# Patient Record
Sex: MALE | Race: WHITE | NOT HISPANIC OR LATINO | Employment: OTHER | URBAN - METROPOLITAN AREA
[De-identification: names, ages, dates, MRNs, and addresses within clinical notes are randomized per-mention and may not be internally consistent; named-entity substitution may affect disease eponyms.]

---

## 2017-01-19 ENCOUNTER — TELEPHONE (OUTPATIENT)
Dept: FAMILY MEDICINE | Facility: CLINIC | Age: 67
End: 2017-01-19

## 2017-01-19 NOTE — Clinical Note
[facility]  [address]  [place] [address]  [phone]    January 30, 2017    Francois Lujan  [address] [place] [address]      Dear Mr. Lujan,    During a recent visit to our clinic, your blood pressure was elevated above the target range for your health.  Because untreated high blood pressure could have a negative effect on your long term health, we hope you have had an opportunity to collect more blood pressure measurements and to visit with your primary care physician regarding the results.      Please schedule a brief clinic RN appointment only to have your blood pressure checked. This is a free visit to you and only takes a few minutes. You can call to schedule [phone] and ask to schedule your free RN blood pressure appointment.      Why is high blood pressure a big deal?      If blood pressure is elevated above target levels you have an increased risk of experiencing a heart attack or stroke, developing heart failure, kidney disease or other chronic diseases.    With appropriate early recognition and treatment, these health problems can be avoided in most cases.  Your physician can help you determine the need for treatment and discuss the non-medication and medication routes to treating high blood pressure as well as evaluate you for possible causes and effects of high blood pressure.    The target range for ideal blood pressure control is less than 140/90 for most individuals. For those who have been diagnosed with heart disease, diabetes, stroke or peripheral vascular disease this target range is less than 130/80.    Sincerely,    Dr. Ivory's Care Team

## 2017-01-19 NOTE — TELEPHONE ENCOUNTER
Called and spoke with Francois. He scheduled a nursing appt only tomorrow for a BP check at 2:00pm.    BP Readings from Last 3 Encounters:   08/30/16 150/74   06/09/16 138/81   05/06/16 140/82     Michelle Simms CMA

## 2017-02-06 DIAGNOSIS — E11.628 TYPE 2 DIABETES MELLITUS WITH OTHER SKIN COMPLICATIONS (H): Primary | ICD-10-CM

## 2017-02-06 NOTE — TELEPHONE ENCOUNTER
Metformin         Last Written Prescription Date: 04/11/16  Last Fill Quantity: 180, # refills: 3  Last Office Visit with G, P or [facility] prescribing provider:  08/30/16        BP Readings from Last 3 Encounters:   08/30/16 150/74   06/09/16 138/81   05/06/16 140/82     MICROL      195   8/30/2016  No results found for this basename: microalbumin  CREATININE   Date Value Ref Range Status   04/11/2016 1.08 0.66 - 1.25 mg/dL Final   ]  GFR ESTIMATE   Date Value Ref Range Status   04/11/2016 69 >60 mL/min/1.7m2 Final     Comment:     Non  GFR Calc   05/29/2015 78 >60 mL/min/1.7m2 Final     Comment:     Non  GFR Calc   07/16/2014 >90 >60 mL/min/1.7m2 Final     GFR ESTIMATE IF BLACK   Date Value Ref Range Status   04/11/2016 83 >60 mL/min/1.7m2 Final     Comment:      GFR Calc   05/29/2015 >90   GFR Calc   >60 mL/min/1.7m2 Final   07/16/2014 >90 >60 mL/min/1.7m2 Final     CHOL      186   4/11/2016  HDL       44   4/11/2016  LDL       86   4/11/2016  LDL      111   5/29/2015  TRIG      282   4/11/2016  CHOLHDLRATIO      3.0   12/10/2013  AST       29   5/29/2015  ALT       35   5/29/2015  A1C      7.0   8/30/2016  A1C     11.2   4/11/2016  A1C      7.6   5/29/2015  A1C      7.0   2/4/2014  A1C      7.2   1/30/2014  POTASSIUM   Date Value Ref Range Status   04/11/2016 4.4 3.4 - 5.3 mmol/L Final

## 2017-02-24 DIAGNOSIS — E29.1 HYPOGONADISM MALE: ICD-10-CM

## 2017-02-24 RX ORDER — TESTOSTERONE 12.5 MG/1.25G
50 GEL TOPICAL DAILY
Qty: 90 PACKET | Refills: 3 | Status: CANCELLED | OUTPATIENT
Start: 2017-02-24

## 2017-03-01 NOTE — TELEPHONE ENCOUNTER
Routing refill request to provider for review/approval because:  Drug not on the [facility] refill protocol     Liz Caballero RN

## 2017-03-03 ENCOUNTER — OFFICE VISIT (OUTPATIENT)
Dept: FAMILY MEDICINE | Facility: CLINIC | Age: 67
End: 2017-03-03
Payer: COMMERCIAL

## 2017-03-03 VITALS
WEIGHT: 275.4 LBS | DIASTOLIC BLOOD PRESSURE: 99 MMHG | SYSTOLIC BLOOD PRESSURE: 161 MMHG | HEART RATE: 62 BPM | BODY MASS INDEX: 37.3 KG/M2 | HEIGHT: 72 IN

## 2017-03-03 DIAGNOSIS — E11.29 TYPE 2 DIABETES MELLITUS WITH MICROALBUMINURIA, WITHOUT LONG-TERM CURRENT USE OF INSULIN (H): Primary | ICD-10-CM

## 2017-03-03 DIAGNOSIS — R80.9 PROTEINURIA: ICD-10-CM

## 2017-03-03 DIAGNOSIS — I10 HTN, GOAL BELOW 140/90: ICD-10-CM

## 2017-03-03 DIAGNOSIS — E03.9 ACQUIRED HYPOTHYROIDISM: ICD-10-CM

## 2017-03-03 DIAGNOSIS — E78.5 HYPERLIPIDEMIA LDL GOAL <70: ICD-10-CM

## 2017-03-03 DIAGNOSIS — R80.9 TYPE 2 DIABETES MELLITUS WITH MICROALBUMINURIA, WITHOUT LONG-TERM CURRENT USE OF INSULIN (H): Primary | ICD-10-CM

## 2017-03-03 DIAGNOSIS — H91.93 HARD OF HEARING, BILATERAL: ICD-10-CM

## 2017-03-03 DIAGNOSIS — I25.10 CORONARY ARTERY DISEASE INVOLVING NATIVE CORONARY ARTERY OF NATIVE HEART WITHOUT ANGINA PECTORIS: ICD-10-CM

## 2017-03-03 LAB
ANION GAP SERPL CALCULATED.3IONS-SCNC: 8 MMOL/L (ref 3–14)
BUN SERPL-MCNC: 24 MG/DL (ref 7–30)
CALCIUM SERPL-MCNC: 8.9 MG/DL (ref 8.5–10.1)
CHLORIDE SERPL-SCNC: 99 MMOL/L (ref 94–109)
CHOLEST SERPL-MCNC: 214 MG/DL
CO2 SERPL-SCNC: 29 MMOL/L (ref 20–32)
CREAT SERPL-MCNC: 0.98 MG/DL (ref 0.66–1.25)
CREAT UR-MCNC: 100 MG/DL
GFR SERPL CREATININE-BSD FRML MDRD: 76 ML/MIN/1.7M2
GLUCOSE SERPL-MCNC: 240 MG/DL (ref 70–99)
HBA1C MFR BLD: 7.7 % (ref 4.3–6)
HDLC SERPL-MCNC: 59 MG/DL
LDLC SERPL CALC-MCNC: 113 MG/DL
MICROALBUMIN UR-MCNC: 891 MG/L
MICROALBUMIN/CREAT UR: 893.68 MG/G CR (ref 0–17)
NONHDLC SERPL-MCNC: 155 MG/DL
POTASSIUM SERPL-SCNC: 4.2 MMOL/L (ref 3.4–5.3)
SODIUM SERPL-SCNC: 136 MMOL/L (ref 133–144)
T4 FREE SERPL-MCNC: 0.76 NG/DL (ref 0.76–1.46)
TRIGL SERPL-MCNC: 209 MG/DL
TSH SERPL DL<=0.05 MIU/L-ACNC: 1.42 MU/L (ref 0.4–4)

## 2017-03-03 PROCEDURE — 84439 ASSAY OF FREE THYROXINE: CPT | Performed by: FAMILY MEDICINE

## 2017-03-03 PROCEDURE — 84443 ASSAY THYROID STIM HORMONE: CPT | Performed by: FAMILY MEDICINE

## 2017-03-03 PROCEDURE — 90471 IMMUNIZATION ADMIN: CPT | Performed by: FAMILY MEDICINE

## 2017-03-03 PROCEDURE — 36415 COLL VENOUS BLD VENIPUNCTURE: CPT | Performed by: FAMILY MEDICINE

## 2017-03-03 PROCEDURE — 90732 PPSV23 VACC 2 YRS+ SUBQ/IM: CPT | Performed by: FAMILY MEDICINE

## 2017-03-03 PROCEDURE — 82043 UR ALBUMIN QUANTITATIVE: CPT | Performed by: FAMILY MEDICINE

## 2017-03-03 PROCEDURE — 80048 BASIC METABOLIC PNL TOTAL CA: CPT | Performed by: FAMILY MEDICINE

## 2017-03-03 PROCEDURE — 99214 OFFICE O/P EST MOD 30 MIN: CPT | Mod: 25 | Performed by: FAMILY MEDICINE

## 2017-03-03 PROCEDURE — 83036 HEMOGLOBIN GLYCOSYLATED A1C: CPT | Performed by: FAMILY MEDICINE

## 2017-03-03 PROCEDURE — 80061 LIPID PANEL: CPT | Performed by: FAMILY MEDICINE

## 2017-03-03 RX ORDER — HYDROCHLOROTHIAZIDE 12.5 MG/1
12.5 TABLET ORAL DAILY
Qty: 90 TABLET | Refills: 3 | Status: SHIPPED | OUTPATIENT
Start: 2017-03-03 | End: 2017-12-14

## 2017-03-03 RX ORDER — METOPROLOL TARTRATE 50 MG
50 TABLET ORAL 2 TIMES DAILY
Qty: 180 TABLET | Refills: 3 | Status: SHIPPED | OUTPATIENT
Start: 2017-03-03 | End: 2018-04-12

## 2017-03-03 RX ORDER — ATORVASTATIN CALCIUM 40 MG/1
40 TABLET, FILM COATED ORAL DAILY
Qty: 90 TABLET | Refills: 3 | Status: SHIPPED | OUTPATIENT
Start: 2017-03-03 | End: 2018-02-23

## 2017-03-03 RX ORDER — LEVOTHYROXINE SODIUM 50 UG/1
50 TABLET ORAL DAILY
Qty: 90 TABLET | Refills: 3 | Status: SHIPPED | OUTPATIENT
Start: 2017-03-03 | End: 2018-04-12

## 2017-03-03 RX ORDER — GLIPIZIDE 10 MG/1
20 TABLET, FILM COATED, EXTENDED RELEASE ORAL
Qty: 180 TABLET | Refills: 3 | Status: SHIPPED | OUTPATIENT
Start: 2017-03-03 | End: 2018-03-14

## 2017-03-03 RX ORDER — AMLODIPINE BESYLATE 5 MG/1
5 TABLET ORAL DAILY
Qty: 30 TABLET | Refills: 1 | Status: SHIPPED | OUTPATIENT
Start: 2017-03-03 | End: 2017-07-28

## 2017-03-03 ASSESSMENT — ANXIETY QUESTIONNAIRES
2. NOT BEING ABLE TO STOP OR CONTROL WORRYING: NOT AT ALL
IF YOU CHECKED OFF ANY PROBLEMS ON THIS QUESTIONNAIRE, HOW DIFFICULT HAVE THESE PROBLEMS MADE IT FOR YOU TO DO YOUR WORK, TAKE CARE OF THINGS AT HOME, OR GET ALONG WITH OTHER PEOPLE: NOT DIFFICULT AT ALL
3. WORRYING TOO MUCH ABOUT DIFFERENT THINGS: NOT AT ALL
7. FEELING AFRAID AS IF SOMETHING AWFUL MIGHT HAPPEN: NOT AT ALL
1. FEELING NERVOUS, ANXIOUS, OR ON EDGE: NOT AT ALL
GAD7 TOTAL SCORE: 0
5. BEING SO RESTLESS THAT IT IS HARD TO SIT STILL: NOT AT ALL
6. BECOMING EASILY ANNOYED OR IRRITABLE: NOT AT ALL

## 2017-03-03 ASSESSMENT — PATIENT HEALTH QUESTIONNAIRE - PHQ9: 5. POOR APPETITE OR OVEREATING: NOT AT ALL

## 2017-03-03 NOTE — NURSING NOTE
Chief Complaint   Patient presents with     Refill Request     needs refills on all medications       Initial BP (!) 161/99  Pulse 62  Ht 6' (1.829 m)  Wt 275 lb 6.4 oz (124.9 kg)  BMI 37.35 kg/m2 Estimated body mass index is 37.35 kg/(m^2) as calculated from the following:    Height as of this encounter: 6' (1.829 m).    Weight as of this encounter: 275 lb 6.4 oz (124.9 kg).  Medication Reconciliation: complete

## 2017-03-03 NOTE — PROGRESS NOTES
"a  SUBJECTIVE:                                                    Francois Lujan is 66 year old male   Chief Complaint   Patient presents with     Refill Request     needs refills on all medications       Diabetes Follow-up      Patient is checking blood sugars: 1-2 times daily. Averaging 150 daily    Diabetic concerns: None     Symptoms of hypoglycemia (low blood sugar): none     Paresthesias (numbness or burning in feet) or sores: Yes \"i've had that for a long time\"     Date of last diabetic eye exam: couple years ago     Hyperlipidemia Follow-Up      Rate your low fat/cholesterol diet?: good    Taking statin?  Yes, no muscle aches from statin    Other lipid medications/supplements?:  none     Hypertension Follow-up      Outpatient blood pressures are not being checked.    Low Salt Diet: not monitoring salt       Hypothyroidism Follow-up      Since last visit, patient describes the following symptoms: dry skin       Amount of exercise or physical activity: None    Problems taking medications regularly: No    Medication side effects: none  Diet: regular (no restrictions)    PHQ-9 SCORE 5/29/2015 4/11/2016 3/3/2017   Total Score 0 - -   Total Score - 0 0     CRISTIAN-7 SCORE 8/19/2014 5/29/2015 3/3/2017   Total Score 3 0 -   Total Score - - 0         Problem list and histories reviewed & adjusted, as indicated.  Additional history: as documented    Patient Active Problem List   Diagnosis     S/P CABG (coronary artery bypass graft)     Hypogonadotropic hypogonadism (H)     Hypothyroidism     Hard of hearing     Hyperlipidemia LDL goal <70     ADELA (obstructive sleep apnea)     Hypertension goal BP (blood pressure) < 140/90     Numbness of hand, right     Health Care Home     Radicular pain of lumbosacral region     Moderate major depression (H)     Elevated sed rate     Elevated C-reactive protein (CRP)     History of back surgery     Polymyalgia rheumatica (H)     Multiple joint pain     OA (osteoarthritis) of knee, right "     ACL (anterior cruciate ligament) tear     HTN, goal below 140/90     Necrobiosis lipoidica     Type 2 diabetes mellitus with complication (H)     Type 2 diabetes mellitus with other skin complications (H)     CKD (chronic kidney disease) stage 2, GFR 60-89 ml/min     Proteinuria     Type 2 diabetes mellitus with other diabetic kidney complication (H)     Diabetic polyneuropathy associated with type 2 diabetes mellitus (H)     Type 2 diabetes mellitus with other circulatory complications (H)     Polyneuropathy associated with underlying disease (H)     Alcohol dependence with other alcohol-induced disorder (H)     Past Surgical History   Procedure Laterality Date     Back surgery       Orthopedic surgery       Baker cyst removed - right knee, and mesiscus tear repair     Hernia repair       infantile hernia repair     Bypass graft artery coronary  11/17/2011     Procedure:BYPASS GRAFT ARTERY CORONARY; CORONARY ARTERY BYPASS, Right, OM, LAD WITH ENDOVEIN HARVEST, ON PUMP; Surgeon:LEONEL MG; Location: OR     Colonoscopy N/A 6/9/2016     Procedure: COLONOSCOPY;  Surgeon: Isidro Humphreys MD;  Location: [facility]       Social History   Substance Use Topics     Smoking status: Former Smoker     Packs/day: 2.00     Years: 30.00     Quit date: 11/13/1997     Smokeless tobacco: Never Used     Alcohol use 6.0 oz/week     12 Cans of beer per week      Comment: 20+ beers a week     Family History   Problem Relation Age of Onset     HEART DISEASE Father      CANCER Mother      C.A.D. Brother          Current Outpatient Prescriptions   Medication Sig Dispense Refill     metFORMIN (GLUCOPHAGE) 500 MG tablet Take 2 tablets (1,000 mg) by mouth 2 times daily (with meals) (Needs follow-up appointment for this medication) 120 tablet 0     testosterone (ANDROGEL) 50 MG/5GM (1%) gel Place 1 packet (50 mg) onto the skin daily 90 packet 3     glipiZIDE (GLUCOTROL XL) 10 MG 24 hr tablet Take 2 tablets (20 mg) by mouth daily (with  breakfast) OVERDUE for follow up appointment! 180 tablet 3     metoprolol (LOPRESSOR) 50 MG tablet Take 1 tablet (50 mg) by mouth 2 times daily 180 tablet 3     hydrochlorothiazide 12.5 MG TABS Take 1 tablet (12.5 mg) by mouth daily 90 tablet 3     levothyroxine (SYNTHROID) 50 MCG tablet Take 1 tablet (50 mcg) by mouth daily 90 tablet 3     atorvastatin (LIPITOR) 40 MG tablet Take 1 tablet (40 mg) by mouth daily 30 tablet 11     aspirin 325 MG tablet Take 325 mg by mouth daily       naproxen (NAPROSYN) 250 MG tablet Take 250 mg by mouth 2 times daily (with meals) 2 tabs bid       Multiple Vitamins-Minerals (CENTRUM SILVER) per tablet Take 1 tablet by mouth daily       Omega-3 Fatty Acids (OMEGA 3 PO) Take 1,200 tablets by mouth every morning = Two capsules       blood glucose (NO BRAND SPECIFIED) lancets standard Use to test blood sugar 3 times daily or as directed. 1 Box 3     blood glucose monitoring (ACCU-CHEK SMARTVIEW) test strip Use to test blood sugar 3 times daily or as directed. 100 each 12     Allergies   Allergen Reactions     Ivp Dye [Contrast Dye] Hives     Brief bout - 10 minutes duration     Recent Labs   Lab Test  08/30/16   1024  04/11/16   1030  05/29/15   1019  07/16/14   1113   02/27/14   0923  02/03/14   12/10/13   0849  10/04/13   1240   A1C  7.0*  11.2*  7.6*   --    --    --    < >   --    < >  8.0*  12.3*   LDL   --   86  111   --    --   97   --    --    --   104  147*   HDL   --   44   --    --    --    --    --    --    --   65  53   TRIG   --   282*   --    --    --    --    --    --    --   100  167*   ALT   --    --   35  19   --    --    --   18   < >   --    --    CR   --   1.08  0.97  0.83   < >   --    --    --    < >  1.03  0.81   GFRESTIMATED   --   69  78  >90   < >   --    --    --    < >  73  >90   GFRESTBLACK   --   83  >90   GFR Calc    >90   --    --    --    --    < >  88  >90   POTASSIUM   --   4.4  4.5  5.5*   < >   --    --   5.0   < >  5.3  4.6   TSH    --   0.84  1.11  0.86   < >   --    --    --    --   1.17  0.59    < > = values in this interval not displayed.      BP Readings from Last 3 Encounters:   03/03/17 (!) 161/99   08/30/16 150/74   06/09/16 138/81    Wt Readings from Last 3 Encounters:   03/03/17 275 lb 6.4 oz (124.9 kg)   08/30/16 270 lb (122.5 kg)   06/09/16 255 lb (115.7 kg)         ROS:  Constitutional, HEENT, cardiovascular, pulmonary, gi and gu systems are negative, except as otherwise noted.    OBJECTIVE:                                                    BP (!) 161/99  Pulse 62  Ht 6' (1.829 m)  Wt 275 lb 6.4 oz (124.9 kg)  BMI 37.35 kg/m2  GENERAL APPEARANCE ADULT: Alert, no acute distress, obese, no distress, cooperative  HENT: Ears and TMs normal, oral mucosa and posterior oropharynx normal  RESP: lungs clear to auscultation   CV: normal rate, regular rhythm, no murmur or gallop  MS: extremities normal, no peripheral edema  foot exam abnormal, DP and PT pulses intact, trophic changes or ulcerative lesions, normal monofilament exam abnormal with general all over decreased sensation  SKIN: no suspicious lesions or rashes  NEURO: Alert, oriented, speech and mentation normal  PSYCH: mentation appears normal., complaining about president Cortes  Diagnostic Test Results:  Results for orders placed or performed in visit on 08/30/16   Hemoglobin A1c   Result Value Ref Range    Hemoglobin A1C 7.0 (H) 4.3 - 6.0 %   Microalbumin quantitative random urine   Result Value Ref Range    Creatinine Urine 60 mg/dL    Albumin Urine mg/L 195 mg/L    Albumin Urine mg/g Cr 324.46 (H) 0 - 17 mg/g Cr   Hepatitis C antibody   Result Value Ref Range    Hepatitis C Antibody  NR     Nonreactive   Assay performance characteristics have not been established for newborns,   infants, and children          ASSESSMENT/PLAN:                                                    1. Type 2 diabetes mellitus with other skin complications (H)  Severe progression of diabetes with  proteinuria and hypertension out of control, stopped ARB (made him dizzy)   - HEMOGLOBIN A1C  - Basic metabolic panel  - TSH  - T4 FREE  - Lipid panel reflex to direct LDL  - Albumin Random Urine Quantitative  - metFORMIN (GLUCOPHAGE) 500 MG tablet; Take 2 tablets (1,000 mg) by mouth 2 times daily (with meals) (Needs follow-up appointment for this medication)  Dispense: 180 tablet; Refill: 3  - glipiZIDE (GLUCOTROL XL) 10 MG 24 hr tablet; Take 2 tablets (20 mg) by mouth daily (with breakfast)  Dispense: 180 tablet; Refill: 3  - US abdominal aorta limited; Future  - PNEUMOCOCCAL VACCINE,ADULT,SQ OR IM    2. Proteinuria  Getting worse, related to elevated blood pressure, does not wish to stop drinking alcohol.  - Albumin Random Urine Quantitative    3. HTN, goal below 140/90  Not at goal, needs ARB or ACE will try different from atorvastatin that made him dizzy.  - metoprolol (LOPRESSOR) 50 MG tablet; Take 1 tablet (50 mg) by mouth 2 times daily  Dispense: 180 tablet; Refill: 3  - hydrochlorothiazide 12.5 MG TABS tablet; Take 1 tablet (12.5 mg) by mouth daily  Dispense: 90 tablet; Refill: 3    4. Acquired hypothyroidism  due for review and refill, taking medication without difficulty    - levothyroxine (SYNTHROID) 50 MCG tablet; Take 1 tablet (50 mcg) by mouth daily  Dispense: 90 tablet; Refill: 3    5. Hyperlipidemia LDL goal <70  due for labs and refill, taking medication without difficulty  - atorvastatin (LIPITOR) 40 MG tablet; Take 1 tablet (40 mg) by mouth daily  Dispense: 90 tablet; Refill: 3  - US abdominal aorta limited; Future    Karen Ivory MD  [facility]

## 2017-03-03 NOTE — PATIENT INSTRUCTIONS
Thank you for choosing [facility].  You may be receiving a survey in the mail from Niki Shen regarding your visit today.  Please take a few minutes to complete and return the survey to let us know how we are doing.      If you have questions or concerns, please contact us via [employer]inos or you can contact your care team at [phone].    Our Clinic hours are:  Monday 6:40 am  to 7:00 pm  Tuesday -Friday 6:40 am to 5:00 pm    The [place] outpatient lab hours are:  Monday - Friday 6:10 am to 4:45 pm  Saturdays 7:00 am to 11:00 am  Appointments are required, call [phone]    If you have clinical questions after hours or would like to schedule an appointment,  call the clinic at [phone].

## 2017-03-03 NOTE — NURSING NOTE
Screening Questionnaire for Adult Immunization    Are you sick today?   No   Do you have allergies to medications, food, a vaccine component or latex?   No   Have you ever had a serious reaction after receiving a vaccination?   No   Do you have a long-term health problem with heart disease, lung disease, asthma, kidney disease, metabolic disease (e.g. diabetes), anemia, or other blood disorder?   No   Do you have cancer, leukemia, HIV/AIDS, or any other immune system problem?   No   In the past 3 months, have you taken medications that affect  your immune system, such as prednisone, other steroids, or anticancer drugs; drugs for the treatment of rheumatoid arthritis, Crohn s disease, or psoriasis; or have you had radiation treatments?   No   Have you had a seizure, or a brain or other nervous system problem?   No   During the past year, have you received a transfusion of blood or blood     products, or been given immune (gamma) globulin or antiviral drug?   No   For women: Are you pregnant or is there a chance you could become        pregnant during the next month?   No   Have you received any vaccinations in the past 4 weeks?   No     Immunization questionnaire answers were all negative.      MNVFC doesn't apply on this patient    Per orders of Dr. Ivory, injection of Prevnar 23 given by Michelle Simms. Patient instructed to remain in clinic for 20 minutes afterwards, and to report any adverse reaction to me immediately.       Screening performed by Michelle Simms on 3/3/2017 at 12:17 PM.

## 2017-03-03 NOTE — MR AVS SNAPSHOT
After Visit Summary   3/3/2017    Francois Lujan    MRN: [id number]           Patient Information     Date Of Birth          1950        Visit Information        Provider Department      3/3/2017 11:00 AM Karen Ivory MD [facility]        Today's Diagnoses     Type 2 diabetes mellitus with other skin complications (H)    -  1    Proteinuria        HTN, goal below 140/90        Acquired hypothyroidism        Hyperlipidemia LDL goal <70          Care Instructions          Thank you for choosing [facility].  You may be receiving a survey in the mail from Niki Shen regarding your visit today.  Please take a few minutes to complete and return the survey to let us know how we are doing.      If you have questions or concerns, please contact us via Sientra or you can contact your care team at [phone].    Our Clinic hours are:  Monday 6:40 am  to 7:00 pm  Tuesday -Friday 6:40 am to 5:00 pm    The [place] outpatient lab hours are:  Monday - Friday 6:10 am to 4:45 pm  Saturdays 7:00 am to 11:00 am  Appointments are required, call [phone]    If you have clinical questions after hours or would like to schedule an appointment,  call the clinic at [phone].          Follow-ups after your visit        Future tests that were ordered for you today     Open Future Orders        Priority Expected Expires Ordered    US abdominal aorta limited Routine  3/3/2018 3/3/2017            Who to contact     If you have questions or need follow up information about today's clinic visit or your schedule please contact [facility] directly at [phone].  Normal or non-critical lab and imaging results will be communicated to you by MyChart, letter or phone within 4 business days after the clinic has received the results. If you do not hear from us within 7 days, please contact the clinic through Slidet or phone. If you have a critical or abnormal lab result, we will  "notify you by phone as soon as possible.  Submit refill requests through [employer] or call your pharmacy and they will forward the refill request to us. Please allow 3 business days for your refill to be completed.          Additional Information About Your Visit        Dynamic Social Network [employer]ysisharDiabetica Information     [employer] lets you send messages to your doctor, view your test results, renew your prescriptions, schedule appointments and more. To sign up, go to [email or web address] . Click on \"Log in\" on the left side of the screen, which will take you to the Welcome page. Then click on \"Sign up Now\" on the right side of the page.     You will be asked to enter the access code listed below, as well as some personal information. Please follow the directions to create your username and password.     Your access code is: [email or web address]  Expires: 2017 11:05 AM     Your access code will  in 90 days. If you need help or a new code, please call your [place] clinic or [phone].        Care EveryWhere ID     This is your Care EveryWhere ID. This could be used by other organizations to access your [place] medical records  [id number]        Your Vitals Were     Pulse Height BMI (Body Mass Index)             62 6' (1.829 m) 37.35 kg/m2          Blood Pressure from Last 3 Encounters:   17 (!) 161/99   16 150/74   16 138/81    Weight from Last 3 Encounters:   17 275 lb 6.4 oz (124.9 kg)   16 270 lb (122.5 kg)   16 255 lb (115.7 kg)              We Performed the Following     Albumin Random Urine Quantitative     Basic metabolic panel     DEPRESSION ACTION PLAN (DAP)     HEMOGLOBIN A1C     Lipid panel reflex to direct LDL     T4 FREE     TSH          Today's Medication Changes          These changes are accurate as of: 3/3/17 11:05 AM.  If you have any questions, ask your nurse or doctor.               These medicines have changed or have updated prescriptions.        Dose/Directions    glipiZIDE 10 MG " 24 hr tablet   Commonly known as:  GLUCOTROL XL   This may have changed:  additional instructions   Used for:  Type 2 diabetes mellitus with other skin complications (H)   Changed by:  Karen Ivory MD        Dose:  20 mg   Take 2 tablets (20 mg) by mouth daily (with breakfast)   Quantity:  180 tablet   Refills:  3            Where to get your medicines      These medications were sent to [facility] [id number] [place] [address]  [id number] [place] [address]     Phone:  [phone]     atorvastatin 40 MG tablet    glipiZIDE 10 MG 24 hr tablet    hydrochlorothiazide 12.5 MG Tabs tablet    levothyroxine 50 MCG tablet    metFORMIN 500 MG tablet    metoprolol 50 MG tablet                Primary Care Provider Office Phone # Fax #    Karen Ivory [phone]       [facility] [address] [facility] [place] [address]        Thank you!     Thank you for choosing [facility]  for your care. Our goal is always to provide you with excellent care. Hearing back from our patients is one way we can continue to improve our services. Please take a few minutes to complete the written survey that you may receive in the mail after your visit with us. Thank you!             Your Updated Medication List - Protect others around you: Learn how to safely use, store and throw away your medicines at www.disposemymeds.org.          This list is accurate as of: 3/3/17 11:05 AM.  Always use your most recent med list.                   Brand Name Dispense Instructions for use    aspirin 325 MG tablet      Take 325 mg by mouth daily       atorvastatin 40 MG tablet    LIPITOR    90 tablet    Take 1 tablet (40 mg) by mouth daily       blood glucose lancets standard    no brand specified    1 Box    Use to test blood sugar 3 times daily or as directed.       blood glucose monitoring test strip    ACCU-CHEK SMARTVIEW    100 each    Use to test blood sugar 3 times daily or  as directed.       CENTRUM SILVER per tablet      Take 1 tablet by mouth daily       glipiZIDE 10 MG 24 hr tablet    GLUCOTROL XL    180 tablet    Take 2 tablets (20 mg) by mouth daily (with breakfast)       hydrochlorothiazide 12.5 MG Tabs tablet     90 tablet    Take 1 tablet (12.5 mg) by mouth daily       levothyroxine 50 MCG tablet    SYNTHROID    90 tablet    Take 1 tablet (50 mcg) by mouth daily       metFORMIN 500 MG tablet    GLUCOPHAGE    180 tablet    Take 2 tablets (1,000 mg) by mouth 2 times daily (with meals) (Needs follow-up appointment for this medication)       metoprolol 50 MG tablet    LOPRESSOR    180 tablet    Take 1 tablet (50 mg) by mouth 2 times daily       naproxen 250 MG tablet    NAPROSYN     Take 250 mg by mouth 2 times daily (with meals) 2 tabs bid       OMEGA 3 PO      Take 1,200 tablets by mouth every morning = Two capsules       testosterone 50 MG/5GM (1%) topical gel    ANDROGEL    90 packet    Place 1 packet (50 mg) onto the skin daily

## 2017-03-04 ASSESSMENT — PATIENT HEALTH QUESTIONNAIRE - PHQ9: SUM OF ALL RESPONSES TO PHQ QUESTIONS 1-9: 0

## 2017-03-04 ASSESSMENT — ANXIETY QUESTIONNAIRES: GAD7 TOTAL SCORE: 0

## 2017-03-05 NOTE — PROGRESS NOTES
A1C at goal, MAC urine very high, is spilling protein and harming kidneys because blood pressure is too high, thyroid labs are normal. Plan see provider for blood pressure control.  May need cardiology assistance..  Please notify.        Thank you. FILEMON RYAN MD

## 2017-03-06 ENCOUNTER — TELEPHONE (OUTPATIENT)
Dept: UROLOGY | Facility: CLINIC | Age: 67
End: 2017-03-06

## 2017-03-07 ENCOUNTER — OFFICE VISIT (OUTPATIENT)
Dept: FAMILY MEDICINE | Facility: CLINIC | Age: 67
End: 2017-03-07
Payer: COMMERCIAL

## 2017-03-07 VITALS
BODY MASS INDEX: 37.25 KG/M2 | HEIGHT: 72 IN | SYSTOLIC BLOOD PRESSURE: 164 MMHG | WEIGHT: 275 LBS | DIASTOLIC BLOOD PRESSURE: 82 MMHG | TEMPERATURE: 98 F | HEART RATE: 56 BPM

## 2017-03-07 DIAGNOSIS — R80.9 PROTEINURIA: Primary | ICD-10-CM

## 2017-03-07 DIAGNOSIS — E11.59 TYPE 2 DIABETES MELLITUS WITH OTHER CIRCULATORY COMPLICATIONS (H): ICD-10-CM

## 2017-03-07 DIAGNOSIS — F10.288 ALCOHOL DEPENDENCE WITH OTHER ALCOHOL-INDUCED DISORDER (H): ICD-10-CM

## 2017-03-07 DIAGNOSIS — I10 HYPERTENSION GOAL BP (BLOOD PRESSURE) < 140/90: ICD-10-CM

## 2017-03-07 PROCEDURE — 99214 OFFICE O/P EST MOD 30 MIN: CPT | Performed by: FAMILY MEDICINE

## 2017-03-07 NOTE — MR AVS SNAPSHOT
"              After Visit Summary   3/7/2017    Francois Lujan    MRN: [id number]           Patient Information     Date Of Birth          1950        Visit Information        Provider Department      3/7/2017 9:20 AM Karen Ivory MD [facility]         Follow-ups after your visit        Who to contact     If you have questions or need follow up information about today's clinic visit or your schedule please contact [facility] directly at [phone].  Normal or non-critical lab and imaging results will be communicated to you by MyChart, letter or phone within 4 business days after the clinic has received the results. If you do not hear from us within 7 days, please contact the clinic through swabrhart or phone. If you have a critical or abnormal lab result, we will notify you by phone as soon as possible.  Submit refill requests through Ecrio or call your pharmacy and they will forward the refill request to us. Please allow 3 business days for your refill to be completed.          Additional Information About Your Visit        [facility] Information     Ecrio lets you send messages to your doctor, view your test results, renew your prescriptions, schedule appointments and more. To sign up, go to [email or web address] . Click on \"Log in\" on the left side of the screen, which will take you to the Welcome page. Then click on \"Sign up Now\" on the right side of the page.     You will be asked to enter the access code listed below, as well as some personal information. Please follow the directions to create your username and password.     Your access code is: [email or web address]  Expires: 2017 11:05 AM     Your access code will  in 90 days. If you need help or a new code, please call your [facility] or [phone].        Care EveryWhere ID     This is your Care EveryWhere ID. This could be used by other organizations to access your [place] medical " records  [id number]        Your Vitals Were     Pulse Temperature Height BMI (Body Mass Index)          56 98  F (36.7  C) (Tympanic) 6' (1.829 m) 37.3 kg/m2         Blood Pressure from Last 3 Encounters:   03/07/17 176/75   03/03/17 (!) 161/99   08/30/16 150/74    Weight from Last 3 Encounters:   03/07/17 275 lb (124.7 kg)   03/03/17 275 lb 6.4 oz (124.9 kg)   08/30/16 270 lb (122.5 kg)              Today, you had the following     No orders found for display         Today's Medication Changes          These changes are accurate as of: 3/7/17  9:58 AM.  If you have any questions, ask your nurse or doctor.               Stop taking these medicines if you haven't already. Please contact your care team if you have questions.     CENTRUM SILVER per tablet   Stopped by:  Karen Ivory MD           naproxen 250 MG tablet   Commonly known as:  NAPROSYN   Stopped by:  Karen Ivory MD           OMEGA 3 PO   Stopped by:  Karen Ivory MD                    Primary Care Provider Office Phone # Fax #    Karen Ivory [phone]       [facility] [address] [facility] [address]        Thank you!     Thank you for choosing [facility]  for your care. Our goal is always to provide you with excellent care. Hearing back from our patients is one way we can continue to improve our services. Please take a few minutes to complete the written survey that you may receive in the mail after your visit with us. Thank you!             Your Updated Medication List - Protect others around you: Learn how to safely use, store and throw away your medicines at www.disposemymeds.org.          This list is accurate as of: 3/7/17  9:58 AM.  Always use your most recent med list.                   Brand Name Dispense Instructions for use    ACE/ARB NOT PRESCRIBED (INTENTIONAL)      Please choose reason not prescribed, below       amLODIPine 5 MG tablet    NORVASC    30 tablet     Take 1 tablet (5 mg) by mouth daily       aspirin 325 MG tablet      Take 325 mg by mouth daily       atorvastatin 40 MG tablet    LIPITOR    90 tablet    Take 1 tablet (40 mg) by mouth daily       blood glucose lancets standard    no brand specified    1 Box    Use to test blood sugar 3 times daily or as directed.       blood glucose monitoring test strip    ACCU-CHEK SMARTVIEW    100 each    Use to test blood sugar 3 times daily or as directed.       glipiZIDE 10 MG 24 hr tablet    GLUCOTROL XL    180 tablet    Take 2 tablets (20 mg) by mouth daily (with breakfast)       hydrochlorothiazide 12.5 MG Tabs tablet     90 tablet    Take 1 tablet (12.5 mg) by mouth daily       levothyroxine 50 MCG tablet    SYNTHROID    90 tablet    Take 1 tablet (50 mcg) by mouth daily       metFORMIN 500 MG tablet    GLUCOPHAGE    180 tablet    Take 2 tablets (1,000 mg) by mouth 2 times daily (with meals) (Needs follow-up appointment for this medication)       metoprolol 50 MG tablet    LOPRESSOR    180 tablet    Take 1 tablet (50 mg) by mouth 2 times daily       testosterone 50 MG/5GM (1%) topical gel    ANDROGEL    90 packet    Place 1 packet (50 mg) onto the skin daily

## 2017-03-07 NOTE — PROGRESS NOTES
"a  SUBJECTIVE:                                                    Francois Lujan is 66 year old male   Chief Complaint   Patient presents with     Results     Patient is here to discuss lab results from 03/03/2017     Chief Complaint   Patient presents with     Results     Patient is here to discuss lab results from 03/03/2017     Problem list and histories reviewed & adjusted, as indicated.  Additional history: is drinking 4-5 beer and 4-5 mixed drinks a day, \"wife will be happy I am quitting\", has not picked up norvasc yet, did not realize had prescription at pharmacy.    Patient Active Problem List   Diagnosis     S/P CABG (coronary artery bypass graft)     Hypogonadotropic hypogonadism (H)     Hypothyroidism     Hard of hearing     Hyperlipidemia LDL goal <70     ADELA (obstructive sleep apnea)     Hypertension goal BP (blood pressure) < 140/90     Numbness of hand, right     Health Care Home     Radicular pain of lumbosacral region     Moderate major depression (H)     Elevated sed rate     Elevated C-reactive protein (CRP)     History of back surgery     Polymyalgia rheumatica (H)     Multiple joint pain     OA (osteoarthritis) of knee, right     ACL (anterior cruciate ligament) tear     HTN, goal below 140/90     Necrobiosis lipoidica     Type 2 diabetes mellitus with complication (H)     Type 2 diabetes mellitus with other skin complications (H)     CKD (chronic kidney disease) stage 2, GFR 60-89 ml/min     Proteinuria     Type 2 diabetes mellitus with other diabetic kidney complication (H)     Diabetic polyneuropathy associated with type 2 diabetes mellitus (H)     Type 2 diabetes mellitus with other circulatory complications (H)     Polyneuropathy associated with underlying disease (H)     Alcohol dependence with other alcohol-induced disorder (H)     Coronary artery disease involving native coronary artery of native heart without angina pectoris     Past Surgical History   Procedure Laterality Date     Back " surgery       Orthopedic surgery       Baker cyst removed - right knee, and mesiscus tear repair     Hernia repair       infantile hernia repair     Bypass graft artery coronary  11/17/2011     Procedure:BYPASS GRAFT ARTERY CORONARY; CORONARY ARTERY BYPASS, Right, OM, LAD WITH ENDOVEIN HARVEST, ON PUMP; Surgeon:LEONEL MG; Location:[facility] OR     Colonoscopy N/A 6/9/2016     Procedure: COLONOSCOPY;  Surgeon: Isidro Humphreys MD;  Location: [facility]       Social History   Substance Use Topics     Smoking status: Former Smoker     Packs/day: 2.00     Years: 30.00     Quit date: 11/13/1997     Smokeless tobacco: Never Used     Alcohol use 6.0 oz/week     12 Cans of beer per week      Comment: 20+ beers a week     Family History   Problem Relation Age of Onset     HEART DISEASE Father      CANCER Mother      C.A.D. Brother          Current Outpatient Prescriptions   Medication Sig Dispense Refill     metFORMIN (GLUCOPHAGE) 500 MG tablet Take 2 tablets (1,000 mg) by mouth 2 times daily (with meals) (Needs follow-up appointment for this medication) 180 tablet 3     glipiZIDE (GLUCOTROL XL) 10 MG 24 hr tablet Take 2 tablets (20 mg) by mouth daily (with breakfast) 180 tablet 3     metoprolol (LOPRESSOR) 50 MG tablet Take 1 tablet (50 mg) by mouth 2 times daily 180 tablet 3     hydrochlorothiazide 12.5 MG TABS tablet Take 1 tablet (12.5 mg) by mouth daily 90 tablet 3     levothyroxine (SYNTHROID) 50 MCG tablet Take 1 tablet (50 mcg) by mouth daily 90 tablet 3     atorvastatin (LIPITOR) 40 MG tablet Take 1 tablet (40 mg) by mouth daily 90 tablet 3     ACE/ARB NOT PRESCRIBED, INTENTIONAL, Please choose reason not prescribed, below       amLODIPine (NORVASC) 5 MG tablet Take 1 tablet (5 mg) by mouth daily 30 tablet 1     blood glucose (NO BRAND SPECIFIED) lancets standard Use to test blood sugar 3 times daily or as directed. 1 Box 3     blood glucose monitoring (ACCU-CHEK SMARTVIEW) test strip Use to test blood sugar 3 times daily  or as directed. 100 each 12     testosterone (ANDROGEL) 50 MG/5GM (1%) gel Place 1 packet (50 mg) onto the skin daily 90 packet 3     aspirin 325 MG tablet Take 325 mg by mouth daily       Allergies   Allergen Reactions     Ivp Dye [Contrast Dye] Hives     Brief bout - 10 minutes duration     Recent Labs   Lab Test  03/03/17   1109  08/30/16   1024  04/11/16   1030  05/29/15   1019  07/16/14   1113  02/03/14   12/10/13   0849   A1C  7.7*  7.0*  11.2*  7.6*   --    < >   --    < >  8.0*   LDL  113*   --   86  111   --    < >   --    --   104   HDL  59   --   44   --    --    --    --    --   65   TRIG  209*   --   282*   --    --    --    --    --   100   ALT   --    --    --   35  19   --   18   < >   --    CR  0.98   --   1.08  0.97  0.83   < >   --    < >  1.03   GFRESTIMATED  76   --   69  78  >90   < >   --    < >  73   GFRESTBLACK  >90   GFR Calc     --   83  >90   GFR Calc    >90   --    --    < >  88   POTASSIUM  4.2   --   4.4  4.5  5.5*   < >  5.0   < >  5.3   TSH  1.42   --   0.84  1.11  0.86   < >   --    --   1.17    < > = values in this interval not displayed.      BP Readings from Last 3 Encounters:   03/07/17 164/82   03/03/17 (!) 161/99   08/30/16 150/74    Wt Readings from Last 3 Encounters:   03/07/17 275 lb (124.7 kg)   03/03/17 275 lb 6.4 oz (124.9 kg)   08/30/16 270 lb (122.5 kg)         ROS:  Constitutional, HEENT, cardiovascular, pulmonary, gi and gu systems are negative, except as otherwise noted.    OBJECTIVE:                                                    /82  Pulse 56  Temp 98  F (36.7  C) (Tympanic)  Ht 6' (1.829 m)  Wt 275 lb (124.7 kg)  BMI 37.3 kg/m2  GENERAL APPEARANCE ADULT: Alert, no acute distress  PSYCH: mentation appears normal., affect and mood normal  Diagnostic Test Results:  Results for orders placed or performed in visit on 03/03/17   HEMOGLOBIN A1C   Result Value Ref Range    Hemoglobin A1C 7.7 (H) 4.3 - 6.0 %   Basic metabolic  panel   Result Value Ref Range    Sodium 136 133 - 144 mmol/L    Potassium 4.2 3.4 - 5.3 mmol/L    Chloride 99 94 - 109 mmol/L    Carbon Dioxide 29 20 - 32 mmol/L    Anion Gap 8 3 - 14 mmol/L    Glucose 240 (H) 70 - 99 mg/dL    Urea Nitrogen 24 7 - 30 mg/dL    Creatinine 0.98 0.66 - 1.25 mg/dL    GFR Estimate 76 >60 mL/min/1.7m2    GFR Estimate If Black >90   GFR Calc   >60 mL/min/1.7m2    Calcium 8.9 8.5 - 10.1 mg/dL   TSH   Result Value Ref Range    TSH 1.42 0.40 - 4.00 mU/L   T4 FREE   Result Value Ref Range    T4 Free 0.76 0.76 - 1.46 ng/dL   Lipid panel reflex to direct LDL   Result Value Ref Range    Cholesterol 214 (H) <200 mg/dL    Triglycerides 209 (H) <150 mg/dL    HDL Cholesterol 59 >39 mg/dL    LDL Cholesterol Calculated 113 (H) <100 mg/dL    Non HDL Cholesterol 155 (H) <130 mg/dL   Albumin Random Urine Quantitative   Result Value Ref Range    Creatinine Urine 100 mg/dL    Albumin Urine mg/L 891 mg/L    Albumin Urine mg/g Cr 893.68 (H) 0 - 17 mg/g Cr        ASSESSMENT/PLAN:                                                    1. Proteinuria  Severe progression of proteinuria, blood pressure high, blood sugars improved.  Discussed concern for dialysis and this was motivation to start taking care, will stop drinking alcohol.   norvasc, recheck in 2 weeks.    2. Hypertension goal BP (blood pressure) < 140/90    3. Alcohol dependence with other alcohol-induced disorder (H)    4. Type 2 diabetes mellitus with other circulatory complications (H)        Karen Ivory MD  [facility]

## 2017-03-07 NOTE — NURSING NOTE
Chief Complaint   Patient presents with     Results     Patient is here to discuss lab results from 03/03/2017       Initial /75  Pulse 56  Temp 98  F (36.7  C) (Tympanic)  Ht 6' (1.829 m)  Wt 275 lb (124.7 kg)  BMI 37.3 kg/m2 Estimated body mass index is 37.3 kg/(m^2) as calculated from the following:    Height as of this encounter: 6' (1.829 m).    Weight as of this encounter: 275 lb (124.7 kg).  Medication Reconciliation: complete

## 2017-03-08 NOTE — TELEPHONE ENCOUNTER
Approval of prior authorization was received via fax.  Pt notified with no further questions.  Will send off to be scanned.    Laura Carrasco CMA

## 2017-03-21 ENCOUNTER — OFFICE VISIT (OUTPATIENT)
Dept: FAMILY MEDICINE | Facility: CLINIC | Age: 67
End: 2017-03-21
Payer: COMMERCIAL

## 2017-03-21 VITALS
HEART RATE: 63 BPM | SYSTOLIC BLOOD PRESSURE: 138 MMHG | BODY MASS INDEX: 35.81 KG/M2 | WEIGHT: 264.4 LBS | HEIGHT: 72 IN | DIASTOLIC BLOOD PRESSURE: 78 MMHG

## 2017-03-21 DIAGNOSIS — R80.9 PROTEINURIA: ICD-10-CM

## 2017-03-21 DIAGNOSIS — I10 HTN, GOAL BELOW 140/90: ICD-10-CM

## 2017-03-21 PROCEDURE — 99214 OFFICE O/P EST MOD 30 MIN: CPT | Performed by: FAMILY MEDICINE

## 2017-03-21 RX ORDER — AMLODIPINE BESYLATE 10 MG/1
10 TABLET ORAL DAILY
Qty: 90 TABLET | Refills: 3 | Status: SHIPPED | OUTPATIENT
Start: 2017-03-21 | End: 2018-04-12

## 2017-03-21 NOTE — NURSING NOTE
Chief Complaint   Patient presents with     Recheck Medication     Amlodipine     Hypertension     recheck     Diabetes     recheck       Initial /80  Pulse 63  Ht 6' (1.829 m)  Wt 264 lb 6.4 oz (119.9 kg)  BMI 35.86 kg/m2 Estimated body mass index is 35.86 kg/(m^2) as calculated from the following:    Height as of this encounter: 6' (1.829 m).    Weight as of this encounter: 264 lb 6.4 oz (119.9 kg).  Medication Reconciliation: complete

## 2017-03-21 NOTE — MR AVS SNAPSHOT
After Visit Summary   3/21/2017    Francois Lujan    MRN: [id number]           Patient Information     Date Of Birth          1950        Visit Information        Provider Department      3/21/2017 12:40 PM Karen Ivory MD [facility]        Today's Diagnoses     HTN, goal below 140/90        Proteinuria          Care Instructions          Thank you for choosing [facility].  You may be receiving a survey in the mail from [facility] regarding your visit today.  Please take a few minutes to complete and return the survey to let us know how we are doing.      If you have questions or concerns, please contact us via Wave - Private Location App or you can contact your care team at [phone].    Our Clinic hours are:  Monday 6:40 am  to 7:00 pm  Tuesday -Friday 6:40 am to 5:00 pm    The [place] outpatient lab hours are:  Monday - Friday 6:10 am to 4:45 pm  Saturdays 7:00 am to 11:00 am  Appointments are required, call [phone]    If you have clinical questions after hours or would like to schedule an appointment,  call the clinic at [phone].          Follow-ups after your visit        Who to contact     If you have questions or need follow up information about today's clinic visit or your schedule please contact [facility] directly at [phone].  Normal or non-critical lab and imaging results will be communicated to you by Axedahart, letter or phone within 4 business days after the clinic has received the results. If you do not hear from us within 7 days, please contact the clinic through Conecte Linkt or phone. If you have a critical or abnormal lab result, we will notify you by phone as soon as possible.  Submit refill requests through Wave - Private Location App or call your pharmacy and they will forward the refill request to us. Please allow 3 business days for your refill to be completed.          Additional Information About Your Visit        MyChart Information     Wave - Private Location App lets you  "send messages to your doctor, view your test results, renew your prescriptions, schedule appointments and more. To sign up, go to [email or web address] . Click on \"Log in\" on the left side of the screen, which will take you to the Welcome page. Then click on \"Sign up Now\" on the right side of the page.     You will be asked to enter the access code listed below, as well as some personal information. Please follow the directions to create your username and password.     Your access code is: [email or web address]  Expires: 2017 12:05 PM     Your access code will  in 90 days. If you need help or a new code, please call your [place] clinic or [phone].        Care EveryWhere ID     This is your Care EveryWhere ID. This could be used by other organizations to access your [place] medical records  [id number]        Your Vitals Were     Pulse Height BMI (Body Mass Index)             63 6' (1.829 m) 35.86 kg/m2          Blood Pressure from Last 3 Encounters:   17 138/78   17 164/82   17 (!) 161/99    Weight from Last 3 Encounters:   17 264 lb 6.4 oz (119.9 kg)   17 275 lb (124.7 kg)   17 275 lb 6.4 oz (124.9 kg)              Today, you had the following     No orders found for display         Today's Medication Changes          These changes are accurate as of: 3/21/17 12:52 PM.  If you have any questions, ask your nurse or doctor.               These medicines have changed or have updated prescriptions.        Dose/Directions    * amLODIPine 5 MG tablet   Commonly known as:  NORVASC   This may have changed:  Another medication with the same name was added. Make sure you understand how and when to take each.   Used for:  HTN, goal below 140/90, Proteinuria   Changed by:  Karen Ivory MD        Dose:  5 mg   Take 1 tablet (5 mg) by mouth daily   Quantity:  30 tablet   Refills:  1       * amLODIPine 10 MG tablet   Commonly known as:  NORVASC   This may have changed:  You " were already taking a medication with the same name, and this prescription was added. Make sure you understand how and when to take each.   Used for:  HTN, goal below 140/90   Changed by:  Karen Ivory MD        Dose:  10 mg   Take 1 tablet (10 mg) by mouth daily   Quantity:  90 tablet   Refills:  3       * Notice:  This list has 2 medication(s) that are the same as other medications prescribed for you. Read the directions carefully, and ask your doctor or other care provider to review them with you.         Where to get your medicines      These medications were sent to [facility] [id number] [place] [address]     Phone:  [phone]     amLODIPine 10 MG tablet                Primary Care Provider Office Phone # Fax #    Karen Ivory [phone]       [facility] [address] [facility] [address]        Thank you!     Thank you for choosing [facility]  for your care. Our goal is always to provide you with excellent care. Hearing back from our patients is one way we can continue to improve our services. Please take a few minutes to complete the written survey that you may receive in the mail after your visit with us. Thank you!             Your Updated Medication List - Protect others around you: Learn how to safely use, store and throw away your medicines at www.disposemymeds.org.          This list is accurate as of: 3/21/17 12:52 PM.  Always use your most recent med list.                   Brand Name Dispense Instructions for use    ACE/ARB NOT PRESCRIBED (INTENTIONAL)      Please choose reason not prescribed, below       * amLODIPine 5 MG tablet    NORVASC    30 tablet    Take 1 tablet (5 mg) by mouth daily       * amLODIPine 10 MG tablet    NORVASC    90 tablet    Take 1 tablet (10 mg) by mouth daily       aspirin 325 MG tablet      Take 325 mg by mouth daily       atorvastatin 40 MG tablet     LIPITOR    90 tablet    Take 1 tablet (40 mg) by mouth daily       blood glucose lancets standard    no brand specified    1 Box    Use to test blood sugar 3 times daily or as directed.       blood glucose monitoring test strip    ACCU-CHEK SMARTVIEW    100 each    Use to test blood sugar 3 times daily or as directed.       glipiZIDE 10 MG 24 hr tablet    GLUCOTROL XL    180 tablet    Take 2 tablets (20 mg) by mouth daily (with breakfast)       hydrochlorothiazide 12.5 MG Tabs tablet     90 tablet    Take 1 tablet (12.5 mg) by mouth daily       levothyroxine 50 MCG tablet    SYNTHROID    90 tablet    Take 1 tablet (50 mcg) by mouth daily       metFORMIN 500 MG tablet    GLUCOPHAGE    180 tablet    Take 2 tablets (1,000 mg) by mouth 2 times daily (with meals) (Needs follow-up appointment for this medication)       metoprolol 50 MG tablet    LOPRESSOR    180 tablet    Take 1 tablet (50 mg) by mouth 2 times daily       testosterone 50 MG/5GM (1%) topical gel    ANDROGEL    90 packet    Place 1 packet (50 mg) onto the skin daily       * Notice:  This list has 2 medication(s) that are the same as other medications prescribed for you. Read the directions carefully, and ask your doctor or other care provider to review them with you.

## 2017-03-21 NOTE — PROGRESS NOTES
a  SUBJECTIVE:                                                    Francois Lujan is 66 year old male   Chief Complaint   Patient presents with     Recheck Medication     Amlodipine     Hypertension     recheck     Diabetes     recheck     Diabetes Follow-up    Patient is checking blood sugars: once daily.  Results are as follows:         am - results are lowering    Diabetic concerns: None     Symptoms of hypoglycemia (low blood sugar): none     Hypertension Follow-up      Outpatient blood pressures are being checked at home.  Results are still slightly high, but coming down.    Low Salt Diet: no added salt       Amount of exercise or physical activity: 6-7 days/week for an average of 45-60 minutes    Problems taking medications regularly: No    Medication side effects: none    Diet: regular (no restrictions)    Medication Followup of Amlodipine    Taking Medication as prescribed: yes    Side Effects:  None    Medication Helping Symptoms:  yes       Problem list and histories reviewed & adjusted, as indicated.  Additional history: he has cut back from 12 to 2 beer a day, exercising 30 minutes daily (tired afterwards, want to take a nap), lost 11 lbs and blood pressure has come down.      Patient Active Problem List   Diagnosis     S/P CABG (coronary artery bypass graft)     Hypogonadotropic hypogonadism (H)     Hypothyroidism     Hard of hearing     Hyperlipidemia LDL goal <70     ADELA (obstructive sleep apnea)     Hypertension goal BP (blood pressure) < 140/90     Numbness of hand, right     Health Care Home     Radicular pain of lumbosacral region     Moderate major depression (H)     Elevated sed rate     Elevated C-reactive protein (CRP)     History of back surgery     Polymyalgia rheumatica (H)     Multiple joint pain     OA (osteoarthritis) of knee, right     ACL (anterior cruciate ligament) tear     HTN, goal below 140/90     Necrobiosis lipoidica     Type 2 diabetes mellitus with complication (H)     Type 2  diabetes mellitus with other skin complications (H)     CKD (chronic kidney disease) stage 2, GFR 60-89 ml/min     Proteinuria     Type 2 diabetes mellitus with other diabetic kidney complication (H)     Diabetic polyneuropathy associated with type 2 diabetes mellitus (H)     Type 2 diabetes mellitus with other circulatory complications (H)     Polyneuropathy associated with underlying disease (H)     Alcohol dependence with other alcohol-induced disorder (H)     Coronary artery disease involving native coronary artery of native heart without angina pectoris     Past Surgical History   Procedure Laterality Date     Back surgery       Orthopedic surgery       Baker cyst removed - right knee, and mesiscus tear repair     Hernia repair       infantile hernia repair     Bypass graft artery coronary  11/17/2011     Procedure:BYPASS GRAFT ARTERY CORONARY; CORONARY ARTERY BYPASS, Right, OM, LAD WITH ENDOVEIN HARVEST, ON PUMP; Surgeon:LEONEL MG; Location: OR     Colonoscopy N/A 6/9/2016     Procedure: COLONOSCOPY;  Surgeon: Isidro Humphreys MD;  Location: [facility]       Social History   Substance Use Topics     Smoking status: Former Smoker     Packs/day: 2.00     Years: 30.00     Quit date: 11/13/1997     Smokeless tobacco: Never Used     Alcohol use 6.0 oz/week     12 Cans of beer per week      Comment: 20+ beers a week     Family History   Problem Relation Age of Onset     HEART DISEASE Father      CANCER Mother      C.A.D. Brother          Current Outpatient Prescriptions   Medication Sig Dispense Refill     amLODIPine (NORVASC) 10 MG tablet Take 1 tablet (10 mg) by mouth daily 90 tablet 3     metFORMIN (GLUCOPHAGE) 500 MG tablet Take 2 tablets (1,000 mg) by mouth 2 times daily (with meals) (Needs follow-up appointment for this medication) 180 tablet 3     glipiZIDE (GLUCOTROL XL) 10 MG 24 hr tablet Take 2 tablets (20 mg) by mouth daily (with breakfast) 180 tablet 3     metoprolol (LOPRESSOR) 50 MG tablet Take 1  tablet (50 mg) by mouth 2 times daily 180 tablet 3     hydrochlorothiazide 12.5 MG TABS tablet Take 1 tablet (12.5 mg) by mouth daily 90 tablet 3     levothyroxine (SYNTHROID) 50 MCG tablet Take 1 tablet (50 mcg) by mouth daily 90 tablet 3     atorvastatin (LIPITOR) 40 MG tablet Take 1 tablet (40 mg) by mouth daily 90 tablet 3     amLODIPine (NORVASC) 5 MG tablet Take 1 tablet (5 mg) by mouth daily 30 tablet 1     blood glucose (NO BRAND SPECIFIED) lancets standard Use to test blood sugar 3 times daily or as directed. 1 Box 3     blood glucose monitoring (ACCU-CHEK SMARTVIEW) test strip Use to test blood sugar 3 times daily or as directed. 100 each 12     testosterone (ANDROGEL) 50 MG/5GM (1%) gel Place 1 packet (50 mg) onto the skin daily 90 packet 3     aspirin 325 MG tablet Take 325 mg by mouth daily       ACE/ARB NOT PRESCRIBED, INTENTIONAL, Please choose reason not prescribed, below       Allergies   Allergen Reactions     Ivp Dye [Contrast Dye] Hives     Brief bout - 10 minutes duration     Recent Labs   Lab Test  03/03/17   1109  08/30/16   1024  04/11/16   1030  05/29/15   1019  07/16/14   1113  02/03/14   12/10/13   0849   A1C  7.7*  7.0*  11.2*  7.6*   --    < >   --    < >  8.0*   LDL  113*   --   86  111   --    < >   --    --   104   HDL  59   --   44   --    --    --    --    --   65   TRIG  209*   --   282*   --    --    --    --    --   100   ALT   --    --    --   35  19   --   18   < >   --    CR  0.98   --   1.08  0.97  0.83   < >   --    < >  1.03   GFRESTIMATED  76   --   69  78  >90   < >   --    < >  73   GFRESTBLACK  >90   GFR Calc     --   83  >90   GFR Calc    >90   --    --    < >  88   POTASSIUM  4.2   --   4.4  4.5  5.5*   < >  5.0   < >  5.3   TSH  1.42   --   0.84  1.11  0.86   < >   --    --   1.17    < > = values in this interval not displayed.      BP Readings from Last 3 Encounters:   03/21/17 138/78   03/07/17 164/82   03/03/17 (!) 161/99    Wt  Readings from Last 3 Encounters:   03/21/17 264 lb 6.4 oz (119.9 kg)   03/07/17 275 lb (124.7 kg)   03/03/17 275 lb 6.4 oz (124.9 kg)         ROS:  Constitutional, HEENT, cardiovascular, pulmonary, gi and gu systems are negative, except as otherwise noted.    OBJECTIVE:                                                    /78  Pulse 63  Ht 6' (1.829 m)  Wt 264 lb 6.4 oz (119.9 kg)  BMI 35.86 kg/m2  GENERAL APPEARANCE ADULT: Alert, no acute distress  PSYCH: mentation appears normal., affect and mood normal  Diagnostic Test Results:  Results for orders placed or performed in visit on 03/03/17   HEMOGLOBIN A1C   Result Value Ref Range    Hemoglobin A1C 7.7 (H) 4.3 - 6.0 %   Basic metabolic panel   Result Value Ref Range    Sodium 136 133 - 144 mmol/L    Potassium 4.2 3.4 - 5.3 mmol/L    Chloride 99 94 - 109 mmol/L    Carbon Dioxide 29 20 - 32 mmol/L    Anion Gap 8 3 - 14 mmol/L    Glucose 240 (H) 70 - 99 mg/dL    Urea Nitrogen 24 7 - 30 mg/dL    Creatinine 0.98 0.66 - 1.25 mg/dL    GFR Estimate 76 >60 mL/min/1.7m2    GFR Estimate If Black >90   GFR Calc   >60 mL/min/1.7m2    Calcium 8.9 8.5 - 10.1 mg/dL   TSH   Result Value Ref Range    TSH 1.42 0.40 - 4.00 mU/L   T4 FREE   Result Value Ref Range    T4 Free 0.76 0.76 - 1.46 ng/dL   Lipid panel reflex to direct LDL   Result Value Ref Range    Cholesterol 214 (H) <200 mg/dL    Triglycerides 209 (H) <150 mg/dL    HDL Cholesterol 59 >39 mg/dL    LDL Cholesterol Calculated 113 (H) <100 mg/dL    Non HDL Cholesterol 155 (H) <130 mg/dL   Albumin Random Urine Quantitative   Result Value Ref Range    Creatinine Urine 100 mg/dL    Albumin Urine mg/L 891 mg/L    Albumin Urine mg/g Cr 893.68 (H) 0 - 17 mg/g Cr        ASSESSMENT/PLAN:                                                    1. HTN, goal below 140/90  Not at goal but close, will double dose of norvasc and recheck in 2 weeks, plan MAC urine in 2 months.  - amLODIPine (NORVASC) 10 MG tablet; Take 1 tablet  (10 mg) by mouth daily  Dispense: 90 tablet; Refill: 3    2. Proteinuria  Recheck MAC in 2 months.      Karen Ivory MD  [facility]

## 2017-03-21 NOTE — PATIENT INSTRUCTIONS
Thank you for choosing [facility].  You may be receiving a survey in the mail from Niki Shen regarding your visit today.  Please take a few minutes to complete and return the survey to let us know how we are doing.      If you have questions or concerns, please contact us via [employer] or you can contact your care team at [phone].    Our Clinic hours are:  Monday 6:40 am  to 7:00 pm  Tuesday -Friday 6:40 am to 5:00 pm    The [place] outpatient lab hours are:  Monday - Friday 6:10 am to 4:45 pm  Saturdays 7:00 am to 11:00 am  Appointments are required, call [phone]    If you have clinical questions after hours or would like to schedule an appointment,  call the clinic at [phone].

## 2017-03-29 ENCOUNTER — RADIANT APPOINTMENT (OUTPATIENT)
Dept: GENERAL RADIOLOGY | Facility: CLINIC | Age: 67
End: 2017-03-29
Attending: PEDIATRICS
Payer: COMMERCIAL

## 2017-03-29 ENCOUNTER — OFFICE VISIT (OUTPATIENT)
Dept: ORTHOPEDICS | Facility: CLINIC | Age: 67
End: 2017-03-29
Payer: COMMERCIAL

## 2017-03-29 VITALS
DIASTOLIC BLOOD PRESSURE: 74 MMHG | SYSTOLIC BLOOD PRESSURE: 145 MMHG | WEIGHT: 264 LBS | HEIGHT: 72 IN | BODY MASS INDEX: 35.76 KG/M2

## 2017-03-29 DIAGNOSIS — S59.902A INJURY OF LEFT ELBOW, INITIAL ENCOUNTER: Primary | ICD-10-CM

## 2017-03-29 DIAGNOSIS — S59.902A INJURY OF LEFT ELBOW, INITIAL ENCOUNTER: ICD-10-CM

## 2017-03-29 PROCEDURE — 99204 OFFICE O/P NEW MOD 45 MIN: CPT | Performed by: PEDIATRICS

## 2017-03-29 PROCEDURE — 73080 X-RAY EXAM OF ELBOW: CPT | Mod: LT

## 2017-03-29 RX ORDER — HYDROCODONE BITARTRATE AND ACETAMINOPHEN 5; 325 MG/1; MG/1
1 TABLET ORAL EVERY 8 HOURS PRN
Qty: 15 TABLET | Refills: 0 | Status: ON HOLD | OUTPATIENT
Start: 2017-03-29 | End: 2017-04-11

## 2017-03-29 NOTE — PROGRESS NOTES
Sports Medicine Clinic Visit    PCP: Karen Ivory    Francois Lujan is a 66 year old male who is seen  as a self referral presenting with a left elbow injury.    Injury: Patient reports an injury 1 week ago, he was doing exercise on a total gym.  Pulled over head and felt a snap in the posterior elbow.  States he probably did too many reps.  Has significant swelling and bruising.  Inability to fully flex elbow.    Location of Pain: right posterior elbow  Duration of Pain: 1 week(s)  Rating of Pain at worst: 8/10  Rating of Pain Currently: 3/10  Symptoms are better with: Aleve and Ibuprofen  Symptoms are worse with: use of the arm, pain at night  Additional Features:   Positive: swelling down into hand, deformity at posterir elbow, weakness   Negative: bruising, popping, grinding, catching, locking, instability, paresthesias, numbness, pain in other joints and systemic symptoms  Other evaluation and/or treatments so far consists of: nothing  Prior History of related problems: none    Social [place]    Review of Systems  Skin: yes bruising, yes swelling  Musculoskeletal: as above  Neurologic: no numbness, paresthesias  Remainder of review of systems is negative including constitutional, CV, pulmonary, GI, except as noted in HPI or medical history.    Patient's current problem list, past medical and surgical history, and family history were reviewed.    Patient Active Problem List   Diagnosis     S/P CABG (coronary artery bypass graft)     Hypogonadotropic hypogonadism (H)     Hypothyroidism     Hard of hearing     Hyperlipidemia LDL goal <70     ADELA (obstructive sleep apnea)     Hypertension goal BP (blood pressure) < 140/90     Numbness of hand, right     Health Care Home     Radicular pain of lumbosacral region     Moderate major depression (H)     Elevated sed rate     Elevated C-reactive protein (CRP)     History of back surgery     Polymyalgia rheumatica (H)     Multiple joint pain     OA  (osteoarthritis) of knee, right     ACL (anterior cruciate ligament) tear     HTN, goal below 140/90     Necrobiosis lipoidica     Type 2 diabetes mellitus with complication (H)     Type 2 diabetes mellitus with other skin complications (H)     CKD (chronic kidney disease) stage 2, GFR 60-89 ml/min     Proteinuria     Type 2 diabetes mellitus with other diabetic kidney complication (H)     Diabetic polyneuropathy associated with type 2 diabetes mellitus (H)     Type 2 diabetes mellitus with other circulatory complications (H)     Polyneuropathy associated with underlying disease (H)     Alcohol dependence with other alcohol-induced disorder (H)     Coronary artery disease involving native coronary artery of native heart without angina pectoris     Past Medical History:   Diagnosis Date     Arthritis      CAD (coronary artery disease)      Diabetes mellitus (H)      Erythema nodosum 1982     Gout      Hypertension      Malignant neoplasm (H)     squamous cell CA removed from right hand     Thyroid disease      Past Surgical History:   Procedure Laterality Date     BACK SURGERY       BYPASS GRAFT ARTERY CORONARY  11/17/2011    Procedure:BYPASS GRAFT ARTERY CORONARY; CORONARY ARTERY BYPASS, Right, OM, LAD WITH ENDOVEIN HARVEST, ON PUMP; Surgeon:LEONEL MG; Location: OR     COLONOSCOPY N/A 6/9/2016    Procedure: COLONOSCOPY;  Surgeon: Isidro Humphreys MD;  Location: [facility]     HERNIA REPAIR      infantile hernia repair     ORTHOPEDIC SURGERY      Baker cyst removed - right knee, and mesiscus tear repair     Family History   Problem Relation Age of Onset     HEART DISEASE Father      CANCER Mother      C.A.D. Brother        Objective  /74  Ht 6' (1.829 m)  Wt 264 lb (119.7 kg)  BMI 35.8 kg/m2    GENERAL APPEARANCE: healthy, alert and no distress   GAIT: NORMAL  SKIN: no suspicious lesions or rashes  HEENT: Sclera clear, anicteric  CV: good peripheral pulses  RESP: Breathing not labored  NEURO: Normal  strength and tone, mentation intact and speech normal  PSYCH:  mentation appears normal and affect normal/bright    Bilateral Elbow exam:    Inspection:     edema noted posterior elbow which does extend into forearm    Tender:     lateral epicondyle left       medial epicondyle left       olecranon left       Into triceps muscle left with defect palpable    Non-Tender:      remainder of the elbow bilaterally    ROM:      flexion 90 left       extension 10 left       forearm supination 45 left       forearm pronation 45 left    Strength:     flexion 5/5 left       extension 2/5 left with significant pain       forearm supination 5/5 left       forearm pronation 5/5 left    Sensation:     intact throughout hand       intact throughout forearm      Radiology  I ordered, visualized and reviewed these images with the patient  XR ELBOW LT G/E 3 VW 3/29/2017 2:04 PM     COMPARISON: None.     HISTORY: Injury         IMPRESSION: No fractures are seen in the left elbow. No elbow effusion  is noted. Olecranon enthesopathy is seen. Soft tissue prominence over  the olecranon process suggests bursitis. Joints are in normal  alignment.    Assessment:  1. Injury of left elbow, initial encounter      Left elbow injury, concern for triceps rupture.  I recommend MRI to evaluate.  Discussed referral or therapy pending results.  Sling for comfort in the interim.    Plan:  - Today's Plan of Care:  MRI of the left elbow  Medical Equipment: sling    -We also discussed other future treatment options:  Referral to ortho surgeon    Follow Up: in clinic 1-2 days after the MRI by making an apointment.    Concerning signs and symptoms were reviewed.  The patient expressed understanding of this management plan and all questions were answered at this time.    Tatiana Thornton MD [facility]  Primary Care Sports Medicine  [facility]

## 2017-03-29 NOTE — PATIENT INSTRUCTIONS
Plan:  - Today's Plan of Care:  MRI of the left elbow  Medical Equipment: sling    -We also discussed other future treatment options:  Referral to ortho surgeon    Follow Up: in clinic 1-2 days after the MRI by making an apointment.     Advanced imaging is done by appointment. Some insurance require a prior authorization to be completed which may delay the time until you are able to schedule your appointment. You should be receiving a call from the scheduling department, if you have not heard from them in 24-48 hours.   Please call [place] [place] [facility] [place]: [phone] to schedule your MRI.  Depending on your availability you can usually schedule within the next 1-2 days.

## 2017-03-29 NOTE — MR AVS SNAPSHOT
After Visit Summary   3/29/2017    Francois Lujan    MRN: [id number]           Patient Information     Date Of Birth          1950        Visit Information        Provider Department      3/29/2017 2:00 PM Tatiana Thornton MD [facility]        Today's Diagnoses     Injury of left elbow, initial encounter    -  1      Care Instructions    Plan:  - Today's Plan of Care:  MRI of the left elbow  Medical Equipment: sling    -We also discussed other future treatment options:  Referral to ortho surgeon    Follow Up: in clinic 1-2 days after the MRI by making an apointment.     Advanced imaging is done by appointment. Some insurance require a prior authorization to be completed which may delay the time until you are able to schedule your appointment. You should be receiving a call from the scheduling department, if you have not heard from them in 24-48 hours.   Please call [facility]: [phone] to schedule your MRI.  Depending on your availability you can usually schedule within the next 1-2 days.                      Follow-ups after your visit        Your next 10 appointments already scheduled     Apr 04, 2017 10:00 AM CDT   [facility] MD   [facility]    [address] [address] [phone]              Future tests that were ordered for you today     Open Future Orders        Priority Expected Expires Ordered    MR Elbow Left w/o Contrast Routine  3/29/2018 3/29/2017            Who to contact     If you have questions or need follow up information about today's clinic visit or your schedule please contact [facility] directly at [phone].  Normal or non-critical lab and imaging results will be communicated to you by MyChart, letter or phone within 4 business days after the clinic has received the results. If you do not  "hear from us within 7 days, please contact the clinic through [employer] or phone. If you have a critical or abnormal lab result, we will notify you by phone as soon as possible.  Submit refill requests through [employer] or call your pharmacy and they will forward the refill request to us. Please allow 3 business days for your refill to be completed.          Additional Information About Your Visit        [employer] Information     QR Artist lets you send messages to your doctor, view your test results, renew your prescriptions, schedule appointments and more. To sign up, go to [email or web address] . Click on \"Log in\" on the left side of the screen, which will take you to the Welcome page. Then click on \"Sign up Now\" on the right side of the page.     You will be asked to enter the access code listed below, as well as some personal information. Please follow the directions to create your username and password.     Your access code is: [email or web address]  Expires: 2017 12:05 PM     Your access code will  in 90 days. If you need help or a new code, please call your [place] clinic or [phone].        Care EveryWhere ID     This is your Care EveryWhere ID. This could be used by other organizations to access your [place] medical records  [id number]        Your Vitals Were     Height BMI (Body Mass Index)                6' (1.829 m) 35.8 kg/m2           Blood Pressure from Last 3 Encounters:   17 145/74   17 138/78   17 164/82    Weight from Last 3 Encounters:   17 264 lb (119.7 kg)   17 264 lb 6.4 oz (119.9 kg)   17 275 lb (124.7 kg)               Primary Care Provider Office Phone # Fax #    Karen Ivory [phone]       [facility] [address] [address]        Thank you!     Thank you for choosing [facility]  for your care. Our goal is always to provide you with excellent care. Hearing back from " our patients is one way we can continue to improve our services. Please take a few minutes to complete the written survey that you may receive in the mail after your visit with us. Thank you!             Your Updated Medication List - Protect others around you: Learn how to safely use, store and throw away your medicines at www.disposemymeds.org.          This list is accurate as of: 3/29/17  2:24 PM.  Always use your most recent med list.                   Brand Name Dispense Instructions for use    ACE/ARB NOT PRESCRIBED (INTENTIONAL)      Please choose reason not prescribed, below       * amLODIPine 5 MG tablet    NORVASC    30 tablet    Take 1 tablet (5 mg) by mouth daily       * amLODIPine 10 MG tablet    NORVASC    90 tablet    Take 1 tablet (10 mg) by mouth daily       aspirin 325 MG tablet      Take 325 mg by mouth daily       atorvastatin 40 MG tablet    LIPITOR    90 tablet    Take 1 tablet (40 mg) by mouth daily       blood glucose lancets standard    no brand specified    1 Box    Use to test blood sugar 3 times daily or as directed.       blood glucose monitoring test strip    ACCU-CHEK SMARTVIEW    100 each    Use to test blood sugar 3 times daily or as directed.       glipiZIDE 10 MG 24 hr tablet    GLUCOTROL XL    180 tablet    Take 2 tablets (20 mg) by mouth daily (with breakfast)       hydrochlorothiazide 12.5 MG Tabs tablet     90 tablet    Take 1 tablet (12.5 mg) by mouth daily       levothyroxine 50 MCG tablet    SYNTHROID    90 tablet    Take 1 tablet (50 mcg) by mouth daily       metFORMIN 500 MG tablet    GLUCOPHAGE    180 tablet    Take 2 tablets (1,000 mg) by mouth 2 times daily (with meals) (Needs follow-up appointment for this medication)       metoprolol 50 MG tablet    LOPRESSOR    180 tablet    Take 1 tablet (50 mg) by mouth 2 times daily       testosterone 50 MG/5GM (1%) topical gel    ANDROGEL    90 packet    Place 1 packet (50 mg) onto the skin daily       * Notice:  This list has 2  medication(s) that are the same as other medications prescribed for you. Read the directions carefully, and ask your doctor or other care provider to review them with you.

## 2017-03-30 ENCOUNTER — HOSPITAL ENCOUNTER (OUTPATIENT)
Dept: MRI IMAGING | Facility: CLINIC | Age: 67
Discharge: HOME | End: 2017-03-30
Attending: PEDIATRICS | Admitting: PEDIATRICS
Payer: COMMERCIAL

## 2017-03-30 DIAGNOSIS — S59.902A INJURY OF LEFT ELBOW, INITIAL ENCOUNTER: ICD-10-CM

## 2017-03-30 PROCEDURE — 73221 MRI JOINT UPR EXTREM W/O DYE: CPT | Mod: LT

## 2017-04-03 ENCOUNTER — OFFICE VISIT (OUTPATIENT)
Dept: ORTHOPEDICS | Facility: CLINIC | Age: 67
End: 2017-04-03
Payer: COMMERCIAL

## 2017-04-03 VITALS
SYSTOLIC BLOOD PRESSURE: 139 MMHG | HEIGHT: 72 IN | WEIGHT: 264 LBS | HEART RATE: 56 BPM | BODY MASS INDEX: 35.76 KG/M2 | DIASTOLIC BLOOD PRESSURE: 68 MMHG

## 2017-04-03 DIAGNOSIS — S46.312D RUPTURE OF LEFT TRICEPS TENDON, SUBSEQUENT ENCOUNTER: Primary | ICD-10-CM

## 2017-04-03 PROCEDURE — 99214 OFFICE O/P EST MOD 30 MIN: CPT | Performed by: PEDIATRICS

## 2017-04-03 NOTE — PATIENT INSTRUCTIONS
Plan:  - Today's Plan of Care:  Referral to an Orthopedic Surgeon  Activity Modification: Rest    Follow Up: as needed

## 2017-04-03 NOTE — NURSING NOTE
Chief Complaint   Patient presents with     Follow Up For     left elbow       Initial /68  Pulse 56  Ht 6' (1.829 m)  Wt 264 lb (119.7 kg)  BMI 35.8 kg/m2 Estimated body mass index is 35.8 kg/(m^2) as calculated from the following:    Height as of this encounter: 6' (1.829 m).    Weight as of this encounter: 264 lb (119.7 kg).  Medication Reconciliation: complete

## 2017-04-03 NOTE — MR AVS SNAPSHOT
After Visit Summary   4/3/2017    Francois Lujan    MRN: [id number]           Patient Information     Date Of Birth          1950        Visit Information        Provider Department      4/3/2017 9:00 AM Tatiana Thornton MD [facility]        Today's Diagnoses     Rupture of left triceps tendon, subsequent encounter    -  1      Care Instructions    Plan:  - Today's Plan of Care:  Referral to an Orthopedic Surgeon  Activity Modification: Rest    Follow Up: as needed            Follow-ups after your visit        Additional Services     ORTHO  REFERRAL       [facility] is referring you to the Orthopedic  Services at [facility].       The  Representative will assist you in the coordination of your Orthopedic and Musculoskeletal Care as prescribed by your physician.    The  Representative will call you within 24 hours to help schedule your appointment, or you may contact the  Representative at:    [place] [place] [facility] ~ [phone]  [facility] ~ [phone]  [facility] ~ [phone]    Type of Referral : Surgical / Specialist - Dr. Kelley, case has been discussed with him by Dr. Thornton      Timeframe requested: within 1 week     Coverage of these services is subject to the terms and limitations of your health insurance plan.  Please call member services at your health plan with any benefit or coverage questions.      If X-rays, CT or MRI's have been performed, please contact the facility where they were done to arrange for , prior to your scheduled appointment.  Please bring this referral request to your appointment and present it to your specialist.                  Your next 10 appointments already scheduled     Apr 04, 2017 10:00 AM CDT   SHORT with Karen Ivory MD   [facility] ([facility]    [address]  [facility] [address]   [phone]              Who to contact     If you have questions or need follow up information about today's clinic visit or your schedule please contact [facility] directly at [phone].  Normal or non-critical lab and imaging results will be communicated to you by MyChart, letter or phone within 4 business days after the clinic has received the results. If you do not hear from us within 7 days, please contact the clinic through MyChart or phone. If you have a critical or abnormal lab result, we will notify you by phone as soon as possible.  Submit refill requests through [employer] or call your pharmacy and they will forward the refill request to us. Please allow 3 business days for your refill to be completed.          Additional Information About Your Visit        MyChart Information     [employer] lets you send messages to your doctor, view your test results, renew your prescriptions, schedule appointments and more. To sign up, go to [email or web address] . Click on \"Log in\" on the left side of the screen, which will take you to the Welcome page. Then click on \"Sign up Now\" on the right side of the page.     You will be asked to enter the access code listed below, as well as some personal information. Please follow the directions to create your username and password.     Your access code is: [email or web address]  Expires: 2017 12:05 PM     Your access code will  in 90 days. If you need help or a new code, please call your [place] clinic or [phone].        Care EveryWhere ID     This is your Care EveryWhere ID. This could be used by other organizations to access your [place] medical records  [id number]        Your Vitals Were     Pulse Height BMI (Body Mass Index)             56 6' (1.829 m) 35.8 kg/m2          Blood Pressure from Last 3 Encounters:   17 139/68   17 145/74   17 138/78    Weight from Last 3 " Encounters:   04/03/17 264 lb (119.7 kg)   03/29/17 264 lb (119.7 kg)   03/21/17 264 lb 6.4 oz (119.9 kg)              We Performed the Following     ORTHO  REFERRAL        Primary Care Provider Office Phone # Fax #    Karen Caroline Ivory [phone]       [facility] [address] [facility] [address]        Thank you!     Thank you for choosing [facility]  for your care. Our goal is always to provide you with excellent care. Hearing back from our patients is one way we can continue to improve our services. Please take a few minutes to complete the written survey that you may receive in the mail after your visit with us. Thank you!             Your Updated Medication List - Protect others around you: Learn how to safely use, store and throw away your medicines at www.disposemymeds.org.          This list is accurate as of: 4/3/17  9:17 AM.  Always use your most recent med list.                   Brand Name Dispense Instructions for use    ACE/ARB NOT PRESCRIBED (INTENTIONAL)      Please choose reason not prescribed, below       * amLODIPine 5 MG tablet    NORVASC    30 tablet    Take 1 tablet (5 mg) by mouth daily       * amLODIPine 10 MG tablet    NORVASC    90 tablet    Take 1 tablet (10 mg) by mouth daily       aspirin 325 MG tablet      Take 325 mg by mouth daily       atorvastatin 40 MG tablet    LIPITOR    90 tablet    Take 1 tablet (40 mg) by mouth daily       blood glucose lancets standard    no brand specified    1 Box    Use to test blood sugar 3 times daily or as directed.       blood glucose monitoring test strip    ACCU-CHEK SMARTVIEW    100 each    Use to test blood sugar 3 times daily or as directed.       glipiZIDE 10 MG 24 hr tablet    GLUCOTROL XL    180 tablet    Take 2 tablets (20 mg) by mouth daily (with breakfast)       hydrochlorothiazide 12.5 MG Tabs tablet     90 tablet    Take 1 tablet (12.5 mg) by mouth daily        HYDROcodone-acetaminophen 5-325 MG per tablet    NORCO    15 tablet    Take 1 tablet by mouth every 8 hours as needed for moderate to severe pain       levothyroxine 50 MCG tablet    SYNTHROID    90 tablet    Take 1 tablet (50 mcg) by mouth daily       metFORMIN 500 MG tablet    GLUCOPHAGE    180 tablet    Take 2 tablets (1,000 mg) by mouth 2 times daily (with meals) (Needs follow-up appointment for this medication)       metoprolol 50 MG tablet    LOPRESSOR    180 tablet    Take 1 tablet (50 mg) by mouth 2 times daily       testosterone 50 MG/5GM (1%) topical gel    ANDROGEL    90 packet    Place 1 packet (50 mg) onto the skin daily       * Notice:  This list has 2 medication(s) that are the same as other medications prescribed for you. Read the directions carefully, and ask your doctor or other care provider to review them with you.

## 2017-04-03 NOTE — PROGRESS NOTES
Sports Medicine Clinic Visit - Interim History April 3, 2017    Initial Visit Date 3/29/2017    PCP: Karen Ivory    Francois Lujan is a 66 year old male who is seen in f/u up for Left Elbow Injury. Since last visit on 3/29/2017 patient has improved, some range of motion has returned but still painful with use.  Here to review MRI results.    Symptoms are better with: Other medications: codeine  Symptoms are worse with: pain at night  Additional Features:   Positive: swelling and weakness   Negative: bruising, popping, grinding, catching, locking, instability, paresthesias, numbness, pain in other joints and systemic symptoms    Social History[place]    Review of Systems  Skin: initial bruising, yes swelling  Musculoskeletal: as above  Neurologic: no numbness, paresthesias  Remainder of review of systems is negative including constitutional, CV, pulmonary, GI, except as noted in HPI or medical history.    Patient's current problem list, past medical and surgical history, and family history were reviewed.    Patient Active Problem List   Diagnosis     S/P CABG (coronary artery bypass graft)     Hypogonadotropic hypogonadism (H)     Hypothyroidism     Hard of hearing     Hyperlipidemia LDL goal <70     ADELA (obstructive sleep apnea)     Hypertension goal BP (blood pressure) < 140/90     Numbness of hand, right     Health Care Home     Radicular pain of lumbosacral region     Moderate major depression (H)     Elevated sed rate     Elevated C-reactive protein (CRP)     History of back surgery     Polymyalgia rheumatica (H)     Multiple joint pain     OA (osteoarthritis) of knee, right     ACL (anterior cruciate ligament) tear     HTN, goal below 140/90     Necrobiosis lipoidica     Type 2 diabetes mellitus with complication (H)     Type 2 diabetes mellitus with other skin complications (H)     CKD (chronic kidney disease) stage 2, GFR 60-89 ml/min     Proteinuria     Type 2 diabetes mellitus with other  diabetic kidney complication (H)     Diabetic polyneuropathy associated with type 2 diabetes mellitus (H)     Type 2 diabetes mellitus with other circulatory complications (H)     Polyneuropathy associated with underlying disease (H)     Alcohol dependence with other alcohol-induced disorder (H)     Coronary artery disease involving native coronary artery of native heart without angina pectoris     Past Medical History:   Diagnosis Date     Arthritis      CAD (coronary artery disease)      Diabetes mellitus (H)      Erythema nodosum 1982     Gout      Hypertension      Malignant neoplasm (H)     squamous cell CA removed from right hand     Thyroid disease      Past Surgical History:   Procedure Laterality Date     BACK SURGERY       BYPASS GRAFT ARTERY CORONARY  11/17/2011    Procedure:BYPASS GRAFT ARTERY CORONARY; CORONARY ARTERY BYPASS, Right, OM, LAD WITH ENDOVEIN HARVEST, ON PUMP; Surgeon:LEONEL MG; Location:[facility]     COLONOSCOPY N/A 6/9/2016    Procedure: COLONOSCOPY;  Surgeon: Isidro Humphreys MD;  Location: [facility]     HERNIA REPAIR      infantile hernia repair     ORTHOPEDIC SURGERY      Baker cyst removed - right knee, and mesiscus tear repair     Family History   Problem Relation Age of Onset     HEART DISEASE Father      CANCER Mother      C.A.D. Brother        Objective  /68  Pulse 56  Ht 6' (1.829 m)  Wt 264 lb (119.7 kg)  BMI 35.8 kg/m2    GENERAL APPEARANCE: healthy, alert and no distress   GAIT: NORMAL  SKIN: no suspicious lesions or rashes  HEENT: Sclera clear, anicteric  CV: no lower extremity edema, good peripheral pulses  RESP: Breathing not labored  NEURO: Normal strength and tone, mentation intact and speech normal  PSYCH:  mentation appears normal and affect normal/bright    Bilateral Elbow exam:  Inspection: edema noted posterior elbow which does extend into forearm - somewhat improved     Tender: lateral epicondyle left  medial epicondyle left  olecranon left  Into triceps muscle  left with defect palpable     Non-Tender:  remainder of the elbow bilaterally     ROM:  flexion 90 left  extension 5 left  forearm supination 45 left  forearm pronation 45 left     Strength: flexion 5/5 left  extension 2/5 left with significant pain  forearm supination 5/5 left  forearm pronation 5/5 left     Sensation: intact throughout hand  intact throughout forearm    Radiology  I ordered, visualized and reviewed these images with the patient  Results for orders placed or performed during the hospital encounter of 03/30/17   MR Elbow Left w/o Contrast    Narrative    MR ELBOW LEFT WITHOUT CONTRAST March 30, 2017 9:34 AM    HISTORY: Concern for triceps tear. Unspecified injury of left elbow,  initial encounter.    TECHNIQUE: Coronal T1, STIR, GRE. Axial T1 and T2. Sagittal STIR.     FINDINGS:   Osseous and Cartilaginous Structures: Enthesopathy at the olecranon.  Otherwise unremarkable. No fracture, bone contusion, osteochondral  lesion, or chondromalacia identified.    Medial Collateral Ligament: The anterior and posterior bands are  intact.    Lateral Collateral Ligament Complex: The radial collateral ligament,  annular ligament, and lateral ulnar collateral ligament are intact.    Common Flexor Tendon: Unremarkable.  No tendinosis or tear identified.    Common Extensor Tendon: Unremarkable.  No tendinosis or tear  identified.    Biceps and Triceps Tendons: There is partial thickness tearing and  prominent tendinosis of the triceps tendon. Overall, I would estimate  that about half of the tendon fibers are torn and the other half are  intact. There is triceps muscle edema, consistent with injury/strain.    Joint Space: No significant overall joint effusion.     Additional Findings: The cubital tunnel and ulnar nerve appear  unremarkable. There is fluid at the olecranon bursal region. The fluid  collection measures 3.5 x 2.2 x 1 cm. There is nonspecific  subcutaneous edema along the elbow region which may relate  to recent  injury or reactive edema.      Impression    IMPRESSION:   1. Partial tearing of the triceps tendon, estimated to involve about  half of the tendon fibers. Prominent tendinosis.  2. Olecranon bursitis.    FELIPE MANRIQUE MD       Assessment:  1. Rupture of left triceps tendon, subsequent encounter      Left triceps tendon partial rupture (~ 50%).  Reviewed images and discussed diagnosis.  Given the significant injury and activity level of the patient I recommend orthopedic surgery referral to discuss options for further treatment.  Reviewed images with orthopedic surgery given unique injury as well.  Recommended continued rest in the interim from all lifting with that arm.    Plan:  - Today's Plan of Care:  Referral to an Orthopedic Surgeon  Activity Modification: Rest    Follow Up: as needed    Concerning signs and symptoms were reviewed.  The patient expressed understanding of this management plan and all questions were answered at this time.    Tatiana Thornton MD CAQ  Primary Care Sports Medicine  [facility]

## 2017-04-10 ENCOUNTER — ANESTHESIA EVENT (OUTPATIENT)
Dept: SURGERY | Facility: CLINIC | Age: 67
End: 2017-04-10
Payer: COMMERCIAL

## 2017-04-10 ENCOUNTER — OFFICE VISIT (OUTPATIENT)
Dept: FAMILY MEDICINE | Facility: CLINIC | Age: 67
End: 2017-04-10
Payer: COMMERCIAL

## 2017-04-10 VITALS
SYSTOLIC BLOOD PRESSURE: 139 MMHG | HEIGHT: 72 IN | WEIGHT: 264 LBS | DIASTOLIC BLOOD PRESSURE: 74 MMHG | HEART RATE: 53 BPM | BODY MASS INDEX: 35.76 KG/M2

## 2017-04-10 DIAGNOSIS — Z95.1 S/P CABG (CORONARY ARTERY BYPASS GRAFT): ICD-10-CM

## 2017-04-10 DIAGNOSIS — E11.59 TYPE 2 DIABETES MELLITUS WITH OTHER CIRCULATORY COMPLICATIONS (H): ICD-10-CM

## 2017-04-10 DIAGNOSIS — Z01.818 PREOP GENERAL PHYSICAL EXAM: Primary | ICD-10-CM

## 2017-04-10 DIAGNOSIS — S46.312S TRICEPS TENDON RUPTURE, LEFT, SEQUELA: ICD-10-CM

## 2017-04-10 DIAGNOSIS — I10 HYPERTENSION GOAL BP (BLOOD PRESSURE) < 140/90: ICD-10-CM

## 2017-04-10 PROCEDURE — 99214 OFFICE O/P EST MOD 30 MIN: CPT | Performed by: FAMILY MEDICINE

## 2017-04-10 PROCEDURE — 93000 ELECTROCARDIOGRAM COMPLETE: CPT | Performed by: FAMILY MEDICINE

## 2017-04-10 NOTE — PROGRESS NOTES
[facility]  [address] [address]  [phone]  Dept: [phone]    PRE-OP EVALUATION:  Today's date: 4/10/2017    Francois Lujan (: 1950) presents for pre-operative evaluation assessment as requested by Dr. Rodriguez Kelley .  He requires evaluation and anesthesia risk assessment prior to undergoing surgery/procedure for treatment of left triceps tendon tear .  Proposed procedure: repair of left triceps tendon    Date of Surgery/ Procedure: 17  Time of Surgery/ Procedure: 2:00pm  Hospital/Surgical Facility: [facility]  Primary Physician: Karen Ivory  Type of Anesthesia Anticipated: General    Patient has a Health Care Directive or Living Will:  NO    1. YES - DO YOU HAVE A HISTORY OF HEART ATTACK, STROKE, STENT, BYPASS OR SURGERY ON AN ARTERY IN THE HEAD, NECK, HEART OR LEG? Bypass about 5 years ago  2. YES - DO YOU EVER HAVE ANY PAIN OR DISCOMFORT IN YOUR CHEST? At times   3. YES - DO YOU HAVE A HISTORY OF HEART FAILURE - Yes - given the bypass  4. NO - Are you troubled by shortness of breath when: walking on the level, up a slight hill or at night?  5. NO - Do you currently have a cold, bronchitis or other respiratory infection?  6. NO - Do you have a cough, shortness of breath or wheezing?  7. NO - Do you sometimes get pains in the calves of your legs when you walk?  8. NO - Do you or anyone in your family have previous history of blood clots?  9. NO - Do you or does anyone in your family have a serious bleeding problem such as prolonged bleeding following surgeries or cuts?  10. NO - Have you ever had problems with anemia or been told to take iron pills?  11. NO - Have you had any abnormal blood loss such as black, tarry or bloody stools, or abnormal vaginal bleeding?  12. YES - HAVE YOU EVER HAD A BLOOD TRANSFUSION? About 5 years ago  13. NO - Have you or any of your relatives ever had problems with anesthesia?  14. YES - DO YOU HAVE  SLEEP APNEA, EXCESSIVE SNORING OR DAYTIME DROWSINESS? Sleep apnea, doesn't use a CPAP  15. NO - Do you have any prosthetic heart valves?  16. YES - DO YOU HAVE PROSTHETIC JOINTS? Right knee  17. NO - Is there any chance that you may be pregnant?      HPI:                                                      Brief HPI related to upcoming procedure: Francois Lujan is 66 year old white male with CAD, diabetes, HTN, CKD, obstructive sleep apnea, alcohol use and left triceps tear who is here to get clearance to have general anesthesia.        See problem list for active medical problems.  Problems all longstanding and stable, except as noted/documented.  See ROS for pertinent symptoms related to these conditions.                                                                                                  .    MEDICAL HISTORY:                                                      Patient Active Problem List    Diagnosis Date Noted     Coronary artery disease involving native coronary artery of native heart without angina pectoris 03/03/2017     Priority: Medium     Polyneuropathy associated with underlying disease (H) 08/30/2016     Priority: Medium     Alcohol dependence with other alcohol-induced disorder (H) 08/30/2016     Priority: Medium     Diabetic polyneuropathy associated with type 2 diabetes mellitus (H) 04/12/2016     Priority: Medium     Type 2 diabetes mellitus with other circulatory complications (H) 04/12/2016     Priority: Medium     Type 2 diabetes mellitus with complication (H) 10/23/2015     Priority: Medium     Type 2 diabetes mellitus with other skin complications (H) 10/23/2015     Priority: Medium     CKD (chronic kidney disease) stage 2, GFR 60-89 ml/min 10/23/2015     Priority: Medium     Proteinuria 10/23/2015     Priority: Medium     Type 2 diabetes mellitus with other diabetic kidney complication (H) 10/23/2015     Priority: Medium     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     Priority: Medium      HTN, goal below 140/90 07/18/2014     Priority: Medium     OA (osteoarthritis) of knee, right 04/29/2014     Priority: Medium     ACL (anterior cruciate ligament) tear 04/29/2014     Priority: Medium     posttraumatic arthrosis lateral compartment       Multiple joint pain 04/23/2014     Priority: Medium     Elevated sed rate 03/03/2014     Priority: Medium     Elevated C-reactive protein (CRP) 03/03/2014     Priority: Medium     History of back surgery 03/03/2014     Priority: Medium     Polymyalgia rheumatica (H) 03/03/2014     Priority: Medium     Moderate major depression (H) 02/11/2014     Priority: Medium     Radicular pain of lumbosacral region 12/31/2013     Priority: Medium     Health Care Home 12/13/2013     Priority: Medium     Status:  Unable to reach  Care Coordinator:  Zoila Flores           Numbness of hand, right 12/12/2013     Priority: Medium     Hypertension goal BP (blood pressure) < 140/90 09/17/2013     Priority: Medium     ADELA (obstructive sleep apnea) 07/11/2013     Priority: Medium     Severe ADELA (7/10/2013 with AHI ~70, sherin desat 86%, insufficient time for CPAP titration)       Hyperlipidemia LDL goal <70 05/28/2013     Priority: Medium     S/P CABG (coronary artery bypass graft) 11/29/2012     Priority: Medium     11/17/11  PREOPERATIVE DIAGNOSIS:  Severe 3-vessel coronary artery disease with unstable angina.        POSTOPERATIVE DIAGNOSIS:  Severe 3-vessel coronary artery disease with unstable angina.        PROCEDURES:    1.  Coronary artery bypass grafting x3 with placement of left internal mammary artery to the left anterior descending artery, saphenous vein graft from the aorta to the third obtuse marginal branch of the circumflex coronary artery and saphenous vein graft from aorta to the distal right coronary artery.    2.  Placement of temporary ventricular pacing wires.    3.  Endoscopic vein harvest from the left leg.        Hypogonadotropic hypogonadism (H) 11/29/2012      Priority: Medium     Hypothyroidism 11/29/2012     Priority: Medium     Hard of hearing 11/29/2012     Priority: Medium      Past Medical History:   Diagnosis Date     Arthritis      CAD (coronary artery disease)      Diabetes mellitus (H)      Erythema nodosum 1982     Gout      Hypertension      Malignant neoplasm (H)     squamous cell CA removed from right hand     Thyroid disease      Past Surgical History:   Procedure Laterality Date     BACK SURGERY       BYPASS GRAFT ARTERY CORONARY  11/17/2011    Procedure:BYPASS GRAFT ARTERY CORONARY; CORONARY ARTERY BYPASS, Right, OM, LAD WITH ENDOVEIN HARVEST, ON PUMP; Surgeon:LEONEL MG; Location:[facility]     COLONOSCOPY N/A 6/9/2016    Procedure: COLONOSCOPY;  Surgeon: Isidro Humphreys MD;  Location: [facility]     HERNIA REPAIR      infantile hernia repair     ORTHOPEDIC SURGERY      Baker cyst removed - right knee, and mesiscus tear repair     Current Outpatient Prescriptions   Medication Sig Dispense Refill     HYDROcodone-acetaminophen (NORCO) 5-325 MG per tablet Take 1 tablet by mouth every 8 hours as needed for moderate to severe pain 15 tablet 0     amLODIPine (NORVASC) 10 MG tablet Take 1 tablet (10 mg) by mouth daily 90 tablet 3     metFORMIN (GLUCOPHAGE) 500 MG tablet Take 2 tablets (1,000 mg) by mouth 2 times daily (with meals) (Needs follow-up appointment for this medication) 180 tablet 3     glipiZIDE (GLUCOTROL XL) 10 MG 24 hr tablet Take 2 tablets (20 mg) by mouth daily (with breakfast) 180 tablet 3     metoprolol (LOPRESSOR) 50 MG tablet Take 1 tablet (50 mg) by mouth 2 times daily 180 tablet 3     hydrochlorothiazide 12.5 MG TABS tablet Take 1 tablet (12.5 mg) by mouth daily 90 tablet 3     levothyroxine (SYNTHROID) 50 MCG tablet Take 1 tablet (50 mcg) by mouth daily 90 tablet 3     atorvastatin (LIPITOR) 40 MG tablet Take 1 tablet (40 mg) by mouth daily 90 tablet 3     ACE/ARB NOT PRESCRIBED, INTENTIONAL, Please choose reason not prescribed, below        amLODIPine (NORVASC) 5 MG tablet Take 1 tablet (5 mg) by mouth daily 30 tablet 1     blood glucose (NO BRAND SPECIFIED) lancets standard Use to test blood sugar 3 times daily or as directed. 1 Box 3     blood glucose monitoring (ACCU-CHEK SMARTVIEW) test strip Use to test blood sugar 3 times daily or as directed. 100 each 12     testosterone (ANDROGEL) 50 MG/5GM (1%) gel Place 1 packet (50 mg) onto the skin daily 90 packet 3     aspirin 325 MG tablet Take 325 mg by mouth daily       OTC products: None, except as noted above    Allergies   Allergen Reactions     Ivp Dye [Contrast Dye] Hives     Brief bout - 10 minutes duration      Latex Allergy: NO    Social History   Substance Use Topics     Smoking status: Former Smoker     Packs/day: 2.00     Years: 30.00     Quit date: 11/13/1997     Smokeless tobacco: Never Used     Alcohol use 6.0 oz/week     12 Cans of beer per week      Comment: 20+ beers a week     History   Drug Use No       REVIEW OF SYSTEMS:                                                    Constitutional, HEENT, cardiovascular, pulmonary, gi and gu systems are negative, except as otherwise noted.    EXAM:                                                    /72  Pulse 53  Ht 6' (1.829 m)  Wt 264 lb (119.7 kg)  BMI 35.8 kg/m2    GENERAL APPEARANCE: healthy, alert and no distress     EYES: EOMI,  PERRL     HENT: ear canals and TM's normal and nose and mouth without ulcers or lesions     NECK: no adenopathy, no asymmetry, masses, or scars and thyroid normal to palpation     RESP: lungs clear to auscultation - no rales, rhonchi or wheezes     CV: regular rates and rhythm, normal S1 S2, no S3 or S4 and no murmur, click or rub     ABDOMEN:  soft, nontender, no HSM or masses and bowel sounds normal     MS: extremities normal- no gross deformities noted, no evidence of inflammation in joints, FROM in all extremities.     SKIN: no suspicious lesions or rashes     NEURO: Normal strength and tone, sensory  exam grossly normal, mentation intact and speech normal     PSYCH: mentation appears normal. and affect normal/bright     LYMPHATICS: No axillary, cervical, or supraclavicular nodes    DIAGNOSTICS:                                                    EKG: sinus bradycardia, rate 52, normal axis, normal intervals, no acute ST/T changes c/w ischemia, no LVH by voltage criteria, unchanged from previous tracings    Recent Labs   Lab Test  03/03/17   1109  08/30/16   1024  04/11/16   1030  09/16/15   1354  05/08/14 05/06/14 02/03/14   HGB   --    --    --   12.3*   --   10.1*   < >  11.6*   < >  10.7*   PLT   --    --    --   222   --    --    --   228   < >  254   INR   --    --    --    --    --    --    --   1.05   --   1.09   NA  136   --   131*   --    < >   --    --   137   --    --    POTASSIUM  4.2   --   4.4   --    < >   --    < >  4.8   --   5.0   CR  0.98   --   1.08   --    < >   --    < >  1.01   --    --    A1C  7.7*  7.0*  11.2*   --    < >   --    --    --    < >   --     < > = values in this interval not displayed.        IMPRESSION:                                                    Reason for surgery/procedure:   1. Preop general physical exam  - EKG 12-lead complete w/read - Clinics    2. Triceps tendon rupture, left, sequela   cleared for general anesthesia    3. Hypertension goal BP (blood pressure) < 140/90  At goal    4. Type 2 diabetes mellitus with other circulatory complications (H)  Not on insulin    5. S/P CABG (coronary artery bypass graft)  stable      The proposed surgical procedure is considered INTERMEDIATE risk.    REVISED CARDIAC RISK INDEX  The patient has the following serious cardiovascular risks for perioperative complications such as (MI, PE, VFib and 3  AV Block):  Diabetes Mellitus (on Insulin)  INTERPRETATION:     The patient has the following additional risks for perioperative complications:  The 10-year ASCVD risk score (Rafa EN Jr, et al., 2013) is: 35.8%    Values used to  calculate the score:      Age: 66 years      Sex: Male      Is Non- : No      Diabetic: Yes      Tobacco smoker: No      Systolic Blood Pressure: 154 mmHg      Is BP treated: Yes      HDL Cholesterol: 59 mg/dL      Total Cholesterol: 214 mg/dL      ICD-10-CM    1. Preop general physical exam Z01.818        RECOMMENDATIONS:                                                      --Consult hospital rounder / IM to assist post-op medical management  Diabetes Medication Use  -----Hold usual  oral diabetic meds (e.g. Metformin, Actos, Glipizide) while NPO.       APPROVAL GIVEN to proceed with proposed procedure, without further diagnostic evaluation       Signed Electronically by: Karen Ivory MD    Copy of this evaluation report is provided to requesting physician.    [facility] Preop Guidelines

## 2017-04-10 NOTE — NURSING NOTE
Chief Complaint   Patient presents with     Pre-Op Exam     L triceps tendon repair       Initial /74 (BP Location: Left arm, Patient Position: Chair, Cuff Size: Adult Large)  Pulse 53  Ht 6' (1.829 m)  Wt 264 lb (119.7 kg)  BMI 35.8 kg/m2 Estimated body mass index is 35.8 kg/(m^2) as calculated from the following:    Height as of this encounter: 6' (1.829 m).    Weight as of this encounter: 264 lb (119.7 kg).  Medication Reconciliation: complete

## 2017-04-10 NOTE — MR AVS SNAPSHOT
After Visit Summary   4/10/2017    Francois Lujan    MRN: [id number]           Patient Information     Date Of Birth          1950        Visit Information        Provider Department      4/10/2017 8:40 AM Karen Ivory MD [facility]        Today's Diagnoses     Preop general physical exam    -  1    Triceps tendon rupture, left, sequela        Hypertension goal BP (blood pressure) < 140/90        Type 2 diabetes mellitus with other circulatory complications (H)        S/P CABG (coronary artery bypass graft)          Care Instructions      Before Your Surgery      Call your surgeon if there is any change in your health. This includes signs of a cold or flu (such as a sore throat, runny nose, cough, rash or fever).    Do not smoke, drink alcohol or take over the counter medicine (unless your surgeon or primary care doctor tells you to) for the 24 hours before and after surgery.    If you take prescribed drugs: Follow your doctor s orders about which medicines to take and which to stop until after surgery.    Eating and drinking prior to surgery: follow the instructions from your surgeon    Take a shower or bath the night before surgery. Use the soap your surgeon gave you to gently clean your skin. If you do not have soap from your surgeon, use your regular soap. Do not shave or scrub the surgery site.  Wear clean pajamas and have clean sheets on your bed.         Follow-ups after your visit        Your next 10 appointments already scheduled     Apr 11, 2017   Procedure with Rodriguez Kelley MD   [facility] (--)    [facility] [facility] [address] [phone]           The medical center is located at [address] (between [address] in [place], [place] miles north of [place]).              Who to contact     If you have questions or need follow up information about today's clinic visit or your schedule please contact [facility] directly at [phone].  Normal or non-critical lab and imaging results will be communicated to you by MyChart, letter or phone within 4 business days after the clinic has received the results. If you do not hear from us within 7 days, please contact the clinic through [employer] or phone. If you have a critical or abnormal lab result, we will notify you by phone as soon as possible.  Submit refill requests through [employer] or call your pharmacy and they will forward the refill request to us. Please allow 3 business days for your refill to be completed.          Additional Information About Your Visit        [employer] Information     [employer] lets you send messages to your doctor, view your test results, renew your prescriptions, schedule appointments and more. To sign up, go to [email or web address] . Click on \"Log in\" on the left side of the screen, which will take you to the Welcome page. Then click on \"Sign up Now\" on the right side of the page.     You will be asked to enter the access code listed below, as well as some personal information. Please follow the directions to create your username and password.     Your access code is: [email or web address]  Expires: 2017 12:05 PM     Your access code will  in 90 days. If you need help or a new code, please call your [place] clinic or [phone].        Care EveryWhere ID     This is your Care EveryWhere ID. This could be used by other organizations to access your [place] medical records  [id number]        Your Vitals Were     Pulse Height BMI (Body Mass Index)             53 6' (1.829 m) 35.8 kg/m2          Blood Pressure from Last 3 Encounters:   04/10/17 139/74   17 139/68   17 145/74    Weight from Last 3 Encounters:   04/10/17 264 lb (119.7 kg)   17 264 lb (119.7 kg)   17 264 lb (119.7 kg)              We Performed the Following     EKG 12-lead complete w/read - Clinics        Primary Care Provider Office Phone # Fax " #    Karen Ivory [phone]       [facility] [address] [address]        Thank you!     Thank you for choosing [facility]  for your care. Our goal is always to provide you with excellent care. Hearing back from our patients is one way we can continue to improve our services. Please take a few minutes to complete the written survey that you may receive in the mail after your visit with us. Thank you!             Your Updated Medication List - Protect others around you: Learn how to safely use, store and throw away your medicines at www.disposemymeds.org.          This list is accurate as of: 4/10/17  9:20 AM.  Always use your most recent med list.                   Brand Name Dispense Instructions for use    ACE/ARB NOT PRESCRIBED (INTENTIONAL)      Please choose reason not prescribed, below       * amLODIPine 5 MG tablet    NORVASC    30 tablet    Take 1 tablet (5 mg) by mouth daily       * amLODIPine 10 MG tablet    NORVASC    90 tablet    Take 1 tablet (10 mg) by mouth daily       aspirin 325 MG tablet      Take 325 mg by mouth daily       atorvastatin 40 MG tablet    LIPITOR    90 tablet    Take 1 tablet (40 mg) by mouth daily       blood glucose lancets standard    no brand specified    1 Box    Use to test blood sugar 3 times daily or as directed.       blood glucose monitoring test strip    ACCU-CHEK SMARTVIEW    100 each    Use to test blood sugar 3 times daily or as directed.       glipiZIDE 10 MG 24 hr tablet    GLUCOTROL XL    180 tablet    Take 2 tablets (20 mg) by mouth daily (with breakfast)       hydrochlorothiazide 12.5 MG Tabs tablet     90 tablet    Take 1 tablet (12.5 mg) by mouth daily       HYDROcodone-acetaminophen 5-325 MG per tablet    NORCO    15 tablet    Take 1 tablet by mouth every 8 hours as needed for moderate to severe pain       levothyroxine 50 MCG tablet    SYNTHROID    90 tablet    Take 1 tablet (50 mcg) by mouth  daily       metFORMIN 500 MG tablet    GLUCOPHAGE    180 tablet    Take 2 tablets (1,000 mg) by mouth 2 times daily (with meals) (Needs follow-up appointment for this medication)       metoprolol 50 MG tablet    LOPRESSOR    180 tablet    Take 1 tablet (50 mg) by mouth 2 times daily       testosterone 50 MG/5GM (1%) topical gel    ANDROGEL    90 packet    Place 1 packet (50 mg) onto the skin daily       * Notice:  This list has 2 medication(s) that are the same as other medications prescribed for you. Read the directions carefully, and ask your doctor or other care provider to review them with you.

## 2017-04-11 ENCOUNTER — ANESTHESIA (OUTPATIENT)
Dept: SURGERY | Facility: CLINIC | Age: 67
End: 2017-04-11
Payer: COMMERCIAL

## 2017-04-11 ENCOUNTER — HOSPITAL ENCOUNTER (OUTPATIENT)
Facility: CLINIC | Age: 67
Discharge: HOME | End: 2017-04-11
Attending: ORTHOPAEDIC SURGERY | Admitting: ORTHOPAEDIC SURGERY
Payer: COMMERCIAL

## 2017-04-11 VITALS
BODY MASS INDEX: 35.89 KG/M2 | RESPIRATION RATE: 14 BRPM | WEIGHT: 265 LBS | HEIGHT: 72 IN | OXYGEN SATURATION: 94 % | TEMPERATURE: 98 F | SYSTOLIC BLOOD PRESSURE: 136 MMHG | DIASTOLIC BLOOD PRESSURE: 85 MMHG | HEART RATE: 77 BPM

## 2017-04-11 DIAGNOSIS — S46.312S TRICEPS TENDON RUPTURE, LEFT, SEQUELA: Primary | ICD-10-CM

## 2017-04-11 LAB — GLUCOSE BLDC GLUCOMTR-MCNC: 195 MG/DL (ref 70–99)

## 2017-04-11 PROCEDURE — 37000008 ZZH ANESTHESIA TECHNICAL FEE, 1ST 30 MIN: Performed by: ORTHOPAEDIC SURGERY

## 2017-04-11 PROCEDURE — 88304 TISSUE EXAM BY PATHOLOGIST: CPT | Mod: 26 | Performed by: ORTHOPAEDIC SURGERY

## 2017-04-11 PROCEDURE — 25000128 H RX IP 250 OP 636: Performed by: NURSE ANESTHETIST, CERTIFIED REGISTERED

## 2017-04-11 PROCEDURE — 71000027 ZZH RECOVERY PHASE 2 EACH 15 MINS: Performed by: ORTHOPAEDIC SURGERY

## 2017-04-11 PROCEDURE — 40000306 ZZH STATISTIC PRE PROC ASSESS II: Performed by: ORTHOPAEDIC SURGERY

## 2017-04-11 PROCEDURE — 25800025 ZZH RX 258: Performed by: NURSE ANESTHETIST, CERTIFIED REGISTERED

## 2017-04-11 PROCEDURE — 36000063 ZZH SURGERY LEVEL 4 EA 15 ADDTL MIN: Performed by: ORTHOPAEDIC SURGERY

## 2017-04-11 PROCEDURE — C1713 ANCHOR/SCREW BN/BN,TIS/BN: HCPCS | Performed by: ORTHOPAEDIC SURGERY

## 2017-04-11 PROCEDURE — 82962 GLUCOSE BLOOD TEST: CPT

## 2017-04-11 PROCEDURE — 37000011 ZZH ANESTHESIA WARD SERVICE: Performed by: NURSE ANESTHETIST, CERTIFIED REGISTERED

## 2017-04-11 PROCEDURE — 36000093 ZZH SURGERY LEVEL 4 1ST 30 MIN: Performed by: ORTHOPAEDIC SURGERY

## 2017-04-11 PROCEDURE — 25000125 ZZHC RX 250: Performed by: NURSE ANESTHETIST, CERTIFIED REGISTERED

## 2017-04-11 PROCEDURE — 25000132 ZZH RX MED GY IP 250 OP 250 PS 637: Performed by: PHYSICIAN ASSISTANT

## 2017-04-11 PROCEDURE — 88304 TISSUE EXAM BY PATHOLOGIST: CPT | Performed by: ORTHOPAEDIC SURGERY

## 2017-04-11 PROCEDURE — 25000132 ZZH RX MED GY IP 250 OP 250 PS 637: Performed by: ORTHOPAEDIC SURGERY

## 2017-04-11 PROCEDURE — 25000128 H RX IP 250 OP 636: Performed by: ORTHOPAEDIC SURGERY

## 2017-04-11 PROCEDURE — 37000009 ZZH ANESTHESIA TECHNICAL FEE, EACH ADDTL 15 MIN: Performed by: ORTHOPAEDIC SURGERY

## 2017-04-11 PROCEDURE — 27210794 ZZH OR GENERAL SUPPLY STERILE: Performed by: ORTHOPAEDIC SURGERY

## 2017-04-11 DEVICE — IMP ANCHOR ARTHREX BIO-SWIVELOCK 4.75X19.1MM AR-2324BCC: Type: IMPLANTABLE DEVICE | Site: ELBOW | Status: FUNCTIONAL

## 2017-04-11 RX ORDER — OXYCODONE HYDROCHLORIDE 5 MG/1
5-10 TABLET ORAL
Status: COMPLETED | OUTPATIENT
Start: 2017-04-11 | End: 2017-04-11

## 2017-04-11 RX ORDER — LIDOCAINE HYDROCHLORIDE 10 MG/ML
INJECTION, SOLUTION INFILTRATION; PERINEURAL PRN
Status: DISCONTINUED | OUTPATIENT
Start: 2017-04-11 | End: 2017-04-11

## 2017-04-11 RX ORDER — FENTANYL CITRATE 50 UG/ML
25-50 INJECTION, SOLUTION INTRAMUSCULAR; INTRAVENOUS
Status: DISCONTINUED | OUTPATIENT
Start: 2017-04-11 | End: 2017-04-11 | Stop reason: HOSPADM

## 2017-04-11 RX ORDER — ROPIVACAINE HYDROCHLORIDE 7.5 MG/ML
INJECTION, SOLUTION EPIDURAL; PERINEURAL PRN
Status: DISCONTINUED | OUTPATIENT
Start: 2017-04-11 | End: 2017-04-11

## 2017-04-11 RX ORDER — ONDANSETRON 4 MG/1
4 TABLET, ORALLY DISINTEGRATING ORAL EVERY 30 MIN PRN
Status: DISCONTINUED | OUTPATIENT
Start: 2017-04-11 | End: 2017-04-11 | Stop reason: HOSPADM

## 2017-04-11 RX ORDER — OXYCODONE HCL 10 MG/1
10 TABLET, FILM COATED, EXTENDED RELEASE ORAL EVERY 12 HOURS
Qty: 8 TABLET | Refills: 0
Start: 2017-04-11 | End: 2017-07-28

## 2017-04-11 RX ORDER — GLYCOPYRROLATE 0.2 MG/ML
INJECTION, SOLUTION INTRAMUSCULAR; INTRAVENOUS PRN
Status: DISCONTINUED | OUTPATIENT
Start: 2017-04-11 | End: 2017-04-11

## 2017-04-11 RX ORDER — FENTANYL CITRATE 50 UG/ML
INJECTION, SOLUTION INTRAMUSCULAR; INTRAVENOUS PRN
Status: DISCONTINUED | OUTPATIENT
Start: 2017-04-11 | End: 2017-04-11

## 2017-04-11 RX ORDER — EPHEDRINE SULFATE 50 MG/ML
INJECTION, SOLUTION INTRAMUSCULAR; INTRAVENOUS; SUBCUTANEOUS PRN
Status: DISCONTINUED | OUTPATIENT
Start: 2017-04-11 | End: 2017-04-11

## 2017-04-11 RX ORDER — KETAMINE HYDROCHLORIDE 50 MG/ML
INJECTION, SOLUTION INTRAMUSCULAR; INTRAVENOUS PRN
Status: DISCONTINUED | OUTPATIENT
Start: 2017-04-11 | End: 2017-04-11

## 2017-04-11 RX ORDER — NALOXONE HYDROCHLORIDE 0.4 MG/ML
.1-.4 INJECTION, SOLUTION INTRAMUSCULAR; INTRAVENOUS; SUBCUTANEOUS
Status: DISCONTINUED | OUTPATIENT
Start: 2017-04-11 | End: 2017-04-11 | Stop reason: HOSPADM

## 2017-04-11 RX ORDER — KETAMINE HYDROCHLORIDE 10 MG/ML
INJECTION, SOLUTION INTRAMUSCULAR; INTRAVENOUS PRN
Status: DISCONTINUED | OUTPATIENT
Start: 2017-04-11 | End: 2017-04-11

## 2017-04-11 RX ORDER — SODIUM CHLORIDE, SODIUM LACTATE, POTASSIUM CHLORIDE, CALCIUM CHLORIDE 600; 310; 30; 20 MG/100ML; MG/100ML; MG/100ML; MG/100ML
INJECTION, SOLUTION INTRAVENOUS CONTINUOUS
Status: DISCONTINUED | OUTPATIENT
Start: 2017-04-11 | End: 2017-04-11 | Stop reason: HOSPADM

## 2017-04-11 RX ORDER — LIDOCAINE 40 MG/G
CREAM TOPICAL
Status: DISCONTINUED | OUTPATIENT
Start: 2017-04-11 | End: 2017-04-11 | Stop reason: HOSPADM

## 2017-04-11 RX ORDER — HYDROXYZINE HYDROCHLORIDE 25 MG/1
25 TABLET, FILM COATED ORAL EVERY 6 HOURS PRN
Status: DISCONTINUED | OUTPATIENT
Start: 2017-04-11 | End: 2017-04-11 | Stop reason: HOSPADM

## 2017-04-11 RX ORDER — CEFAZOLIN SODIUM 2 G/100ML
2 INJECTION, SOLUTION INTRAVENOUS
Status: DISCONTINUED | OUTPATIENT
Start: 2017-04-11 | End: 2017-04-11 | Stop reason: DRUGHIGH

## 2017-04-11 RX ORDER — ONDANSETRON 2 MG/ML
4 INJECTION INTRAMUSCULAR; INTRAVENOUS EVERY 30 MIN PRN
Status: DISCONTINUED | OUTPATIENT
Start: 2017-04-11 | End: 2017-04-11 | Stop reason: HOSPADM

## 2017-04-11 RX ORDER — PROPOFOL 10 MG/ML
INJECTION, EMULSION INTRAVENOUS CONTINUOUS PRN
Status: DISCONTINUED | OUTPATIENT
Start: 2017-04-11 | End: 2017-04-11

## 2017-04-11 RX ORDER — METOPROLOL TARTRATE 1 MG/ML
1-2 INJECTION, SOLUTION INTRAVENOUS EVERY 5 MIN PRN
Status: DISCONTINUED | OUTPATIENT
Start: 2017-04-11 | End: 2017-04-11 | Stop reason: HOSPADM

## 2017-04-11 RX ORDER — OXYCODONE HYDROCHLORIDE 5 MG/1
5-10 TABLET ORAL
Qty: 45 TABLET | Refills: 0
Start: 2017-04-11 | End: 2017-07-28

## 2017-04-11 RX ORDER — HYDROMORPHONE HYDROCHLORIDE 1 MG/ML
.3-.5 INJECTION, SOLUTION INTRAMUSCULAR; INTRAVENOUS; SUBCUTANEOUS EVERY 10 MIN PRN
Status: DISCONTINUED | OUTPATIENT
Start: 2017-04-11 | End: 2017-04-11 | Stop reason: HOSPADM

## 2017-04-11 RX ORDER — HYDROXYZINE HYDROCHLORIDE 25 MG/1
25 TABLET, FILM COATED ORAL EVERY 6 HOURS PRN
Qty: 30 TABLET | Refills: 0
Start: 2017-04-11 | End: 2017-07-28

## 2017-04-11 RX ORDER — CEFAZOLIN SODIUM 1 G/3ML
1 INJECTION, POWDER, FOR SOLUTION INTRAMUSCULAR; INTRAVENOUS SEE ADMIN INSTRUCTIONS
Status: DISCONTINUED | OUTPATIENT
Start: 2017-04-11 | End: 2017-04-11 | Stop reason: HOSPADM

## 2017-04-11 RX ORDER — HYDROXYZINE HYDROCHLORIDE 50 MG/1
50 TABLET, FILM COATED ORAL EVERY 6 HOURS PRN
Status: DISCONTINUED | OUTPATIENT
Start: 2017-04-11 | End: 2017-04-11 | Stop reason: HOSPADM

## 2017-04-11 RX ORDER — LIDOCAINE HCL/EPINEPHRINE/PF 2%-1:200K
VIAL (ML) INJECTION PRN
Status: DISCONTINUED | OUTPATIENT
Start: 2017-04-11 | End: 2017-04-11

## 2017-04-11 RX ORDER — ALBUTEROL SULFATE 0.83 MG/ML
2.5 SOLUTION RESPIRATORY (INHALATION) EVERY 4 HOURS PRN
Status: DISCONTINUED | OUTPATIENT
Start: 2017-04-11 | End: 2017-04-11 | Stop reason: HOSPADM

## 2017-04-11 RX ORDER — CEFAZOLIN SODIUM 1 G/50ML
3 SOLUTION INTRAVENOUS
Status: COMPLETED | OUTPATIENT
Start: 2017-04-11 | End: 2017-04-11

## 2017-04-11 RX ORDER — ACETAMINOPHEN 325 MG/1
975 TABLET ORAL ONCE
Status: COMPLETED | OUTPATIENT
Start: 2017-04-11 | End: 2017-04-11

## 2017-04-11 RX ADMIN — FENTANYL CITRATE 50 MCG: 50 INJECTION, SOLUTION INTRAMUSCULAR; INTRAVENOUS at 14:05

## 2017-04-11 RX ADMIN — ROPIVACAINE HYDROCHLORIDE 30 ML: 7.5 INJECTION, SOLUTION EPIDURAL; PERINEURAL at 13:24

## 2017-04-11 RX ADMIN — KETAMINE HYDROCHLORIDE 20 MG: 50 INJECTION, SOLUTION INTRAMUSCULAR; INTRAVENOUS at 15:56

## 2017-04-11 RX ADMIN — PROPOFOL 150 MCG/KG/MIN: 10 INJECTION, EMULSION INTRAVENOUS at 14:00

## 2017-04-11 RX ADMIN — MIDAZOLAM HYDROCHLORIDE 2 MG: 1 INJECTION, SOLUTION INTRAMUSCULAR; INTRAVENOUS at 13:16

## 2017-04-11 RX ADMIN — FENTANYL CITRATE 50 MCG: 50 INJECTION, SOLUTION INTRAMUSCULAR; INTRAVENOUS at 13:56

## 2017-04-11 RX ADMIN — MIDAZOLAM HYDROCHLORIDE 2 MG: 1 INJECTION, SOLUTION INTRAMUSCULAR; INTRAVENOUS at 13:18

## 2017-04-11 RX ADMIN — EPHEDRINE SULFATE 5 MG: 50 INJECTION INTRAMUSCULAR; INTRAVENOUS; SUBCUTANEOUS at 15:15

## 2017-04-11 RX ADMIN — KETAMINE HYDROCHLORIDE 10 MG: 50 INJECTION, SOLUTION INTRAMUSCULAR; INTRAVENOUS at 14:28

## 2017-04-11 RX ADMIN — GLYCOPYRROLATE 0.2 MG: 0.2 INJECTION, SOLUTION INTRAMUSCULAR; INTRAVENOUS at 14:00

## 2017-04-11 RX ADMIN — FENTANYL CITRATE 100 MCG: 50 INJECTION, SOLUTION INTRAMUSCULAR; INTRAVENOUS at 13:16

## 2017-04-11 RX ADMIN — KETAMINE HYDROCHLORIDE 20 MG: 50 INJECTION, SOLUTION INTRAMUSCULAR; INTRAVENOUS at 15:23

## 2017-04-11 RX ADMIN — LIDOCAINE HYDROCHLORIDE,EPINEPHRINE BITARTRATE 5 ML: 20; .005 INJECTION, SOLUTION EPIDURAL; INFILTRATION; INTRACAUDAL; PERINEURAL at 13:22

## 2017-04-11 RX ADMIN — SODIUM CHLORIDE, POTASSIUM CHLORIDE, SODIUM LACTATE AND CALCIUM CHLORIDE: 600; 310; 30; 20 INJECTION, SOLUTION INTRAVENOUS at 12:44

## 2017-04-11 RX ADMIN — Medication 3 G: at 13:55

## 2017-04-11 RX ADMIN — EPHEDRINE SULFATE 5 MG: 50 INJECTION INTRAMUSCULAR; INTRAVENOUS; SUBCUTANEOUS at 15:27

## 2017-04-11 RX ADMIN — ACETAMINOPHEN 975 MG: 325 TABLET, FILM COATED ORAL at 12:44

## 2017-04-11 RX ADMIN — FENTANYL CITRATE 50 MCG: 50 INJECTION, SOLUTION INTRAMUSCULAR; INTRAVENOUS at 14:00

## 2017-04-11 RX ADMIN — SODIUM CHLORIDE, POTASSIUM CHLORIDE, SODIUM LACTATE AND CALCIUM CHLORIDE: 600; 310; 30; 20 INJECTION, SOLUTION INTRAVENOUS at 15:15

## 2017-04-11 RX ADMIN — KETAMINE HYDROCHLORIDE 20 MG: 50 INJECTION, SOLUTION INTRAMUSCULAR; INTRAVENOUS at 14:51

## 2017-04-11 RX ADMIN — MIDAZOLAM HYDROCHLORIDE 1 MG: 1 INJECTION, SOLUTION INTRAMUSCULAR; INTRAVENOUS at 13:55

## 2017-04-11 RX ADMIN — LIDOCAINE HYDROCHLORIDE 1 ML: 10 INJECTION, SOLUTION INFILTRATION; PERINEURAL at 12:43

## 2017-04-11 RX ADMIN — OXYCODONE HYDROCHLORIDE 5 MG: 5 TABLET ORAL at 17:00

## 2017-04-11 RX ADMIN — LIDOCAINE HYDROCHLORIDE 50 MG: 10 INJECTION, SOLUTION INFILTRATION; PERINEURAL at 14:00

## 2017-04-11 ASSESSMENT — LIFESTYLE VARIABLES: TOBACCO_USE: 1

## 2017-04-11 NOTE — ANESTHESIA PROCEDURE NOTES
Peripheral nerve/Neuraxial procedure note : interscalene  Pre-Procedure  Performed by  KATE RAHMAN   Location: pre-op    Procedure Times:4/11/2017 1:15 PM and 4/11/2017 1:25 PM  Pre-Anesthestic Checklist: patient identified, IV checked, site marked, risks and benefits discussed, informed consent, monitors and equipment checked, pre-op evaluation, at physician/surgeon's request and post-op pain management    Timeout  Correct Patient: Yes   Correct Procedure: Yes   Correct Site: Yes   Correct Laterality: Yes   Correct Position: Yes   Site Marked: Yes   .   Procedure Documentation    Diagnosis:PAIN.    Procedure:    Interscalene.  Local skin infiltrated with 1 mL of 1% lidocaine.     Ultrasound used to identify targeted nerve, plexus, or vascular marker and placed a needle adjacent to it., Ultrasound was used to visualize the spread of the anesthetic in close proximity to the above stated nerve. A permanent image is entered into the patient's record.  Patient Prep;mask, sterile gloves, chlorhexidine gluconate and isopropyl alcohol, patient draped.  .  Needle: insulated, short bevel (22 G. 2 in). .  Spinal Needle: . . Insertion Method: Single Shot.     Assessment/Narrative  Paresthesias: No.  Injection made incrementally with aspirations every 5 mL..  The placement was negative for: blood aspirated, painful injection and site bleeding.  Bolus given via needle. No blood aspirated via catheter.   Secured via.   Complications: none. Test dose of 3 mL lidocaine 2% w/ 1:200,000 epinephrine at 13:22.  Test dose negative for signs of intravascular, subdural or intrathecal injection.

## 2017-04-11 NOTE — BRIEF OP NOTE
[facility] Orthopedic Brief Operative Note    Pre-operative diagnosis: Left tricep tear   Post-operative diagnosis: Same with significant gouty degeneration of triceps tendon   Procedure: Left triceps tendon repair   Surgeon: Rodriguez Kelley   Assistant(s): Ana Triplett PA-C   Anesthesia: Regional   Estimated blood loss: Minimal   Drains: None   Specimens: Triceps tendon   Implants: (See full op note)   Complications: None   Condition: Stable

## 2017-04-11 NOTE — IP AVS SNAPSHOT
MRN:[id number]                      After Visit Summary   4/11/2017    Francois Lujan    MRN: [id number]           Thank you!     Thank you for choosing [facility] for your care. Our goal is always to provide you with excellent care. Hearing back from our patients is one way we can continue to improve our services. Please take a few minutes to complete the written survey that you may receive in the mail after you visit with us. Thank you!        Patient Information     Date Of Birth          1950        About your hospital stay     You were admitted on:  April 11, 2017 You last received care in the:  [facility] PreOP/Phase II    You were discharged on:  April 11, 2017       Who to Call     For medical emergencies, please call 911.  For non-urgent questions about your medical care, please call your primary care provider or clinic, [phone]  For questions related to your surgery, please call your surgery clinic        Attending Provider     Provider Specialty    Rodriguez Kelley MD Hand Surgery       Primary Care Provider Office Phone # Fax #    Karen Caroline Ivory [phone]       [facility] [address] [address]        After Care Instructions      Diet as Tolerated       Return to diet before surgery, unless instructed otherwise.            Discharge Instructions       Review outpatient procedure discharge instructions with patient as directed by Provider            Ice to affected area       Ice pack to surgical site every 15 minutes per hour for 24 hours            No Dressing Change       No dressing change until follow up appointment.            No weight bearing           Notify Provider       For signs and symptoms of infection: Fever greater than 101, redness, swelling, heat at site, drainage, pus.            Return to clinic       Return to clinic in 2 weeks            Shower        Cover dressing if dressing is not going to be  changed today                  Further instructions from your care team                           Same Day Surgery Discharge Instructions  Special Precautions After Surgery - Adult    1. It is not unusual to feel lightheaded or faint, up to 24 hours after surgery or while taking pain medication.  If you have these symptoms; sit for a few minutes before standing and have someone assist you when getting up.  2. You should rest and relax for the next 24 hours and must have someone stay with you for at least 24 hours after your discharge.  3. DO NOT DRIVE any vehicle or operate mechanical equipment for 24 hours following the end of your surgery.  DO NOT DRIVE while taking narcotic pain medications that have been prescribed by your physician.  If you had a limb operated on, you must be able to use it fully to drive.  4. DO NOT drink alcoholic beverages for 24 hours following surgery or while taking prescription pain medication.  5. Drink clear liquids (apple juice, ginger ale, broth, 7-Up, etc.).  Progress to your regular diet as you feel able.  6. Any questions call your physician and do not make important decisions for 24 hours.    ACTIVITY  ? Rest today.  No activity or diet restrictions.  ? Resume activity as tolerated.  ? Restrictions: per nerve block information. Use sling for support  ? See printed discharge sheet.     INCISIONAL CARE  ? Do not remove dressing until seen by physician.  ? Keep extremity elevated above the level of the heart if possible. Move fingers once sensation returns.  ? Apply ice 1/2 hour on and 1/2 hour off while awake.  ? Be alert for signs of infection:  redness, swelling, heat, drainage of pus, and/or elevated temperature.  Contact your doctor if these occur.        Call for an appointment to return to the clinic as directed per md    Medications:  ? Hydroxyzine (Vistaril):  Next dose: as needed.  ? Oxycontin:  Next dose: start tonight and every 12 hours for the next 4 days.  ? Oxycodone  "as needed for break through  pain  And then for pain control as directed  ? Follow the instructions on the bottle.     Additional discharge instructions: None  __________________________________________________________________________________________________________________________________  IMPORTANT NUMBERS:    [facility] Main Number:  [phone], [phone]  Pharmacy:  [phone]  Same Day Surgery:  [phone], Monday - Friday until 8:30 p.m.  Urgent Care:  [phone]  Emergency Room:  [phone]      [facility] Orthopedics:  [phone]             Pending Results     Date and Time Order Name Status Description    2017 1518 Surgical pathology exam In process             Admission Information     Date & Time Provider Department Dept. Phone    2017 Rodriguez Kelley MD [facility] PreOP/Phase [phone]      Your Vitals Were     Blood Pressure Pulse Temperature Respirations Height Weight    152/72 63 98  F (36.7  C) (Oral) 16 1.829 m (6') 120.2 kg (265 lb)    Pulse Oximetry BMI (Body Mass Index)                95% 35.94 kg/m2          MyChart Information     [employer] lets you send messages to your doctor, view your test results, renew your prescriptions, schedule appointments and more. To sign up, go to [email or web address] . Click on \"Log in\" on the left side of the screen, which will take you to the Welcome page. Then click on \"Sign up Now\" on the right side of the page.     You will be asked to enter the access code listed below, as well as some personal information. Please follow the directions to create your username and password.     Your access code is: [email or web address]  Expires: 2017 12:05 PM     Your access code will  in 90 days. If you need help or a new code, please call your [facility] or [phone].        Care EveryWhere ID     This is your Care EveryWhere ID. This could be used by other organizations to access your [facility] medical records  [id number]   "         Review of your medicines      START taking        Dose / Directions    hydrOXYzine 25 MG tablet   Commonly known as:  ATARAX   Used for:  Triceps tendon rupture, left, sequela        Dose:  25 mg   Take 1 tablet (25 mg) by mouth every 6 hours as needed for itching (and nausea)   Quantity:  30 tablet   Refills:  0       * oxyCODONE 5 MG IR tablet   Commonly known as:  ROXICODONE   Used for:  Triceps tendon rupture, left, sequela        Dose:  5-10 mg   Take 1-2 tablets (5-10 mg) by mouth every 3 hours as needed for pain or other (Moderate to Severe)   Quantity:  45 tablet   Refills:  0       * oxyCODONE 10 MG 12 hr tablet   Commonly known as:  OxyCONTIN   Used for:  Triceps tendon rupture, left, sequela        Dose:  10 mg   Take 1 tablet (10 mg) by mouth every 12 hours   Quantity:  8 tablet   Refills:  0       * Notice:  This list has 2 medication(s) that are the same as other medications prescribed for you. Read the directions carefully, and ask your doctor or other care provider to review them with you.      CONTINUE these medicines which have NOT CHANGED        Dose / Directions    ACE/ARB NOT PRESCRIBED (INTENTIONAL)   Used for:  HTN, goal below 140/90, Type 2 diabetes mellitus with microalbuminuria, without long-term current use of insulin (H)        Please choose reason not prescribed, below   Refills:  0       * amLODIPine 5 MG tablet   Commonly known as:  NORVASC   Used for:  HTN, goal below 140/90, Proteinuria        Dose:  5 mg   Take 1 tablet (5 mg) by mouth daily   Quantity:  30 tablet   Refills:  1       * amLODIPine 10 MG tablet   Commonly known as:  NORVASC   Used for:  HTN, goal below 140/90        Dose:  10 mg   Take 1 tablet (10 mg) by mouth daily   Quantity:  90 tablet   Refills:  3       aspirin 325 MG tablet        Dose:  325 mg   Take 325 mg by mouth daily   Refills:  0       atorvastatin 40 MG tablet   Commonly known as:  LIPITOR   Used for:  Hyperlipidemia LDL goal <70        Dose:   40 mg   Take 1 tablet (40 mg) by mouth daily   Quantity:  90 tablet   Refills:  3       blood glucose lancets standard   Commonly known as:  no brand specified        Use to test blood sugar 3 times daily or as directed.   Quantity:  1 Box   Refills:  3       blood glucose monitoring test strip   Commonly known as:  ACCU-CHEK SMARTVIEW   Used for:  Type 2 diabetes mellitus with complication (H)        Use to test blood sugar 3 times daily or as directed.   Quantity:  100 each   Refills:  12       glipiZIDE 10 MG 24 hr tablet   Commonly known as:  GLUCOTROL XL        Dose:  20 mg   Take 2 tablets (20 mg) by mouth daily (with breakfast)   Quantity:  180 tablet   Refills:  3       hydrochlorothiazide 12.5 MG Tabs tablet   Used for:  HTN, goal below 140/90        Dose:  12.5 mg   Take 1 tablet (12.5 mg) by mouth daily   Quantity:  90 tablet   Refills:  3       levothyroxine 50 MCG tablet   Commonly known as:  SYNTHROID   Used for:  Acquired hypothyroidism        Dose:  50 mcg   Take 1 tablet (50 mcg) by mouth daily   Quantity:  90 tablet   Refills:  3       metFORMIN 500 MG tablet   Commonly known as:  GLUCOPHAGE        Dose:  1000 mg   Take 2 tablets (1,000 mg) by mouth 2 times daily (with meals) (Needs follow-up appointment for this medication)   Quantity:  180 tablet   Refills:  3       metoprolol 50 MG tablet   Commonly known as:  LOPRESSOR   Used for:  HTN, goal below 140/90        Dose:  50 mg   Take 1 tablet (50 mg) by mouth 2 times daily   Quantity:  180 tablet   Refills:  3       testosterone 50 MG/5GM (1%) topical gel   Commonly known as:  ANDROGEL   Used for:  Hypogonadism male        Dose:  50 mg   Place 1 packet (50 mg) onto the skin daily   Quantity:  90 packet   Refills:  3       * Notice:  This list has 2 medication(s) that are the same as other medications prescribed for you. Read the directions carefully, and ask your doctor or other care provider to review them with you.      STOP taking      HYDROcodone-acetaminophen 5-325 MG per tablet   Commonly known as:  NORCO                Where to get your medicines      Some of these will need a paper prescription and others can be bought over the counter. Ask your nurse if you have questions.     You don't need a prescription for these medications     hydrOXYzine 25 MG tablet    oxyCODONE 10 MG 12 hr tablet    oxyCODONE 5 MG IR tablet                Protect others around you: Learn how to safely use, store and throw away your medicines at www.disposemymeds.org.             Medication List: This is a list of all your medications and when to take them. Check marks below indicate your daily home schedule. Keep this list as a reference.      Medications           Morning Afternoon Evening Bedtime As Needed    ACE/ARB NOT PRESCRIBED (INTENTIONAL)   Please choose reason not prescribed, below                                * amLODIPine 5 MG tablet   Commonly known as:  NORVASC   Take 1 tablet (5 mg) by mouth daily                                * amLODIPine 10 MG tablet   Commonly known as:  NORVASC   Take 1 tablet (10 mg) by mouth daily                                aspirin 325 MG tablet   Take 325 mg by mouth daily                                atorvastatin 40 MG tablet   Commonly known as:  LIPITOR   Take 1 tablet (40 mg) by mouth daily                                blood glucose lancets standard   Commonly known as:  no brand specified   Use to test blood sugar 3 times daily or as directed.                                blood glucose monitoring test strip   Commonly known as:  ACCU-CHEK SMARTVIEW   Use to test blood sugar 3 times daily or as directed.                                glipiZIDE 10 MG 24 hr tablet   Commonly known as:  GLUCOTROL XL   Take 2 tablets (20 mg) by mouth daily (with breakfast)                                hydrochlorothiazide 12.5 MG Tabs tablet   Take 1 tablet (12.5 mg) by mouth daily                                hydrOXYzine 25 MG  tablet   Commonly known as:  ATARAX   Take 1 tablet (25 mg) by mouth every 6 hours as needed for itching (and nausea)                                levothyroxine 50 MCG tablet   Commonly known as:  SYNTHROID   Take 1 tablet (50 mcg) by mouth daily                                metFORMIN 500 MG tablet   Commonly known as:  GLUCOPHAGE   Take 2 tablets (1,000 mg) by mouth 2 times daily (with meals) (Needs follow-up appointment for this medication)                                metoprolol 50 MG tablet   Commonly known as:  LOPRESSOR   Take 1 tablet (50 mg) by mouth 2 times daily                                * oxyCODONE 5 MG IR tablet   Commonly known as:  ROXICODONE   Take 1-2 tablets (5-10 mg) by mouth every 3 hours as needed for pain or other (Moderate to Severe)                                * oxyCODONE 10 MG 12 hr tablet   Commonly known as:  OxyCONTIN   Take 1 tablet (10 mg) by mouth every 12 hours                                testosterone 50 MG/5GM (1%) topical gel   Commonly known as:  ANDROGEL   Place 1 packet (50 mg) onto the skin daily                                * Notice:  This list has 4 medication(s) that are the same as other medications prescribed for you. Read the directions carefully, and ask your doctor or other care provider to review them with you.

## 2017-04-11 NOTE — DISCHARGE INSTRUCTIONS
Same Day Surgery Discharge Instructions  Special Precautions After Surgery - Adult    1. It is not unusual to feel lightheaded or faint, up to 24 hours after surgery or while taking pain medication.  If you have these symptoms; sit for a few minutes before standing and have someone assist you when getting up.  2. You should rest and relax for the next 24 hours and must have someone stay with you for at least 24 hours after your discharge.  3. DO NOT DRIVE any vehicle or operate mechanical equipment for 24 hours following the end of your surgery.  DO NOT DRIVE while taking narcotic pain medications that have been prescribed by your physician.  If you had a limb operated on, you must be able to use it fully to drive.  4. DO NOT drink alcoholic beverages for 24 hours following surgery or while taking prescription pain medication.  5. Drink clear liquids (apple juice, ginger ale, broth, 7-Up, etc.).  Progress to your regular diet as you feel able.  6. Any questions call your physician and do not make important decisions for 24 hours.    ACTIVITY  ? Rest today.  No activity or diet restrictions.  ? Resume activity as tolerated.  ? Restrictions: per nerve block information. Use sling for support  ? See printed discharge sheet.     INCISIONAL CARE  ? Do not remove dressing until seen by physician.  ? Keep extremity elevated above the level of the heart if possible. Move fingers once sensation returns.  ? Apply ice 1/2 hour on and 1/2 hour off while awake.  ? Be alert for signs of infection:  redness, swelling, heat, drainage of pus, and/or elevated temperature.  Contact your doctor if these occur.        Call for an appointment to return to the clinic as directed per md    Medications:  ? Hydroxyzine (Vistaril):  Next dose: as needed.  ? Oxycontin:  Next dose: start tonight and every 12 hours for the next 4 days.  ? Oxycodone as needed for break through  pain  And then for pain control as  directed  ? Follow the instructions on the bottle.     Additional discharge instructions: None  __________________________________________________________________________________________________________________________________  IMPORTANT NUMBERS:    [facility] Main Number:  [phone], [phone]  Pharmacy:  [phone]  Same Day Surgery:  [phone], Monday - Friday until 8:30 p.m.  Urgent Care:  [phone]  Emergency Room:  [phone]      [facility] Orthopedics:  [phone]

## 2017-04-11 NOTE — ANESTHESIA CARE TRANSFER NOTE
Patient: Francois Levinetle    Procedure(s):  Left Arm Triceps Repair - Wound Class: I-Clean    Diagnosis: left tricep tear  Diagnosis Additional Information: No value filed.    Anesthesia Type:   General, MAC, Periph. Nerve Block for postop pain, ETT     Note:  Airway :Nasal Cannula  Patient transferred to:Phase II        Vitals: (Last set prior to Anesthesia Care Transfer)    CRNA VITALS  4/11/2017 1541 - 4/11/2017 1628      4/11/2017             Pulse: 74    Temp: (!)  31.2  C (88.2  F)    SpO2: 99 %                Electronically Signed By: EVA Chandler CRNA  April 11, 2017  4:28 PM

## 2017-04-11 NOTE — H&P (VIEW-ONLY)
[facility]  [address] [address]  [phone]  Dept: [phone]    PRE-OP EVALUATION:  Today's date: 4/10/2017    Francois Lujan (: 1950) presents for pre-operative evaluation assessment as requested by Dr. Rodriguez Kelley .  He requires evaluation and anesthesia risk assessment prior to undergoing surgery/procedure for treatment of left triceps tendon tear .  Proposed procedure: repair of left triceps tendon    Date of Surgery/ Procedure: 17  Time of Surgery/ Procedure: 2:00pm  Hospital/Surgical Facility: [facility]  Primary Physician: Karen Ivory  Type of Anesthesia Anticipated: General    Patient has a Health Care Directive or Living Will:  NO    1. YES - DO YOU HAVE A HISTORY OF HEART ATTACK, STROKE, STENT, BYPASS OR SURGERY ON AN ARTERY IN THE HEAD, NECK, HEART OR LEG? Bypass about 5 years ago  2. YES - DO YOU EVER HAVE ANY PAIN OR DISCOMFORT IN YOUR CHEST? At times   3. YES - DO YOU HAVE A HISTORY OF HEART FAILURE - Yes - given the bypass  4. NO - Are you troubled by shortness of breath when: walking on the level, up a slight hill or at night?  5. NO - Do you currently have a cold, bronchitis or other respiratory infection?  6. NO - Do you have a cough, shortness of breath or wheezing?  7. NO - Do you sometimes get pains in the calves of your legs when you walk?  8. NO - Do you or anyone in your family have previous history of blood clots?  9. NO - Do you or does anyone in your family have a serious bleeding problem such as prolonged bleeding following surgeries or cuts?  10. NO - Have you ever had problems with anemia or been told to take iron pills?  11. NO - Have you had any abnormal blood loss such as black, tarry or bloody stools, or abnormal vaginal bleeding?  12. YES - HAVE YOU EVER HAD A BLOOD TRANSFUSION? About 5 years ago  13. NO - Have you or any of your relatives ever had problems with anesthesia?  14. YES - DO YOU HAVE  SLEEP APNEA, EXCESSIVE SNORING OR DAYTIME DROWSINESS? Sleep apnea, doesn't use a CPAP  15. NO - Do you have any prosthetic heart valves?  16. YES - DO YOU HAVE PROSTHETIC JOINTS? Right knee  17. NO - Is there any chance that you may be pregnant?      HPI:                                                      Brief HPI related to upcoming procedure: Francois Lujan is 66 year old white male with CAD, diabetes, HTN, CKD, obstructive sleep apnea, alcohol use and left triceps tear who is here to get clearance to have general anesthesia.        See problem list for active medical problems.  Problems all longstanding and stable, except as noted/documented.  See ROS for pertinent symptoms related to these conditions.                                                                                                  .    MEDICAL HISTORY:                                                      Patient Active Problem List    Diagnosis Date Noted     Coronary artery disease involving native coronary artery of native heart without angina pectoris 03/03/2017     Priority: Medium     Polyneuropathy associated with underlying disease (H) 08/30/2016     Priority: Medium     Alcohol dependence with other alcohol-induced disorder (H) 08/30/2016     Priority: Medium     Diabetic polyneuropathy associated with type 2 diabetes mellitus (H) 04/12/2016     Priority: Medium     Type 2 diabetes mellitus with other circulatory complications (H) 04/12/2016     Priority: Medium     Type 2 diabetes mellitus with complication (H) 10/23/2015     Priority: Medium     Type 2 diabetes mellitus with other skin complications (H) 10/23/2015     Priority: Medium     CKD (chronic kidney disease) stage 2, GFR 60-89 ml/min 10/23/2015     Priority: Medium     Proteinuria 10/23/2015     Priority: Medium     Type 2 diabetes mellitus with other diabetic kidney complication (H) 10/23/2015     Priority: Medium     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     Priority: Medium      HTN, goal below 140/90 07/18/2014     Priority: Medium     OA (osteoarthritis) of knee, right 04/29/2014     Priority: Medium     ACL (anterior cruciate ligament) tear 04/29/2014     Priority: Medium     posttraumatic arthrosis lateral compartment       Multiple joint pain 04/23/2014     Priority: Medium     Elevated sed rate 03/03/2014     Priority: Medium     Elevated C-reactive protein (CRP) 03/03/2014     Priority: Medium     History of back surgery 03/03/2014     Priority: Medium     Polymyalgia rheumatica (H) 03/03/2014     Priority: Medium     Moderate major depression (H) 02/11/2014     Priority: Medium     Radicular pain of lumbosacral region 12/31/2013     Priority: Medium     Health Care Home 12/13/2013     Priority: Medium     Status:  Unable to reach  Care Coordinator:  Zoila Flores           Numbness of hand, right 12/12/2013     Priority: Medium     Hypertension goal BP (blood pressure) < 140/90 09/17/2013     Priority: Medium     ADELA (obstructive sleep apnea) 07/11/2013     Priority: Medium     Severe ADELA (7/10/2013 with AHI ~70, sherin desat 86%, insufficient time for CPAP titration)       Hyperlipidemia LDL goal <70 05/28/2013     Priority: Medium     S/P CABG (coronary artery bypass graft) 11/29/2012     Priority: Medium     11/17/11  PREOPERATIVE DIAGNOSIS:  Severe 3-vessel coronary artery disease with unstable angina.        POSTOPERATIVE DIAGNOSIS:  Severe 3-vessel coronary artery disease with unstable angina.        PROCEDURES:    1.  Coronary artery bypass grafting x3 with placement of left internal mammary artery to the left anterior descending artery, saphenous vein graft from the aorta to the third obtuse marginal branch of the circumflex coronary artery and saphenous vein graft from aorta to the distal right coronary artery.    2.  Placement of temporary ventricular pacing wires.    3.  Endoscopic vein harvest from the left leg.        Hypogonadotropic hypogonadism (H) 11/29/2012      Priority: Medium     Hypothyroidism 11/29/2012     Priority: Medium     Hard of hearing 11/29/2012     Priority: Medium      Past Medical History:   Diagnosis Date     Arthritis      CAD (coronary artery disease)      Diabetes mellitus (H)      Erythema nodosum 1982     Gout      Hypertension      Malignant neoplasm (H)     squamous cell CA removed from right hand     Thyroid disease      Past Surgical History:   Procedure Laterality Date     BACK SURGERY       BYPASS GRAFT ARTERY CORONARY  11/17/2011    Procedure:BYPASS GRAFT ARTERY CORONARY; CORONARY ARTERY BYPASS, Right, OM, LAD WITH ENDOVEIN HARVEST, ON PUMP; Surgeon:LEONEL MG; Location:[facility]     COLONOSCOPY N/A 6/9/2016    Procedure: COLONOSCOPY;  Surgeon: Isidro Humphreys MD;  Location: [facility]     HERNIA REPAIR      infantile hernia repair     ORTHOPEDIC SURGERY      Baker cyst removed - right knee, and mesiscus tear repair     Current Outpatient Prescriptions   Medication Sig Dispense Refill     HYDROcodone-acetaminophen (NORCO) 5-325 MG per tablet Take 1 tablet by mouth every 8 hours as needed for moderate to severe pain 15 tablet 0     amLODIPine (NORVASC) 10 MG tablet Take 1 tablet (10 mg) by mouth daily 90 tablet 3     metFORMIN (GLUCOPHAGE) 500 MG tablet Take 2 tablets (1,000 mg) by mouth 2 times daily (with meals) (Needs follow-up appointment for this medication) 180 tablet 3     glipiZIDE (GLUCOTROL XL) 10 MG 24 hr tablet Take 2 tablets (20 mg) by mouth daily (with breakfast) 180 tablet 3     metoprolol (LOPRESSOR) 50 MG tablet Take 1 tablet (50 mg) by mouth 2 times daily 180 tablet 3     hydrochlorothiazide 12.5 MG TABS tablet Take 1 tablet (12.5 mg) by mouth daily 90 tablet 3     levothyroxine (SYNTHROID) 50 MCG tablet Take 1 tablet (50 mcg) by mouth daily 90 tablet 3     atorvastatin (LIPITOR) 40 MG tablet Take 1 tablet (40 mg) by mouth daily 90 tablet 3     ACE/ARB NOT PRESCRIBED, INTENTIONAL, Please choose reason not prescribed, below        amLODIPine (NORVASC) 5 MG tablet Take 1 tablet (5 mg) by mouth daily 30 tablet 1     blood glucose (NO BRAND SPECIFIED) lancets standard Use to test blood sugar 3 times daily or as directed. 1 Box 3     blood glucose monitoring (ACCU-CHEK SMARTVIEW) test strip Use to test blood sugar 3 times daily or as directed. 100 each 12     testosterone (ANDROGEL) 50 MG/5GM (1%) gel Place 1 packet (50 mg) onto the skin daily 90 packet 3     aspirin 325 MG tablet Take 325 mg by mouth daily       OTC products: None, except as noted above    Allergies   Allergen Reactions     Ivp Dye [Contrast Dye] Hives     Brief bout - 10 minutes duration      Latex Allergy: NO    Social History   Substance Use Topics     Smoking status: Former Smoker     Packs/day: 2.00     Years: 30.00     Quit date: 11/13/1997     Smokeless tobacco: Never Used     Alcohol use 6.0 oz/week     12 Cans of beer per week      Comment: 20+ beers a week     History   Drug Use No       REVIEW OF SYSTEMS:                                                    Constitutional, HEENT, cardiovascular, pulmonary, gi and gu systems are negative, except as otherwise noted.    EXAM:                                                    /72  Pulse 53  Ht 6' (1.829 m)  Wt 264 lb (119.7 kg)  BMI 35.8 kg/m2    GENERAL APPEARANCE: healthy, alert and no distress     EYES: EOMI,  PERRL     HENT: ear canals and TM's normal and nose and mouth without ulcers or lesions     NECK: no adenopathy, no asymmetry, masses, or scars and thyroid normal to palpation     RESP: lungs clear to auscultation - no rales, rhonchi or wheezes     CV: regular rates and rhythm, normal S1 S2, no S3 or S4 and no murmur, click or rub     ABDOMEN:  soft, nontender, no HSM or masses and bowel sounds normal     MS: extremities normal- no gross deformities noted, no evidence of inflammation in joints, FROM in all extremities.     SKIN: no suspicious lesions or rashes     NEURO: Normal strength and tone, sensory  exam grossly normal, mentation intact and speech normal     PSYCH: mentation appears normal. and affect normal/bright     LYMPHATICS: No axillary, cervical, or supraclavicular nodes    DIAGNOSTICS:                                                    EKG: sinus bradycardia, rate 52, normal axis, normal intervals, no acute ST/T changes c/w ischemia, no LVH by voltage criteria, unchanged from previous tracings    Recent Labs   Lab Test  03/03/17   1109  08/30/16   1024  04/11/16   1030  09/16/15   1354  05/08/14 05/06/14 02/03/14   HGB   --    --    --   12.3*   --   10.1*   < >  11.6*   < >  10.7*   PLT   --    --    --   222   --    --    --   228   < >  254   INR   --    --    --    --    --    --    --   1.05   --   1.09   NA  136   --   131*   --    < >   --    --   137   --    --    POTASSIUM  4.2   --   4.4   --    < >   --    < >  4.8   --   5.0   CR  0.98   --   1.08   --    < >   --    < >  1.01   --    --    A1C  7.7*  7.0*  11.2*   --    < >   --    --    --    < >   --     < > = values in this interval not displayed.        IMPRESSION:                                                    Reason for surgery/procedure:   1. Preop general physical exam  - EKG 12-lead complete w/read - Clinics    2. Triceps tendon rupture, left, sequela   cleared for general anesthesia    3. Hypertension goal BP (blood pressure) < 140/90  At goal    4. Type 2 diabetes mellitus with other circulatory complications (H)  Not on insulin    5. S/P CABG (coronary artery bypass graft)  stable      The proposed surgical procedure is considered INTERMEDIATE risk.    REVISED CARDIAC RISK INDEX  The patient has the following serious cardiovascular risks for perioperative complications such as (MI, PE, VFib and 3  AV Block):  Diabetes Mellitus (on Insulin)  INTERPRETATION:     The patient has the following additional risks for perioperative complications:  The 10-year ASCVD risk score (Rafa EN Jr, et al., 2013) is: 35.8%    Values used to  calculate the score:      Age: 66 years      Sex: Male      Is Non- : No      Diabetic: Yes      Tobacco smoker: No      Systolic Blood Pressure: 154 mmHg      Is BP treated: Yes      HDL Cholesterol: 59 mg/dL      Total Cholesterol: 214 mg/dL      ICD-10-CM    1. Preop general physical exam Z01.818        RECOMMENDATIONS:                                                      --Consult hospital rounder / IM to assist post-op medical management  Diabetes Medication Use  -----Hold usual  oral diabetic meds (e.g. Metformin, Actos, Glipizide) while NPO.       APPROVAL GIVEN to proceed with proposed procedure, without further diagnostic evaluation       Signed Electronically by: Karen Ivory MD    Copy of this evaluation report is provided to requesting physician.    [facility] Preop Guidelines

## 2017-04-11 NOTE — ANESTHESIA POSTPROCEDURE EVALUATION
Patient: Francois Levinetle    Procedure(s):  Left Arm Triceps Repair - Wound Class: I-Clean    Diagnosis:left tricep tear  Diagnosis Additional Information: No value filed.    Anesthesia Type:  General, MAC, Periph. Nerve Block for postop pain, ETT    Note:  Anesthesia Post Evaluation    Patient location during evaluation: Bedside  Patient participation: Able to fully participate in evaluation  Level of consciousness: sleepy but conscious  Pain management: adequate  Airway patency: patent  Cardiovascular status: stable  Respiratory status: nasal cannula  Hydration status: stable  PONV: none     Anesthetic complications: None          Last vitals:  Vitals:    04/11/17 1216 04/11/17 1615   BP: 180/84 137/63   Pulse:  70   Resp: 20 16   Temp: 36.7  C (98  F)    SpO2: 97% 96%         Electronically Signed By: EVA Chandler CRNA  April 11, 2017  4:29 PM

## 2017-04-11 NOTE — ANESTHESIA PREPROCEDURE EVALUATION
Anesthesia Evaluation     . Pt has had prior anesthetic.     No history of anesthetic complications          ROS/MED HX    ENT/Pulmonary:     (+)sleep apnea, tobacco use, Past use doesn't use CPAP , . .    Neurologic:  - neg neurologic ROS     Cardiovascular:     (+) hypertension--CAD, -CABG-date: 5 years ago --3 vessel , . : . . . :. . Previous cardiac testing date:results:date: results:ECG reviewed date:4/10/17 results:SB date: results:          METS/Exercise Tolerance:  >4 METS   Hematologic:  - neg hematologic  ROS       Musculoskeletal:   (+) , , other musculoskeletal- back surgery       GI/Hepatic:  - neg GI/hepatic ROS       Renal/Genitourinary:     (+) chronic renal disease,       Endo:     (+) type I DM, Diabetic complications: neuropathy, thyroid problem hypothyroidism, Obesity, .      Psychiatric:  - neg psychiatric ROS       Infectious Disease:  - neg infectious disease ROS       Malignancy:      - no malignancy   Other:    - neg other ROS                 Physical Exam  Normal systems: cardiovascular, pulmonary and dental    Airway   Mallampati: III  TM distance: >3 FB  Neck ROM: full    Dental     Cardiovascular       Pulmonary                     Anesthesia Plan      History & Physical Review  History and physical reviewed and following examination; no interval change.    ASA Status:  3 .    NPO Status:  > 8 hours    Plan for General, MAC, Periph. Nerve Block for postop pain and ETT with Propofol induction. Maintenance will be Balanced.  Reason for MAC:  Deep or markedly invasive procedure (G8)  PONV prophylaxis:  Ondansetron (or other 5HT-3), Other (See comment) and Scopolamine patch  Additional equipment: Videolaryngoscope      Postoperative Care  Postoperative pain management:  IV analgesics, Oral pain medications and Peripheral nerve block (Single Shot).      Consents  Anesthetic plan, risks, benefits and alternatives discussed with:  Patient and Spouse..                          .

## 2017-04-11 NOTE — IP AVS SNAPSHOT
[facility] PreOP/Phase II    [address] [address]    Phone:  [phone]    Fax:  [phone]                                       After Visit Summary   4/11/2017    Francois Lujna    MRN: [id number]           After Visit Summary Signature Page     I have received my discharge instructions, and my questions have been answered. I have discussed any challenges I see with this plan with the nurse or doctor.    ..........................................................................................................................................  Patient/Patient Representative Signature      ..........................................................................................................................................  Patient Representative Print Name and Relationship to Patient    ..................................................               ................................................  Date                                            Time    ..........................................................................................................................................  Reviewed by Signature/Title    ...................................................              ..............................................  Date                                                            Time

## 2017-04-12 NOTE — OP NOTE
PROCEDURE/SERVICE DATE:  04/11/2017.      PREOPERATIVE DIAGNOSIS:  Left triceps avulsion.      POSTOPERATIVE DIAGNOSES:     1. Left triceps avulsion.   2. Thickened left olecranon bursa with mild gouty deposits.       PROCEDURES:     1. Left triceps repair.   2. Excision of left olecranon bursa.      ANESTHESIA:  Regional.      SURGEON:  Rodriguez Kelley MD.      ASSISTANT:  Denia Triplett PA-C.      DESCRIPTION OF PROCEDURE:  Francois Lujan was taken to the main operating suite.  Once there, he was transferred over to the operating table in a supine position.  He was given IV conscious sedation per Anesthesia.  He was transferred up to the right lateral decubitus position.  All pressure points were carefully padded.  An axillary roll was put in place.  The left upper extremity was prepped and draped in the usual sterile fashion.  The arm was exsanguinated with an Esmarch bandage, and the tourniquet was put up to 250 mmHg.        A curvilinear incision was made overlying the dorsal aspect of the elbow and avoiding the olecranon.  The patient had a thickened olecranon bursa that was excised.  The tendon was noted to be partially avulsed with significant scar tissue.  The triceps tendon was freed up, and there was a significant amount of what appeared to be tophaceous deposits throughout the course of the tendon.  The tendon had a diffusely degenerative appearance.  The distal 6-8 mm of tendon was removed to get down to at least reasonably healthy-appearing tendon that had longitudinal fibrous architecture, but there appeared to be tophaceous deposits throughout the course of the tendon.  The insertion point of the triceps and the olecranon was freshened up.  All soft tissue was removed, and it was gently bur to get down to bleeding bed of bone.  Two 2 mm drill holes were put in place.  A #2 FiberWire and a #2 Tiger wire were utilized to put 2 modified Mathiston type sutures in the triceps.  The FiberLink and a  TigerLInk were also passed, and these sutures were all passed through the 2 drill holes.  One limb of the FiberWire and 1 limb of the Tiger wire were passed through the fiber loop, and another limb from each was passed through the Tiger loop.  In this fashion, an X in box type repair was accomplished.  A 4.75 mm BioComposite SwiveLock was utilized to anchor the sutures.  Excellent approximation was achieved, and excellent stability was achieved.  The elbow was taken through a full range of motion, and full approximation was achieved with a good compression of the entire footprint.  The sides of the tendon were reinforced with #1 Ethibond in a running locked fashion.  Subcutaneous tissue was closed with 2-0 undyed Vicryl in a running simple suture fashion.  Skin was closed with 3-0 StrataFix covered by benzoin in a running subcuticular fashion.  This was covered by benzoin and Steri-Strips.  A dressing consisting of Adaptic gauze, 4x4 fluffs, sterile Webril and a long arm plaster splint with the elbow at about 70-80 degrees of flexion was applied.  Tourniquet was let down for a total tourniquet time of 73 minutes.  Of note, the olecranon bursa was removed in its entirety, and a portion of the triceps tendon with tophaceous deposits was sent for pathology to evaluate for gout.         EZIO ERICKSON MD             D: 2017 06:53   T: 2017 11:55   MT: [id number]      Name:     ANGELA WU   MRN:      [id number]        Account:        [id number]   :      1950           Procedure Date: 2017      Document: [id number]

## 2017-04-13 LAB — COPATH REPORT: NORMAL

## 2017-04-19 ENCOUNTER — TELEPHONE (OUTPATIENT)
Dept: UROLOGY | Facility: CLINIC | Age: 67
End: 2017-04-19

## 2017-04-19 DIAGNOSIS — E23.0 HYPOGONADOTROPIC HYPOGONADISM (H): Primary | ICD-10-CM

## 2017-04-19 NOTE — TELEPHONE ENCOUNTER
Per Dr Davila:  Had prescription request for testosterone replacement.  Hasn't been seen in 1.5 yrs.  Will be happy to refill, but needs a visit and labs including testosterone, and CBC.  Will give a temporary script when we have an appt set up.  Thanks.     Please notify pt when he returns call, lab orders are in.    Laura Carrasco, CMA

## 2017-04-24 NOTE — TELEPHONE ENCOUNTER
Called and spoke to patient, informed that he needs to get labs drawn and a recheck visit before Dr Davila will refill his testosterone. Patient voiced understanding and stated that he has enough for 3 months and will get in to be seen before he needs another refill.     Aviva Couch MA

## 2017-07-28 ENCOUNTER — HOSPITAL ENCOUNTER (EMERGENCY)
Facility: CLINIC | Age: 67
Discharge: HOME | End: 2017-07-28
Attending: EMERGENCY MEDICINE | Admitting: EMERGENCY MEDICINE
Payer: COMMERCIAL

## 2017-07-28 ENCOUNTER — APPOINTMENT (OUTPATIENT)
Dept: CT IMAGING | Facility: CLINIC | Age: 67
End: 2017-07-28
Attending: EMERGENCY MEDICINE
Payer: COMMERCIAL

## 2017-07-28 VITALS
SYSTOLIC BLOOD PRESSURE: 154 MMHG | OXYGEN SATURATION: 97 % | HEART RATE: 72 BPM | DIASTOLIC BLOOD PRESSURE: 71 MMHG | RESPIRATION RATE: 16 BRPM | TEMPERATURE: 98.2 F

## 2017-07-28 DIAGNOSIS — R10.9 ACUTE LEFT FLANK PAIN: ICD-10-CM

## 2017-07-28 LAB
ALBUMIN SERPL-MCNC: 4.1 G/DL (ref 3.4–5)
ALBUMIN UR-MCNC: 30 MG/DL
ALP SERPL-CCNC: 128 U/L (ref 40–150)
ALT SERPL W P-5'-P-CCNC: 22 U/L (ref 0–70)
ANION GAP SERPL CALCULATED.3IONS-SCNC: 7 MMOL/L (ref 3–14)
APPEARANCE UR: CLEAR
AST SERPL W P-5'-P-CCNC: 18 U/L (ref 0–45)
BASOPHILS # BLD AUTO: 0 10E9/L (ref 0–0.2)
BASOPHILS NFR BLD AUTO: 0.3 %
BILIRUB SERPL-MCNC: 0.5 MG/DL (ref 0.2–1.3)
BILIRUB UR QL STRIP: NEGATIVE
BUN SERPL-MCNC: 21 MG/DL (ref 7–30)
CALCIUM SERPL-MCNC: 8.9 MG/DL (ref 8.5–10.1)
CHLORIDE SERPL-SCNC: 94 MMOL/L (ref 94–109)
CO2 SERPL-SCNC: 29 MMOL/L (ref 20–32)
COLOR UR AUTO: ABNORMAL
CREAT SERPL-MCNC: 1.17 MG/DL (ref 0.66–1.25)
D DIMER PPP FEU-MCNC: 0.3 UG/ML FEU (ref 0–0.5)
DIFFERENTIAL METHOD BLD: ABNORMAL
EOSINOPHIL # BLD AUTO: 0.4 10E9/L (ref 0–0.7)
EOSINOPHIL NFR BLD AUTO: 4.1 %
ERYTHROCYTE [DISTWIDTH] IN BLOOD BY AUTOMATED COUNT: 13.2 % (ref 10–15)
GFR SERPL CREATININE-BSD FRML MDRD: 62 ML/MIN/1.7M2
GLUCOSE SERPL-MCNC: 163 MG/DL (ref 70–99)
GLUCOSE UR STRIP-MCNC: NEGATIVE MG/DL
HCT VFR BLD AUTO: 37.3 % (ref 40–53)
HGB BLD-MCNC: 13.1 G/DL (ref 13.3–17.7)
HGB UR QL STRIP: NEGATIVE
IMM GRANULOCYTES # BLD: 0 10E9/L (ref 0–0.4)
IMM GRANULOCYTES NFR BLD: 0.1 %
KETONES UR STRIP-MCNC: NEGATIVE MG/DL
LEUKOCYTE ESTERASE UR QL STRIP: NEGATIVE
LYMPHOCYTES # BLD AUTO: 4.8 10E9/L (ref 0.8–5.3)
LYMPHOCYTES NFR BLD AUTO: 50.1 %
MCH RBC QN AUTO: 32.1 PG (ref 26.5–33)
MCHC RBC AUTO-ENTMCNC: 35.1 G/DL (ref 31.5–36.5)
MCV RBC AUTO: 91 FL (ref 78–100)
MONOCYTES # BLD AUTO: 0.7 10E9/L (ref 0–1.3)
MONOCYTES NFR BLD AUTO: 7.4 %
NEUTROPHILS # BLD AUTO: 3.6 10E9/L (ref 1.6–8.3)
NEUTROPHILS NFR BLD AUTO: 38 %
NITRATE UR QL: NEGATIVE
PH UR STRIP: 6 PH (ref 5–7)
PLATELET # BLD AUTO: 225 10E9/L (ref 150–450)
POTASSIUM SERPL-SCNC: 3.9 MMOL/L (ref 3.4–5.3)
PROT SERPL-MCNC: 8.3 G/DL (ref 6.8–8.8)
RBC # BLD AUTO: 4.08 10E12/L (ref 4.4–5.9)
RBC #/AREA URNS AUTO: <1 /HPF (ref 0–2)
SODIUM SERPL-SCNC: 130 MMOL/L (ref 133–144)
SP GR UR STRIP: 1 (ref 1–1.03)
SQUAMOUS #/AREA URNS AUTO: <1 /HPF (ref 0–1)
TROPONIN I SERPL-MCNC: NORMAL UG/L (ref 0–0.04)
URN SPEC COLLECT METH UR: ABNORMAL
UROBILINOGEN UR STRIP-MCNC: NORMAL MG/DL (ref 0–2)
WBC # BLD AUTO: 9.6 10E9/L (ref 4–11)
WBC #/AREA URNS AUTO: 0 /HPF (ref 0–2)

## 2017-07-28 PROCEDURE — 96374 THER/PROPH/DIAG INJ IV PUSH: CPT | Mod: 59

## 2017-07-28 PROCEDURE — 25000125 ZZHC RX 250: Performed by: EMERGENCY MEDICINE

## 2017-07-28 PROCEDURE — 80053 COMPREHEN METABOLIC PANEL: CPT | Performed by: EMERGENCY MEDICINE

## 2017-07-28 PROCEDURE — 84484 ASSAY OF TROPONIN QUANT: CPT | Performed by: EMERGENCY MEDICINE

## 2017-07-28 PROCEDURE — 81001 URINALYSIS AUTO W/SCOPE: CPT | Performed by: EMERGENCY MEDICINE

## 2017-07-28 PROCEDURE — 25000128 H RX IP 250 OP 636: Performed by: EMERGENCY MEDICINE

## 2017-07-28 PROCEDURE — 99285 EMERGENCY DEPT VISIT HI MDM: CPT | Performed by: EMERGENCY MEDICINE

## 2017-07-28 PROCEDURE — 96375 TX/PRO/DX INJ NEW DRUG ADDON: CPT

## 2017-07-28 PROCEDURE — 85379 FIBRIN DEGRADATION QUANT: CPT | Performed by: EMERGENCY MEDICINE

## 2017-07-28 PROCEDURE — 99285 EMERGENCY DEPT VISIT HI MDM: CPT | Mod: 25

## 2017-07-28 PROCEDURE — 85025 COMPLETE CBC W/AUTO DIFF WBC: CPT | Performed by: EMERGENCY MEDICINE

## 2017-07-28 PROCEDURE — 74160 CT ABDOMEN W/CONTRAST: CPT

## 2017-07-28 RX ORDER — KETOROLAC TROMETHAMINE 30 MG/ML
30 INJECTION, SOLUTION INTRAMUSCULAR; INTRAVENOUS ONCE
Status: COMPLETED | OUTPATIENT
Start: 2017-07-28 | End: 2017-07-28

## 2017-07-28 RX ORDER — DIPHENHYDRAMINE HYDROCHLORIDE 50 MG/ML
25 INJECTION INTRAMUSCULAR; INTRAVENOUS ONCE
Status: COMPLETED | OUTPATIENT
Start: 2017-07-28 | End: 2017-07-28

## 2017-07-28 RX ORDER — IOPAMIDOL 755 MG/ML
100 INJECTION, SOLUTION INTRAVASCULAR ONCE
Status: COMPLETED | OUTPATIENT
Start: 2017-07-28 | End: 2017-07-28

## 2017-07-28 RX ADMIN — IOPAMIDOL 100 ML: 755 INJECTION, SOLUTION INTRAVENOUS at 05:59

## 2017-07-28 RX ADMIN — KETOROLAC TROMETHAMINE 30 MG: 30 INJECTION, SOLUTION INTRAMUSCULAR at 04:58

## 2017-07-28 RX ADMIN — DIPHENHYDRAMINE HYDROCHLORIDE 25 MG: 50 INJECTION, SOLUTION INTRAMUSCULAR; INTRAVENOUS at 05:55

## 2017-07-28 RX ADMIN — SODIUM CHLORIDE 72 ML: 9 INJECTION, SOLUTION INTRAVENOUS at 06:00

## 2017-07-28 ASSESSMENT — ENCOUNTER SYMPTOMS
APPETITE CHANGE: 0
COUGH: 0
FATIGUE: 0
NAUSEA: 0
FEVER: 0
DIARRHEA: 0
ABDOMINAL PAIN: 0
ACTIVITY CHANGE: 0
WHEEZING: 0
PSYCHIATRIC NEGATIVE: 1
WEAKNESS: 0
ABDOMINAL DISTENTION: 0
BLOOD IN STOOL: 0
VOMITING: 0
FREQUENCY: 0
DIFFICULTY URINATING: 0
CHEST TIGHTNESS: 0
CONSTIPATION: 0
RECTAL PAIN: 0

## 2017-07-28 NOTE — ED PROVIDER NOTES
History     Chief Complaint   Patient presents with     Chest Pain     HPI  Francois Lujan is a 66 year old male significant past medical history for CABG, DM type II, HTN, ADELA, obesity, PMR-presents with development of left posterior thoracic pain.  Describes pain starting in the left flank left posterior rib area and moving around following the lower rib to the left anterior chest region/upper abdominal region.  It does not cross midline.   No previous kidney stone  No trauma or fall  No anterior chest pain or dyspnea  Describes discomfort as intermittent deep ache.  Not sharp.  Not associated with movement or breathing  No recent cough or congestion  No dyspeptic symptoms.  Denies dysuria urgency or frequency.  No noted hematuria.  Has noted no rash/no previous shingles history    I have reviewed the Medications, Allergies, Past Medical and Surgical History, and Social History in the Epic system.    Allergies:   Allergies   Allergen Reactions     Ivp Dye [Contrast Dye] Hives     Brief bout - 10 minutes duration         No current facility-administered medications on file prior to encounter.   Current Outpatient Prescriptions on File Prior to Encounter:  amLODIPine (NORVASC) 10 MG tablet Take 1 tablet (10 mg) by mouth daily   metFORMIN (GLUCOPHAGE) 500 MG tablet Take 2 tablets (1,000 mg) by mouth 2 times daily (with meals) (Needs follow-up appointment for this medication)   glipiZIDE (GLUCOTROL XL) 10 MG 24 hr tablet Take 2 tablets (20 mg) by mouth daily (with breakfast)   metoprolol (LOPRESSOR) 50 MG tablet Take 1 tablet (50 mg) by mouth 2 times daily   hydrochlorothiazide 12.5 MG TABS tablet Take 1 tablet (12.5 mg) by mouth daily   levothyroxine (SYNTHROID) 50 MCG tablet Take 1 tablet (50 mcg) by mouth daily   atorvastatin (LIPITOR) 40 MG tablet Take 1 tablet (40 mg) by mouth daily   testosterone (ANDROGEL) 50 MG/5GM (1%) gel Place 1 packet (50 mg) onto the skin daily   ACE/ARB NOT PRESCRIBED, INTENTIONAL,  Please choose reason not prescribed, below   [DISCONTINUED] amLODIPine (NORVASC) 5 MG tablet Take 1 tablet (5 mg) by mouth daily   blood glucose (NO BRAND SPECIFIED) lancets standard Use to test blood sugar 3 times daily or as directed.   blood glucose monitoring (ACCU-CHEK SMARTVIEW) test strip Use to test blood sugar 3 times daily or as directed.       Patient Active Problem List   Diagnosis     S/P CABG (coronary artery bypass graft)     Hypogonadotropic hypogonadism (H)     Hypothyroidism     Hard of hearing     Hyperlipidemia LDL goal <70     ADELA (obstructive sleep apnea)     Hypertension goal BP (blood pressure) < 140/90     Numbness of hand, right     Health Care Home     Radicular pain of lumbosacral region     Moderate major depression (H)     Elevated sed rate     Elevated C-reactive protein (CRP)     History of back surgery     Polymyalgia rheumatica (H)     Multiple joint pain     OA (osteoarthritis) of knee, right     ACL (anterior cruciate ligament) tear     HTN, goal below 140/90     Necrobiosis lipoidica     Type 2 diabetes mellitus with complication (H)     Type 2 diabetes mellitus with other skin complications (H)     CKD (chronic kidney disease) stage 2, GFR 60-89 ml/min     Proteinuria     Type 2 diabetes mellitus with other diabetic kidney complication (H)     Diabetic polyneuropathy associated with type 2 diabetes mellitus (H)     Type 2 diabetes mellitus with other circulatory complications (H)     Polyneuropathy associated with underlying disease (H)     Alcohol dependence with other alcohol-induced disorder (H)     Coronary artery disease involving native coronary artery of native heart without angina pectoris     Triceps tendon rupture, left, sequela       Past Surgical History:   Procedure Laterality Date     BACK SURGERY       BYPASS GRAFT ARTERY CORONARY  11/17/2011    Procedure:BYPASS GRAFT ARTERY CORONARY; CORONARY ARTERY BYPASS, Right, OM, LAD WITH ENDOVEIN HARVEST, ON PUMP;  Surgeon:LEONEL MG; Location: OR     COLONOSCOPY N/A 6/9/2016    Procedure: COLONOSCOPY;  Surgeon: Isidro Humphreys MD;  Location: [facility]     HERNIA REPAIR      infantile hernia repair     ORTHOPEDIC SURGERY      Baker cyst removed - right knee, and mesiscus tear repair     REPAIR TENDON TRICEPS UPPER EXTREMITY Left 4/11/2017    Procedure: REPAIR TENDON TRICEPS UPPER EXTREMITY;  Surgeon: Rodriguez Kelley MD;  Location: [facility]       Social History   Substance Use Topics     Smoking status: Former Smoker     Packs/day: 2.00     Years: 30.00     Quit date: 11/13/1997     Smokeless tobacco: Never Used     Alcohol use 6.0 oz/week     12 Cans of beer per week      Comment: 20+ beers a week       Most Recent Immunizations   Administered Date(s) Administered     Influenza (High Dose) 3 valent vaccine 08/30/2016     Influenza (IIV3) 11/29/2012     Influenza Vaccine IM 3yrs+ 4 Valent IIV4 11/17/2015     Pneumococcal (PCV 13) 11/17/2015     Pneumococcal 23 valent 03/03/2017     TDAP Vaccine (Adacel) 11/29/2012       BMI: Estimated body mass index is 35.94 kg/(m^2) as calculated from the following:    Height as of 4/11/17: 1.829 m (6').    Weight as of 4/11/17: 120.2 kg (265 lb).      Review of Systems   Constitutional: Negative for activity change, appetite change, fatigue and fever.   HENT: Negative for congestion.    Respiratory: Negative for cough, chest tightness and wheezing.    Cardiovascular: Negative for chest pain and leg swelling.   Gastrointestinal: Negative for abdominal distention, abdominal pain, blood in stool, constipation, diarrhea, nausea, rectal pain and vomiting.   Genitourinary: Negative for difficulty urinating, frequency and scrotal swelling.   Neurological: Negative for weakness.   Psychiatric/Behavioral: Negative.        Physical Exam      Physical Exam   Constitutional: He is oriented to person, place, and time.   Appears mildly distressed   HENT:   Head: Normocephalic.   Eyes: Conjunctivae are  normal. Pupils are equal, round, and reactive to light. No scleral icterus.   Cardiovascular: Normal rate, regular rhythm, normal heart sounds and intact distal pulses.  Exam reveals no friction rub.    No murmur heard.  Pulmonary/Chest: Effort normal and breath sounds normal. No respiratory distress. He has no wheezes. He has no rales.   No respiratory splinting   Abdominal: Soft. Bowel sounds are normal. He exhibits no distension. There is no tenderness. There is no rebound and no guarding.   Not able to produce any pain with palpation and percussion in the left flank area or in the left side of the abdomen.  Remainder of abdomen is nontender   Neurological: He is alert and oriented to person, place, and time.   Skin: No rash noted.   No rash concerning for zoster   Nursing note and vitals reviewed.      ED Course     ED Course     Procedures             EKG Interpretation:      Interpreted by Gustabo Jhaveri  Time reviewed: 04:00  Symptoms at time of EKG:  Pain left side of her posterior thorax at level diaphragm  Rhythm: normal sinus   Rate: normal  Axis: normal  Ectopy: none  Conduction: normal  ST Segments/ T Waves: No ST-T wave changes  Q Waves: none  Comparison to prior: Unchanged from 4/17    Clinical Impression: normal EKG       Results for orders placed or performed during the hospital encounter of 07/28/17 (from the past 24 hour(s))   CBC with platelets differential   Result Value Ref Range    WBC 9.6 4.0 - 11.0 10e9/L    RBC Count 4.08 (L) 4.4 - 5.9 10e12/L    Hemoglobin 13.1 (L) 13.3 - 17.7 g/dL    Hematocrit 37.3 (L) 40.0 - 53.0 %    MCV 91 78 - 100 fl    MCH 32.1 26.5 - 33.0 pg    MCHC 35.1 31.5 - 36.5 g/dL    RDW 13.2 10.0 - 15.0 %    Platelet Count 225 150 - 450 10e9/L    Diff Method Automated Method     % Neutrophils 38.0 %    % Lymphocytes 50.1 %    % Monocytes 7.4 %    % Eosinophils 4.1 %    % Basophils 0.3 %    % Immature Granulocytes 0.1 %    Absolute Neutrophil 3.6 1.6 - 8.3 10e9/L     Absolute Lymphocytes 4.8 0.8 - 5.3 10e9/L    Absolute Monocytes 0.7 0.0 - 1.3 10e9/L    Absolute Eosinophils 0.4 0.0 - 0.7 10e9/L    Absolute Basophils 0.0 0.0 - 0.2 10e9/L    Abs Immature Granulocytes 0.0 0 - 0.4 10e9/L   Comprehensive metabolic panel   Result Value Ref Range    Sodium 130 (L) 133 - 144 mmol/L    Potassium 3.9 3.4 - 5.3 mmol/L    Chloride 94 94 - 109 mmol/L    Carbon Dioxide 29 20 - 32 mmol/L    Anion Gap 7 3 - 14 mmol/L    Glucose 163 (H) 70 - 99 mg/dL    Urea Nitrogen 21 7 - 30 mg/dL    Creatinine 1.17 0.66 - 1.25 mg/dL    GFR Estimate 62 >60 mL/min/1.7m2    GFR Estimate If Black 75 >60 mL/min/1.7m2    Calcium 8.9 8.5 - 10.1 mg/dL    Bilirubin Total 0.5 0.2 - 1.3 mg/dL    Albumin 4.1 3.4 - 5.0 g/dL    Protein Total 8.3 6.8 - 8.8 g/dL    Alkaline Phosphatase 128 40 - 150 U/L    ALT 22 0 - 70 U/L    AST 18 0 - 45 U/L   Troponin I   Result Value Ref Range    Troponin I ES  0.000 - 0.045 ug/L     <0.015  The 99th percentile for upper reference range is 0.045 ug/L.  Troponin values in   the range of 0.045 - 0.120 ug/L may be associated with risks of adverse   clinical events.     D dimer quantitative   Result Value Ref Range    D Dimer 0.3 0.0 - 0.50 ug/ml FEU   UA reflex to Microscopic   Result Value Ref Range    Color Urine Light Yellow     Appearance Urine Clear     Glucose Urine Negative NEG mg/dL    Bilirubin Urine Negative NEG    Ketones Urine Negative NEG mg/dL    Specific Gravity Urine 1.005 1.003 - 1.035    Blood Urine Negative NEG    pH Urine 6.0 5.0 - 7.0 pH    Protein Albumin Urine 30 (A) NEG mg/dL    Urobilinogen mg/dL Normal 0.0 - 2.0 mg/dL    Nitrite Urine Negative NEG    Leukocyte Esterase Urine Negative NEG    Source Midstream Urine     RBC Urine <1 0 - 2 /HPF    WBC Urine 0 0 - 2 /HPF    Squamous Epithelial /HPF Urine <1 0 - 1 /HPF       Assessments & Plan (with Medical Decision Making)  66-year-old male presents for evaluation of sudden onset left CVA flank pain that radiates into  the left upper abdomen . not associated with any chest discomfort or palpitations.  No shortness of breath.  Not worse with inspiration.  No urinary complaints.  No previous renal colic /stones .  Examination was not able to reproduce any pain with palpation or percussion in the low back, left flank/CVA area, abdomen.  Lungs are clear including lung bases.  Cardiac exam unremarkable   Differential diagnosis includes UTI , kidney stone , pain from spleen, musculoskeletal, and neuropathic pain from shingles with no manifestation of rash(to early), intrathoracic/ left lung base .  Blood work and urinalysis including CBC, CMP, d-dimer, troponin, UA was normal and she has no light on possible cause for patient's discomfort . EKG was normal.  CT of chest and abdomen with contrast is pending.  Elected to image both the chest and abdomen because his pain follows along the left diaphragm level.   Patient feeling improved after Toradol IV.  Currently complaining of any pain.  Uncertain as to the etiology for his discomfort at this time-wait for CT results.     Reviewed CT/lab results with patient. Uncertain cause for discomfort. Currently no complaints of discomfort.  D/C home and continue to observe for return of pain if noted advised follow up.       I have reviewed the nursing notes.    I have reviewed the findings, diagnosis, plan and need for follow up with the patient.      New Prescriptions    No medications on file       Final diagnoses:   Acute left flank pain       7/28/2017   [facility] EMERGENCY DEPARTMENT     Gustabo Jhaveri,   07/28/17 0651

## 2017-07-28 NOTE — DISCHARGE INSTRUCTIONS
No identified process to account for your pain in the left flank/poterior thorax.   Urine,blood and CT imaging of the chest and abdomin was all normal.  Continue to observe for return of discomfort and any new symptoms. If noted return to the Emergency department.

## 2017-07-28 NOTE — ED AVS SNAPSHOT
[facility] Emergency Department    [address] [address]    Phone:  [phone]    Fax:  [phone]                                       Francois Lujan   MRN: [id number]    Department:  [facility] Emergency Department   Date of Visit:  7/28/2017           Patient Information     Date Of Birth          1950        Your diagnoses for this visit were:     Acute left flank pain        You were seen by Gustabo Jhaveri DO.      Follow-up Information     Follow up with Karen Ivory MD.    Specialty:  Family Practice    Contact information:    [facility] MED  [address] [id number] [phone]          Discharge Instructions       No identified process to account for your pain in the left flank/poterior thorax.   Urine,blood and CT imaging of the chest and abdomin was all normal.  Continue to observe for return of discomfort and any new symptoms. If noted return to the Emergency department.      24 Hour Appointment Hotline       To make an appointment at any [facility] clinic, call [phone] ([phone]). If you don't have a family doctor or clinic, we will help you find one. [facility] clinics are conveniently located to serve the needs of you and your family.             Review of your medicines      Our records show that you are taking the medicines listed below. If these are incorrect, please call your family doctor or clinic.        Dose / Directions Last dose taken    ACE/ARB NOT PRESCRIBED (INTENTIONAL)        Please choose reason not prescribed, below   Refills:  0        amLODIPine 10 MG tablet   Commonly known as:  NORVASC   Dose:  10 mg   Quantity:  90 tablet        Take 1 tablet (10 mg) by mouth daily   Refills:  3        ASPIRIN PO   Dose:  81 mg        Take 81 mg by mouth daily   Refills:  0        atorvastatin 40 MG tablet   Commonly known as:  LIPITOR   Dose:  40 mg   Quantity:  90 tablet        Take 1 tablet (40 mg) by  mouth daily   Refills:  3        blood glucose lancets standard   Commonly known as:  no brand specified   Quantity:  1 Box        Use to test blood sugar 3 times daily or as directed.   Refills:  3        blood glucose monitoring test strip   Commonly known as:  ACCU-CHEK SMARTVIEW   Quantity:  100 each        Use to test blood sugar 3 times daily or as directed.   Refills:  12        glipiZIDE 10 MG 24 hr tablet   Commonly known as:  GLUCOTROL XL   Dose:  20 mg   Quantity:  180 tablet        Take 2 tablets (20 mg) by mouth daily (with breakfast)   Refills:  3        hydrochlorothiazide 12.5 MG Tabs tablet   Dose:  12.5 mg   Quantity:  90 tablet        Take 1 tablet (12.5 mg) by mouth daily   Refills:  3        levothyroxine 50 MCG tablet   Commonly known as:  SYNTHROID   Dose:  50 mcg   Quantity:  90 tablet        Take 1 tablet (50 mcg) by mouth daily   Refills:  3        metFORMIN 500 MG tablet   Commonly known as:  GLUCOPHAGE   Dose:  1000 mg   Quantity:  180 tablet        Take 2 tablets (1,000 mg) by mouth 2 times daily (with meals) (Needs follow-up appointment for this medication)   Refills:  3        metoprolol 50 MG tablet   Commonly known as:  LOPRESSOR   Dose:  50 mg   Quantity:  180 tablet        Take 1 tablet (50 mg) by mouth 2 times daily   Refills:  3        testosterone 50 MG/5GM (1%) topical gel   Commonly known as:  ANDROGEL   Dose:  50 mg   Quantity:  90 packet        Place 1 packet (50 mg) onto the skin daily   Refills:  3                Procedures and tests performed during your visit     CBC with platelets differential    CT Chest Abdomen w Contrast    Comprehensive metabolic panel    D dimer quantitative    EKG 12-lead, tracing only    Peripheral IV catheter    Troponin I    UA reflex to Microscopic      Orders Needing Specimen Collection     None      Pending Results     Date and Time Order Name Status Description    7/28/2017 0533 CT Chest Abdomen w Contrast Preliminary             Pending  Culture Results     No orders found from 7/26/2017 to 7/29/2017.            Pending Results Instructions     If you had any lab results that were not finalized at the time of your Discharge, you can call the ED Lab Result RN at [phone]. You will be contacted by this team for any positive Lab results or changes in treatment. The nurses are available 7 days a week from 10A to 6:30P.  You can leave a message 24 hours per day and they will return your call.        Test Results From Your Hospital Stay        7/28/2017  4:07 AM      Component Results     Component Value Ref Range & Units Status    WBC 9.6 4.0 - 11.0 10e9/L Final    RBC Count 4.08 (L) 4.4 - 5.9 10e12/L Final    Hemoglobin 13.1 (L) 13.3 - 17.7 g/dL Final    Hematocrit 37.3 (L) 40.0 - 53.0 % Final    MCV 91 78 - 100 fl Final    MCH 32.1 26.5 - 33.0 pg Final    MCHC 35.1 31.5 - 36.5 g/dL Final    RDW 13.2 10.0 - 15.0 % Final    Platelet Count 225 150 - 450 10e9/L Final    Diff Method Automated Method  Final    % Neutrophils 38.0 % Final    % Lymphocytes 50.1 % Final    % Monocytes 7.4 % Final    % Eosinophils 4.1 % Final    % Basophils 0.3 % Final    % Immature Granulocytes 0.1 % Final    Absolute Neutrophil 3.6 1.6 - 8.3 10e9/L Final    Absolute Lymphocytes 4.8 0.8 - 5.3 10e9/L Final    Absolute Monocytes 0.7 0.0 - 1.3 10e9/L Final    Absolute Eosinophils 0.4 0.0 - 0.7 10e9/L Final    Absolute Basophils 0.0 0.0 - 0.2 10e9/L Final    Abs Immature Granulocytes 0.0 0 - 0.4 10e9/L Final         7/28/2017  4:11 AM      Component Results     Component Value Ref Range & Units Status    Sodium 130 (L) 133 - 144 mmol/L Final    Potassium 3.9 3.4 - 5.3 mmol/L Final    Chloride 94 94 - 109 mmol/L Final    Carbon Dioxide 29 20 - 32 mmol/L Final    Anion Gap 7 3 - 14 mmol/L Final    Glucose 163 (H) 70 - 99 mg/dL Final    Urea Nitrogen 21 7 - 30 mg/dL Final    Creatinine 1.17 0.66 - 1.25 mg/dL Final    GFR Estimate 62 >60 mL/min/1.7m2 Final    Non   GFR Calc    GFR Estimate If Black 75 >60 mL/min/1.7m2 Final    African American GFR Calc    Calcium 8.9 8.5 - 10.1 mg/dL Final    Bilirubin Total 0.5 0.2 - 1.3 mg/dL Final    Albumin 4.1 3.4 - 5.0 g/dL Final    Protein Total 8.3 6.8 - 8.8 g/dL Final    Alkaline Phosphatase 128 40 - 150 U/L Final    ALT 22 0 - 70 U/L Final    AST 18 0 - 45 U/L Final         7/28/2017  4:11 AM      Component Results     Component Value Ref Range & Units Status    Troponin I ES  0.000 - 0.045 ug/L Final    <0.015  The 99th percentile for upper reference range is 0.045 ug/L.  Troponin values in   the range of 0.045 - 0.120 ug/L may be associated with risks of adverse   clinical events.           7/28/2017  5:28 AM      Component Results     Component Value Ref Range & Units Status    Color Urine Light Yellow  Final    Appearance Urine Clear  Final    Glucose Urine Negative NEG mg/dL Final    Bilirubin Urine Negative NEG Final    Ketones Urine Negative NEG mg/dL Final    Specific Gravity Urine 1.005 1.003 - 1.035 Final    Blood Urine Negative NEG Final    pH Urine 6.0 5.0 - 7.0 pH Final    Protein Albumin Urine 30 (A) NEG mg/dL Final    Urobilinogen mg/dL Normal 0.0 - 2.0 mg/dL Final    Nitrite Urine Negative NEG Final    Leukocyte Esterase Urine Negative NEG Final    Source Midstream Urine  Final    RBC Urine <1 0 - 2 /HPF Final    WBC Urine 0 0 - 2 /HPF Final    Squamous Epithelial /HPF Urine <1 0 - 1 /HPF Final         7/28/2017  4:12 AM      Component Results     Component Value Ref Range & Units Status    D Dimer 0.3 0.0 - 0.50 ug/ml FEU Final    This D-dimer assay is intended for use in conjunction with a clinical pretest   probability assessment model to exclude pulmonary embolism (PE) and deep   venous   thrombosis (DVT) in outpatients suspected of PE or DVT. The cut-off value is   0.5 ug/mL FEU.           7/28/2017  6:31 AM      Narrative     CT CHEST ABDOMEN W CONTRAST  7/28/2017 6:10 AM      HISTORY: Severe pain started in the  "left flank and radiated into the  left upper quadrant of the abdomen.    TECHNIQUE: CT chest, abdomen and pelvis with intravenous contrast.  Radiation dose for this scan was reduced using automated exposure  control, adjustment of the mA and/or kV according to patient size, or  iterative reconstruction technique. 100 mL Isovue-370.      COMPARISON: None.    FINDINGS:  Chest: There is calcified pleural plaque in the right hemithorax. A  few bands of scar or atelectasis in the lungs bilaterally. No focal  consolidation to suggest pneumonia. There has been a previous median  sternotomy. The heart size is normal. No mediastinal, hilar or  axillary lymph node enlargement.    Abdomen: The liver, spleen, gallbladder, pancreas, adrenal glands and  kidneys are normal in appearance. There is no abdominal or pelvic  lymph node enlargement. There is atherosclerotic calcification of the  aorta and its branches. No aneurysm.    Pelvis: There are sigmoid diverticula without acute diverticulitis. No  bowel obstruction or inflammation. The appendix is normal. No free  intraperitoneal gas or fluid. There is degenerative disease throughout  the spine.        Impression     IMPRESSION:  1. No acute abnormality.  2. Calcified pleural plaque in the right hemithorax.  3. Sigmoid diverticula without acute diverticulitis. No bowel  obstruction or inflammation.                Thank you for choosing [facility]       Thank you for choosing [facility] for your care. Our goal is always to provide you with excellent care. Hearing back from our patients is one way we can continue to improve our services. Please take a few minutes to complete the written survey that you may receive in the mail after you visit with us. Thank you!        [employer] Information     [employer] lets you send messages to your doctor, view your test results, renew your prescriptions, schedule appointments and more. To sign up, go to [email or web address] . Click on \"Log in\" on " "the left side of the screen, which will take you to the Welcome page. Then click on \"Sign up Now\" on the right side of the page.     You will be asked to enter the access code listed below, as well as some personal information. Please follow the directions to create your username and password.     Your access code is: [email or web address]  Expires: 10/26/2017  6:52 AM     Your access code will  in 90 days. If you need help or a new code, please call your [facility] clinic or [phone].        Care EveryWhere ID     This is your Care EveryWhere ID. This could be used by other organizations to access your [facility] medical records  [id number]        Equal Access to Services     KAYLAH ZAZUETA : Thang Agarwal, sherita quesada, rocio tran, luly lam. So [facility] [phone].    ATENCIÓN: Si habla español, tiene a birch disposición servicios gratuitos de asistencia lingüística. Llame al [phone].    We comply with applicable federal civil rights laws and [place] laws. We do not discriminate on the basis of race, color, national origin, age, disability sex, sexual orientation or gender identity.            After Visit Summary       This is your record. Keep this with you and show to your community pharmacist(s) and doctor(s) at your next visit.                  "

## 2017-07-28 NOTE — ED NOTES
Patient stated woke up earlier tonight with left posterior chest pain.  Was able to go to sleep when pain than radiated to left chest. No SOA but does have some pain on inspiration which patient states is intermittent.  No nausea, vomiting or diarrhea. No abd pain.  Describes pain as left lower chest which does not get worse with palpation or movement.  No recent illness, surgery or travel.  Last surgery in April

## 2017-07-28 NOTE — ED AVS SNAPSHOT
[facility] Emergency Department    [address] [address]    Phone:  [phone]    Fax:  [phone]                                       Francois Lujan   MRN: [id number]    Department:  [facility] Emergency Department   Date of Visit:  7/28/2017           After Visit Summary Signature Page     I have received my discharge instructions, and my questions have been answered. I have discussed any challenges I see with this plan with the nurse or doctor.    ..........................................................................................................................................  Patient/Patient Representative Signature      ..........................................................................................................................................  Patient Representative Print Name and Relationship to Patient    ..................................................               ................................................  Date                                            Time    ..........................................................................................................................................  Reviewed by Signature/Title    ...................................................              ..............................................  Date                                                            Time

## 2017-08-25 DIAGNOSIS — Z79.4 TYPE 2 DIABETES MELLITUS WITH COMPLICATION, WITH LONG-TERM CURRENT USE OF INSULIN (H): Primary | ICD-10-CM

## 2017-08-25 DIAGNOSIS — E11.8 TYPE 2 DIABETES MELLITUS WITH COMPLICATION, WITH LONG-TERM CURRENT USE OF INSULIN (H): Primary | ICD-10-CM

## 2017-08-25 NOTE — TELEPHONE ENCOUNTER
Test Strips      Last Written Prescription Date: 05/03/16  Last Fill Quantity: 100,  # refills: 12   Last Office Visit with G, [facility] or [facility] prescribing provider: 04/10/17

## 2017-09-19 ENCOUNTER — TRANSFERRED RECORDS (OUTPATIENT)
Dept: HEALTH INFORMATION MANAGEMENT | Facility: CLINIC | Age: 67
End: 2017-09-19

## 2017-09-20 ENCOUNTER — HOSPITAL ENCOUNTER (OUTPATIENT)
Dept: MRI IMAGING | Facility: CLINIC | Age: 67
Discharge: HOME | End: 2017-09-20
Attending: PHYSICIAN ASSISTANT | Admitting: PHYSICIAN ASSISTANT
Payer: COMMERCIAL

## 2017-09-20 DIAGNOSIS — M25.569 KNEE PAIN: ICD-10-CM

## 2017-09-20 PROCEDURE — 73721 MRI JNT OF LWR EXTRE W/O DYE: CPT | Mod: LT

## 2017-09-21 ENCOUNTER — OFFICE VISIT (OUTPATIENT)
Dept: FAMILY MEDICINE | Facility: CLINIC | Age: 67
End: 2017-09-21
Payer: COMMERCIAL

## 2017-09-21 VITALS
WEIGHT: 269.2 LBS | HEART RATE: 62 BPM | TEMPERATURE: 97.8 F | DIASTOLIC BLOOD PRESSURE: 73 MMHG | SYSTOLIC BLOOD PRESSURE: 156 MMHG | BODY MASS INDEX: 36.51 KG/M2

## 2017-09-21 DIAGNOSIS — N18.2 CKD (CHRONIC KIDNEY DISEASE) STAGE 2, GFR 60-89 ML/MIN: ICD-10-CM

## 2017-09-21 DIAGNOSIS — E11.42 DIABETIC POLYNEUROPATHY ASSOCIATED WITH TYPE 2 DIABETES MELLITUS (H): ICD-10-CM

## 2017-09-21 DIAGNOSIS — Z12.11 SPECIAL SCREENING FOR MALIGNANT NEOPLASMS, COLON: ICD-10-CM

## 2017-09-21 DIAGNOSIS — Z01.818 PREOP GENERAL PHYSICAL EXAM: ICD-10-CM

## 2017-09-21 DIAGNOSIS — S83.207D ACUTE MENISCAL TEAR OF LEFT KNEE, SUBSEQUENT ENCOUNTER: Primary | ICD-10-CM

## 2017-09-21 PROBLEM — M10.49: Status: ACTIVE | Noted: 2017-09-21

## 2017-09-21 LAB
ANION GAP SERPL CALCULATED.3IONS-SCNC: 6 MMOL/L (ref 3–14)
BUN SERPL-MCNC: 24 MG/DL (ref 7–30)
CALCIUM SERPL-MCNC: 8.9 MG/DL (ref 8.5–10.1)
CHLORIDE SERPL-SCNC: 101 MMOL/L (ref 94–109)
CHOLEST SERPL-MCNC: 165 MG/DL
CO2 SERPL-SCNC: 29 MMOL/L (ref 20–32)
CREAT SERPL-MCNC: 0.87 MG/DL (ref 0.66–1.25)
CREAT UR-MCNC: 57 MG/DL
GFR SERPL CREATININE-BSD FRML MDRD: 88 ML/MIN/1.7M2
GLUCOSE SERPL-MCNC: 154 MG/DL (ref 70–99)
HBA1C MFR BLD: 6.2 % (ref 4.3–6)
HDLC SERPL-MCNC: 66 MG/DL
LDLC SERPL CALC-MCNC: 81 MG/DL
MICROALBUMIN UR-MCNC: 231 MG/L
MICROALBUMIN/CREAT UR: 406.69 MG/G CR (ref 0–17)
NONHDLC SERPL-MCNC: 99 MG/DL
POTASSIUM SERPL-SCNC: 4.2 MMOL/L (ref 3.4–5.3)
SODIUM SERPL-SCNC: 136 MMOL/L (ref 133–144)
T4 FREE SERPL-MCNC: 0.9 NG/DL (ref 0.76–1.46)
TRIGL SERPL-MCNC: 91 MG/DL
TSH SERPL DL<=0.005 MIU/L-ACNC: 1.39 MU/L (ref 0.4–4)

## 2017-09-21 PROCEDURE — 82043 UR ALBUMIN QUANTITATIVE: CPT | Performed by: FAMILY MEDICINE

## 2017-09-21 PROCEDURE — 36415 COLL VENOUS BLD VENIPUNCTURE: CPT | Performed by: FAMILY MEDICINE

## 2017-09-21 PROCEDURE — 84443 ASSAY THYROID STIM HORMONE: CPT | Performed by: FAMILY MEDICINE

## 2017-09-21 PROCEDURE — 80061 LIPID PANEL: CPT | Performed by: FAMILY MEDICINE

## 2017-09-21 PROCEDURE — 83036 HEMOGLOBIN GLYCOSYLATED A1C: CPT | Performed by: FAMILY MEDICINE

## 2017-09-21 PROCEDURE — 99214 OFFICE O/P EST MOD 30 MIN: CPT | Performed by: FAMILY MEDICINE

## 2017-09-21 PROCEDURE — 84439 ASSAY OF FREE THYROXINE: CPT | Performed by: FAMILY MEDICINE

## 2017-09-21 PROCEDURE — 80048 BASIC METABOLIC PNL TOTAL CA: CPT | Performed by: FAMILY MEDICINE

## 2017-09-21 ASSESSMENT — PATIENT HEALTH QUESTIONNAIRE - PHQ9: SUM OF ALL RESPONSES TO PHQ QUESTIONS 1-9: 0

## 2017-09-21 NOTE — LETTER
September 25, 2017      Francois Lujan  [address]  [place] [address]        Dear ,    We are writing to inform you of your test results.  A1C and glucose high but at goal, urine protein high, rest of labs are normal. Plan get blood pressure lowered and reA1C and glucose high but at goal, urine protein high, rest of labs are normal. Plan get blood pressure lowered and recheck urine for protein in 2 monthscheck urine for protein in 2 months. Future lab order was placed. Please call outpatient lab scheduling at [phone] to schedule in 2 months. Thank You .    If you have any questions or concerns, please call the clinic at the number listed above.       Sincerely,        Karen Ivory MD

## 2017-09-21 NOTE — MR AVS SNAPSHOT
After Visit Summary   9/21/2017    Francois Lujan    MRN: [id number]           Patient Information     Date Of Birth          1950        Visit Information        Provider Department      9/21/2017 7:20 AM Karen Ivory MD [facility]        Today's Diagnoses     Acute meniscal tear of left knee, subsequent encounter    -  1    Preop general physical exam        Diabetic polyneuropathy associated with type 2 diabetes mellitus (H)        CKD (chronic kidney disease) stage 2, GFR 60-89 ml/min          Care Instructions      Before Your Surgery      Call your surgeon if there is any change in your health. This includes signs of a cold or flu (such as a sore throat, runny nose, cough, rash or fever).    Do not smoke, drink alcohol or take over the counter medicine (unless your surgeon or primary care doctor tells you to) for the 24 hours before and after surgery.    If you take prescribed drugs: Follow your doctor s orders about which medicines to take and which to stop until after surgery.    Eating and drinking prior to surgery: follow the instructions from your surgeon    Take a shower or bath the night before surgery. Use the soap your surgeon gave you to gently clean your skin. If you do not have soap from your surgeon, use your regular soap. Do not shave or scrub the surgery site.  Wear clean pajamas and have clean sheets on your bed.           Follow-ups after your visit        Who to contact     If you have questions or need follow up information about today's clinic visit or your schedule please contact [facility] directly at [phone].  Normal or non-critical lab and imaging results will be communicated to you by MyChart, letter or phone within 4 business days after the clinic has received the results. If you do not hear from us within 7 days, please contact the clinic through MyChart or phone. If you have a critical or abnormal lab result, we will  "notify you by phone as soon as possible.  Submit refill requests through [facility] or call your pharmacy and they will forward the refill request to us. Please allow 3 business days for your refill to be completed.          Additional Information About Your Visit        [facility] Information     [facility] lets you send messages to your doctor, view your test results, renew your prescriptions, schedule appointments and more. To sign up, go to [email or web address] . Click on \"Log in\" on the left side of the screen, which will take you to the Welcome page. Then click on \"Sign up Now\" on the right side of the page.     You will be asked to enter the access code listed below, as well as some personal information. Please follow the directions to create your username and password.     Your access code is: [email or web address]  Expires: 10/26/2017  6:52 AM     Your access code will  in 90 days. If you need help or a new code, please call your [facility] clinic or [phone].        Care EveryWhere ID     This is your Care EveryWhere ID. This could be used by other organizations to access your [facility] medical records  [id number]        Your Vitals Were     Pulse Temperature BMI (Body Mass Index)             62 97.8  F (36.6  C) (Tympanic) 36.51 kg/m2          Blood Pressure from Last 3 Encounters:   17 149/72   17 154/71   17 136/85    Weight from Last 3 Encounters:   17 269 lb 3.2 oz (122.1 kg)   17 265 lb (120.2 kg)   04/10/17 264 lb (119.7 kg)              Today, you had the following     No orders found for display       Primary Care Provider Office Phone # Fax #    Karen Ivory [phone] [address] [facility] [facility] [address]        Equal Access to Services     KAYLAH ZAZUETA : Thang Agarwal, sherita quesada, luly fragoso. [facility] [phone].    ATENCIÓN: Si sheri cerda " disposición servicios gratuitos de asistencia lingüística. Tracy arteaga [phone].    We comply with applicable federal civil rights laws and [place] laws. We do not discriminate on the basis of race, color, national origin, age, disability sex, sexual orientation or gender identity.            Thank you!     Thank you for choosing [facility]  for your care. Our goal is always to provide you with excellent care. Hearing back from our patients is one way we can continue to improve our services. Please take a few minutes to complete the written survey that you may receive in the mail after your visit with us. Thank you!             Your Updated Medication List - Protect others around you: Learn how to safely use, store and throw away your medicines at www.disposemymeds.org.          This list is accurate as of: 9/21/17  8:19 AM.  Always use your most recent med list.                   Brand Name Dispense Instructions for use Diagnosis    ACE/ARB NOT PRESCRIBED (INTENTIONAL)      Please choose reason not prescribed, below    HTN, goal below 140/90, Type 2 diabetes mellitus with microalbuminuria, without long-term current use of insulin (H)       amLODIPine 10 MG tablet    NORVASC    90 tablet    Take 1 tablet (10 mg) by mouth daily    HTN, goal below 140/90       ASPIRIN PO      Take 81 mg by mouth daily        atorvastatin 40 MG tablet    LIPITOR    90 tablet    Take 1 tablet (40 mg) by mouth daily    Hyperlipidemia LDL goal <70       blood glucose lancets standard    no brand specified    1 Box    Use to test blood sugar 3 times daily or as directed.        blood glucose monitoring test strip    ACCU-CHEK SMARTVIEW    100 each    Use to test blood sugar 3 times daily or as directed.    Type 2 diabetes mellitus with complication, with long-term current use of insulin (H)       glipiZIDE 10 MG 24 hr tablet    GLUCOTROL XL    180 tablet    Take 2 tablets (20 mg) by mouth daily (with breakfast)         hydrochlorothiazide 12.5 MG Tabs tablet     90 tablet    Take 1 tablet (12.5 mg) by mouth daily    HTN, goal below 140/90       levothyroxine 50 MCG tablet    SYNTHROID    90 tablet    Take 1 tablet (50 mcg) by mouth daily    Acquired hypothyroidism       metFORMIN 500 MG tablet    GLUCOPHAGE    180 tablet    Take 2 tablets (1,000 mg) by mouth 2 times daily (with meals) (Needs follow-up appointment for this medication)        metoprolol 50 MG tablet    LOPRESSOR    180 tablet    Take 1 tablet (50 mg) by mouth 2 times daily    HTN, goal below 140/90       testosterone 50 MG/5GM (1%) topical gel    ANDROGEL    90 packet    Place 1 packet (50 mg) onto the skin daily    Hypogonadism male

## 2017-09-21 NOTE — PROGRESS NOTES
[facility]  [address] [address]  [phone]  Dept: [phone]    PRE-OP EVALUATION:  Today's date: 2017    Francois Lujan (: 1950) presents for pre-operative evaluation assessment as requested by Dr. Kelley.  He requires evaluation and anesthesia risk assessment prior to undergoing surgery/procedure for treatment of Left knee meniscus tear .  Proposed procedure: arthroscopy with Left meniscus repair    Date of Surgery/ Procedure: 17  Time of Surgery/ Procedure: 1:15pm  Hospital/Surgical Facility: [place]     Primary Physician: Karen Ivory  Type of Anesthesia Anticipated: to be determined    Patient has a Health Care Directive or Living Will:  NO    1. YES - DO YOU HAVE A HISTORY OF HEART ATTACK, STROKE, STENT, BYPASS OR SURGERY ON AN ARTERY IN THE HEAD, NECK, HEART OR LEG?  CABG x 3 vessels.  2. YES - DO YOU EVER HAVE ANY PAIN OR DISCOMFORT IN YOUR CHEST? Left chest pain evaluated in ED 2017 not cardiac, muscle skeletal.  3. NO- DO YOU HAVE A HISTORY OF HEART FAILURE   4. NO - Are you troubled by shortness of breath when: walking on the level, up a slight hill or at night?  5. NO - Do you currently have a cold, bronchitis or other respiratory infection?  6. NO - Do you have a cough, shortness of breath or wheezing?  7. NO - Do you sometimes get pains in the calves of your legs when you walk?  8. NO - DO YOU OR ANYONE IN YOUR FAMILY HAVE PREVIOUS HISTORY OF BLOOD CLOTS?   9. NO - Do you or does anyone in your family have a serious bleeding problem such as prolonged bleeding following surgeries or cuts?  10. NO - Have you ever had problems with anemia or been told to take iron pills?  11. NO - Have you had any abnormal blood loss such as black, tarry or bloody stools, or abnormal vaginal bleeding?  12. YES - HAVE YOU EVER HAD A BLOOD TRANSFUSION?   13. NO - Have you or any of your relatives ever had problems with anesthesia?  14. YES  - DO YOU HAVE SLEEP APNEA, EXCESSIVE SNORING OR DAYTIME DROWSINESS? Did not tolerate CPAC, no devices used at night  15. NO - Do you have any prosthetic heart valves?  16. YES - DO YOU HAVE PROSTHETIC JOINTS? Right knee arthroplasty  17. NO - Is there any chance that you may be pregnant?        HPI:                                                      Brief HPI related to upcoming procedure: Francois Lujan is 66 year old white male with CAD with CABG x 3, type 2 diabetes, hypertension, CKD, hypothyroidism, daily alcohol use who is here to get clearance to have general anesthesia.        DIABETES - Patient has a longstanding history of DiabetesType Type II . Patient is being treated with diet and oral agents and denies significant side effects. Control has been good. Complicating factors include but are not limited to: cardiac good since CABG, renal protinurea and alcohol 6 lite beer a day.                                                                                                              .  HYPERTENSION - Patient has longstanding history of mod-severe HTN , currently denies any symptoms referable to elevated blood pressure. Specifically denies chest pain, palpitations, dyspnea, orthopnea, PND or peripheral edema. Blood pressure readings have not been in normal range. Current medication regimen is as listed below. Patient denies any side effects of medication.                                                                                                                                                                                          .  HYPERLIPIDEMIA - Patient has a long history of significant Hyperlipidemia requiring medication for treatment with recent good control. Patient reports no problems or side effects with the medication.                                                                                                                                                       .  CAD - Patient has  a longstanding history of mod-severe CAD. Patient denies recent chest pain or NTG use, denies exercise induced dyspnea or PND. Last Stress test 11/13/11 prior to CABG surgery, most recent EKG normal 7/28/17.                                                                                                                            .    MEDICAL HISTORY:                                                    Patient Active Problem List    Diagnosis Date Noted     Hypertension goal BP (blood pressure) < 140/90 09/17/2013     Priority: High     Hyperlipidemia LDL goal <70 05/28/2013     Priority: High     S/P CABG (coronary artery bypass graft) 11/29/2012     Priority: High     11/17/11  PREOPERATIVE DIAGNOSIS:  Severe 3-vessel coronary artery disease with unstable angina.        POSTOPERATIVE DIAGNOSIS:  Severe 3-vessel coronary artery disease with unstable angina.        PROCEDURES:    1.  Coronary artery bypass grafting x3 with placement of left internal mammary artery to the left anterior descending artery, saphenous vein graft from the aorta to the third obtuse marginal branch of the circumflex coronary artery and saphenous vein graft from aorta to the distal right coronary artery.    2.  Placement of temporary ventricular pacing wires.    3.  Endoscopic vein harvest from the left leg.        Hypothyroidism 11/29/2012     Priority: High     Other secondary gout, multiple sites, unspecified chronicity 09/21/2017     Priority: Medium     Triceps tendon rupture, left, sequela 04/10/2017     Priority: Medium     Coronary artery disease involving native coronary artery of native heart without angina pectoris 03/03/2017     Priority: Medium     Polyneuropathy associated with underlying disease (H) 08/30/2016     Priority: Medium     Alcohol dependence with other alcohol-induced disorder (H) 08/30/2016     Priority: Medium     Diabetic polyneuropathy associated with type 2 diabetes mellitus (H) 04/12/2016     Priority: Medium      Type 2 diabetes mellitus with other circulatory complications (H) 04/12/2016     Priority: Medium     Type 2 diabetes mellitus with complication (H) 10/23/2015     Priority: Medium     Type 2 diabetes mellitus with other skin complications (H) 10/23/2015     Priority: Medium     CKD (chronic kidney disease) stage 2, GFR 60-89 ml/min 10/23/2015     Priority: Medium     Proteinuria 10/23/2015     Priority: Medium     Type 2 diabetes mellitus with other diabetic kidney complication (H) 10/23/2015     Priority: Medium     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     Priority: Medium     HTN, goal below 140/90 07/18/2014     Priority: Medium     OA (osteoarthritis) of knee, right 04/29/2014     Priority: Medium     ACL (anterior cruciate ligament) tear 04/29/2014     Priority: Medium     posttraumatic arthrosis lateral compartment       Multiple joint pain 04/23/2014     Priority: Medium     Elevated sed rate 03/03/2014     Priority: Medium     Elevated C-reactive protein (CRP) 03/03/2014     Priority: Medium     History of back surgery 03/03/2014     Priority: Medium     Polymyalgia rheumatica (H) 03/03/2014     Priority: Medium     Moderate major depression (H) 02/11/2014     Priority: Medium     Radicular pain of lumbosacral region 12/31/2013     Priority: Medium     Health Care Home 12/13/2013     Priority: Medium     Status:  Unable to reach  Care Coordinator:  Zoila Flores           Numbness of hand, right 12/12/2013     Priority: Medium     ADELA (obstructive sleep apnea) 07/11/2013     Priority: Medium     Severe ADELA (7/10/2013 with AHI ~70, sherin desat 86%, insufficient time for CPAP titration)       Hypogonadotropic hypogonadism (H) 11/29/2012     Priority: Medium     Hard of hearing 11/29/2012     Priority: Medium      Past Medical History:   Diagnosis Date     Arthritis      CAD (coronary artery disease)      Diabetes mellitus (H)      Erythema nodosum 1982     Gout      Hypertension      Malignant neoplasm (H)      squamous cell CA removed from right hand     Thyroid disease      Past Surgical History:   Procedure Laterality Date     BACK SURGERY       BYPASS GRAFT ARTERY CORONARY  11/17/2011    Procedure:BYPASS GRAFT ARTERY CORONARY; CORONARY ARTERY BYPASS, Right, OM, LAD WITH ENDOVEIN HARVEST, ON PUMP; Surgeon:LEONEL MG; Location: OR     COLONOSCOPY N/A 6/9/2016    Procedure: COLONOSCOPY;  Surgeon: Isidro Humphreys MD;  Location: [facility]     HERNIA REPAIR      infantile hernia repair     ORTHOPEDIC SURGERY      Baker cyst removed - right knee, and mesiscus tear repair     REPAIR TENDON TRICEPS UPPER EXTREMITY Left 4/11/2017    Procedure: REPAIR TENDON TRICEPS UPPER EXTREMITY;  Surgeon: Rodriguez Kelley MD;  Location: [facility]     Current Outpatient Prescriptions   Medication Sig Dispense Refill     blood glucose monitoring (ACCU-CHEK SMARTVIEW) test strip Use to test blood sugar 3 times daily or as directed. 100 each 12     ASPIRIN PO Take 81 mg by mouth daily       amLODIPine (NORVASC) 10 MG tablet Take 1 tablet (10 mg) by mouth daily (Patient not taking: Reported on 9/21/2017) 90 tablet 3     metFORMIN (GLUCOPHAGE) 500 MG tablet Take 2 tablets (1,000 mg) by mouth 2 times daily (with meals) (Needs follow-up appointment for this medication) (Patient not taking: Reported on 9/21/2017) 180 tablet 3     glipiZIDE (GLUCOTROL XL) 10 MG 24 hr tablet Take 2 tablets (20 mg) by mouth daily (with breakfast) (Patient not taking: Reported on 9/21/2017) 180 tablet 3     metoprolol (LOPRESSOR) 50 MG tablet Take 1 tablet (50 mg) by mouth 2 times daily (Patient not taking: Reported on 9/21/2017) 180 tablet 3     hydrochlorothiazide 12.5 MG TABS tablet Take 1 tablet (12.5 mg) by mouth daily (Patient not taking: Reported on 9/21/2017) 90 tablet 3     levothyroxine (SYNTHROID) 50 MCG tablet Take 1 tablet (50 mcg) by mouth daily (Patient not taking: Reported on 9/21/2017) 90 tablet 3     atorvastatin (LIPITOR) 40 MG tablet Take 1 tablet (40  mg) by mouth daily (Patient not taking: Reported on 9/21/2017) 90 tablet 3     ACE/ARB NOT PRESCRIBED, INTENTIONAL, Please choose reason not prescribed, below       blood glucose (NO BRAND SPECIFIED) lancets standard Use to test blood sugar 3 times daily or as directed. 1 Box 3     testosterone (ANDROGEL) 50 MG/5GM (1%) gel Place 1 packet (50 mg) onto the skin daily (Patient not taking: Reported on 9/21/2017) 90 packet 3     OTC products: None, except as noted above    Allergies   Allergen Reactions     Ivp Dye [Contrast Dye] Hives     Brief bout - 10 minutes duration      Latex Allergy: NO    Social History   Substance Use Topics     Smoking status: Former Smoker     Packs/day: 2.00     Years: 30.00     Quit date: 11/13/1997     Smokeless tobacco: Never Used     Alcohol use 6.0 oz/week     12 Cans of beer per week      Comment: 20+ beers a week     History   Drug Use No       REVIEW OF SYSTEMS:                                                    Constitutional, HEENT, cardiovascular, pulmonary, gi and gu systems are negative, except as otherwise noted.      EXAM:                                                    /73  Pulse 62  Temp 97.8  F (36.6  C) (Tympanic)  Wt 269 lb 3.2 oz (122.1 kg)  BMI 36.51 kg/m2    GENERAL APPEARANCE: healthy, alert and no distress     EYES: EOMI,  PERRL     HENT: ear canals and TM's normal and nose and mouth without ulcers or lesions     NECK: no adenopathy, no asymmetry, masses, or scars and thyroid normal to palpation     RESP: lungs clear to auscultation - no rales, rhonchi or wheezes     CV: regular rates and rhythm, normal S1 S2, no S3 or S4 and no murmur, click or rub     ABDOMEN:  soft, nontender, no HSM or masses and bowel sounds normal     MS: extremities normal- no gross deformities noted, no evidence of inflammation in joints, FROM in all extremities.     SKIN: no suspicious lesions or rashes     NEURO: Normal strength and tone, sensory exam grossly normal, mentation  intact and speech normal     PSYCH: mentation appears normal. and affect normal/bright     LYMPHATICS: No axillary, cervical, or supraclavicular nodes    DIAGNOSTICS:                                                      Results for orders placed or performed in visit on 09/21/17   Hemoglobin A1c   Result Value Ref Range    Hemoglobin A1C 6.2 (H) 4.3 - 6.0 %         Recent Labs   Lab Test  07/28/17   0344  03/03/17   1109  08/30/16   1024   09/16/15   1354  05/06/14 02/03/14   HGB  13.1*   --    --    --   12.3*   < >  11.6*   < >  10.7*   PLT  225   --    --    --   222   --   228   < >  254   INR   --    --    --    --    --    --   1.05   --   1.09   NA  130*  136   --    < >   --    < >  137   --    --    POTASSIUM  3.9  4.2   --    < >   --    < >  4.8   --   5.0   CR  1.17  0.98   --    < >   --    < >  1.01   --    --    A1C   --   7.7*  7.0*   < >   --    < >   --    < >   --     < > = values in this interval not displayed.        IMPRESSION:                                                    Reason for surgery/procedure:   1. Preop general physical exam  2. Acute meniscal tear of left knee, subsequent encounter   cleared for general anesthesia      3. Diabetic polyneuropathy associated with type 2 diabetes mellitus (H)  A1C at goal, urine protein high due to elevated blood pressures, difficulty managing this due to beer consumption.  - Basic metabolic panel  - Lipid panel reflex to direct LDL  - Hemoglobin A1c  - TSH  - T4 FREE  - Microalbumin quantitative random urine    4. CKD (chronic kidney disease) stage 2, GFR 60-89 ml/min  Due for recheck  - Microalbumin quantitative random urine      The proposed surgical procedure is considered INTERMEDIATE risk.    REVISED CARDIAC RISK INDEX  The patient has the following serious cardiovascular risks for perioperative complications such as (MI, PE, VFib and 3  AV Block):  No serious cardiac risks  INTERPRETATION:   The 10-year ASCVD risk score (Rafa EN Jr, et al.,  2013) is: 36.5%    Values used to calculate the score:      Age: 66 years      Sex: Male      Is Non- : No      Diabetic: Yes      Tobacco smoker: No      Systolic Blood Pressure: 156 mmHg      Is BP treated: Yes      HDL Cholesterol: 59 mg/dL      Total Cholesterol: 214 mg/dL      The patient has the following additional risks for perioperative complications:  No identified additional risks      ICD-10-CM    1. Acute meniscal tear of left knee, subsequent encounter S83.207D    2. Preop general physical exam Z01.818    3. Diabetic polyneuropathy associated with type 2 diabetes mellitus (H) E11.42 Basic metabolic panel     Lipid panel reflex to direct LDL     Hemoglobin A1c     TSH     T4 FREE     Microalbumin quantitative random urine   4. CKD (chronic kidney disease) stage 2, GFR 60-89 ml/min N18.2 Microalbumin quantitative random urine       RECOMMENDATIONS:                                                      --Consult hospital rounder / IM to assist post-op medical management    Cardiovascular Risk  Patient is already on a Beta Blocker. Continue Betablocker therapy after surgery, using Beta blocker order set as necessary for NPO status.      Obstructive Sleep Apnea (or suspected sleep apnea)  Did not tolerate CPAP, refuses to use devices.      --Patient is to take all scheduled medications on the day of surgery EXCEPT for modifications listed below.    APPROVAL GIVEN to proceed with proposed procedure, without further diagnostic evaluation       Signed Electronically by: Karen Ivory MD    Copy of this evaluation report is provided to requesting physician.    [facility] Preop Guidelines

## 2017-09-21 NOTE — PROGRESS NOTES
A1C and glucose high but at goal, urine protein high, rest of labs are normal. Plan get blood pressure lowered and recheck urine for protein in 2 months.  Please notify.        Thank you. FILEMON RYAN MD

## 2017-10-13 ENCOUNTER — TRANSFERRED RECORDS (OUTPATIENT)
Dept: HEALTH INFORMATION MANAGEMENT | Facility: CLINIC | Age: 67
End: 2017-10-13

## 2017-10-16 ENCOUNTER — TELEPHONE (OUTPATIENT)
Dept: FAMILY MEDICINE | Facility: CLINIC | Age: 67
End: 2017-10-16

## 2017-10-16 NOTE — LETTER
October 16, 2017      Francois Le  [address]  [place] [address]        Dear Francois,         During a recent visit to our clinic, your blood pressure was elevated above the target range for your health.   BP Readings from Last 3 Encounters:   09/21/17 156/73   07/28/17 154/71   04/11/17 136/85       Please schedule a free appointment for a blood pressure check with nurse in the near future.  You can call [phone] to do this.      Why is high blood pressure a big deal?    If blood pressure is elevated above target levels you have an increased risk of experiencing a heart attack or stroke, developing heart failure, kidney disease or other chronic diseases.    With appropriate early recognition and treatment, these health problems can be avoided in most cases.  Your physician can help you determine the need for treatment and discuss the non-medication and medication routes to treating high blood pressure as well as evaluate you for possible causes and effects of high blood pressure.    The target range for ideal blood pressure control is less than 140/90 for most individuals.  For those who have been diagnosed with heart disease, diabetes, stroke or peripheral vascular disease this target range is less than 130/80.      Sincerely,      Jessica STOCK,  CMA

## 2017-10-16 NOTE — TELEPHONE ENCOUNTER
Panel Management Review      Summary:    Patient is due/failing the following:   BP CHECK    Action needed:   Patient needs nurse only appointment.    Type of outreach:    Sent letter.    Questions for provider review:    None                                                                                                                                    Jessica STOCK CMA       Chart routed to None .

## 2017-12-04 ENCOUNTER — TELEPHONE (OUTPATIENT)
Dept: FAMILY MEDICINE | Facility: CLINIC | Age: 67
End: 2017-12-04

## 2017-12-04 NOTE — TELEPHONE ENCOUNTER
Panel Management Review      Summary:    Patient is due/failing the following:   BP CHECK    Action needed:   Patient needs non-fasting lab only appointment    Type of outreach:    Sent letter.    Questions for provider review:    None                                                                                                                                    Jessica STOCK CMA       Chart routed to None .

## 2017-12-04 NOTE — LETTER
December 4, 2017      Francois Le  [address]  [place] [address]        Dear Francois,       During a recent visit to our clinic, your blood pressure was elevated above the target range for your health.   BP Readings from Last 3 Encounters:   09/21/17 156/73   07/28/17 154/71   04/11/17 136/85       Please schedule a free appointment for a blood pressure check with nurse in the near future.  You can call [phone] or walk into any [facility] clinic to do this.      Why is high blood pressure a big deal?    If blood pressure is elevated above target levels you have an increased risk of experiencing a heart attack or stroke, developing heart failure, kidney disease or other chronic diseases.    With appropriate early recognition and treatment, these health problems can be avoided in most cases.  Your physician can help you determine the need for treatment and discuss the non-medication and medication routes to treating high blood pressure as well as evaluate you for possible causes and effects of high blood pressure.    The target range for ideal blood pressure control is less than 140/90 for most individuals.  For those who have been diagnosed with heart disease, diabetes, stroke or peripheral vascular disease this target range is less than 130/80.        Sincerely,        Jessica STOCK  CMA

## 2017-12-14 ENCOUNTER — OFFICE VISIT (OUTPATIENT)
Dept: FAMILY MEDICINE | Facility: CLINIC | Age: 67
End: 2017-12-14
Payer: COMMERCIAL

## 2017-12-14 VITALS
HEIGHT: 72 IN | HEART RATE: 70 BPM | BODY MASS INDEX: 37.82 KG/M2 | WEIGHT: 279.2 LBS | SYSTOLIC BLOOD PRESSURE: 147 MMHG | DIASTOLIC BLOOD PRESSURE: 75 MMHG | TEMPERATURE: 97.6 F

## 2017-12-14 DIAGNOSIS — Z23 NEED FOR PROPHYLACTIC VACCINATION AND INOCULATION AGAINST INFLUENZA: ICD-10-CM

## 2017-12-14 DIAGNOSIS — M10.9 ACUTE GOUTY ARTHRITIS: ICD-10-CM

## 2017-12-14 DIAGNOSIS — R04.0 EPISTAXIS: ICD-10-CM

## 2017-12-14 DIAGNOSIS — E11.8 TYPE 2 DIABETES MELLITUS WITH COMPLICATION, WITHOUT LONG-TERM CURRENT USE OF INSULIN (H): Primary | ICD-10-CM

## 2017-12-14 DIAGNOSIS — I10 HTN, GOAL BELOW 140/90: ICD-10-CM

## 2017-12-14 DIAGNOSIS — R80.9 PROTEINURIA, UNSPECIFIED TYPE: ICD-10-CM

## 2017-12-14 LAB
BASOPHILS # BLD AUTO: 0 10E9/L (ref 0–0.2)
BASOPHILS NFR BLD AUTO: 0.2 %
CREAT UR-MCNC: 74 MG/DL
CRP SERPL-MCNC: 4.5 MG/L (ref 0–8)
DIFFERENTIAL METHOD BLD: ABNORMAL
EOSINOPHIL # BLD AUTO: 0.3 10E9/L (ref 0–0.7)
EOSINOPHIL NFR BLD AUTO: 2.7 %
ERYTHROCYTE [DISTWIDTH] IN BLOOD BY AUTOMATED COUNT: 12.9 % (ref 10–15)
ERYTHROCYTE [SEDIMENTATION RATE] IN BLOOD BY WESTERGREN METHOD: 22 MM/H (ref 0–20)
HCT VFR BLD AUTO: 35.3 % (ref 40–53)
HGB BLD-MCNC: 12.2 G/DL (ref 13.3–17.7)
LYMPHOCYTES # BLD AUTO: 3.2 10E9/L (ref 0.8–5.3)
LYMPHOCYTES NFR BLD AUTO: 30.5 %
MCH RBC QN AUTO: 33.3 PG (ref 26.5–33)
MCHC RBC AUTO-ENTMCNC: 34.6 G/DL (ref 31.5–36.5)
MCV RBC AUTO: 96 FL (ref 78–100)
MICROALBUMIN UR-MCNC: 776 MG/L
MICROALBUMIN/CREAT UR: 1043.01 MG/G CR (ref 0–17)
MONOCYTES # BLD AUTO: 1.1 10E9/L (ref 0–1.3)
MONOCYTES NFR BLD AUTO: 10.6 %
NEUTROPHILS # BLD AUTO: 5.8 10E9/L (ref 1.6–8.3)
NEUTROPHILS NFR BLD AUTO: 56 %
PLATELET # BLD AUTO: 210 10E9/L (ref 150–450)
RBC # BLD AUTO: 3.66 10E12/L (ref 4.4–5.9)
URATE SERPL-MCNC: 7.6 MG/DL (ref 3.5–7.2)
WBC # BLD AUTO: 10.4 10E9/L (ref 4–11)

## 2017-12-14 PROCEDURE — 99214 OFFICE O/P EST MOD 30 MIN: CPT | Mod: 25 | Performed by: FAMILY MEDICINE

## 2017-12-14 PROCEDURE — 85652 RBC SED RATE AUTOMATED: CPT | Performed by: FAMILY MEDICINE

## 2017-12-14 PROCEDURE — 36415 COLL VENOUS BLD VENIPUNCTURE: CPT | Performed by: FAMILY MEDICINE

## 2017-12-14 PROCEDURE — 85025 COMPLETE CBC W/AUTO DIFF WBC: CPT | Performed by: FAMILY MEDICINE

## 2017-12-14 PROCEDURE — 86431 RHEUMATOID FACTOR QUANT: CPT | Performed by: FAMILY MEDICINE

## 2017-12-14 PROCEDURE — 90662 IIV NO PRSV INCREASED AG IM: CPT | Performed by: FAMILY MEDICINE

## 2017-12-14 PROCEDURE — 90471 IMMUNIZATION ADMIN: CPT | Performed by: FAMILY MEDICINE

## 2017-12-14 PROCEDURE — 82043 UR ALBUMIN QUANTITATIVE: CPT | Performed by: FAMILY MEDICINE

## 2017-12-14 PROCEDURE — 86140 C-REACTIVE PROTEIN: CPT | Performed by: FAMILY MEDICINE

## 2017-12-14 PROCEDURE — 84550 ASSAY OF BLOOD/URIC ACID: CPT | Performed by: FAMILY MEDICINE

## 2017-12-14 RX ORDER — INDOMETHACIN 25 MG/1
25 CAPSULE ORAL 2 TIMES DAILY WITH MEALS
Qty: 42 CAPSULE | Refills: 1 | Status: SHIPPED | OUTPATIENT
Start: 2017-12-14 | End: 2018-11-20

## 2017-12-14 NOTE — NURSING NOTE
Initial /63  Pulse 72  Temp 97.6  F (36.4  C) (Tympanic)  Ht 6' (1.829 m)  Wt 279 lb 3.2 oz (126.6 kg)  BMI 37.87 kg/m2 Estimated body mass index is 37.87 kg/(m^2) as calculated from the following:    Height as of this encounter: 6' (1.829 m).    Weight as of this encounter: 279 lb 3.2 oz (126.6 kg). .

## 2017-12-14 NOTE — MR AVS SNAPSHOT
After Visit Summary   12/14/2017    Francois Lujan    MRN: [id number]           Patient Information     Date Of Birth          1950        Visit Information        Provider Department      12/14/2017 8:00 AM Karen Ivory MD [facility]        Today's Diagnoses     Need for prophylactic vaccination and inoculation against influenza    -  1    Epistaxis        Acute gouty arthritis        Type 2 diabetes mellitus with complication, without long-term current use of insulin (H)        Proteinuria, unspecified type           Follow-ups after your visit        Additional Services     OTOLARYNGOLOGY REFERRAL       Your provider has referred you to: [facility]: [facility] [phone]   [email or web address]    Please be aware that coverage of these services is subject to the terms and limitations of your health insurance plan.  Call member services at your health plan with any benefit or coverage questions.      Please bring the following with you to your appointment:    (1) Any X-Rays, CTs or MRIs which have been performed.  Contact the facility where they were done to arrange for  prior to your scheduled appointment.   (2) List of current medications  (3) This referral request   (4) Any documents/labs given to you for this referral                  Who to contact     If you have questions or need follow up information about today's clinic visit or your schedule please contact [facility] directly at [phone].  Normal or non-critical lab and imaging results will be communicated to you by MyChart, letter or phone within 4 business days after the clinic has received the results. If you do not hear from us within 7 days, please contact the clinic through MyChart or phone. If you have a critical or abnormal lab result, we will notify you by phone as soon as possible.  Submit refill requests through [employer] or call your  "pharmacy and they will forward the refill request to us. Please allow 3 business days for your refill to be completed.          Additional Information About Your Visit        MyChart Information     [employer] lets you send messages to your doctor, view your test results, renew your prescriptions, schedule appointments and more. To sign up, go to [email or web address] . Click on \"Log in\" on the left side of the screen, which will take you to the Welcome page. Then click on \"Sign up Now\" on the right side of the page.     You will be asked to enter the access code listed below, as well as some personal information. Please follow the directions to create your username and password.     Your access code is: [email or web address]  Expires: 3/14/2018  8:43 AM     Your access code will  in 90 days. If you need help or a new code, please call your [facility] clinic or [phone].        Care EveryWhere ID     This is your Care EveryWhere ID. This could be used by other organizations to access your [facility] medical records  [id number]        Your Vitals Were     Pulse Temperature Height BMI (Body Mass Index)          72 97.6  F (36.4  C) (Tympanic) 6' (1.829 m) 37.87 kg/m2         Blood Pressure from Last 3 Encounters:   17 146/63   17 156/73   17 154/71    Weight from Last 3 Encounters:   17 279 lb 3.2 oz (126.6 kg)   17 269 lb 3.2 oz (122.1 kg)   17 265 lb (120.2 kg)              We Performed the Following     Albumin Random Urine Quantitative with Creat Ratio     CBC with platelets differential     CRP inflammation     Erythrocyte sedimentation rate auto     FLU VACCINE, INCREASED ANTIGEN, PRESV FREE, AGE 65+ [66493]     OTOLARYNGOLOGY REFERRAL     Rheumatoid factor     Uric acid     Vaccine Administration, Initial [47587]          Today's Medication Changes          These changes are accurate as of: 17  8:43 AM.  If you have any questions, ask your nurse or doctor.             "   Start taking these medicines.        Dose/Directions    indomethacin 25 MG capsule   Commonly known as:  INDOCIN   Used for:  Acute gouty arthritis   Started by:  Karen Ivory MD        Dose:  25 mg   Take 1 capsule (25 mg) by mouth 2 times daily (with meals)   Quantity:  42 capsule   Refills:  1         Stop taking these medicines if you haven't already. Please contact your care team if you have questions.     hydrochlorothiazide 12.5 MG Tabs tablet   Stopped by:  Karen Ivory MD                Where to get your medicines      These medications were sent to [address] [address]     Phone:  [phone]     indomethacin 25 MG capsule                Primary Care Provider Office Phone # Fax #    Karen Ivory [phone] [address] [facility] [address]        Equal Access to Services     [facility]: Hadii aad ku hadasho Soomaali, waaxda luqadaha, qaybta kaalmada adeegyada, waxay adrianain hayaan adelaida rai . So [facility] [phone].    ATENCIÓN: Si habla español, tiene a birch disposición servicios gratuitos de asistencia lingüística. Llame al [phone].    We comply with applicable federal civil rights laws and [place] laws. We do not discriminate on the basis of race, color, national origin, age, disability, sex, sexual orientation, or gender identity.            Thank you!     Thank you for choosing [facility]  for your care. Our goal is always to provide you with excellent care. Hearing back from our patients is one way we can continue to improve our services. Please take a few minutes to complete the written survey that you may receive in the mail after your visit with us. Thank you!             Your Updated Medication List - Protect others around you: Learn how to safely use, store and throw away your medicines at www.disposemymeds.org.          This list is  accurate as of: 12/14/17  8:43 AM.  Always use your most recent med list.                   Brand Name Dispense Instructions for use Diagnosis    ACE/ARB/ARNI NOT PRESCRIBED (INTENTIONAL)      Please choose reason not prescribed, below    HTN, goal below 140/90, Type 2 diabetes mellitus with microalbuminuria, without long-term current use of insulin (H)       amLODIPine 10 MG tablet    NORVASC    90 tablet    Take 1 tablet (10 mg) by mouth daily    HTN, goal below 140/90       ASPIRIN PO      Take 81 mg by mouth daily        atorvastatin 40 MG tablet    LIPITOR    90 tablet    Take 1 tablet (40 mg) by mouth daily    Hyperlipidemia LDL goal <70       blood glucose lancets standard    no brand specified    1 Box    Use to test blood sugar 3 times daily or as directed.        blood glucose monitoring test strip    ACCU-CHEK SMARTVIEW    100 each    Use to test blood sugar 3 times daily or as directed.    Type 2 diabetes mellitus with complication, with long-term current use of insulin (H)       glipiZIDE 10 MG 24 hr tablet    GLUCOTROL XL    180 tablet    Take 2 tablets (20 mg) by mouth daily (with breakfast)        indomethacin 25 MG capsule    INDOCIN    42 capsule    Take 1 capsule (25 mg) by mouth 2 times daily (with meals)    Acute gouty arthritis       levothyroxine 50 MCG tablet    SYNTHROID    90 tablet    Take 1 tablet (50 mcg) by mouth daily    Acquired hypothyroidism       metFORMIN 500 MG tablet    GLUCOPHAGE    180 tablet    Take 2 tablets (1,000 mg) by mouth 2 times daily (with meals) (Needs follow-up appointment for this medication)        metoprolol 50 MG tablet    LOPRESSOR    180 tablet    Take 1 tablet (50 mg) by mouth 2 times daily    HTN, goal below 140/90       testosterone 50 MG/5GM (1%) topical gel    ANDROGEL    90 packet    Place 1 packet (50 mg) onto the skin daily    Hypogonadism male

## 2017-12-14 NOTE — PROGRESS NOTES
SUBJECTIVE:                                                    Francois Lujan is 67 year old male   Chief Complaint   Patient presents with     Diabetes     Hypertension     Lipids     Arthritis     Left knee and bilateral elbows. States this is Gout.     Nose Bleeds     Frequent nose bleeds     Flu Shot     Diabetes Follow-up      Patient is checking blood sugars:Every other day. 160-180 Has risen since Thanksgiving    Diabetic concerns: None     Symptoms of hypoglycemia (low blood sugar): none     Paresthesias (numbness or burning in feet) or sores: No     Date of last diabetic eye exam: Over one year ago    Hyperlipidemia Follow-Up      Rate your low fat/cholesterol diet?: poor    Taking statin?  Yes, possible muscle aches from statin. Not for some time    Other lipid medications/supplements?:  none    Hypertension Follow-up      Outpatient blood pressures: Checks at home, but doesn't trust it will very by 20% when checking 10 min later.    Low Salt Diet: not monitoring salt    BP Readings from Last 2 Encounters:   12/14/17 147/75   09/21/17 156/73     Hemoglobin A1C (%)   Date Value   09/21/2017 6.2 (H)   03/03/2017 7.7 (H)     LDL Cholesterol Calculated (mg/dL)   Date Value   09/21/2017 81   03/03/2017 113 (H)     Health Maintenance reviewed - patient asked to schedule diabetic eye exam.  No need for screen for AAA, had CT abdomen 7/28/17  Abdomen: The liver, spleen, gallbladder, pancreas, adrenal glands and  kidneys are normal in appearance. There is no abdominal or pelvic  lymph node enlargement. There is atherosclerotic calcification of the  aorta and its branches. No aneurysm.          Problem list and histories reviewed & adjusted, as indicated.  Additional history: Dr. Kelley said he had gout crystals in elbow joint and knee.  Would like to get on meds.  Taking diuretic and drinking alcohol on regular basis.  Not checking his blood pressures outside clinic.  Patient Active Problem List   Diagnosis      S/P CABG (coronary artery bypass graft)     Hypogonadotropic hypogonadism (H)     Hypothyroidism     Hard of hearing     Hyperlipidemia LDL goal <70     ADELA (obstructive sleep apnea)     Hypertension goal BP (blood pressure) < 140/90     Numbness of hand, right     Health Care Home     Radicular pain of lumbosacral region     Moderate major depression (H)     Elevated sed rate     Elevated C-reactive protein (CRP)     History of back surgery     Polymyalgia rheumatica (H)     Multiple joint pain     OA (osteoarthritis) of knee, right     ACL (anterior cruciate ligament) tear     HTN, goal below 140/90     Necrobiosis lipoidica     Type 2 diabetes mellitus with complication (H)     Type 2 diabetes mellitus with other skin complications     CKD (chronic kidney disease) stage 2, GFR 60-89 ml/min     Proteinuria     Type 2 diabetes mellitus with other diabetic kidney complication     Diabetic polyneuropathy associated with type 2 diabetes mellitus (H)     Type 2 diabetes mellitus with other circulatory complications     Polyneuropathy associated with underlying disease (H)     Alcohol dependence with other alcohol-induced disorder (H)     Coronary artery disease involving native coronary artery of native heart without angina pectoris     Triceps tendon rupture, left, sequela     Other secondary gout, multiple sites, unspecified chronicity     Proteinuria, unspecified type     Epistaxis     Acute gouty arthritis     Type 2 diabetes mellitus with complication, without long-term current use of insulin (H)     Past Surgical History:   Procedure Laterality Date     BACK SURGERY       BYPASS GRAFT ARTERY CORONARY  11/17/2011    Procedure:BYPASS GRAFT ARTERY CORONARY; CORONARY ARTERY BYPASS, Right, OM, LAD WITH ENDOVEIN HARVEST, ON PUMP; Surgeon:LEONEL MG; Location:[facility]     COLONOSCOPY N/A 6/9/2016    Procedure: COLONOSCOPY;  Surgeon: Isidro Humphreys MD;  Location: [facility]     HERNIA REPAIR      infantile hernia repair      ORTHOPEDIC SURGERY      Baker cyst removed - right knee, and mesiscus tear repair     REPAIR TENDON TRICEPS UPPER EXTREMITY Left 4/11/2017    Procedure: REPAIR TENDON TRICEPS UPPER EXTREMITY;  Surgeon: Rodriguez Kelley MD;  Location: [facility]       Social History   Substance Use Topics     Smoking status: Former Smoker     Packs/day: 2.00     Years: 30.00     Quit date: 11/13/1997     Smokeless tobacco: Never Used     Alcohol use 6.0 oz/week     12 Cans of beer per week      Comment: 20+ beers a week     Family History   Problem Relation Age of Onset     HEART DISEASE Father      CANCER Mother      C.A.D. Brother          Current Outpatient Prescriptions   Medication Sig Dispense Refill     indomethacin (INDOCIN) 25 MG capsule Take 1 capsule (25 mg) by mouth 2 times daily (with meals) 42 capsule 1     ASPIRIN PO Take 81 mg by mouth daily       amLODIPine (NORVASC) 10 MG tablet Take 1 tablet (10 mg) by mouth daily 90 tablet 3     metFORMIN (GLUCOPHAGE) 500 MG tablet Take 2 tablets (1,000 mg) by mouth 2 times daily (with meals) (Needs follow-up appointment for this medication) 180 tablet 3     glipiZIDE (GLUCOTROL XL) 10 MG 24 hr tablet Take 2 tablets (20 mg) by mouth daily (with breakfast) 180 tablet 3     metoprolol (LOPRESSOR) 50 MG tablet Take 1 tablet (50 mg) by mouth 2 times daily 180 tablet 3     levothyroxine (SYNTHROID) 50 MCG tablet Take 1 tablet (50 mcg) by mouth daily 90 tablet 3     atorvastatin (LIPITOR) 40 MG tablet Take 1 tablet (40 mg) by mouth daily 90 tablet 3     testosterone (ANDROGEL) 50 MG/5GM (1%) gel Place 1 packet (50 mg) onto the skin daily 90 packet 3     blood glucose monitoring (ACCU-CHEK SMARTVIEW) test strip Use to test blood sugar 3 times daily or as directed. 100 each 12     ACE/ARB NOT PRESCRIBED, INTENTIONAL, Please choose reason not prescribed, below       blood glucose (NO BRAND SPECIFIED) lancets standard Use to test blood sugar 3 times daily or as directed. 1 Box 3     Allergies    Allergen Reactions     Ivp Dye [Contrast Dye] Hives     Brief bout - 10 minutes duration     Recent Labs   Lab Test  09/21/17   0827  07/28/17   0344  03/03/17   1109  08/30/16   1024  04/11/16   1030  05/29/15   1019  07/16/14   1113   A1C  6.2*   --   7.7*  7.0*  11.2*  7.6*   --    LDL  81   --   113*   --   86  111   --    HDL  66   --   59   --   44   --    --    TRIG  91   --   209*   --   282*   --    --    ALT   --   22   --    --    --   35  19   CR  0.87  1.17  0.98   --   1.08  0.97  0.83   GFRESTIMATED  88  62  76   --   69  78  >90   GFRESTBLACK  >90  75  >90  African American GFR Calc     --   83  >90   GFR Calc    >90   POTASSIUM  4.2  3.9  4.2   --   4.4  4.5  5.5*   TSH  1.39   --   1.42   --   0.84  1.11  0.86      BP Readings from Last 3 Encounters:   12/14/17 147/75   09/21/17 156/73   07/28/17 154/71    Wt Readings from Last 3 Encounters:   12/14/17 279 lb 3.2 oz (126.6 kg)   09/21/17 269 lb 3.2 oz (122.1 kg)   04/11/17 265 lb (120.2 kg)         ROS:  Constitutional, HEENT, cardiovascular, pulmonary, gi and gu systems are negative, except as otherwise noted.    OBJECTIVE:                                                    /75  Pulse 70  Temp 97.6  F (36.4  C) (Tympanic)  Ht 6' (1.829 m)  Wt 279 lb 3.2 oz (126.6 kg)  BMI 37.87 kg/m2  GENERAL APPEARANCE ADULT: Alert, no acute distress, obese  HENT: Ears and TMs normal, oral mucosa and posterior oropharynx normal  RESP: lungs clear to auscultation   CV: normal rate, regular rhythm, no murmur or gallop  MS: extremities normal, no peripheral edema  foot exam normal DP and PT pulses, no trophic changes or ulcerative lesions and abnormal monofilament exam- decreased sensation in toes  SKIN: no suspicious lesions or rashes  NEURO: Alert, oriented, speech and mentation normal, Gait including heel, toe and tandem gait are normal  PSYCH: mentation appears normal., affect and mood normal  Diagnostic Test Results:  Results for  orders placed or performed in visit on 12/14/17   Erythrocyte sedimentation rate auto   Result Value Ref Range    Sed Rate 22 (H) 0 - 20 mm/h   CRP inflammation   Result Value Ref Range    CRP Inflammation 4.5 0.0 - 8.0 mg/L   CBC with platelets differential   Result Value Ref Range    WBC 10.4 4.0 - 11.0 10e9/L    RBC Count 3.66 (L) 4.4 - 5.9 10e12/L    Hemoglobin 12.2 (L) 13.3 - 17.7 g/dL    Hematocrit 35.3 (L) 40.0 - 53.0 %    MCV 96 78 - 100 fl    MCH 33.3 (H) 26.5 - 33.0 pg    MCHC 34.6 31.5 - 36.5 g/dL    RDW 12.9 10.0 - 15.0 %    Platelet Count 210 150 - 450 10e9/L    Diff Method Automated Method     % Neutrophils 56.0 %    % Lymphocytes 30.5 %    % Monocytes 10.6 %    % Eosinophils 2.7 %    % Basophils 0.2 %    Absolute Neutrophil 5.8 1.6 - 8.3 10e9/L    Absolute Lymphocytes 3.2 0.8 - 5.3 10e9/L    Absolute Monocytes 1.1 0.0 - 1.3 10e9/L    Absolute Eosinophils 0.3 0.0 - 0.7 10e9/L    Absolute Basophils 0.0 0.0 - 0.2 10e9/L   Uric acid   Result Value Ref Range    Uric Acid 7.6 (H) 3.5 - 7.2 mg/dL   Rheumatoid factor   Result Value Ref Range    Rheumatoid Factor <20 <20 IU/mL   Albumin Random Urine Quantitative with Creat Ratio   Result Value Ref Range    Creatinine Urine 74 mg/dL    Albumin Urine mg/L 776 mg/L    Albumin Urine mg/g Cr 1043.01 (H) 0 - 17 mg/g Cr          ASSESSMENT/PLAN:                                                    1. Type 2 diabetes mellitus with complication, without long-term current use of insulin (H)  A1C and glucose at goal, blood pressure too high, protein in urine going up, need to stop diuretic as gout flares.    - Albumin Random Urine Quantitative with Creat Ratio    2. HTN, goal below 140/90  Not sure why not tolerating ACE/ARBs but would be helpful in improving blood pressure as well as protecting kidneys.  We keep hoping he will quit alcohol but cannot seem to manage.    3. Proteinuria, unspecified type  Would need nephrology referral  if:  Single eGFR in past 12 months <30  ml/min. nope, [place] eGFR < 60 ml/min AND blood presure persistently > 130/80 despite antihypertensive management. nope, Singe eGFR < 60 ml/min and  hemoglobin (HG) < 10. no, Single eGFR < 60 ml/min AND hyperparathyroidism (PTH) > 72 pg/ml despite correcting Vitamin D deficiency. Did not check, Proteinuria > 1 gram/24 hours. Did not check, Unexplained hematuria. no and Unexplained decline in eGFR > 15 ml/min between two readings. No    4. Epistaxis  No bleed right now, Francois believes has vascular lesion.  Will see ENT.  - OTOLARYNGOLOGY REFERRAL    5. Acute gouty arthritis  Sed rate and uric acid levels elevated, once inflammation is down will start on alopurinol.  - indomethacin (INDOCIN) 25 MG capsule; Take 1 capsule (25 mg) by mouth 2 times daily (with meals)  Dispense: 42 capsule; Refill: 1  - Erythrocyte sedimentation rate auto  - CRP inflammation  - CBC with platelets differential  - Uric acid  - Rheumatoid factor    6. Need for prophylactic vaccination and inoculation against influenza  - FLU VACCINE, INCREASED ANTIGEN, PRESV FREE, AGE 65+ [98988]  - Vaccine Administration, Initial [81091]      Karen Ivory MD  [facility]    Injectable Influenza Immunization Documentation    1.  Is the person to be vaccinated sick today?   No    2. Does the person to be vaccinated have an allergy to a component   of the vaccine?   No  Egg Allergy Algorithm Link    3. Has the person to be vaccinated ever had a serious reaction   to influenza vaccine in the past?   No    4. Has the person to be vaccinated ever had Guillain-Barré syndrome?   No    Form completed by Jessica STOCK CMA

## 2017-12-15 PROBLEM — M10.9 ACUTE GOUTY ARTHRITIS: Status: ACTIVE | Noted: 2017-12-15

## 2017-12-15 PROBLEM — E11.8 TYPE 2 DIABETES MELLITUS WITH COMPLICATION, WITHOUT LONG-TERM CURRENT USE OF INSULIN (H): Status: ACTIVE | Noted: 2017-12-15

## 2017-12-15 PROBLEM — R80.9 PROTEINURIA, UNSPECIFIED TYPE: Status: ACTIVE | Noted: 2017-12-15

## 2017-12-15 PROBLEM — R04.0 EPISTAXIS: Status: ACTIVE | Noted: 2017-12-15

## 2017-12-15 LAB — RHEUMATOID FACT SER NEPH-ACNC: <20 IU/ML (ref 0–20)

## 2017-12-18 NOTE — PROGRESS NOTES
MAC urine higher still due to elevated blood pressure, uric acid, sed rate is high normal.  Need to get blood pressure down.  Please notify.        Thank you. FILEMON RYAN MD

## 2017-12-21 ENCOUNTER — OFFICE VISIT (OUTPATIENT)
Dept: FAMILY MEDICINE | Facility: CLINIC | Age: 67
End: 2017-12-21
Payer: COMMERCIAL

## 2017-12-21 VITALS
HEART RATE: 56 BPM | TEMPERATURE: 97.4 F | BODY MASS INDEX: 37.84 KG/M2 | DIASTOLIC BLOOD PRESSURE: 69 MMHG | WEIGHT: 279 LBS | SYSTOLIC BLOOD PRESSURE: 125 MMHG

## 2017-12-21 DIAGNOSIS — I10 HTN, GOAL BELOW 130/80: Primary | ICD-10-CM

## 2017-12-21 DIAGNOSIS — R80.9 PROTEINURIA, UNSPECIFIED TYPE: ICD-10-CM

## 2017-12-21 PROCEDURE — 99214 OFFICE O/P EST MOD 30 MIN: CPT | Performed by: FAMILY MEDICINE

## 2017-12-21 RX ORDER — VALSARTAN 80 MG/1
40 TABLET ORAL DAILY
Qty: 45 TABLET | Refills: 3 | Status: SHIPPED | OUTPATIENT
Start: 2017-12-21 | End: 2018-01-05

## 2017-12-21 NOTE — MR AVS SNAPSHOT
"              After Visit Summary   12/21/2017    Francois Lujan    MRN: [id number]           Patient Information     Date Of Birth          1950        Visit Information        Provider Department      12/21/2017 8:00 AM Karen Ivory MD [facility]        Today's Diagnoses     HTN, goal below 130/80    -  1    Proteinuria, unspecified type           Follow-ups after your visit        Your next 10 appointments already scheduled     Dec 27, 2017  1:30 PM CST   New Visit with Nilson Godoy MD   [facility]    [address] [address]   [phone]              Who to contact     If you have questions or need follow up information about today's clinic visit or your schedule please contact [facility] directly at [phone].  Normal or non-critical lab and imaging results will be communicated to you by MyChart, letter or phone within 4 business days after the clinic has received the results. If you do not hear from us within 7 days, please contact the clinic through MyChart or phone. If you have a critical or abnormal lab result, we will notify you by phone as soon as possible.  Submit refill requests through [employer] or call your pharmacy and they will forward the refill request to us. Please allow 3 business days for your refill to be completed.          Additional Information About Your Visit        MyChart Information     [facility] lets you send messages to your doctor, view your test results, renew your prescriptions, schedule appointments and more. To sign up, go to [email or web address] . Click on \"Log in\" on the left side of the screen, which will take you to the Welcome page. Then click on \"Sign up Now\" on the right side of the page.     You will be asked to enter the access code listed below, as well as some personal information. Please follow the directions to create your username and password.     Your " access code is: [email or web address]  Expires: 3/14/2018  8:43 AM     Your access code will  in 90 days. If you need help or a new code, please call your [place] clinic or [phone].        Care EveryWhere ID     This is your Care EveryWhere ID. This could be used by other organizations to access your [place] medical records  [id number]        Your Vitals Were     Pulse Temperature BMI (Body Mass Index)             53 97.4  F (36.3  C) (Tympanic) 37.84 kg/m2          Blood Pressure from Last 3 Encounters:   17 154/73   17 147/75   17 156/73    Weight from Last 3 Encounters:   17 279 lb (126.6 kg)   17 279 lb 3.2 oz (126.6 kg)   17 269 lb 3.2 oz (122.1 kg)              Today, you had the following     No orders found for display         Today's Medication Changes          These changes are accurate as of: 17  8:47 AM.  If you have any questions, ask your nurse or doctor.               Start taking these medicines.        Dose/Directions    valsartan 80 MG tablet   Commonly known as:  DIOVAN   Used for:  HTN, goal below 130/80, Proteinuria, unspecified type   Started by:  Karen Ivory MD        Dose:  40 mg   Take 0.5 tablets (40 mg) by mouth daily   Quantity:  45 tablet   Refills:  3            Where to get your medicines      These medications were sent to [address] [address]     Phone:  [phone]     valsartan 80 MG tablet                Primary Care Provider Office Phone # Fax #    Karen Ivory [phone] [address] [facility] [address]        Equal Access to Services     KAYLAH ZAZUETA AH: Thang taveras Sodaya, waaxda luqadaha, qaybta kaalmada adejoshuayafreddy, luly lam. [facility] [phone].    ATENCIÓN: Si habla español, tiene a birch disposición servicios gratuitos de asistencia lingüística. Llame al  [phone].    We comply with applicable federal civil rights laws and [place] laws. We do not discriminate on the basis of race, color, national origin, age, disability, sex, sexual orientation, or gender identity.            Thank you!     Thank you for choosing [facility]  for your care. Our goal is always to provide you with excellent care. Hearing back from our patients is one way we can continue to improve our services. Please take a few minutes to complete the written survey that you may receive in the mail after your visit with us. Thank you!             Your Updated Medication List - Protect others around you: Learn how to safely use, store and throw away your medicines at www.disposemymeds.org.          This list is accurate as of: 12/21/17  8:47 AM.  Always use your most recent med list.                   Brand Name Dispense Instructions for use Diagnosis    ACE/ARB/ARNI NOT PRESCRIBED (INTENTIONAL)      Please choose reason not prescribed, below    HTN, goal below 140/90, Type 2 diabetes mellitus with microalbuminuria, without long-term current use of insulin (H)       amLODIPine 10 MG tablet    NORVASC    90 tablet    Take 1 tablet (10 mg) by mouth daily    HTN, goal below 140/90       ASPIRIN PO      Take 81 mg by mouth daily        atorvastatin 40 MG tablet    LIPITOR    90 tablet    Take 1 tablet (40 mg) by mouth daily    Hyperlipidemia LDL goal <70       blood glucose lancets standard    no brand specified    1 Box    Use to test blood sugar 3 times daily or as directed.        blood glucose monitoring test strip    ACCU-CHEK SMARTVIEW    100 each    Use to test blood sugar 3 times daily or as directed.    Type 2 diabetes mellitus with complication, with long-term current use of insulin (H)       glipiZIDE 10 MG 24 hr tablet    GLUCOTROL XL    180 tablet    Take 2 tablets (20 mg) by mouth daily (with breakfast)        indomethacin 25 MG capsule    INDOCIN    42 capsule    Take 1  capsule (25 mg) by mouth 2 times daily (with meals)    Acute gouty arthritis       levothyroxine 50 MCG tablet    SYNTHROID    90 tablet    Take 1 tablet (50 mcg) by mouth daily    Acquired hypothyroidism       metFORMIN 500 MG tablet    GLUCOPHAGE    180 tablet    Take 2 tablets (1,000 mg) by mouth 2 times daily (with meals) (Needs follow-up appointment for this medication)        metoprolol 50 MG tablet    LOPRESSOR    180 tablet    Take 1 tablet (50 mg) by mouth 2 times daily    HTN, goal below 140/90       testosterone 50 MG/5GM (1%) topical gel    ANDROGEL    90 packet    Place 1 packet (50 mg) onto the skin daily    Hypogonadism male       valsartan 80 MG tablet    DIOVAN    45 tablet    Take 0.5 tablets (40 mg) by mouth daily    HTN, goal below 130/80, Proteinuria, unspecified type

## 2017-12-21 NOTE — PROGRESS NOTES
SUBJECTIVE:                                                    Francois Lujan is 67 year old male   Chief Complaint   Patient presents with     Diabetes     Follow up to discuss lab results.     Component      Latest Ref Rng & Units 12/14/2017   WBC      4.0 - 11.0 10e9/L 10.4   RBC Count      4.4 - 5.9 10e12/L 3.66 (L)   Hemoglobin      13.3 - 17.7 g/dL 12.2 (L)   Hematocrit      40.0 - 53.0 % 35.3 (L)   MCV      78 - 100 fl 96   MCH      26.5 - 33.0 pg 33.3 (H)   MCHC      31.5 - 36.5 g/dL 34.6   RDW      10.0 - 15.0 % 12.9   Platelet Count      150 - 450 10e9/L 210   Diff Method       Automated Method   % Neutrophils      % 56.0   % Lymphocytes      % 30.5   % Monocytes      % 10.6   % Eosinophils      % 2.7   % Basophils      % 0.2   Absolute Neutrophil      1.6 - 8.3 10e9/L 5.8   Absolute Lymphocytes      0.8 - 5.3 10e9/L 3.2   Absolute Monocytes      0.0 - 1.3 10e9/L 1.1   Absolute Eosinophils      0.0 - 0.7 10e9/L 0.3   Absolute Basophils      0.0 - 0.2 10e9/L 0.0   Creatinine Urine      mg/dL 74   Albumin Urine mg/L      mg/L 776   Albumin Urine mg/g Cr      0 - 17 mg/g Cr 1043.01 (H)   Sed Rate      0 - 20 mm/h 22 (H)   CRP Inflammation      0.0 - 8.0 mg/L 4.5   Uric Acid      3.5 - 7.2 mg/dL 7.6 (H)   Rheumatoid Factor      <20 IU/mL <20       Problem list and histories reviewed & adjusted, as indicated.  Additional history: has quit drinking alcohol for a week, blood pressures better at home,  Taking cough medicine now and went back up, has had cold for about a week.    Patient Active Problem List   Diagnosis     S/P CABG (coronary artery bypass graft)     Hypogonadotropic hypogonadism (H)     Hypothyroidism     Hard of hearing     Hyperlipidemia LDL goal <70     ADELA (obstructive sleep apnea)     Hypertension goal BP (blood pressure) < 140/90     Numbness of hand, right     Health Care Home     Radicular pain of lumbosacral region     Moderate major depression (H)     Elevated sed rate     Elevated  C-reactive protein (CRP)     History of back surgery     Polymyalgia rheumatica (H)     Multiple joint pain     OA (osteoarthritis) of knee, right     ACL (anterior cruciate ligament) tear     HTN, goal below 140/90     Necrobiosis lipoidica     Type 2 diabetes mellitus with complication (H)     Type 2 diabetes mellitus with other skin complications     CKD (chronic kidney disease) stage 2, GFR 60-89 ml/min     Proteinuria     Type 2 diabetes mellitus with other diabetic kidney complication     Diabetic polyneuropathy associated with type 2 diabetes mellitus (H)     Type 2 diabetes mellitus with other circulatory complications     Polyneuropathy associated with underlying disease (H)     Alcohol dependence with other alcohol-induced disorder (H)     Coronary artery disease involving native coronary artery of native heart without angina pectoris     Triceps tendon rupture, left, sequela     Other secondary gout, multiple sites, unspecified chronicity     Proteinuria, unspecified type     Epistaxis     Acute gouty arthritis     Type 2 diabetes mellitus with complication, without long-term current use of insulin (H)     Past Surgical History:   Procedure Laterality Date     BACK SURGERY       BYPASS GRAFT ARTERY CORONARY  11/17/2011    Procedure:BYPASS GRAFT ARTERY CORONARY; CORONARY ARTERY BYPASS, Right, OM, LAD WITH ENDOVEIN HARVEST, ON PUMP; Surgeon:LEONEL MG; Location: OR     COLONOSCOPY N/A 6/9/2016    Procedure: COLONOSCOPY;  Surgeon: Isidro Humphreys MD;  Location: [facility]     HERNIA REPAIR      infantile hernia repair     ORTHOPEDIC SURGERY      Baker cyst removed - right knee, and mesiscus tear repair     REPAIR TENDON TRICEPS UPPER EXTREMITY Left 4/11/2017    Procedure: REPAIR TENDON TRICEPS UPPER EXTREMITY;  Surgeon: Rodriguez Kelely MD;  Location: [facility]       Social History   Substance Use Topics     Smoking status: Former Smoker     Packs/day: 2.00     Years: 30.00     Quit date: 11/13/1997      Smokeless tobacco: Never Used     Alcohol use 6.0 oz/week     12 Cans of beer per week      Comment: 20+ beers a week     Family History   Problem Relation Age of Onset     HEART DISEASE Father      CANCER Mother      C.A.D. Brother          Current Outpatient Prescriptions   Medication Sig Dispense Refill     valsartan (DIOVAN) 80 MG tablet Take 0.5 tablets (40 mg) by mouth daily 45 tablet 3     indomethacin (INDOCIN) 25 MG capsule Take 1 capsule (25 mg) by mouth 2 times daily (with meals) 42 capsule 1     ASPIRIN PO Take 81 mg by mouth daily       amLODIPine (NORVASC) 10 MG tablet Take 1 tablet (10 mg) by mouth daily 90 tablet 3     metFORMIN (GLUCOPHAGE) 500 MG tablet Take 2 tablets (1,000 mg) by mouth 2 times daily (with meals) (Needs follow-up appointment for this medication) 180 tablet 3     glipiZIDE (GLUCOTROL XL) 10 MG 24 hr tablet Take 2 tablets (20 mg) by mouth daily (with breakfast) 180 tablet 3     metoprolol (LOPRESSOR) 50 MG tablet Take 1 tablet (50 mg) by mouth 2 times daily 180 tablet 3     levothyroxine (SYNTHROID) 50 MCG tablet Take 1 tablet (50 mcg) by mouth daily 90 tablet 3     atorvastatin (LIPITOR) 40 MG tablet Take 1 tablet (40 mg) by mouth daily 90 tablet 3     testosterone (ANDROGEL) 50 MG/5GM (1%) gel Place 1 packet (50 mg) onto the skin daily 90 packet 3     blood glucose monitoring (ACCU-CHEK SMARTVIEW) test strip Use to test blood sugar 3 times daily or as directed. 100 each 12     ACE/ARB NOT PRESCRIBED, INTENTIONAL, Please choose reason not prescribed, below       blood glucose (NO BRAND SPECIFIED) lancets standard Use to test blood sugar 3 times daily or as directed. 1 Box 3     Allergies   Allergen Reactions     Ivp Dye [Contrast Dye] Hives     Brief bout - 10 minutes duration     Recent Labs   Lab Test  09/21/17   0827  07/28/17   0344  03/03/17   1109  08/30/16   1024  04/11/16   1030  05/29/15   1019  07/16/14   1113   A1C  6.2*   --   7.7*  7.0*  11.2*  7.6*   --    LDL  81    --   113*   --   86  111   --    HDL  66   --   59   --   44   --    --    TRIG  91   --   209*   --   282*   --    --    ALT   --   22   --    --    --   35  19   CR  0.87  1.17  0.98   --   1.08  0.97  0.83   GFRESTIMATED  88  62  76   --   69  78  >90   GFRESTBLACK  >90  75  >90  African American GFR Calc     --   83  >90   GFR Calc    >90   POTASSIUM  4.2  3.9  4.2   --   4.4  4.5  5.5*   TSH  1.39   --   1.42   --   0.84  1.11  0.86      BP Readings from Last 3 Encounters:   12/21/17 125/69   12/14/17 147/75   09/21/17 156/73    Wt Readings from Last 3 Encounters:   12/21/17 279 lb (126.6 kg)   12/14/17 279 lb 3.2 oz (126.6 kg)   09/21/17 269 lb 3.2 oz (122.1 kg)         ROS:  Constitutional, HEENT, cardiovascular, pulmonary, gi and gu systems are negative, except as otherwise noted.    OBJECTIVE:                                                    /69 (BP Location: Right arm, Patient Position: Chair, Cuff Size: Adult Regular)  Pulse 56  Temp 97.4  F (36.3  C) (Tympanic)  Wt 279 lb (126.6 kg)  BMI 37.84 kg/m2  GENERAL APPEARANCE ADULT: Alert, no acute distress  PSYCH: mentation appears normal., affect and mood normal  Diagnostic Test Results:  none      ASSESSMENT/PLAN:                                                    1. HTN, goal below 130/80  2. Proteinuria, unspecified type  In diabetes and alcohol use, says is done with alcohol, stopped voltaren because dizzy, will add back low dose ARB valsartan for kidney protection and blood pressure control.  Encouraged to stay off alcohol and recheck blood pressure after cold resolved.  Repeat MAC in a month.  - valsartan (DIOVAN) 80 MG tablet; Take 0.5 tablets (40 mg) by mouth daily  Dispense: 45 tablet; Refill: 3    Karen Ivory MD  [facility]

## 2017-12-21 NOTE — NURSING NOTE
Initial /73  Pulse 53  Temp 97.4  F (36.3  C) (Tympanic)  Wt 279 lb (126.6 kg)  BMI 37.84 kg/m2 Estimated body mass index is 37.84 kg/(m^2) as calculated from the following:    Height as of 12/14/17: 6' (1.829 m).    Weight as of this encounter: 279 lb (126.6 kg). .

## 2017-12-21 NOTE — LETTER
[facility]  [address] [address]  [phone]        January 27, 2018    Francois Lujan  [address]  [place] [address]              Dear Francois Lujan    This is to remind you that your Urine Micro Albumin lab test is due.    You may call our office at [phone] to schedule an appointment.    Please disregard this notice if you have already had your labs drawn or made an appointment.        Sincerely,        Karen Ivory MD

## 2017-12-27 ENCOUNTER — OFFICE VISIT (OUTPATIENT)
Dept: OTOLARYNGOLOGY | Facility: CLINIC | Age: 67
End: 2017-12-27
Payer: COMMERCIAL

## 2017-12-27 VITALS
DIASTOLIC BLOOD PRESSURE: 80 MMHG | HEART RATE: 56 BPM | SYSTOLIC BLOOD PRESSURE: 160 MMHG | BODY MASS INDEX: 37.3 KG/M2 | TEMPERATURE: 98.2 F | WEIGHT: 275 LBS

## 2017-12-27 DIAGNOSIS — H90.3 BILATERAL SENSORINEURAL HEARING LOSS: ICD-10-CM

## 2017-12-27 DIAGNOSIS — R04.0 EPISTAXIS: Primary | ICD-10-CM

## 2017-12-27 PROCEDURE — 99203 OFFICE O/P NEW LOW 30 MIN: CPT | Mod: 25 | Performed by: OTOLARYNGOLOGY

## 2017-12-27 PROCEDURE — 30901 CONTROL OF NOSEBLEED: CPT | Performed by: OTOLARYNGOLOGY

## 2017-12-27 ASSESSMENT — PAIN SCALES - GENERAL: PAINLEVEL: NO PAIN (0)

## 2017-12-27 NOTE — PROGRESS NOTES
History of Present Illness - Francois Lujan is a 67 year old male who presents with recurrent nosebleeds since September 2017. He gets about 3-4 episodes per week. He controls them with digital pressure. He denies anticoagulation. He also is very hard of hearing, but has not gotten much benefit from hearing aids in the past.    Past Medical History -   Patient Active Problem List   Diagnosis     S/P CABG (coronary artery bypass graft)     Hypogonadotropic hypogonadism (H)     Hypothyroidism     Hard of hearing     Hyperlipidemia LDL goal <70     ADELA (obstructive sleep apnea)     Hypertension goal BP (blood pressure) < 140/90     Numbness of hand, right     Health Care Home     Radicular pain of lumbosacral region     Moderate major depression (H)     Elevated sed rate     Elevated C-reactive protein (CRP)     History of back surgery     Polymyalgia rheumatica (H)     Multiple joint pain     OA (osteoarthritis) of knee, right     ACL (anterior cruciate ligament) tear     HTN, goal below 140/90     Necrobiosis lipoidica     Type 2 diabetes mellitus with complication (H)     Type 2 diabetes mellitus with other skin complications     CKD (chronic kidney disease) stage 2, GFR 60-89 ml/min     Proteinuria     Type 2 diabetes mellitus with other diabetic kidney complication     Diabetic polyneuropathy associated with type 2 diabetes mellitus (H)     Type 2 diabetes mellitus with other circulatory complications     Polyneuropathy associated with underlying disease (H)     Alcohol dependence with other alcohol-induced disorder (H)     Coronary artery disease involving native coronary artery of native heart without angina pectoris     Triceps tendon rupture, left, sequela     Other secondary gout, multiple sites, unspecified chronicity     Proteinuria, unspecified type     Epistaxis     Acute gouty arthritis     Type 2 diabetes mellitus with complication, without long-term current use of insulin (H)       Current  Medications -   Current Outpatient Prescriptions:      valsartan (DIOVAN) 80 MG tablet, Take 0.5 tablets (40 mg) by mouth daily, Disp: 45 tablet, Rfl: 3     indomethacin (INDOCIN) 25 MG capsule, Take 1 capsule (25 mg) by mouth 2 times daily (with meals), Disp: 42 capsule, Rfl: 1     blood glucose monitoring (ACCU-CHEK SMARTVIEW) test strip, Use to test blood sugar 3 times daily or as directed., Disp: 100 each, Rfl: 12     ASPIRIN PO, Take 81 mg by mouth daily, Disp: , Rfl:      amLODIPine (NORVASC) 10 MG tablet, Take 1 tablet (10 mg) by mouth daily, Disp: 90 tablet, Rfl: 3     metFORMIN (GLUCOPHAGE) 500 MG tablet, Take 2 tablets (1,000 mg) by mouth 2 times daily (with meals) (Needs follow-up appointment for this medication), Disp: 180 tablet, Rfl: 3     glipiZIDE (GLUCOTROL XL) 10 MG 24 hr tablet, Take 2 tablets (20 mg) by mouth daily (with breakfast), Disp: 180 tablet, Rfl: 3     metoprolol (LOPRESSOR) 50 MG tablet, Take 1 tablet (50 mg) by mouth 2 times daily, Disp: 180 tablet, Rfl: 3     levothyroxine (SYNTHROID) 50 MCG tablet, Take 1 tablet (50 mcg) by mouth daily, Disp: 90 tablet, Rfl: 3     atorvastatin (LIPITOR) 40 MG tablet, Take 1 tablet (40 mg) by mouth daily, Disp: 90 tablet, Rfl: 3     blood glucose (NO BRAND SPECIFIED) lancets standard, Use to test blood sugar 3 times daily or as directed., Disp: 1 Box, Rfl: 3     testosterone (ANDROGEL) 50 MG/5GM (1%) gel, Place 1 packet (50 mg) onto the skin daily, Disp: 90 packet, Rfl: 3     ACE/ARB NOT PRESCRIBED, INTENTIONAL,, Please choose reason not prescribed, below (Patient not taking: Reported on 12/27/2017), Disp: , Rfl:     Allergies -   Allergies   Allergen Reactions     Ivp Dye [Contrast Dye] Hives     Brief bout - 10 minutes duration       Social History -   Social History     Social History     Marital status:      Spouse name: N/A     Number of children: N/A     Years of education: N/A     Social History Main Topics     Smoking status: Former  Smoker     Packs/day: 2.00     Years: 30.00     Quit date: 11/13/1997     Smokeless tobacco: Never Used     Alcohol use 6.0 oz/week     12 Cans of beer per week      Comment: 30 beers a week     Drug use: No     Sexual activity: Yes     Other Topics Concern     Parent/Sibling W/ Cabg, Mi Or Angioplasty Before 65f 55m? 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 Brother      SEPTICEMIA Brother 3     Lung Cancer Sister      Schizophrenia Sister        Review of Systems - As per HPI and PMHx, otherwise 7 system review of the head and neck negative. 10+ system review negative.    Physical Exam  /80 (BP Location: Left arm, Patient Position: Chair, Cuff Size: Adult Large)  Pulse 56  Temp 98.2  F (36.8  C) (Oral)  Wt 124.7 kg (275 lb)  BMI 37.3 kg/m2  General - The patient is well nourished and well developed, and appears to have good nutritional status.  Alert and oriented to person and place, answers questions and cooperates with examination appropriately.   Head and Face - Normocephalic and atraumatic, with no gross asymmetry noted of the contour of the facial features.  The facial nerve is intact, with strong symmetric movements.  Voice and Breathing - The patient was breathing comfortably without the use of accessory muscles. There was no wheezing, stridor, or stertor.  The patients voice was clear and strong, and had appropriate pitch and quality.  Ears - Bilateral pinna and EACs with normal appearing overlying skin. Tympanic  membrane intact with good mobility on pneumatic otoscopy bilaterally. Bony landmarks of the ossicular chain are normal. The tympanic membranes are normal in appearance. No retraction, perforation, or masses.  No fluid or purulence was seen in the external canal or the middle ear.   Eyes - Extraocular movements intact.  Sclera were not icteric or injected, conjunctiva were pink and moist.  Mouth - Examination of the oral cavity showed pink, healthy oral mucosa. No lesions or ulcerations noted.  The tongue was mobile and midline, and the dentition were in good condition.    Throat - The walls of the oropharynx were smooth, pink, moist, symmetric, and had no lesions or ulcerations.  The tonsillar pillars and soft palate were symmetric.  The uvula was midline on elevation.  Neck - Normal midline excursion of the laryngotracheal complex during swallowing.  Full range of motion on passive movement.  Palpation of the occipital, submental, submandibular, internal jugular chain, and supraclavicular nodes did not demonstrate any abnormal lymph nodes or masses.  The carotid pulse was palpable bilaterally.  Palpation of the thyroid was soft and smooth, with no nodules or goiter appreciated.  The trachea was mobile and midline.  Nose - External contour is symmetric, no gross deflection or scars.  Nasal mucosa is pink and moist with no abnormal mucus.  The septum was midline and non-obstructive, prominent vessels on right caudal septum.    Nasal Cautery - Options were explained to the patient regarding conservative measures versus nasal cautery in the clinic today.  The patient wished to proceed with cautery.  I placed a small piece of cotton soaked in 4% liquid lidocaine in the anterior nasal cavity over the area of prominent vessels.  This was left in place for 10 minutes.  I then proceeded to remove the cotton, and applied silver nitrate to the vessels, starting distally, and working my way back to the vessels  point of entry  onto the nasal mucosa.  The patient tolerated the procedure well.    A/P  - The patient has been cauterized today for epistaxis.  I counseled them on avoiding trauma to the nose for the next 3 days. I advised that they can then use saline spray to keep the nose moisturized. If there is any further troublesome bleeding, I recommended they pinch the soft part of their nose with their fingers, and lean forward. If blood escapes from the front, then they should reposition their pinch to better secure the nose. Once the bleeding has been stopped with pinching, hold the pinch for 10 minutes. If bleeding persists despite this, then I recommend proceeding to the Emergency Department. However, if the bleeding is controlled, please arrange a followup appointment with my office 2 weeks or more after the day in which the nose was cauterized.    We also discussed that he might want to consider cochlear implantation. He will call back if he would like referral.    Dr. Nilson Godoy MD  Otolaryngology  [facility]

## 2017-12-27 NOTE — LETTER
12/27/2017         RE: Francois Lujan  [address]  [place] [address]        Dear Colleague,    Thank you for referring your patient, Francois Lujan, to the [facility]. Please see a copy of my visit note below.        History of Present Illness - Francois Lujan is a 67 year old male who presents with recurrent nosebleeds since September 2017. He gets about 3-4 episodes per week. He controls them with digital pressure. He denies anticoagulation. He also is very hard of hearing, but has not gotten much benefit from hearing aids in the past.    Past Medical History -   Patient Active Problem List   Diagnosis     S/P CABG (coronary artery bypass graft)     Hypogonadotropic hypogonadism (H)     Hypothyroidism     Hard of hearing     Hyperlipidemia LDL goal <70     ADELA (obstructive sleep apnea)     Hypertension goal BP (blood pressure) < 140/90     Numbness of hand, right     Health Care Home     Radicular pain of lumbosacral region     Moderate major depression (H)     Elevated sed rate     Elevated C-reactive protein (CRP)     History of back surgery     Polymyalgia rheumatica (H)     Multiple joint pain     OA (osteoarthritis) of knee, right     ACL (anterior cruciate ligament) tear     HTN, goal below 140/90     Necrobiosis lipoidica     Type 2 diabetes mellitus with complication (H)     Type 2 diabetes mellitus with other skin complications     CKD (chronic kidney disease) stage 2, GFR 60-89 ml/min     Proteinuria     Type 2 diabetes mellitus with other diabetic kidney complication     Diabetic polyneuropathy associated with type 2 diabetes mellitus (H)     Type 2 diabetes mellitus with other circulatory complications     Polyneuropathy associated with underlying disease (H)     Alcohol dependence with other alcohol-induced disorder (H)     Coronary artery disease involving native coronary artery of native heart without angina pectoris     Triceps tendon rupture, left, sequela     Other  secondary gout, multiple sites, unspecified chronicity     Proteinuria, unspecified type     Epistaxis     Acute gouty arthritis     Type 2 diabetes mellitus with complication, without long-term current use of insulin (H)       Current Medications -   Current Outpatient Prescriptions:      valsartan (DIOVAN) 80 MG tablet, Take 0.5 tablets (40 mg) by mouth daily, Disp: 45 tablet, Rfl: 3     indomethacin (INDOCIN) 25 MG capsule, Take 1 capsule (25 mg) by mouth 2 times daily (with meals), Disp: 42 capsule, Rfl: 1     blood glucose monitoring (ACCU-CHEK SMARTVIEW) test strip, Use to test blood sugar 3 times daily or as directed., Disp: 100 each, Rfl: 12     ASPIRIN PO, Take 81 mg by mouth daily, Disp: , Rfl:      amLODIPine (NORVASC) 10 MG tablet, Take 1 tablet (10 mg) by mouth daily, Disp: 90 tablet, Rfl: 3     metFORMIN (GLUCOPHAGE) 500 MG tablet, Take 2 tablets (1,000 mg) by mouth 2 times daily (with meals) (Needs follow-up appointment for this medication), Disp: 180 tablet, Rfl: 3     glipiZIDE (GLUCOTROL XL) 10 MG 24 hr tablet, Take 2 tablets (20 mg) by mouth daily (with breakfast), Disp: 180 tablet, Rfl: 3     metoprolol (LOPRESSOR) 50 MG tablet, Take 1 tablet (50 mg) by mouth 2 times daily, Disp: 180 tablet, Rfl: 3     levothyroxine (SYNTHROID) 50 MCG tablet, Take 1 tablet (50 mcg) by mouth daily, Disp: 90 tablet, Rfl: 3     atorvastatin (LIPITOR) 40 MG tablet, Take 1 tablet (40 mg) by mouth daily, Disp: 90 tablet, Rfl: 3     blood glucose (NO BRAND SPECIFIED) lancets standard, Use to test blood sugar 3 times daily or as directed., Disp: 1 Box, Rfl: 3     testosterone (ANDROGEL) 50 MG/5GM (1%) gel, Place 1 packet (50 mg) onto the skin daily, Disp: 90 packet, Rfl: 3     ACE/ARB NOT PRESCRIBED, INTENTIONAL,, Please choose reason not prescribed, below (Patient not taking: Reported on 12/27/2017), Disp: , Rfl:     Allergies -   Allergies   Allergen Reactions     Ivp Dye [Contrast Dye] Hives     Brief bout - 10  minutes duration       Social History -   Social History     Social History     Marital status:      Spouse name: N/A     Number of children: N/A     Years of education: N/A     Social History Main Topics     Smoking status: Former Smoker     Packs/day: 2.00     Years: 30.00     Quit date: 11/13/1997     Smokeless tobacco: Never Used     Alcohol use 6.0 oz/week     12 Cans of beer per week      Comment: 30 beers a week     Drug use: No     Sexual activity: Yes     Other Topics Concern     Parent/Sibling W/ Cabg, Mi Or Angioplasty Before 65f 55m? 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 Brother      SEPTICEMIA Brother 3     Lung Cancer Sister      Schizophrenia Sister        Review of Systems - As per HPI and PMHx, otherwise 7 system review of the head and neck negative. 10+ system review negative.    Physical Exam  /80 (BP Location: Left arm, Patient Position: Chair, Cuff Size: Adult Large)  Pulse 56  Temp 98.2  F (36.8  C) (Oral)  Wt 124.7 kg (275 lb)  BMI 37.3 kg/m2  General - The patient is well nourished and well developed, and appears to have good nutritional status.  Alert and oriented to person and place, answers questions and cooperates with examination appropriately.   Head and Face - Normocephalic and atraumatic, with no gross asymmetry noted of the contour of the facial features.  The facial nerve is intact, with strong symmetric movements.  Voice and Breathing - The patient was breathing comfortably  without the use of accessory muscles. There was no wheezing, stridor, or stertor.  The patients voice was clear and strong, and had appropriate pitch and quality.  Ears - Bilateral pinna and EACs with normal appearing overlying skin. Tympanic membrane intact with good mobility on pneumatic otoscopy bilaterally. Bony landmarks of the ossicular chain are normal. The tympanic membranes are normal in appearance. No retraction, perforation, or masses.  No fluid or purulence was seen in the external canal or the middle ear.   Eyes - Extraocular movements intact.  Sclera were not icteric or injected, conjunctiva were pink and moist.  Mouth - Examination of the oral cavity showed pink, healthy oral mucosa. No lesions or ulcerations noted.  The tongue was mobile and midline, and the dentition were in good condition.    Throat - The walls of the oropharynx were smooth, pink, moist, symmetric, and had no lesions or ulcerations.  The tonsillar pillars and soft palate were symmetric.  The uvula was midline on elevation.  Neck - Normal midline excursion of the laryngotracheal complex during swallowing.  Full range of motion on passive movement.  Palpation of the occipital, submental, submandibular, internal jugular chain, and supraclavicular nodes did not demonstrate any abnormal lymph nodes or masses.  The carotid pulse was palpable bilaterally.  Palpation of the thyroid was soft and smooth, with no nodules or goiter appreciated.  The trachea was mobile and midline.  Nose - External contour is symmetric, no gross deflection or scars.  Nasal mucosa is pink and moist with no abnormal mucus.  The septum was midline and non-obstructive, prominent vessels on right caudal septum.    Nasal Cautery - Options were explained to the patient regarding conservative measures versus nasal cautery in the clinic today.  The patient wished to proceed with cautery.  I placed a small piece of cotton soaked in 4% liquid lidocaine in the anterior  nasal cavity over the area of prominent vessels.  This was left in place for 10 minutes.  I then proceeded to remove the cotton, and applied silver nitrate to the vessels, starting distally, and working my way back to the vessels  point of entry onto the nasal mucosa.  The patient tolerated the procedure well.    A/P  - The patient has been cauterized today for epistaxis.  I counseled them on avoiding trauma to the nose for the next 3 days. I advised that they can then use saline spray to keep the nose moisturized. If there is any further troublesome bleeding, I recommended they pinch the soft part of their nose with their fingers, and lean forward. If blood escapes from the front, then they should reposition their pinch to better secure the nose. Once the bleeding has been stopped with pinching, hold the pinch for 10 minutes. If bleeding persists despite this, then I recommend proceeding to the Emergency Department. However, if the bleeding is controlled, please arrange a followup appointment with my office 2 weeks or more after the day in which the nose was cauterized.    We also discussed that he might want to consider cochlear implantation. He will call back if he would like referral.    Dr. Nilson Godoy MD  Otolaryngology  [facility]        Again, thank you for allowing me to participate in the care of your patient.        Sincerely,        Nilson Godoy MD

## 2017-12-27 NOTE — MR AVS SNAPSHOT
"              After Visit Summary   12/27/2017    Francois Lujan    MRN: [id number]           Patient Information     Date Of Birth          1950        Visit Information        Provider Department      12/27/2017 1:30 PM Nilson Godoy MD [facility]        Today's Diagnoses     Epistaxis    -  1    Bilateral sensorineural hearing loss          Care Instructions    Per physician's instructions            Follow-ups after your visit        Your next 10 appointments already scheduled     Jan 05, 2018  9:40 AM CST   SHORT with Karen Ivory MD   [facility]    [address] [address]   [phone]              Who to contact     If you have questions or need follow up information about today's clinic visit or your schedule please contact [facility] directly at [phone].  Normal or non-critical lab and imaging results will be communicated to you by MyChart, letter or phone within 4 business days after the clinic has received the results. If you do not hear from us within 7 days, please contact the clinic through MyChart or phone. If you have a critical or abnormal lab result, we will notify you by phone as soon as possible.  Submit refill requests through [employer] or call your pharmacy and they will forward the refill request to us. Please allow 3 business days for your refill to be completed.          Additional Information About Your Visit        MyChart Information     [employer] lets you send messages to your doctor, view your test results, renew your prescriptions, schedule appointments and more. To sign up, go to [email or web address] . Click on \"Log in\" on the left side of the screen, which will take you to the Welcome page. Then click on \"Sign up Now\" on the right side of the page.     You will be asked to enter the access code listed below, as well as some personal information. Please follow the " directions to create your username and password.     Your access code is: [email or web address]  Expires: 3/14/2018  8:43 AM     Your access code will  in 90 days. If you need help or a new code, please call your [facility] clinic or [phone].        Care EveryWhere ID     This is your Care EveryWhere ID. This could be used by other organizations to access your [facility] medical records  [id number]        Your Vitals Were     Pulse Temperature BMI (Body Mass Index)             56 98.2  F (36.8  C) (Oral) 37.3 kg/m2          Blood Pressure from Last 3 Encounters:   17 160/80   17 125/69   17 147/75    Weight from Last 3 Encounters:   17 124.7 kg (275 lb)   17 126.6 kg (279 lb)   17 126.6 kg (279 lb 3.2 oz)              We Performed the Following     Nasal Cautery Simple Unil        Primary Care Provider Office Phone # Fax #    Karen Caroline Ivory [phone] [address] [address]        Equal Access to Services     KAYLAH ZAZUETA AH: Hadii jovita foleyo Sodaya, waaxda luqadaha, qaybta kaalmada adejoshuayada, luly lam. So [facility] [phone].    ATENCIÓN: Si habla español, tiene a birch disposición servicios gratuitos de asistencia lingüística. Llame al [phone].    We comply with applicable federal civil rights laws and [place] laws. We do not discriminate on the basis of race, color, national origin, age, disability, sex, sexual orientation, or gender identity.            Thank you!     Thank you for choosing [facility]  for your care. Our goal is always to provide you with excellent care. Hearing back from our patients is one way we can continue to improve our services. Please take a few minutes to complete the written survey that you may receive in the mail after your visit with us. Thank you!             Your Updated Medication List - Protect others around you: Learn how to safely use, store and  throw away your medicines at www.disposemymeds.org.          This list is accurate as of: 12/27/17  5:24 PM.  Always use your most recent med list.                   Brand Name Dispense Instructions for use Diagnosis    ACE/ARB/ARNI NOT PRESCRIBED (INTENTIONAL)      Please choose reason not prescribed, below    HTN, goal below 140/90, Type 2 diabetes mellitus with microalbuminuria, without long-term current use of insulin (H)       amLODIPine 10 MG tablet    NORVASC    90 tablet    Take 1 tablet (10 mg) by mouth daily    HTN, goal below 140/90       ASPIRIN PO      Take 81 mg by mouth daily        atorvastatin 40 MG tablet    LIPITOR    90 tablet    Take 1 tablet (40 mg) by mouth daily    Hyperlipidemia LDL goal <70       blood glucose lancets standard    no brand specified    1 Box    Use to test blood sugar 3 times daily or as directed.        blood glucose monitoring test strip    ACCU-CHEK SMARTVIEW    100 each    Use to test blood sugar 3 times daily or as directed.    Type 2 diabetes mellitus with complication, with long-term current use of insulin (H)       glipiZIDE 10 MG 24 hr tablet    GLUCOTROL XL    180 tablet    Take 2 tablets (20 mg) by mouth daily (with breakfast)        indomethacin 25 MG capsule    INDOCIN    42 capsule    Take 1 capsule (25 mg) by mouth 2 times daily (with meals)    Acute gouty arthritis       levothyroxine 50 MCG tablet    SYNTHROID    90 tablet    Take 1 tablet (50 mcg) by mouth daily    Acquired hypothyroidism       metFORMIN 500 MG tablet    GLUCOPHAGE    180 tablet    Take 2 tablets (1,000 mg) by mouth 2 times daily (with meals) (Needs follow-up appointment for this medication)        metoprolol 50 MG tablet    LOPRESSOR    180 tablet    Take 1 tablet (50 mg) by mouth 2 times daily    HTN, goal below 140/90       testosterone 50 MG/5GM (1%) topical gel    ANDROGEL    90 packet    Place 1 packet (50 mg) onto the skin daily    Hypogonadism male       valsartan 80 MG tablet     DIOVAN    45 tablet    Take 0.5 tablets (40 mg) by mouth daily    HTN, goal below 130/80, Proteinuria, unspecified type

## 2017-12-27 NOTE — NURSING NOTE
Initial /80 (BP Location: Left arm, Patient Position: Chair, Cuff Size: Adult Large)  Pulse 56  Temp 98.2  F (36.8  C) (Oral)  Wt 124.7 kg (275 lb)  BMI 37.3 kg/m2 Estimated body mass index is 37.3 kg/(m^2) as calculated from the following:    Height as of 12/14/17: 1.829 m (6').    Weight as of this encounter: 124.7 kg (275 lb). .    Annamaria Nick LPN

## 2018-01-05 ENCOUNTER — OFFICE VISIT (OUTPATIENT)
Dept: FAMILY MEDICINE | Facility: CLINIC | Age: 68
End: 2018-01-05
Payer: COMMERCIAL

## 2018-01-05 VITALS
HEIGHT: 72 IN | SYSTOLIC BLOOD PRESSURE: 139 MMHG | BODY MASS INDEX: 37.52 KG/M2 | TEMPERATURE: 97.6 F | RESPIRATION RATE: 18 BRPM | WEIGHT: 277 LBS | HEART RATE: 83 BPM | DIASTOLIC BLOOD PRESSURE: 68 MMHG

## 2018-01-05 DIAGNOSIS — R80.9 PROTEINURIA, UNSPECIFIED TYPE: ICD-10-CM

## 2018-01-05 DIAGNOSIS — I10 HTN, GOAL BELOW 130/80: ICD-10-CM

## 2018-01-05 PROCEDURE — 99213 OFFICE O/P EST LOW 20 MIN: CPT | Performed by: FAMILY MEDICINE

## 2018-01-05 RX ORDER — VALSARTAN 80 MG/1
80 TABLET ORAL DAILY
Qty: 90 TABLET | Refills: 3 | Status: SHIPPED | OUTPATIENT
Start: 2018-01-05 | End: 2018-04-12

## 2018-01-05 NOTE — PATIENT INSTRUCTIONS
Thank you for choosing [facility].  You may be receiving a survey in the mail from Niki Shen regarding your visit today.  Please take a few minutes to complete and return the survey to let us know how we are doing.      If you have questions or concerns, please contact us via [employer] or you can contact your care team at [phone].    Our Clinic hours are:  Monday 6:40 am  to 7:00 pm  Tuesday -Friday 6:40 am to 5:00 pm    The [place] outpatient lab hours are:  Monday - Friday 6:10 am to 4:45 pm  Saturdays 7:00 am to 11:00 am  Appointments are required, call [phone]    If you have clinical questions after hours or would like to schedule an appointment,  call the clinic at [phone].

## 2018-01-05 NOTE — MR AVS SNAPSHOT
After Visit Summary   1/5/2018    Francois Lujan    MRN: [id number]           Patient Information     Date Of Birth          1950        Visit Information        Provider Department      1/5/2018 9:40 AM Karen Ivory MD [facility]        Today's Diagnoses     HTN, goal below 130/80        Proteinuria, unspecified type          Care Instructions    Thank you for choosing [facility].  You may be receiving a survey in the mail from [facility] regarding your visit today.  Please take a few minutes to complete and return the survey to let us know how we are doing.      If you have questions or concerns, please contact us via [employer] or you can contact your care team at [phone].    Our Clinic hours are:  Monday 6:40 am  to 7:00 pm  Tuesday -Friday 6:40 am to 5:00 pm    The [place] outpatient lab hours are:  Monday - Friday 6:10 am to 4:45 pm  Saturdays 7:00 am to 11:00 am  Appointments are required, call [phone]    If you have clinical questions after hours or would like to schedule an appointment,  call the clinic at [phone].              Follow-ups after your visit        Your next 10 appointments already scheduled     Jan 05, 2018  9:40 AM CST   SHORT with Karen Ivory MD   [facility]    [address] [address] [phone]              Who to contact     If you have questions or need follow up information about today's clinic visit or your schedule please contact [facility] directly at [phone].  Normal or non-critical lab and imaging results will be communicated to you by MyChart, letter or phone within 4 business days after the clinic has received the results. If you do not hear from us within 7 days, please contact the clinic through MyChart or phone. If you have a critical or abnormal lab result, we will notify you by phone as soon as possible.  Submit refill  "requests through [employer] or call your pharmacy and they will forward the refill request to us. Please allow 3 business days for your refill to be completed.          Additional Information About Your Visit        [employer] Information     [employer] lets you send messages to your doctor, view your test results, renew your prescriptions, schedule appointments and more. To sign up, go to [email or web address] . Click on \"Log in\" on the left side of the screen, which will take you to the Welcome page. Then click on \"Sign up Now\" on the right side of the page.     You will be asked to enter the access code listed below, as well as some personal information. Please follow the directions to create your username and password.     Your access code is: [email or web address]  Expires: 3/14/2018  8:43 AM     Your access code will  in 90 days. If you need help or a new code, please call your [place] clinic or [phone].        Care EveryWhere ID     This is your Care EveryWhere ID. This could be used by other organizations to access your [place] medical records  [id number]        Your Vitals Were     Pulse Temperature Respirations Height BMI (Body Mass Index)       83 97.6  F (36.4  C) (Tympanic) 18 6' (1.829 m) 37.57 kg/m2        Blood Pressure from Last 3 Encounters:   18 154/71   17 160/80   17 125/69    Weight from Last 3 Encounters:   18 277 lb (125.6 kg)   17 275 lb (124.7 kg)   17 279 lb (126.6 kg)              Today, you had the following     No orders found for display         Today's Medication Changes          These changes are accurate as of: 18  9:32 AM.  If you have any questions, ask your nurse or doctor.               These medicines have changed or have updated prescriptions.        Dose/Directions    valsartan 80 MG tablet   Commonly known as:  DIOVAN   This may have changed:  how much to take   Used for:  HTN, goal below 130/80, Proteinuria, unspecified type   Changed " by:  Karen Ivory MD        Dose:  80 mg   Take 1 tablet (80 mg) by mouth daily   Quantity:  90 tablet   Refills:  3         Stop taking these medicines if you haven't already. Please contact your care team if you have questions.     ACE/ARB/ARNI NOT PRESCRIBED (INTENTIONAL)   Stopped by:  Karen Ivory MD                Where to get your medicines      These medications were sent to [address] [address]     Phone:  [phone]     valsartan 80 MG tablet                Primary Care Provider Office Phone # Fax #    Karen Ivory [phone] [address] [address]        Equal Access to Services     KAYLAH ZAZUETA AH: Hadii jovita bennett hadasho Soomaali, waaxda luqadaha, qaybta kaalmada adeegyada, luly davalos haymodesta rai . So [facility] [phone].    ATENCIÓN: Si habla español, tiene a birch disposición servicios gratuitos de asistencia lingüística. Llame al [phone].    We comply with applicable federal civil rights laws and [place] laws. We do not discriminate on the basis of race, color, national origin, age, disability, sex, sexual orientation, or gender identity.            Thank you!     Thank you for choosing [facility]  for your care. Our goal is always to provide you with excellent care. Hearing back from our patients is one way we can continue to improve our services. Please take a few minutes to complete the written survey that you may receive in the mail after your visit with us. Thank you!             Your Updated Medication List - Protect others around you: Learn how to safely use, store and throw away your medicines at www.disposemymeds.org.          This list is accurate as of: 1/5/18  9:32 AM.  Always use your most recent med list.                   Brand Name Dispense Instructions for use Diagnosis    amLODIPine 10 MG tablet    NORVASC    90  tablet    Take 1 tablet (10 mg) by mouth daily    HTN, goal below 140/90       ASPIRIN PO      Take 81 mg by mouth daily        atorvastatin 40 MG tablet    LIPITOR    90 tablet    Take 1 tablet (40 mg) by mouth daily    Hyperlipidemia LDL goal <70       blood glucose lancets standard    no brand specified    1 Box    Use to test blood sugar 3 times daily or as directed.        blood glucose monitoring test strip    ACCU-CHEK SMARTVIEW    100 each    Use to test blood sugar 3 times daily or as directed.    Type 2 diabetes mellitus with complication, with long-term current use of insulin (H)       glipiZIDE 10 MG 24 hr tablet    GLUCOTROL XL    180 tablet    Take 2 tablets (20 mg) by mouth daily (with breakfast)        indomethacin 25 MG capsule    INDOCIN    42 capsule    Take 1 capsule (25 mg) by mouth 2 times daily (with meals)    Acute gouty arthritis       levothyroxine 50 MCG tablet    SYNTHROID    90 tablet    Take 1 tablet (50 mcg) by mouth daily    Acquired hypothyroidism       metFORMIN 500 MG tablet    GLUCOPHAGE    180 tablet    Take 2 tablets (1,000 mg) by mouth 2 times daily (with meals) (Needs follow-up appointment for this medication)        metoprolol 50 MG tablet    LOPRESSOR    180 tablet    Take 1 tablet (50 mg) by mouth 2 times daily    HTN, goal below 140/90       testosterone 50 MG/5GM (1%) topical gel    ANDROGEL    90 packet    Place 1 packet (50 mg) onto the skin daily    Hypogonadism male       valsartan 80 MG tablet    DIOVAN    90 tablet    Take 1 tablet (80 mg) by mouth daily    HTN, goal below 130/80, Proteinuria, unspecified type

## 2018-01-05 NOTE — PROGRESS NOTES
SUBJECTIVE:                                                    Francois Lujan is 67 year old male   Chief Complaint   Patient presents with     Hypertension     Hypertension Follow-up      Outpatient blood pressures are not being checked.    Low Salt Diet: No, pt claims he doesn't pay attention to that.         Amount of exercise or physical activity: None    Problems taking medications regularly: No    Medication side effects: none    Diet: regular (no restrictions)        Problem list and histories reviewed & adjusted, as indicated.  Additional history: as documented  Patient Active Problem List   Diagnosis     S/P CABG (coronary artery bypass graft)     Hypogonadotropic hypogonadism (H)     Hypothyroidism     Hard of hearing     Hyperlipidemia LDL goal <70     ADELA (obstructive sleep apnea)     Hypertension goal BP (blood pressure) < 140/90     Numbness of hand, right     Health Care Home     Radicular pain of lumbosacral region     Moderate major depression (H)     Elevated sed rate     Elevated C-reactive protein (CRP)     History of back surgery     Polymyalgia rheumatica (H)     Multiple joint pain     OA (osteoarthritis) of knee, right     ACL (anterior cruciate ligament) tear     HTN, goal below 140/90     Necrobiosis lipoidica     Type 2 diabetes mellitus with complication (H)     Type 2 diabetes mellitus with other skin complications     CKD (chronic kidney disease) stage 2, GFR 60-89 ml/min     Proteinuria     Type 2 diabetes mellitus with other diabetic kidney complication     Diabetic polyneuropathy associated with type 2 diabetes mellitus (H)     Type 2 diabetes mellitus with other circulatory complications     Polyneuropathy associated with underlying disease (H)     Alcohol dependence with other alcohol-induced disorder (H)     Coronary artery disease involving native coronary artery of native heart without angina pectoris     Triceps tendon rupture, left, sequela     Other secondary gout, multiple  sites, unspecified chronicity     Proteinuria, unspecified type     Epistaxis     Acute gouty arthritis     Type 2 diabetes mellitus with complication, without long-term current use of insulin (H)     Past Surgical History:   Procedure Laterality Date     BACK SURGERY       BYPASS GRAFT ARTERY CORONARY  11/17/2011    Procedure:BYPASS GRAFT ARTERY CORONARY; CORONARY ARTERY BYPASS, Right, OM, LAD WITH ENDOVEIN HARVEST, ON PUMP; Surgeon:LEONEL MG; Location: OR     COLONOSCOPY N/A 6/9/2016    Procedure: COLONOSCOPY;  Surgeon: Isidro Humphreys MD;  Location: [facility]     HERNIA REPAIR      infantile hernia repair     ORTHOPEDIC SURGERY      Baker cyst removed - right knee, and mesiscus tear repair     REPAIR TENDON TRICEPS UPPER EXTREMITY Left 4/11/2017    Procedure: REPAIR TENDON TRICEPS UPPER EXTREMITY;  Surgeon: Rodriguez Kelley MD;  Location: [facility]       Social History   Substance Use Topics     Smoking status: Former Smoker     Packs/day: 2.00     Years: 30.00     Quit date: 11/13/1997     Smokeless tobacco: Never Used     Alcohol use 6.0 oz/week     12 Cans of beer per week      Comment: occassionally      Family History   Problem Relation Age of Onset     HEART DISEASE Father      CANCER Mother      C.A.D. Brother      SEPTICEMIA Brother 3     Lung Cancer Sister      Schizophrenia Sister          Current Outpatient Prescriptions   Medication Sig Dispense Refill     valsartan (DIOVAN) 80 MG tablet Take 1 tablet (80 mg) by mouth daily 90 tablet 3     indomethacin (INDOCIN) 25 MG capsule Take 1 capsule (25 mg) by mouth 2 times daily (with meals) 42 capsule 1     blood glucose monitoring (ACCU-CHEK SMARTVIEW) test strip Use to test blood sugar 3 times daily or as directed. 100 each 12     ASPIRIN PO Take 81 mg by mouth daily       amLODIPine (NORVASC) 10 MG tablet Take 1 tablet (10 mg) by mouth daily 90 tablet 3     metFORMIN (GLUCOPHAGE) 500 MG tablet Take 2 tablets (1,000 mg) by mouth 2 times daily (with  meals) (Needs follow-up appointment for this medication) 180 tablet 3     glipiZIDE (GLUCOTROL XL) 10 MG 24 hr tablet Take 2 tablets (20 mg) by mouth daily (with breakfast) 180 tablet 3     metoprolol (LOPRESSOR) 50 MG tablet Take 1 tablet (50 mg) by mouth 2 times daily 180 tablet 3     levothyroxine (SYNTHROID) 50 MCG tablet Take 1 tablet (50 mcg) by mouth daily 90 tablet 3     atorvastatin (LIPITOR) 40 MG tablet Take 1 tablet (40 mg) by mouth daily 90 tablet 3     blood glucose (NO BRAND SPECIFIED) lancets standard Use to test blood sugar 3 times daily or as directed. 1 Box 3     testosterone (ANDROGEL) 50 MG/5GM (1%) gel Place 1 packet (50 mg) onto the skin daily 90 packet 3     [DISCONTINUED] valsartan (DIOVAN) 80 MG tablet Take 0.5 tablets (40 mg) by mouth daily 45 tablet 3     Allergies   Allergen Reactions     Ivp Dye [Contrast Dye] Hives     Brief bout - 10 minutes duration     Recent Labs   Lab Test  09/21/17   0827  07/28/17   0344  03/03/17   1109  08/30/16   1024  04/11/16   1030  05/29/15   1019  07/16/14   1113   A1C  6.2*   --   7.7*  7.0*  11.2*  7.6*   --    LDL  81   --   113*   --   86  111   --    HDL  66   --   59   --   44   --    --    TRIG  91   --   209*   --   282*   --    --    ALT   --   22   --    --    --   35  19   CR  0.87  1.17  0.98   --   1.08  0.97  0.83   GFRESTIMATED  88  62  76   --   69  78  >90   GFRESTBLACK  >90  75  >90  African American GFR Calc     --   83  >90   GFR Calc    >90   POTASSIUM  4.2  3.9  4.2   --   4.4  4.5  5.5*   TSH  1.39   --   1.42   --   0.84  1.11  0.86      BP Readings from Last 3 Encounters:   01/05/18 139/68   12/27/17 160/80   12/21/17 125/69    Wt Readings from Last 3 Encounters:   01/05/18 277 lb (125.6 kg)   12/27/17 275 lb (124.7 kg)   12/21/17 279 lb (126.6 kg)         ROS:  Constitutional, HEENT, cardiovascular, pulmonary, gi and gu systems are negative, except as otherwise noted.    OBJECTIVE:                                                     /68 (BP Location: Left arm, Patient Position: Chair, Cuff Size: Adult Large)  Pulse 83  Temp 97.6  F (36.4  C) (Tympanic)  Resp 18  Ht 6' (1.829 m)  Wt 277 lb (125.6 kg)  BMI 37.57 kg/m2  GENERAL APPEARANCE ADULT: Alert, no acute distress  PSYCH: mentation appears normal., affect and mood normal  Diagnostic Test Results:  none      ASSESSMENT/PLAN:                                                    1. HTN, goal below 140/90  At goal, intermittently high, will double diovan and recheck in 3-4 weeks.  - valsartan (DIOVAN) 80 MG tablet; Take 1 tablet (80 mg) by mouth daily  Dispense: 90 tablet; Refill: 3    2. Proteinuria, unspecified type  Recheck MAC in March.  - valsartan (DIOVAN) 80 MG tablet; Take 1 tablet (80 mg) by mouth daily  Dispense: 90 tablet; Refill: 3    Karen Ivory MD  [facility]

## 2018-01-05 NOTE — NURSING NOTE
Chief Complaint   Patient presents with     Hypertension       Initial /71 (BP Location: Left arm, Patient Position: Chair, Cuff Size: Adult Large)  Pulse 83  Temp 97.6  F (36.4  C) (Tympanic)  Resp 18  Ht 6' (1.829 m)  Wt 277 lb (125.6 kg)  BMI 37.57 kg/m2 Estimated body mass index is 37.57 kg/(m^2) as calculated from the following:    Height as of this encounter: 6' (1.829 m).    Weight as of this encounter: 277 lb (125.6 kg).  BP completed using cuff size: large      Laura Carrasco, CMA

## 2018-01-19 ENCOUNTER — TRANSFERRED RECORDS (OUTPATIENT)
Dept: HEALTH INFORMATION MANAGEMENT | Facility: CLINIC | Age: 68
End: 2018-01-19

## 2018-02-23 DIAGNOSIS — E78.5 HYPERLIPIDEMIA LDL GOAL <70: ICD-10-CM

## 2018-02-23 RX ORDER — ATORVASTATIN CALCIUM 40 MG/1
40 TABLET, FILM COATED ORAL DAILY
Qty: 90 TABLET | Refills: 1 | Status: SHIPPED | OUTPATIENT
Start: 2018-02-23 | End: 2018-04-12

## 2018-03-06 ENCOUNTER — TELEPHONE (OUTPATIENT)
Dept: FAMILY MEDICINE | Facility: CLINIC | Age: 68
End: 2018-03-06

## 2018-03-06 NOTE — TELEPHONE ENCOUNTER
We sent this patient an overdue lab letter on 1/27/18. If they need these results please contact the patient.  Thank you  Op [facility]

## 2018-03-14 DIAGNOSIS — R80.9 PROTEINURIA: Primary | ICD-10-CM

## 2018-03-14 DIAGNOSIS — E11.8 TYPE 2 DIABETES MELLITUS WITH COMPLICATION (H): ICD-10-CM

## 2018-03-14 NOTE — TELEPHONE ENCOUNTER
"Requested Prescriptions   Pending Prescriptions Disp Refills     metFORMIN (GLUCOPHAGE) 500 MG tablet  Last Written Prescription Date:  03/03/17  Last Fill Quantity: 180,  # refills: 3   Last office visit: 1/5/2018 with prescribing provider:  01/05/18   Future Office Visit:   Next 5 appointments (look out 90 days)     Apr 12, 2018  9:30 AM CDT   [facility] with Chato Davila MD   [facility]    [address] [address]   [phone]               180 tablet 3     Sig: Take 2 tablets (1,000 mg) by mouth 2 times daily (with meals) (Needs follow-up appointment for this medication)    Biguanide Agents Passed    3/14/2018  1:44 PM       Passed - Blood pressure less than 140/90 in past 6 months    BP Readings from Last 3 Encounters:   01/05/18 139/68   12/27/17 160/80   12/21/17 125/69          Passed - Patient has documented LDL within the past 12 mos.    Recent Labs   Lab Test  09/21/17   0827   LDL  81          Passed - Patient has had a Microalbumin in the past 12 mos.    Recent Labs   Lab Test  12/14/17   0900   MICROL  776   UMALCR  1043.01*          Passed - Patient is age 10 or older       Passed - Patient has documented A1c within the specified period of time.    Recent Labs   Lab Test  09/21/17   0827   A1C  6.2*          Passed - Patient's CR is NOT>1.4 OR Patient's EGFR is NOT<45 within past 12 mos.    Recent Labs   Lab Test  09/21/17   0827   GFRESTIMATED  88   GFRESTBLACK  >90     Recent Labs   Lab Test  09/21/17   0827   CR  0.87          Passed - Patient does NOT have a diagnosis of CHF.       Passed - Recent (6 mo) or future (30 days) visit within the authorizing provider's specialty    Patient had office visit in the last 6 months or has a visit in the next 30 days with authorizing provider or within the authorizing provider's specialty.  See \"Patient Info\" tab in inbasket, or \"Choose Columns\" in Meds & Orders section of the " "refill encounter.            glipiZIDE (GLUCOTROL XL) 10 MG 24 hr tablet  Last Written Prescription Date:  03/03/17  Last Fill Quantity: 180,  # refills: 3   Last office visit: 1/5/2018 with prescribing provider:  01/05/18   Future Office Visit:   Next 5 appointments (look out 90 days)     Apr 12, 2018  9:30 AM CDT   [facility] Visit with Chato [facility]ila MD   [facility]    [address] [address]   [phone]               180 tablet 3     Sig: Take 2 tablets (20 mg) by mouth daily (with breakfast)    Sulfonylurea Agents Passed    3/14/2018  1:44 PM       Passed - Blood pressure less than 140/90 in past 6 months    BP Readings from Last 3 Encounters:   01/05/18 139/68   12/27/17 160/80   12/21/17 125/69          Passed - Patient has documented LDL within the past 12 mos.    Recent Labs   Lab Test  09/21/17   0827   LDL  81          Passed - Patient has had a Microalbumin in the past 12 mos.    Recent Labs   Lab Test  12/14/17   0900   MICROL  776   UMALCR  1043.01*          Passed - Patient has documented A1c within the specified period of time.    Recent Labs   Lab Test  09/21/17   0827   A1C  6.2*          Passed - Patient is age 18 or older       Passed - Patient has a recent creatinine (normal) within the past 12 mos.    Recent Labs   Lab Test  09/21/17   0827   CR  0.87          Passed - Recent (6 mo) or future (30 days) visit within the authorizing provider's specialty    Patient had office visit in the last 6 months or has a visit in the next 30 days with authorizing provider or within the authorizing provider's specialty.  See \"Patient Info\" tab in inbasket, or \"Choose Columns\" in Meds & Orders section of the refill encounter.              "

## 2018-03-14 NOTE — LETTER
[facility]  [address]  [place] [address]  Phone: [phone]/ [phone] for lab appointment    March 16, 2018    Francois Lujan                                                                                                                   [address] [place] [address]            Dear Mr. Lujan,    We are concerned about your health care.  We recently provided you with a medication refill.  Many medications require routine follow-up with your Doctor.      At this time we ask that: You make an appointment at Out patient lab clinic for routine labs for medication monitoring. You are due now to have a repeat Urine albumin (protein) level and a Hemoglobin A1c level rechecked.      Your prescription: Has been refilled so you may have time for the above noted follow-up.  Please have lab work drawn prior to needing your next refill of medication.       Thank you,      Karen Ivory MD / [facility]

## 2018-03-16 RX ORDER — GLIPIZIDE 10 MG/1
20 TABLET, FILM COATED, EXTENDED RELEASE ORAL
Qty: 180 TABLET | Refills: 0 | Status: SHIPPED | OUTPATIENT
Start: 2018-03-16 | End: 2018-04-12

## 2018-04-10 DIAGNOSIS — E11.8 TYPE 2 DIABETES MELLITUS WITH COMPLICATION (H): ICD-10-CM

## 2018-04-10 DIAGNOSIS — R80.9 PROTEINURIA: ICD-10-CM

## 2018-04-10 DIAGNOSIS — E23.0 HYPOGONADOTROPIC HYPOGONADISM (H): ICD-10-CM

## 2018-04-10 LAB
BASOPHILS # BLD AUTO: 0 10E9/L (ref 0–0.2)
BASOPHILS NFR BLD AUTO: 0.3 %
CREAT UR-MCNC: 64 MG/DL
DIFFERENTIAL METHOD BLD: ABNORMAL
EOSINOPHIL # BLD AUTO: 0.3 10E9/L (ref 0–0.7)
EOSINOPHIL NFR BLD AUTO: 3.1 %
ERYTHROCYTE [DISTWIDTH] IN BLOOD BY AUTOMATED COUNT: 12.7 % (ref 10–15)
HBA1C MFR BLD: 8.4 % (ref 0–6.4)
HCT VFR BLD AUTO: 34.7 % (ref 40–53)
HGB BLD-MCNC: 12 G/DL (ref 13.3–17.7)
LYMPHOCYTES # BLD AUTO: 3.7 10E9/L (ref 0.8–5.3)
LYMPHOCYTES NFR BLD AUTO: 39.3 %
MCH RBC QN AUTO: 32.4 PG (ref 26.5–33)
MCHC RBC AUTO-ENTMCNC: 34.6 G/DL (ref 31.5–36.5)
MCV RBC AUTO: 94 FL (ref 78–100)
MICROALBUMIN UR-MCNC: 655 MG/L
MICROALBUMIN/CREAT UR: 1018.66 MG/G CR (ref 0–17)
MONOCYTES # BLD AUTO: 0.8 10E9/L (ref 0–1.3)
MONOCYTES NFR BLD AUTO: 7.9 %
NEUTROPHILS # BLD AUTO: 4.7 10E9/L (ref 1.6–8.3)
NEUTROPHILS NFR BLD AUTO: 49.4 %
PLATELET # BLD AUTO: 264 10E9/L (ref 150–450)
RBC # BLD AUTO: 3.7 10E12/L (ref 4.4–5.9)
WBC # BLD AUTO: 9.5 10E9/L (ref 4–11)

## 2018-04-10 PROCEDURE — 82043 UR ALBUMIN QUANTITATIVE: CPT | Performed by: FAMILY MEDICINE

## 2018-04-10 PROCEDURE — 84270 ASSAY OF SEX HORMONE GLOBUL: CPT | Performed by: UROLOGY

## 2018-04-10 PROCEDURE — 83036 HEMOGLOBIN GLYCOSYLATED A1C: CPT | Performed by: UROLOGY

## 2018-04-10 PROCEDURE — 85025 COMPLETE CBC W/AUTO DIFF WBC: CPT | Performed by: UROLOGY

## 2018-04-10 PROCEDURE — 84403 ASSAY OF TOTAL TESTOSTERONE: CPT | Performed by: UROLOGY

## 2018-04-10 PROCEDURE — 36415 COLL VENOUS BLD VENIPUNCTURE: CPT | Performed by: UROLOGY

## 2018-04-10 NOTE — PROGRESS NOTES
A1C is too high, need to see provider for treatment.  Please notify.        Thank you. FILEMON RYAN MD

## 2018-04-12 ENCOUNTER — OFFICE VISIT (OUTPATIENT)
Dept: UROLOGY | Facility: CLINIC | Age: 68
End: 2018-04-12
Payer: COMMERCIAL

## 2018-04-12 ENCOUNTER — OFFICE VISIT (OUTPATIENT)
Dept: FAMILY MEDICINE | Facility: CLINIC | Age: 68
End: 2018-04-12
Payer: COMMERCIAL

## 2018-04-12 ENCOUNTER — TELEPHONE (OUTPATIENT)
Dept: UROLOGY | Facility: CLINIC | Age: 68
End: 2018-04-12

## 2018-04-12 VITALS
DIASTOLIC BLOOD PRESSURE: 75 MMHG | TEMPERATURE: 98.7 F | WEIGHT: 278 LBS | HEIGHT: 72 IN | HEART RATE: 73 BPM | SYSTOLIC BLOOD PRESSURE: 123 MMHG | BODY MASS INDEX: 37.65 KG/M2

## 2018-04-12 VITALS
RESPIRATION RATE: 14 BRPM | SYSTOLIC BLOOD PRESSURE: 150 MMHG | DIASTOLIC BLOOD PRESSURE: 70 MMHG | WEIGHT: 277 LBS | HEIGHT: 72 IN | BODY MASS INDEX: 37.52 KG/M2 | OXYGEN SATURATION: 97 % | HEART RATE: 70 BPM | TEMPERATURE: 97.6 F

## 2018-04-12 DIAGNOSIS — M10.9 ACUTE GOUTY ARTHRITIS: ICD-10-CM

## 2018-04-12 DIAGNOSIS — E03.9 ACQUIRED HYPOTHYROIDISM: ICD-10-CM

## 2018-04-12 DIAGNOSIS — E29.1 HYPOGONADISM MALE: Primary | ICD-10-CM

## 2018-04-12 DIAGNOSIS — E11.8 TYPE 2 DIABETES MELLITUS WITH COMPLICATION, WITHOUT LONG-TERM CURRENT USE OF INSULIN (H): ICD-10-CM

## 2018-04-12 DIAGNOSIS — F10.288 ALCOHOL DEPENDENCE WITH OTHER ALCOHOL-INDUCED DISORDER (H): ICD-10-CM

## 2018-04-12 DIAGNOSIS — E78.5 HYPERLIPIDEMIA LDL GOAL <70: ICD-10-CM

## 2018-04-12 DIAGNOSIS — F32.1 MODERATE MAJOR DEPRESSION (H): Primary | ICD-10-CM

## 2018-04-12 DIAGNOSIS — I10 HTN, GOAL BELOW 140/90: ICD-10-CM

## 2018-04-12 DIAGNOSIS — E11.42 DIABETIC POLYNEUROPATHY ASSOCIATED WITH TYPE 2 DIABETES MELLITUS (H): ICD-10-CM

## 2018-04-12 DIAGNOSIS — R80.1 PERSISTENT PROTEINURIA: ICD-10-CM

## 2018-04-12 LAB
PSA SERPL-MCNC: 0.08 UG/L (ref 0–4)
SHBG SERPL-SCNC: 42 NMOL/L (ref 11–80)
TESTOST FREE SERPL-MCNC: 3.4 NG/DL (ref 4.7–24.4)
TESTOST SERPL-MCNC: 208 NG/DL (ref 240–950)

## 2018-04-12 PROCEDURE — 99214 OFFICE O/P EST MOD 30 MIN: CPT | Performed by: FAMILY MEDICINE

## 2018-04-12 PROCEDURE — 36415 COLL VENOUS BLD VENIPUNCTURE: CPT | Performed by: UROLOGY

## 2018-04-12 PROCEDURE — 84153 ASSAY OF PSA TOTAL: CPT | Performed by: UROLOGY

## 2018-04-12 PROCEDURE — 99213 OFFICE O/P EST LOW 20 MIN: CPT | Performed by: UROLOGY

## 2018-04-12 RX ORDER — METOPROLOL TARTRATE 50 MG
50 TABLET ORAL 2 TIMES DAILY
Qty: 180 TABLET | Refills: 3 | Status: SHIPPED | OUTPATIENT
Start: 2018-04-12 | End: 2019-02-19

## 2018-04-12 RX ORDER — INDOMETHACIN 50 MG/1
50 CAPSULE ORAL
Qty: 42 CAPSULE | Refills: 1 | Status: SHIPPED | OUTPATIENT
Start: 2018-04-12 | End: 2018-11-20

## 2018-04-12 RX ORDER — GLIPIZIDE 10 MG/1
20 TABLET, FILM COATED, EXTENDED RELEASE ORAL
Qty: 180 TABLET | Refills: 3 | Status: SHIPPED | OUTPATIENT
Start: 2018-04-12 | End: 2019-02-19

## 2018-04-12 RX ORDER — AMLODIPINE BESYLATE 10 MG/1
10 TABLET ORAL DAILY
Qty: 90 TABLET | Refills: 3 | Status: SHIPPED | OUTPATIENT
Start: 2018-04-12 | End: 2019-02-19

## 2018-04-12 RX ORDER — LEVOTHYROXINE SODIUM 50 UG/1
50 TABLET ORAL DAILY
Qty: 90 TABLET | Refills: 3 | Status: SHIPPED | OUTPATIENT
Start: 2018-04-12 | End: 2019-02-19

## 2018-04-12 RX ORDER — ATORVASTATIN CALCIUM 40 MG/1
40 TABLET, FILM COATED ORAL DAILY
Qty: 90 TABLET | Refills: 3 | Status: SHIPPED | OUTPATIENT
Start: 2018-04-12 | End: 2019-02-19

## 2018-04-12 RX ORDER — TESTOSTERONE 12.5 MG/1.25G
50 GEL TOPICAL DAILY
Qty: 90 PACKET | Refills: 3 | Status: SHIPPED | OUTPATIENT
Start: 2018-04-12 | End: 2018-11-12

## 2018-04-12 ASSESSMENT — PAIN SCALES - GENERAL: PAINLEVEL: MILD PAIN (2)

## 2018-04-12 NOTE — PROGRESS NOTES
Visit Date:   2018      DATE OF VISIT:  2018      REASON FOR VISIT TODAY:  Hypogonadism.      BRIEF COURSE:  Mr. Lujan is a 67-year-old gentleman followed in our clinic for hypogonadism secondary to mumps orchitis as a child.  The patient has been on testosterone replacement since .  He currently uses AndroGel 150 mg packet daily.  He has been on this dose since roughly .  The patient comes in today noting overall he does continue to do well.  He is currently managing a gout flare, but notes no symptoms of hypogonadism at this time.      PHYSICAL EXAMINATION:     VITAL SIGNS:  His blood pressure is 150/70, pulse is 70.   GENERAL:  He is in no acute distress.      The patient has hemoglobin from 04/10/2018 which was 12. PSA from 2015 was 0.09.  The patient declined a digital rectal exam today.      ASSESSMENT AND PLAN:  Over half of today's 15-minute visit was spent counseling the patient regarding his hypogonadism.  I suggested to Mr. Lujan his testosterone which was drawn a couple days ago is still pending.  We will also ask the patient to get PSA drawn today as it has been again several years since his last PSA.  We will have the patient call us back next week for results of his testosterone and PSA.  Otherwise, I encouraged the patient to follow up with his primary for workup of anemia, which he is already in the process of doing.  The patient was given a renewal for his prescription of testosterone and again 50 mg packet of gel daily.         DAMIAN MARCELO MD             D: 2018   T: 2018   MT: RHONDA      Name:     ANGELA LUJAN   MRN:      [id number]        Account:      [id number]   :      1950           Visit Date:   2018      Document: [id number]

## 2018-04-12 NOTE — NURSING NOTE
Chief Complaint   Patient presents with     Recheck Medication     testosterone       Initial /70 (BP Location: Right arm, Patient Position: Chair, Cuff Size: Adult Regular)  Pulse 70  Temp 97.6  F (36.4  C)  Resp 14  Ht 1.829 m (6')  Wt 125.6 kg (277 lb)  SpO2 97%  BMI 37.57 kg/m2 Estimated body mass index is 37.57 kg/(m^2) as calculated from the following:    Height as of this encounter: 1.829 m (6').    Weight as of this encounter: 125.6 kg (277 lb).  Medication Reconciliation: complete     Aviva Couch MA

## 2018-04-12 NOTE — PATIENT INSTRUCTIONS
If you have questions or concerns on any instructions given to you by your provider today or if you need to schedule an appointment, you can reach us at [phone].

## 2018-04-12 NOTE — NURSING NOTE
Initial /75  Pulse 73  Temp 98.7  F (37.1  C) (Tympanic)  Ht 6' (1.829 m)  Wt 278 lb (126.1 kg)  BMI 37.7 kg/m2 Estimated body mass index is 37.7 kg/(m^2) as calculated from the following:    Height as of this encounter: 6' (1.829 m).    Weight as of this encounter: 278 lb (126.1 kg). .

## 2018-04-12 NOTE — TELEPHONE ENCOUNTER
Prior Authorization Retail Medication Request    Medication/Dose: testosterone  ICD code (if different than what is on RX):  Hypogonadism male [E29.1]  Previously Tried and Failed:    Rationale:      Insurance Name:     Insurance ID:  [id number]      Pharmacy Information (if different than what is on RX)  Name:  [facility]  Phone:  [phone]

## 2018-04-12 NOTE — MR AVS SNAPSHOT
"              After Visit Summary   4/12/2018    Francois Lujan    MRN: [id number]           Patient Information     Date Of Birth          1950        Visit Information        Provider Department      4/12/2018 1:00 PM Karen Ivory MD [facility]        Today's Diagnoses     Moderate major depression (H)    -  1    Type 2 diabetes mellitus with complication, without long-term current use of insulin (H)        Persistent proteinuria        Diabetic polyneuropathy associated with type 2 diabetes mellitus (H)        Acute gouty arthritis        Alcohol dependence with other alcohol-induced disorder (H)        Hyperlipidemia LDL goal <70        HTN, goal below 140/90        Acquired hypothyroidism           Follow-ups after your visit        Who to contact     If you have questions or need follow up information about today's clinic visit or your schedule please contact [facility] directly at [phone].  Normal or non-critical lab and imaging results will be communicated to you by PayStandhart, letter or phone within 4 business days after the clinic has received the results. If you do not hear from us within 7 days, please contact the clinic through PayStandhart or phone. If you have a critical or abnormal lab result, we will notify you by phone as soon as possible.  Submit refill requests through brettapproved or call your pharmacy and they will forward the refill request to us. Please allow 3 business days for your refill to be completed.          Additional Information About Your Visit        MyChart Information     brettapproved lets you send messages to your doctor, view your test results, renew your prescriptions, schedule appointments and more. To sign up, go to [email or web address] . Click on \"Log in\" on the left side of the screen, which will take you to the Welcome page. Then click on \"Sign up Now\" on the right side of the page.     You will be asked to enter the access code listed " below, as well as some personal information. Please follow the directions to create your username and password.     Your access code is: [email or web address]  Expires: 2018  1:09 PM     Your access code will  in 90 days. If you need help or a new code, please call your [facility] clinic or [phone].        Care EveryWhere ID     This is your Care EveryWhere ID. This could be used by other organizations to access your [facility] medical records  [id number]        Your Vitals Were     Pulse Temperature Height BMI (Body Mass Index)          73 98.7  F (37.1  C) (Tympanic) 6' (1.829 m) 37.7 kg/m2         Blood Pressure from Last 3 Encounters:   18 123/75   18 150/70   18 139/68    Weight from Last 3 Encounters:   18 278 lb (126.1 kg)   18 277 lb (125.6 kg)   18 277 lb (125.6 kg)              We Performed the Following     DEPRESSION ACTION PLAN (DAP)          Today's Medication Changes          These changes are accurate as of 18  1:09 PM.  If you have any questions, ask your nurse or doctor.               These medicines have changed or have updated prescriptions.        Dose/Directions    * indomethacin 25 MG capsule   Commonly known as:  INDOCIN   This may have changed:  Another medication with the same name was added. Make sure you understand how and when to take each.   Used for:  Acute gouty arthritis   Changed by:  Karen Ivory MD        Dose:  25 mg   Take 1 capsule (25 mg) by mouth 2 times daily (with meals)   Quantity:  42 capsule   Refills:  1       * indomethacin 50 MG capsule   Commonly known as:  INDOCIN   This may have changed:  You were already taking a medication with the same name, and this prescription was added. Make sure you understand how and when to take each.   Used for:  Acute gouty arthritis   Changed by:  Karen Ivory MD        Dose:  50 mg   Take 1 capsule (50 mg) by mouth 3 times daily (with meals)   Quantity:  42 capsule    Refills:  1       metFORMIN 500 MG tablet   Commonly known as:  GLUCOPHAGE   This may have changed:  additional instructions   Used for:  Type 2 diabetes mellitus with complication, without long-term current use of insulin (H)   Changed by:  Karen Ivory MD        Dose:  1000 mg   Take 2 tablets (1,000 mg) by mouth 2 times daily (with meals)   Quantity:  360 tablet   Refills:  3       * Notice:  This list has 2 medication(s) that are the same as other medications prescribed for you. Read the directions carefully, and ask your doctor or other care provider to review them with you.      Stop taking these medicines if you haven't already. Please contact your care team if you have questions.     valsartan 80 MG tablet   Commonly known as:  DIOVAN   Stopped by:  Chato Davila MD                Where to get your medicines      These medications were sent to [address] [address]     Phone:  [phone]     amLODIPine 10 MG tablet    atorvastatin 40 MG tablet    glipiZIDE 10 MG 24 hr tablet    indomethacin 50 MG capsule    levothyroxine 50 MCG tablet    metFORMIN 500 MG tablet    metoprolol tartrate 50 MG tablet         Some of these will need a paper prescription and others can be bought over the counter.  Ask your nurse if you have questions.     Bring a paper prescription for each of these medications     testosterone 50 MG/5GM (1%) topical gel                Primary Care Provider Office Phone # Fax #    Karen Ivory [phone] [address] [facility] [address]        Equal Access to Services     [facility]: Hadii aad ku hadasho Sodaya, waaxda luqadaha, qaybta kaalmada adeegyada, luly lam. So [facility] [phone].    ATENCIÓN: Si habla español, tiene a birch disposición servicios gratuitos de asistencia lingüística. Llame al [phone].    We comply with  applicable federal civil rights laws and [place] laws. We do not discriminate on the basis of race, color, national origin, age, disability, sex, sexual orientation, or gender identity.            Thank you!     Thank you for choosing [facility]  for your care. Our goal is always to provide you with excellent care. Hearing back from our patients is one way we can continue to improve our services. Please take a few minutes to complete the written survey that you may receive in the mail after your visit with us. Thank you!             Your Updated Medication List - Protect others around you: Learn how to safely use, store and throw away your medicines at www.disposemymeds.org.          This list is accurate as of 4/12/18  1:09 PM.  Always use your most recent med list.                   Brand Name Dispense Instructions for use Diagnosis    amLODIPine 10 MG tablet    NORVASC    90 tablet    Take 1 tablet (10 mg) by mouth daily    HTN, goal below 140/90       ASPIRIN PO      Take 81 mg by mouth daily        atorvastatin 40 MG tablet    LIPITOR    90 tablet    Take 1 tablet (40 mg) by mouth daily    Hyperlipidemia LDL goal <70       blood glucose lancets standard    no brand specified    1 Box    Use to test blood sugar 3 times daily or as directed.        blood glucose monitoring test strip    ACCU-CHEK SMARTVIEW    100 each    Use to test blood sugar 3 times daily or as directed.    Type 2 diabetes mellitus with complication, with long-term current use of insulin (H)       glipiZIDE 10 MG 24 hr tablet    GLUCOTROL XL    180 tablet    Take 2 tablets (20 mg) by mouth daily (with breakfast)    Type 2 diabetes mellitus with complication, without long-term current use of insulin (H)       * indomethacin 25 MG capsule    INDOCIN    42 capsule    Take 1 capsule (25 mg) by mouth 2 times daily (with meals)    Acute gouty arthritis       * indomethacin 50 MG capsule    INDOCIN    42 capsule    Take 1 capsule (50  mg) by mouth 3 times daily (with meals)    Acute gouty arthritis       levothyroxine 50 MCG tablet    SYNTHROID    90 tablet    Take 1 tablet (50 mcg) by mouth daily    Acquired hypothyroidism       metFORMIN 500 MG tablet    GLUCOPHAGE    360 tablet    Take 2 tablets (1,000 mg) by mouth 2 times daily (with meals)    Type 2 diabetes mellitus with complication, without long-term current use of insulin (H)       metoprolol tartrate 50 MG tablet    LOPRESSOR    180 tablet    Take 1 tablet (50 mg) by mouth 2 times daily    HTN, goal below 140/90       testosterone 50 MG/5GM (1%) topical gel    ANDROGEL    90 packet    Place 1 packet (50 mg) onto the skin daily    Hypogonadism male       * Notice:  This list has 2 medication(s) that are the same as other medications prescribed for you. Read the directions carefully, and ask your doctor or other care provider to review them with you.

## 2018-04-12 NOTE — PROGRESS NOTES
SUBJECTIVE:                                                    Francois Lujan is 67 year old male   Chief Complaint   Patient presents with     Diabetes     Elevated A1C and Albumin levels, labs obtained 4/10/18. Was instructed to follow up to discuss treatment. States blood sugar levels have been in the 150-160 range when checked in the morning. States that it has gone up since decreaseing alcohol intake due to gout.     Arthritis     Gout flair 2-3 weeks in left foot. Requesting allopurinal. Minimal amoutn of pain now.  Has decreased alcohol intake         Problem list and histories reviewed & adjusted, as indicated.  Additional history: as documented    Patient Active Problem List   Diagnosis     S/P CABG (coronary artery bypass graft)     Hypogonadotropic hypogonadism (H)     Hypothyroidism     Hard of hearing     Hyperlipidemia LDL goal <70     ADELA (obstructive sleep apnea)     Hypertension goal BP (blood pressure) < 140/90     Numbness of hand, right     Health Care Home     Radicular pain of lumbosacral region     Moderate major depression (H)     Elevated sed rate     Elevated C-reactive protein (CRP)     History of back surgery     Polymyalgia rheumatica (H)     Multiple joint pain     OA (osteoarthritis) of knee, right     ACL (anterior cruciate ligament) tear     HTN, goal below 140/90     Necrobiosis lipoidica     Type 2 diabetes mellitus with complication (H)     Type 2 diabetes mellitus with other skin complications     CKD (chronic kidney disease) stage 2, GFR 60-89 ml/min     Proteinuria     Type 2 diabetes mellitus with other diabetic kidney complication     Diabetic polyneuropathy associated with type 2 diabetes mellitus (H)     Type 2 diabetes mellitus with other circulatory complications     Polyneuropathy associated with underlying disease (H)     Alcohol dependence with other alcohol-induced disorder (H)     Coronary artery disease involving native coronary artery of native heart without  angina pectoris     Triceps tendon rupture, left, sequela     Other secondary gout, multiple sites, unspecified chronicity     Proteinuria, unspecified type     Epistaxis     Acute gouty arthritis     Type 2 diabetes mellitus with complication, without long-term current use of insulin (H)     Past Surgical History:   Procedure Laterality Date     BACK SURGERY       BYPASS GRAFT ARTERY CORONARY  11/17/2011    Procedure:BYPASS GRAFT ARTERY CORONARY; CORONARY ARTERY BYPASS, Right, OM, LAD WITH ENDOVEIN HARVEST, ON PUMP; Surgeon:LEONEL MG; Location: OR     COLONOSCOPY N/A 6/9/2016    Procedure: COLONOSCOPY;  Surgeon: Isidro Humphreys MD;  Location: [facility]     HERNIA REPAIR      infantile hernia repair     ORTHOPEDIC SURGERY      Baker cyst removed - right knee, and mesiscus tear repair     REPAIR TENDON TRICEPS UPPER EXTREMITY Left 4/11/2017    Procedure: REPAIR TENDON TRICEPS UPPER EXTREMITY;  Surgeon: Rodriguez Kelley MD;  Location: [facility]       Social History   Substance Use Topics     Smoking status: Former Smoker     Packs/day: 2.00     Years: 30.00     Quit date: 11/13/1997     Smokeless tobacco: Never Used     Alcohol use 6.0 oz/week     12 Cans of beer per week      Comment: occassionally      Family History   Problem Relation Age of Onset     HEART DISEASE Father      CANCER Mother      C.A.D. Brother      SEPTICEMIA Brother 3     Lung Cancer Sister      Schizophrenia Sister          Current Outpatient Prescriptions   Medication Sig Dispense Refill     testosterone (ANDROGEL) 50 MG/5GM (1%) topical gel Place 1 packet (50 mg) onto the skin daily 90 packet 3     metFORMIN (GLUCOPHAGE) 500 MG tablet Take 2 tablets (1,000 mg) by mouth 2 times daily (with meals) 360 tablet 3     glipiZIDE (GLUCOTROL XL) 10 MG 24 hr tablet Take 2 tablets (20 mg) by mouth daily (with breakfast) 180 tablet 3     atorvastatin (LIPITOR) 40 MG tablet Take 1 tablet (40 mg) by mouth daily 90 tablet 3     amLODIPine (NORVASC) 10 MG  tablet Take 1 tablet (10 mg) by mouth daily 90 tablet 3     metoprolol tartrate (LOPRESSOR) 50 MG tablet Take 1 tablet (50 mg) by mouth 2 times daily 180 tablet 3     levothyroxine (SYNTHROID) 50 MCG tablet Take 1 tablet (50 mcg) by mouth daily 90 tablet 3     indomethacin (INDOCIN) 50 MG capsule Take 1 capsule (50 mg) by mouth 3 times daily (with meals) 42 capsule 1     ASPIRIN PO Take 81 mg by mouth daily       [DISCONTINUED] metFORMIN (GLUCOPHAGE) 500 MG tablet Take 2 tablets (1,000 mg) by mouth 2 times daily (with meals) (Needs follow-up appointment for this medication) 360 tablet 0     [DISCONTINUED] glipiZIDE (GLUCOTROL XL) 10 MG 24 hr tablet Take 2 tablets (20 mg) by mouth daily (with breakfast) 180 tablet 0     [DISCONTINUED] atorvastatin (LIPITOR) 40 MG tablet Take 1 tablet (40 mg) by mouth daily 90 tablet 1     indomethacin (INDOCIN) 25 MG capsule Take 1 capsule (25 mg) by mouth 2 times daily (with meals) (Patient not taking: Reported on 4/12/2018) 42 capsule 1     blood glucose monitoring (ACCU-CHEK SMARTVIEW) test strip Use to test blood sugar 3 times daily or as directed. 100 each 12     [DISCONTINUED] amLODIPine (NORVASC) 10 MG tablet Take 1 tablet (10 mg) by mouth daily 90 tablet 3     [DISCONTINUED] metoprolol (LOPRESSOR) 50 MG tablet Take 1 tablet (50 mg) by mouth 2 times daily 180 tablet 3     [DISCONTINUED] levothyroxine (SYNTHROID) 50 MCG tablet Take 1 tablet (50 mcg) by mouth daily 90 tablet 3     blood glucose (NO BRAND SPECIFIED) lancets standard Use to test blood sugar 3 times daily or as directed. 1 Box 3     [DISCONTINUED] testosterone (ANDROGEL) 50 MG/5GM (1%) gel Place 1 packet (50 mg) onto the skin daily 90 packet 3     Allergies   Allergen Reactions     Hydrocodone-Acetaminophen Itching     Iodine      Irbesartan      renal insuff and hyperkalemia       Ivp Dye [Contrast Dye] Hives     Brief bout - 10 minutes duration     Lisinopril Nausea     Sulfa Drugs      Valsartan Difficulty  breathing     Recent Labs   Lab Test  04/10/18   0850  09/21/17   0827  07/28/17   0344  03/03/17   1109   04/11/16   1030  05/29/15   1019  07/16/14   1113   A1C  8.4*  6.2*   --   7.7*   < >  11.2*  7.6*   --    LDL   --   81   --   113*   --   86  111   --    HDL   --   66   --   59   --   44   --    --    TRIG   --   91   --   209*   --   282*   --    --    ALT   --    --   22   --    --    --   35  19   CR   --   0.87  1.17  0.98   --   1.08  0.97  0.83   GFRESTIMATED   --   88  62  76   --   69  78  >90   GFRESTBLACK   --   >90  75  >90  African American GFR Calc     --   83  >90   GFR Calc    >90   POTASSIUM   --   4.2  3.9  4.2   --   4.4  4.5  5.5*   TSH   --   1.39   --   1.42   --   0.84  1.11  0.86    < > = values in this interval not displayed.      BP Readings from Last 3 Encounters:   04/12/18 123/75   04/12/18 150/70   01/05/18 139/68    Wt Readings from Last 3 Encounters:   04/12/18 278 lb (126.1 kg)   04/12/18 277 lb (125.6 kg)   01/05/18 277 lb (125.6 kg)         ROS:  Constitutional, HEENT, cardiovascular, pulmonary, gi and gu systems are negative, except as otherwise noted.    OBJECTIVE:                                                    /75  Pulse 73  Temp 98.7  F (37.1  C) (Tympanic)  Ht 6' (1.829 m)  Wt 278 lb (126.1 kg)  BMI 37.7 kg/m2  GENERAL APPEARANCE ADULT: Alert, no acute distress, obese  MS: foot exam normal DP and PT pulses, no trophic changes or ulcerative lesions, normal monofilament exam, erythema and tenderness at left foot, and ankle, warm to touch  SKIN: no suspicious lesions or rashes, erythema - as above  NEURO: Alert, oriented, speech and mentation normal  PSYCH: mentation appears normal., affect and mood normal  Diagnostic Test Results:  Results for orders placed or performed in visit on 04/12/18   PSA tumor marker   Result Value Ref Range    PSA 0.08 0 - 4 ug/L          ASSESSMENT/PLAN:                                                    1. Type 2  diabetes mellitus with complication, without long-term current use of insulin (H)  due for labs and refill, taking medication without difficulty  - metFORMIN (GLUCOPHAGE) 500 MG tablet; Take 2 tablets (1,000 mg) by mouth 2 times daily (with meals)  Dispense: 360 tablet; Refill: 3  - glipiZIDE (GLUCOTROL XL) 10 MG 24 hr tablet; Take 2 tablets (20 mg) by mouth daily (with breakfast)  Dispense: 180 tablet; Refill: 3  - Hemoglobin A1c; Future    2. Moderate major depression (H)    3. Persistent proteinuria    4. Diabetic polyneuropathy associated with type 2 diabetes mellitus (H)    5. Acute gouty arthritis  With flare  - indomethacin (INDOCIN) 50 MG capsule; Take 1 capsule (50 mg) by mouth 3 times daily (with meals)  Dispense: 42 capsule; Refill: 1    6. Alcohol dependence with other alcohol-induced disorder (H)  Cutting back 75 % and working to get even less    7. Hyperlipidemia LDL goal <70  due for labs and refill, taking medication without difficulty  - atorvastatin (LIPITOR) 40 MG tablet; Take 1 tablet (40 mg) by mouth daily  Dispense: 90 tablet; Refill: 3    8. HTN, goal below 140/90  due for labs and refill, taking medication without difficulty  - amLODIPine (NORVASC) 10 MG tablet; Take 1 tablet (10 mg) by mouth daily  Dispense: 90 tablet; Refill: 3  - metoprolol tartrate (LOPRESSOR) 50 MG tablet; Take 1 tablet (50 mg) by mouth 2 times daily  Dispense: 180 tablet; Refill: 3    9. Acquired hypothyroidism  due for review and refill, taking medication without difficulty  - levothyroxine (SYNTHROID) 50 MCG tablet; Take 1 tablet (50 mcg) by mouth daily  Dispense: 90 tablet; Refill: 3    Karen Ivory MD  FA

## 2018-04-12 NOTE — MR AVS SNAPSHOT
"              After Visit Summary   4/12/2018    Francois Lujan    MRN: [id number]           Patient Information     Date Of Birth          1950        Visit Information        Provider Department      4/12/2018 9:30 AM Chato Davila MD [facility]        Today's Diagnoses     Hypogonadism male    -  1      Care Instructions    If you have questions or concerns on any instructions given to you by your provider today or if you need to schedule an appointment, you can reach us at [phone].                         Follow-ups after your visit        Future tests that were ordered for you today     Open Future Orders        Priority Expected Expires Ordered    Hemoglobin A1c Routine 7/12/2018 10/11/2018 4/12/2018            Who to contact     If you have questions or need follow up information about today's clinic visit or your schedule please contact [facility] directly at [phone].  Normal or non-critical lab and imaging results will be communicated to you by MyChart, letter or phone within 4 business days after the clinic has received the results. If you do not hear from us within 7 days, please contact the clinic through MyChart or phone. If you have a critical or abnormal lab result, we will notify you by phone as soon as possible.  Submit refill requests through [employer] or call your pharmacy and they will forward the refill request to us. Please allow 3 business days for your refill to be completed.          Additional Information About Your Visit        MyChart Information     AllPeers lets you send messages to your doctor, view your test results, renew your prescriptions, schedule appointments and more. To sign up, go to [email or web address] . Click on \"Log in\" on the left side of the screen, which will take you to the Welcome page. Then click on \"Sign up Now\" on the right side of the page.     You will be asked to enter the access code listed below, as " well as some personal information. Please follow the directions to create your username and password.     Your access code is: [email or web address]  Expires: 2018  1:09 PM     Your access code will  in 90 days. If you need help or a new code, please call your [facility] clinic or [phone].        Care EveryWhere ID     This is your Care EveryWhere ID. This could be used by other organizations to access your [facility] medical records  [id number]        Your Vitals Were     Pulse Temperature Respirations Height Pulse Oximetry BMI (Body Mass Index)    70 97.6  F (36.4  C) 14 1.829 m (6') 97% 37.57 kg/m2       Blood Pressure from Last 3 Encounters:   18 123/75   18 150/70   18 139/68    Weight from Last 3 Encounters:   18 126.1 kg (278 lb)   18 125.6 kg (277 lb)   18 125.6 kg (277 lb)              We Performed the Following     PSA tumor marker          Today's Medication Changes          These changes are accurate as of 18 11:59 PM.  If you have any questions, ask your nurse or doctor.               These medicines have changed or have updated prescriptions.        Dose/Directions    * indomethacin 25 MG capsule   Commonly known as:  INDOCIN   This may have changed:  Another medication with the same name was added. Make sure you understand how and when to take each.   Used for:  Acute gouty arthritis   Changed by:  Karen Ivory MD        Dose:  25 mg   Take 1 capsule (25 mg) by mouth 2 times daily (with meals)   Quantity:  42 capsule   Refills:  1       * indomethacin 50 MG capsule   Commonly known as:  INDOCIN   This may have changed:  You were already taking a medication with the same name, and this prescription was added. Make sure you understand how and when to take each.   Used for:  Acute gouty arthritis   Changed by:  Karen Ivory MD        Dose:  50 mg   Take 1 capsule (50 mg) by mouth 3 times daily (with meals)   Quantity:  42 capsule   Refills:  1        metFORMIN 500 MG tablet   Commonly known as:  GLUCOPHAGE   This may have changed:  additional instructions   Used for:  Type 2 diabetes mellitus with complication, without long-term current use of insulin (H)   Changed by:  Karen Ivory MD        Dose:  1000 mg   Take 2 tablets (1,000 mg) by mouth 2 times daily (with meals)   Quantity:  360 tablet   Refills:  3       * Notice:  This list has 2 medication(s) that are the same as other medications prescribed for you. Read the directions carefully, and ask your doctor or other care provider to review them with you.      Stop taking these medicines if you haven't already. Please contact your care team if you have questions.     valsartan 80 MG tablet   Commonly known as:  DIOVAN   Stopped by:  Chato Davila MD                Where to get your medicines      These medications were sent to [address] [address]     Phone:  [phone]     amLODIPine 10 MG tablet    atorvastatin 40 MG tablet    glipiZIDE 10 MG 24 hr tablet    indomethacin 50 MG capsule    levothyroxine 50 MCG tablet    metFORMIN 500 MG tablet    metoprolol tartrate 50 MG tablet         Some of these will need a paper prescription and others can be bought over the counter.  Ask your nurse if you have questions.     Bring a paper prescription for each of these medications     testosterone 50 MG/5GM (1%) topical gel                Primary Care Provider Office Phone # Fax #    Karen Ivory [phone] [address] [facility] [address]        Equal Access to Services     [facility]: Hadii jovita foleyo Soreeceali, waaxda luqadaha, qaybta kaalmada adelaidayafreddy, luly lam. So [facility] [phone].    ATENCIÓN: Si habla español, tiene a birch disposición servicios gratuitos de asistencia lingüística. Llame al [phone].    We comply with applicable  federal civil rights laws and [place] laws. We do not discriminate on the basis of race, color, national origin, age, disability, sex, sexual orientation, or gender identity.            Thank you!     Thank you for choosing [facility]  for your care. Our goal is always to provide you with excellent care. Hearing back from our patients is one way we can continue to improve our services. Please take a few minutes to complete the written survey that you may receive in the mail after your visit with us. Thank you!             Your Updated Medication List - Protect others around you: Learn how to safely use, store and throw away your medicines at www.disposemymeds.org.          This list is accurate as of 4/12/18 11:59 PM.  Always use your most recent med list.                   Brand Name Dispense Instructions for use Diagnosis    amLODIPine 10 MG tablet    NORVASC    90 tablet    Take 1 tablet (10 mg) by mouth daily    HTN, goal below 140/90       ASPIRIN PO      Take 81 mg by mouth daily        atorvastatin 40 MG tablet    LIPITOR    90 tablet    Take 1 tablet (40 mg) by mouth daily    Hyperlipidemia LDL goal <70       blood glucose lancets standard    no brand specified    1 Box    Use to test blood sugar 3 times daily or as directed.        blood glucose monitoring test strip    ACCU-CHEK SMARTVIEW    100 each    Use to test blood sugar 3 times daily or as directed.    Type 2 diabetes mellitus with complication, with long-term current use of insulin (H)       glipiZIDE 10 MG 24 hr tablet    GLUCOTROL XL    180 tablet    Take 2 tablets (20 mg) by mouth daily (with breakfast)    Type 2 diabetes mellitus with complication, without long-term current use of insulin (H)       * indomethacin 25 MG capsule    INDOCIN    42 capsule    Take 1 capsule (25 mg) by mouth 2 times daily (with meals)    Acute gouty arthritis       * indomethacin 50 MG capsule    INDOCIN    42 capsule    Take 1 capsule (50 mg) by  mouth 3 times daily (with meals)    Acute gouty arthritis       levothyroxine 50 MCG tablet    SYNTHROID    90 tablet    Take 1 tablet (50 mcg) by mouth daily    Acquired hypothyroidism       metFORMIN 500 MG tablet    GLUCOPHAGE    360 tablet    Take 2 tablets (1,000 mg) by mouth 2 times daily (with meals)    Type 2 diabetes mellitus with complication, without long-term current use of insulin (H)       metoprolol tartrate 50 MG tablet    LOPRESSOR    180 tablet    Take 1 tablet (50 mg) by mouth 2 times daily    HTN, goal below 140/90       testosterone 50 MG/5GM (1%) topical gel    ANDROGEL    90 packet    Place 1 packet (50 mg) onto the skin daily    Hypogonadism male       * Notice:  This list has 2 medication(s) that are the same as other medications prescribed for you. Read the directions carefully, and ask your doctor or other care provider to review them with you.

## 2018-04-13 ASSESSMENT — PATIENT HEALTH QUESTIONNAIRE - PHQ9: SUM OF ALL RESPONSES TO PHQ QUESTIONS 1-9: 6

## 2018-04-17 NOTE — TELEPHONE ENCOUNTER
Central Prior Authorization Team   Phone: [phone]      PA Initiation    Medication: testosterone  Insurance Company: [employer] - Phone [phone] Fax [phone]  Pharmacy Filling the Rx: [facility] - [place] - [address]  Filling Pharmacy Phone: [phone]  Filling Pharmacy Fax: [phone]  Start Date: 4/17/2018

## 2018-04-17 NOTE — TELEPHONE ENCOUNTER
Prior Authorization Approval    Authorization Effective Date: 3/17/2018  Authorization Expiration Date: 4/17/2023  Medication: testosterone - Approved   Approved Dose/Quantity:    Reference #: [id number]   Insurance Company: [employer] - Phone [phone] Fax [phone]  Expected CoPay: $10.00     CoPay Card Available:      Foundation Assistance Needed:    Which Pharmacy is filling the prescription (Not needed for infusion/clinic administered): [facility] [place] [place] [facility] [address] [facility]  Pharmacy Notified: Yes  Patient Notified: Yes

## 2018-07-25 ENCOUNTER — OFFICE VISIT (OUTPATIENT)
Dept: ORTHOPEDICS | Facility: CLINIC | Age: 68
End: 2018-07-25
Payer: COMMERCIAL

## 2018-07-25 ENCOUNTER — RADIANT APPOINTMENT (OUTPATIENT)
Dept: GENERAL RADIOLOGY | Facility: CLINIC | Age: 68
End: 2018-07-25
Attending: PEDIATRICS
Payer: COMMERCIAL

## 2018-07-25 VITALS
DIASTOLIC BLOOD PRESSURE: 89 MMHG | SYSTOLIC BLOOD PRESSURE: 138 MMHG | HEIGHT: 72 IN | WEIGHT: 279 LBS | BODY MASS INDEX: 37.79 KG/M2

## 2018-07-25 DIAGNOSIS — M25.551 RIGHT HIP PAIN: Primary | ICD-10-CM

## 2018-07-25 DIAGNOSIS — M25.551 RIGHT HIP PAIN: ICD-10-CM

## 2018-07-25 PROCEDURE — 73502 X-RAY EXAM HIP UNI 2-3 VIEWS: CPT

## 2018-07-25 PROCEDURE — 99213 OFFICE O/P EST LOW 20 MIN: CPT | Performed by: PEDIATRICS

## 2018-07-25 NOTE — PATIENT INSTRUCTIONS
Plan:  - Today's Plan of Care:  Rehab: Physical Therapy: [facility] - [phone]    -We also discussed other future treatment options:  Referral to primary care, MRI of the hip or Steroid injection of hip    Follow Up: 6 - 8 weeks    If you have any further questions for your physician or physician s care team you can call [phone] and use option 3 to leave a voice message. Calls received during business hours will be returned same day.

## 2018-07-25 NOTE — MR AVS SNAPSHOT
After Visit Summary   7/25/2018    rFancois Lujan    MRN: [id number]           Patient Information     Date Of Birth          1950        Visit Information        Provider Department      7/25/2018 3:40 PM Tatiana Thornton MD [facility] [place]        Today's Diagnoses     Right hip pain    -  1      Care Instructions        Plan:  - Today's Plan of Care:  Rehab: Physical Therapy: [facility] - [phone]    -We also discussed other future treatment options:  Referral to primary care, MRI of the hip or Steroid injection of hip    Follow Up: 6 - 8 weeks    If you have any further questions for your physician or physician s care team you can call [phone] and use option 3 to leave a voice message. Calls received during business hours will be returned same day.              Follow-ups after your visit        Additional Services     PHYSICAL THERAPY REFERRAL       *This therapy referral will be filtered to a centralized scheduling office at [facility] and the patient will receive a call to schedule an appointment at a [facility] location most convenient for them. *     [facility] provides Physical Therapy evaluation and treatment and many specialty services across the [facility] system.  If requesting a specialty program, please choose from the list below.    If you have not heard from the scheduling office within 2 business days, please call [phone] for all locations, with the exception of [place], please call [phone] and [facility], please call [phone]  Treatment: Evaluation & Treatment  Special Instructions/Modalities: evaluate and treat  Special Programs: None    Please be aware that coverage of these services is subject to the terms and limitations of your health insurance plan.  Call member services at your health plan with any benefit or coverage questions.      **Note to Provider:  If you are  "referring outside of [facility] for the therapy appointment, please list the name of the location in the \"special instructions\" above, print the referral and give to the patient to schedule the appointment.                  Who to contact     If you have questions or need follow up information about today's clinic visit or your schedule please contact [facility] directly at [phone].  Normal or non-critical lab and imaging results will be communicated to you by MyChart, letter or phone within 4 business days after the clinic has received the results. If you do not hear from us within 7 days, please contact the clinic through MyChart or phone. If you have a critical or abnormal lab result, we will notify you by phone as soon as possible.  Submit refill requests through [employer] or call your pharmacy and they will forward the refill request to us. Please allow 3 business days for your refill to be completed.          Additional Information About Your Visit        Care EveryWhere ID     This is your Care EveryWhere ID. This could be used by other organizations to access your [facility] medical records  [id number]        Your Vitals Were     Height BMI (Body Mass Index)                6' (1.829 m) 37.84 kg/m2           Blood Pressure from Last 3 Encounters:   07/25/18 138/89   04/12/18 123/75   04/12/18 150/70    Weight from Last 3 Encounters:   07/25/18 279 lb (126.6 kg)   04/12/18 278 lb (126.1 kg)   04/12/18 277 lb (125.6 kg)              We Performed the Following     PHYSICAL THERAPY REFERRAL        Primary Care Provider Office Phone # Fax #    Karen Caroline Ivory [phone] [address] [facility] [address]        Equal Access to Services     KAYLAH ZAZUETA : sherita Monge qaybta kaalmada adeegyada, waxay idiin hayaan adeeg kharash la'aan ah. So [facility] [phone].    ATENCIÓN: Si habla español, tiene a birch disposición " servicios gratuitos de asistencia lingüística. Tracy arteaga [phone].    We comply with applicable federal civil rights laws and [place] laws. We do not discriminate on the basis of race, color, national origin, age, disability, sex, sexual orientation, or gender identity.            Thank you!     Thank you for choosing [facility]  for your care. Our goal is always to provide you with excellent care. Hearing back from our patients is one way we can continue to improve our services. Please take a few minutes to complete the written survey that you may receive in the mail after your visit with us. Thank you!             Your Updated Medication List - Protect others around you: Learn how to safely use, store and throw away your medicines at www.disposemymeds.org.          This list is accurate as of 7/25/18  4:57 PM.  Always use your most recent med list.                   Brand Name Dispense Instructions for use Diagnosis    amLODIPine 10 MG tablet    NORVASC    90 tablet    Take 1 tablet (10 mg) by mouth daily    HTN, goal below 140/90       ASPIRIN PO      Take 81 mg by mouth daily        atorvastatin 40 MG tablet    LIPITOR    90 tablet    Take 1 tablet (40 mg) by mouth daily    Hyperlipidemia LDL goal <70       blood glucose lancets standard    no brand specified    1 Box    Use to test blood sugar 3 times daily or as directed.        blood glucose monitoring test strip    ACCU-CHEK SMARTVIEW    100 each    Use to test blood sugar 3 times daily or as directed.    Type 2 diabetes mellitus with complication, with long-term current use of insulin (H)       glipiZIDE 10 MG 24 hr tablet    GLUCOTROL XL    180 tablet    Take 2 tablets (20 mg) by mouth daily (with breakfast)    Type 2 diabetes mellitus with complication, without long-term current use of insulin (H)       * indomethacin 25 MG capsule    INDOCIN    42 capsule    Take 1 capsule (25 mg) by mouth 2 times daily (with meals)     Acute gouty arthritis       * indomethacin 50 MG capsule    INDOCIN    42 capsule    Take 1 capsule (50 mg) by mouth 3 times daily (with meals)    Acute gouty arthritis       levothyroxine 50 MCG tablet    SYNTHROID    90 tablet    Take 1 tablet (50 mcg) by mouth daily    Acquired hypothyroidism       metFORMIN 500 MG tablet    GLUCOPHAGE    360 tablet    Take 2 tablets (1,000 mg) by mouth 2 times daily (with meals)    Type 2 diabetes mellitus with complication, without long-term current use of insulin (H)       metoprolol tartrate 50 MG tablet    LOPRESSOR    180 tablet    Take 1 tablet (50 mg) by mouth 2 times daily    HTN, goal below 140/90       testosterone 50 MG/5GM (1%) topical gel    ANDROGEL    90 packet    Place 1 packet (50 mg) onto the skin daily    Hypogonadism male       * Notice:  This list has 2 medication(s) that are the same as other medications prescribed for you. Read the directions carefully, and ask your doctor or other care provider to review them with you.

## 2018-07-25 NOTE — LETTER
7/25/2018         RE: Francois Lujan  [address]  [place] [address]        Dear Colleague,    Thank you for referring your patient, Francois Lujan, to the [facility]. Please see a copy of my visit note below.    Sports Medicine Clinic Visit    PCP: Karen Ivory    Francois Lujan is a 67 year old male who is seen  in consultation at the request of self referred presenting with right hip pain    Injury: He reports insidious onset of right hip pain in may of 2018. He reports the pain has worsened over the last 2 weeks. He reports pain with sleeping on the right side. He feels it may be gout related as he experiences gout in his elbow. He reports the hip locks while he walks intermittently.    Location of Pain: right deep hip pain  Duration of Pain: 2 month(s)  Rating of Pain at worst: 10/10  Rating of Pain Currently: 1/10  Symptoms are better with: Rest and naproxen  Symptoms are worse with: walking and sleeping  Additional Features:   Positive: popping, instability and weakness   Negative: swelling, bruising, grinding, catching, locking, paresthesias and numbness  Other evaluation and/or treatments so far consists of: Rest and naproxen  Prior History of related problems: nothing    Social History: retired    Review of Systems  Skin: no bruising, no swelling  Musculoskeletal: as above  Neurologic: no numbness, paresthesias  Remainder of review of systems is negative including constitutional, CV, pulmonary, GI, except as noted in HPI or medical history.    Patient's current problem list, past medical and surgical history, and family history were reviewed.    Patient Active Problem List   Diagnosis     S/P CABG (coronary artery bypass graft)     Hypogonadotropic hypogonadism (H)     Hypothyroidism     Hard of hearing     Hyperlipidemia LDL goal <70     ADELA (obstructive sleep apnea)     Hypertension goal BP (blood pressure) < 140/90     Numbness of hand, right      Health Care Home     Radicular pain of lumbosacral region     Moderate major depression (H)     Elevated sed rate     Elevated C-reactive protein (CRP)     History of back surgery     Polymyalgia rheumatica (H)     Multiple joint pain     OA (osteoarthritis) of knee, right     ACL (anterior cruciate ligament) tear     HTN, goal below 140/90     Necrobiosis lipoidica     Type 2 diabetes mellitus with complication (H)     Type 2 diabetes mellitus with other skin complications     CKD (chronic kidney disease) stage 2, GFR 60-89 ml/min     Proteinuria     Type 2 diabetes mellitus with other diabetic kidney complication     Diabetic polyneuropathy associated with type 2 diabetes mellitus (H)     Type 2 diabetes mellitus with other circulatory complications     Polyneuropathy associated with underlying disease (H)     Alcohol dependence with other alcohol-induced disorder (H)     Coronary artery disease involving native coronary artery of native heart without angina pectoris     Triceps tendon rupture, left, sequela     Other secondary gout, multiple sites, unspecified chronicity     Proteinuria, unspecified type     Epistaxis     Acute gouty arthritis     Type 2 diabetes mellitus with complication, without long-term current use of insulin (H)     Past Medical History:   Diagnosis Date     Arthritis      CAD (coronary artery disease)      Diabetes mellitus (H)      Erythema nodosum 1982     Gout      Hypertension      Malignant neoplasm (H)     squamous cell CA removed from right hand     Thyroid disease      Past Surgical History:   Procedure Laterality Date     BACK SURGERY       BYPASS GRAFT ARTERY CORONARY  11/17/2011    Procedure:BYPASS GRAFT ARTERY CORONARY; CORONARY ARTERY BYPASS, Right, OM, LAD WITH ENDOVEIN HARVEST, ON PUMP; Surgeon:LEONEL MG; Location: OR     COLONOSCOPY N/A 6/9/2016    Procedure: COLONOSCOPY;  Surgeon: Isidro Humphreys MD;  Location: [facility]     HERNIA REPAIR      infantile hernia repair      ORTHOPEDIC SURGERY      Baker cyst removed - right knee, and mesiscus tear repair     REPAIR TENDON TRICEPS UPPER EXTREMITY Left 4/11/2017    Procedure: REPAIR TENDON TRICEPS UPPER EXTREMITY;  Surgeon: Rodriguez Kelley MD;  Location: [facility] OR     Family History   Problem Relation Age of Onset     HEART DISEASE Father      Cancer Mother      C.A.D. Brother      SEPTICEMIA Brother 3     Lung Cancer Sister      Schizophrenia Sister        Objective  /89 (BP Location: Left arm, Patient Position: Chair, Cuff Size: Adult Regular)  Ht 6' (1.829 m)  Wt 279 lb (126.6 kg)  BMI 37.84 kg/m2    GENERAL APPEARANCE: healthy, alert and no distress   GAIT: NORMAL  SKIN: no suspicious lesions or rashes  HEENT: Sclera clear, anicteric  CV: good peripheral pulses  RESP: Breathing not labored  NEURO: Normal strength and tone, mentation intact and speech normal  PSYCH:  mentation appears normal and affect normal/bright    Bilateral hip exam    Inspection:      no edema or ecchymosis in hip area    Tender:      greater trochanter right       groin right    Non Tender:      remainder of the hip area right    ROM:     Full active and passive ROM  right    Strength:      flexion 5/5 bilateral       abduction 5/5 bilateral       adduction 5/5 bilateral    Sensation:      grossly intact in hip and thigh    Special Tests:      neg (-) KAYLEEN right       neg (-) FADIR right    Radiology  I ordered, visualized and reviewed these images with the patient  AP Pelvis and lateral XR views of hips reviewed: no acute bony abnormality, mild degenerative change  - will follow official read    Assessment:  1. Right hip pain      Right hip pain with mild arthritis on x-ray, greater trochanteric pain syndrome also likely contributing.  Patient has a history of gout, which may be contributing, however, this episode of pain lasting longer than gout flare would typically.  We discussed the following treatment options: symptom treatment, activity  modification/rest, imaging, rehab and referral. Following discussion, plan: will start with physical therapy, consider other options pending clinical course.    Plan:  - Today's Plan of Care:  Rehab: Physical Therapy: [facility] - [phone]    -We also discussed other future treatment options:  MRI of the hip or Steroid injection of hip    Follow Up: 6 - 8 weeks  - Follow up with primary care to discuss overall treatment for gout    Concerning signs and symptoms were reviewed.  The patient expressed understanding of this management plan and all questions were answered at this time.    Tatiana Thornton MD CAQ  Primary Care Sports Medicine  [facility]    Again, thank you for allowing me to participate in the care of your patient.        Sincerely,        Tatiana Thornton MD

## 2018-07-25 NOTE — PROGRESS NOTES
Sports Medicine Clinic Visit    PCP: Karen Ivory    Francois Lujan is a 67 year old male who is seen  in consultation at the request of self referred presenting with right hip pain    Injury: He reports insidious onset of right hip pain in may of 2018. He reports the pain has worsened over the last 2 weeks. He reports pain with sleeping on the right side. He feels it may be gout related as he experiences gout in his elbow. He reports the hip locks while he walks intermittently.    Location of Pain: right deep hip pain  Duration of Pain: 2 month(s)  Rating of Pain at worst: 10/10  Rating of Pain Currently: 1/10  Symptoms are better with: Rest and naproxen  Symptoms are worse with: walking and sleeping  Additional Features:   Positive: popping, instability and weakness   Negative: swelling, bruising, grinding, catching, locking, paresthesias and numbness  Other evaluation and/or treatments so far consists of: Rest and naproxen  Prior History of related problems: nothing    Social History: retired    Review of Systems  Skin: no bruising, no swelling  Musculoskeletal: as above  Neurologic: no numbness, paresthesias  Remainder of review of systems is negative including constitutional, CV, pulmonary, GI, except as noted in HPI or medical history.    Patient's current problem list, past medical and surgical history, and family history were reviewed.    Patient Active Problem List   Diagnosis     S/P CABG (coronary artery bypass graft)     Hypogonadotropic hypogonadism (H)     Hypothyroidism     Hard of hearing     Hyperlipidemia LDL goal <70     ADELA (obstructive sleep apnea)     Hypertension goal BP (blood pressure) < 140/90     Numbness of hand, right     Health Care Home     Radicular pain of lumbosacral region     Moderate major depression (H)     Elevated sed rate     Elevated C-reactive protein (CRP)     History of back surgery     Polymyalgia rheumatica (H)     Multiple joint pain     OA (osteoarthritis) of  knee, right     ACL (anterior cruciate ligament) tear     HTN, goal below 140/90     Necrobiosis lipoidica     Type 2 diabetes mellitus with complication (H)     Type 2 diabetes mellitus with other skin complications     CKD (chronic kidney disease) stage 2, GFR 60-89 ml/min     Proteinuria     Type 2 diabetes mellitus with other diabetic kidney complication     Diabetic polyneuropathy associated with type 2 diabetes mellitus (H)     Type 2 diabetes mellitus with other circulatory complications     Polyneuropathy associated with underlying disease (H)     Alcohol dependence with other alcohol-induced disorder (H)     Coronary artery disease involving native coronary artery of native heart without angina pectoris     Triceps tendon rupture, left, sequela     Other secondary gout, multiple sites, unspecified chronicity     Proteinuria, unspecified type     Epistaxis     Acute gouty arthritis     Type 2 diabetes mellitus with complication, without long-term current use of insulin (H)     Past Medical History:   Diagnosis Date     Arthritis      CAD (coronary artery disease)      Diabetes mellitus (H)      Erythema nodosum 1982     Gout      Hypertension      Malignant neoplasm (H)     squamous cell CA removed from right hand     Thyroid disease      Past Surgical History:   Procedure Laterality Date     BACK SURGERY       BYPASS GRAFT ARTERY CORONARY  11/17/2011    Procedure:BYPASS GRAFT ARTERY CORONARY; CORONARY ARTERY BYPASS, Right, OM, LAD WITH ENDOVEIN HARVEST, ON PUMP; Surgeon:LEONEL MG; Location: OR     COLONOSCOPY N/A 6/9/2016    Procedure: COLONOSCOPY;  Surgeon: Isidro Humphreys MD;  Location: [facility]     HERNIA REPAIR      infantile hernia repair     ORTHOPEDIC SURGERY      Baker cyst removed - right knee, and mesiscus tear repair     REPAIR TENDON TRICEPS UPPER EXTREMITY Left 4/11/2017    Procedure: REPAIR TENDON TRICEPS UPPER EXTREMITY;  Surgeon: Rodriguez Kelley MD;  Location: [facility]     Family  History   Problem Relation Age of Onset     HEART DISEASE Father      Cancer Mother      C.A.D. Brother      SEPTICEMIA Brother 3     Lung Cancer Sister      Schizophrenia Sister        Objective  /89 (BP Location: Left arm, Patient Position: Chair, Cuff Size: Adult Regular)  Ht 6' (1.829 m)  Wt 279 lb (126.6 kg)  BMI 37.84 kg/m2    GENERAL APPEARANCE: healthy, alert and no distress   GAIT: NORMAL  SKIN: no suspicious lesions or rashes  HEENT: Sclera clear, anicteric  CV: good peripheral pulses  RESP: Breathing not labored  NEURO: Normal strength and tone, mentation intact and speech normal  PSYCH:  mentation appears normal and affect normal/bright    Bilateral hip exam    Inspection:      no edema or ecchymosis in hip area    Tender:      greater trochanter right       groin right    Non Tender:      remainder of the hip area right    ROM:     Full active and passive ROM  right    Strength:      flexion 5/5 bilateral       abduction 5/5 bilateral       adduction 5/5 bilateral    Sensation:      grossly intact in hip and thigh    Special Tests:      neg (-) KAYLEEN right       neg (-) FADIR right    Radiology  I ordered, visualized and reviewed these images with the patient  AP Pelvis and lateral XR views of hips reviewed: no acute bony abnormality, mild degenerative change  - will follow official read    Assessment:  1. Right hip pain      Right hip pain with mild arthritis on x-ray, greater trochanteric pain syndrome also likely contributing.  Patient has a history of gout, which may be contributing, however, this episode of pain lasting longer than gout flare would typically.  We discussed the following treatment options: symptom treatment, activity modification/rest, imaging, rehab and referral. Following discussion, plan: will start with physical therapy, consider other options pending clinical course.    Plan:  - Today's Plan of Care:  [phone]    -We  also discussed other future treatment options:  MRI of the hip or Steroid injection of hip    Follow Up: 6 - 8 weeks  - Follow up with primary care to discuss overall treatment for gout    Concerning signs and symptoms were reviewed.  The patient expressed understanding of this management plan and all questions were answered at this time.    Tatiana Thornton MD CAQ  Primary Care Sports Medicine  [facility]

## 2018-11-12 DIAGNOSIS — E29.1 HYPOGONADISM MALE: ICD-10-CM

## 2018-11-12 NOTE — TELEPHONE ENCOUNTER
Pt last seen in clinic 4/2018.  It is time for his 6 month refill.    Trudy Quintana RN  [facility] [place]

## 2018-11-19 PROBLEM — L53.8 OTHER SPECIFIED ERYTHEMATOUS CONDITION(695.89): Status: ACTIVE | Noted: 2018-11-19

## 2018-11-19 PROBLEM — F32.A DEPRESSION: Status: ACTIVE | Noted: 2018-11-19

## 2018-11-19 PROBLEM — M12.9 ARTHROPATHY OF MULTIPLE SITES: Status: ACTIVE | Noted: 2018-11-19

## 2018-11-19 PROBLEM — E66.9 OBESITY: Status: ACTIVE | Noted: 2018-11-19

## 2018-11-19 PROBLEM — D64.9 ANEMIA: Status: ACTIVE | Noted: 2018-11-19

## 2018-11-19 PROBLEM — F52.8 PSYCHOSEXUAL DYSFUNCTION WITH INHIBITED SEXUAL EXCITEMENT: Status: ACTIVE | Noted: 2018-11-19

## 2018-11-19 PROBLEM — I25.10 CORONARY ATHEROSCLEROSIS: Status: ACTIVE | Noted: 2018-11-19

## 2018-11-19 PROBLEM — E29.1 OTHER TESTICULAR HYPOFUNCTION: Status: ACTIVE | Noted: 2018-11-19

## 2018-11-19 RX ORDER — CEPHALEXIN 500 MG/1
CAPSULE ORAL
Refills: 1 | COMMUNITY
Start: 2018-09-05 | End: 2018-11-20

## 2018-11-20 ENCOUNTER — OFFICE VISIT (OUTPATIENT)
Dept: FAMILY MEDICINE | Facility: CLINIC | Age: 68
End: 2018-11-20
Payer: COMMERCIAL

## 2018-11-20 VITALS
OXYGEN SATURATION: 96 % | BODY MASS INDEX: 37.93 KG/M2 | DIASTOLIC BLOOD PRESSURE: 66 MMHG | RESPIRATION RATE: 12 BRPM | HEIGHT: 72 IN | HEART RATE: 67 BPM | WEIGHT: 280 LBS | TEMPERATURE: 98.1 F | SYSTOLIC BLOOD PRESSURE: 148 MMHG

## 2018-11-20 DIAGNOSIS — I10 HYPERTENSION GOAL BP (BLOOD PRESSURE) < 140/90: ICD-10-CM

## 2018-11-20 DIAGNOSIS — M35.3 POLYMYALGIA RHEUMATICA (H): ICD-10-CM

## 2018-11-20 DIAGNOSIS — E11.8 TYPE 2 DIABETES MELLITUS WITH COMPLICATION, WITHOUT LONG-TERM CURRENT USE OF INSULIN (H): Primary | ICD-10-CM

## 2018-11-20 DIAGNOSIS — Z23 NEED FOR PROPHYLACTIC VACCINATION AND INOCULATION AGAINST INFLUENZA: ICD-10-CM

## 2018-11-20 DIAGNOSIS — E23.0 HYPOGONADOTROPIC HYPOGONADISM (H): ICD-10-CM

## 2018-11-20 DIAGNOSIS — E66.01 MORBID OBESITY (H): ICD-10-CM

## 2018-11-20 DIAGNOSIS — G63 POLYNEUROPATHY ASSOCIATED WITH UNDERLYING DISEASE (H): ICD-10-CM

## 2018-11-20 LAB
ANION GAP SERPL CALCULATED.3IONS-SCNC: 7 MMOL/L (ref 3–14)
BUN SERPL-MCNC: 19 MG/DL (ref 7–30)
CALCIUM SERPL-MCNC: 8.5 MG/DL (ref 8.5–10.1)
CHLORIDE SERPL-SCNC: 104 MMOL/L (ref 94–109)
CHOLEST SERPL-MCNC: 164 MG/DL
CO2 SERPL-SCNC: 27 MMOL/L (ref 20–32)
CREAT SERPL-MCNC: 0.86 MG/DL (ref 0.66–1.25)
GFR SERPL CREATININE-BSD FRML MDRD: 88 ML/MIN/1.7M2
GLUCOSE SERPL-MCNC: 346 MG/DL (ref 70–99)
HBA1C MFR BLD: 7 % (ref 0–5.6)
HDLC SERPL-MCNC: 57 MG/DL
LDLC SERPL CALC-MCNC: 77 MG/DL
NONHDLC SERPL-MCNC: 107 MG/DL
POTASSIUM SERPL-SCNC: 4.4 MMOL/L (ref 3.4–5.3)
SODIUM SERPL-SCNC: 138 MMOL/L (ref 133–144)
T4 FREE SERPL-MCNC: 0.84 NG/DL (ref 0.76–1.46)
TRIGL SERPL-MCNC: 152 MG/DL
TSH SERPL DL<=0.005 MIU/L-ACNC: 1.52 MU/L (ref 0.4–4)

## 2018-11-20 PROCEDURE — 84443 ASSAY THYROID STIM HORMONE: CPT | Performed by: FAMILY MEDICINE

## 2018-11-20 PROCEDURE — 83036 HEMOGLOBIN GLYCOSYLATED A1C: CPT | Performed by: FAMILY MEDICINE

## 2018-11-20 PROCEDURE — 80061 LIPID PANEL: CPT | Performed by: FAMILY MEDICINE

## 2018-11-20 PROCEDURE — 90662 IIV NO PRSV INCREASED AG IM: CPT | Performed by: FAMILY MEDICINE

## 2018-11-20 PROCEDURE — 80048 BASIC METABOLIC PNL TOTAL CA: CPT | Performed by: FAMILY MEDICINE

## 2018-11-20 PROCEDURE — 82043 UR ALBUMIN QUANTITATIVE: CPT | Performed by: FAMILY MEDICINE

## 2018-11-20 PROCEDURE — 84439 ASSAY OF FREE THYROXINE: CPT | Performed by: FAMILY MEDICINE

## 2018-11-20 PROCEDURE — 90471 IMMUNIZATION ADMIN: CPT | Performed by: FAMILY MEDICINE

## 2018-11-20 PROCEDURE — 99214 OFFICE O/P EST MOD 30 MIN: CPT | Mod: 25 | Performed by: FAMILY MEDICINE

## 2018-11-20 PROCEDURE — 36415 COLL VENOUS BLD VENIPUNCTURE: CPT | Performed by: FAMILY MEDICINE

## 2018-11-20 RX ORDER — VALSARTAN 80 MG/1
40 TABLET ORAL DAILY
Qty: 45 TABLET | Refills: 3 | Status: SHIPPED | OUTPATIENT
Start: 2018-11-20 | End: 2019-02-19

## 2018-11-20 NOTE — NURSING NOTE
Initial /78  Pulse 67  Temp 98.1  F (36.7  C) (Tympanic)  Resp 12  Ht 6' (1.829 m)  Wt 280 lb (127 kg)  SpO2 96%  BMI 37.97 kg/m2 Estimated body mass index is 37.97 kg/(m^2) as calculated from the following:    Height as of this encounter: 6' (1.829 m).    Weight as of this encounter: 280 lb (127 kg). .

## 2018-11-20 NOTE — PROGRESS NOTES
SUBJECTIVE:                                                    Francois Lujan is 68 year old male   Chief Complaint   Patient presents with     Diabetes     Not fasting     Hypertension     Lipids     Thyroid Problem     Diabetes Follow-up      Patient is checking blood sugars: rarely.  Results range from [id number]    Diabetic concerns: None     Symptoms of hypoglycemia (low blood sugar): Had one about 4 weeks ago. Was 70, felt shakey     Paresthesias (numbness or burning in feet) or sores: Yes, not worsening     Date of last diabetic eye exam: over one year ago    Diabetes Management Resources    Hyperlipidemia Follow-Up      Rate your low fat/cholesterol diet?: fair    Taking statin?  Yes, possible muscle aches from statin at times. Keeps hydrated    Other lipid medications/supplements?:  none    Hypertension Follow-up      Outpatient blood pressures are being checked at home.  Results are 135-140 for systolic.    Low Salt Diet: not monitoring salt    BP Readings from Last 2 Encounters:   11/20/18 148/66   07/25/18 138/89     Hemoglobin A1C (%)   Date Value   11/20/2018 7.0 (H)   04/10/2018 8.4 (H)     LDL Cholesterol Calculated (mg/dL)   Date Value   11/20/2018 77   09/21/2017 81     Hypothyroidism Follow-up      Since last visit, patient describes the following symptoms: Weight stable, no hair loss, no skin changes, no constipation, no loose stools            Problem list and histories reviewed & adjusted, as indicated.  Additional history: as documented    Patient Active Problem List   Diagnosis     S/P CABG (coronary artery bypass graft)     Hypogonadotropic hypogonadism (H)     Hypothyroidism     Hard of hearing     Hyperlipidemia LDL goal <70     ADELA (obstructive sleep apnea)     Hypertension goal BP (blood pressure) < 140/90     Numbness of hand, right     Health Care Home     Radicular pain of lumbosacral region     Moderate major depression (H)     Elevated sed rate     Elevated C-reactive protein (CRP)      History of back surgery     Polymyalgia rheumatica (H)     Multiple joint pain     OA (osteoarthritis) of knee, right     ACL (anterior cruciate ligament) tear     HTN, goal below 140/90     Necrobiosis lipoidica     Type 2 diabetes mellitus with complication (H)     Type 2 diabetes mellitus with other skin complications (H)     CKD (chronic kidney disease) stage 2, GFR 60-89 ml/min     Proteinuria     Type 2 diabetes mellitus with other diabetic kidney complication (H)     Diabetic polyneuropathy associated with type 2 diabetes mellitus (H)     Type 2 diabetes mellitus with other circulatory complications (H)     Polyneuropathy associated with underlying disease (H)     Alcohol dependence with other alcohol-induced disorder (H)     Coronary artery disease involving native coronary artery of native heart without angina pectoris     Triceps tendon rupture, left, sequela     Other secondary gout, multiple sites, unspecified chronicity     Proteinuria, unspecified type     Epistaxis     Acute gouty arthritis     Type 2 diabetes mellitus with complication, without long-term current use of insulin (H)     Anemia     Arthropathy of multiple sites     Benign neoplasm of colon     Benign essential hypertension     Chronic gouty arthropathy     Coronary atherosclerosis     Depression     Other specified erythematous condition(695.89)     Other testicular hypofunction     Degeneration of lumbar or lumbosacral intervertebral disc     Psychosexual dysfunction with inhibited sexual excitement     Obesity     Major depressive disorder, recurrent episode, in partial or unspecified remission     Vitamin D deficiency     Obesity (BMI 35.0-39.9) with comorbidity (H)     Past Surgical History:   Procedure Laterality Date     BACK SURGERY       BYPASS GRAFT ARTERY CORONARY  11/17/2011    Procedure:BYPASS GRAFT ARTERY CORONARY; CORONARY ARTERY BYPASS, Right, OM, LAD WITH ENDOVEIN HARVEST, ON PUMP; Surgeon:LEONEL MG;  Location: OR     COLONOSCOPY N/A 6/9/2016    Procedure: COLONOSCOPY;  Surgeon: Isidro Humphreys MD;  Location: [facility]     HERNIA REPAIR      infantile hernia repair     ORTHOPEDIC SURGERY      Baker cyst removed - right knee, and mesiscus tear repair     REPAIR TENDON TRICEPS UPPER EXTREMITY Left 4/11/2017    Procedure: REPAIR TENDON TRICEPS UPPER EXTREMITY;  Surgeon: Rodriguez Kelley MD;  Location: [facility]       Social History   Substance Use Topics     Smoking status: Former Smoker     Packs/day: 2.00     Years: 30.00     Quit date: 11/13/1997     Smokeless tobacco: Never Used     Alcohol use 6.0 oz/week     12 Cans of beer per week      Comment: occassionally      Family History   Problem Relation Age of Onset     HEART DISEASE Father      Cancer Mother      C.A.D. Brother      SEPTICEMIA Brother 3     Lung Cancer Sister      Schizophrenia Sister          Current Outpatient Prescriptions   Medication Sig Dispense Refill     amLODIPine (NORVASC) 10 MG tablet Take 1 tablet (10 mg) by mouth daily 90 tablet 3     ASPIRIN PO Take 81 mg by mouth daily       atorvastatin (LIPITOR) 40 MG tablet Take 1 tablet (40 mg) by mouth daily 90 tablet 3     blood glucose (NO BRAND SPECIFIED) lancets standard Use to test blood sugar 3 times daily or as directed. 1 Box 3     blood glucose monitoring (ACCU-CHEK SMARTVIEW) test strip Use to test blood sugar 3 times daily or as directed. 100 each 12     glipiZIDE (GLUCOTROL XL) 10 MG 24 hr tablet Take 2 tablets (20 mg) by mouth daily (with breakfast) 180 tablet 3     levothyroxine (SYNTHROID) 50 MCG tablet Take 1 tablet (50 mcg) by mouth daily 90 tablet 3     metFORMIN (GLUCOPHAGE) 500 MG tablet Take 2 tablets (1,000 mg) by mouth 2 times daily (with meals) 360 tablet 3     metoprolol tartrate (LOPRESSOR) 50 MG tablet Take 1 tablet (50 mg) by mouth 2 times daily 180 tablet 3     testosterone (ANDROGEL) 50 MG/5GM (1%) topical gel Place 1 packet (50 mg) onto the skin daily 90 packet 3      valsartan (DIOVAN) 80 MG tablet Take 0.5 tablets (40 mg) by mouth daily 45 tablet 3     Allergies   Allergen Reactions     Hydrocodone-Acetaminophen Itching     Iodine      Irbesartan      renal insuff and hyperkalemia       Ivp Dye [Contrast Dye] Hives     Brief bout - 10 minutes duration     Lisinopril Nausea     Sulfa Drugs      Valsartan Difficulty breathing     Recent Labs   Lab Test  11/20/18   1101  04/10/18   0850  09/21/17   0827  07/28/17   0344  03/03/17   1109   05/29/15   1019  07/16/14   1113   A1C  7.0*  8.4*  6.2*   --   7.7*   < >  7.6*   --    LDL  77   --   81   --   113*   < >  111   --    HDL  57   --   66   --   59   < >   --    --    TRIG  152*   --   91   --   209*   < >   --    --    ALT   --    --    --   22   --    --   35  19   CR  0.86   --   0.87  1.17  0.98   < >  0.97  0.83   GFRESTIMATED  88   --   88  62  76   < >  78  >90   GFRESTBLACK  >90   --   >90  75  >90  African American GFR Calc     < >  >90   GFR Calc    >90   POTASSIUM  4.4   --   4.2  3.9  4.2   < >  4.5  5.5*   TSH  1.52   --   1.39   --   1.42   < >  1.11  0.86    < > = values in this interval not displayed.      BP Readings from Last 3 Encounters:   11/20/18 148/66   07/25/18 138/89   04/12/18 123/75    Wt Readings from Last 3 Encounters:   11/20/18 280 lb (127 kg)   07/25/18 279 lb (126.6 kg)   04/12/18 278 lb (126.1 kg)         ROS:  Constitutional, HEENT, cardiovascular, pulmonary, gi and gu systems are negative, except as otherwise noted.    OBJECTIVE:                                                    /66  Pulse 67  Temp 98.1  F (36.7  C) (Tympanic)  Resp 12  Ht 6' (1.829 m)  Wt 280 lb (127 kg)  SpO2 96%  BMI 37.97 kg/m2  GENERAL APPEARANCE ADULT: Alert, no acute distress  HENT: Ears and TMs normal, oral mucosa and posterior oropharynx normal  RESP: lungs clear to auscultation   CV: normal rate, regular rhythm, no murmur or gallop  PSYCH: mentation appears normal., affect and mood  normal  Diagnostic Test Results:  Results for orders placed or performed in visit on 11/20/18   Basic metabolic panel   Result Value Ref Range    Sodium 138 133 - 144 mmol/L    Potassium 4.4 3.4 - 5.3 mmol/L    Chloride 104 94 - 109 mmol/L    Carbon Dioxide 27 20 - 32 mmol/L    Anion Gap 7 3 - 14 mmol/L    Glucose 346 (H) 70 - 99 mg/dL    Urea Nitrogen 19 7 - 30 mg/dL    Creatinine 0.86 0.66 - 1.25 mg/dL    GFR Estimate 88 >60 mL/min/1.7m2    GFR Estimate If Black >90 >60 mL/min/1.7m2    Calcium 8.5 8.5 - 10.1 mg/dL   Hemoglobin A1c   Result Value Ref Range    Hemoglobin A1C 7.0 (H) 0 - 5.6 %   Lipid panel reflex to direct LDL Fasting   Result Value Ref Range    Cholesterol 164 <200 mg/dL    Triglycerides 152 (H) <150 mg/dL    HDL Cholesterol 57 >39 mg/dL    LDL Cholesterol Calculated 77 <100 mg/dL    Non HDL Cholesterol 107 <130 mg/dL   TSH   Result Value Ref Range    TSH 1.52 0.40 - 4.00 mU/L   T4 FREE   Result Value Ref Range    T4 Free 0.84 0.76 - 1.46 ng/dL          ASSESSMENT/PLAN:                                                    1. Type 2 diabetes mellitus with complication, without long-term current use of insulin (H)  Due for labs not fasting, at goal  - Basic metabolic panel; Future  - Hemoglobin A1c; Future  - Lipid panel reflex to direct LDL Fasting; Future  - TSH; Future  - T4 FREE; Future  - Albumin Random Urine Quantitative with Creat Ratio; Future  - valsartan (DIOVAN) 80 MG tablet; Take 0.5 tablets (40 mg) by mouth daily  Dispense: 45 tablet; Refill: 3  - Basic metabolic panel  - Hemoglobin A1c  - Lipid panel reflex to direct LDL Fasting  - TSH  - T4 FREE  - Albumin Random Urine Quantitative with Creat Ratio    2. Morbid obesity (H)  Lost 50 lbs when quit drinking, gained 15 back    3. Hypogonadotropic hypogonadism (H)  On meds no need for refills at this time    4. Polyneuropathy associated with underlying disease (H)  stable    5. Hypertension goal BP (blood pressure) < 140/90  due for review  and refill, taking medication without difficulty  - valsartan (DIOVAN) 80 MG tablet; Take 0.5 tablets (40 mg) by mouth daily  Dispense: 45 tablet; Refill: 3    6. Need for prophylactic vaccination and inoculation against influenza  - FLU VACCINE, INCREASED ANTIGEN, PRESV FREE, AGE 65+ [85070]    7. Polymyalgia rheumatica (H)  stable        Karen Ivory MD  [facility]      Injectable Influenza Immunization Documentation    1.  Is the person to be vaccinated sick today?   No    2. Does the person to be vaccinated have an allergy to a component   of the vaccine?   No  Egg Allergy Algorithm Link    3. Has the person to be vaccinated ever had a serious reaction   to influenza vaccine in the past?   No    4. Has the person to be vaccinated ever had Guillain-Barré syndrome?   No    Form completed by Jessica STOCK CMA

## 2018-11-20 NOTE — MR AVS SNAPSHOT
After Visit Summary   11/20/2018    Francois Lujan    MRN: [id number]           Patient Information     Date Of Birth          1950        Visit Information        Provider Department      11/20/2018 10:20 AM Karen Ivory MD [facility]        Today's Diagnoses     Type 2 diabetes mellitus with complication, without long-term current use of insulin (H)    -  1    Morbid obesity (H)        Hypogonadotropic hypogonadism (H)        Polymyalgia rheumatica (H)        Polyneuropathy associated with underlying disease (H)        Need for prophylactic vaccination and inoculation against influenza           Follow-ups after your visit        Future tests that were ordered for you today     Open Future Orders        Priority Expected Expires Ordered    Basic metabolic panel Routine  12/31/2018 11/20/2018    Hemoglobin A1c Routine  12/31/2018 11/20/2018    Lipid panel reflex to direct LDL Fasting Routine  12/31/2018 11/20/2018    TSH Routine  12/31/2018 11/20/2018    T4 FREE Routine  12/31/2018 11/20/2018    Albumin Random Urine Quantitative with Creat Ratio Routine  12/31/2018 11/20/2018            Who to contact     If you have questions or need follow up information about today's clinic visit or your schedule please contact [facility] directly at [phone].  Normal or non-critical lab and imaging results will be communicated to you by MyChart, letter or phone within 4 business days after the clinic has received the results. If you do not hear from us within 7 days, please contact the clinic through MyChart or phone. If you have a critical or abnormal lab result, we will notify you by phone as soon as possible.  Submit refill requests through [employer] or call your pharmacy and they will forward the refill request to us. Please allow 3 business days for your refill to be completed.          Additional Information About Your Visit        Care EveryWhere ID     This is  your Care EveryWhere ID. This could be used by other organizations to access your [place] medical records  [id number]        Your Vitals Were     Pulse Temperature Respirations Height Pulse Oximetry BMI (Body Mass Index)    67 98.1  F (36.7  C) (Tympanic) 12 6' (1.829 m) 96% 37.97 kg/m2       Blood Pressure from Last 3 Encounters:   11/20/18 164/78   07/25/18 138/89   04/12/18 123/75    Weight from Last 3 Encounters:   11/20/18 280 lb (127 kg)   07/25/18 279 lb (126.6 kg)   04/12/18 278 lb (126.1 kg)              We Performed the Following     FLU VACCINE, INCREASED ANTIGEN, PRESV FREE, AGE 65+ [30994]          Today's Medication Changes          These changes are accurate as of 11/20/18 10:49 AM.  If you have any questions, ask your nurse or doctor.               Start taking these medicines.        Dose/Directions    valsartan 80 MG tablet   Commonly known as:  DIOVAN   Used for:  Type 2 diabetes mellitus with complication, without long-term current use of insulin (H)        Dose:  40 mg   Take 0.5 tablets (40 mg) by mouth daily   Quantity:  45 tablet   Refills:  3         Stop taking these medicines if you haven't already. Please contact your care team if you have questions.     acetaminophen-codeine 300-30 MG per tablet   Commonly known as:  TYLENOL #3           cephALEXin 500 MG capsule   Commonly known as:  KEFLEX           indomethacin 25 MG capsule   Commonly known as:  INDOCIN           indomethacin 50 MG capsule   Commonly known as:  INDOCIN                Where to get your medicines      These medications [place] [facility] [place] [address] [facility]  [address] [facility] [address]     Phone:  [phone]     valsartan 80 MG tablet                Primary Care Provider Office Phone # Fax #    Karen Ivory [phone] [address] [facility] [facility] [address]        Equal Access to Services     KAYLAH ZAZUETA AH: sherita Monge  dipak zitakem tannerluly mchugh ah. So [facility] [phone].    ATENCIÓN: Si harpreet alanis, tiene a birch disposición servicios gratuitos de asistencia lingüística. Tracy al [phone].    We comply with applicable federal civil rights laws and [place] laws. We do not discriminate on the basis of race, color, national origin, age, disability, sex, sexual orientation, or gender identity.            Thank you!     Thank you for choosing [facility]  for your care. Our goal is always to provide you with excellent care. Hearing back from our patients is one way we can continue to improve our services. Please take a few minutes to complete the written survey that you may receive in the mail after your visit with us. Thank you!             Your Updated Medication List - Protect others around you: Learn how to safely use, store and throw away your medicines at www.disposemymeds.org.          This list is accurate as of 11/20/18 10:49 AM.  Always use your most recent med list.                   Brand Name Dispense Instructions for use Diagnosis    amLODIPine 10 MG tablet    NORVASC    90 tablet    Take 1 tablet (10 mg) by mouth daily    HTN, goal below 140/90       ASPIRIN PO      Take 81 mg by mouth daily        atorvastatin 40 MG tablet    LIPITOR    90 tablet    Take 1 tablet (40 mg) by mouth daily    Hyperlipidemia LDL goal <70       blood glucose lancets standard    no brand specified    1 Box    Use to test blood sugar 3 times daily or as directed.        blood glucose monitoring test strip    ACCU-CHEK SMARTVIEW    100 each    Use to test blood sugar 3 times daily or as directed.    Type 2 diabetes mellitus with complication, with long-term current use of insulin (H)       glipiZIDE 10 MG 24 hr tablet    GLUCOTROL XL    180 tablet    Take 2 tablets (20 mg) by mouth daily (with breakfast)    Type 2 diabetes mellitus with complication, without long-term current use  of insulin (H)       levothyroxine 50 MCG tablet    SYNTHROID    90 tablet    Take 1 tablet (50 mcg) by mouth daily    Acquired hypothyroidism       metFORMIN 500 MG tablet    GLUCOPHAGE    360 tablet    Take 2 tablets (1,000 mg) by mouth 2 times daily (with meals)    Type 2 diabetes mellitus with complication, without long-term current use of insulin (H)       metoprolol tartrate 50 MG tablet    LOPRESSOR    180 tablet    Take 1 tablet (50 mg) by mouth 2 times daily    HTN, goal below 140/90       testosterone 50 MG/5GM (1%) topical gel    ANDROGEL    90 packet    Place 1 packet (50 mg) onto the skin daily    Hypogonadism male       valsartan 80 MG tablet    DIOVAN    45 tablet    Take 0.5 tablets (40 mg) by mouth daily    Type 2 diabetes mellitus with complication, without long-term current use of insulin (H)

## 2018-11-20 NOTE — LETTER
[facility]  [address] [place] [address]  Phone: [phone]    November 26, 2018        Francois Le  [address]  [place] [address]          Dear Francois,      Urine protein, glucose and A1C are high- not normal, but a1c is at goal. Plan to stop all alcohol and recheck urine for protein in 2 months.    Component      Latest Ref Rng & Units 11/20/2018   Sodium      133 - 144 mmol/L 138   Potassium      3.4 - 5.3 mmol/L 4.4   Chloride      94 - 109 mmol/L 104   Carbon Dioxide      20 - 32 mmol/L 27   Anion Gap      3 - 14 mmol/L 7   Glucose      70 - 99 mg/dL 346 (H)   Urea Nitrogen      7 - 30 mg/dL 19   Creatinine      0.66 - 1.25 mg/dL 0.86   GFR Estimate      >60 mL/min/1.7m2 88   GFR Estimate If Black      >60 mL/min/1.7m2 >90   Calcium      8.5 - 10.1 mg/dL 8.5   Cholesterol      <200 mg/dL 164   Triglycerides      <150 mg/dL 152 (H)   HDL Cholesterol      >39 mg/dL 57   LDL Cholesterol Calculated      <100 mg/dL 77   Non HDL Cholesterol      <130 mg/dL 107   Creatinine Urine      mg/dL 111   Albumin Urine mg/L      mg/L 1660   Albumin Urine mg/g Cr      0 - 17 mg/g Cr 1495.50 (H)   Hemoglobin A1C      0 - 5.6 % 7.0 (H)   TSH      0.40 - 4.00 mU/L 1.52   T4 Free      0.76 - 1.46 ng/dL 0.84        Sincerely,        Karen Ivory MD / maday cma

## 2018-11-21 LAB
CREAT UR-MCNC: 111 MG/DL
MICROALBUMIN UR-MCNC: 1660 MG/L
MICROALBUMIN/CREAT UR: 1495.5 MG/G CR (ref 0–17)

## 2018-11-26 NOTE — PROGRESS NOTES
Urine protein, glucose and a1c are high, not normal but a1c is at goal. Plan stop all alcohol and recheck urine for protein in 2 months.  Please notify.        Thank you. FILEMON RYAN MD

## 2018-12-17 DIAGNOSIS — E29.1 HYPOGONADISM MALE: ICD-10-CM

## 2018-12-17 RX ORDER — TESTOSTERONE 12.5 MG/1.25G
50 GEL TOPICAL DAILY
Qty: 90 PACKET | Refills: 1 | Status: CANCELLED | OUTPATIENT
Start: 2018-12-17

## 2018-12-17 NOTE — TELEPHONE ENCOUNTER
Pt is calling again to get refill of Androgel, states he has called and pharmacy has called to get refills, he is now out, please call pt to confirm [phone]

## 2018-12-27 NOTE — TELEPHONE ENCOUNTER
Pt calling back to check status of refill. He has been waiting since Nov for this. He expressed frustration that this hasn't been done yet.     Please call    Betsey Rees  Specialty CSS

## 2018-12-28 RX ORDER — TESTOSTERONE 12.5 MG/1.25G
GEL TOPICAL
Qty: 150 G
Start: 2018-12-28 | End: 2019-04-09

## 2019-02-11 DIAGNOSIS — E11.8 TYPE 2 DIABETES MELLITUS WITH COMPLICATION, WITHOUT LONG-TERM CURRENT USE OF INSULIN (H): ICD-10-CM

## 2019-02-11 DIAGNOSIS — E29.1 HYPOGONADISM MALE: ICD-10-CM

## 2019-02-11 DIAGNOSIS — I10 HTN, GOAL BELOW 140/90: ICD-10-CM

## 2019-02-11 DIAGNOSIS — E03.9 ACQUIRED HYPOTHYROIDISM: ICD-10-CM

## 2019-02-11 DIAGNOSIS — E78.5 HYPERLIPIDEMIA LDL GOAL <70: ICD-10-CM

## 2019-02-11 DIAGNOSIS — I10 HYPERTENSION GOAL BP (BLOOD PRESSURE) < 140/90: ICD-10-CM

## 2019-02-11 NOTE — TELEPHONE ENCOUNTER
"Requested Prescriptions   Pending Prescriptions Disp Refills     metoprolol tartrate (LOPRESSOR) 50 MG tablet 180 tablet 3    Last Written Prescription Date:  4/12/18  Last Fill Quantity: 180 tab,  # refills: 3   Last office visit: 11/20/2018 with prescribing provider:  Karen Ivory     Future Office Visit:     Sig: Take 1 tablet (50 mg) by mouth 2 times daily    Beta-Blockers Protocol Failed - 2/11/2019  5:19 PM       Failed - Blood pressure under 140/90 in past 12 months    BP Readings from Last 3 Encounters:   11/20/18 148/66   07/25/18 138/89   04/12/18 123/75                Passed - Patient is age 6 or older       Passed - Recent (12 mo) or future (30 days) visit within the authorizing provider's specialty    Patient had office visit in the last 12 months or has a visit in the next 30 days with authorizing provider or within the authorizing provider's specialty.  See \"Patient Info\" tab in inbasket, or \"Choose Columns\" in Meds & Orders section of the refill encounter.             Passed - Medication is active on med list        atorvastatin (LIPITOR) 40 MG tablet 90 tablet 3    Last Written Prescription Date:  4/12/18  Last Fill Quantity: 90 tab,  # refills: 3   Last office visit: 11/20/2018 with prescribing provider:  Karen Ivory     Future Office Visit:     Sig: Take 1 tablet (40 mg) by mouth daily    Statins Protocol Passed - 2/11/2019  5:19 PM       Passed - LDL on file in past 12 months    Recent Labs   Lab Test 11/20/18  1101   LDL 77            Passed - No abnormal creatine kinase in past 12 months    Recent Labs   Lab Test 04/23/14  1037   CKT 70               Passed - Recent (12 mo) or future (30 days) visit within the authorizing provider's specialty    Patient had office visit in the last 12 months or has a visit in the next 30 days with authorizing provider or within the authorizing provider's specialty.  See \"Patient Info\" tab in inbasket, or \"Choose Columns\" in Meds & Orders section " "of the refill encounter.             Passed - Medication is active on med list       Passed - Patient is age 18 or older        amLODIPine (NORVASC) 10 MG tablet 90 tablet 3    Last Written Prescription Date:  4/12/18  Last Fill Quantity: 90 tab,  # refills: 3   Last office visit: 11/20/2018 with prescribing provider:  Karen Ivory     Future Office Visit:     Sig: Take 1 tablet (10 mg) by mouth daily    Calcium Channel Blockers Protocol  Failed - 2/11/2019  5:19 PM       Failed - Blood pressure under 140/90 in past 12 months    BP Readings from Last 3 Encounters:   11/20/18 148/66   07/25/18 138/89   04/12/18 123/75                Passed - Recent (12 mo) or future (30 days) visit within the authorizing provider's specialty    Patient had office visit in the last 12 months or has a visit in the next 30 days with authorizing provider or within the authorizing provider's specialty.  See \"Patient Info\" tab in inbasket, or \"Choose Columns\" in Meds & Orders section of the refill encounter.             Passed - Medication is active on med list       Passed - Patient is age 18 or older       Passed - Normal serum creatinine on file in past 12 months    Recent Labs   Lab Test 11/20/18  1101   CR 0.86             levothyroxine (SYNTHROID) 50 MCG tablet 90 tablet 3    Last Written Prescription Date:  4/12/18  Last Fill Quantity: 90 tab,  # refills: 3   Last office visit: 11/20/2018 with prescribing provider:  Karen Ivory     Future Office Visit:     Sig: Take 1 tablet (50 mcg) by mouth daily    Thyroid Protocol Passed - 2/11/2019  5:19 PM       Passed - Patient is 12 years or older       Passed - Recent (12 mo) or future (30 days) visit within the authorizing provider's specialty    Patient had office visit in the last 12 months or has a visit in the next 30 days with authorizing provider or within the authorizing provider's specialty.  See \"Patient Info\" tab in inbasket, or \"Choose Columns\" in Meds & Orders " "section of the refill encounter.             Passed - Medication is active on med list       Passed - Normal TSH on file in past 12 months    Recent Labs   Lab Test 11/20/18  1101   TSH 1.52              glipiZIDE (GLUCOTROL XL) 10 MG 24 hr tablet 180 tablet 3    Last Written Prescription Date:  4/12/18  Last Fill Quantity: 180 tab,  # refills: 3   Last office visit: 11/20/2018 with prescribing provider:  Karen Ivory     Future Office Visit:     Sig: Take 2 tablets (20 mg) by mouth daily (with breakfast)    Sulfonylurea Agents Failed - 2/11/2019  5:19 PM       Failed - Blood pressure less than 140/90 in past 6 months    BP Readings from Last 3 Encounters:   11/20/18 148/66   07/25/18 138/89   04/12/18 123/75                Passed - Patient has documented LDL within the past 12 mos.    Recent Labs   Lab Test 11/20/18  1101   LDL 77            Passed - Patient has had a Microalbumin in the past 12 mos.    Recent Labs   Lab Test 11/20/18  1101   MICROL 1,660   UMALCR 1,495.50*            Passed - Patient has documented A1c within the specified period of time.    If HgbA1C is 8 or greater, it needs to be on file within the past 3 months.  If less than 8, must be on file within the past 6 months.     Recent Labs   Lab Test 11/20/18  1101   A1C 7.0*            Passed - Medication is active on med list       Passed - Patient is age 18 or older       Passed - Patient has a recent creatinine (normal) within the past 12 mos.    Recent Labs   Lab Test 11/20/18  1101   CR 0.86            Passed - Recent (6 mo) or future (30 days) visit within the authorizing provider's specialty    Patient had office visit in the last 6 months or has a visit in the next 30 days with authorizing provider or within the authorizing provider's specialty.  See \"Patient Info\" tab in inbasket, or \"Choose Columns\" in Meds & Orders section of the refill encounter.            metFORMIN (GLUCOPHAGE) 500 MG tablet 360 tablet 3    Last Written " "Prescription Date:  4/12/18  Last Fill Quantity: 360 tab,  # refills: 3   Last office visit: 11/20/2018 with prescribing provider:  Karen Ivory     Future Office Visit:     Sig: Take 2 tablets (1,000 mg) by mouth 2 times daily (with meals)    Biguanide Agents Failed - 2/11/2019  5:19 PM       Failed - Blood pressure less than 140/90 in past 6 months    BP Readings from Last 3 Encounters:   11/20/18 148/66   07/25/18 138/89   04/12/18 123/75                Passed - Patient has documented LDL within the past 12 mos.    Recent Labs   Lab Test 11/20/18  1101   LDL 77            Passed - Patient has had a Microalbumin in the past 15 mos.    Recent Labs   Lab Test 11/20/18  1101   MICROL 1,660   UMALCR 1,495.50*            Passed - Patient is age 10 or older       Passed - Patient has documented A1c within the specified period of time.    If HgbA1C is 8 or greater, it needs to be on file within the past 3 months.  If less than 8, must be on file within the past 6 months.     Recent Labs   Lab Test 11/20/18  1101   A1C 7.0*            Passed - Patient's CR is NOT>1.4 OR Patient's EGFR is NOT<45 within past 12 mos.    Recent Labs   Lab Test 11/20/18  1101   GFRESTIMATED 88   GFRESTBLACK >90       Recent Labs   Lab Test 11/20/18  1101   CR 0.86            Passed - Patient does NOT have a diagnosis of CHF.       Passed - Medication is active on med list       Passed - Recent (6 mo) or future (30 days) visit within the authorizing provider's specialty    Patient had office visit in the last 6 months or has a visit in the next 30 days with authorizing provider or within the authorizing provider's specialty.  See \"Patient Info\" tab in inbasket, or \"Choose Columns\" in Meds & Orders section of the refill encounter.            valsartan (DIOVAN) 80 MG tablet 45 tablet 3    Last Written Prescription Date:  11/20/18  Last Fill Quantity: 45 tab,  # refills: 3   Last office visit: 11/20/2018 with prescribing provider:  [place]" "Karen Caroline     Future Office Visit:     Sig: Take 0.5 tablets (40 mg) by mouth daily    Angiotensin-II Receptors Failed - 2/11/2019  5:19 PM       Failed - Blood pressure under 140/90 in past 12 months    BP Readings from Last 3 Encounters:   11/20/18 148/66   07/25/18 138/89   04/12/18 123/75                Passed - Recent (12 mo) or future (30 days) visit within the authorizing provider's specialty    Patient had office visit in the last 12 months or has a visit in the next 30 days with authorizing provider or within the authorizing provider's specialty.  See \"Patient Info\" tab in inbasket, or \"Choose Columns\" in Meds & Orders section of the refill encounter.             Passed - Medication is active on med list       Passed - Patient is age 18 or older       Passed - Normal serum creatinine on file in past 12 months    Recent Labs   Lab Test 11/20/18  1101   CR 0.86            Passed - Normal serum potassium on file in past 12 months    Recent Labs   Lab Test 11/20/18  1101   POTASSIUM 4.4                      "

## 2019-02-12 ENCOUNTER — TELEPHONE (OUTPATIENT)
Dept: UROLOGY | Facility: CLINIC | Age: 69
End: 2019-02-12

## 2019-02-12 NOTE — TELEPHONE ENCOUNTER
Prior Authorization Specialty Medication Request    Medication/Dose: testosterone (ANDROGEL/TESTIM) 50 MG/5GM (1%) topical gel  ICD code (if different than what is on RX):  Hypogonadism male [E29.1]   Previously Tried and Failed:      Important Lab Values:   Rationale:     Insurance Name:   Insurance ID: [id number]  Insurance Phone Number:     Pharmacy Information (if different than what is on RX)  Name:  [facility]  Phone:

## 2019-02-13 NOTE — TELEPHONE ENCOUNTER
Routing refill request to provider for review/approval because:  BP above goal for RN protocol.   Per last labs is to have urine microalbumin rechecked now (order needs diagnosis)   Drug interaction/ allergy warning on the myronrtrommel Lorenzo RNC

## 2019-02-14 NOTE — TELEPHONE ENCOUNTER
Patient notified needs appointment, scheduled for 2-19-19, patient is ok to wait till appointment for refill.    JORGE Elkins

## 2019-02-15 RX ORDER — ATORVASTATIN CALCIUM 40 MG/1
40 TABLET, FILM COATED ORAL DAILY
Qty: 90 TABLET | Refills: 3 | OUTPATIENT
Start: 2019-02-15

## 2019-02-15 RX ORDER — VALSARTAN 80 MG/1
40 TABLET ORAL DAILY
Qty: 45 TABLET | Refills: 3 | OUTPATIENT
Start: 2019-02-15

## 2019-02-15 RX ORDER — GLIPIZIDE 10 MG/1
20 TABLET, FILM COATED, EXTENDED RELEASE ORAL
Qty: 180 TABLET | Refills: 3 | OUTPATIENT
Start: 2019-02-15

## 2019-02-15 RX ORDER — AMLODIPINE BESYLATE 10 MG/1
10 TABLET ORAL DAILY
Qty: 90 TABLET | Refills: 3 | OUTPATIENT
Start: 2019-02-15

## 2019-02-15 RX ORDER — METOPROLOL TARTRATE 50 MG
50 TABLET ORAL 2 TIMES DAILY
Qty: 180 TABLET | Refills: 3 | OUTPATIENT
Start: 2019-02-15

## 2019-02-15 RX ORDER — LEVOTHYROXINE SODIUM 50 UG/1
50 TABLET ORAL DAILY
Qty: 90 TABLET | Refills: 3 | OUTPATIENT
Start: 2019-02-15

## 2019-02-15 NOTE — TELEPHONE ENCOUNTER
Prior Authorization Approval    Authorization Effective Date: 1/16/2019  Authorization Expiration Date: 2/15/2020  Medication: testosterone (ANDROGEL/TESTIM) 50 MG/5GM (1%) topical gel-APPROVED  Approved Dose/Quantity:   Reference #:     Insurance Company: [employer] - Phone [phone] Fax [phone]  Expected CoPay:       CoPay Card Available:      Foundation Assistance Needed:    Which Pharmacy is filling the prescription (Not needed for infusion/clinic administered): [facility] [place] - [place] - [address] [facility]  Pharmacy Notified: Yes  Patient Notified: No    Pharmacy will notify patient when medication is ready.

## 2019-02-15 NOTE — TELEPHONE ENCOUNTER
Central Prior Authorization Team   Phone: [phone]      PA Initiation    Medication: testosterone (ANDROGEL/TESTIM) 50 MG/5GM (1%) topical gel-Initiated  Insurance Company: [employer] - Phone [phone] Fax [phone]  Pharmacy [place]illing the Rx[place] [facility] [place] [address] [facility]  Filling Pharmacy Phone: [phone]  Filling Pharmacy Fax:    Start Date: 2/15/2019

## 2019-02-18 NOTE — PROGRESS NOTES
SUBJECTIVE:                                                    Francois Lujan is 68 year old male   Chief Complaint   Patient presents with     Diabetes     Not fasting. has splenda in his tea. Med refills.     Lipids     Hypertension     Diabetes Follow-up      Patient is checking blood sugars: not at all, ran out of test strips due to changing insurance.    Diabetic concerns: None     Symptoms of hypoglycemia (low blood sugar): shaky, dizzy, weak around 70. This is rare     Paresthesias (numbness or burning in feet) or sores: Yes      Date of last diabetic eye exam: About 1 year ago    Diabetes Management Resources    Hyperlipidemia Follow-Up      Rate your low fat/cholesterol diet?: not monitoring fat    Taking statin?  Yes, no muscle aches from statin    Other lipid medications/supplements?:  None    Has been trying to lose weigh. Cut calories and alcohol     Hypertension Follow-up      Outpatient blood pressures are not being checked.    Low Salt Diet: not monitoring salt    BP Readings from Last 2 Encounters:   02/19/19 152/76   11/20/18 148/66     Hemoglobin A1C (%)   Date Value   11/20/2018 7.0 (H)   04/10/2018 8.4 (H)     LDL Cholesterol Calculated (mg/dL)   Date Value   11/20/2018 77   09/21/2017 81           Problem list and histories reviewed & adjusted, as indicated.  Additional history: as documented    Patient Active Problem List   Diagnosis     S/P CABG (coronary artery bypass graft)     Hypogonadotropic hypogonadism (H)     Hypothyroidism     Hard of hearing     Hyperlipidemia LDL goal <70     ADELA (obstructive sleep apnea)     Hypertension goal BP (blood pressure) < 140/90     Numbness of hand, right     Health Care Home     Radicular pain of lumbosacral region     Moderate major depression (H)     Elevated sed rate     Elevated C-reactive protein (CRP)     History of back surgery     Polymyalgia rheumatica (H)     Multiple joint pain     OA (osteoarthritis) of knee, right     ACL (anterior  cruciate ligament) tear     HTN, goal below 140/90     Necrobiosis lipoidica     Type 2 diabetes mellitus with complication (H)     Type 2 diabetes mellitus with other skin complications (H)     CKD (chronic kidney disease) stage 2, GFR 60-89 ml/min     Proteinuria     Type 2 diabetes mellitus with other diabetic kidney complication (H)     Diabetic polyneuropathy associated with type 2 diabetes mellitus (H)     Type 2 diabetes mellitus with other circulatory complications (H)     Polyneuropathy associated with underlying disease (H)     Alcohol dependence with other alcohol-induced disorder (H)     Coronary artery disease involving native coronary artery of native heart without angina pectoris     Triceps tendon rupture, left, sequela     Other secondary gout, multiple sites, unspecified chronicity     Proteinuria, unspecified type     Epistaxis     Acute gouty arthritis     Type 2 diabetes mellitus with complication, without long-term current use of insulin (H)     Anemia     Arthropathy of multiple sites     Benign neoplasm of colon     Benign essential hypertension     Chronic gouty arthropathy     Coronary atherosclerosis     Depression     Other specified erythematous condition(695.89)     Other testicular hypofunction     Degeneration of lumbar or lumbosacral intervertebral disc     Psychosexual dysfunction with inhibited sexual excitement     Obesity     Major depressive disorder, recurrent episode, in partial or unspecified remission     Vitamin D deficiency     Obesity (BMI 35.0-39.9) with comorbidity (H)     Past Surgical History:   Procedure Laterality Date     BACK SURGERY       BYPASS GRAFT ARTERY CORONARY  11/17/2011    Procedure:BYPASS GRAFT ARTERY CORONARY; CORONARY ARTERY BYPASS, Right, OM, LAD WITH ENDOVEIN HARVEST, ON PUMP; Surgeon:LEONEL MG; Location: OR     COLONOSCOPY N/A 6/9/2016    Procedure: COLONOSCOPY;  Surgeon: Isidro Humphreys MD;  Location: [facility]     HERNIA REPAIR      infantile  hernia repair     ORTHOPEDIC SURGERY      Baker cyst removed - right knee, and mesiscus tear repair     REPAIR TENDON TRICEPS UPPER EXTREMITY Left 2017    Procedure: REPAIR TENDON TRICEPS UPPER EXTREMITY;  Surgeon: Rodriguez Kelley MD;  Location: [facility]       Social History     Tobacco Use     Smoking status: Former Smoker     Packs/day: 2.00     Years: 30.00     Pack years: 60.00     Last attempt to quit: 1997     Years since quittin.2     Smokeless tobacco: Never Used   Substance Use Topics     Alcohol use: Yes     Alcohol/week: 6.0 oz     Types: 12 Cans of beer per week     Comment: occassionally      Family History   Problem Relation Age of Onset     Heart Disease Father      Cancer Mother      C.A.D. Brother      Septicemia Brother 3     Lung Cancer Sister      Schizophrenia Sister          Current Outpatient Medications   Medication Sig Dispense Refill     amLODIPine (NORVASC) 10 MG tablet Take 1 tablet (10 mg) by mouth daily 90 tablet 3     ASPIRIN PO Take 81 mg by mouth daily       atorvastatin (LIPITOR) 40 MG tablet Take 1 tablet (40 mg) by mouth daily 90 tablet 3     blood glucose (NO BRAND SPECIFIED) lancets standard Use to test blood sugar 3 times daily or as directed. 1 Box 3     blood glucose monitoring (ACCU-CHEK SMARTVIEW) test strip Use to test blood sugar 3 times daily or as directed. 100 each 12     glipiZIDE (GLUCOTROL XL) 10 MG 24 hr tablet Take 2 tablets (20 mg) by mouth daily (with breakfast) 180 tablet 3     levothyroxine (SYNTHROID) 50 MCG tablet Take 1 tablet (50 mcg) by mouth daily 90 tablet 3     metFORMIN (GLUCOPHAGE) 500 MG tablet Take 2 tablets (1,000 mg) by mouth 2 times daily (with meals) 360 tablet 3     metoprolol tartrate (LOPRESSOR) 50 MG tablet Take 1 tablet (50 mg) by mouth 2 times daily 180 tablet 3     testosterone (ANDROGEL/TESTIM) 50 MG/5GM (1%) topical gel APPLY 1 PACKET (50MG) TOPICALLY DAILY. APPLY TO THE CLEAN DRY INTACT SKIN ON THE SHOULDERS UPPER  ARMS OR ABDOMEN. 150 g      valsartan (DIOVAN) 80 MG tablet Take 0.5 tablets (40 mg) by mouth daily 45 tablet 3     Allergies   Allergen Reactions     Hydrocodone-Acetaminophen Itching     Iodine      Irbesartan      renal insuff and hyperkalemia       Ivp Dye [Contrast Dye] Hives     Brief bout - 10 minutes duration     Lisinopril Nausea     Sulfa Drugs      Valsartan Difficulty breathing     Recent Labs   Lab Test 11/20/18  1101 04/10/18  0850 09/21/17  0827 07/28/17  0344 03/03/17  1109  05/29/15  1019 07/16/14  1113   A1C 7.0* 8.4* 6.2*  --  7.7*   < > 7.6*  --    LDL 77  --  81  --  113*   < > 111  --    HDL 57  --  66  --  59   < >  --   --    TRIG 152*  --  91  --  209*   < >  --   --    ALT  --   --   --  22  --   --  35 19   CR 0.86  --  0.87 1.17 0.98   < > 0.97 0.83   GFRESTIMATED 88  --  88 62 76   < > 78 >90   GFRESTBLACK >90  --  >90 75 >90  African American GFR Calc     < > >90   GFR Calc   >90   POTASSIUM 4.4  --  4.2 3.9 4.2   < > 4.5 5.5*   TSH 1.52  --  1.39  --  1.42   < > 1.11 0.86    < > = values in this interval not displayed.      BP Readings from Last 3 Encounters:   02/19/19 152/76   11/20/18 148/66   07/25/18 138/89    Wt Readings from Last 3 Encounters:   02/19/19 125.3 kg (276 lb 3.2 oz)   11/20/18 127 kg (280 lb)   07/25/18 126.6 kg (279 lb)         ROS:  Constitutional, HEENT, cardiovascular, pulmonary, gi and gu systems are negative, except as otherwise noted.    OBJECTIVE:                                                    /76   Pulse 54   Temp 97.1  F (36.2  C) (Tympanic)   Resp 12   Ht 1.829 m (6')   Wt 125.3 kg (276 lb 3.2 oz)   SpO2 96%   BMI 37.46 kg/m     GENERAL APPEARANCE ADULT: Alert, no acute distress, morbidly obese  MS: extremities normal, no peripheral edema  hip exam Hip appears normal, non-tender and normal range of motion, swelling-none, erythema-no, ecchymosis-no, deformity-no, tenderness over greater trochanter  SKIN: no suspicious  lesions or rashes  NEURO: Alert, oriented, speech and mentation normal  PSYCH: mentation appears normal., affect and mood normal  Diagnostic Test Results:  Results for orders placed or performed in visit on 02/19/19   Albumin Random Urine Quantitative with Creat Ratio   Result Value Ref Range    Creatinine Urine 151 mg/dL    Albumin Urine mg/L 1,620 mg/L    Albumin Urine mg/g Cr 1,072.85 (H) 0 - 17 mg/g Cr   Hemoglobin A1c   Result Value Ref Range    Hemoglobin A1C 8.5 (H) 0 - 5.6 %          ASSESSMENT/PLAN:                                                    1. Type 2 diabetes mellitus with complication, without long-term current use of insulin (H)  due for labs and refill, taking medication without difficulty.  Not at goal but cut back greatly on carbohydrates (beer).  Proteinuria still elevated.  May need nephrology assistance.  - blood glucose (NO BRAND SPECIFIED) lancets standard; Use to test blood sugar 3 times daily or as directed.  Dispense: 1 each; Refill: 11  - blood glucose (ACCU-CHEK SMARTVIEW) test strip; Use to test blood sugar 3 times daily or as directed.  Dispense: 100 each; Refill: 12  - glipiZIDE (GLUCOTROL XL) 10 MG 24 hr tablet; Take 2 tablets (20 mg) by mouth daily (with breakfast)  Dispense: 180 tablet; Refill: 3  - metFORMIN (GLUCOPHAGE) 500 MG tablet; Take 2 tablets (1,000 mg) by mouth 2 times daily (with meals)  Dispense: 360 tablet; Refill: 3  - Albumin Random Urine Quantitative with Creat Ratio  - Hemoglobin A1c    2. HTN, goal below 140/90  due for labs and refill, taking medication without difficulty  - US abdominal aorta limited; Future  - amLODIPine (NORVASC) 10 MG tablet; Take 1 tablet (10 mg) by mouth daily  Dispense: 90 tablet; Refill: 3  - metoprolol tartrate (LOPRESSOR) 50 MG tablet; Take 1 tablet (50 mg) by mouth 2 times daily  Dispense: 180 tablet; Refill: 3  - valsartan (DIOVAN) 80 MG tablet; Take 0.5 tablets (40 mg) by mouth daily  Dispense: 45 tablet; Refill: 3    3.  Hyperlipidemia LDL goal <70  due for labs and refill, taking medication without difficulty  - atorvastatin (LIPITOR) 40 MG tablet; Take 1 tablet (40 mg) by mouth daily  Dispense: 90 tablet; Refill: 3    4. Acquired hypothyroidism  due for labs and refill, taking medication without difficulty  - levothyroxine (SYNTHROID) 50 MCG tablet; Take 1 tablet (50 mcg) by mouth daily  Dispense: 90 tablet; Refill: 3    5. Trochanteric bursitis of right hip  Has seen Dr. POST and will return for possible steroid injection.3  - ORTHO  REFERRAL    Karen Ivory MD  [facility]

## 2019-02-19 ENCOUNTER — OFFICE VISIT (OUTPATIENT)
Dept: FAMILY MEDICINE | Facility: CLINIC | Age: 69
End: 2019-02-19
Payer: COMMERCIAL

## 2019-02-19 VITALS
OXYGEN SATURATION: 96 % | RESPIRATION RATE: 12 BRPM | SYSTOLIC BLOOD PRESSURE: 152 MMHG | TEMPERATURE: 97.1 F | HEART RATE: 54 BPM | WEIGHT: 276.2 LBS | DIASTOLIC BLOOD PRESSURE: 76 MMHG | HEIGHT: 72 IN | BODY MASS INDEX: 37.41 KG/M2

## 2019-02-19 DIAGNOSIS — E03.9 ACQUIRED HYPOTHYROIDISM: ICD-10-CM

## 2019-02-19 DIAGNOSIS — E11.8 TYPE 2 DIABETES MELLITUS WITH COMPLICATION, WITHOUT LONG-TERM CURRENT USE OF INSULIN (H): Primary | ICD-10-CM

## 2019-02-19 DIAGNOSIS — I10 HTN, GOAL BELOW 140/90: ICD-10-CM

## 2019-02-19 DIAGNOSIS — R80.1 PERSISTENT PROTEINURIA: ICD-10-CM

## 2019-02-19 DIAGNOSIS — E78.5 HYPERLIPIDEMIA LDL GOAL <70: ICD-10-CM

## 2019-02-19 DIAGNOSIS — M70.61 TROCHANTERIC BURSITIS OF RIGHT HIP: ICD-10-CM

## 2019-02-19 LAB
CREAT UR-MCNC: 151 MG/DL
HBA1C MFR BLD: 8.5 % (ref 0–5.6)
MICROALBUMIN UR-MCNC: 1620 MG/L
MICROALBUMIN/CREAT UR: 1072.85 MG/G CR (ref 0–17)

## 2019-02-19 PROCEDURE — 82043 UR ALBUMIN QUANTITATIVE: CPT | Performed by: FAMILY MEDICINE

## 2019-02-19 PROCEDURE — 99214 OFFICE O/P EST MOD 30 MIN: CPT | Performed by: FAMILY MEDICINE

## 2019-02-19 PROCEDURE — 36415 COLL VENOUS BLD VENIPUNCTURE: CPT | Performed by: FAMILY MEDICINE

## 2019-02-19 PROCEDURE — 83036 HEMOGLOBIN GLYCOSYLATED A1C: CPT | Performed by: FAMILY MEDICINE

## 2019-02-19 RX ORDER — VALSARTAN 80 MG/1
40 TABLET ORAL DAILY
Qty: 45 TABLET | Refills: 3 | Status: SHIPPED | OUTPATIENT
Start: 2019-02-19 | End: 2019-02-22

## 2019-02-19 RX ORDER — GLIPIZIDE 10 MG/1
20 TABLET, FILM COATED, EXTENDED RELEASE ORAL
Qty: 180 TABLET | Refills: 3 | Status: SHIPPED | OUTPATIENT
Start: 2019-02-19 | End: 2019-02-22

## 2019-02-19 RX ORDER — METOPROLOL TARTRATE 50 MG
50 TABLET ORAL 2 TIMES DAILY
Qty: 180 TABLET | Refills: 3 | Status: SHIPPED | OUTPATIENT
Start: 2019-02-19 | End: 2019-02-22

## 2019-02-19 RX ORDER — AMLODIPINE BESYLATE 10 MG/1
10 TABLET ORAL DAILY
Qty: 90 TABLET | Refills: 3 | Status: SHIPPED | OUTPATIENT
Start: 2019-02-19 | End: 2019-02-22

## 2019-02-19 RX ORDER — LEVOTHYROXINE SODIUM 50 UG/1
50 TABLET ORAL DAILY
Qty: 90 TABLET | Refills: 3 | Status: SHIPPED | OUTPATIENT
Start: 2019-02-19 | End: 2019-02-22

## 2019-02-19 RX ORDER — ATORVASTATIN CALCIUM 40 MG/1
40 TABLET, FILM COATED ORAL DAILY
Qty: 90 TABLET | Refills: 3 | Status: SHIPPED | OUTPATIENT
Start: 2019-02-19 | End: 2019-02-22

## 2019-02-19 ASSESSMENT — MIFFLIN-ST. JEOR: SCORE: 2060.83

## 2019-02-19 ASSESSMENT — PATIENT HEALTH QUESTIONNAIRE - PHQ9: SUM OF ALL RESPONSES TO PHQ QUESTIONS 1-9: 0

## 2019-02-19 NOTE — NURSING NOTE
Initial /76   Pulse 54   Temp 97.1  F (36.2  C) (Tympanic)   Resp 12   Ht 1.829 m (6')   Wt 125.3 kg (276 lb 3.2 oz)   SpO2 96%   BMI 37.46 kg/m   Estimated body mass index is 37.46 kg/m  as calculated from the following:    Height as of this encounter: 1.829 m (6').    Weight as of this encounter: 125.3 kg (276 lb 3.2 oz). .

## 2019-02-21 ENCOUNTER — TELEPHONE (OUTPATIENT)
Dept: FAMILY MEDICINE | Facility: CLINIC | Age: 69
End: 2019-02-21

## 2019-02-21 NOTE — TELEPHONE ENCOUNTER
Reason for Call:  Patient is calling asking why there is an Ultrasound ordered.    Detailed comments: Patient was seen 2/19/19 and he does not recall talking with Dr. Ivory about doing an US. He is asking why he has one and what is it for.    Phone Number Patient can be reached at: Home number on file [phone] (home)    Best Time: any    Can we leave a detailed message on this number? YES    Call taken on 2/21/2019 at 10:22 AM by Halina Santana

## 2019-02-21 NOTE — TELEPHONE ENCOUNTER
Patient was advised of US from health maintenance screening that Dr. Ivory ordered. He was notified that this is recommended based on his HTN.  Lizzy POST RN

## 2019-02-22 ENCOUNTER — TELEPHONE (OUTPATIENT)
Dept: FAMILY MEDICINE | Facility: CLINIC | Age: 69
End: 2019-02-22

## 2019-02-22 DIAGNOSIS — R80.9 PROTEINURIA: Primary | ICD-10-CM

## 2019-02-22 DIAGNOSIS — E78.5 HYPERLIPIDEMIA LDL GOAL <70: ICD-10-CM

## 2019-02-22 DIAGNOSIS — E03.9 ACQUIRED HYPOTHYROIDISM: ICD-10-CM

## 2019-02-22 DIAGNOSIS — I10 HTN, GOAL BELOW 140/90: ICD-10-CM

## 2019-02-22 DIAGNOSIS — E11.8 TYPE 2 DIABETES MELLITUS WITH COMPLICATION, WITHOUT LONG-TERM CURRENT USE OF INSULIN (H): ICD-10-CM

## 2019-02-22 RX ORDER — GLIPIZIDE 10 MG/1
20 TABLET, FILM COATED, EXTENDED RELEASE ORAL
Qty: 180 TABLET | Refills: 3 | Status: CANCELLED | OUTPATIENT
Start: 2019-02-22

## 2019-02-22 RX ORDER — METOPROLOL TARTRATE 50 MG
50 TABLET ORAL 2 TIMES DAILY
Qty: 180 TABLET | Refills: 3 | Status: SHIPPED | OUTPATIENT
Start: 2019-02-22 | End: 2020-04-09

## 2019-02-22 RX ORDER — VALSARTAN 80 MG/1
40 TABLET ORAL DAILY
Qty: 45 TABLET | Refills: 3 | Status: SHIPPED | OUTPATIENT
Start: 2019-02-22 | End: 2019-02-25

## 2019-02-22 RX ORDER — AMLODIPINE BESYLATE 10 MG/1
10 TABLET ORAL DAILY
Qty: 90 TABLET | Refills: 3 | Status: SHIPPED | OUTPATIENT
Start: 2019-02-22 | End: 2020-04-09

## 2019-02-22 RX ORDER — LEVOTHYROXINE SODIUM 50 UG/1
50 TABLET ORAL DAILY
Qty: 90 TABLET | Refills: 3 | Status: SHIPPED | OUTPATIENT
Start: 2019-02-22 | End: 2019-02-25

## 2019-02-22 RX ORDER — ATORVASTATIN CALCIUM 40 MG/1
40 TABLET, FILM COATED ORAL DAILY
Qty: 90 TABLET | Refills: 3 | Status: SHIPPED | OUTPATIENT
Start: 2019-02-22 | End: 2020-04-09

## 2019-02-22 RX ORDER — TESTOSTERONE 12.5 MG/1.25G
GEL TOPICAL
Qty: 150 G | Refills: 3 | Status: CANCELLED | OUTPATIENT
Start: 2019-02-22

## 2019-02-22 RX ORDER — GLIPIZIDE 10 MG/1
20 TABLET, FILM COATED, EXTENDED RELEASE ORAL
Qty: 180 TABLET | Refills: 3 | Status: SHIPPED | OUTPATIENT
Start: 2019-02-22 | End: 2020-04-09

## 2019-02-22 NOTE — TELEPHONE ENCOUNTER
Medication not listed on [facility] protocol. Will route to provider for consideration.    Ada LEAHY RN   [facility]

## 2019-02-22 NOTE — TELEPHONE ENCOUNTER
Wife called stating that patient was referred to a nephrologist however appt are out until 04/06; should he be seen sooner? He could see someone sooner however needs referral fax [facility] fax [phone].    Piedad POWELL  [facility]

## 2019-02-22 NOTE — TELEPHONE ENCOUNTER
"Request from [facility] to refill patient's glipizide.    Medication last filled and sent to [facility] on 2/19/19 with three refills giving 12 months supply.    Writer called [facility] to clarify.  They received prescription for this medication on 2/19/19.    [facility] stated that they were calling for clarification on the prescription.    Writer provided clarification.  Rupali Fuller Certified Pharmacy Technician at [facility] stated that they did not receive any medication prescriptions on 2/19/19 and they do not have current prescriptions for   Amlodipine, atorvastatin, blood glucose test strips, blood glucose monitor lancets, glipizide, levothyroxine, metformin, metoprolol tartrate or valsartan.      Writer resent all of these medications sigend as \"fullfillment\" per the intent of the 2/19/19 sending in our chart.     Valsartan did have an allergy alert, this was overridden as \"RN Reviewed\" per the intent of the 2/19/19 Valsartan order.    No further action necessary.  Encounter closed.    Kwame Mcmanus RN              "

## 2019-02-22 NOTE — TELEPHONE ENCOUNTER
Dr. Ivory,    Per 2/19/19 Albumin results: Notes recorded by Karen Ivory MD on 2/22/2019 at 6:59 AM CST  Elevated protein in urine needs 24 hour urine for protein and see nephrology, orders placed.  Please notify.        Thank you. KAREN IVORY MD  2/19/19  1:21 PM [id number]    Component Value Flag Ref Range Units Status Collected Lab   Creatinine Urine 151    mg/dL Final 02/19/2019  1:21 PM 59   Albumin Urine mg/L 1,620    mg/L Final 02/19/2019  1:21 PM 59   Albumin Urine mg/g Cr 1,072.85 Abnormally high   H  0 - 17 mg/g Cr Final 02/19/2019  1:21 PM        Is it okay to wait until 4/6/19 for patient to be seen?   -otherwise referral pending  Thanks,  Lizzy POST RN

## 2019-02-22 NOTE — TELEPHONE ENCOUNTER
"Requested Prescriptions   Pending Prescriptions Disp Refills     glipiZIDE (GLUCOTROL XL) 10 MG 24 hr tablet 180 tablet 3     Sig: Take 2 tablets (20 mg) by mouth daily (with breakfast)    Sulfonylurea Agents Failed - 2/22/2019  9:04 AM       Failed - Blood pressure less than 140/90 in past 6 months    BP Readings from Last 3 Encounters:   02/19/19 152/76   11/20/18 148/66   07/25/18 138/89                Passed - Patient has documented LDL within the past 12 mos.    Recent Labs   Lab Test 11/20/18  1101   LDL 77            Passed - Patient has had a Microalbumin in the past 12 mos.    Recent Labs   Lab Test 02/19/19  1321   MICROL 1,620   UMALCR 1,072.85*            Passed - Patient has documented A1c within the specified period of time.    If HgbA1C is 8 or greater, it needs to be on file within the past 3 months.  If less than 8, must be on file within the past 6 months.     Recent Labs   Lab Test 02/19/19  1320   A1C 8.5*            Passed - Medication is active on med list       Passed - Patient is age 18 or older       Passed - Patient has a recent creatinine (normal) within the past 12 mos.    Recent Labs   Lab Test 11/20/18  1101   CR 0.86            Passed - Recent (6 mo) or future (30 days) visit within the authorizing provider's specialty    Patient had office visit in the last 6 months or has a visit in the next 30 days with authorizing provider or within the authorizing provider's specialty.  See \"Patient Info\" tab in inbasket, or \"Choose Columns\" in Meds & Orders section of the refill encounter.            Last Written Prescription Date:  2/19/19  Last Fill Quantity: 180,  # refills: 3   Last office visit: 2/19/2019 with prescribing provider:  Cachorro   Future Office Visit:      "

## 2019-02-22 NOTE — RESULT ENCOUNTER NOTE
Elevated protein in urine needs 24 hour urine for protein and see nephrology, orders placed.  Please notify.        Thank you. FILEMON RYAN MD

## 2019-02-25 ENCOUNTER — TELEPHONE (OUTPATIENT)
Dept: FAMILY MEDICINE | Facility: CLINIC | Age: 69
End: 2019-02-25

## 2019-02-25 DIAGNOSIS — I10 HTN, GOAL BELOW 140/90: ICD-10-CM

## 2019-02-25 DIAGNOSIS — E03.9 ACQUIRED HYPOTHYROIDISM: ICD-10-CM

## 2019-02-25 RX ORDER — LEVOTHYROXINE SODIUM 50 UG/1
50 TABLET ORAL DAILY
Qty: 30 TABLET | Refills: 0 | Status: SHIPPED | OUTPATIENT
Start: 2019-02-25 | End: 2019-12-13

## 2019-02-25 RX ORDER — VALSARTAN 80 MG/1
40 TABLET ORAL DAILY
Qty: 15 TABLET | Refills: 0 | Status: SHIPPED | OUTPATIENT
Start: 2019-02-25 | End: 2019-07-09

## 2019-02-25 NOTE — TELEPHONE ENCOUNTER
Reason for Call:  Medication or medication refill:    Do you use a [facility]?  Name of the pharmacy and phone number for the current request:  [facility] [phone]    Name of the medication requested: Pt states that he is awaiting his refills from his mail order pharmacy and is going to run out of pills shortly.  He and is asking for a small refill of Levothyroxine and Valsartan to [facility] to get him through until he gets his mail order Rxs. No need to call patient back, unless there are questions or problems.      Other request:     Can we leave a detailed message on this number? YES    Phone number patient can be reached at: Home number on file [phone] (home)    Best Time: any    Call taken on 2/25/2019 at 2:26 PM by Allyssa Gotti

## 2019-02-27 PROCEDURE — 84156 ASSAY OF PROTEIN URINE: CPT | Performed by: FAMILY MEDICINE

## 2019-02-27 PROCEDURE — 81050 URINALYSIS VOLUME MEASURE: CPT | Performed by: FAMILY MEDICINE

## 2019-02-28 DIAGNOSIS — R80.1 PERSISTENT PROTEINURIA: ICD-10-CM

## 2019-02-28 LAB
COLLECT DURATION TIME UR: 24 H
CREAT 24H UR-MRATE: 1.31 G/(24.H) (ref 1–2)
CREAT UR-MCNC: 52 MG/DL
PROT 24H UR-MRATE: 2.95 G/(24.H) (ref 0.04–0.23)
PROT UR-MCNC: 1.16 G/L
PROT/CREAT 24H UR: 2.25 G/G CR (ref 0–0.2)
SPECIMEN VOL UR: 2550 ML

## 2019-03-01 DIAGNOSIS — R80.1 PERSISTENT PROTEINURIA: Primary | ICD-10-CM

## 2019-03-01 NOTE — RESULT ENCOUNTER NOTE
Urine protein too high, set up appointment with nephrology.  Please notify.        Thank you. FILEMON RYAN MD

## 2019-03-05 ENCOUNTER — DOCUMENTATION ONLY (OUTPATIENT)
Dept: CARE COORDINATION | Facility: CLINIC | Age: 69
End: 2019-03-05

## 2019-04-06 ENCOUNTER — OFFICE VISIT (OUTPATIENT)
Dept: NEPHROLOGY | Facility: CLINIC | Age: 69
End: 2019-04-06
Attending: FAMILY MEDICINE
Payer: COMMERCIAL

## 2019-04-06 VITALS
HEART RATE: 51 BPM | OXYGEN SATURATION: 98 % | DIASTOLIC BLOOD PRESSURE: 72 MMHG | HEIGHT: 72 IN | WEIGHT: 264 LBS | SYSTOLIC BLOOD PRESSURE: 128 MMHG | BODY MASS INDEX: 35.76 KG/M2

## 2019-04-06 DIAGNOSIS — R80.1 PERSISTENT PROTEINURIA: ICD-10-CM

## 2019-04-06 DIAGNOSIS — I10 HYPERTENSION GOAL BP (BLOOD PRESSURE) < 140/90: ICD-10-CM

## 2019-04-06 DIAGNOSIS — E11.8 TYPE 2 DIABETES MELLITUS WITH COMPLICATION, WITHOUT LONG-TERM CURRENT USE OF INSULIN (H): ICD-10-CM

## 2019-04-06 DIAGNOSIS — D64.9 ANEMIA, UNSPECIFIED TYPE: Primary | ICD-10-CM

## 2019-04-06 DIAGNOSIS — E03.9 HYPOTHYROIDISM, UNSPECIFIED TYPE: ICD-10-CM

## 2019-04-06 LAB
ALBUMIN SERPL-MCNC: 4.1 G/DL (ref 3.4–5)
ALBUMIN UR-MCNC: 100 MG/DL
ALP SERPL-CCNC: 127 U/L (ref 40–150)
ALT SERPL W P-5'-P-CCNC: 29 U/L (ref 0–70)
ANION GAP SERPL CALCULATED.3IONS-SCNC: 6 MMOL/L (ref 3–14)
APPEARANCE UR: CLEAR
AST SERPL W P-5'-P-CCNC: 26 U/L (ref 0–45)
BILIRUB SERPL-MCNC: 0.6 MG/DL (ref 0.2–1.3)
BILIRUB UR QL STRIP: NEGATIVE
BUN SERPL-MCNC: 23 MG/DL (ref 7–30)
CALCIUM SERPL-MCNC: 9.5 MG/DL (ref 8.5–10.1)
CHLORIDE SERPL-SCNC: 104 MMOL/L (ref 94–109)
CO2 SERPL-SCNC: 25 MMOL/L (ref 20–32)
COLOR UR AUTO: YELLOW
CREAT SERPL-MCNC: 0.89 MG/DL (ref 0.66–1.25)
CREAT UR-MCNC: 87 MG/DL
ERYTHROCYTE [DISTWIDTH] IN BLOOD BY AUTOMATED COUNT: 13.7 % (ref 10–15)
GFR SERPL CREATININE-BSD FRML MDRD: 88 ML/MIN/{1.73_M2}
GLUCOSE SERPL-MCNC: 124 MG/DL (ref 70–99)
GLUCOSE UR STRIP-MCNC: NEGATIVE MG/DL
HCT VFR BLD AUTO: 37.9 % (ref 40–53)
HGB BLD-MCNC: 12.3 G/DL (ref 13.3–17.7)
HGB UR QL STRIP: NEGATIVE
KETONES UR STRIP-MCNC: NEGATIVE MG/DL
LEUKOCYTE ESTERASE UR QL STRIP: NEGATIVE
MCH RBC QN AUTO: 31.1 PG (ref 26.5–33)
MCHC RBC AUTO-ENTMCNC: 32.5 G/DL (ref 31.5–36.5)
MCV RBC AUTO: 96 FL (ref 78–100)
MUCOUS THREADS #/AREA URNS LPF: PRESENT /LPF
NITRATE UR QL: NEGATIVE
PH UR STRIP: 6 PH (ref 5–7)
PLATELET # BLD AUTO: 237 10E9/L (ref 150–450)
POTASSIUM SERPL-SCNC: 5.2 MMOL/L (ref 3.4–5.3)
PROT SERPL-MCNC: 8.1 G/DL (ref 6.8–8.8)
PROT UR-MCNC: 0.68 G/L
PROT/CREAT 24H UR: 0.77 G/G CR (ref 0–0.2)
RBC # BLD AUTO: 3.96 10E12/L (ref 4.4–5.9)
RBC #/AREA URNS AUTO: <1 /HPF (ref 0–2)
SODIUM SERPL-SCNC: 136 MMOL/L (ref 133–144)
SOURCE: ABNORMAL
SP GR UR STRIP: 1.01 (ref 1–1.03)
UROBILINOGEN UR STRIP-MCNC: 0 MG/DL (ref 0–2)
WBC # BLD AUTO: 10.1 10E9/L (ref 4–11)
WBC #/AREA URNS AUTO: <1 /HPF (ref 0–5)

## 2019-04-06 PROCEDURE — 84156 ASSAY OF PROTEIN URINE: CPT | Performed by: INTERNAL MEDICINE

## 2019-04-06 PROCEDURE — G0463 HOSPITAL OUTPT CLINIC VISIT: HCPCS | Mod: ZF

## 2019-04-06 PROCEDURE — 36415 COLL VENOUS BLD VENIPUNCTURE: CPT | Performed by: INTERNAL MEDICINE

## 2019-04-06 PROCEDURE — 00000402 ZZHCL STATISTIC TOTAL PROTEIN: Performed by: INTERNAL MEDICINE

## 2019-04-06 PROCEDURE — 80053 COMPREHEN METABOLIC PANEL: CPT | Performed by: INTERNAL MEDICINE

## 2019-04-06 PROCEDURE — 83883 ASSAY NEPHELOMETRY NOT SPEC: CPT | Performed by: INTERNAL MEDICINE

## 2019-04-06 PROCEDURE — 85027 COMPLETE CBC AUTOMATED: CPT | Performed by: INTERNAL MEDICINE

## 2019-04-06 PROCEDURE — 84165 PROTEIN E-PHORESIS SERUM: CPT | Performed by: INTERNAL MEDICINE

## 2019-04-06 PROCEDURE — 81001 URINALYSIS AUTO W/SCOPE: CPT | Performed by: INTERNAL MEDICINE

## 2019-04-06 ASSESSMENT — PAIN SCALES - GENERAL: PAINLEVEL: NO PAIN (0)

## 2019-04-06 ASSESSMENT — MIFFLIN-ST. JEOR: SCORE: 2005.5

## 2019-04-06 NOTE — PROGRESS NOTES
April 6, 2019    I was asked to see this patient by Dr. Ivory for evaluation of proteinuria.     CC: proteinuria    HPI: Francois Lujan is a 68 year old male who presents for evaluation of proteinuria. Mr. Lujan's hx is significant for HTN dx at a very young age and diabetes dx in 1996. His diabetes is not complicated by retinopathy as far as he knows but has not been seen by ophthalmology for 3 years. Addt hx includes CABG in 2011. On review of creatinine readings, it has varied from ~ 0.8-1.1 dating back to 2010. UAs In the past have been without hematuria but proteinuria has been present. UMACR under 150 mg/g from 2013 to 2016 but then increased since 2016 with the most recent at 1072 mg/g. He is currently taking valsartan 40 mg daily; had some hyperkalemia in 2014. Addt hx includes heavy etoh intake as he worked at a [employer]. In the past month he has made significant changes to his lifestyle with cutting back on etoh as much as 95% and also following nutrisystem weight loss plan. He reports 20 lbs weight loss in the past month. He also has a hx of gout - he feels foods are a direct trigger - was previously on allopurinol but stopped it as gout was not an issue. He feels this will continue to improve with dietary  Changes he has made.     - History of Hematuria: no  - Swelling: noted a month ago but otherwise not an issue  - Hx of UTIs: no  - Hx of stones: no  - Rashes/Joint pain: has hx of erythema nodosum in 1982, chronic back pain/joint pain - works [employer]  - Family hx of kidney disease: unknown - parents passed at a young age  - NSAID use: he will use naproxen only when the pain is severe enough. He has never used large amounts of NSAIDs.        Allergies   Allergen Reactions     Hydrocodone-Acetaminophen Itching     Iodine      Irbesartan      renal insuff and hyperkalemia       Ivp Dye [Contrast Dye] Hives     Brief bout - 10 minutes duration     Lisinopril Nausea     Sulfa Drugs      Valsartan  Difficulty breathing         Current Outpatient Medications on File Prior to Visit:  amLODIPine (NORVASC) 10 MG tablet Take 1 tablet (10 mg) by mouth daily   ASPIRIN PO Take 81 mg by mouth daily   atorvastatin (LIPITOR) 40 MG tablet Take 1 tablet (40 mg) by mouth daily   blood glucose (ACCU-CHEK SMARTVIEW) test strip Use to test blood sugar 3 times daily or as directed.   blood glucose (NO BRAND SPECIFIED) lancets standard Use to test blood sugar 3 times daily or as directed.   glipiZIDE (GLUCOTROL XL) 10 MG 24 hr tablet Take 2 tablets (20 mg) by mouth daily (with breakfast)   levothyroxine (SYNTHROID) 50 MCG tablet Take 1 tablet (50 mcg) by mouth daily   metFORMIN (GLUCOPHAGE) 500 MG tablet Take 2 tablets (1,000 mg) by mouth 2 times daily (with meals)   metoprolol tartrate (LOPRESSOR) 50 MG tablet Take 1 tablet (50 mg) by mouth 2 times daily   testosterone (ANDROGEL/TESTIM) 50 MG/5GM (1%) topical gel APPLY 1 PACKET (50MG) TOPICALLY DAILY. APPLY TO THE CLEAN DRY INTACT SKIN ON THE SHOULDERS UPPER ARMS OR ABDOMEN.   valsartan (DIOVAN) 80 MG tablet Take 0.5 tablets (40 mg) by mouth daily     No current facility-administered medications on file prior to visit.     Past Medical History:   Diagnosis Date     Arthritis      CAD (coronary artery disease)      Diabetes mellitus (H)      Erythema nodosum 1982     Gout      Hypertension      Malignant neoplasm (H)     squamous cell CA removed from right hand     Thyroid disease        Past Surgical History:   Procedure Laterality Date     BACK SURGERY       BYPASS GRAFT ARTERY CORONARY  11/17/2011    Procedure:BYPASS GRAFT ARTERY CORONARY; CORONARY ARTERY BYPASS, Right, OM, LAD WITH ENDOVEIN HARVEST, ON PUMP; Surgeon:LEONEL MG; Location: OR     COLONOSCOPY N/A 6/9/2016    Procedure: COLONOSCOPY;  Surgeon: Isidro Humphreys MD;  Location: [facility]     HERNIA REPAIR      infantile hernia repair     ORTHOPEDIC SURGERY      Baker cyst removed - right knee, and mesiscus tear  repair     REPAIR TENDON TRICEPS UPPER EXTREMITY Left 2017    Procedure: REPAIR TENDON TRICEPS UPPER EXTREMITY;  Surgeon: Rodriguez Kelley MD;  Location: [facility]       Social History     Tobacco Use     Smoking status: Former Smoker     Packs/day: 2.00     Years: 30.00     Pack years: 60.00     Last attempt to quit: 1997     Years since quittin.4     Smokeless tobacco: Never Used   Substance Use Topics     Alcohol use: Yes     Alcohol/week: 6.0 oz     Types: 12 Cans of beer per week     Comment: occassionally      Drug use: No       Family History   Problem Relation Age of Onset     Heart Disease Father      Cancer Mother      C.A.D. Brother      Septicemia Brother 3     Lung Cancer Sister      Schizophrenia Sister        ROS: A 12 system review of systems was negative other than noted here or above.     Exam:  /72   Pulse 51   Ht 1.829 m (6')   Wt 119.7 kg (264 lb)   SpO2 98%   BMI 35.80 kg/m      GENERAL APPEARANCE: alert and no distress  EYES: PERRL, no scleral icterus  HENT: mouth without ulcers or lesions  NECK: supple, no adenopathy  RESP: lungs clear to auscultation   CV: regular rhythm, normal rate, no rub  Extremities: no clubbing, cyanosis, trace edema bilaterally  SKIN: no rash  NEURO: mentation intact and speech normal  PSYCH: affect normal/bright    Results:    Orders Only on 2019   Component Date Value Ref Range Status     WBC 2019 10.1  4.0 - 11.0 10e9/L Final     RBC Count 2019 3.96* 4.4 - 5.9 10e12/L Final     Hemoglobin 2019 12.3* 13.3 - 17.7 g/dL Final     Hematocrit 2019 37.9* 40.0 - 53.0 % Final     MCV 2019 96  78 - 100 fl Final     MCH 2019 31.1  26.5 - 33.0 pg Final     MCHC 2019 32.5  31.5 - 36.5 g/dL Final     RDW 2019 13.7  10.0 - 15.0 % Final     Platelet Count 2019 237  150 - 450 10e9/L Final     Color Urine 2019 Yellow   Final     Appearance Urine 2019 Clear   Final     Glucose Urine  04/06/2019 Negative  NEG^Negative mg/dL Final     Bilirubin Urine 04/06/2019 Negative  NEG^Negative Final     Ketones Urine 04/06/2019 Negative  NEG^Negative mg/dL Final     Specific Gravity Urine 04/06/2019 1.015  1.003 - 1.035 Final     Blood Urine 04/06/2019 Negative  NEG^Negative Final     pH Urine 04/06/2019 6.0  5.0 - 7.0 pH Final     Protein Albumin Urine 04/06/2019 100* NEG^Negative mg/dL Final     Urobilinogen mg/dL 04/06/2019 0.0  0.0 - 2.0 mg/dL Final     Nitrite Urine 04/06/2019 Negative  NEG^Negative Final     Leukocyte Esterase Urine 04/06/2019 Negative  NEG^Negative Final     Source 04/06/2019 Midstream Urine   Final     RBC Urine 04/06/2019 <1  0 - 2 /HPF Final     WBC Urine 04/06/2019 <1  0 - 5 /HPF Final     Mucous Urine 04/06/2019 Present* NEG^Negative /LPF Final     Sodium 04/06/2019 136  133 - 144 mmol/L Final     Potassium 04/06/2019 5.2  3.4 - 5.3 mmol/L Final     Chloride 04/06/2019 104  94 - 109 mmol/L Final     Carbon Dioxide 04/06/2019 PENDING  20 - 32 mmol/L Incomplete     Anion Gap 04/06/2019 PENDING  3 - 14 mmol/L Incomplete     Glucose 04/06/2019 PENDING  70 - 99 mg/dL Incomplete     Urea Nitrogen 04/06/2019 PENDING  7 - 30 mg/dL Incomplete     Creatinine 04/06/2019 PENDING  0.66 - 1.25 mg/dL Incomplete     GFR Estimate 04/06/2019 PENDING  >60 mL/min/[1.73_m2] Incomplete     GFR Estimate If Black 04/06/2019 PENDING  >60 mL/min/[1.73_m2] Incomplete     Calcium 04/06/2019 PENDING  8.5 - 10.1 mg/dL Incomplete     Bilirubin Total 04/06/2019 PENDING  0.2 - 1.3 mg/dL Incomplete     Albumin 04/06/2019 PENDING  3.4 - 5.0 g/dL Incomplete     Protein Total 04/06/2019 PENDING  6.8 - 8.8 g/dL Incomplete     Alkaline Phosphatase 04/06/2019 PENDING  40 - 150 U/L Incomplete     ALT 04/06/2019 PENDING  0 - 70 U/L Incomplete     AST 04/06/2019 PENDING  0 - 45 U/L Incomplete          Assessment/Plan:   1. CKD Stage 2: In the setting of diabetes, need to consider diabetic nephropathy. I would like to  optimize his ARB and see how much the urine protein can be decreased. In regards to the potential of another GN occurring, there is no hematuria which is reassuring. I will rule out myeloma today. Kidneys were commented as normal in imaging from 2017.   - Increasing valsartan to 80 mg daily today.   - Labs in 2 weeks to recheck potassium  - He will keep me updated on BP readings and we will continue to titrate the valsartan as able with hopes of minimizing proteinuria    2. Hypertension: at goal of <130/80 today but would like to increase valsartan. Will increase to 80 mg daily today. With this increase, will decrease amlodipine to 5 mg daily to avoid hypotension. Recommend labs in 2 weeks and to send a BP update at that time. Ideally, we will continue to titrate the valsartan as tolerated to max dosing - this may mean minimizing metoprolol and potentially stopping norvasc but we will follow his BP readings and adjust as needed.     3. DM: last A1C 8.5% - encouraged improved diabetes control    4. Hypothyroidism: TSH well controlled at 1.52 in Nov.     5. Hyperlipidemia: already on statin therapy.     Patient Instructions     1. Increase valsartan to 80 mg daily  2. Decrease norvasc (amlodipine) to 5 mg daily  3. Follow blood pressure at home and update me on your readings in 2 weeks  4. Repeat labs in 2 weeks in [place]  5. Monitor potassium intake   6. Goal blood pressure 110-130/ less than 80.   7. Follow-up in 4-6 months in [place].       Patient Education     Discharge Instructions: Eating a Low-Potassium Diet  Your healthcare provider has prescribed a low-potassium diet for you. This kind of diet is advised for people who have certain kidney problems. Potassium is needed for muscle function. But too much potassium is a health risk. Potassium is found in many foods. Read below to find out how to change your diet.  Foods to limit  Some foods are high in potassium. Limit your daily intake of the foods in the  list below.    Fruits: apricots (canned and fresh), bananas, cantaloupe, honeydew melon, kiwi, nectarines, pomegranates, oranges, orange juice, pears, dried fruits (apricots, dates, figs, prunes), and prune juice    Vegetables: asparagus, avocado, artichoke, bamboo shoots, beets, brussels sprouts, cabbage, celery, chard, okra, potatoes (white and sweet), pumpkin, rutabaga, spinach (cooked), squash, tomato, tomato sauce, tomato juice, and vegetable juice cocktail    Legumes: black-eyed peas, chickpeas, lentils, lima beans, navy beans, red kidney beans, soybeans, and split peas    Nuts and seeds: almonds, Brazil nuts, cashews, peanuts, peanut butter, pecans, pumpkin seeds, sunflower seeds, and walnuts    Breads and cereals: bran and whole-grain products    Dairy foods: milk, cheese, ice cream, yogurt    Animal protein: all forms of animal protein    Other: chocolate, cocoa, coconut milk, and molasses  Tips    Ask your healthcare provider how much potassium you are allowed each day. This will help you figure out serving sizes for your needs.    Check labels for potassium. It may be listed as potassium chloride.    Do not use salt substitutes. These often have potassium in them.    Cook frozen fruits and vegetables in water. Rinse and drain them well before eating.    Drain liquid from all canned fruits and vegetables. Rinse them before eating.    Reduce the potassium in potatoes. Peel them, slice thinly, and soak in water for at least 4 hours.    Reduce the potassium in green leafy vegetables. Soak them in water for at least 4 hours.    Eat white rice and refined white flour products. These include white bread, pasta, and grits.  Follow-up  Make a follow-up appointment as advised by your healthcare provider.  When to call your healthcare provider  Call your healthcare provider right away if you have any of the following:    Fatigue    Shortness of breath    Chest pain    Slow, irregular  heartbeat    Fainting    Dizziness    Lightheadedness    Confusion   Date Last Reviewed: 6/1/2017 2000-2018 [employer]. [address] [facility], [place] [address]. All rights reserved. This information is not intended as a substitute for professional medical care. Always follow your healthcare professional's instructions.                Nena Zepeda, DO

## 2019-04-06 NOTE — NURSING NOTE
Chief Complaint   Patient presents with     Consult     proteinuria consult     Blood pressure 128/72, pulse 51, height 1.829 m (6'), weight 119.7 kg (264 lb), SpO2 98 %.    Chuck Jhaveri CMA on 4/6/2019 at 9:50 AM

## 2019-04-06 NOTE — LETTER
4/6/2019       RE: Francois Lujan  [address]  [place] [address]     Dear Colleague,    Thank you for referring your patient, Francois Lujan, to the [facility] NEPHROLOGY at [facility]. Please see a copy of my visit note below.    April 6, 2019    I was asked to see this patient by Dr. Ivory for evaluation of proteinuria.     CC: proteinuria    HPI: Francois Lujan is a 68 year old male who presents for evaluation of proteinuria. Mr. Lujan's hx is significant for HTN dx at a very young age and diabetes dx in 1996. His diabetes is not complicated by retinopathy as far as he knows but has not been seen by ophthalmology for 3 years. Addt hx includes CABG in 2011. On review of creatinine readings, it has varied from ~ 0.8-1.1 dating back to 2010. UAs In the past have been without hematuria but proteinuria has been present. UMACR under 150 mg/g from 2013 to 2016 but then increased since 2016 with the most recent at 1072 mg/g. He is currently taking valsartan 40 mg daily; had some hyperkalemia in 2014. Addt hx includes heavy etoh intake as he worked at a [employer]. In the past month he has made significant changes to his lifestyle with cutting back on etoh as much as 95% and also following nutrisystem weight loss plan. He reports 20 lbs weight loss in the past month. He also has a hx of gout - he feels foods are a direct trigger - was previously on allopurinol but stopped it as gout was not an issue. He feels this will continue to improve with dietary  Changes he has made.     - History of Hematuria: no  - Swelling: noted a month ago but otherwise not an issue  - Hx of UTIs: no  - Hx of stones: no  - Rashes/Joint pain: has hx of erythema nodosum in 1982, chronic back pain/joint pain - works [employer]  - Family hx of kidney disease: unknown - parents passed at a young age  - NSAID use: he will use naproxen only when the pain is severe enough. He has never used large amounts of  NSAIDs.        Allergies   Allergen Reactions     Hydrocodone-Acetaminophen Itching     Iodine      Irbesartan      renal insuff and hyperkalemia       Ivp Dye [Contrast Dye] Hives     Brief bout - 10 minutes duration     Lisinopril Nausea     Sulfa Drugs      Valsartan Difficulty breathing         Current Outpatient Medications on File Prior to Visit:  amLODIPine (NORVASC) 10 MG tablet Take 1 tablet (10 mg) by mouth daily   ASPIRIN PO Take 81 mg by mouth daily   atorvastatin (LIPITOR) 40 MG tablet Take 1 tablet (40 mg) by mouth daily   blood glucose (ACCU-CHEK SMARTVIEW) test strip Use to test blood sugar 3 times daily or as directed.   blood glucose (NO BRAND SPECIFIED) lancets standard Use to test blood sugar 3 times daily or as directed.   glipiZIDE (GLUCOTROL XL) 10 MG 24 hr tablet Take 2 tablets (20 mg) by mouth daily (with breakfast)   levothyroxine (SYNTHROID) 50 MCG tablet Take 1 tablet (50 mcg) by mouth daily   metFORMIN (GLUCOPHAGE) 500 MG tablet Take 2 tablets (1,000 mg) by mouth 2 times daily (with meals)   metoprolol tartrate (LOPRESSOR) 50 MG tablet Take 1 tablet (50 mg) by mouth 2 times daily   testosterone (ANDROGEL/TESTIM) 50 MG/5GM (1%) topical gel APPLY 1 PACKET (50MG) TOPICALLY DAILY. APPLY TO THE CLEAN DRY INTACT SKIN ON THE SHOULDERS UPPER ARMS OR ABDOMEN.   valsartan (DIOVAN) 80 MG tablet Take 0.5 tablets (40 mg) by mouth daily     No current facility-administered medications on file prior to visit.     Past Medical History:   Diagnosis Date     Arthritis      CAD (coronary artery disease)      Diabetes mellitus (H)      Erythema nodosum 1982     Gout      Hypertension      Malignant neoplasm (H)     squamous cell CA removed from right hand     Thyroid disease        Past Surgical History:   Procedure Laterality Date     BACK SURGERY       BYPASS GRAFT ARTERY CORONARY  11/17/2011    Procedure:BYPASS GRAFT ARTERY CORONARY; CORONARY ARTERY BYPASS, Right, OM, LAD WITH ENDOVEIN HARVEST, ON PUMP;  Surgeon:LEONEL MG; Location: OR     COLONOSCOPY N/A 2016    Procedure: COLONOSCOPY;  Surgeon: Isidro Humphreys MD;  Location: [facility]     HERNIA REPAIR      infantile hernia repair     ORTHOPEDIC SURGERY      Baker cyst removed - right knee, and mesiscus tear repair     REPAIR TENDON TRICEPS UPPER EXTREMITY Left 2017    Procedure: REPAIR TENDON TRICEPS UPPER EXTREMITY;  Surgeon: Rodriguez Kelley MD;  Location: [facility]       Social History     Tobacco Use     Smoking status: Former Smoker     Packs/day: 2.00     Years: 30.00     Pack years: 60.00     Last attempt to quit: 1997     Years since quittin.4     Smokeless tobacco: Never Used   Substance Use Topics     Alcohol use: Yes     Alcohol/week: 6.0 oz     Types: 12 Cans of beer per week     Comment: occassionally      Drug use: No       Family History   Problem Relation Age of Onset     Heart Disease Father      Cancer Mother      C.A.D. Brother      Septicemia Brother 3     Lung Cancer Sister      Schizophrenia Sister        ROS: A 12 system review of systems was negative other than noted here or above.     Exam:  /72   Pulse 51   Ht 1.829 m (6')   Wt 119.7 kg (264 lb)   SpO2 98%   BMI 35.80 kg/m       GENERAL APPEARANCE: alert and no distress  EYES: PERRL, no scleral icterus  HENT: mouth without ulcers or lesions  NECK: supple, no adenopathy  RESP: lungs clear to auscultation   CV: regular rhythm, normal rate, no rub  Extremities: no clubbing, cyanosis, trace edema bilaterally  SKIN: no rash  NEURO: mentation intact and speech normal  PSYCH: affect normal/bright    Results:    Orders Only on 2019   Component Date Value Ref Range Status     WBC 2019 10.1  4.0 - 11.0 10e9/L Final     RBC Count 2019 3.96* 4.4 - 5.9 10e12/L Final     Hemoglobin 2019 12.3* 13.3 - 17.7 g/dL Final     Hematocrit 2019 37.9* 40.0 - 53.0 % Final     MCV 2019 96  78 - 100 fl Final     MCH 2019 31.1  26.5 - 33.0 pg  Final     MCHC 04/06/2019 32.5  31.5 - 36.5 g/dL Final     RDW 04/06/2019 13.7  10.0 - 15.0 % Final     Platelet Count 04/06/2019 237  150 - 450 10e9/L Final     Color Urine 04/06/2019 Yellow   Final     Appearance Urine 04/06/2019 Clear   Final     Glucose Urine 04/06/2019 Negative  NEG^Negative mg/dL Final     Bilirubin Urine 04/06/2019 Negative  NEG^Negative Final     Ketones Urine 04/06/2019 Negative  NEG^Negative mg/dL Final     Specific Gravity Urine 04/06/2019 1.015  1.003 - 1.035 Final     Blood Urine 04/06/2019 Negative  NEG^Negative Final     pH Urine 04/06/2019 6.0  5.0 - 7.0 pH Final     Protein Albumin Urine 04/06/2019 100* NEG^Negative mg/dL Final     Urobilinogen mg/dL 04/06/2019 0.0  0.0 - 2.0 mg/dL Final     Nitrite Urine 04/06/2019 Negative  NEG^Negative Final     Leukocyte Esterase Urine 04/06/2019 Negative  NEG^Negative Final     Source 04/06/2019 Midstream Urine   Final     RBC Urine 04/06/2019 <1  0 - 2 /HPF Final     WBC Urine 04/06/2019 <1  0 - 5 /HPF Final     Mucous Urine 04/06/2019 Present* NEG^Negative /LPF Final     Sodium 04/06/2019 136  133 - 144 mmol/L Final     Potassium 04/06/2019 5.2  3.4 - 5.3 mmol/L Final     Chloride 04/06/2019 104  94 - 109 mmol/L Final     Carbon Dioxide 04/06/2019 PENDING  20 - 32 mmol/L Incomplete     Anion Gap 04/06/2019 PENDING  3 - 14 mmol/L Incomplete     Glucose 04/06/2019 PENDING  70 - 99 mg/dL Incomplete     Urea Nitrogen 04/06/2019 PENDING  7 - 30 mg/dL Incomplete     Creatinine 04/06/2019 PENDING  0.66 - 1.25 mg/dL Incomplete     GFR Estimate 04/06/2019 PENDING  >60 mL/min/[1.73_m2] Incomplete     GFR Estimate If Black 04/06/2019 PENDING  >60 mL/min/[1.73_m2] Incomplete     Calcium 04/06/2019 PENDING  8.5 - 10.1 mg/dL Incomplete     Bilirubin Total 04/06/2019 PENDING  0.2 - 1.3 mg/dL Incomplete     Albumin 04/06/2019 PENDING  3.4 - 5.0 g/dL Incomplete     Protein Total 04/06/2019 PENDING  6.8 - 8.8 g/dL Incomplete     Alkaline Phosphatase 04/06/2019  PENDING  40 - 150 U/L Incomplete     ALT 04/06/2019 PENDING  0 - 70 U/L Incomplete     AST 04/06/2019 PENDING  0 - 45 U/L Incomplete          Assessment/Plan:   1. CKD Stage 2: In the setting of diabetes, need to consider diabetic nephropathy. I would like to optimize his ARB and see how much the urine protein can be decreased. In regards to the potential of another GN occurring, there is no hematuria which is reassuring. I will rule out myeloma today. Kidneys were commented as normal in imaging from 2017.   - Increasing valsartan to 80 mg daily today.   - Labs in 2 weeks to recheck potassium  - He will keep me updated on BP readings and we will continue to titrate the valsartan as able with hopes of minimizing proteinuria    2. Hypertension: at goal of <130/80 today but would like to increase valsartan. Will increase to 80 mg daily today. With this increase, will decrease amlodipine to 5 mg daily to avoid hypotension. Recommend labs in 2 weeks and to send a BP update at that time. Ideally, we will continue to titrate the valsartan as tolerated to max dosing - this may mean minimizing metoprolol and potentially stopping norvasc but we will follow his BP readings and adjust as needed.     3. DM: last A1C 8.5% - encouraged improved diabetes control    4. Hypothyroidism: TSH well controlled at 1.52 in Nov.     5. Hyperlipidemia: already on statin therapy.     Patient Instructions     1. Increase valsartan to 80 mg daily  2. Decrease norvasc (amlodipine) to 5 mg daily  3. Follow blood pressure at home and update me on your readings in 2 weeks  4. Repeat labs in 2 weeks in [place]  5. Monitor potassium intake   6. Goal blood pressure 110-130/ less than 80.   7. Follow-up in 4-6 months in [place].       Patient Education     Discharge Instructions: Eating a Low-Potassium Diet  Your healthcare provider has prescribed a low-potassium diet for you. This kind of diet is advised for people who have certain kidney problems.  Potassium is needed for muscle function. But too much potassium is a health risk. Potassium is found in many foods. Read below to find out how to change your diet.  Foods to limit  Some foods are high in potassium. Limit your daily intake of the foods in the list below.    Fruits: apricots (canned and fresh), bananas, cantaloupe, honeydew melon, kiwi, nectarines, pomegranates, oranges, orange juice, pears, dried fruits (apricots, dates, figs, prunes), and prune juice    Vegetables: asparagus, avocado, artichoke, bamboo shoots, beets, brussels sprouts, cabbage, celery, chard, okra, potatoes (white and sweet), pumpkin, rutabaga, spinach (cooked), squash, tomato, tomato sauce, tomato juice, and vegetable juice cocktail    Legumes: black-eyed peas, chickpeas, lentils, lima beans, navy beans, red kidney beans, soybeans, and split peas    Nuts and seeds: almonds, Brazil nuts, cashews, peanuts, peanut butter, pecans, pumpkin seeds, sunflower seeds, and walnuts    Breads and cereals: bran and whole-grain products    Dairy foods: milk, cheese, ice cream, yogurt    Animal protein: all forms of animal protein    Other: chocolate, cocoa, coconut milk, and molasses  Tips    Ask your healthcare provider how much potassium you are allowed each day. This will help you figure out serving sizes for your needs.    Check labels for potassium. It may be listed as potassium chloride.    Do not use salt substitutes. These often have potassium in them.    Cook frozen fruits and vegetables in water. Rinse and drain them well before eating.    Drain liquid from all canned fruits and vegetables. Rinse them before eating.    Reduce the potassium in potatoes. Peel them, slice thinly, and soak in water for at least 4 hours.    Reduce the potassium in green leafy vegetables. Soak them in water for at least 4 hours.    Eat white rice and refined white flour products. These include white bread, pasta, and grits.  Follow-up  Make a follow-up  appointment as advised by your healthcare provider.  When to call your healthcare provider  Call your healthcare provider right away if you have any of the following:    Fatigue    Shortness of breath    Chest pain    Slow, irregular heartbeat    Fainting    Dizziness    Lightheadedness    Confusion   Date Last Reviewed: 6/1/2017 2000-2018 [employer]. [address] [place], [place] [id number]. All rights reserved. This information is not intended as a substitute for professional medical care. Always follow your healthcare professional's instructions.                Nena Zepeda, DO     Again, thank you for allowing me to participate in the care of your patient.      Sincerely,    Nena Zepeda MD

## 2019-04-06 NOTE — LETTER
4/6/2019      RE: Francois Lujan  [address]  [facility] [address]       April 6, 2019    I was asked to see this patient by Dr. Ivory for evaluation of proteinuria.     CC: proteinuria    HPI: Francois Lujan is a 68 year old male who presents for evaluation of proteinuria. Mr. Lujan's hx is significant for HTN dx at a very young age and diabetes dx in 1996. His diabetes is not complicated by retinopathy as far as he knows but has not been seen by ophthalmology for 3 years. Addt hx includes CABG in 2011. On review of creatinine readings, it has varied from ~ 0.8-1.1 dating back to 2010. UAs In the past have been without hematuria but proteinuria has been present. UMACR under 150 mg/g from 2013 to 2016 but then increased since 2016 with the most recent at 1072 mg/g. He is currently taking valsartan 40 mg daily; had some hyperkalemia in 2014. Addt hx includes heavy etoh intake as he worked at a [employer]. In the past month he has made significant changes to his lifestyle with cutting back on etoh as much as 95% and also following nutrisystem weight loss plan. He reports 20 lbs weight loss in the past month. He also has a hx of gout - he feels foods are a direct trigger - was previously on allopurinol but stopped it as gout was not an issue. He feels this will continue to improve with dietary  Changes he has made.     - History of Hematuria: no  - Swelling: noted a month ago but otherwise not an issue  - Hx of UTIs: no  - Hx of stones: no  - Rashes/Joint pain: has hx of erythema nodosum in 1982, chronic back pain/joint pain - works [employer]  - Family hx of kidney disease: unknown - parents passed at a young age  - NSAID use: he will use naproxen only when the pain is severe enough. He has never used large amounts of NSAIDs.        Allergies   Allergen Reactions     Hydrocodone-Acetaminophen Itching     Iodine      Irbesartan      renal insuff and hyperkalemia       Ivp Dye [Contrast Dye] Hives     Brief bout  - 10 minutes duration     Lisinopril Nausea     Sulfa Drugs      Valsartan Difficulty breathing         Current Outpatient Medications on File Prior to Visit:  amLODIPine (NORVASC) 10 MG tablet Take 1 tablet (10 mg) by mouth daily   ASPIRIN PO Take 81 mg by mouth daily   atorvastatin (LIPITOR) 40 MG tablet Take 1 tablet (40 mg) by mouth daily   blood glucose (ACCU-CHEK SMARTVIEW) test strip Use to test blood sugar 3 times daily or as directed.   blood glucose (NO BRAND SPECIFIED) lancets standard Use to test blood sugar 3 times daily or as directed.   glipiZIDE (GLUCOTROL XL) 10 MG 24 hr tablet Take 2 tablets (20 mg) by mouth daily (with breakfast)   levothyroxine (SYNTHROID) 50 MCG tablet Take 1 tablet (50 mcg) by mouth daily   metFORMIN (GLUCOPHAGE) 500 MG tablet Take 2 tablets (1,000 mg) by mouth 2 times daily (with meals)   metoprolol tartrate (LOPRESSOR) 50 MG tablet Take 1 tablet (50 mg) by mouth 2 times daily   testosterone (ANDROGEL/TESTIM) 50 MG/5GM (1%) topical gel APPLY 1 PACKET (50MG) TOPICALLY DAILY. APPLY TO THE CLEAN DRY INTACT SKIN ON THE SHOULDERS UPPER ARMS OR ABDOMEN.   valsartan (DIOVAN) 80 MG tablet Take 0.5 tablets (40 mg) by mouth daily     No current facility-administered medications on file prior to visit.     Past Medical History:   Diagnosis Date     Arthritis      CAD (coronary artery disease)      Diabetes mellitus (H)      Erythema nodosum 1982     Gout      Hypertension      Malignant neoplasm (H)     squamous cell CA removed from right hand     Thyroid disease        Past Surgical History:   Procedure Laterality Date     BACK SURGERY       BYPASS GRAFT ARTERY CORONARY  11/17/2011    Procedure:BYPASS GRAFT ARTERY CORONARY; CORONARY ARTERY BYPASS, Right, OM, LAD WITH ENDOVEIN HARVEST, ON PUMP; Surgeon:LEONEL MG; Location: OR     COLONOSCOPY N/A 6/9/2016    Procedure: COLONOSCOPY;  Surgeon: Isidro Humphreys MD;  Location: [facility]     HERNIA REPAIR      infantile hernia repair      ORTHOPEDIC SURGERY      Baker cyst removed - right knee, and mesiscus tear repair     REPAIR TENDON TRICEPS UPPER EXTREMITY Left 2017    Procedure: REPAIR TENDON TRICEPS UPPER EXTREMITY;  Surgeon: Rodriguez Kelley MD;  Location: [facility]       Social History     Tobacco Use     Smoking status: Former Smoker     Packs/day: 2.00     Years: 30.00     Pack years: 60.00     Last attempt to quit: 1997     Years since quittin.4     Smokeless tobacco: Never Used   Substance Use Topics     Alcohol use: Yes     Alcohol/week: 6.0 oz     Types: 12 Cans of beer per week     Comment: occassionally      Drug use: No       Family History   Problem Relation Age of Onset     Heart Disease Father      Cancer Mother      C.A.D. Brother      Septicemia Brother 3     Lung Cancer Sister      Schizophrenia Sister        ROS: A 12 system review of systems was negative other than noted here or above.     Exam:  /72   Pulse 51   Ht 1.829 m (6')   Wt 119.7 kg (264 lb)   SpO2 98%   BMI 35.80 kg/m       GENERAL APPEARANCE: alert and no distress  EYES: PERRL, no scleral icterus  HENT: mouth without ulcers or lesions  NECK: supple, no adenopathy  RESP: lungs clear to auscultation   CV: regular rhythm, normal rate, no rub  Extremities: no clubbing, cyanosis, trace edema bilaterally  SKIN: no rash  NEURO: mentation intact and speech normal  PSYCH: affect normal/bright    Results:    Orders Only on 2019   Component Date Value Ref Range Status     WBC 2019 10.1  4.0 - 11.0 10e9/L Final     RBC Count 2019 3.96* 4.4 - 5.9 10e12/L Final     Hemoglobin 2019 12.3* 13.3 - 17.7 g/dL Final     Hematocrit 2019 37.9* 40.0 - 53.0 % Final     MCV 2019 96  78 - 100 fl Final     MCH 2019 31.1  26.5 - 33.0 pg Final     MCHC 2019 32.5  31.5 - 36.5 g/dL Final     RDW 2019 13.7  10.0 - 15.0 % Final     Platelet Count 2019 237  150 - 450 10e9/L Final     Color Urine 2019 Yellow    Final     Appearance Urine 04/06/2019 Clear   Final     Glucose Urine 04/06/2019 Negative  NEG^Negative mg/dL Final     Bilirubin Urine 04/06/2019 Negative  NEG^Negative Final     Ketones Urine 04/06/2019 Negative  NEG^Negative mg/dL Final     Specific Gravity Urine 04/06/2019 1.015  1.003 - 1.035 Final     Blood Urine 04/06/2019 Negative  NEG^Negative Final     pH Urine 04/06/2019 6.0  5.0 - 7.0 pH Final     Protein Albumin Urine 04/06/2019 100* NEG^Negative mg/dL Final     Urobilinogen mg/dL 04/06/2019 0.0  0.0 - 2.0 mg/dL Final     Nitrite Urine 04/06/2019 Negative  NEG^Negative Final     Leukocyte Esterase Urine 04/06/2019 Negative  NEG^Negative Final     Source 04/06/2019 Midstream Urine   Final     RBC Urine 04/06/2019 <1  0 - 2 /HPF Final     WBC Urine 04/06/2019 <1  0 - 5 /HPF Final     Mucous Urine 04/06/2019 Present* NEG^Negative /LPF Final     Sodium 04/06/2019 136  133 - 144 mmol/L Final     Potassium 04/06/2019 5.2  3.4 - 5.3 mmol/L Final     Chloride 04/06/2019 104  94 - 109 mmol/L Final     Carbon Dioxide 04/06/2019 PENDING  20 - 32 mmol/L Incomplete     Anion Gap 04/06/2019 PENDING  3 - 14 mmol/L Incomplete     Glucose 04/06/2019 PENDING  70 - 99 mg/dL Incomplete     Urea Nitrogen 04/06/2019 PENDING  7 - 30 mg/dL Incomplete     Creatinine 04/06/2019 PENDING  0.66 - 1.25 mg/dL Incomplete     GFR Estimate 04/06/2019 PENDING  >60 mL/min/[1.73_m2] Incomplete     GFR Estimate If Black 04/06/2019 PENDING  >60 mL/min/[1.73_m2] Incomplete     Calcium 04/06/2019 PENDING  8.5 - 10.1 mg/dL Incomplete     Bilirubin Total 04/06/2019 PENDING  0.2 - 1.3 mg/dL Incomplete     Albumin 04/06/2019 PENDING  3.4 - 5.0 g/dL Incomplete     Protein Total 04/06/2019 PENDING  6.8 - 8.8 g/dL Incomplete     Alkaline Phosphatase 04/06/2019 PENDING  40 - 150 U/L Incomplete     ALT 04/06/2019 PENDING  0 - 70 U/L Incomplete     AST 04/06/2019 PENDING  0 - 45 U/L Incomplete          Assessment/Plan:   1. CKD Stage 2: In the setting  of diabetes, need to consider diabetic nephropathy. I would like to optimize his ARB and see how much the urine protein can be decreased. In regards to the potential of another GN occurring, there is no hematuria which is reassuring. I will rule out myeloma today. Kidneys were commented as normal in imaging from 2017.   - Increasing valsartan to 80 mg daily today.   - Labs in 2 weeks to recheck potassium  - He will keep me updated on BP readings and we will continue to titrate the valsartan as able with hopes of minimizing proteinuria    2. Hypertension: at goal of <130/80 today but would like to increase valsartan. Will increase to 80 mg daily today. With this increase, will decrease amlodipine to 5 mg daily to avoid hypotension. Recommend labs in 2 weeks and to send a BP update at that time. Ideally, we will continue to titrate the valsartan as tolerated to max dosing - this may mean minimizing metoprolol and potentially stopping norvasc but we will follow his BP readings and adjust as needed.     3. DM: last A1C 8.5% - encouraged improved diabetes control    4. Hypothyroidism: TSH well controlled at 1.52 in Nov.     5. Hyperlipidemia: already on statin therapy.     Patient Instructions     1. Increase valsartan to 80 mg daily  2. Decrease norvasc (amlodipine) to 5 mg daily  3. Follow blood pressure at home and update me on your readings in 2 weeks  4. Repeat labs in 2 weeks in [place]  5. Monitor potassium intake   6. Goal blood pressure 110-130/ less than 80.   7. Follow-up in 4-6 months in [place].       Patient Education     Discharge Instructions: Eating a Low-Potassium Diet  Your healthcare provider has prescribed a low-potassium diet for you. This kind of diet is advised for people who have certain kidney problems. Potassium is needed for muscle function. But too much potassium is a health risk. Potassium is found in many foods. Read below to find out how to change your diet.  Foods to limit  Some foods  are high in potassium. Limit your daily intake of the foods in the list below.    Fruits: apricots (canned and fresh), bananas, cantaloupe, honeydew melon, kiwi, nectarines, pomegranates, oranges, orange juice, pears, dried fruits (apricots, dates, figs, prunes), and prune juice    Vegetables: asparagus, avocado, artichoke, bamboo shoots, beets, brussels sprouts, cabbage, celery, chard, okra, potatoes (white and sweet), pumpkin, rutabaga, spinach (cooked), squash, tomato, tomato sauce, tomato juice, and vegetable juice cocktail    Legumes: black-eyed peas, chickpeas, lentils, lima beans, navy beans, red kidney beans, soybeans, and split peas    Nuts and seeds: almonds, Brazil nuts, cashews, peanuts, peanut butter, pecans, pumpkin seeds, sunflower seeds, and walnuts    Breads and cereals: bran and whole-grain products    Dairy foods: milk, cheese, ice cream, yogurt    Animal protein: all forms of animal protein    Other: chocolate, cocoa, coconut milk, and molasses  Tips    Ask your healthcare provider how much potassium you are allowed each day. This will help you figure out serving sizes for your needs.    Check labels for potassium. It may be listed as potassium chloride.    Do not use salt substitutes. These often have potassium in them.    Cook frozen fruits and vegetables in water. Rinse and drain them well before eating.    Drain liquid from all canned fruits and vegetables. Rinse them before eating.    Reduce the potassium in potatoes. Peel them, slice thinly, and soak in water for at least 4 hours.    Reduce the potassium in green leafy vegetables. Soak them in water for at least 4 hours.    Eat white rice and refined white flour products. These include white bread, pasta, and grits.  Follow-up  Make a follow-up appointment as advised by your healthcare provider.  When to call your healthcare provider  Call your healthcare provider right away if you have any of the following:    Fatigue    Shortness of  breath    Chest pain    Slow, irregular heartbeat    Fainting    Dizziness    Lightheadedness    Confusion   Date Last Reviewed: 6/1/2017 2000-2018 [employer]. [address] [place] [address], [place] [address]. All rights reserved. This information is not intended as a substitute for professional medical care. Always follow your healthcare professional's instructions.                DO Nena Patterson MD

## 2019-04-06 NOTE — PATIENT INSTRUCTIONS
1. Increase valsartan to 80 mg daily  2. Decrease norvasc (amlodipine) to 5 mg daily  3. Follow blood pressure at home and update me on your readings in 2 weeks  4. Repeat labs in 2 weeks in [place]  5. Monitor potassium intake   6. Goal blood pressure 110-130/ less than 80.   7. Follow-up in 4-6 months in [place].       Patient Education     Discharge Instructions: Eating a Low-Potassium Diet  Your healthcare provider has prescribed a low-potassium diet for you. This kind of diet is advised for people who have certain kidney problems. Potassium is needed for muscle function. But too much potassium is a health risk. Potassium is found in many foods. Read below to find out how to change your diet.  Foods to limit  Some foods are high in potassium. Limit your daily intake of the foods in the list below.    Fruits: apricots (canned and fresh), bananas, cantaloupe, honeydew melon, kiwi, nectarines, pomegranates, oranges, orange juice, pears, dried fruits (apricots, dates, figs, prunes), and prune juice    Vegetables: asparagus, avocado, artichoke, bamboo shoots, beets, brussels sprouts, cabbage, celery, chard, okra, potatoes (white and sweet), pumpkin, rutabaga, spinach (cooked), squash, tomato, tomato sauce, tomato juice, and vegetable juice cocktail    Legumes: black-eyed peas, chickpeas, lentils, lima beans, navy beans, red kidney beans, soybeans, and split peas    Nuts and seeds: almonds, Brazil nuts, cashews, peanuts, peanut butter, pecans, pumpkin seeds, sunflower seeds, and walnuts    Breads and cereals: bran and whole-grain products    Dairy foods: milk, cheese, ice cream, yogurt    Animal protein: all forms of animal protein    Other: chocolate, cocoa, coconut milk, and molasses  Tips    Ask your healthcare provider how much potassium you are allowed each day. This will help you figure out serving sizes for your needs.    Check labels for potassium. It may be listed as potassium chloride.    Do not use salt  substitutes. These often have potassium in them.    Cook frozen fruits and vegetables in water. Rinse and drain them well before eating.    Drain liquid from all canned fruits and vegetables. Rinse them before eating.    Reduce the potassium in potatoes. Peel them, slice thinly, and soak in water for at least 4 hours.    Reduce the potassium in green leafy vegetables. Soak them in water for at least 4 hours.    Eat white rice and refined white flour products. These include white bread, pasta, and grits.  Follow-up  Make a follow-up appointment as advised by your healthcare provider.  When to call your healthcare provider  Call your healthcare provider right away if you have any of the following:    Fatigue    Shortness of breath    Chest pain    Slow, irregular heartbeat    Fainting    Dizziness    Lightheadedness    Confusion   Date Last Reviewed: 6/1/2017 2000-2018 [employer]. [address] [place] [address], [place] [address]. All rights reserved. This information is not intended as a substitute for professional medical care. Always follow your healthcare professional's instructions.

## 2019-04-06 NOTE — Clinical Note
Hi Dr. Ivory, Please let me know if you have any questions with the changes we are making. My hope is to optimize the ARB for protein lowering effects. Thank you, Nean Zepeda

## 2019-04-08 LAB
ALBUMIN SERPL ELPH-MCNC: 4.2 G/DL (ref 3.7–5.1)
ALPHA1 GLOB SERPL ELPH-MCNC: 0.4 G/DL (ref 0.2–0.4)
ALPHA2 GLOB SERPL ELPH-MCNC: 0.8 G/DL (ref 0.5–0.9)
B-GLOBULIN SERPL ELPH-MCNC: 1 G/DL (ref 0.6–1)
GAMMA GLOB SERPL ELPH-MCNC: 1.1 G/DL (ref 0.7–1.6)
KAPPA LC UR-MCNC: 1.73 MG/DL (ref 0.33–1.94)
KAPPA LC/LAMBDA SER: 1.09 {RATIO} (ref 0.26–1.65)
LAMBDA LC SERPL-MCNC: 1.58 MG/DL (ref 0.57–2.63)
M PROTEIN SERPL ELPH-MCNC: 0 G/DL
PROT PATTERN SERPL ELPH-IMP: NORMAL

## 2019-04-09 DIAGNOSIS — E29.1 HYPOGONADISM MALE: ICD-10-CM

## 2019-04-09 NOTE — TELEPHONE ENCOUNTER
Due for yearly follow up.  Call placed to [employer] to discuss.    Left message on answering machine for patient to call back.  Trudy Quintana RN  [facility] [place]

## 2019-04-19 DIAGNOSIS — D64.9 ANEMIA, UNSPECIFIED TYPE: ICD-10-CM

## 2019-04-19 DIAGNOSIS — R80.1 PERSISTENT PROTEINURIA: ICD-10-CM

## 2019-04-19 LAB
ANION GAP SERPL CALCULATED.3IONS-SCNC: 6 MMOL/L (ref 3–14)
BUN SERPL-MCNC: 33 MG/DL (ref 7–30)
CALCIUM SERPL-MCNC: 8.9 MG/DL (ref 8.5–10.1)
CHLORIDE SERPL-SCNC: 104 MMOL/L (ref 94–109)
CO2 SERPL-SCNC: 24 MMOL/L (ref 20–32)
CREAT SERPL-MCNC: 1.11 MG/DL (ref 0.66–1.25)
CREAT UR-MCNC: 79 MG/DL
FERRITIN SERPL-MCNC: 52 NG/ML (ref 26–388)
GFR SERPL CREATININE-BSD FRML MDRD: 68 ML/MIN/{1.73_M2}
GLUCOSE SERPL-MCNC: 169 MG/DL (ref 70–99)
IRON SATN MFR SERPL: 21 % (ref 15–46)
IRON SERPL-MCNC: 69 UG/DL (ref 35–180)
POTASSIUM SERPL-SCNC: 4.8 MMOL/L (ref 3.4–5.3)
PROT UR-MCNC: 0.47 G/L
PROT/CREAT 24H UR: 0.59 G/G CR (ref 0–0.2)
SODIUM SERPL-SCNC: 134 MMOL/L (ref 133–144)
TIBC SERPL-MCNC: 335 UG/DL (ref 240–430)

## 2019-04-19 PROCEDURE — 80048 BASIC METABOLIC PNL TOTAL CA: CPT | Performed by: INTERNAL MEDICINE

## 2019-04-19 PROCEDURE — 83550 IRON BINDING TEST: CPT | Performed by: INTERNAL MEDICINE

## 2019-04-19 PROCEDURE — 84156 ASSAY OF PROTEIN URINE: CPT | Performed by: INTERNAL MEDICINE

## 2019-04-19 PROCEDURE — 36415 COLL VENOUS BLD VENIPUNCTURE: CPT | Performed by: INTERNAL MEDICINE

## 2019-04-19 PROCEDURE — 82728 ASSAY OF FERRITIN: CPT | Performed by: INTERNAL MEDICINE

## 2019-04-19 PROCEDURE — 83540 ASSAY OF IRON: CPT | Performed by: INTERNAL MEDICINE

## 2019-04-19 RX ORDER — TESTOSTERONE 12.5 MG/1.25G
GEL TOPICAL
Qty: 90 PACKET | Refills: 0
Start: 2019-04-19 | End: 2019-05-15

## 2019-04-19 NOTE — TELEPHONE ENCOUNTER
Pt scheduled with Dr Frey on May 15th.    Advised pt will contact Dr Davila to consider refill until that appt can be completed.      Msg sent to Dr Davila to ask approve and RN can call in to pharmacy.    Trudy Quintana RN  [facility] [place]

## 2019-05-03 DIAGNOSIS — R80.1 PERSISTENT PROTEINURIA: ICD-10-CM

## 2019-05-03 LAB
ALBUMIN SERPL-MCNC: 4 G/DL (ref 3.4–5)
ANION GAP SERPL CALCULATED.3IONS-SCNC: 4 MMOL/L (ref 3–14)
BUN SERPL-MCNC: 25 MG/DL (ref 7–30)
CALCIUM SERPL-MCNC: 9.2 MG/DL (ref 8.5–10.1)
CHLORIDE SERPL-SCNC: 104 MMOL/L (ref 94–109)
CO2 SERPL-SCNC: 30 MMOL/L (ref 20–32)
CREAT SERPL-MCNC: 0.97 MG/DL (ref 0.66–1.25)
CREAT UR-MCNC: 72 MG/DL
GFR SERPL CREATININE-BSD FRML MDRD: 80 ML/MIN/{1.73_M2}
GLUCOSE SERPL-MCNC: 151 MG/DL (ref 70–99)
HGB BLD-MCNC: 12.1 G/DL (ref 13.3–17.7)
PHOSPHATE SERPL-MCNC: 4.3 MG/DL (ref 2.5–4.5)
POTASSIUM SERPL-SCNC: 4.6 MMOL/L (ref 3.4–5.3)
PROT UR-MCNC: 1.75 G/L
PROT/CREAT 24H UR: 2.43 G/G CR (ref 0–0.2)
PTH-INTACT SERPL-MCNC: 46 PG/ML (ref 18–80)
SODIUM SERPL-SCNC: 138 MMOL/L (ref 133–144)

## 2019-05-03 PROCEDURE — 83970 ASSAY OF PARATHORMONE: CPT | Performed by: INTERNAL MEDICINE

## 2019-05-03 PROCEDURE — 84156 ASSAY OF PROTEIN URINE: CPT | Performed by: INTERNAL MEDICINE

## 2019-05-03 PROCEDURE — 36415 COLL VENOUS BLD VENIPUNCTURE: CPT | Performed by: INTERNAL MEDICINE

## 2019-05-03 PROCEDURE — 85018 HEMOGLOBIN: CPT | Performed by: INTERNAL MEDICINE

## 2019-05-03 PROCEDURE — 80069 RENAL FUNCTION PANEL: CPT | Performed by: INTERNAL MEDICINE

## 2019-05-15 ENCOUNTER — OFFICE VISIT (OUTPATIENT)
Dept: UROLOGY | Facility: CLINIC | Age: 69
End: 2019-05-15
Payer: COMMERCIAL

## 2019-05-15 VITALS
TEMPERATURE: 97.3 F | SYSTOLIC BLOOD PRESSURE: 147 MMHG | HEART RATE: 55 BPM | RESPIRATION RATE: 18 BRPM | DIASTOLIC BLOOD PRESSURE: 62 MMHG

## 2019-05-15 DIAGNOSIS — E29.1 HYPOGONADISM MALE: ICD-10-CM

## 2019-05-15 PROCEDURE — 84403 ASSAY OF TOTAL TESTOSTERONE: CPT | Performed by: UROLOGY

## 2019-05-15 PROCEDURE — 36415 COLL VENOUS BLD VENIPUNCTURE: CPT | Performed by: UROLOGY

## 2019-05-15 PROCEDURE — 84270 ASSAY OF SEX HORMONE GLOBUL: CPT | Performed by: UROLOGY

## 2019-05-15 PROCEDURE — 99214 OFFICE O/P EST MOD 30 MIN: CPT | Performed by: UROLOGY

## 2019-05-15 RX ORDER — TESTOSTERONE 12.5 MG/1.25G
GEL TOPICAL
Qty: 90 PACKET | Refills: 3 | Status: SHIPPED | OUTPATIENT
Start: 2019-05-15 | End: 2020-07-28

## 2019-05-15 NOTE — NURSING NOTE
Chief Complaint   Patient presents with     Consult     Pt see's Dr Davila, Hypogonadism        Initial /65 (BP Location: Right arm, Patient Position: Chair, Cuff Size: Adult Regular)   Pulse 55   Temp 97.3  F (36.3  C) (Tympanic)   Resp 18  Estimated body mass index is 35.8 kg/m  as calculated from the following:    Height as of 4/6/19: 1.829 m (6').    Weight as of 4/6/19: 119.7 kg (264 lb).  BP completed using cuff size: regular   Medications and allergies reviewed.      Laura WALDROP, CMA

## 2019-05-15 NOTE — PROGRESS NOTES
Appointment source: Established Patient  Patient name: Francois Lujan  Urology Staff: Jay Frey MD    Subjective: This is a 68 year old year old male returning for follow up of hypogonadism.    Previous Dr. Davila note:    Mr. Francois Lujan is a 64-year-old gentleman who comes to see us today for a longstanding history of hypogonadism.  The patient had an episode of mumps orchitis as a child and subsequently developed atrophy of his testicles bilaterally.  The patient began testosterone replacement therapy in 1985.  The patient initially used injectable testosterone and then went to testosterone patches, but has been using AndroGel 1 packet 50 mg daily since roughly 1995.  The patient notes he tolerates the medication without difficulty and notes that it works well for him.  He has no symptoms of hypogonadism currently.    Objective:  PSA 0.08 ng/dL (2018)    No recent serum testosterone levels.    Assessment:  Chronic hypogonadism.    Plan:  Continue Androgel.    Return in one year for follow up.    Total time 25 minutes, counseling 20 minutes discussing hypogonadism.

## 2019-05-15 NOTE — PATIENT INSTRUCTIONS
Per physician instructions.    If you have questions or concerns on any instructions given to you by your provider today or if you need to schedule an appointment, you can reach us at [phone].    Thank you!

## 2019-05-16 ENCOUNTER — TELEPHONE (OUTPATIENT)
Dept: FAMILY MEDICINE | Facility: CLINIC | Age: 69
End: 2019-05-16

## 2019-05-16 DIAGNOSIS — E11.8 TYPE 2 DIABETES MELLITUS WITH COMPLICATION, WITHOUT LONG-TERM CURRENT USE OF INSULIN (H): Primary | ICD-10-CM

## 2019-05-16 NOTE — TELEPHONE ENCOUNTER
Met in consultation for:  Diabetes Mellitus    Parameters Addressed:  A1C    Recommended follow-up:  Lab only appt    Considerations:

## 2019-05-16 NOTE — TELEPHONE ENCOUNTER
Panel Management Review      Patient has the following on his problem list:     Diabetes    ASA:     Last A1C  Lab Results   Component Value Date    A1C 8.5 02/19/2019    A1C 7.0 11/20/2018    A1C 8.4 04/10/2018    A1C 6.2 09/21/2017    A1C 7.7 03/03/2017     A1C tested: FAILED    Last LDL:    Lab Results   Component Value Date    CHOL 164 11/20/2018     Lab Results   Component Value Date    HDL 57 11/20/2018     Lab Results   Component Value Date    LDL 77 11/20/2018     Lab Results   Component Value Date    TRIG 152 11/20/2018     Lab Results   Component Value Date    CHOLHDLRATIO 3.0 12/10/2013     Lab Results   Component Value Date    NHDL 107 11/20/2018       Is the patient on a Statin? YES             Is the patient on Aspirin? YES    Medications     HMG CoA Reductase Inhibitors     atorvastatin (LIPITOR) 40 MG tablet       Salicylates     ASPIRIN PO             Last three blood pressure readings:  BP Readings from Last 3 Encounters:   05/15/19 147/62   04/06/19 128/72   02/19/19 152/76       Date of last diabetes office visit: 2/19/19     Tobacco History:     History   Smoking Status     Former Smoker     Packs/day: 2.00     Years: 30.00     Quit date: 11/13/1997   Smokeless Tobacco     Never Used           Composite cancer screening  Chart review shows that this patient is due/due soon for the following None  Summary:    Patient is due/failing the following:   A1C    Action needed:   Patient needs non-fasting lab only appointment, future lab ordered.    Type of outreach:    Attempted call, no answer, no voicemail.    Questions for provider review:    None                                                                                                                                    ANDRE Olivier MA       Chart routed to Care Team .

## 2019-05-18 LAB
SHBG SERPL-SCNC: 45 NMOL/L (ref 11–80)
TESTOST FREE SERPL-MCNC: 1.9 NG/DL (ref 4.7–24.4)
TESTOST SERPL-MCNC: 125 NG/DL (ref 240–950)

## 2019-05-22 DIAGNOSIS — E29.1 HYPOGONADISM MALE: Primary | ICD-10-CM

## 2019-05-22 NOTE — PROGRESS NOTES
Serum testosterone 125 ng/dL. Continue current dose of androgel and repeat the study in 3 months as therapy was interrupted prior to this study.

## 2019-07-02 DIAGNOSIS — R80.1 PERSISTENT PROTEINURIA: ICD-10-CM

## 2019-07-02 DIAGNOSIS — E11.8 TYPE 2 DIABETES MELLITUS WITH COMPLICATION, WITHOUT LONG-TERM CURRENT USE OF INSULIN (H): ICD-10-CM

## 2019-07-02 LAB
ALBUMIN SERPL-MCNC: 3.9 G/DL (ref 3.4–5)
ANION GAP SERPL CALCULATED.3IONS-SCNC: 4 MMOL/L (ref 3–14)
BUN SERPL-MCNC: 18 MG/DL (ref 7–30)
CALCIUM SERPL-MCNC: 9.3 MG/DL (ref 8.5–10.1)
CHLORIDE SERPL-SCNC: 105 MMOL/L (ref 94–109)
CO2 SERPL-SCNC: 26 MMOL/L (ref 20–32)
CREAT SERPL-MCNC: 0.84 MG/DL (ref 0.66–1.25)
GFR SERPL CREATININE-BSD FRML MDRD: 89 ML/MIN/{1.73_M2}
GLUCOSE SERPL-MCNC: 162 MG/DL (ref 70–99)
HBA1C MFR BLD: 6 % (ref 0–5.6)
HGB BLD-MCNC: 12.6 G/DL (ref 13.3–17.7)
PHOSPHATE SERPL-MCNC: 3.8 MG/DL (ref 2.5–4.5)
POTASSIUM SERPL-SCNC: 4.6 MMOL/L (ref 3.4–5.3)
PTH-INTACT SERPL-MCNC: 44 PG/ML (ref 18–80)
SODIUM SERPL-SCNC: 135 MMOL/L (ref 133–144)

## 2019-07-02 PROCEDURE — 83970 ASSAY OF PARATHORMONE: CPT | Performed by: INTERNAL MEDICINE

## 2019-07-02 PROCEDURE — 84156 ASSAY OF PROTEIN URINE: CPT | Performed by: INTERNAL MEDICINE

## 2019-07-02 PROCEDURE — 83036 HEMOGLOBIN GLYCOSYLATED A1C: CPT | Performed by: FAMILY MEDICINE

## 2019-07-02 PROCEDURE — 80069 RENAL FUNCTION PANEL: CPT | Performed by: INTERNAL MEDICINE

## 2019-07-02 PROCEDURE — 85018 HEMOGLOBIN: CPT | Performed by: INTERNAL MEDICINE

## 2019-07-02 PROCEDURE — 36415 COLL VENOUS BLD VENIPUNCTURE: CPT | Performed by: FAMILY MEDICINE

## 2019-07-03 DIAGNOSIS — R80.1 PERSISTENT PROTEINURIA: Primary | ICD-10-CM

## 2019-07-03 LAB
CREAT UR-MCNC: 136 MG/DL
PROT UR-MCNC: 2.56 G/L
PROT/CREAT 24H UR: 1.88 G/G CR (ref 0–0.2)

## 2019-07-08 NOTE — PROGRESS NOTES
200  SUBJECTIVE:                                                    Francois Lujan is 68 year old male   Chief Complaint   Patient presents with     Hypertension     Med refills and lab results     Lipids     Was prescribed Valsartan 80 mg daily, was [facility]. Requesting refill to get him to new rx showing up in mail, 10 tab?     Diabetes     Thyroid Problem     Component      Latest Ref Rng & Units 7/2/2019   Sodium      133 - 144 mmol/L 135   Potassium      3.4 - 5.3 mmol/L 4.6   Chloride      94 - 109 mmol/L 105   Carbon Dioxide      20 - 32 mmol/L 26   Anion Gap      3 - 14 mmol/L 4   Glucose      70 - 99 mg/dL 162 (H)   Urea Nitrogen      7 - 30 mg/dL 18   Creatinine      0.66 - 1.25 mg/dL 0.84   GFR Estimate      >60 mL/min/1.73:m2 89   GFR Estimate If Black      >60 mL/min/1.73:m2 >90   Calcium      8.5 - 10.1 mg/dL 9.3   Phosphorus      2.5 - 4.5 mg/dL 3.8   Albumin      3.4 - 5.0 g/dL 3.9   Protein Random Urine      g/L 2.56   Protein Total Urine g/gr Creatinine      0 - 0.2 g/g Cr 1.88 (H)   Hemoglobin A1C      0 - 5.6 % 6.0 (H)   Parathyroid Hormone Intact      18 - 80 pg/mL 44   Hemoglobin      13.3 - 17.7 g/dL 12.6 (L)   Creatinine Urine      mg/dL 136     Diabetes Follow-up      How often are you checking your blood sugar? A few times a week, 130-60    What time of day are you checking your blood sugars (select all that apply)?  Before meals    Have you had any blood sugars above 200?  No    Have you had any blood sugars below 70?  Yes around 60    What symptoms do you notice when your blood sugar is low?  Shaky    What concerns do you have today about your diabetes? None     Do you have any of these symptoms? (Select all that apply)  Numbness in feet     Have you had a diabetic eye exam in the last 12 months? No    Diabetes Management Resources    Hyperlipidemia Follow-Up      Are you having any of the following symptoms? (Select all that apply)  No complaints of shortness of  breath, chest pain or pressure.  No increased sweating or nausea with activity.  No left-sided neck or arm pain.  No complaints of pain in calves when walking 1-2 blocks.    Are you regularly taking any medication or supplement to lower your cholesterol?   Yes- .    Are you having muscle aches or other side effects that you think could be caused by your cholesterol lowering medication?  No    Hypertension Follow-up      Do you check your blood pressure regularly outside of the clinic? No     Are you following a low salt diet? No    Are your blood pressures ever more than 140 on the top number (systolic) OR more   than 90 on the bottom number (diastolic), for example 140/90? Yes    BP Readings from Last 2 Encounters:   07/09/19 138/62   05/15/19 147/62     Hemoglobin A1C (%)   Date Value   07/02/2019 6.0 (H)   02/19/2019 8.5 (H)     LDL Cholesterol Calculated (mg/dL)   Date Value   11/20/2018 77   09/21/2017 81     Hypothyroidism Follow-up      Since last visit, patient describes the following symptoms: Weight stable, no hair loss, no skin changes, no constipation, no loose stools            Problem list and histories reviewed & adjusted, as indicated.  Additional history: as documented    Patient Active Problem List   Diagnosis     S/P CABG (coronary artery bypass graft)     Hypogonadotropic hypogonadism (H)     Hypothyroidism     Hard of hearing     Hyperlipidemia LDL goal <70     ADELA (obstructive sleep apnea)     Hypertension goal BP (blood pressure) < 140/90     Numbness of hand, right     Health Care Home     Radicular pain of lumbosacral region     Moderate major depression (H)     Elevated sed rate     Elevated C-reactive protein (CRP)     History of back surgery     Polymyalgia rheumatica (H)     Multiple joint pain     OA (osteoarthritis) of knee, right     ACL (anterior cruciate ligament) tear     HTN, goal below 140/90     Necrobiosis lipoidica     Type 2 diabetes mellitus with complication (H)     Type 2  diabetes mellitus with other skin complications (H)     CKD (chronic kidney disease) stage 2, GFR 60-89 ml/min     Proteinuria     Type 2 diabetes mellitus with other diabetic kidney complication (H)     Diabetic polyneuropathy associated with type 2 diabetes mellitus (H)     Type 2 diabetes mellitus with other circulatory complications (H)     Polyneuropathy associated with underlying disease (H)     Alcohol dependence with other alcohol-induced disorder (H)     Coronary artery disease involving native coronary artery of native heart without angina pectoris     Triceps tendon rupture, left, sequela     Other secondary gout, multiple sites, unspecified chronicity     Proteinuria, unspecified type     Epistaxis     Acute gouty arthritis     Type 2 diabetes mellitus with complication, without long-term current use of insulin (H)     Anemia     Arthropathy of multiple sites     Benign neoplasm of colon     Benign essential hypertension     Chronic gouty arthropathy     Coronary atherosclerosis     Depression     Other specified erythematous condition(695.89)     Other testicular hypofunction     Degeneration of lumbar or lumbosacral intervertebral disc     Psychosexual dysfunction with inhibited sexual excitement     Obesity     Major depressive disorder, recurrent episode, in partial or unspecified remission     Vitamin D deficiency     Obesity (BMI 35.0-39.9) with comorbidity (H)     Past Surgical History:   Procedure Laterality Date     BACK SURGERY       BYPASS GRAFT ARTERY CORONARY  11/17/2011    Procedure:BYPASS GRAFT ARTERY CORONARY; CORONARY ARTERY BYPASS, Right, OM, LAD WITH ENDOVEIN HARVEST, ON PUMP; Surgeon:LEONEL MG; Location: OR     COLONOSCOPY N/A 6/9/2016    Procedure: COLONOSCOPY;  Surgeon: Isidro Humphreys MD;  Location: [facility]     HERNIA REPAIR      infantile hernia repair     ORTHOPEDIC SURGERY      Baker cyst removed - right knee, and mesiscus tear repair     REPAIR TENDON TRICEPS UPPER  EXTREMITY Left 2017    Procedure: REPAIR TENDON TRICEPS UPPER EXTREMITY;  Surgeon: Rodriguez Kelley MD;  Location: [facility]       Social History     Tobacco Use     Smoking status: Former Smoker     Packs/day: 2.00     Years: 30.00     Pack years: 60.00     Last attempt to quit: 1997     Years since quittin.6     Smokeless tobacco: Never Used   Substance Use Topics     Alcohol use: Yes     Alcohol/week: 6.0 oz     Types: 12 Cans of beer per week     Comment: occassionally      Family History   Problem Relation Age of Onset     Heart Disease Father      Cancer Mother      C.A.D. Brother      Septicemia Brother 3     Lung Cancer Sister      Schizophrenia Sister          Current Outpatient Medications   Medication Sig Dispense Refill     amLODIPine (NORVASC) 10 MG tablet Take 1 tablet (10 mg) by mouth daily (Patient taking differently: Take 10 mg by mouth daily ) 90 tablet 3     ASPIRIN PO Take 81 mg by mouth daily       atorvastatin (LIPITOR) 40 MG tablet Take 1 tablet (40 mg) by mouth daily 90 tablet 3     blood glucose (ACCU-CHEK SMARTVIEW) test strip Use to test blood sugar 3 times daily or as directed. 100 each 12     blood glucose (NO BRAND SPECIFIED) lancets standard Use to test blood sugar 3 times daily or as directed. 1 each 11     glipiZIDE (GLUCOTROL XL) 10 MG 24 hr tablet Take 2 tablets (20 mg) by mouth daily (with breakfast) 180 tablet 3     levothyroxine (SYNTHROID) 50 MCG tablet Take 1 tablet (50 mcg) by mouth daily 30 tablet 0     metFORMIN (GLUCOPHAGE) 500 MG tablet Take 2 tablets (1,000 mg) by mouth 2 times daily (with meals) 360 tablet 3     metoprolol tartrate (LOPRESSOR) 50 MG tablet Take 1 tablet (50 mg) by mouth 2 times daily 180 tablet 3     testosterone (ANDROGEL/TESTIM) 50 MG/5GM (1%) topical gel APPLY 1 PACKET (50MG) TOPICALLY DAILY. APPLY TO THE CLEAN DRY INTACT SKIN ON THE SHOULDERS UPPER ARMS OR ABDOMEN. 90 packet 3     valsartan (DIOVAN) 80 MG tablet Take 1 tablet (80 mg)  by mouth daily 20 tablet 0     Allergies   Allergen Reactions     Hydrocodone-Acetaminophen Itching     Iodine      Irbesartan      renal insuff and hyperkalemia       Ivp Dye [Contrast Dye] Hives     Brief bout - 10 minutes duration     Lisinopril Nausea     Sulfa Drugs      Valsartan Difficulty breathing     Recent Labs   Lab Test 07/02/19  1247 07/02/19  1245 05/03/19  0834  04/06/19  1107 02/19/19  1320 11/20/18  1101  09/21/17  0827 07/28/17  0344 03/03/17  1109  05/29/15  1019   A1C  --  6.0*  --   --   --  8.5* 7.0*   < > 6.2*  --  7.7*   < > 7.6*   LDL  --   --   --   --   --   --  77  --  81  --  113*   < > 111   HDL  --   --   --   --   --   --  57  --  66  --  59   < >  --    TRIG  --   --   --   --   --   --  152*  --  91  --  209*   < >  --    ALT  --   --   --   --  29  --   --   --   --  22  --   --  35   CR 0.84  --  0.97   < > 0.89  --  0.86  --  0.87 1.17 0.98   < > 0.97   GFRESTIMATED 89  --  80   < > 88  --  88  --  88 62 76   < > 78   GFRESTBLACK >90  --  >90   < > >90  --  >90  --  >90 75 >90  African American GFR Calc     < > >90   GFR Calc     POTASSIUM 4.6  --  4.6   < > 5.2  --  4.4  --  4.2 3.9 4.2   < > 4.5   TSH  --   --   --   --   --   --  1.52  --  1.39  --  1.42   < > 1.11    < > = values in this interval not displayed.      BP Readings from Last 3 Encounters:   07/09/19 138/62   05/15/19 147/62   04/06/19 128/72    Wt Readings from Last 3 Encounters:   07/09/19 123.8 kg (273 lb)   04/06/19 119.7 kg (264 lb)   02/19/19 125.3 kg (276 lb 3.2 oz)         ROS:  Constitutional, HEENT, cardiovascular, pulmonary, gi and gu systems are negative, except as otherwise noted.    OBJECTIVE:                                                    /62   Pulse 62   Temp 98.3  F (36.8  C) (Tympanic)   Resp 12   Ht 1.829 m (6')   Wt 123.8 kg (273 lb)   SpO2 96%   BMI 37.03 kg/m     GENERAL APPEARANCE ADULT: Alert, no acute distress, obese  MS: extremities normal, no peripheral  edema  foot exam normal DP and PT pulses, no trophic changes or ulcerative lesions, normal monofilament exam and abnormal monofilament exam-no sensation noted lower leg and ankle, feet has decreased sensation  SKIN: no suspicious lesions or rashes  NEURO: Alert, oriented, speech and mentation normal  PSYCH: mentation appears normal., affect and mood normal  Diagnostic Test Results:  Results for orders placed or performed in visit on 07/02/19   **A1C FUTURE anytime   Result Value Ref Range    Hemoglobin A1C 6.0 (H) 0 - 5.6 %   Renal panel   Result Value Ref Range    Sodium 135 133 - 144 mmol/L    Potassium 4.6 3.4 - 5.3 mmol/L    Chloride 105 94 - 109 mmol/L    Carbon Dioxide 26 20 - 32 mmol/L    Anion Gap 4 3 - 14 mmol/L    Glucose 162 (H) 70 - 99 mg/dL    Urea Nitrogen 18 7 - 30 mg/dL    Creatinine 0.84 0.66 - 1.25 mg/dL    GFR Estimate 89 >60 mL/min/[1.73_m2]    GFR Estimate If Black >90 >60 mL/min/[1.73_m2]    Calcium 9.3 8.5 - 10.1 mg/dL    Phosphorus 3.8 2.5 - 4.5 mg/dL    Albumin 3.9 3.4 - 5.0 g/dL   Parathyroid Hormone Intact   Result Value Ref Range    Parathyroid Hormone Intact 44 18 - 80 pg/mL   Hemoglobin   Result Value Ref Range    Hemoglobin 12.6 (L) 13.3 - 17.7 g/dL   Protein  random urine with Creat Ratio   Result Value Ref Range    Protein Random Urine 2.56 g/L    Protein Total Urine g/gr Creatinine 1.88 (H) 0 - 0.2 g/g Cr   Creatinine urine calculation only   Result Value Ref Range    Creatinine Urine 136 mg/dL          ASSESSMENT/PLAN:                                                    1. HTN, goal below 140/90  Nephrology has decreased norvasc to 5 mg and increased diovan to 80 mg, needs script  - valsartan (DIOVAN) 80 MG tablet; Take 1 tablet (80 mg) by mouth daily  Dispense: 20 tablet; Refill: 0    2. Polyneuropathy associated with underlying disease (H)  Poor monofilament in legs, treated and stable    3. Polymyalgia rheumatica (H)    4. Alcohol dependence with other alcohol-induced disorder  (H)  Still having few drinks but has changed diet and A1C improved, taking meds    5. Moderate major depression (H)  stable    6. Morbid obesity (H)  Ideal weight for height is 184, realistic weight 200.  Need to lose at least 10%, 28 pounds in 28 weeks.    7. Hypogonadotropic hypogonadism (H)  On testosterone      Karen Ivory MD  [facility] [place]

## 2019-07-09 ENCOUNTER — OFFICE VISIT (OUTPATIENT)
Dept: FAMILY MEDICINE | Facility: CLINIC | Age: 69
End: 2019-07-09
Payer: COMMERCIAL

## 2019-07-09 VITALS
OXYGEN SATURATION: 96 % | WEIGHT: 273 LBS | BODY MASS INDEX: 36.98 KG/M2 | TEMPERATURE: 98.3 F | DIASTOLIC BLOOD PRESSURE: 62 MMHG | HEIGHT: 72 IN | RESPIRATION RATE: 12 BRPM | HEART RATE: 62 BPM | SYSTOLIC BLOOD PRESSURE: 138 MMHG

## 2019-07-09 DIAGNOSIS — F32.1 MODERATE MAJOR DEPRESSION (H): ICD-10-CM

## 2019-07-09 DIAGNOSIS — E66.01 MORBID OBESITY (H): ICD-10-CM

## 2019-07-09 DIAGNOSIS — F10.288 ALCOHOL DEPENDENCE WITH OTHER ALCOHOL-INDUCED DISORDER (H): ICD-10-CM

## 2019-07-09 DIAGNOSIS — E23.0 HYPOGONADOTROPIC HYPOGONADISM (H): ICD-10-CM

## 2019-07-09 DIAGNOSIS — G63 POLYNEUROPATHY ASSOCIATED WITH UNDERLYING DISEASE (H): ICD-10-CM

## 2019-07-09 DIAGNOSIS — I10 HTN, GOAL BELOW 140/90: ICD-10-CM

## 2019-07-09 DIAGNOSIS — M35.3 POLYMYALGIA RHEUMATICA (H): ICD-10-CM

## 2019-07-09 PROCEDURE — 99214 OFFICE O/P EST MOD 30 MIN: CPT | Performed by: FAMILY MEDICINE

## 2019-07-09 RX ORDER — VALSARTAN 80 MG/1
80 TABLET ORAL DAILY
Qty: 90 TABLET | Refills: 3 | Status: SHIPPED | OUTPATIENT
Start: 2019-07-09 | End: 2019-07-09

## 2019-07-09 RX ORDER — VALSARTAN 80 MG/1
80 TABLET ORAL DAILY
Qty: 20 TABLET | Refills: 0 | Status: SHIPPED | OUTPATIENT
Start: 2019-07-09 | End: 2019-12-13

## 2019-07-09 ASSESSMENT — MIFFLIN-ST. JEOR: SCORE: 2046.32

## 2019-07-09 NOTE — NURSING NOTE
Initial /68   Pulse 62   Temp 98.3  F (36.8  C) (Tympanic)   Resp 12   Ht 1.829 m (6')   Wt 123.8 kg (273 lb)   SpO2 96%   BMI 37.03 kg/m   Estimated body mass index is 37.03 kg/m  as calculated from the following:    Height as of this encounter: 1.829 m (6').    Weight as of this encounter: 123.8 kg (273 lb). .

## 2019-07-11 DIAGNOSIS — E29.1 HYPOGONADISM MALE: Primary | ICD-10-CM

## 2019-07-11 NOTE — TELEPHONE ENCOUNTER
Reason for Call:  Medication or medication refill:    Do you use a [facility]?  Name of the pharmacy and phone number for the current request:  [facility] [phone]    Name of the medication requested: testosterone    Other request: LOV:  5/15/19  LAST REFILL:  4/19/19    Can we leave a detailed message on this number? YES    Phone number patient can be reached at: Home number on file [phone] (home)    Best Time: any     Call taken on 7/11/2019 at 2:33 PM by Betsey Rees

## 2019-07-11 NOTE — TELEPHONE ENCOUNTER
Patient is requesting refill of Androgel/ testim 50 mg/5gm 1% topical gel to be sent to the [facility].   His last clinic visit and testosterone lab was 5-15-19.   I left a message with the patient that I would route a message to Dr Frey who will return to clinic on Monday,. Kimber WILSON Rn

## 2019-07-25 RX ORDER — TESTOSTERONE GEL, 1% 10 MG/G
50 GEL TRANSDERMAL DAILY
Qty: 90 PACKET | Refills: 3 | Status: SHIPPED | OUTPATIENT
Start: 2019-07-25 | End: 2019-10-14

## 2019-07-25 NOTE — TELEPHONE ENCOUNTER
Androgel Rx is completed by Dr Frey and the medication is called in to the [facility]. Kimber WILSON Rn

## 2019-08-01 ENCOUNTER — TELEPHONE (OUTPATIENT)
Dept: FAMILY MEDICINE | Facility: CLINIC | Age: 69
End: 2019-08-01

## 2019-08-01 NOTE — TELEPHONE ENCOUNTER
Panel Management Review      Summary:    Patient is due/failing the following:   A1C and BP CHECK    Action needed:   Patient needs non-fasting lab only appointment and Patient needs nurse only appointment.    Type of outreach:    None, BP and a1c were good at 7/9/19 appointment    Questions for provider review:    None                                                                                                                                    Jessica STOCK CMA       Chart routed to None .

## 2019-09-05 NOTE — PROGRESS NOTES
[facility]    SUBJECTIVE:                                                    Francois Lujan is 68 year old male   Chief Complaint   Patient presents with     Foot Pain     Symptoms starting last week, states that he stepped on some furniture nails. States that right foot is very infected. Wonder in if he has a blood infection. States that he had chest pain radiating into left arm yesterday. States no continuing symptoms today. Increased pain with . Has tried washing, antibiotic cream, and bandaging foot to little effect. Diabetes Pt.      Problem list and histories reviewed & adjusted, as indicated.  Additional history: as documented    Patient Active Problem List   Diagnosis     S/P CABG (coronary artery bypass graft)     Hypogonadotropic hypogonadism (H)     Hypothyroidism     Hard of hearing     Hyperlipidemia LDL goal <70     ADELA (obstructive sleep apnea)     Hypertension goal BP (blood pressure) < 140/90     Numbness of hand, right     Health Care Home     Radicular pain of lumbosacral region     Moderate major depression (H)     Elevated sed rate     Elevated C-reactive protein (CRP)     History of back surgery     Polymyalgia rheumatica (H)     Multiple joint pain     OA (osteoarthritis) of knee, right     ACL (anterior cruciate ligament) tear     HTN, goal below 140/90     Necrobiosis lipoidica     Type 2 diabetes mellitus with complication (H)     Type 2 diabetes mellitus with other skin complications (H)     CKD (chronic kidney disease) stage 2, GFR 60-89 ml/min     Proteinuria     Type 2 diabetes mellitus with other diabetic kidney complication (H)     Diabetic polyneuropathy associated with type 2 diabetes mellitus (H)     Type 2 diabetes mellitus with other circulatory complications (H)     Polyneuropathy associated with underlying disease (H)     Alcohol dependence with other alcohol-induced disorder (H)     Coronary artery disease involving native coronary artery of native heart without angina pectoris      Triceps tendon rupture, left, sequela     Other secondary gout, multiple sites, unspecified chronicity     Proteinuria, unspecified type     Epistaxis     Acute gouty arthritis     Type 2 diabetes mellitus with complication, without long-term current use of insulin (H)     Anemia     Arthropathy of multiple sites     Benign neoplasm of colon     Benign essential hypertension     Chronic gouty arthropathy     Coronary atherosclerosis     Depression     Other specified erythematous condition(695.89)     Other testicular hypofunction     Degeneration of lumbar or lumbosacral intervertebral disc     Obesity     Vitamin D deficiency     Obesity (BMI 35.0-39.9) with comorbidity (H)     Past Surgical History:   Procedure Laterality Date     BACK SURGERY       BYPASS GRAFT ARTERY CORONARY  2011    Procedure:BYPASS GRAFT ARTERY CORONARY; CORONARY ARTERY BYPASS, Right, OM, LAD WITH ENDOVEIN HARVEST, ON PUMP; Surgeon:LEONEL MG; Location: OR     COLONOSCOPY N/A 2016    Procedure: COLONOSCOPY;  Surgeon: Isidro Humphreys MD;  Location: [facility]     HERNIA REPAIR      infantile hernia repair     ORTHOPEDIC SURGERY      Baker cyst removed - right knee, and mesiscus tear repair     REPAIR TENDON TRICEPS UPPER EXTREMITY Left 2017    Procedure: REPAIR TENDON TRICEPS UPPER EXTREMITY;  Surgeon: Rodriguez Kelley MD;  Location: [facility] OR       Social History     Tobacco Use     Smoking status: Former Smoker     Packs/day: 2.00     Years: 30.00     Pack years: 60.00     Last attempt to quit: 1997     Years since quittin.8     Smokeless tobacco: Never Used   Substance Use Topics     Alcohol use: Yes     Alcohol/week: 6.0 oz     Types: 12 Cans of beer per week     Comment: occassionally      Family History   Problem Relation Age of Onset     Heart Disease Father      Cancer Mother      C.A.D. Brother      Septicemia Brother 3     Lung Cancer Sister      Schizophrenia Sister          Current Outpatient  Medications   Medication Sig Dispense Refill     cephALEXin (KEFLEX) 500 MG capsule Take 1 capsule (500 mg) by mouth 4 times daily for 10 days 40 capsule 0     amLODIPine (NORVASC) 10 MG tablet Take 1 tablet (10 mg) by mouth daily (Patient taking differently: Take 10 mg by mouth daily ) 90 tablet 3     ASPIRIN PO Take 81 mg by mouth daily       atorvastatin (LIPITOR) 40 MG tablet Take 1 tablet (40 mg) by mouth daily 90 tablet 3     blood glucose (ACCU-CHEK SMARTVIEW) test strip Use to test blood sugar 3 times daily or as directed. 100 each 12     blood glucose (NO BRAND SPECIFIED) lancets standard Use to test blood sugar 3 times daily or as directed. 1 each 11     glipiZIDE (GLUCOTROL XL) 10 MG 24 hr tablet Take 2 tablets (20 mg) by mouth daily (with breakfast) 180 tablet 3     levothyroxine (SYNTHROID) 50 MCG tablet Take 1 tablet (50 mcg) by mouth daily 30 tablet 0     metFORMIN (GLUCOPHAGE) 500 MG tablet Take 2 tablets (1,000 mg) by mouth 2 times daily (with meals) 360 tablet 3     metoprolol tartrate (LOPRESSOR) 50 MG tablet Take 1 tablet (50 mg) by mouth 2 times daily 180 tablet 3     testosterone (ANDROGEL) 50 MG/5GM (1%) topical gel Place 1 packet (50 mg of testosterone) onto the skin daily 90 packet 3     testosterone (ANDROGEL/TESTIM) 50 MG/5GM (1%) topical gel APPLY 1 PACKET (50MG) TOPICALLY DAILY. APPLY TO THE CLEAN DRY INTACT SKIN ON THE SHOULDERS UPPER ARMS OR ABDOMEN. 90 packet 3     valsartan (DIOVAN) 80 MG tablet Take 1 tablet (80 mg) by mouth daily 20 tablet 0     Allergies   Allergen Reactions     Hydrocodone-Acetaminophen Itching     Iodine      Irbesartan      renal insuff and hyperkalemia       Ivp Dye [Contrast Dye] Hives     Brief bout - 10 minutes duration     Lisinopril Nausea     Sulfa Drugs      Valsartan Difficulty breathing     Recent Labs   Lab Test 09/06/19  0743 07/02/19  1247 07/02/19  1245 05/03/19  0834  04/06/19  1107 02/19/19  1320 11/20/18  1101  09/21/17  0827 07/28/17  0344  03/03/17  1109  05/29/15  1019   A1C 6.5*  --  6.0*  --   --   --  8.5* 7.0*   < > 6.2*  --  7.7*   < > 7.6*   LDL  --   --   --   --   --   --   --  77  --  81  --  113*   < > 111   HDL  --   --   --   --   --   --   --  57  --  66  --  59   < >  --    TRIG  --   --   --   --   --   --   --  152*  --  91  --  209*   < >  --    ALT  --   --   --   --   --  29  --   --   --   --  22  --   --  35   CR  --  0.84  --  0.97   < > 0.89  --  0.86  --  0.87 1.17 0.98   < > 0.97   GFRESTIMATED  --  89  --  80   < > 88  --  88  --  88 62 76   < > 78   GFRESTBLACK  --  >90  --  >90   < > >90  --  >90  --  >90 75 >90  African American GFR Calc     < > >90   GFR Calc     POTASSIUM  --  4.6  --  4.6   < > 5.2  --  4.4  --  4.2 3.9 4.2   < > 4.5   TSH  --   --   --   --   --   --   --  1.52  --  1.39  --  1.42   < > 1.11    < > = values in this interval not displayed.      BP Readings from Last 3 Encounters:   09/06/19 (!) 154/70   07/09/19 138/62   05/15/19 147/62    Wt Readings from Last 3 Encounters:   09/06/19 125.8 kg (277 lb 6.4 oz)   07/09/19 123.8 kg (273 lb)   04/06/19 119.7 kg (264 lb)         ROS:  Constitutional, HEENT, cardiovascular, pulmonary, gi and gu systems are negative, except as otherwise noted.    OBJECTIVE:                                                    BP (!) 154/70   Pulse 67   Temp 98  F (36.7  C) (Tympanic)   Resp 12   Ht 1.829 m (6')   Wt 125.8 kg (277 lb 6.4 oz)   SpO2 96%   BMI 37.62 kg/m     GENERAL APPEARANCE ADULT: Alert, no acute distress, cooperative, tired appearing  MS: leg erythema to mid calf right leg  foot exam abnormal monofilament exam-no sensation, erythema foot and leg and ulceration at plantar right foot 2 mm depth, 1x2 cm oval depression, dry, white dead skin around ulcer 3 mm thickness.  SKIN: as above  NEURO: Alert, oriented, speech and mentation normal, sensation altered no feeling in feet bilaterally  PSYCH: mentation appears normal., affect and mood  normal  Diagnostic Test Results:  Results for orders placed or performed in visit on 09/06/19   CBC with platelets differential   Result Value Ref Range    WBC 10.6 4.0 - 11.0 10e9/L    RBC Count 3.85 (L) 4.4 - 5.9 10e12/L    Hemoglobin 12.6 (L) 13.3 - 17.7 g/dL    Hematocrit 36.7 (L) 40.0 - 53.0 %    MCV 95 78 - 100 fl    MCH 32.7 26.5 - 33.0 pg    MCHC 34.3 31.5 - 36.5 g/dL    RDW 13.0 10.0 - 15.0 %    Platelet Count 234 150 - 450 10e9/L    % Neutrophils 44.9 %    % Lymphocytes 39.7 %    % Monocytes 10.8 %    % Eosinophils 4.3 %    % Basophils 0.3 %    Absolute Neutrophil 4.7 1.6 - 8.3 10e9/L    Absolute Lymphocytes 4.2 0.8 - 5.3 10e9/L    Absolute Monocytes 1.1 0.0 - 1.3 10e9/L    Absolute Eosinophils 0.5 0.0 - 0.7 10e9/L    Absolute Basophils 0.0 0.0 - 0.2 10e9/L    Diff Method Automated Method    Erythrocyte sedimentation rate auto   Result Value Ref Range    Sed Rate 32 (H) 0 - 20 mm/h   Hemoglobin A1c   Result Value Ref Range    Hemoglobin A1C 6.5 (H) 0 - 5.6 %          ASSESSMENT/PLAN:                                                    1. Cellulitis and abscess of foot excluding toe  Diabetic foot neuropathy so unaware of furniture staple in foot.  Wound appears old and healing except for the erythema up leg which Francois claims is his gout which was there before the staple wound.  Start antibiotic and see podiatry for debridement and further treatment.  Consider wound care nurse.  Recheck on Monday with me if no other appointments.  - cephALEXin (KEFLEX) 500 MG capsule; Take 1 capsule (500 mg) by mouth 4 times daily for 10 days  Dispense: 40 capsule; Refill: 0  - EKG 12-lead complete w/read - Clinics  - ORTHO  REFERRAL  - XR Foot Right G/E 3 Views; Future  - CBC with platelets differential  - Basic metabolic panel  - CRP inflammation  - Erythrocyte sedimentation rate auto  - Hemoglobin A1c    2. Type 2 diabetes mellitus with complication, without long-term current use of insulin (H)  A1C at goal  last check, 6.5 today      Karen Ivory MD  [facility]

## 2019-09-06 ENCOUNTER — OFFICE VISIT (OUTPATIENT)
Dept: FAMILY MEDICINE | Facility: CLINIC | Age: 69
End: 2019-09-06
Payer: COMMERCIAL

## 2019-09-06 ENCOUNTER — TELEPHONE (OUTPATIENT)
Dept: FAMILY MEDICINE | Facility: CLINIC | Age: 69
End: 2019-09-06

## 2019-09-06 ENCOUNTER — ANCILLARY PROCEDURE (OUTPATIENT)
Dept: GENERAL RADIOLOGY | Facility: CLINIC | Age: 69
End: 2019-09-06
Attending: FAMILY MEDICINE
Payer: COMMERCIAL

## 2019-09-06 VITALS
OXYGEN SATURATION: 96 % | BODY MASS INDEX: 37.57 KG/M2 | WEIGHT: 277.4 LBS | SYSTOLIC BLOOD PRESSURE: 154 MMHG | TEMPERATURE: 98 F | HEART RATE: 67 BPM | DIASTOLIC BLOOD PRESSURE: 70 MMHG | HEIGHT: 72 IN | RESPIRATION RATE: 12 BRPM

## 2019-09-06 DIAGNOSIS — E11.8 TYPE 2 DIABETES MELLITUS WITH COMPLICATION, WITHOUT LONG-TERM CURRENT USE OF INSULIN (H): ICD-10-CM

## 2019-09-06 DIAGNOSIS — L03.119 CELLULITIS AND ABSCESS OF FOOT EXCLUDING TOE: ICD-10-CM

## 2019-09-06 DIAGNOSIS — L02.619 CELLULITIS AND ABSCESS OF FOOT EXCLUDING TOE: Primary | ICD-10-CM

## 2019-09-06 DIAGNOSIS — L02.619 CELLULITIS AND ABSCESS OF FOOT EXCLUDING TOE: ICD-10-CM

## 2019-09-06 DIAGNOSIS — L03.119 CELLULITIS AND ABSCESS OF FOOT EXCLUDING TOE: Primary | ICD-10-CM

## 2019-09-06 LAB
ANION GAP SERPL CALCULATED.3IONS-SCNC: 7 MMOL/L (ref 3–14)
BASOPHILS # BLD AUTO: 0 10E9/L (ref 0–0.2)
BASOPHILS NFR BLD AUTO: 0.3 %
BUN SERPL-MCNC: 24 MG/DL (ref 7–30)
CALCIUM SERPL-MCNC: 8.8 MG/DL (ref 8.5–10.1)
CHLORIDE SERPL-SCNC: 100 MMOL/L (ref 94–109)
CO2 SERPL-SCNC: 25 MMOL/L (ref 20–32)
CREAT SERPL-MCNC: 0.96 MG/DL (ref 0.66–1.25)
CRP SERPL-MCNC: 9.4 MG/L (ref 0–8)
DIFFERENTIAL METHOD BLD: ABNORMAL
EOSINOPHIL # BLD AUTO: 0.5 10E9/L (ref 0–0.7)
EOSINOPHIL NFR BLD AUTO: 4.3 %
ERYTHROCYTE [DISTWIDTH] IN BLOOD BY AUTOMATED COUNT: 13 % (ref 10–15)
ERYTHROCYTE [SEDIMENTATION RATE] IN BLOOD BY WESTERGREN METHOD: 32 MM/H (ref 0–20)
GFR SERPL CREATININE-BSD FRML MDRD: 80 ML/MIN/{1.73_M2}
GLUCOSE SERPL-MCNC: 219 MG/DL (ref 70–99)
HBA1C MFR BLD: 6.5 % (ref 0–5.6)
HCT VFR BLD AUTO: 36.7 % (ref 40–53)
HGB BLD-MCNC: 12.6 G/DL (ref 13.3–17.7)
LYMPHOCYTES # BLD AUTO: 4.2 10E9/L (ref 0.8–5.3)
LYMPHOCYTES NFR BLD AUTO: 39.7 %
MCH RBC QN AUTO: 32.7 PG (ref 26.5–33)
MCHC RBC AUTO-ENTMCNC: 34.3 G/DL (ref 31.5–36.5)
MCV RBC AUTO: 95 FL (ref 78–100)
MONOCYTES # BLD AUTO: 1.1 10E9/L (ref 0–1.3)
MONOCYTES NFR BLD AUTO: 10.8 %
NEUTROPHILS # BLD AUTO: 4.7 10E9/L (ref 1.6–8.3)
NEUTROPHILS NFR BLD AUTO: 44.9 %
PLATELET # BLD AUTO: 234 10E9/L (ref 150–450)
POTASSIUM SERPL-SCNC: 4.6 MMOL/L (ref 3.4–5.3)
RBC # BLD AUTO: 3.85 10E12/L (ref 4.4–5.9)
SODIUM SERPL-SCNC: 132 MMOL/L (ref 133–144)
WBC # BLD AUTO: 10.6 10E9/L (ref 4–11)

## 2019-09-06 PROCEDURE — 86140 C-REACTIVE PROTEIN: CPT | Performed by: FAMILY MEDICINE

## 2019-09-06 PROCEDURE — 80048 BASIC METABOLIC PNL TOTAL CA: CPT | Performed by: FAMILY MEDICINE

## 2019-09-06 PROCEDURE — 36415 COLL VENOUS BLD VENIPUNCTURE: CPT | Performed by: FAMILY MEDICINE

## 2019-09-06 PROCEDURE — 73630 X-RAY EXAM OF FOOT: CPT | Mod: RT

## 2019-09-06 PROCEDURE — 93000 ELECTROCARDIOGRAM COMPLETE: CPT | Performed by: FAMILY MEDICINE

## 2019-09-06 PROCEDURE — 85652 RBC SED RATE AUTOMATED: CPT | Performed by: FAMILY MEDICINE

## 2019-09-06 PROCEDURE — 99214 OFFICE O/P EST MOD 30 MIN: CPT | Performed by: FAMILY MEDICINE

## 2019-09-06 PROCEDURE — 85025 COMPLETE CBC W/AUTO DIFF WBC: CPT | Performed by: FAMILY MEDICINE

## 2019-09-06 PROCEDURE — 83036 HEMOGLOBIN GLYCOSYLATED A1C: CPT | Performed by: FAMILY MEDICINE

## 2019-09-06 RX ORDER — CEPHALEXIN 500 MG/1
500 CAPSULE ORAL 4 TIMES DAILY
Qty: 40 CAPSULE | Refills: 0 | Status: SHIPPED | OUTPATIENT
Start: 2019-09-06 | End: 2019-09-16

## 2019-09-06 ASSESSMENT — MIFFLIN-ST. JEOR: SCORE: 2066.28

## 2019-09-06 NOTE — NURSING NOTE
Initial BP (!) 154/70   Pulse 67   Temp 98  F (36.7  C) (Tympanic)   Resp 12   Ht 1.829 m (6')   Wt 125.8 kg (277 lb 6.4 oz)   SpO2 96%   BMI 37.62 kg/m   Estimated body mass index is 37.62 kg/m  as calculated from the following:    Height as of this encounter: 1.829 m (6').    Weight as of this encounter: 125.8 kg (277 lb 6.4 oz). .

## 2019-09-06 NOTE — TELEPHONE ENCOUNTER
Called Pt to no answer. Requested call back.    Pt is to follow up with Dr. Ivory on Monday if he is unable to see Podiatry.    Jessica STOCK,  CMA

## 2019-09-06 NOTE — TELEPHONE ENCOUNTER
Results given to patient with good understanding.  He will contact  on Monday if he cannot see Podiatry  Tresa Santos on 9/6/2019 at 8:31 AM

## 2019-09-09 ENCOUNTER — OFFICE VISIT (OUTPATIENT)
Dept: PODIATRY | Facility: CLINIC | Age: 69
End: 2019-09-09
Payer: COMMERCIAL

## 2019-09-09 VITALS
HEART RATE: 61 BPM | BODY MASS INDEX: 37.52 KG/M2 | DIASTOLIC BLOOD PRESSURE: 70 MMHG | WEIGHT: 277 LBS | SYSTOLIC BLOOD PRESSURE: 164 MMHG | HEIGHT: 72 IN

## 2019-09-09 DIAGNOSIS — L97.519 TYPE 2 DIABETES MELLITUS WITH RIGHT DIABETIC FOOT ULCER (H): Primary | ICD-10-CM

## 2019-09-09 DIAGNOSIS — E11.621 TYPE 2 DIABETES MELLITUS WITH RIGHT DIABETIC FOOT ULCER (H): Primary | ICD-10-CM

## 2019-09-09 PROCEDURE — 99203 OFFICE O/P NEW LOW 30 MIN: CPT | Mod: 25 | Performed by: PODIATRIST

## 2019-09-09 PROCEDURE — 11042 DBRDMT SUBQ TIS 1ST 20SQCM/<: CPT | Performed by: PODIATRIST

## 2019-09-09 ASSESSMENT — MIFFLIN-ST. JEOR: SCORE: 2064.46

## 2019-09-09 NOTE — PATIENT INSTRUCTIONS
Francois to follow up with Primary Care provider regarding elevated blood pressure.    FOOT ULCER (WOUND) EDUCATION  Ulceration ofthe foot involves a break or hole in the skin. Skin is our best protection against infection. Skin is quite durable, however, the underlying tissues are fragile. For this reason, the wound is likely to deepen rapidly. Deep wounds usually get infected and require amputation. Prompt healing is therefore essential to avoid limb loss.     Foot ulcers do not heal without intervention. Walking on the foot and living your normal life is not typically compatible with healing the sore. Successful healing will require several months of significant alteration of your daily activities.   Ulcer complications frequently develop. This primarily includes infection of skin, which then spreads deep into your joints, bones and tendons. Spreading infection may travel up your leg and into other parts of your body. Deep infection is usually treated with amputation ofpart ofyour foot or your leg. Signs of infection include fever, chills, nausea, vomiting, erratic blood sugars, local redness, pus, strong odor and localized warmth. Signs of infection should be taken seriously. Prompt evaluation in the clinic or hospital emergency room is required.   Ulcer treatment requires debridement or surgical removal of devitalized tissue. Your doctor will trim away callused, moistened, unhealthy tissue from the wound surface and margin. This helps to clean the wound and allows proper inspection. Debridement also stimulates healing even though the wound originally appears larger. Expect some bleeding with each debridement. You will be given instruction regarding wound bandaging. This often includes ointment and gauze. Avoid tape directly on the skin. Hand washing is essential since most infections will come from your fingertips. Ulcer care requires a no touch technique. Your fingers should not touch the margin or base of the  wound.    HELPFUL HEALING TIPS:  1. Debridement: Getting rid of bad tissue makes way for good tissue to promote healing  2. Addressing Foot Deformities: Hammertoes and bunions can cause increased pressure  3. Pressure Reduction: If pressure remains to the wound, it won't heal  4. Good Pulses: If bloodflow is not getting to the foot, the ulcer will not heal  5. Good Nutrition: If you are not getting proper nutrition your body can't heal.Protein!  6. Infection Control: Keep the ulcer clean with wound cleanser. DO NOT SOAK IT!  7. Moisture Control: Keep edema down and make sure that drainage is getting pulled away from the ulcer    IMPORTANCE OF DEBRIDEMENT    Reduces bioburden to help control or reduce infection. Even if an ulcer is not  infected,  the bacterial bioburden causes increased local inflammation.    Allows more accurate visualization of the wound base and edges, which allows for more precise staging.    Removes necrotic/non-viable tissue, which impedes wound healing, causes protein loss and can be a nidus for infection.    Stimulates new circulation (angiogenesis) and allows adequate oxygen delivery to the wound.    Removes undermining and tunneling, and may help reduce abscess formation.    Releases healing growth factors at the edge of the wound.    Prepares the wound bed by leaving only tissues that are capable of regenerating.

## 2019-09-09 NOTE — LETTER
9/9/2019         RE: Francois Lujan  [address]  [place] [address]        Dear Colleague,    Thank you for referring your patient, Francois Lujan, to the [facility]. Please see a copy of my visit note below.    Francois Lujan is a 68 year old male who presents to the office with a chief complaint of an ulcer on the bottom of the right foot.  The patient relates the ulcer has been present for the past several weeks.  The patient denies any fever, chills, nausea or vomiting.  The patient denies any pus or blood draining from the ulcer.  The patient denies any redness or swelling around the ulcer.  The patient denies of any pain involving the ulcer.  The patient denies any previous ulceration on either foot or leg.  The patient relates blood sugars are under control.  The patient was sent by Dr. Ivory for consultation on the right foot.       REVIEW OF SYSTEMS:  Constitutional, HEENT, cardiovascular, pulmonary, GI, , musculoskeletal, neuro, skin, endocrine and psych systems are negative, except as otherwise noted.     PAST MEDICAL HISTORY:   Past Medical History:   Diagnosis Date     Arthritis      CAD (coronary artery disease)      Diabetes mellitus (H)      Erythema nodosum 1982     Gout      Hypertension      Malignant neoplasm (H)     squamous cell CA removed from right hand     Thyroid disease         PAST SURGICAL HISTORY:   Past Surgical History:   Procedure Laterality Date     BACK SURGERY       BYPASS GRAFT ARTERY CORONARY  11/17/2011    Procedure:BYPASS GRAFT ARTERY CORONARY; CORONARY ARTERY BYPASS, Right, OM, LAD WITH ENDOVEIN HARVEST, ON PUMP; Surgeon:LEONEL MG; Location: OR     COLONOSCOPY N/A 6/9/2016    Procedure: COLONOSCOPY;  Surgeon: Isidro Humphreys MD;  Location: [facility]     HERNIA REPAIR      infantile hernia repair     ORTHOPEDIC SURGERY      Baker cyst removed - right knee, and mesiscus tear repair     REPAIR TENDON TRICEPS UPPER EXTREMITY Left  4/11/2017    Procedure: REPAIR TENDON TRICEPS UPPER EXTREMITY;  Surgeon: Rodriguez Kelley MD;  Location: [facility]        MEDICATIONS:   Current Outpatient Medications:      amLODIPine (NORVASC) 10 MG tablet, Take 1 tablet (10 mg) by mouth daily (Patient taking differently: Take 10 mg by mouth daily ), Disp: 90 tablet, Rfl: 3     ASPIRIN PO, Take 81 mg by mouth daily, Disp: , Rfl:      atorvastatin (LIPITOR) 40 MG tablet, Take 1 tablet (40 mg) by mouth daily, Disp: 90 tablet, Rfl: 3     blood glucose (ACCU-CHEK SMARTVIEW) test strip, Use to test blood sugar 3 times daily or as directed., Disp: 100 each, Rfl: 12     blood glucose (NO BRAND SPECIFIED) lancets standard, Use to test blood sugar 3 times daily or as directed., Disp: 1 each, Rfl: 11     cephALEXin (KEFLEX) 500 MG capsule, Take 1 capsule (500 mg) by mouth 4 times daily for 10 days, Disp: 40 capsule, Rfl: 0     glipiZIDE (GLUCOTROL XL) 10 MG 24 hr tablet, Take 2 tablets (20 mg) by mouth daily (with breakfast), Disp: 180 tablet, Rfl: 3     levothyroxine (SYNTHROID) 50 MCG tablet, Take 1 tablet (50 mcg) by mouth daily, Disp: 30 tablet, Rfl: 0     metFORMIN (GLUCOPHAGE) 500 MG tablet, Take 2 tablets (1,000 mg) by mouth 2 times daily (with meals), Disp: 360 tablet, Rfl: 3     metoprolol tartrate (LOPRESSOR) 50 MG tablet, Take 1 tablet (50 mg) by mouth 2 times daily, Disp: 180 tablet, Rfl: 3     testosterone (ANDROGEL) 50 MG/5GM (1%) topical gel, Place 1 packet (50 mg of testosterone) onto the skin daily, Disp: 90 packet, Rfl: 3     testosterone (ANDROGEL/TESTIM) 50 MG/5GM (1%) topical gel, APPLY 1 PACKET (50MG) TOPICALLY DAILY. APPLY TO THE CLEAN DRY INTACT SKIN ON THE SHOULDERS UPPER ARMS OR ABDOMEN., Disp: 90 packet, Rfl: 3     valsartan (DIOVAN) 80 MG tablet, Take 1 tablet (80 mg) by mouth daily, Disp: 20 tablet, Rfl: 0     ALLERGIES:    Allergies   Allergen Reactions     Hydrocodone-Acetaminophen Itching     Iodine      Irbesartan      renal insuff and  hyperkalemia       Ivp Dye [Contrast Dye] Hives     Brief bout - 10 minutes duration     Lisinopril Nausea     Sulfa Drugs      Valsartan Difficulty breathing        SOCIAL HISTORY:   Social History     Socioeconomic History     Marital status:      Spouse name: Not on file     Number of children: Not on file     Years of education: Not on file     Highest education level: Not on file   Occupational History     Not on file   Social Needs     Financial resource strain: Not on file     Food insecurity:     Worry: Not on file     Inability: Not on file     Transportation needs:     Medical: Not on file     Non-medical: Not on file   Tobacco Use     Smoking status: Former Smoker     Packs/day: 2.00     Years: 30.00     Pack years: 60.00     Last attempt to quit: 1997     Years since quittin.8     Smokeless tobacco: Never Used   Substance and Sexual Activity     Alcohol use: Yes     Alcohol/week: 6.0 oz     Types: 12 Cans of beer per week     Comment: occassionally      Drug use: No     Sexual activity: Yes   Lifestyle     Physical activity:     Days per week: Not on file     Minutes per session: Not on file     Stress: Not on file   Relationships     Social connections:     Talks on phone: Not on file     Gets together: Not on file     Attends Voodoo service: Not on file     Active member of club or organization: Not on file     Attends meetings of clubs or organizations: Not on file     Relationship status: Not on file     Intimate partner violence:     Fear of current or ex partner: Not on file     Emotionally abused: Not on file     Physically abused: Not on file     Forced sexual activity: Not on file   Other Topics Concern     Parent/sibling w/ CABG, MI or angioplasty before 65F 55M? 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 Brother      Septicemia Brother 3     Lung Cancer Sister      Schizophrenia Sister         EXAM:Vitals: BP (!) 164/70   Pulse 61   Ht 1.829 m (6')   Wt 125.6 kg (277 lb)   BMI 37.57 kg/m     BMI= Body mass index is 37.57 kg/m .  Weight management plan: Patient was referred to their PCP to discuss a diet and exercise plan.    General appearance: Patient is alert and fully cooperative with history & exam.  No sign of distress is noted during the visit.     Psychiatric: Affect is pleasant & appropriate.  Patient appears motivated to improve health.     Respiratory: Breathing is regular & unlabored while sitting.     HEENT: Hearing is intact to spoken word.  Speech is clear.  No gross evidence of visual impairment that would impact ambulation.     Dermatologic:  One notes grade II ulceration located on the plantar aspect of the foot underlying the second metatarsal on the right.  The ulcer measures out to be approximately 2 cm x 1 cm x 0.5 cm.  One notes negative probing to bone, negative tracking.  The subcutaneous granular base appears beefy red with hyperkeratotic wound borders as well as a slight serous drainage and absence of mal odor.  There is no noted swelling, purulent drainage or ascending erythema.    Vascular: DP & PT pulses are intact & regular bilaterally.  No significant edema or varicosities noted.  CFT and skin temperature is normal to both lower extremities.     Neurologic: Lower extremity sensation is absent to light touch.  No evidence of weakness or contracture in the lower extremities.  Noted evidence of neuropathy.      Musculoskeletal: Patient is ambulatory without assistive device or brace.  No gross ankle deformity noted.  No foot or ankle joint effusion is noted.    Radiographic evaluation including an AP, lateral and medial oblique evaluation of the right foot reveals no cortical erosions or periosteal elevation.  All joint margins appear stable.  There is no apparent fracture or tumor formation noted.  There is no evidence of foreign body or gas in the tissue.    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    Plan:  I have explained to Francois the condition of the diabetic malperforans ulceration.  After verbal consent, #15 blade was used to debride ulcer down to and including subcutaneous tissue.  Bleeding controlled with light pressure.   No drainage noted.  No anesthesia was used due to neuropathy. Dry dressing applied to foot.  Patient tolerated procedure well.  I explained to him  potential risks infection and loss of tissue, including loss of limb.  The patient was fitted with a postop shoe as well as prescribed a knee scooter to be completely nonweightbearing on the right foot.  Patient was referred to [facility] for further wound  evaluation and dressing treatments.  The patient will follow up in two weeks for further evaluation.        ADRIAN Henao.PSUNIL., F.A.C.F.A.S.    Again, thank you for allowing me to participate in the care of your patient.        Sincerely,        Yao Shields DPM

## 2019-09-09 NOTE — NURSING NOTE
Chief Complaint   Patient presents with     Consult     Right foot needs debreidment       Initial BP (!) 164/70   Pulse 61   Ht 1.829 m (6')   Wt 125.6 kg (277 lb)   BMI 37.57 kg/m   Estimated body mass index is 37.57 kg/m  as calculated from the following:    Height as of this encounter: 1.829 m (6').    Weight as of this encounter: 125.6 kg (277 lb).  Medications and allergies reviewed.      Feli CAMPBELL MA

## 2019-09-09 NOTE — PROGRESS NOTES
Francois Lujan is a 68 year old male who presents to the office with a chief complaint of an ulcer on the bottom of the right foot.  The patient relates the ulcer has been present for the past several weeks.  The patient denies any fever, chills, nausea or vomiting.  The patient denies any pus or blood draining from the ulcer.  The patient denies any redness or swelling around the ulcer.  The patient denies of any pain involving the ulcer.  The patient denies any previous ulceration on either foot or leg.  The patient relates blood sugars are under control.  The patient was sent by Dr. Ivory for consultation on the right foot.       REVIEW OF SYSTEMS:  Constitutional, HEENT, cardiovascular, pulmonary, GI, , musculoskeletal, neuro, skin, endocrine and psych systems are negative, except as otherwise noted.     PAST MEDICAL HISTORY:   Past Medical History:   Diagnosis Date     Arthritis      CAD (coronary artery disease)      Diabetes mellitus (H)      Erythema nodosum 1982     Gout      Hypertension      Malignant neoplasm (H)     squamous cell CA removed from right hand     Thyroid disease         PAST SURGICAL HISTORY:   Past Surgical History:   Procedure Laterality Date     BACK SURGERY       BYPASS GRAFT ARTERY CORONARY  11/17/2011    Procedure:BYPASS GRAFT ARTERY CORONARY; CORONARY ARTERY BYPASS, Right, OM, LAD WITH ENDOVEIN HARVEST, ON PUMP; Surgeon:LEONEL MG; Location: OR     COLONOSCOPY N/A 6/9/2016    Procedure: COLONOSCOPY;  Surgeon: Isidro Humphreys MD;  Location: [facility]     HERNIA REPAIR      infantile hernia repair     ORTHOPEDIC SURGERY      Baker cyst removed - right knee, and mesiscus tear repair     REPAIR TENDON TRICEPS UPPER EXTREMITY Left 4/11/2017    Procedure: REPAIR TENDON TRICEPS UPPER EXTREMITY;  Surgeon: Rodriguez Kelley MD;  Location: [facility]        MEDICATIONS:   Current Outpatient Medications:      amLODIPine (NORVASC) 10 MG tablet, Take 1 tablet (10 mg) by mouth daily (Patient  taking differently: Take 10 mg by mouth daily ), Disp: 90 tablet, Rfl: 3     ASPIRIN PO, Take 81 mg by mouth daily, Disp: , Rfl:      atorvastatin (LIPITOR) 40 MG tablet, Take 1 tablet (40 mg) by mouth daily, Disp: 90 tablet, Rfl: 3     blood glucose (ACCU-CHEK SMARTVIEW) test strip, Use to test blood sugar 3 times daily or as directed., Disp: 100 each, Rfl: 12     blood glucose (NO BRAND SPECIFIED) lancets standard, Use to test blood sugar 3 times daily or as directed., Disp: 1 each, Rfl: 11     cephALEXin (KEFLEX) 500 MG capsule, Take 1 capsule (500 mg) by mouth 4 times daily for 10 days, Disp: 40 capsule, Rfl: 0     glipiZIDE (GLUCOTROL XL) 10 MG 24 hr tablet, Take 2 tablets (20 mg) by mouth daily (with breakfast), Disp: 180 tablet, Rfl: 3     levothyroxine (SYNTHROID) 50 MCG tablet, Take 1 tablet (50 mcg) by mouth daily, Disp: 30 tablet, Rfl: 0     metFORMIN (GLUCOPHAGE) 500 MG tablet, Take 2 tablets (1,000 mg) by mouth 2 times daily (with meals), Disp: 360 tablet, Rfl: 3     metoprolol tartrate (LOPRESSOR) 50 MG tablet, Take 1 tablet (50 mg) by mouth 2 times daily, Disp: 180 tablet, Rfl: 3     testosterone (ANDROGEL) 50 MG/5GM (1%) topical gel, Place 1 packet (50 mg of testosterone) onto the skin daily, Disp: 90 packet, Rfl: 3     testosterone (ANDROGEL/TESTIM) 50 MG/5GM (1%) topical gel, APPLY 1 PACKET (50MG) TOPICALLY DAILY. APPLY TO THE CLEAN DRY INTACT SKIN ON THE SHOULDERS UPPER ARMS OR ABDOMEN., Disp: 90 packet, Rfl: 3     valsartan (DIOVAN) 80 MG tablet, Take 1 tablet (80 mg) by mouth daily, Disp: 20 tablet, Rfl: 0     ALLERGIES:    Allergies   Allergen Reactions     Hydrocodone-Acetaminophen Itching     Iodine      Irbesartan      renal insuff and hyperkalemia       Ivp Dye [Contrast Dye] Hives     Brief bout - 10 minutes duration     Lisinopril Nausea     Sulfa Drugs      Valsartan Difficulty breathing        SOCIAL HISTORY:   Social History     Socioeconomic History     Marital status:       Spouse name: Not on file     Number of children: Not on file     Years of education: Not on file     Highest education level: Not on file   Occupational History     Not on file   Social Needs     Financial resource strain: Not on file     Food insecurity:     Worry: Not on file     Inability: Not on file     Transportation needs:     Medical: Not on file     Non-medical: Not on file   Tobacco Use     Smoking status: Former Smoker     Packs/day: 2.00     Years: 30.00     Pack years: 60.00     Last attempt to quit: 1997     Years since quittin.8     Smokeless tobacco: Never Used   Substance and Sexual Activity     Alcohol use: Yes     Alcohol/week: 6.0 oz     Types: 12 Cans of beer per week     Comment: occassionally      Drug use: No     Sexual activity: Yes   Lifestyle     Physical activity:     Days per week: Not on file     Minutes per session: Not on file     Stress: Not on file   Relationships     Social connections:     Talks on phone: Not on file     Gets together: Not on file     Attends Ep[employer]opalian service: Not on file     Active member of club or organization: Not on file     Attends meetings of clubs or organizations: Not on file     Relationship status: Not on file     Intimate partner violence:     Fear of current or ex partner: Not on file     Emotionally abused: Not on file     Physically abused: Not on file     Forced sexual activity: Not on file   Other Topics Concern     Parent/sibling w/ CABG, MI or angioplasty before 65F 55M? 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 Brother      Septicemia Brother 3     Lung Cancer Sister      Schizophrenia Sister         EXAM:Vitals: BP (!) 164/70   Pulse 61   Ht 1.829 m (6')   Wt 125.6 kg (277 lb)   BMI 37.57 kg/m    BMI= Body mass index is 37.57 kg/m .  Weight management plan: Patient was referred to their PCP to discuss a diet and exercise plan.    General appearance: Patient is alert and fully cooperative with history & exam.  No sign of distress is noted during the visit.     Psychiatric: Affect is pleasant & appropriate.  Patient appears motivated to improve health.     Respiratory: Breathing is regular & unlabored while sitting.     HEENT: Hearing is intact to spoken word.  Speech is clear.  No gross evidence of visual impairment that would impact ambulation.     Dermatologic:  One notes grade II ulceration located on the plantar aspect of the foot underlying the second metatarsal on the right.  The ulcer measures out to be approximately 2 cm x 1 cm x 0.5 cm.  One notes negative probing to bone, negative tracking.  The subcutaneous granular base appears beefy red with hyperkeratotic wound borders as well as a slight serous drainage and absence of mal odor.  There is no noted swelling, purulent drainage or ascending erythema.    Vascular: DP & PT pulses are intact & regular bilaterally.  No significant edema or varicosities noted.  CFT and skin temperature is normal to both lower extremities.     Neurologic: Lower extremity sensation is absent to light touch.  No evidence of weakness or contracture in the lower extremities.  Noted evidence of neuropathy.     Musculoskeletal: Patient is ambulatory without assistive device or brace.  No gross ankle deformity noted.  No foot or ankle joint effusion is noted.    Radiographic evaluation including an AP, lateral and medial oblique evaluation of the right foot reveals no  cortical erosions or periosteal elevation.  All joint margins appear stable.  There is no apparent fracture or tumor formation noted.  There is no evidence of foreign body or gas in the tissue.    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    Plan:  I have explained to Francois the condition of the diabetic malperforans ulceration.  After verbal consent, #15 blade was used to debride ulcer down to and including subcutaneous tissue.  Bleeding controlled with light pressure.   No drainage noted.  No anesthesia was used due to neuropathy. Dry dressing applied to foot.  Patient tolerated procedure well.  I explained to him  potential risks infection and loss of tissue, including loss of limb.  The patient was fitted with a postop shoe as well as prescribed a knee scooter to be completely nonweightbearing on the right foot.  Patient was referred to [facility] wound care clinic for further wound  evaluation and dressing treatments.  The patient will follow up in two weeks for further evaluation.        EDY Shields D.P.M., FNEFTALI.F.A.S.

## 2019-10-14 DIAGNOSIS — E29.1 HYPOGONADISM MALE: ICD-10-CM

## 2019-10-14 RX ORDER — TESTOSTERONE GEL, 1% 10 MG/G
50 GEL TRANSDERMAL DAILY
Qty: 90 PACKET | Refills: 3 | Status: SHIPPED | OUTPATIENT
Start: 2019-10-14 | End: 2020-07-15

## 2019-11-08 ENCOUNTER — HEALTH MAINTENANCE LETTER (OUTPATIENT)
Age: 69
End: 2019-11-08

## 2019-12-12 NOTE — PROGRESS NOTES
SUBJECTIVE:                                                    Francois Lujan is 69 year old male   Chief Complaint   Patient presents with     Diabetes     Hypertension     Lipids     Diabetes Follow-up    How often are you checking your blood sugar? A few times a week  What time of day are you checking your blood sugars (select all that apply)?  Before meals  Have you had any blood sugars above 200?  No  Have you had any blood sugars below 70? Occasionally. Usually 120-130 range    What symptoms do you notice when your blood sugar is low?  None    What concerns do you have today about your diabetes? Continued open wound on right foot      Do you have any of these symptoms? (Select all that apply)  Numbness in feet, Burning in feet and Redness, sores, or blisters on feet     Have you had a diabetic eye exam in the last 12 months? No  Has cut back on drinking  Diabetes Management Resources    Hyperlipidemia Follow-Up      Are you regularly taking any medication or supplement to lower your cholesterol?   Yes- .    Are you having muscle aches or other side effects that you think could be caused by your cholesterol lowering medication?  No, later states occasional at night    Hypertension Follow-up      Do you check your blood pressure regularly outside of the clinic? No     Are you following a low salt diet? No    Are your blood pressures ever more than 140 on the top number (systolic) OR more   than 90 on the bottom number (diastolic), for example 140/90? No    BP Readings from Last 2 Encounters:   12/13/19 (!) 168/82   09/09/19 (!) 164/70     Hemoglobin A1C (%)   Date Value   12/13/2019 7.9 (H)   09/06/2019 6.5 (H)     LDL Cholesterol Calculated (mg/dL)   Date Value   11/20/2018 77   09/21/2017 81         Problem list and histories reviewed & adjusted, as indicated.  Additional history: as documented    Patient Active Problem List   Diagnosis     S/P CABG (coronary artery bypass graft)     Hypogonadotropic  hypogonadism (H)     Hypothyroidism     Hard of hearing     Hyperlipidemia LDL goal <70     ADELA (obstructive sleep apnea)     Hypertension goal BP (blood pressure) < 140/90     Numbness of hand, right     Health Care Home     Radicular pain of lumbosacral region     Moderate major depression (H)     Elevated sed rate     Elevated C-reactive protein (CRP)     History of back surgery     Polymyalgia rheumatica (H)     Multiple joint pain     OA (osteoarthritis) of knee, right     ACL (anterior cruciate ligament) tear     HTN, goal below 140/90     Necrobiosis lipoidica     Type 2 diabetes mellitus with complication (H)     Type 2 diabetes mellitus with other skin complications (H)     CKD (chronic kidney disease) stage 2, GFR 60-89 ml/min     Proteinuria     Type 2 diabetes mellitus with other diabetic kidney complication (H)     Diabetic polyneuropathy associated with type 2 diabetes mellitus (H)     Type 2 diabetes mellitus with other circulatory complications (H)     Polyneuropathy associated with underlying disease (H)     Alcohol dependence with other alcohol-induced disorder (H)     Coronary artery disease involving native coronary artery of native heart without angina pectoris     Triceps tendon rupture, left, sequela     Other secondary gout, multiple sites, unspecified chronicity     Proteinuria, unspecified type     Epistaxis     Acute gouty arthritis     Type 2 diabetes mellitus with complication, without long-term current use of insulin (H)     Anemia     Arthropathy of multiple sites     Benign neoplasm of colon     Benign essential hypertension     Chronic gouty arthropathy     Coronary atherosclerosis     Depression     Other specified erythematous condition(695.89)     Other testicular hypofunction     Degeneration of lumbar or lumbosacral intervertebral disc     Obesity     Vitamin D deficiency     Obesity (BMI 35.0-39.9) with comorbidity (H)     Past Surgical History:   Procedure Laterality Date      BACK SURGERY       BYPASS GRAFT ARTERY CORONARY  2011    Procedure:BYPASS GRAFT ARTERY CORONARY; CORONARY ARTERY BYPASS, Right, OM, LAD WITH ENDOVEIN HARVEST, ON PUMP; Surgeon:LEONEL MG; Location: OR     COLONOSCOPY N/A 2016    Procedure: COLONOSCOPY;  Surgeon: Isidro Humphreys MD;  Location: [facility]     HERNIA REPAIR      infantile hernia repair     ORTHOPEDIC SURGERY      Baker cyst removed - right knee, and mesiscus tear repair     REPAIR TENDON TRICEPS UPPER EXTREMITY Left 2017    Procedure: REPAIR TENDON TRICEPS UPPER EXTREMITY;  Surgeon: Rodriguez Kelley MD;  Location: [facility]       Social History     Tobacco Use     Smoking status: Former Smoker     Packs/day: 2.00     Years: 30.00     Pack years: 60.00     Last attempt to quit: 1997     Years since quittin.0     Smokeless tobacco: Never Used   Substance Use Topics     Alcohol use: Yes     Alcohol/week: 10.0 standard drinks     Types: 12 Cans of beer per week     Comment: occassionally      Family History   Problem Relation Age of Onset     Heart Disease Father      Cancer Mother      C.A.D. Brother      Septicemia Brother 3     Lung Cancer Sister      Schizophrenia Sister          Current Outpatient Medications   Medication Sig Dispense Refill     amLODIPine (NORVASC) 10 MG tablet Take 1 tablet (10 mg) by mouth daily (Patient taking differently: Take 10 mg by mouth daily ) 90 tablet 3     ASPIRIN PO Take 81 mg by mouth daily       atorvastatin (LIPITOR) 40 MG tablet Take 1 tablet (40 mg) by mouth daily 90 tablet 3     blood glucose (ACCU-CHEK SMARTVIEW) test strip Use to test blood sugar 3 times daily or as directed. 100 each 12     blood glucose (NO BRAND SPECIFIED) lancets standard Use to test blood sugar 3 times daily or as directed. 1 each 11     glipiZIDE (GLUCOTROL XL) 10 MG 24 hr tablet Take 2 tablets (20 mg) by mouth daily (with breakfast) 180 tablet 3     levothyroxine (SYNTHROID) 50 MCG tablet Take 1 tablet (50 mcg)  by mouth daily 90 tablet 3     metFORMIN (GLUCOPHAGE) 500 MG tablet Take 2 tablets (1,000 mg) by mouth 2 times daily (with meals) 360 tablet 3     metoprolol tartrate (LOPRESSOR) 50 MG tablet Take 1 tablet (50 mg) by mouth 2 times daily 180 tablet 3     testosterone (ANDROGEL) 50 MG/5GM (1%) topical gel Place 1 packet (50 mg of testosterone) onto the skin daily 90 packet 3     testosterone (ANDROGEL/TESTIM) 50 MG/5GM (1%) topical gel APPLY 1 PACKET (50MG) TOPICALLY DAILY. APPLY TO THE CLEAN DRY INTACT SKIN ON THE SHOULDERS UPPER ARMS OR ABDOMEN. 90 packet 3     valsartan (DIOVAN) 80 MG tablet Take 1 tablet (80 mg) by mouth daily 90 tablet 3     order for DME Knee Walker  Length of use: Three months    The patient was prescribed a knee walker. The patient is unsteady utilizing crutches, quad walker or a cane.  The knee walker will allow the patient to ambulate safely without the use of the operative foot and reduce risk of falls. 1 Units 0     order for DME Postop shoe 1 Device 0     Allergies   Allergen Reactions     Hydrocodone-Acetaminophen Itching     Iodine      Irbesartan      renal insuff and hyperkalemia       Ivp Dye [Contrast Dye] Hives     Brief bout - 10 minutes duration     Lisinopril Nausea     Sulfa Drugs      Valsartan Difficulty breathing     Recent Labs   Lab Test 12/13/19  1007 09/06/19  0743 07/02/19  1247 07/02/19  1245  04/06/19  1107  11/20/18  1101  09/21/17  0827 07/28/17  0344 03/03/17  1109  05/29/15  1019   A1C 7.9* 6.5*  --  6.0*  --   --    < > 7.0*   < > 6.2*  --  7.7*   < > 7.6*   LDL  --   --   --   --   --   --   --  77  --  81  --  113*   < > 111   HDL  --   --   --   --   --   --   --  57  --  66  --  59   < >  --    TRIG  --   --   --   --   --   --   --  152*  --  91  --  209*   < >  --    ALT  --   --   --   --   --  29  --   --   --   --  22  --   --  35   CR  --  0.96 0.84  --    < > 0.89  --  0.86  --  0.87 1.17 0.98   < > 0.97   GFRESTIMATED  --  80 89  --    < > 88  --  88   --  88 62 76   < > 78   GFRESTBLACK  --  >90 >90  --    < > >90  --  >90  --  >90 75 >90  African American GFR Calc     < > >90   GFR Calc     POTASSIUM  --  4.6 4.6  --    < > 5.2  --  4.4  --  4.2 3.9 4.2   < > 4.5   TSH  --   --   --   --   --   --   --  1.52  --  1.39  --  1.42   < > 1.11    < > = values in this interval not displayed.      BP Readings from Last 3 Encounters:   12/13/19 (!) 168/82   09/09/19 (!) 164/70   09/06/19 (!) 154/70    Wt Readings from Last 3 Encounters:   12/13/19 122.5 kg (270 lb)   09/09/19 125.6 kg (277 lb)   09/06/19 125.8 kg (277 lb 6.4 oz)         ROS:  Constitutional, HEENT, cardiovascular, pulmonary, gi and gu systems are negative, except as otherwise noted.    OBJECTIVE:                                                    BP (!) 168/82   Pulse 76   Temp 98.1  F (36.7  C) (Tympanic)   Resp 12   Ht 1.829 m (6')   Wt 122.5 kg (270 lb)   SpO2 98%   BMI 36.62 kg/m    GENERAL APPEARANCE ADULT: Alert, no acute distress, obese, cooperative, tired appearing, hard of hearing  MS: extremities normal, no peripheral edema  foot exam callous bottom right foot over 3rd metatarsal tarsal joint, slight drainage  SKIN: no suspicious lesions or rashes, lesion present, type:sharp border, raised, ulcerated, appearance:red, red-brown, location: right plantar foot, size: 2 cm, oval  Diagnostic Test Results:  none      ASSESSMENT/PLAN:                                                    1. Acquired hypothyroidism  due for review and refill, taking medication without difficulty  - levothyroxine (SYNTHROID) 50 MCG tablet; Take 1 tablet (50 mcg) by mouth daily  Dispense: 90 tablet; Refill: 3    2. HTN, goal below 140/90  due for review and refill, taking medication without difficulty  - valsartan (DIOVAN) 80 MG tablet; Take 1 tablet (80 mg) by mouth daily  Dispense: 90 tablet; Refill: 3    3. Type 2 diabetes mellitus with complication, without long-term current use of insulin  (H)  Cut out beer, lose 7 lbs but A1C went up, still at goal  - Hemoglobin A1c    4. Diabetic ulcer of other part of right foot associated with type 2 diabetes mellitus, unspecified ulcer stage (H)  Uni boot applied, leave on for a week.  See wound care nurse for further treatment.  - WOUND CARE REFERRAL    Karen Ivory MD  [facility]

## 2019-12-13 ENCOUNTER — OFFICE VISIT (OUTPATIENT)
Dept: FAMILY MEDICINE | Facility: CLINIC | Age: 69
End: 2019-12-13
Payer: COMMERCIAL

## 2019-12-13 VITALS
HEART RATE: 76 BPM | SYSTOLIC BLOOD PRESSURE: 168 MMHG | HEIGHT: 72 IN | TEMPERATURE: 98.1 F | WEIGHT: 270 LBS | RESPIRATION RATE: 12 BRPM | BODY MASS INDEX: 36.57 KG/M2 | OXYGEN SATURATION: 98 % | DIASTOLIC BLOOD PRESSURE: 82 MMHG

## 2019-12-13 DIAGNOSIS — E03.9 ACQUIRED HYPOTHYROIDISM: ICD-10-CM

## 2019-12-13 DIAGNOSIS — I10 HTN, GOAL BELOW 140/90: ICD-10-CM

## 2019-12-13 DIAGNOSIS — E11.621 DIABETIC ULCER OF OTHER PART OF RIGHT FOOT ASSOCIATED WITH TYPE 2 DIABETES MELLITUS, UNSPECIFIED ULCER STAGE (H): ICD-10-CM

## 2019-12-13 DIAGNOSIS — E11.8 TYPE 2 DIABETES MELLITUS WITH COMPLICATION, WITHOUT LONG-TERM CURRENT USE OF INSULIN (H): Primary | ICD-10-CM

## 2019-12-13 DIAGNOSIS — L97.519 DIABETIC ULCER OF OTHER PART OF RIGHT FOOT ASSOCIATED WITH TYPE 2 DIABETES MELLITUS, UNSPECIFIED ULCER STAGE (H): ICD-10-CM

## 2019-12-13 LAB — HBA1C MFR BLD: 7.9 % (ref 0–5.6)

## 2019-12-13 PROCEDURE — 99214 OFFICE O/P EST MOD 30 MIN: CPT | Performed by: FAMILY MEDICINE

## 2019-12-13 PROCEDURE — 83036 HEMOGLOBIN GLYCOSYLATED A1C: CPT | Performed by: FAMILY MEDICINE

## 2019-12-13 PROCEDURE — 36415 COLL VENOUS BLD VENIPUNCTURE: CPT | Performed by: FAMILY MEDICINE

## 2019-12-13 RX ORDER — VALSARTAN 80 MG/1
80 TABLET ORAL DAILY
Qty: 90 TABLET | Refills: 3 | Status: SHIPPED | OUTPATIENT
Start: 2019-12-13 | End: 2020-04-09

## 2019-12-13 RX ORDER — LEVOTHYROXINE SODIUM 50 UG/1
50 TABLET ORAL DAILY
Qty: 90 TABLET | Refills: 3 | Status: SHIPPED | OUTPATIENT
Start: 2019-12-13 | End: 2020-04-09

## 2019-12-13 RX ORDER — METOPROLOL TARTRATE 100 MG
100 TABLET ORAL 2 TIMES DAILY
Status: CANCELLED | OUTPATIENT
Start: 2019-12-13

## 2019-12-13 RX ORDER — VALSARTAN 160 MG/1
160 TABLET ORAL DAILY
Qty: 90 TABLET | Refills: 3 | Status: CANCELLED | OUTPATIENT
Start: 2019-12-13

## 2019-12-13 ASSESSMENT — PATIENT HEALTH QUESTIONNAIRE - PHQ9: SUM OF ALL RESPONSES TO PHQ QUESTIONS 1-9: 0

## 2019-12-13 ASSESSMENT — MIFFLIN-ST. JEOR: SCORE: 2027.71

## 2019-12-13 NOTE — NURSING NOTE
Initial BP (!) 168/82   Pulse 76   Temp 98.1  F (36.7  C) (Tympanic)   Resp 12   Ht 1.829 m (6')   Wt 122.5 kg (270 lb)   SpO2 98%   BMI 36.62 kg/m   Estimated body mass index is 36.62 kg/m  as calculated from the following:    Height as of this encounter: 1.829 m (6').    Weight as of this encounter: 122.5 kg (270 lb). .

## 2020-02-23 ENCOUNTER — HEALTH MAINTENANCE LETTER (OUTPATIENT)
Age: 70
End: 2020-02-23

## 2020-03-12 ENCOUNTER — TELEPHONE (OUTPATIENT)
Dept: FAMILY MEDICINE | Facility: CLINIC | Age: 70
End: 2020-03-12

## 2020-03-12 NOTE — TELEPHONE ENCOUNTER
Met in consultation for:  Diabetes Mellitus and Hypertension    Parameters Addressed:  B/P management    Recommended follow-up:  HTN Nurse (route order to PCP)    Considerations:  recheck blood pressure if not at goal see doctor

## 2020-04-09 ENCOUNTER — VIRTUAL VISIT (OUTPATIENT)
Dept: FAMILY MEDICINE | Facility: CLINIC | Age: 70
End: 2020-04-09
Payer: COMMERCIAL

## 2020-04-09 DIAGNOSIS — E11.8 TYPE 2 DIABETES MELLITUS WITH COMPLICATION, WITHOUT LONG-TERM CURRENT USE OF INSULIN (H): ICD-10-CM

## 2020-04-09 DIAGNOSIS — I10 HTN, GOAL BELOW 140/90: ICD-10-CM

## 2020-04-09 DIAGNOSIS — E03.9 ACQUIRED HYPOTHYROIDISM: ICD-10-CM

## 2020-04-09 DIAGNOSIS — E78.5 HYPERLIPIDEMIA LDL GOAL <70: ICD-10-CM

## 2020-04-09 PROCEDURE — 99214 OFFICE O/P EST MOD 30 MIN: CPT | Mod: TEL | Performed by: FAMILY MEDICINE

## 2020-04-09 RX ORDER — GLIPIZIDE 10 MG/1
20 TABLET, FILM COATED, EXTENDED RELEASE ORAL
Qty: 180 TABLET | Refills: 3 | Status: SHIPPED | OUTPATIENT
Start: 2020-04-09 | End: 2021-04-08

## 2020-04-09 RX ORDER — ATORVASTATIN CALCIUM 40 MG/1
40 TABLET, FILM COATED ORAL DAILY
Qty: 90 TABLET | Refills: 3 | Status: SHIPPED | OUTPATIENT
Start: 2020-04-09 | End: 2020-07-28

## 2020-04-09 RX ORDER — VALSARTAN 80 MG/1
80 TABLET ORAL DAILY
Qty: 90 TABLET | Refills: 3 | Status: SHIPPED | OUTPATIENT
Start: 2020-04-09 | End: 2020-07-28

## 2020-04-09 RX ORDER — METOPROLOL TARTRATE 50 MG
50 TABLET ORAL 2 TIMES DAILY
Qty: 180 TABLET | Refills: 3 | Status: SHIPPED | OUTPATIENT
Start: 2020-04-09 | End: 2020-09-02 | Stop reason: SINTOL

## 2020-04-09 RX ORDER — AMLODIPINE BESYLATE 10 MG/1
10 TABLET ORAL DAILY
Qty: 45 TABLET | Refills: 3 | Status: SHIPPED | OUTPATIENT
Start: 2020-04-09 | End: 2021-05-05

## 2020-04-09 RX ORDER — BLOOD SUGAR DIAGNOSTIC
STRIP MISCELLANEOUS
Qty: 100 EACH | Refills: 12 | Status: SHIPPED | OUTPATIENT
Start: 2020-04-09 | End: 2023-08-07

## 2020-04-09 RX ORDER — LEVOTHYROXINE SODIUM 50 UG/1
50 TABLET ORAL DAILY
Qty: 90 TABLET | Refills: 3 | Status: SHIPPED | OUTPATIENT
Start: 2020-04-09 | End: 2021-07-08

## 2020-04-09 NOTE — PROGRESS NOTES
SUBJECTIVE:                                                    Francois Lujan is a 69 year old male who is being evaluated via a billable telephone visit.    Chief Complaint   Patient presents with     Diabetes     Hypertension     Lipids     Thyroid Problem         Diabetes Follow-up  Did have tea with sugar this morning. Will check BP and blood sugar while waiting for provider call.      How often are you checking your blood sugar? occasionally 140-130 A1C of 7 average    What concerns do you have today about your diabetes? None. Sores on feet have cleared since last visit.     Do you have any of these symptoms? (Select all that apply)  No numbness or tingling in feet.  No redness, sores or blisters on feet.  No complaints of excessive thirst.  No reports of blurry vision.  No significant changes to weight.    Have you had a diabetic eye exam in the last 12 months? No                Hyperlipidemia Follow-Up      Are you regularly taking any medication or supplement to lower your cholesterol?   No    Are you having muscle aches or other side effects that you think could be caused by your cholesterol lowering medication?  Yes- if dehydrated.     Hypertension Follow-up      Do you check your blood pressure regularly outside of the clinic? Yes when drinking alcohol. 5-6 beers a day    Are you following a low salt diet? Yes, tries to    Are your blood pressures ever more than 140 on the top number (systolic) OR more   than 90 on the bottom number (diastolic), for example 140/90? Yes    BP Readings from Last 2 Encounters:   12/13/19 (!) 168/82   09/09/19 (!) 164/70     Hemoglobin A1C (%)   Date Value   12/13/2019 7.9 (H)   09/06/2019 6.5 (H)     LDL Cholesterol Calculated (mg/dL)   Date Value   11/20/2018 77   09/21/2017 81       Hypothyroidism Follow-up      Since last visit, patient describes the following symptoms: Weight stable, no hair loss, no skin changes, no constipation, no loose stools              The  "patient has been notified of following:     \"This telephone visit will be conducted via a call between you and your physician/provider. We have found that certain health care needs can be provided without the need for a physical exam.  This service lets us provide the care you need with a short phone conversation.  If a prescription is necessary we can send it directly to your pharmacy.  If lab work is needed we can place an order for that and you can then stop by our lab to have the test done at a later time.    If during the course of the call the physician/provider feels a telephone visit is not appropriate, you will not be charged for this service.\"     I have reviewed and updated the patient's Past Medical History, Social History, Family History and Medication List.  Jessica STOCK CMA        Problem list and histories reviewed & adjusted, as indicated.  Additional history: blood sugar was 200, had 3 teaspoons of sugar in his tea about 1/2 hour ago.  Blood pressure 155/80 \"but been running around\".  Is actively physically isolating, working in the yard.  Has 5-6 beer a day, doesn't think that is a problem.    Patient Active Problem List   Diagnosis     S/P CABG (coronary artery bypass graft)     Hypogonadotropic hypogonadism (H)     Hypothyroidism     Hard of hearing     Hyperlipidemia LDL goal <70     ADELA (obstructive sleep apnea)     Hypertension goal BP (blood pressure) < 140/90     Numbness of hand, right     Health Care Home     Radicular pain of lumbosacral region     Moderate major depression (H)     Elevated sed rate     Elevated C-reactive protein (CRP)     History of back surgery     Polymyalgia rheumatica (H)     Multiple joint pain     OA (osteoarthritis) of knee, right     ACL (anterior cruciate ligament) tear     HTN, goal below 140/90     Necrobiosis lipoidica     Type 2 diabetes mellitus with complication (H)     Type 2 diabetes mellitus with other skin complications (H)     CKD (chronic kidney " disease) stage 2, GFR 60-89 ml/min     Proteinuria     Type 2 diabetes mellitus with other diabetic kidney complication (H)     Diabetic polyneuropathy associated with type 2 diabetes mellitus (H)     Type 2 diabetes mellitus with other circulatory complications (H)     Polyneuropathy associated with underlying disease (H)     Alcohol dependence with other alcohol-induced disorder (H)     Coronary artery disease involving native coronary artery of native heart without angina pectoris     Triceps tendon rupture, left, sequela     Other secondary gout, multiple sites, unspecified chronicity     Proteinuria, unspecified type     Epistaxis     Acute gouty arthritis     Type 2 diabetes mellitus with complication, without long-term current use of insulin (H)     Anemia     Arthropathy of multiple sites     Benign neoplasm of colon     Benign essential hypertension     Chronic gouty arthropathy     Coronary atherosclerosis     Depression     Other specified erythematous condition(695.89)     Other testicular hypofunction     Degeneration of lumbar or lumbosacral intervertebral disc     Obesity     Vitamin D deficiency     Obesity (BMI 35.0-39.9) with comorbidity (H)     Past Surgical History:   Procedure Laterality Date     BACK SURGERY       BYPASS GRAFT ARTERY CORONARY  11/17/2011    Procedure:BYPASS GRAFT ARTERY CORONARY; CORONARY ARTERY BYPASS, Right, OM, LAD WITH ENDOVEIN HARVEST, ON PUMP; Surgeon:LEONEL MG; Location: OR     COLONOSCOPY N/A 6/9/2016    Procedure: COLONOSCOPY;  Surgeon: Isidro Humphreys MD;  Location: [facility]     HERNIA REPAIR      infantile hernia repair     ORTHOPEDIC SURGERY      Baker cyst removed - right knee, and mesiscus tear repair     REPAIR TENDON TRICEPS UPPER EXTREMITY Left 4/11/2017    Procedure: REPAIR TENDON TRICEPS UPPER EXTREMITY;  Surgeon: Rodriguez Kelley MD;  Location: [facility]       Social History     Tobacco Use     Smoking status: Former Smoker     Packs/day: 2.00     Years:  30.00     Pack years: 60.00     Last attempt to quit: 1997     Years since quittin.4     Smokeless tobacco: Never Used   Substance Use Topics     Alcohol use: Yes     Alcohol/week: 10.0 standard drinks     Types: 12 Cans of beer per week     Comment: occassionally      Family History   Problem Relation Age of Onset     Heart Disease Father      Cancer Mother      C.A.D. Brother      Septicemia Brother 3     Lung Cancer Sister      Schizophrenia Sister          Current Outpatient Medications   Medication Sig Dispense Refill     amLODIPine (NORVASC) 10 MG tablet Take 1 tablet (10 mg) by mouth daily (Patient taking differently: Take 10 mg by mouth daily ) 90 tablet 3     ASPIRIN PO Take 81 mg by mouth daily       atorvastatin (LIPITOR) 40 MG tablet Take 1 tablet (40 mg) by mouth daily 90 tablet 3     blood glucose (ACCU-CHEK SMARTVIEW) test strip Use to test blood sugar 3 times daily or as directed. 100 each 12     blood glucose (NO BRAND SPECIFIED) lancets standard Use to test blood sugar 3 times daily or as directed. 1 each 11     glipiZIDE (GLUCOTROL XL) 10 MG 24 hr tablet Take 2 tablets (20 mg) by mouth daily (with breakfast) 180 tablet 3     levothyroxine (SYNTHROID) 50 MCG tablet Take 1 tablet (50 mcg) by mouth daily 90 tablet 3     metFORMIN (GLUCOPHAGE) 500 MG tablet Take 2 tablets (1,000 mg) by mouth 2 times daily (with meals) 360 tablet 3     metoprolol tartrate (LOPRESSOR) 50 MG tablet Take 1 tablet (50 mg) by mouth 2 times daily 180 tablet 3     testosterone (ANDROGEL) 50 MG/5GM (1%) topical gel Place 1 packet (50 mg of testosterone) onto the skin daily 90 packet 3     testosterone (ANDROGEL/TESTIM) 50 MG/5GM (1%) topical gel APPLY 1 PACKET (50MG) TOPICALLY DAILY. APPLY TO THE CLEAN DRY INTACT SKIN ON THE SHOULDERS UPPER ARMS OR ABDOMEN. 90 packet 3     valsartan (DIOVAN) 80 MG tablet Take 1 tablet (80 mg) by mouth daily 90 tablet 3     order for DME Knee Walker  Length of use: Three  months    The patient was prescribed a knee walker. The patient is unsteady utilizing crutches, quad walker or a cane.  The knee walker will allow the patient to ambulate safely without the use of the operative foot and reduce risk of falls. 1 Units 0     order for DME Postop shoe 1 Device 0     Allergies   Allergen Reactions     Hydrocodone-Acetaminophen Itching     Iodine      Irbesartan      renal insuff and hyperkalemia       Ivp Dye [Contrast Dye] Hives     Brief bout - 10 minutes duration     Lisinopril Nausea     Sulfa Drugs      Valsartan Difficulty breathing     Recent Labs   Lab Test 12/13/19  1007 09/06/19  0743 07/02/19  1247 07/02/19  1245  04/06/19  1107  11/20/18  1101  09/21/17  0827 07/28/17  0344 03/03/17  1109  05/29/15  1019   A1C 7.9* 6.5*  --  6.0*  --   --    < > 7.0*   < > 6.2*  --  7.7*   < > 7.6*   LDL  --   --   --   --   --   --   --  77  --  81  --  113*   < > 111   HDL  --   --   --   --   --   --   --  57  --  66  --  59   < >  --    TRIG  --   --   --   --   --   --   --  152*  --  91  --  209*   < >  --    ALT  --   --   --   --   --  29  --   --   --   --  22  --   --  35   CR  --  0.96 0.84  --    < > 0.89  --  0.86  --  0.87 1.17 0.98   < > 0.97   GFRESTIMATED  --  80 89  --    < > 88  --  88  --  88 62 76   < > 78   GFRESTBLACK  --  >90 >90  --    < > >90  --  >90  --  >90 75 >90  African American GFR Calc     < > >90   GFR Calc     POTASSIUM  --  4.6 4.6  --    < > 5.2  --  4.4  --  4.2 3.9 4.2   < > 4.5   TSH  --   --   --   --   --   --   --  1.52  --  1.39  --  1.42   < > 1.11    < > = values in this interval not displayed.      BP Readings from Last 3 Encounters:   12/13/19 (!) 168/82   09/09/19 (!) 164/70   09/06/19 (!) 154/70    Wt Readings from Last 3 Encounters:   12/13/19 122.5 kg (270 lb)   09/09/19 125.6 kg (277 lb)   09/06/19 125.8 kg (277 lb 6.4 oz)         ROS:  Constitutional, HEENT, cardiovascular, pulmonary, gi and gu systems are negative, except  as otherwise noted.    OBJECTIVE:                                                    There were no vitals taken for this visit.  Phone visit.  Francois says his feet are clear, wound has healed and little swelling, can see the sock line.  Labs pending     Phone call duration:  16 minutes      ASSESSMENT/PLAN:                                                    1. Type 2 diabetes mellitus with complication, without long-term current use of insulin (H)  Poor understanding of the diet and lifestyle of well controled diabetic.  Sugar in tea, beer 5-6 a day, but is active and does take his meds.  Struggled to get his blood pressure down while he is consuming alcohol.   When off alcohol blood pressures come down nicely.  He struggles to stay off alcohol.  Due for labs and refill medications, taking medication without difficulty.  Will plan a live visit in late summer when hopelfully COVID19 pandemic will be slowed.  - blood glucose (ACCU-CHEK SMARTVIEW) test strip; Use to test blood sugar 3 times daily or as directed.  Dispense: 100 each; Refill: 12  - blood glucose (NO BRAND SPECIFIED) lancets standard; Use to test blood sugar 3 times daily or as directed.  Dispense: 1 each; Refill: 11  - glipiZIDE (GLUCOTROL XL) 10 MG 24 hr tablet; Take 2 tablets (20 mg) by mouth daily (with breakfast)  Dispense: 180 tablet; Refill: 3  - metFORMIN (GLUCOPHAGE) 500 MG tablet; Take 2 tablets (1,000 mg) by mouth 2 times daily (with meals)  Dispense: 360 tablet; Refill: 3  - Hemoglobin A1c; Future    2. Hyperlipidemia LDL goal <70  due for labs and refill, taking medication without difficulty  - atorvastatin (LIPITOR) 40 MG tablet; Take 1 tablet (40 mg) by mouth daily  Dispense: 90 tablet; Refill: 3    3. HTN, goal below 140/90  due for labs and refill, taking medication without difficulty  - metoprolol tartrate (LOPRESSOR) 50 MG tablet; Take 1 tablet (50 mg) by mouth 2 times daily  Dispense: 180 tablet; Refill: 3  - amLODIPine (NORVASC) 10  MG tablet; Take 1 tablet (10 mg) by mouth daily  Dispense: 45 tablet; Refill: 3  - valsartan (DIOVAN) 80 MG tablet; Take 1 tablet (80 mg) by mouth daily  Dispense: 90 tablet; Refill: 3    4. Acquired hypothyroidism  due for labs and refill, taking medication without difficulty  - levothyroxine (SYNTHROID) 50 MCG tablet; Take 1 tablet (50 mcg) by mouth daily  Dispense: 90 tablet; Refill: 3  - TSH; Future  - T4 FREE; Future    Karen Ivory MD  [facility]

## 2020-05-29 ENCOUNTER — APPOINTMENT (OUTPATIENT)
Dept: MRI IMAGING | Facility: CLINIC | Age: 70
End: 2020-05-29
Attending: EMERGENCY MEDICINE
Payer: COMMERCIAL

## 2020-05-29 ENCOUNTER — NURSE TRIAGE (OUTPATIENT)
Dept: FAMILY MEDICINE | Facility: CLINIC | Age: 70
End: 2020-05-29

## 2020-05-29 ENCOUNTER — HOSPITAL ENCOUNTER (EMERGENCY)
Facility: CLINIC | Age: 70
Discharge: HOME | End: 2020-05-29
Attending: EMERGENCY MEDICINE | Admitting: EMERGENCY MEDICINE
Payer: COMMERCIAL

## 2020-05-29 VITALS
WEIGHT: 265 LBS | DIASTOLIC BLOOD PRESSURE: 73 MMHG | HEART RATE: 65 BPM | TEMPERATURE: 98.2 F | OXYGEN SATURATION: 98 % | BODY MASS INDEX: 35.94 KG/M2 | RESPIRATION RATE: 15 BRPM | SYSTOLIC BLOOD PRESSURE: 148 MMHG

## 2020-05-29 DIAGNOSIS — R20.2 PARESTHESIA: ICD-10-CM

## 2020-05-29 LAB
ALBUMIN SERPL-MCNC: 3.7 G/DL (ref 3.4–5)
ALP SERPL-CCNC: 179 U/L (ref 40–150)
ALT SERPL W P-5'-P-CCNC: 26 U/L (ref 0–70)
ANION GAP SERPL CALCULATED.3IONS-SCNC: 8 MMOL/L (ref 3–14)
APTT PPP: 25 SEC (ref 22–37)
AST SERPL W P-5'-P-CCNC: 24 U/L (ref 0–45)
BASOPHILS # BLD AUTO: 0.1 10E9/L (ref 0–0.2)
BASOPHILS NFR BLD AUTO: 0.6 %
BILIRUB SERPL-MCNC: 0.6 MG/DL (ref 0.2–1.3)
BUN SERPL-MCNC: 22 MG/DL (ref 7–30)
CALCIUM SERPL-MCNC: 8.9 MG/DL (ref 8.5–10.1)
CHLORIDE SERPL-SCNC: 107 MMOL/L (ref 94–109)
CO2 SERPL-SCNC: 21 MMOL/L (ref 20–32)
CREAT SERPL-MCNC: 0.84 MG/DL (ref 0.66–1.25)
DIFFERENTIAL METHOD BLD: ABNORMAL
EOSINOPHIL # BLD AUTO: 0.4 10E9/L (ref 0–0.7)
EOSINOPHIL NFR BLD AUTO: 4.1 %
ERYTHROCYTE [DISTWIDTH] IN BLOOD BY AUTOMATED COUNT: 13.3 % (ref 10–15)
GFR SERPL CREATININE-BSD FRML MDRD: 89 ML/MIN/{1.73_M2}
GLUCOSE SERPL-MCNC: 181 MG/DL (ref 70–99)
HCT VFR BLD AUTO: 39.2 % (ref 40–53)
HGB BLD-MCNC: 12.9 G/DL (ref 13.3–17.7)
IMM GRANULOCYTES # BLD: 0 10E9/L (ref 0–0.4)
IMM GRANULOCYTES NFR BLD: 0.3 %
INR PPP: 1.12 (ref 0.86–1.14)
LYMPHOCYTES # BLD AUTO: 4.1 10E9/L (ref 0.8–5.3)
LYMPHOCYTES NFR BLD AUTO: 41.6 %
MCH RBC QN AUTO: 32.3 PG (ref 26.5–33)
MCHC RBC AUTO-ENTMCNC: 32.9 G/DL (ref 31.5–36.5)
MCV RBC AUTO: 98 FL (ref 78–100)
MONOCYTES # BLD AUTO: 0.7 10E9/L (ref 0–1.3)
MONOCYTES NFR BLD AUTO: 7.3 %
NEUTROPHILS # BLD AUTO: 4.5 10E9/L (ref 1.6–8.3)
NEUTROPHILS NFR BLD AUTO: 46.1 %
NRBC # BLD AUTO: 0 10*3/UL
NRBC BLD AUTO-RTO: 0 /100
PLATELET # BLD AUTO: 243 10E9/L (ref 150–450)
POTASSIUM SERPL-SCNC: 4.6 MMOL/L (ref 3.4–5.3)
PROT SERPL-MCNC: 8.1 G/DL (ref 6.8–8.8)
RBC # BLD AUTO: 3.99 10E12/L (ref 4.4–5.9)
SODIUM SERPL-SCNC: 136 MMOL/L (ref 133–144)
WBC # BLD AUTO: 9.9 10E9/L (ref 4–11)

## 2020-05-29 PROCEDURE — 85610 PROTHROMBIN TIME: CPT | Performed by: EMERGENCY MEDICINE

## 2020-05-29 PROCEDURE — 80053 COMPREHEN METABOLIC PANEL: CPT | Performed by: EMERGENCY MEDICINE

## 2020-05-29 PROCEDURE — 70544 MR ANGIOGRAPHY HEAD W/O DYE: CPT

## 2020-05-29 PROCEDURE — A9585 GADOBUTROL INJECTION: HCPCS | Performed by: EMERGENCY MEDICINE

## 2020-05-29 PROCEDURE — 25500064 ZZH RX 255 OP 636: Performed by: EMERGENCY MEDICINE

## 2020-05-29 PROCEDURE — 93005 ELECTROCARDIOGRAM TRACING: CPT | Performed by: EMERGENCY MEDICINE

## 2020-05-29 PROCEDURE — 85730 THROMBOPLASTIN TIME PARTIAL: CPT | Performed by: EMERGENCY MEDICINE

## 2020-05-29 PROCEDURE — 99285 EMERGENCY DEPT VISIT HI MDM: CPT | Mod: 25 | Performed by: EMERGENCY MEDICINE

## 2020-05-29 PROCEDURE — 93010 ELECTROCARDIOGRAM REPORT: CPT | Mod: Z6 | Performed by: EMERGENCY MEDICINE

## 2020-05-29 PROCEDURE — 70549 MR ANGIOGRAPH NECK W/O&W/DYE: CPT

## 2020-05-29 PROCEDURE — 70553 MRI BRAIN STEM W/O & W/DYE: CPT

## 2020-05-29 PROCEDURE — 85025 COMPLETE CBC W/AUTO DIFF WBC: CPT | Performed by: EMERGENCY MEDICINE

## 2020-05-29 RX ORDER — GADOBUTROL 604.72 MG/ML
10 INJECTION INTRAVENOUS ONCE
Status: COMPLETED | OUTPATIENT
Start: 2020-05-29 | End: 2020-05-29

## 2020-05-29 RX ADMIN — GADOBUTROL 10 ML: 604.72 INJECTION INTRAVENOUS at 16:44

## 2020-05-29 ASSESSMENT — ENCOUNTER SYMPTOMS
WOUND: 0
NAUSEA: 0
SHORTNESS OF BREATH: 0
COUGH: 0
DYSURIA: 0
VOMITING: 0
SPEECH DIFFICULTY: 0
DIZZINESS: 0
CHILLS: 0
DIARRHEA: 0
BACK PAIN: 0
SORE THROAT: 0
FEVER: 0
HEADACHES: 0
WEAKNESS: 0
NUMBNESS: 1
BRUISES/BLEEDS EASILY: 0
ABDOMINAL PAIN: 0

## 2020-05-29 NOTE — DISCHARGE INSTRUCTIONS
The MRI of your brain was reassuring and no stroke was identified.  Recommend that you increase your dosage of aspirin from 81 mg to 325 mg daily. Follow up with Dr. Celestin this coming week. If you experience new or worsening of symptoms, weakness, etc., please return to the Emergency Department immediately for further evaluation.

## 2020-05-29 NOTE — ED NOTES
"Back from MRI. Denies pain. BP and monitoring reapplied. \" I have never felt this flustered in all my life\"   "

## 2020-05-29 NOTE — TELEPHONE ENCOUNTER
Spoke with pt and he is having numbness and tingling on the R side of his mouth, R hand-first 3 fingers and his R leg. Recommended ER for evaluation and pt agreed. Katerin

## 2020-05-29 NOTE — ED NOTES
Patient states he has had numbness and tingling on the right hand,leg and side of mouth for the past week. He comes in today because of a steady progression in the frequency and severity of the symptoms

## 2020-05-29 NOTE — ED NOTES
MRI ready for patient. This RN changed patient into gown, monitoring removed and socks given. MRI paperwork reviewed and checked.

## 2020-05-29 NOTE — ED PROVIDER NOTES
"  History     Chief Complaint   Patient presents with     Numbness     HPI  Francois Lujan is a 69 year old male with complex past medical history which includes diabetes type 2, hypertension, upper lipidemia, coronary artery disease status post CABG x3, who presents for evaluation of right-sided paresthesias of one-week duration.  Patient reports decreased sensation on right side of face, right arm, and right leg.  Symptoms been present for approximately 1 week and says that they are more noticeable today.  Denies any weakness, headache, confusion, chest pain, shortness of breath, fever, nausea, vomiting, or diarrhea.  Patient was concerned about coming to hospital due to current pandemic.  States that he did not want to \"be a bother\".  Pattern is been fairly consistent and he says at no time have symptoms completely resolved.  Denies having this sensation previously.  Says he does have some neuropathy in his feet related to his diabetes but says that this is different.     The patient's PMHx, Surgical Hx, Allergies, and Medications were all reviewed with the patient.    Allergies:  Allergies   Allergen Reactions     Hydrocodone-Acetaminophen Itching     Iodine      Irbesartan      renal insuff and hyperkalemia       Ivp Dye [Contrast Dye] Hives     Brief bout - 10 minutes duration     Lisinopril Nausea     Sulfa Drugs      Valsartan Difficulty breathing       Problem List:    Patient Active Problem List    Diagnosis Date Noted     Obesity (BMI 35.0-39.9) with comorbidity (H) 11/20/2018     Priority: Medium     Anemia 11/19/2018     Priority: Medium     Overview:   Created by Conversion       Arthropathy of multiple sites 11/19/2018     Priority: Medium     Overview:   Created by Conversion    Replacement Utility updated for latest IMO load       Benign neoplasm of colon 11/19/2018     Priority: Medium     Overview:   Created by Conversion       Benign essential hypertension 11/19/2018     Priority: Medium     " Overview:   Created by Conversion       Chronic gouty arthropathy 11/19/2018     Priority: Medium     Overview:   Created by Conversion    Replacement Utility updated for latest IMO load       Coronary atherosclerosis 11/19/2018     Priority: Medium     Overview:   Created by Conversion    Replacement Utility updated for latest IMO load       Depression 11/19/2018     Priority: Medium     Overview:   Created by Conversion       Other specified erythematous condition(695.89) 11/19/2018     Priority: Medium     Overview:   Created by Conversion  [facility] Annotation: Apr 21 2008  6:10PM - Tano Maldonado: elbows       Other testicular hypofunction 11/19/2018     Priority: Medium     Overview:   Created by Conversion       Degeneration of lumbar or lumbosacral intervertebral disc 11/19/2018     Priority: Medium     Overview:   Created by Conversion       Obesity 11/19/2018     Priority: Medium     Overview:   Created by Conversion       Vitamin D deficiency 11/19/2018     Priority: Medium     Overview:   Created by Conversion    Replacement Utility updated for latest IMO load       Proteinuria, unspecified type 12/15/2017     Priority: Medium     Epistaxis 12/15/2017     Priority: Medium     Acute gouty arthritis 12/15/2017     Priority: Medium     Type 2 diabetes mellitus with complication, without long-term current use of insulin (H) 12/15/2017     Priority: Medium     Other secondary gout, multiple sites, unspecified chronicity 09/21/2017     Priority: Medium     Triceps tendon rupture, left, sequela 04/10/2017     Priority: Medium     Coronary artery disease involving native coronary artery of native heart without angina pectoris 03/03/2017     Priority: Medium     Polyneuropathy associated with underlying disease (H) 08/30/2016     Priority: Medium     Alcohol dependence with other alcohol-induced disorder (H) 08/30/2016     Priority: Medium     Diabetic polyneuropathy associated with type 2 diabetes mellitus  (H) 04/12/2016     Priority: Medium     Type 2 diabetes mellitus with other circulatory complications (H) 04/12/2016     Priority: Medium     Type 2 diabetes mellitus with complication (H) 10/23/2015     Priority: Medium     Type 2 diabetes mellitus with other skin complications (H) 10/23/2015     Priority: Medium     CKD (chronic kidney disease) stage 2, GFR 60-89 ml/min 10/23/2015     Priority: Medium     Proteinuria 10/23/2015     Priority: Medium     Type 2 diabetes mellitus with other diabetic kidney complication (H) 10/23/2015     Priority: Medium     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     Priority: Medium     HTN, goal below 140/90 07/18/2014     Priority: Medium     OA (osteoarthritis) of knee, right 04/29/2014     Priority: Medium     ACL (anterior cruciate ligament) tear 04/29/2014     Priority: Medium     posttraumatic arthrosis lateral compartment       Multiple joint pain 04/23/2014     Priority: Medium     Elevated sed rate 03/03/2014     Priority: Medium     Elevated C-reactive protein (CRP) 03/03/2014     Priority: Medium     History of back surgery 03/03/2014     Priority: Medium     Polymyalgia rheumatica (H) 03/03/2014     Priority: Medium     Moderate major depression (H) 02/11/2014     Priority: Medium     Radicular pain of lumbosacral region 12/31/2013     Priority: Medium     Health Care Home 12/13/2013     Priority: Medium     Status:  Unable to reach  Care Coordinator:  Zoila Flores           Numbness of hand, right 12/12/2013     Priority: Medium     Hypertension goal BP (blood pressure) < 140/90 09/17/2013     Priority: Medium     ADELA (obstructive sleep apnea) 07/11/2013     Priority: Medium     Severe ADELA (7/10/2013 with AHI ~70, sherin desat 86%, insufficient time for CPAP titration)       Hyperlipidemia LDL goal <70 05/28/2013     Priority: Medium     S/P CABG (coronary artery bypass graft) 11/29/2012     Priority: Medium     11/17/11  PREOPERATIVE DIAGNOSIS:  Severe 3-vessel coronary  artery disease with unstable angina.        POSTOPERATIVE DIAGNOSIS:  Severe 3-vessel coronary artery disease with unstable angina.        PROCEDURES:    1.  Coronary artery bypass grafting x3 with placement of left internal mammary artery to the left anterior descending artery, saphenous vein graft from the aorta to the third obtuse marginal branch of the circumflex coronary artery and saphenous vein graft from aorta to the distal right coronary artery.    2.  Placement of temporary ventricular pacing wires.    3.  Endoscopic vein harvest from the left leg.        Hypogonadotropic hypogonadism (H) 11/29/2012     Priority: Medium     Hypothyroidism 11/29/2012     Priority: Medium     Hard of hearing 11/29/2012     Priority: Medium        Past Medical History:    Past Medical History:   Diagnosis Date     Arthritis      CAD (coronary artery disease)      Diabetes mellitus (H)      Erythema nodosum 1982     Gout      Hypertension      Malignant neoplasm (H)      Thyroid disease        Past Surgical History:    Past Surgical History:   Procedure Laterality Date     BACK SURGERY       BYPASS GRAFT ARTERY CORONARY  11/17/2011    Procedure:BYPASS GRAFT ARTERY CORONARY; CORONARY ARTERY BYPASS, Right, OM, LAD WITH ENDOVEIN HARVEST, ON PUMP; Surgeon:LEONEL MG; Location: OR     COLONOSCOPY N/A 6/9/2016    Procedure: COLONOSCOPY;  Surgeon: Isidro Humphreys MD;  Location: [facility]     HERNIA REPAIR      infantile hernia repair     ORTHOPEDIC SURGERY      Baker cyst removed - right knee, and mesiscus tear repair     REPAIR TENDON TRICEPS UPPER EXTREMITY Left 4/11/2017    Procedure: REPAIR TENDON TRICEPS UPPER EXTREMITY;  Surgeon: Rodriguez Kelley MD;  Location: [facility] OR       Family History:    Family History   Problem Relation Age of Onset     Heart Disease Father      Cancer Mother      C.A.D. Brother      Septicemia Brother 3     Lung Cancer Sister      Schizophrenia Sister        Social History:  Marital Status:    [2]  Social History     Tobacco Use     Smoking status: Former Smoker     Packs/day: 2.00     Years: 30.00     Pack years: 60.00     Last attempt to quit: 1997     Years since quittin.5     Smokeless tobacco: Never Used   Substance Use Topics     Alcohol use: Yes     Alcohol/week: 10.0 standard drinks     Types: 12 Cans of beer per week     Comment: occassionally      Drug use: No        Medications:    amLODIPine (NORVASC) 10 MG tablet  ASPIRIN PO  atorvastatin (LIPITOR) 40 MG tablet  blood glucose (ACCU-CHEK SMARTVIEW) test strip  blood glucose (NO BRAND SPECIFIED) lancets standard  glipiZIDE (GLUCOTROL XL) 10 MG 24 hr tablet  levothyroxine (SYNTHROID) 50 MCG tablet  metFORMIN (GLUCOPHAGE) 500 MG tablet  metoprolol tartrate (LOPRESSOR) 50 MG tablet  order for DME  order for DME  testosterone (ANDROGEL) 50 MG/5GM (1%) topical gel  testosterone (ANDROGEL/TESTIM) 50 MG/5GM (1%) topical gel  valsartan (DIOVAN) 80 MG tablet          Review of Systems   Constitutional: Negative for chills and fever.   HENT: Positive for hearing loss (wears hearing aids). Negative for congestion and sore throat.    Eyes: Negative for visual disturbance.   Respiratory: Negative for cough and shortness of breath.    Cardiovascular: Negative for chest pain.   Gastrointestinal: Negative for abdominal pain, diarrhea, nausea and vomiting.   Genitourinary: Negative for dysuria.   Musculoskeletal: Negative for back pain.   Skin: Negative for wound.   Neurological: Positive for numbness. Negative for dizziness, speech difficulty, weakness and headaches.   Hematological: Does not bruise/bleed easily.       Physical Exam   BP: (!) 199/82  Pulse: 65  Heart Rate: 65  Temp: 98.2  F (36.8  C)  Resp: 16  Weight: 120.2 kg (265 lb)  SpO2: 98 %    Physical Exam  General: Awake, alert, and cooperative. No acute distress   Mental Status: Oriented x 3. Speech normal.  Head: Normocephalic, atraumatic, symmetric.   Eyes: Conjunctiva normal, no  discharge, PERRLA 2 mm. No neglect/hemianopsia  Ears, Nose, Throat: External ears and nares normal.  No oral exudates. Oral mucosa moist.  Neck: Supple. No adenopathy. Non-tender.   Cardiovascular: Regular rate. Regular rhythm. No murmurs. Peripheral pulses strong. Normal capillary refill. No LE edema.   Respiratory: Normal effort. No wheezes, rales, or rhonchi bilaterally.  Chest Wall: Equal rise.  Abdomen: No tenderess, soft, non-distended. No rebound or guarding. No masses palpated  Back: Atraumatic.   Genitourinary: Normal external genitalia  Musculoskeletal: No gross deformity. Warm and well perfused  Neurologic: Mental status: Alert, awake. Oriented to self, date, and place. Normal speech and language. GCS 15  Cranial Nerves: II-XII fully intact  Motor: Follows commands x 4 extremities. Down going Babinski's. No Clonus.   Upper Extremities:   RUE: 5/5 shoulder abduction. 5/5 elbow flex/ext. 5/5 wrist flex/ext. 5/5 hand .   LUE: 5/5 shoulder abduction. 5/5 elbow flex/ext. 5/5 wrist flex/ext. 5/5 hand .   Lower Extremities:   RLE: 5/5 hip flexion. 5/5 knee flex/ext. 5/5 ankle plantar-/dorsiflexion. 5/5 EHL.   LLE: 5/5 hip flexion. 5/5 knee flex/ext. 5/5 ankle plantar-/dorsiflexion. 5/5 EHL   Sensory:  Reduced sensation on face in V2/V2, RUE and RLE  Coordination: Finger-nose finger intact. Heel-shin intact.  Skin: Warm, dry, no pallor, no erythema, no jaundice or rash.  Psychiatric:  Appropriate affect. Behavior is normal.    ED Course        Procedures             EKG Interpretation:      Interpreted by Eliud Santiago MD  Time reviewed: 1535  Symptoms at time of EKG: parasthesia   Rhythm: normal sinus   Rate: Normal  Axis: Normal  Ectopy: none  Conduction: normal  ST Segments/ T Waves: No acute ischemic changes and Non-specific ST-T wave changes  Q Waves: v1 and v2  Comparison to prior: No significant change compared to 9/6/2019    Clinical Impression: sinus rhythm. Old anteroseptal infarct. No  acute changes        Critical Care time:  none               Results for orders placed or performed during the hospital encounter of 05/29/20 (from the past 24 hour(s))   CBC with platelets differential   Result Value Ref Range    WBC 9.9 4.0 - 11.0 10e9/L    RBC Count 3.99 (L) 4.4 - 5.9 10e12/L    Hemoglobin 12.9 (L) 13.3 - 17.7 g/dL    Hematocrit 39.2 (L) 40.0 - 53.0 %    MCV 98 78 - 100 fl    MCH 32.3 26.5 - 33.0 pg    MCHC 32.9 31.5 - 36.5 g/dL    RDW 13.3 10.0 - 15.0 %    Platelet Count 243 150 - 450 10e9/L    Diff Method Automated Method     % Neutrophils 46.1 %    % Lymphocytes 41.6 %    % Monocytes 7.3 %    % Eosinophils 4.1 %    % Basophils 0.6 %    % Immature Granulocytes 0.3 %    Nucleated RBCs 0 0 /100    Absolute Neutrophil 4.5 1.6 - 8.3 10e9/L    Absolute Lymphocytes 4.1 0.8 - 5.3 10e9/L    Absolute Monocytes 0.7 0.0 - 1.3 10e9/L    Absolute Eosinophils 0.4 0.0 - 0.7 10e9/L    Absolute Basophils 0.1 0.0 - 0.2 10e9/L    Abs Immature Granulocytes 0.0 0 - 0.4 10e9/L    Absolute Nucleated RBC 0.0    Comprehensive metabolic panel   Result Value Ref Range    Sodium 136 133 - 144 mmol/L    Potassium 4.6 3.4 - 5.3 mmol/L    Chloride 107 94 - 109 mmol/L    Carbon Dioxide 21 20 - 32 mmol/L    Anion Gap 8 3 - 14 mmol/L    Glucose 181 (H) 70 - 99 mg/dL    Urea Nitrogen 22 7 - 30 mg/dL    Creatinine 0.84 0.66 - 1.25 mg/dL    GFR Estimate 89 >60 mL/min/[1.73_m2]    GFR Estimate If Black >90 >60 mL/min/[1.73_m2]    Calcium 8.9 8.5 - 10.1 mg/dL    Bilirubin Total 0.6 0.2 - 1.3 mg/dL    Albumin 3.7 3.4 - 5.0 g/dL    Protein Total 8.1 6.8 - 8.8 g/dL    Alkaline Phosphatase 179 (H) 40 - 150 U/L    ALT 26 0 - 70 U/L    AST 24 0 - 45 U/L   INR   Result Value Ref Range    INR 1.12 0.86 - 1.14   Partial thromboplastin time   Result Value Ref Range    PTT 25 22 - 37 sec   MRA Neck (Carotids) wo & w Contrast    Narrative    MRA NECK WITHOUT AND WITH CONTRAST  5/29/2020 4:44 PM     HISTORY: Right-sided paresthesia.      TECHNIQUE: 2D time-of-flight MR angiogram of the neck without contrast  and 3D MR angiogram of the neck with  10 mL Gadavist. Estimates of  carotid stenoses are made relative to the distal internal carotid  artery diameters except as noted.     COMPARISON: None.     FINDINGS:    Normal origin of the great vessels from the aortic arch.     Right carotid artery: There is mild atherosclerotic disease of the  right carotid bifurcation with up to approximately 40-50% stenosis by  NASCET criteria.    Left carotid artery: The left common and internal carotid arteries are  patent. No significant stenosis.     Vertebral arteries: Vertebral arteries appear patent without evidence  of dissection. Left vertebral artery is dominant. No significant  stenosis.       Impression    IMPRESSION:  1. There is up to 40-50% stenosis of the proximal cervical right  internal carotid artery, presumably related to atherosclerotic  disease.  2. Otherwise, no significant stenosis of the left cervical carotid or  bilateral cervical vertebral arteries.   MR Brain w/o & w Contrast    Narrative    MRI BRAIN WITHOUT AND WITH CONTRAST  5/29/2020 4:44 PM     HISTORY:  Decreased sensation in face, right upper extremity, right  lower extremity x one week.     TECHNIQUE:  Multiplanar, multisequence MRI of the brain without and  with 10 mL Gadavist.     COMPARISON: CT head 2/12/2010.    FINDINGS: There is no evidence of acute infarct, hemorrhage, mass, or  herniation. Mild global brain parenchymal volume loss. Minimal  presumed chronic small vessel ischemic change in the cerebral white  matter, commensurate with the patient's age. 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 There is no abnormal intracranial  enhancement.     Moderate right mastoid effusion. Orbits appear normal. The major  vascular flow voids at the base of the brain are maintained.      Impression     IMPRESSION:    1. No evidence of acute infarct, mass, hemorrhage, or herniation.  2. Mild age-related changes, as above.  3. Moderate right mastoid effusion.    MRA Brain ([place]) wo Contrast    Narrative    MR ANGIOGRAM OF THE HEAD WITHOUT CONTRAST   5/29/2020 4:45 PM     HISTORY: Right-sided paresthesia.    TECHNIQUE:  3D time-of-flight MR angiogram of the head without  contrast.    COMPARISON: MRI head of same day.    FINDINGS: The major intracranial arteries including the proximal  branches of the anterior cerebral, middle cerebral, and posterior  cerebral arteries appear patent without vascular cutoff. There is mild  diffuse irregularity involving the bilateral carotid siphons, likely  due to atherosclerotic disease, without flow-limiting stenosis. There  is suggestion of an inferomedially directed saccular aneurysm versus  infundibulum arising from the paraclinoid/supraclinoid right internal  carotid artery (series 6 image 57) measuring approximately 2-2.5 mm.  Additionally, there is a superolaterally directed saccular outpouching  arising from the left internal carotid artery just distal to the  origin of the left ophthalmic artery (series 6 image 64) measuring  approximately 2-3 mm.      Impression    IMPRESSION:  1. No high-grade stenosis or large-vessel occlusion involving the  major intracranial arterial vasculature.  2. Findings concerning for small (2-3 mm) saccular aneurysms arising  from the right paraclinoid/supraclinoid and left ophthalmic segments  of the internal carotid arteries.       Medications   gadobutrol (GADAVIST) injection 10 mL (10 mLs Intravenous Given 5/29/20 1644)       Assessments & Plan (with Medical Decision Making)   69 year old male with past medical history significant for coronary artery disease status post CABG, hypertension, diabetes, hyperlipidemia, with 1 week of right-sided paresthesias. on arrival to Emergency Department, vital signs were notable for blood  pressure of 157/72.  Centimeters detailed above and with subjective decrease in sensation in V2/V3 distribution on face, and right upper and lower extremities.  ECG with sinus rhythm no evidence of acute ischemia no significant changes from prior ECG.  No chest pain, pressure, history of atrial fibrillation.  CBC without leukocytosis, hemoglobin 12.9 (baseline of 12), CMP grossly normal with exception of glucose of 181.  MRI of brain as well as MRA of neck and [place] obtained and no identifiable etiology of his symptoms.  No acute or subacute stroke.  Internal carotid on right with 40 to 50% stenosis and small saccular aneurysms on the left internal carotid artery.  I reviewed the case with Dr. Kunz from stroke neurology who felt reassured by his imaging.  Given symptoms and face and extremities very unlikely to be spinal cord lesion.  Plan to increase aspirin from 81 mg to 325 mg daily given his weight of 120 kg.  No indication to start dual antiplatelet therapy at this time.  Plan for him to follow-up with Dr. Ivory this coming week.  I discussed imaging findings and he felt very reassured that no stroke identified.  Strict ED return precautions discussed.  Etiology of his symptoms remains unclear at this time however given reassuring MRI feel he is appropriate for discharge emergency department and further outpatient management as needed.  Patient expresses agreement understanding of plan and discharged from the department in stable condition.    I have reviewed the nursing notes.         Discharge Medication List as of 5/29/2020  5:49 PM          Final diagnoses:   Paresthesia     Eliud Santiago MD    5/29/2020   [facility] EMERGENCY DEPARTMENT    Disclaimer: This note consists of words and symbols derived from keyboarding and dictation using voice recognition software.  As a result, there may be errors that have gone undetected.  Please consider this when interpreting information found in  this note.             Eliud Santiago MD  05/29/20 [id number]

## 2020-05-29 NOTE — ED AVS SNAPSHOT
[facility] Emergency Department  [address] [facility] [address]  Phone:  [phone]  Fax:  [phone]                                    Francois Lujan   MRN: [id number]    Department:  [facility] Emergency Department   Date of Visit:  5/29/2020           After Visit Summary Signature Page    I have received my discharge instructions, and my questions have been answered. I have discussed any challenges I see with this plan with the nurse or doctor.    ..........................................................................................................................................  Patient/Patient Representative Signature      ..........................................................................................................................................  Patient Representative Print Name and Relationship to Patient    ..................................................               ................................................  Date                                   Time    ..........................................................................................................................................  Reviewed by Signature/Title    ...................................................              ..............................................  Date                                               Time          22EPIC Rev 08/18

## 2020-06-01 NOTE — TELEPHONE ENCOUNTER
Patient called back, he was seen in ER on 5-, patient says the providers thought he had possible ministroke and is increasing his aspirin now to 325 mg, says he is feeling fine, still has metal taste in his mouth, otherwise no emergent concerns. Patient is scheduled for hospital follow up on 6-8-2020 with PCP, told to call back if has further concerns or emergent symptoms, patient verbalizes understanding.    JORGE Elkins

## 2020-06-08 ENCOUNTER — OFFICE VISIT (OUTPATIENT)
Dept: FAMILY MEDICINE | Facility: CLINIC | Age: 70
End: 2020-06-08
Payer: COMMERCIAL

## 2020-06-08 VITALS
OXYGEN SATURATION: 96 % | BODY MASS INDEX: 37.79 KG/M2 | SYSTOLIC BLOOD PRESSURE: 136 MMHG | DIASTOLIC BLOOD PRESSURE: 78 MMHG | HEART RATE: 55 BPM | RESPIRATION RATE: 14 BRPM | WEIGHT: 279 LBS | HEIGHT: 72 IN

## 2020-06-08 DIAGNOSIS — E11.8 TYPE 2 DIABETES MELLITUS WITH COMPLICATION, WITHOUT LONG-TERM CURRENT USE OF INSULIN (H): ICD-10-CM

## 2020-06-08 DIAGNOSIS — E23.0 HYPOGONADOTROPIC HYPOGONADISM (H): ICD-10-CM

## 2020-06-08 DIAGNOSIS — L97.519 DIABETIC ULCER OF OTHER PART OF RIGHT FOOT ASSOCIATED WITH TYPE 2 DIABETES MELLITUS, UNSPECIFIED ULCER STAGE (H): ICD-10-CM

## 2020-06-08 DIAGNOSIS — R20.0 NUMBNESS OF HAND: ICD-10-CM

## 2020-06-08 DIAGNOSIS — Z09 HOSPITAL DISCHARGE FOLLOW-UP: Primary | ICD-10-CM

## 2020-06-08 DIAGNOSIS — R20.0 RIGHT FACIAL NUMBNESS: ICD-10-CM

## 2020-06-08 DIAGNOSIS — F32.1 MODERATE MAJOR DEPRESSION (H): ICD-10-CM

## 2020-06-08 DIAGNOSIS — E11.621 DIABETIC ULCER OF OTHER PART OF RIGHT FOOT ASSOCIATED WITH TYPE 2 DIABETES MELLITUS, UNSPECIFIED ULCER STAGE (H): ICD-10-CM

## 2020-06-08 DIAGNOSIS — E66.01 MORBID OBESITY (H): ICD-10-CM

## 2020-06-08 DIAGNOSIS — M35.3 POLYMYALGIA RHEUMATICA (H): ICD-10-CM

## 2020-06-08 DIAGNOSIS — I10 ESSENTIAL (PRIMARY) HYPERTENSION: ICD-10-CM

## 2020-06-08 DIAGNOSIS — G63 POLYNEUROPATHY ASSOCIATED WITH UNDERLYING DISEASE (H): ICD-10-CM

## 2020-06-08 DIAGNOSIS — F10.288 ALCOHOL DEPENDENCE WITH OTHER ALCOHOL-INDUCED DISORDER (H): ICD-10-CM

## 2020-06-08 LAB — HBA1C MFR BLD: 7.4 % (ref 0–5.6)

## 2020-06-08 PROCEDURE — 99214 OFFICE O/P EST MOD 30 MIN: CPT | Performed by: FAMILY MEDICINE

## 2020-06-08 PROCEDURE — 83036 HEMOGLOBIN GLYCOSYLATED A1C: CPT | Performed by: FAMILY MEDICINE

## 2020-06-08 PROCEDURE — 36415 COLL VENOUS BLD VENIPUNCTURE: CPT | Performed by: FAMILY MEDICINE

## 2020-06-08 ASSESSMENT — MIFFLIN-ST. JEOR: SCORE: 2068.54

## 2020-06-08 NOTE — PROGRESS NOTES
a  SUBJECTIVE:                                                    Francois Lujan is 69 year old male   Chief Complaint   Patient presents with     Hospital F/U     ED/UC Followup:    Facility:  [facility] [place]  Date of visit: 05/29/2020  Reason for visit: numbness  Current Status: feeling better in the last 4 days         Problem list and histories reviewed & adjusted, as indicated.  Additional history: work up in ED did not show stroke but aspirin dose increased and no sxs since.  Continues to drink alcohol against medical advice.  Foot exam last visit for diabetes.  Foot ulcer nearly healed.    Patient Active Problem List   Diagnosis     S/P CABG (coronary artery bypass graft)     Hypogonadotropic hypogonadism (H)     Hypothyroidism     Hard of hearing     Hyperlipidemia LDL goal <70     ADELA (obstructive sleep apnea)     Hypertension goal BP (blood pressure) < 140/90     Numbness of hand, right     Health Care Home     Radicular pain of lumbosacral region     Moderate major depression (H)     Elevated sed rate     Elevated C-reactive protein (CRP)     History of back surgery     Polymyalgia rheumatica (H)     Multiple joint pain     OA (osteoarthritis) of knee, right     ACL (anterior cruciate ligament) tear     HTN, goal below 140/90     Necrobiosis lipoidica     Type 2 diabetes mellitus with complication (H)     Type 2 diabetes mellitus with other skin complications (H)     CKD (chronic kidney disease) stage 2, GFR 60-89 ml/min     Proteinuria     Type 2 diabetes mellitus with other diabetic kidney complication (H)     Diabetic polyneuropathy associated with type 2 diabetes mellitus (H)     Type 2 diabetes mellitus with other circulatory complications (H)     Polyneuropathy associated with underlying disease (H)     Alcohol dependence with other alcohol-induced disorder (H)     Coronary artery disease involving native coronary artery of native heart without angina pectoris     Triceps tendon rupture, left,  sequela     Other secondary gout, multiple sites, unspecified chronicity     Proteinuria, unspecified type     Epistaxis     Acute gouty arthritis     Type 2 diabetes mellitus with complication, without long-term current use of insulin (H)     Anemia     Arthropathy of multiple sites     Benign neoplasm of colon     Benign essential hypertension     Chronic gouty arthropathy     Coronary atherosclerosis     Depression     Other specified erythematous condition(695.89)     Other testicular hypofunction     Degeneration of lumbar or lumbosacral intervertebral disc     Obesity     Vitamin D deficiency     Obesity (BMI 35.0-39.9) with comorbidity (H)     Diabetic ulcer of other part of right foot associated with type 2 diabetes mellitus, unspecified ulcer stage (H)     Past Surgical History:   Procedure Laterality Date     BACK SURGERY       BYPASS GRAFT ARTERY CORONARY  2011    Procedure:BYPASS GRAFT ARTERY CORONARY; CORONARY ARTERY BYPASS, Right, OM, LAD WITH ENDOVEIN HARVEST, ON PUMP; Surgeon:LEONEL MG; Location: OR     COLONOSCOPY N/A 2016    Procedure: COLONOSCOPY;  Surgeon: Isidro Humphreys MD;  Location: [facility]     HERNIA REPAIR      infantile hernia repair     ORTHOPEDIC SURGERY      Baker cyst removed - right knee, and mesiscus tear repair     REPAIR TENDON TRICEPS UPPER EXTREMITY Left 2017    Procedure: REPAIR TENDON TRICEPS UPPER EXTREMITY;  Surgeon: Rodriguez Kelley MD;  Location: [facility]       Social History     Tobacco Use     Smoking status: Former Smoker     Packs/day: 2.00     Years: 30.00     Pack years: 60.00     Last attempt to quit: 1997     Years since quittin.5     Smokeless tobacco: Never Used   Substance Use Topics     Alcohol use: Yes     Alcohol/week: 10.0 standard drinks     Types: 12 Cans of beer per week     Comment: occassionally      Family History   Problem Relation Age of Onset     Heart Disease Father      Cancer Mother      C.A.D. Brother       Septicemia Brother 3     Lung Cancer Sister      Schizophrenia Sister          Current Outpatient Medications   Medication Sig Dispense Refill     amLODIPine (NORVASC) 10 MG tablet Take 1 tablet (10 mg) by mouth daily 45 tablet 3     ASPIRIN PO Take 324 mg by mouth daily        atorvastatin (LIPITOR) 40 MG tablet Take 1 tablet (40 mg) by mouth daily 90 tablet 3     blood glucose (ACCU-CHEK SMARTVIEW) test strip Use to test blood sugar 3 times daily or as directed. 100 each 12     blood glucose (NO BRAND SPECIFIED) lancets standard Use to test blood sugar 3 times daily or as directed. 1 each 11     glipiZIDE (GLUCOTROL XL) 10 MG 24 hr tablet Take 2 tablets (20 mg) by mouth daily (with breakfast) 180 tablet 3     levothyroxine (SYNTHROID) 50 MCG tablet Take 1 tablet (50 mcg) by mouth daily 90 tablet 3     metFORMIN (GLUCOPHAGE) 500 MG tablet Take 2 tablets (1,000 mg) by mouth 2 times daily (with meals) 360 tablet 3     metoprolol tartrate (LOPRESSOR) 50 MG tablet Take 1 tablet (50 mg) by mouth 2 times daily 180 tablet 3     testosterone (ANDROGEL) 50 MG/5GM (1%) topical gel Place 1 packet (50 mg of testosterone) onto the skin daily 90 packet 3     testosterone (ANDROGEL/TESTIM) 50 MG/5GM (1%) topical gel APPLY 1 PACKET (50MG) TOPICALLY DAILY. APPLY TO THE CLEAN DRY INTACT SKIN ON THE SHOULDERS UPPER ARMS OR ABDOMEN. 90 packet 3     valsartan (DIOVAN) 80 MG tablet Take 1 tablet (80 mg) by mouth daily 90 tablet 3     order for DME Knee Walker  Length of use: Three months    The patient was prescribed a knee walker. The patient is unsteady utilizing crutches, quad walker or a cane.  The knee walker will allow the patient to ambulate safely without the use of the operative foot and reduce risk of falls. (Patient not taking: Reported on 6/8/2020) 1 Units 0     order for DME Postop shoe (Patient not taking: Reported on 6/8/2020) 1 Device 0     Allergies   Allergen Reactions     Hydrocodone-Acetaminophen Itching     Iodine       Irbesartan      renal insuff and hyperkalemia       Ivp Dye [Contrast Dye] Hives     Brief bout - 10 minutes duration     Lisinopril Nausea     Sulfa Drugs      Valsartan Difficulty breathing     Recent Labs   Lab Test 06/08/20  1208 05/29/20  1423 12/13/19  1007 09/06/19  0743  04/06/19  1107  11/20/18  1101  09/21/17  0827 07/28/17  0344 03/03/17  1109   A1C 7.4*  --  7.9* 6.5*   < >  --    < > 7.0*   < > 6.2*  --  7.7*   LDL  --   --   --   --   --   --   --  77  --  81  --  113*   HDL  --   --   --   --   --   --   --  57  --  66  --  59   TRIG  --   --   --   --   --   --   --  152*  --  91  --  209*   ALT  --  26  --   --   --  29  --   --   --   --  22  --    CR  --  0.84  --  0.96   < > 0.89  --  0.86  --  0.87 1.17 0.98   GFRESTIMATED  --  89  --  80   < > 88  --  88  --  88 62 76   GFRESTBLACK  --  >90  --  >90   < > >90  --  >90  --  >90 75 >90  African American GFR Calc     POTASSIUM  --  4.6  --  4.6   < > 5.2  --  4.4  --  4.2 3.9 4.2   TSH  --   --   --   --   --   --   --  1.52  --  1.39  --  1.42    < > = values in this interval not displayed.      BP Readings from Last 3 Encounters:   06/08/20 136/78   05/29/20 (!) 148/73   12/13/19 (!) 168/82    Wt Readings from Last 3 Encounters:   06/08/20 126.6 kg (279 lb)   05/29/20 120.2 kg (265 lb)   12/13/19 122.5 kg (270 lb)         ROS:  Constitutional, HEENT, cardiovascular, pulmonary, gi and gu systems are negative, except as otherwise noted.    OBJECTIVE:                                                    /78 (BP Location: Right arm, Patient Position: Sitting, Cuff Size: Adult Large)   Pulse 55   Resp 14   Ht 1.829 m (6')   Wt 126.6 kg (279 lb)   SpO2 96%   BMI 37.84 kg/m    GENERAL APPEARANCE ADULT: Alert, no acute distress  HENT: Ears and TMs normal, oral mucosa and posterior oropharynx normal  RESP: lungs clear to auscultation   CV: normal rate, regular rhythm, no murmur or gallop  PSYCH: mentation appears normal., affect and mood  normal  Diagnostic Test Results:  none      ASSESSMENT/PLAN:                                                    1. Hospital discharge follow-up  2. Right facial numbness  3. Numbness of hand, right  Possible TIA of effect of alcohol consumption.  Will continue or 325 mg Asprin daily and stop alcohol use completely    4. Type 2 diabetes mellitus with complication, without long-term current use of insulin (H)  - US Abdominal Aorta Imaging; Future  - Hemoglobin A1c    5. Essential (primary) hypertension   - US Abdominal Aorta Imaging; Future    6. Diabetic ulcer of other part of right foot associated with type 2 diabetes mellitus, unspecified ulcer stage (H)  Nearly resolved    7. Polyneuropathy associated with underlying disease (H)  Chronic and treated    8. Polymyalgia rheumatica (H)    9. Alcohol dependence with other alcohol-induced disorder (H)  Encouraged to quit and has in past, may need assistance    10. Moderate major depression (H)  On meds    11. Morbid obesity (H)  Ideal weight for height is 180, realistic weight 200 (over 79 lbs).  Need to lose at least 10%, 28 pounds in 28 weeks.    12. Hypogonadotropic hypogonadism (H)  Using testosterone supplements      Karen Ivory MD  [facility]

## 2020-07-14 DIAGNOSIS — E29.1 HYPOGONADISM MALE: ICD-10-CM

## 2020-07-14 NOTE — TELEPHONE ENCOUNTER
Called the pharmacy and clarified this request.  This was last issued on 10-14-19 for a qty of 90 with 3 refills.  Pharmacist reports that this cannot be filled for a yr because it is a controlled substance.    Pharmacist says that the last time this was filled at the [facility] was January but is now .  Last ov May 2019.  No future appt scheduled to date with urology.    Please issue new rx.      Merry Nicole  [facility] RN

## 2020-07-14 NOTE — TELEPHONE ENCOUNTER
Requested Prescriptions   Pending Prescriptions Disp Refills     testosterone (ANDROGEL) 50 MG/5GM (1%) topical gel 90 packet 3     Sig: Place 1 packet (50 mg of testosterone) onto the skin daily       There is no refill protocol information for this order        Last Written Prescription Date:    Last Fill Quantity: ,  # refills:    Last office visit: 5/15/2019 with prescribing provider:  gleich    Future Office Visit:

## 2020-07-15 RX ORDER — TESTOSTERONE GEL, 1% 10 MG/G
50 GEL TRANSDERMAL DAILY
Qty: 90 PACKET | Refills: 3 | Status: SHIPPED | OUTPATIENT
Start: 2020-07-15 | End: 2020-11-30

## 2020-07-28 ENCOUNTER — OFFICE VISIT (OUTPATIENT)
Dept: FAMILY MEDICINE | Facility: CLINIC | Age: 70
End: 2020-07-28
Payer: COMMERCIAL

## 2020-07-28 VITALS
OXYGEN SATURATION: 96 % | WEIGHT: 278.4 LBS | RESPIRATION RATE: 16 BRPM | HEIGHT: 72 IN | SYSTOLIC BLOOD PRESSURE: 162 MMHG | BODY MASS INDEX: 37.71 KG/M2 | HEART RATE: 87 BPM | DIASTOLIC BLOOD PRESSURE: 70 MMHG

## 2020-07-28 DIAGNOSIS — E55.9 VITAMIN D DEFICIENCY: ICD-10-CM

## 2020-07-28 DIAGNOSIS — E08.42 DIABETES MELLITUS DUE TO UNDERLYING CONDITION WITH DIABETIC POLYNEUROPATHY, WITHOUT LONG-TERM CURRENT USE OF INSULIN (H): ICD-10-CM

## 2020-07-28 DIAGNOSIS — R80.9 TYPE 2 DIABETES MELLITUS WITH MICROALBUMINURIA, WITHOUT LONG-TERM CURRENT USE OF INSULIN (H): ICD-10-CM

## 2020-07-28 DIAGNOSIS — R20.0 NUMBNESS AND TINGLING OF RIGHT FACE: Primary | ICD-10-CM

## 2020-07-28 DIAGNOSIS — R20.0 NUMBNESS OF RIGHT FOOT: ICD-10-CM

## 2020-07-28 DIAGNOSIS — R20.0 NUMBNESS AND TINGLING IN RIGHT HAND: ICD-10-CM

## 2020-07-28 DIAGNOSIS — R20.2 NUMBNESS AND TINGLING OF RIGHT FACE: Primary | ICD-10-CM

## 2020-07-28 DIAGNOSIS — E78.5 HYPERLIPIDEMIA LDL GOAL <70: ICD-10-CM

## 2020-07-28 DIAGNOSIS — M51.379 DEGENERATION OF LUMBAR OR LUMBOSACRAL INTERVERTEBRAL DISC: ICD-10-CM

## 2020-07-28 DIAGNOSIS — E11.8 TYPE 2 DIABETES MELLITUS WITH COMPLICATION (H): ICD-10-CM

## 2020-07-28 DIAGNOSIS — R20.2 NUMBNESS AND TINGLING IN RIGHT HAND: ICD-10-CM

## 2020-07-28 DIAGNOSIS — R80.1 PERSISTENT PROTEINURIA: Primary | ICD-10-CM

## 2020-07-28 DIAGNOSIS — M1A.00X0 CHRONIC GOUTY ARTHROPATHY: ICD-10-CM

## 2020-07-28 DIAGNOSIS — F10.10 ALCOHOL ABUSE: ICD-10-CM

## 2020-07-28 DIAGNOSIS — I10 BENIGN ESSENTIAL HYPERTENSION: ICD-10-CM

## 2020-07-28 DIAGNOSIS — I10 HTN, GOAL BELOW 140/90: ICD-10-CM

## 2020-07-28 DIAGNOSIS — E11.29 TYPE 2 DIABETES MELLITUS WITH MICROALBUMINURIA, WITHOUT LONG-TERM CURRENT USE OF INSULIN (H): ICD-10-CM

## 2020-07-28 PROBLEM — M12.9 ARTHROPATHY OF MULTIPLE SITES: Status: RESOLVED | Noted: 2018-11-19 | Resolved: 2020-07-28

## 2020-07-28 PROBLEM — F32.A DEPRESSION: Status: RESOLVED | Noted: 2018-11-19 | Resolved: 2020-07-28

## 2020-07-28 PROBLEM — I25.10 CORONARY ATHEROSCLEROSIS: Status: RESOLVED | Noted: 2018-11-19 | Resolved: 2020-07-28

## 2020-07-28 PROBLEM — E29.1 OTHER TESTICULAR HYPOFUNCTION: Status: RESOLVED | Noted: 2018-11-19 | Resolved: 2020-07-28

## 2020-07-28 PROBLEM — E66.9 OBESITY: Status: RESOLVED | Noted: 2018-11-19 | Resolved: 2020-07-28

## 2020-07-28 PROBLEM — M1A.9XX0 CHRONIC GOUTY ARTHROPATHY: Status: ACTIVE | Noted: 2018-11-19

## 2020-07-28 PROBLEM — M10.9 ACUTE GOUTY ARTHRITIS: Status: RESOLVED | Noted: 2017-12-15 | Resolved: 2020-07-28

## 2020-07-28 PROBLEM — M10.49: Status: RESOLVED | Noted: 2017-09-21 | Resolved: 2020-07-28

## 2020-07-28 PROBLEM — L53.8 OTHER SPECIFIED ERYTHEMATOUS CONDITION(695.89): Status: RESOLVED | Noted: 2018-11-19 | Resolved: 2020-07-28

## 2020-07-28 PROBLEM — D64.9 ANEMIA: Status: RESOLVED | Noted: 2018-11-19 | Resolved: 2020-07-28

## 2020-07-28 LAB
CHOLEST SERPL-MCNC: 202 MG/DL
CREAT UR-MCNC: 74 MG/DL
CRP SERPL-MCNC: 4.9 MG/L (ref 0–8)
ERYTHROCYTE [DISTWIDTH] IN BLOOD BY AUTOMATED COUNT: 12.6 % (ref 10–15)
ERYTHROCYTE [SEDIMENTATION RATE] IN BLOOD BY WESTERGREN METHOD: 22 MM/H (ref 0–20)
FERRITIN SERPL-MCNC: 57 NG/ML (ref 26–388)
HCT VFR BLD AUTO: 36 % (ref 40–53)
HDLC SERPL-MCNC: 57 MG/DL
HGB BLD-MCNC: 12.5 G/DL (ref 13.3–17.7)
LDLC SERPL CALC-MCNC: 103 MG/DL
MCH RBC QN AUTO: 32.6 PG (ref 26.5–33)
MCHC RBC AUTO-ENTMCNC: 34.7 G/DL (ref 31.5–36.5)
MCV RBC AUTO: 94 FL (ref 78–100)
MICROALBUMIN UR-MCNC: 968 MG/L
MICROALBUMIN/CREAT UR: 1311.65 MG/G CR (ref 0–17)
NONHDLC SERPL-MCNC: 145 MG/DL
PLATELET # BLD AUTO: 221 10E9/L (ref 150–450)
RBC # BLD AUTO: 3.83 10E12/L (ref 4.4–5.9)
TRIGL SERPL-MCNC: 210 MG/DL
TSH SERPL DL<=0.005 MIU/L-ACNC: 1.05 MU/L (ref 0.4–4)
VIT B12 SERPL-MCNC: 348 PG/ML (ref 193–986)
WBC # BLD AUTO: 10.2 10E9/L (ref 4–11)

## 2020-07-28 PROCEDURE — 82728 ASSAY OF FERRITIN: CPT | Performed by: INTERNAL MEDICINE

## 2020-07-28 PROCEDURE — 82607 VITAMIN B-12: CPT | Performed by: INTERNAL MEDICINE

## 2020-07-28 PROCEDURE — 83883 ASSAY NEPHELOMETRY NOT SPEC: CPT | Mod: 59 | Performed by: INTERNAL MEDICINE

## 2020-07-28 PROCEDURE — 86140 C-REACTIVE PROTEIN: CPT | Performed by: INTERNAL MEDICINE

## 2020-07-28 PROCEDURE — 82043 UR ALBUMIN QUANTITATIVE: CPT | Performed by: INTERNAL MEDICINE

## 2020-07-28 PROCEDURE — 83883 ASSAY NEPHELOMETRY NOT SPEC: CPT | Performed by: INTERNAL MEDICINE

## 2020-07-28 PROCEDURE — 84443 ASSAY THYROID STIM HORMONE: CPT | Performed by: INTERNAL MEDICINE

## 2020-07-28 PROCEDURE — 99215 OFFICE O/P EST HI 40 MIN: CPT | Performed by: INTERNAL MEDICINE

## 2020-07-28 PROCEDURE — 36415 COLL VENOUS BLD VENIPUNCTURE: CPT | Performed by: INTERNAL MEDICINE

## 2020-07-28 PROCEDURE — 85027 COMPLETE CBC AUTOMATED: CPT | Performed by: INTERNAL MEDICINE

## 2020-07-28 PROCEDURE — 84165 PROTEIN E-PHORESIS SERUM: CPT | Performed by: INTERNAL MEDICINE

## 2020-07-28 PROCEDURE — 80061 LIPID PANEL: CPT | Performed by: INTERNAL MEDICINE

## 2020-07-28 PROCEDURE — 85652 RBC SED RATE AUTOMATED: CPT | Performed by: INTERNAL MEDICINE

## 2020-07-28 PROCEDURE — 00000402 ZZHCL STATISTIC TOTAL PROTEIN: Performed by: INTERNAL MEDICINE

## 2020-07-28 RX ORDER — ATORVASTATIN CALCIUM 80 MG/1
80 TABLET, FILM COATED ORAL DAILY
Qty: 90 TABLET | Refills: 3 | Status: SHIPPED | OUTPATIENT
Start: 2020-07-28 | End: 2021-09-23

## 2020-07-28 RX ORDER — VALSARTAN 160 MG/1
160 TABLET ORAL DAILY
Qty: 90 TABLET | Refills: 3 | Status: SHIPPED | OUTPATIENT
Start: 2020-07-28 | End: 2021-09-07

## 2020-07-28 ASSESSMENT — MIFFLIN-ST. JEOR: SCORE: 2065.81

## 2020-07-28 NOTE — RESULT ENCOUNTER NOTE
There is a significant amount of protein in the urine.  Recommend to increase valsartan from 80 to 160 mg.  This helps with blood pressure and to protect the kidneys.  It looks like he saw a kidney doctor Dr. Zepeda1 year ago, you are overdue for follow-up with her    Thyroid test is normal  Cholesterol is higher than last check 1 year ago.  Because of your history of bypass surgery, we want to see the LDL less than 70.  I recommend to double the dose of the atorvastatin from 40 mg daily to 80 mg daily.    Medication changes sent to the pharmacy    Inflammatory marker CRP is normal    There is mild stable anemia.  There is no sign of iron deficiency.    The sed rate is mildly elevated, but similar to previous values

## 2020-07-28 NOTE — PROGRESS NOTES
Subjective     Francois Lujan is a 69 year old male who presents to clinic today for the following health issues:    HPI       Chief Complaint   Patient presents with     Establish Care     Dr. Ivory pt. pt. was in the ER on 5/29/2020 and saw Dr. Ivory on 6/8/2020 pt. would like to get aquainted with Dr. Alvarez. pt. states he is still having the numbness.        Numbness: right sounds of mouth, cheek, hand, right foot.  Had a 'bad episode' a few days ago with associated tremor and legs felt weak.  --numbness started in mid-May  --has chronic neuropathy in feet due to diabetes   --numbness in face and hand is intermittent, occurring more often.  Seems to happen when going from sitting to standing or bending over at the waist..  Previously was only 1-2 x day.  It lasts 1-2 min and then will go away.  Never has the numbness except when changing positions.  The face and hand numbness always go along together.  --numbness in feet is different than chronic diabetes numbness because it feels 'spongy' and 'weak'.  This also only occurring with position changes as above, and always occur at the same time as face and hand numbness.  --there is no pain  --has never been on neuropathy meds    Former smoker, quit 30 years ago, smoked for 30 years.  6 beers/day      MRA Neck   IMPRESSION:  1. There is up to 40-50% stenosis of the proximal cervical right internal carotid artery, presumably related to atherosclerotic disease.  2. Otherwise, no significant stenosis of the left cervical carotid or bilateral cervical vertebral arteries.    MRI Brain    IMPRESSION:    1. No evidence of acute infarct, mass, hemorrhage, or herniation.  2. Mild age-related changes, as above.  3. Moderate right mastoid effusion.     IMPRESSION:  1. No high-grade stenosis or large-vessel occlusion involving the major intracranial arterial vasculature.  2. Findings concerning for small (2-3 mm) saccular aneurysms arising from the right  paraclinoid/supraclinoid and left ophthalmic segments of the internal carotid arteries.       Current Outpatient Medications   Medication Sig Dispense Refill     amLODIPine (NORVASC) 10 MG tablet Take 1 tablet (10 mg) by mouth daily 45 tablet 3     ASPIRIN PO Take 324 mg by mouth daily        atorvastatin (LIPITOR) 40 MG tablet Take 1 tablet (40 mg) by mouth daily 90 tablet 3     blood glucose (ACCU-CHEK SMARTVIEW) test strip Use to test blood sugar 3 times daily or as directed. 100 each 12     blood glucose (NO BRAND SPECIFIED) lancets standard Use to test blood sugar 3 times daily or as directed. 1 each 11     glipiZIDE (GLUCOTROL XL) 10 MG 24 hr tablet Take 2 tablets (20 mg) by mouth daily (with breakfast) 180 tablet 3     levothyroxine (SYNTHROID) 50 MCG tablet Take 1 tablet (50 mcg) by mouth daily 90 tablet 3     metFORMIN (GLUCOPHAGE) 500 MG tablet Take 2 tablets (1,000 mg) by mouth 2 times daily (with meals) 360 tablet 3     metoprolol tartrate (LOPRESSOR) 50 MG tablet Take 1 tablet (50 mg) by mouth 2 times daily 180 tablet 3     testosterone (ANDROGEL) 50 MG/5GM (1%) topical gel Place 1 packet (50 mg of testosterone) onto the skin daily 90 packet 3     valsartan (DIOVAN) 80 MG tablet Take 1 tablet (80 mg) by mouth daily 90 tablet 3     testosterone (ANDROGEL/TESTIM) 50 MG/5GM (1%) topical gel APPLY 1 PACKET (50MG) TOPICALLY DAILY. APPLY TO THE CLEAN DRY INTACT SKIN ON THE SHOULDERS UPPER ARMS OR ABDOMEN. 90 packet 3       Reviewed and updated as needed this visit by Provider         Review of Systems   Constitutional, HEENT, cardiovascular, pulmonary, gi and gu systems are negative, except as otherwise noted.      Objective    BP (!) 150/62   Pulse 87   Resp 16   Ht 1.829 m (6')   Wt 126.3 kg (278 lb 6.4 oz)   SpO2 96%   BMI 37.76 kg/m    Body mass index is 37.76 kg/m .  Physical Exam   GENERAL APPEARANCE: healthy, alert, no distress and over weight  RESP: lungs clear to auscultation - no rales,  rhonchi or wheezes  CV: regular rates and rhythm, normal S1 S2, no S3 or S4 and no murmur, click or rub  LYMPHATICS: no cervical adenopathy  1+ bilateral pitting edema to mid shin  MS: extremities normal- no gross deformities noted and 5/5 strength bilateral upper and lower extremities  SKIN: Necrobiosis lipoidica bilateral lower legs  NEURO: Normal strength and tone, mentation intact, speech normal, cranial nerves 2-12 intact except for decreased sensation in the right jaw area, rapid alternating movements normal,  Unabile to elicit bilateral knee or ankle reflexes.  Decreased vibratory sense in the right foot and ankle.  Intact in bilateral upper extremities and left leg.  Decreased sensation to sharp/dull bilateral legs but more pronounced on the right.  DIABETIC FOOT EXAM: normal DP and PT pulses, no trophic changes or ulcerative lesions and reduced sensation at 8/10 spots bilateral feet          Assessment & Plan       ICD-10-CM    1. Numbness and tingling of right face  R20.0 MR Cervical Spine w/o Contrast    R20.2 NEUROLOGY ADULT REFERRAL     Vitamin B12     Ferritin     Kappa and lambda light chain     Protein electrophoresis     CBC with platelets     ESR: Erythrocyte sedimentation rate     CRP, inflammation   2. Numbness and tingling in right hand  R20.0 MR Cervical Spine w/o Contrast    R20.2 NEUROLOGY ADULT REFERRAL     Vitamin B12     Ferritin     Kappa and lambda light chain     Protein electrophoresis     CBC with platelets     ESR: Erythrocyte sedimentation rate     CRP, inflammation   3. Numbness of right foot  R20.0 MR Cervical Spine w/o Contrast     NEUROLOGY ADULT REFERRAL     Vitamin B12     Ferritin     Kappa and lambda light chain     Protein electrophoresis     CBC with platelets     ESR: Erythrocyte sedimentation rate     CRP, inflammation   4. Diabetes mellitus due to underlying condition with diabetic polyneuropathy, without long-term current use of insulin (H)   E08.42 Ferritin   5. Type  2 diabetes mellitus with complication (H)  E11.8 TSH with free T4 reflex     Lipid panel reflex to direct LDL Non-fasting     Albumin Random Urine Quantitative with Creat Ratio   6. Alcohol abuse  F10.10      Unclear etiology of the numbness of his face, hand, leg.  It is unusual that is in such a wide distribution and appears to be positional.  I am unaware of any medical condition that would cause these symptoms.  Things that could cause neuropathy would be alcohol, diabetes, vitamin deficiency, multiple myeloma, medicine side effect, structural lesion (carpal tunnel, cervical radiculopathy).  Testing as above and see neurology.  May need EMG?       BMI:   Estimated body mass index is 37.76 kg/m  as calculated from the following:    Height as of this encounter: 1.829 m (6').    Weight as of this encounter: 126.3 kg (278 lb 6.4 oz).   Weight management plan: Discussed healthy diet and exercise guidelines        Patient Instructions   1. Labs today  2. See Neurology  3. Work to cut back on Neurology  4. Neurology - Dr. Mathias at [facility] -   Address: [address] [place] [id number]      Phone: [phone]      Greater than 40 minutes was spent face-to-face with the patient by the provider.  Greater than 50% was spent in counseling or coordinating care for this patient.      No follow-ups on file.    Teresita Alvarez, [facility]

## 2020-07-28 NOTE — PATIENT INSTRUCTIONS
1. Labs today  2. See Neurology  3. Work to cut back on Neurology  4. Neurology - Dr. Mathias at [facility] -   Address: [address] [place] [id number]      Phone: [phone]

## 2020-07-29 LAB
ALBUMIN SERPL ELPH-MCNC: 4.2 G/DL (ref 3.7–5.1)
ALPHA1 GLOB SERPL ELPH-MCNC: 0.4 G/DL (ref 0.2–0.4)
ALPHA2 GLOB SERPL ELPH-MCNC: 0.8 G/DL (ref 0.5–0.9)
B-GLOBULIN SERPL ELPH-MCNC: 1 G/DL (ref 0.6–1)
GAMMA GLOB SERPL ELPH-MCNC: 1.1 G/DL (ref 0.7–1.6)
KAPPA LC UR-MCNC: 3.26 MG/DL (ref 0.33–1.94)
KAPPA LC/LAMBDA SER: 1.54 {RATIO} (ref 0.26–1.65)
LAMBDA LC SERPL-MCNC: 2.12 MG/DL (ref 0.57–2.63)
M PROTEIN SERPL ELPH-MCNC: 0 G/DL
PROT PATTERN SERPL ELPH-IMP: NORMAL

## 2020-08-02 ENCOUNTER — APPOINTMENT (OUTPATIENT)
Dept: CT IMAGING | Facility: CLINIC | Age: 70
DRG: 066 | End: 2020-08-02
Attending: EMERGENCY MEDICINE
Payer: COMMERCIAL

## 2020-08-02 ENCOUNTER — HOSPITAL ENCOUNTER (OUTPATIENT)
Dept: MRI IMAGING | Facility: CLINIC | Age: 70
Discharge: HOME | DRG: 066 | End: 2020-08-02
Attending: INTERNAL MEDICINE | Admitting: INTERNAL MEDICINE
Payer: COMMERCIAL

## 2020-08-02 ENCOUNTER — APPOINTMENT (OUTPATIENT)
Dept: MRI IMAGING | Facility: CLINIC | Age: 70
DRG: 066 | End: 2020-08-02
Attending: EMERGENCY MEDICINE
Payer: COMMERCIAL

## 2020-08-02 ENCOUNTER — HOSPITAL ENCOUNTER (INPATIENT)
Facility: CLINIC | Age: 70
LOS: 1 days | Discharge: HOME | DRG: 066 | End: 2020-08-03
Attending: EMERGENCY MEDICINE | Admitting: FAMILY MEDICINE
Payer: COMMERCIAL

## 2020-08-02 DIAGNOSIS — R20.0 NUMBNESS OF RIGHT FOOT: ICD-10-CM

## 2020-08-02 DIAGNOSIS — R29.701 NATIONAL INSTITUTES OF HEALTH STROKE SCALE (NIHSS) TOTAL SCORE 1: ICD-10-CM

## 2020-08-02 DIAGNOSIS — R20.0 NUMBNESS AND TINGLING OF RIGHT FACE: ICD-10-CM

## 2020-08-02 DIAGNOSIS — R20.0 TACTILE ANESTHESIA: ICD-10-CM

## 2020-08-02 DIAGNOSIS — Z03.818 ENCNTR FOR OBS FOR SUSP EXPSR TO OTH BIOLG AGENTS RULED OUT: ICD-10-CM

## 2020-08-02 DIAGNOSIS — R20.0 NUMBNESS: ICD-10-CM

## 2020-08-02 DIAGNOSIS — I63.81 LEFT THALAMIC INFARCTION (H): ICD-10-CM

## 2020-08-02 DIAGNOSIS — R20.2 NUMBNESS AND TINGLING IN RIGHT HAND: ICD-10-CM

## 2020-08-02 DIAGNOSIS — I63.89 ACUTE ARTERIAL ISCHEMIC STROKE, MULTIFOCAL, MULT VASCULAR TERRITORIES (H): ICD-10-CM

## 2020-08-02 DIAGNOSIS — R20.0 NUMBNESS AND TINGLING IN RIGHT HAND: ICD-10-CM

## 2020-08-02 DIAGNOSIS — R20.2 NUMBNESS AND TINGLING OF RIGHT FACE: ICD-10-CM

## 2020-08-02 DIAGNOSIS — I63.9 CEREBROVASCULAR ACCIDENT (CVA), UNSPECIFIED MECHANISM (H): Primary | ICD-10-CM

## 2020-08-02 LAB
ANION GAP SERPL CALCULATED.3IONS-SCNC: 5 MMOL/L (ref 3–14)
BASOPHILS # BLD AUTO: 0.1 10E9/L (ref 0–0.2)
BASOPHILS NFR BLD AUTO: 0.6 %
BUN SERPL-MCNC: 21 MG/DL (ref 7–30)
CALCIUM SERPL-MCNC: 8.2 MG/DL (ref 8.5–10.1)
CHLORIDE SERPL-SCNC: 104 MMOL/L (ref 94–109)
CO2 SERPL-SCNC: 23 MMOL/L (ref 20–32)
CREAT SERPL-MCNC: 0.93 MG/DL (ref 0.66–1.25)
DIFFERENTIAL METHOD BLD: ABNORMAL
EOSINOPHIL # BLD AUTO: 0.4 10E9/L (ref 0–0.7)
EOSINOPHIL NFR BLD AUTO: 4.6 %
ERYTHROCYTE [DISTWIDTH] IN BLOOD BY AUTOMATED COUNT: 12.5 % (ref 10–15)
GFR SERPL CREATININE-BSD FRML MDRD: 83 ML/MIN/{1.73_M2}
GLUCOSE BLDC GLUCOMTR-MCNC: 237 MG/DL (ref 70–99)
GLUCOSE BLDC GLUCOMTR-MCNC: 246 MG/DL (ref 70–99)
GLUCOSE SERPL-MCNC: 244 MG/DL (ref 70–99)
HBA1C MFR BLD: 7.7 % (ref 0–5.6)
HCT VFR BLD AUTO: 35.7 % (ref 40–53)
HGB BLD-MCNC: 12.2 G/DL (ref 13.3–17.7)
IMM GRANULOCYTES # BLD: 0 10E9/L (ref 0–0.4)
IMM GRANULOCYTES NFR BLD: 0.2 %
LYMPHOCYTES # BLD AUTO: 3.1 10E9/L (ref 0.8–5.3)
LYMPHOCYTES NFR BLD AUTO: 37.3 %
MCH RBC QN AUTO: 32.3 PG (ref 26.5–33)
MCHC RBC AUTO-ENTMCNC: 34.2 G/DL (ref 31.5–36.5)
MCV RBC AUTO: 94 FL (ref 78–100)
MONOCYTES # BLD AUTO: 0.7 10E9/L (ref 0–1.3)
MONOCYTES NFR BLD AUTO: 8 %
NEUTROPHILS # BLD AUTO: 4.1 10E9/L (ref 1.6–8.3)
NEUTROPHILS NFR BLD AUTO: 49.3 %
NRBC # BLD AUTO: 0 10*3/UL
NRBC BLD AUTO-RTO: 0 /100
PLATELET # BLD AUTO: 219 10E9/L (ref 150–450)
PLATELET FUNCTION ASA: 416 ARU
POTASSIUM SERPL-SCNC: 4.9 MMOL/L (ref 3.4–5.3)
RBC # BLD AUTO: 3.78 10E12/L (ref 4.4–5.9)
SODIUM SERPL-SCNC: 132 MMOL/L (ref 133–144)
TROPONIN I SERPL-MCNC: <0.015 UG/L (ref 0–0.04)
WBC # BLD AUTO: 8.3 10E9/L (ref 4–11)

## 2020-08-02 PROCEDURE — 25800030 ZZH RX IP 258 OP 636: Performed by: EMERGENCY MEDICINE

## 2020-08-02 PROCEDURE — 72141 MRI NECK SPINE W/O DYE: CPT

## 2020-08-02 PROCEDURE — 25500064 ZZH RX 255 OP 636: Performed by: EMERGENCY MEDICINE

## 2020-08-02 PROCEDURE — U0003 INFECTIOUS AGENT DETECTION BY NUCLEIC ACID (DNA OR RNA); SEVERE ACUTE RESPIRATORY SYNDROME CORONAVIRUS 2 (SARS-COV-2) (CORONAVIRUS DISEASE [COVID-19]), AMPLIFIED PROBE TECHNIQUE, MAKING USE OF HIGH THROUGHPUT TECHNOLOGIES AS DESCRIBED BY CMS-2020-01-R: HCPCS | Performed by: EMERGENCY MEDICINE

## 2020-08-02 PROCEDURE — 93005 ELECTROCARDIOGRAM TRACING: CPT | Performed by: EMERGENCY MEDICINE

## 2020-08-02 PROCEDURE — A9585 GADOBUTROL INJECTION: HCPCS | Performed by: EMERGENCY MEDICINE

## 2020-08-02 PROCEDURE — 83036 HEMOGLOBIN GLYCOSYLATED A1C: CPT | Performed by: EMERGENCY MEDICINE

## 2020-08-02 PROCEDURE — 85576 BLOOD PLATELET AGGREGATION: CPT | Performed by: PSYCHIATRY & NEUROLOGY

## 2020-08-02 PROCEDURE — 80048 BASIC METABOLIC PNL TOTAL CA: CPT | Performed by: EMERGENCY MEDICINE

## 2020-08-02 PROCEDURE — 99291 CRITICAL CARE FIRST HOUR: CPT | Mod: 25 | Performed by: EMERGENCY MEDICINE

## 2020-08-02 PROCEDURE — 99285 EMERGENCY DEPT VISIT HI MDM: CPT | Mod: 25 | Performed by: EMERGENCY MEDICINE

## 2020-08-02 PROCEDURE — 70498 CT ANGIOGRAPHY NECK: CPT

## 2020-08-02 PROCEDURE — 12000000 ZZH R&B MED SURG/OB

## 2020-08-02 PROCEDURE — 25000125 ZZHC RX 250: Performed by: EMERGENCY MEDICINE

## 2020-08-02 PROCEDURE — 93010 ELECTROCARDIOGRAM REPORT: CPT | Mod: Z6 | Performed by: EMERGENCY MEDICINE

## 2020-08-02 PROCEDURE — 00000146 ZZHCL STATISTIC GLUCOSE BY METER IP

## 2020-08-02 PROCEDURE — 70553 MRI BRAIN STEM W/O & W/DYE: CPT

## 2020-08-02 PROCEDURE — C9803 HOPD COVID-19 SPEC COLLECT: HCPCS | Performed by: EMERGENCY MEDICINE

## 2020-08-02 PROCEDURE — 85025 COMPLETE CBC W/AUTO DIFF WBC: CPT | Performed by: EMERGENCY MEDICINE

## 2020-08-02 PROCEDURE — 84484 ASSAY OF TROPONIN QUANT: CPT | Performed by: EMERGENCY MEDICINE

## 2020-08-02 PROCEDURE — 25000128 H RX IP 250 OP 636: Performed by: EMERGENCY MEDICINE

## 2020-08-02 PROCEDURE — 25000132 ZZH RX MED GY IP 250 OP 250 PS 637: Performed by: EMERGENCY MEDICINE

## 2020-08-02 PROCEDURE — 99223 1ST HOSP IP/OBS HIGH 75: CPT | Mod: AI | Performed by: FAMILY MEDICINE

## 2020-08-02 PROCEDURE — 70450 CT HEAD/BRAIN W/O DYE: CPT

## 2020-08-02 RX ORDER — VALSARTAN 40 MG/1
160 TABLET ORAL DAILY
Status: DISCONTINUED | OUTPATIENT
Start: 2020-08-03 | End: 2020-08-03 | Stop reason: HOSPADM

## 2020-08-02 RX ORDER — METOPROLOL TARTRATE 25 MG/1
50 TABLET, FILM COATED ORAL 2 TIMES DAILY
Status: DISCONTINUED | OUTPATIENT
Start: 2020-08-02 | End: 2020-08-03 | Stop reason: HOSPADM

## 2020-08-02 RX ORDER — ASPIRIN 81 MG/1
81 TABLET, CHEWABLE ORAL DAILY
Status: DISCONTINUED | OUTPATIENT
Start: 2020-08-03 | End: 2020-08-03 | Stop reason: HOSPADM

## 2020-08-02 RX ORDER — CLOPIDOGREL BISULFATE 75 MG/1
75 TABLET ORAL DAILY
Status: DISCONTINUED | OUTPATIENT
Start: 2020-08-03 | End: 2020-08-03 | Stop reason: HOSPADM

## 2020-08-02 RX ORDER — LEVOTHYROXINE SODIUM 50 UG/1
50 TABLET ORAL
Status: DISCONTINUED | OUTPATIENT
Start: 2020-08-03 | End: 2020-08-03 | Stop reason: HOSPADM

## 2020-08-02 RX ORDER — SODIUM CHLORIDE 9 MG/ML
INJECTION, SOLUTION INTRAVENOUS CONTINUOUS
Status: DISCONTINUED | OUTPATIENT
Start: 2020-08-02 | End: 2020-08-03 | Stop reason: HOSPADM

## 2020-08-02 RX ORDER — CLOPIDOGREL BISULFATE 75 MG/1
300 TABLET ORAL ONCE
Status: COMPLETED | OUTPATIENT
Start: 2020-08-02 | End: 2020-08-02

## 2020-08-02 RX ORDER — METOPROLOL TARTRATE 50 MG
50 TABLET ORAL ONCE
Status: COMPLETED | OUTPATIENT
Start: 2020-08-02 | End: 2020-08-02

## 2020-08-02 RX ORDER — ATORVASTATIN CALCIUM 80 MG/1
80 TABLET, FILM COATED ORAL DAILY
Status: DISCONTINUED | OUTPATIENT
Start: 2020-08-03 | End: 2020-08-03 | Stop reason: HOSPADM

## 2020-08-02 RX ORDER — VALSARTAN 160 MG/1
160 TABLET ORAL ONCE
Status: COMPLETED | OUTPATIENT
Start: 2020-08-02 | End: 2020-08-02

## 2020-08-02 RX ORDER — GADOBUTROL 604.72 MG/ML
10 INJECTION INTRAVENOUS ONCE
Status: COMPLETED | OUTPATIENT
Start: 2020-08-02 | End: 2020-08-02

## 2020-08-02 RX ORDER — IOPAMIDOL 755 MG/ML
70 INJECTION, SOLUTION INTRAVASCULAR ONCE
Status: COMPLETED | OUTPATIENT
Start: 2020-08-02 | End: 2020-08-02

## 2020-08-02 RX ORDER — ONDANSETRON 2 MG/ML
4 INJECTION INTRAMUSCULAR; INTRAVENOUS EVERY 6 HOURS PRN
Status: DISCONTINUED | OUTPATIENT
Start: 2020-08-02 | End: 2020-08-03 | Stop reason: HOSPADM

## 2020-08-02 RX ORDER — ONDANSETRON 4 MG/1
4 TABLET, ORALLY DISINTEGRATING ORAL EVERY 6 HOURS PRN
Status: DISCONTINUED | OUTPATIENT
Start: 2020-08-02 | End: 2020-08-03 | Stop reason: HOSPADM

## 2020-08-02 RX ORDER — DIAZEPAM 5 MG
5 TABLET ORAL
Status: COMPLETED | OUTPATIENT
Start: 2020-08-02 | End: 2020-08-02

## 2020-08-02 RX ORDER — VALSARTAN 80 MG/1
80 TABLET ORAL ONCE
Status: DISCONTINUED | OUTPATIENT
Start: 2020-08-02 | End: 2020-08-02 | Stop reason: DRUGHIGH

## 2020-08-02 RX ORDER — ASPIRIN 81 MG/1
324 TABLET, CHEWABLE ORAL DAILY
Status: DISCONTINUED | OUTPATIENT
Start: 2020-08-02 | End: 2020-08-02

## 2020-08-02 RX ORDER — AMLODIPINE BESYLATE 10 MG/1
10 TABLET ORAL DAILY
Status: DISCONTINUED | OUTPATIENT
Start: 2020-08-03 | End: 2020-08-03 | Stop reason: HOSPADM

## 2020-08-02 RX ADMIN — SODIUM CHLORIDE: 9 INJECTION, SOLUTION INTRAVENOUS at 20:52

## 2020-08-02 RX ADMIN — IOPAMIDOL 70 ML: 755 INJECTION, SOLUTION INTRAVENOUS at 14:01

## 2020-08-02 RX ADMIN — SODIUM CHLORIDE 100 ML: 9 INJECTION, SOLUTION INTRAVENOUS at 14:00

## 2020-08-02 RX ADMIN — VALSARTAN 160 MG: 160 TABLET, FILM COATED ORAL at 18:24

## 2020-08-02 RX ADMIN — CLOPIDOGREL BISULFATE 300 MG: 75 TABLET ORAL at 16:41

## 2020-08-02 RX ADMIN — DIAZEPAM 5 MG: 5 TABLET ORAL at 14:24

## 2020-08-02 RX ADMIN — SODIUM CHLORIDE 100 ML: 9 INJECTION, SOLUTION INTRAVENOUS at 15:06

## 2020-08-02 RX ADMIN — GADOBUTROL 10 ML: 604.72 INJECTION INTRAVENOUS at 14:29

## 2020-08-02 RX ADMIN — METOPROLOL TARTRATE 50 MG: 50 TABLET ORAL at 18:24

## 2020-08-02 ASSESSMENT — ENCOUNTER SYMPTOMS
CARDIOVASCULAR NEGATIVE: 1
RESPIRATORY NEGATIVE: 1
PSYCHIATRIC NEGATIVE: 1
GASTROINTESTINAL NEGATIVE: 1
EYES NEGATIVE: 1
ENDOCRINE NEGATIVE: 1
CONSTITUTIONAL NEGATIVE: 1
ALLERGIC/IMMUNOLOGIC NEGATIVE: 1
HEMATOLOGIC/LYMPHATIC NEGATIVE: 1
NUMBNESS: 1
MUSCULOSKELETAL NEGATIVE: 1

## 2020-08-02 ASSESSMENT — MIFFLIN-ST. JEOR
SCORE: 2076
SCORE: 2064

## 2020-08-02 NOTE — ED TRIAGE NOTES
Pt reprots numbness that started on Thursday to right side of face and mouth, symptoms have progressively worsened since. Pt reports he has been worked up for a TIA/stroke recently and had an MRI done

## 2020-08-02 NOTE — PROGRESS NOTES
"[facility]    Stroke Telephone Note    I was called by Dr. Omero Ramey on 08/02/20 at 1313 regarding patient Francois Ljuan. The patient is a 69 year old man with pmh of CAd s/p CABGx3, DM2, HTN, HLD , TIA on asa presenting with 4 days history of right face, arm and leg numbness. He was seeing by his PCP who ordered a MRI C spine,which showed degenerative changes. He presented to the ED given persistent symptoms. CTH reviewed a showed a subacute/chronic lacunar infarct left thalamus  I recommended CTA head and neck along with an MRI brain.   MRI brain with subacute left thalamic infarct. CTA with no LVO but showed mod rt ICA stenosis    TPA Treatment   Not given due to outside treatment window.    Endovascular Treatment  Not initiated due to absence of proximal vessel occlusion    Impression  Ischemic Stroke due to small-vessel occlusion     Recommendations  -Load with plavix 300 mg and asa 325 mg. Plan to continue asa 81 mg and plavix 75 mg for 21 days, followed by plavix 75 mg daily indefinitely  -will send platelet function assay ASA  -TTE  -recent LDL: 103  A1c: 7.7. continue atorva 80 mg daily  Acute Ischemic Stroke (without tPA) Recommendations  - Permissive HTN; labetalol PRN for SBP > 220  - Telemetry, EKG  - Bedside Glucose Monitoring  - PT/OT/SLP  - Stroke Education  - Euthermia, Euglycemia    My recommendations are based on the limited information provided on the phone by Dr. Omero Ramey. They are not intended to replace the clinical judgment of Dr. Omero Ramey which should always be utilized to provide the most appropriate care to meet the unique needs of this patient.     Mariela Martinez MD   Neurocritical Care  To page me or covering stroke neurology team member, click here: [facility]COM   Choose \"On Call\" tab at top, then search dropdown box for \"Neurology Adult\", select location, press Enter, then look for stroke/neuro ICU/telestroke.         "

## 2020-08-02 NOTE — ED NOTES
Plavix started in ED. Metoprolol and valsartan given. Metformin held because pt had contrast today. Pt eager to be admitted. Continues to have numbness on the right side having a difficult time buttoning and feeling things with his fingers, but the strength is equal on both sides. No problems here with swallowing. Pt up ambulating in room independently.

## 2020-08-02 NOTE — H&P
[facility]    History and Physical  Hospital Medicine       Date of Admission:  8/2/2020  Date of Service: 8/2/2020     Assessment & Plan   Francois Lujan is a 69 year old male who presents on 8/2/2020 with ischemic stroke due to small vessel occlusion    Principal Problem:    Acute ischemic VBA thalamic stroke, left (H)  Patient presents on 8/2/2020 with right side numbness (right face, right hand, and right foot.  )  Symptoms started 4 days ago.   Patient denies any weakness in upper or lower extremities, no speech problem, no headache.  Patient denies any neck pain, chest pain or difficulty breathing.  Patient reports that he has had similar symptoms in 05/20/2020, at that time he had an MRI, MRA neck and MRI brain done showed no evidence of stroke, a showedThere is up to 40-50% stenosis of the proximal cervical right  internal carotid artery, presumably related to atherosclerotic  Disease.  MRI brain showed:  1. 0.8 cm area of restricted diffusion in the left thalamus. This is  consistent with a recent infarct. This corresponds to the area of  low-density seen on the recent noncontrast head CT.    2. No bleed or mass effect.  Patient was given Plavix 300 mg,and  Aspirin In the ED  Neurology was consulted by ED physician.  Neurology recommended Plavix load and aspirin.  Plan:  -Continue Plavix and aspirin  -Echocardiogram with bubble study  -Fasting lipid profile  -Telemetry  -Blood sugar monitoring  -Permissive hypertension  -Lipitor 80 mg    Active Problems:    Hypertension goal BP (blood pressure) < 140/90  Continue Home medication Norvasc 10 mg, valsartan and metoprolol.    Type 2 diabetes mellitus with microalbuminuria, without long-term current use of insulin (H)  Lab Results   Component Value Date    A1C 7.7 08/02/2020    A1C 7.4 06/08/2020    A1C 7.9 12/13/2019    A1C 6.5 09/06/2019    A1C 6.0 07/02/2019     Home medication glipizide 10 mg, metformin 500 mg  Moderate  ISS    S/P CABG (coronary artery bypass graft)  Patient denies any chest pain or angina symptoms  Continue Lipitor and aspirin       Acquired hypothyroidism  Chronic stable problem  Continue home Synthroid      Hyperlipidemia LDL goal <70  Continue Lipitor 80 mg       Diet: Regular Diet Adult    DVT Prophylaxis: Pneumatic Compression Devices  Lazo Catheter: not present  Code Status: full code   Lines: peripheral IV     Disposition Plan   Expected discharge: 2 - 3 days, recommended to prior living arrangement once adequate pain management/ tolerating PO medications, mental status at baseline and safe disposition plan/ TCU bed available.  Entered: Julio Scott MD 08/02/2020, 6:48 PM     Status: Patient is appropriate for inpatient   Julio Scott MD        The patient's care was discussed with the Patient.    Primary Care Physician   Teresita Alvarez [phone]    History is obtained from the patient,  and review of old records via the EMR.    History of Present Illness   Francois Lujan is a 69 year old male with past medical history of  acquired hypothyroidism, obstructive sleep apnea, history of lumbosacral radicular pain, depression, polymyalgia rheumatica, osteoarthritis, stage II chronic kidney disease, type 2 diabetes, chronic gouty arthropathy now presents on 8/2/2020 with right side numbness (right face, right hand, and right foot.  )  Symptoms started 4 days ago.   Patient denies any weakness in upper or lower extremities, no speech problem, no headache.  Patient denies any neck pain, chest pain or difficulty breathing.  Patient reports that he has had similar symptoms in 05/20/2020, at that time he had an MRI, MRA neck and MRI brain done showed no evidence of stroke, a showedThere is up to 40-50% stenosis of the proximal cervical right  internal carotid artery, presumably related to atherosclerotic  disease.  On arrival to the ED patient was not in acute distress, he was alert and  oriented.  Vital signs on arrival showed:BP: (!) 186/84  Pulse: 75  Heart Rate: 71  Temp: 98.9  F (37.2  C)  Resp: 20  Height: 182.9 cm (6')  Weight: 126.1 kg (278 lb)  SpO2: 97 %  Labs showed: Blood sugar 246, CBC hemoglobin 12.2, normal blood count and platelets.  Hemoglobin A1c 7.7.  COVID-19 in process  EKG performed and showed: Sinus  Rhythm   Imaging studies: MRI brain showed recent infarct in the left thalamus  Patient was given Plavix 300 mg,and  Aspirin In the ED  Neurology was consulted by ED physician.  Neurology recommended Plavix load and aspirin.  Patient was admitted to [facility] for further evaluation and management   Patient was interviewed ad examined at bedside.     Review of Systems   The 10 point Review of Systems is negative other than noted in the HPI or here.     Past Medical History      Past Medical History:   Diagnosis Date     Arthritis      CAD (coronary artery disease)      Diabetes mellitus (H)      Erythema nodosum 1982     Gout      Hypertension      Malignant neoplasm (H)     squamous cell CA removed from right hand     Thyroid disease      Patient Active Problem List    Diagnosis Date Noted     Acute ischemic VBA thalamic stroke, left (H) 08/02/2020     Priority: High     Diabetic ulcer of other part of right foot associated with type 2 diabetes mellitus, unspecified ulcer stage (H) 06/08/2020     Priority: Medium     Obesity (BMI 35.0-39.9) with comorbidity (H) 11/20/2018     Priority: Medium     Benign neoplasm of colon 11/19/2018     Priority: Medium     Overview:   Created by Conversion       Benign essential hypertension 11/19/2018     Priority: Medium     Chronic gouty arthropathy 11/19/2018     Priority: Medium     Degeneration of lumbar or lumbosacral intervertebral disc 11/19/2018     Priority: Medium     Vitamin D deficiency 11/19/2018     Priority: Medium     Epistaxis 12/15/2017     Priority: Medium     Triceps tendon rupture, left, sequela 04/10/2017     Priority:  Medium     Coronary artery disease involving native coronary artery of native heart without angina pectoris 03/03/2017     Priority: Medium     Alcohol dependence with other alcohol-induced disorder (H) 08/30/2016     Priority: Medium     Diabetic polyneuropathy associated with type 2 diabetes mellitus (H) 04/12/2016     Priority: Medium     CKD (chronic kidney disease) stage 2, GFR 60-89 ml/min 10/23/2015     Priority: Medium     Proteinuria 10/23/2015     Priority: Medium     Type 2 diabetes mellitus with microalbuminuria, without long-term current use of insulin (H) 10/23/2015     Priority: Medium     On valsartan        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     Priority: Medium     OA (osteoarthritis) of knee, right 04/29/2014     Priority: Medium     ACL (anterior cruciate ligament) tear 04/29/2014     Priority: Medium     posttraumatic arthrosis lateral compartment       Multiple joint pain 04/23/2014     Priority: Medium     Elevated sed rate 03/03/2014     Priority: Medium     Elevated C-reactive protein (CRP) 03/03/2014     Priority: Medium     History of back surgery 03/03/2014     Priority: Medium     Polymyalgia rheumatica (H) 03/03/2014     Priority: Medium     Moderate major depression (H) 02/11/2014     Priority: Medium     Radicular pain of lumbosacral region 12/31/2013     Priority: Medium     Numbness of hand, right 12/12/2013     Priority: Medium     Hypertension goal BP (blood pressure) < 140/90 09/17/2013     Priority: Medium     ADELA (obstructive sleep apnea) 07/11/2013     Priority: Medium     Severe ADELA (7/10/2013 with AHI ~70, sherin desat 86%, insufficient time for CPAP titration)       Hypogonadotropic hypogonadism (H) 11/29/2012     Priority: Medium     Hard of hearing 11/29/2012     Priority: Medium     Hyperlipidemia LDL goal <70 05/28/2013     Priority: Low     S/P CABG (coronary artery bypass graft) 11/29/2012     Priority: Low     11/17/11  PREOPERATIVE DIAGNOSIS:  Severe 3-vessel coronary  artery disease with unstable angina.        POSTOPERATIVE DIAGNOSIS:  Severe 3-vessel coronary artery disease with unstable angina.        PROCEDURES:    1.  Coronary artery bypass grafting x3 with placement of left internal mammary artery to the left anterior descending artery, saphenous vein graft from the aorta to the third obtuse marginal branch of the circumflex coronary artery and saphenous vein graft from aorta to the distal right coronary artery.    2.  Placement of temporary ventricular pacing wires.    3.  Endoscopic vein harvest from the left leg.        Acquired hypothyroidism 11/29/2012     Priority: Low        Past Surgical History     Past Surgical History:   Procedure Laterality Date     BACK SURGERY       BYPASS GRAFT ARTERY CORONARY  11/17/2011    Procedure:BYPASS GRAFT ARTERY CORONARY; CORONARY ARTERY BYPASS, Right, OM, LAD WITH ENDOVEIN HARVEST, ON PUMP; Surgeon:LEONEL MG; Location: OR     COLONOSCOPY N/A 6/9/2016    Procedure: COLONOSCOPY;  Surgeon: Isidro Humphreys MD;  Location: [facility]     HERNIA REPAIR      infantile hernia repair     ORTHOPEDIC SURGERY      Baker cyst removed - right knee, and mesiscus tear repair     REPAIR TENDON TRICEPS UPPER EXTREMITY Left 4/11/2017    Procedure: REPAIR TENDON TRICEPS UPPER EXTREMITY;  Surgeon: Rodriguez Kelley MD;  Location: [facility]        Prior to Admission Medications   Prior to Admission Medications   Prescriptions Last Dose Informant Patient Reported? Taking?   ASPIRIN PO 8/1/2020 at Unknown time  Yes Yes   Sig: Take 324 mg by mouth daily    amLODIPine (NORVASC) 10 MG tablet 8/1/2020 at Unknown time  No Yes   Sig: Take 1 tablet (10 mg) by mouth daily   atorvastatin (LIPITOR) 80 MG tablet 8/1/2020 at Unknown time  No Yes   Sig: Take 1 tablet (80 mg) by mouth daily   blood glucose (ACCU-CHEK SMARTVIEW) test strip   No No   Sig: Use to test blood sugar 3 times daily or as directed.   blood glucose (NO BRAND SPECIFIED) lancets standard   No No    Sig: Use to test blood sugar 3 times daily or as directed.   glipiZIDE (GLUCOTROL XL) 10 MG 24 hr tablet 8/1/2020 at Unknown time  No Yes   Sig: Take 2 tablets (20 mg) by mouth daily (with breakfast)   levothyroxine (SYNTHROID) 50 MCG tablet 8/1/2020 at Unknown time  No Yes   Sig: Take 1 tablet (50 mcg) by mouth daily   metFORMIN (GLUCOPHAGE) 500 MG tablet 8/1/2020 at Unknown time  No Yes   Sig: Take 2 tablets (1,000 mg) by mouth 2 times daily (with meals)   metoprolol tartrate (LOPRESSOR) 50 MG tablet 8/1/2020 at Unknown time  No Yes   Sig: Take 1 tablet (50 mg) by mouth 2 times daily   testosterone (ANDROGEL) 50 MG/5GM (1%) topical gel 8/1/2020 at Unknown time  No Yes   Sig: Place 1 packet (50 mg of testosterone) onto the skin daily   valsartan (DIOVAN) 160 MG tablet 8/1/2020 at Unknown time  No Yes   Sig: Take 1 tablet (160 mg) by mouth daily      Facility-Administered Medications: None     Allergies   Allergies   Allergen Reactions     Hydrocodone-Acetaminophen Itching     Iodine      Irbesartan      renal insuff and hyperkalemia       Lisinopril Nausea     Sulfa Drugs      Valsartan Difficulty breathing       Family History    Family History   Problem Relation Age of Onset     Heart Disease Father      Cancer Mother      C.A.D. Brother      Septicemia Brother 3     Lung Cancer Sister      Schizophrenia Sister        Social History   Social History     Socioeconomic History     Marital status:      Spouse name: Not on file     Number of children: Not on file     Years of education: Not on file     Highest education level: Not on file   Occupational History     Not on file   Social Needs     Financial resource strain: Not on file     Food insecurity     Worry: Not on file     Inability: Not on file     Transportation needs     Medical: Not on file     Non-medical: Not on file   Tobacco Use     Smoking status: Former Smoker     Packs/day: 2.00     Years: 30.00     Pack years: 60.00     Last attempt to  quit: 1997     Years since quittin.7     Smokeless tobacco: Never Used   Substance and Sexual Activity     Alcohol use: Yes     Alcohol/week: 10.0 standard drinks     Types: 12 Cans of beer per week     Comment: occassionally      Drug use: No     Sexual activity: Yes   Lifestyle     Physical activity     Days per week: Not on file     Minutes per session: Not on file     Stress: Not on file   Relationships     Social connections     Talks on phone: Not on file     Gets together: Not on file     Attends [employer] service: Not on file     Active member of club or organization: Not on file     Attends meetings of clubs or organizations: Not on file     Relationship status: Not on file     Intimate partner violence     Fear of current or ex partner: Not on file     Emotionally abused: Not on file     Physically abused: Not on file     Forced sexual activity: Not on file   Other Topics Concern     Parent/sibling w/ CABG, MI or angioplasty before 65F 55M? 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     Constitutional: Alert, oriented, cooperative, no apparent distress, appears nontoxic  Eyes: Eyes are clear, pupils are reactive.  HENT: Oropharynx is clear and moist. No evidence of cranial trauma.  Lymph/Hematologic: No epitrochlear, axillary, anterior or posterior cervical, or  supraclavicular lymphadenopathy is appreciated.  Cardiovascular: Regular rate and rhythm, normal S1 and S2, and no murmur noted. JVP is normal. Good peripheral pulses in wrists bilaterally. No lower extremity edema.  Respiratory: Clear to auscultation bilaterally.   GI: Soft, non-tender, normal bowel sounds, no hepatomegaly.  Genitourinary: Deferred  Musculoskeletal: Normal muscle bulk and tone.  Skin: Warm and dry, no rashes.   Neurologic: Neck supple. Cranial nerves are grossly intact.  is symmetric.     Data   Data reviewed today:   Recent Labs   Lab 08/02/20  1309 07/28/20  0752   WBC 8.3 10.2   HGB 12.2* 12.5*   MCV 94 94    221   *  --    POTASSIUM 4.9  --    CHLORIDE 104  --    CO2 23  --    BUN 21  --    CR 0.93  --    ANIONGAP 5  --    [place] 8.2*  --    *  --    TROPI <0.015  --        Recent Results (from the past 24 hour(s))   MR Cervical Spine w/o Contrast    Narrative    MRI CERVICAL SPINE WITHOUT CONTRAST 8/2/2020 12:11 PM     HISTORY: Radiculopathy. Numbness and tingling of right face. Numbness  and tingling in right hand. Numbness of right foot.    TECHNIQUE: Multiplanar, multisequence MRI of the cervical spine  without contrast.     COMPARISON: Cervical spine CT 2/12/2010.    FINDINGS:  Alignment is significant for trace grade 1 anterolisthesis  of C4 on C5, C5 on C6, and C7 on T1. Bone marrow demonstrates fatty  endplate change at C6-C7. T1 hyperintense lesion within the inferior  cortex of T1 is consistent with benign intraosseous cavernous venous  malformation. No abnormal cord signal. No extraspinal abnormality is  identified.    Level by level as follows:    C2-C3:  Mild disc loss. Disc osteophyte complex. Mild bilateral facet  arthropathy. Mild bilateral foraminal stenosis. Mild spinal canal  stenosis.     C3-C4:  Mild disc height loss. Disc osteophyte complex. Mild bilateral  facet arthropathy. Moderate right foraminal stenosis. Severe left  foraminal stenosis. Mild  spinal canal stenosis.     C4-C5:  Mild disc height loss. Disc osteophyte complex. Moderate  bilateral facet arthropathy. Severe bilateral foraminal stenosis. Mild  spinal canal stenosis.     C5-C6:  Mild disc height loss. Disc osteophyte complex. Moderate  bilateral facet arthropathy. Moderate right foraminal stenosis. Severe  left foraminal stenosis. Mild spinal canal stenosis.     C6-C7:  Moderate disc height loss. Disc osteophyte complex. Moderate  bilateral facet arthropathy. Moderate right foraminal stenosis. Severe  left foraminal stenosis. Mild spinal canal stenosis.    C7-T1: Mild disc height loss. No herniation. Moderate right facet  arthropathy. Severe left facet arthropathy. No foraminal or spinal  canal stenosis.      Impression    IMPRESSION:    1. Severe foraminal stenosis at multiple levels.  2. Mild spinal canal stenosis at multiple levels.     ARNAV PIMENTEL MD   CT Head w/o Contrast    Narrative    CT SCAN OF THE HEAD WITHOUT CONTRAST   8/2/2020 2:16 PM     HISTORY: Four-day history of progressive and persistent right facial  numbness, right hand numbness and right foot numbness.  History of  diabetic neuropathy.  Evaluate for acute intracranial process.   Possible stroke.    TECHNIQUE:  Axial images of the head and coronal reformations without  IV contrast material. Radiation dose for this scan was reduced using  automated exposure control, adjustment of the mA and/or kV according  to patient size, or iterative reconstruction technique.    COMPARISON: Head MRI 5/29/2020.    FINDINGS:  Mild volume loss is present. White matter hypoattenuation  likely represents mild chronic small vessel ischemic change. The  cerebral hemispheres, brainstem, and cerebellum otherwise demonstrate  normal morphology and attenuation. Small area of curvilinear  hypoattenuation is present involving the left thalamus suggestive of  lacunar infarct (series 3 image 17). No evidence of acute ischemia,  hemorrhage, mass,  mass effect or hydrocephalus. Partial opacification  of the right mastoid cavity, likely inflammatory. Paranasal sinuses  are well aerated.      Impression    IMPRESSION:   1. Small area of hypoattenuation within the left thalamus suggestive  of chronic lacunar infarct, new compared to 5/29/2020. MRI could be  performed if indicated.  2. Otherwise, no evidence of acute ischemia or hemorrhage.    ARNAV PIMENTEL MD   CTA Head Neck with Contrast    Narrative    CTA  HEAD NECK WITH CONTRAST  8/2/2020 2:21 PM     HISTORY: 4-day history of progressive and persistent right facial  numbness, right hand numbness and right foot numbness.  History of  diabetic neuropathy.  Evaluate for acute intracranial process.   Compare with MR imaging from 5/20/20    TECHNIQUE:  Precontrast localizing scans were followed by CT  angiography with an injection of 70 mL [id number]-370 IV contrast with  scans through the head and neck.  Images were transferred to a  separate 3-D workstation where multiplanar reformations and 3-D images  were created.  Estimates of carotid stenoses are made relative to the  distal internal carotid artery diameters except as noted. Radiation  dose for this scan was reduced using automated exposure control,  adjustment of the mA and/or kV according to patient size, or iterative  reconstruction technique.    COMPARISON: MR angiogram dated 5/29/2020    FINDINGS: Estimates of stenosis at the carotid bifurcations are  relative to the distal internal carotids.    ARCH: Calcified atherosclerotic plaque is seen in the arch of the  aorta.    NECK CTA:  Right Carotid:  The right common carotid artery is normal.  Calcified  atherosclerotic plaque is seen at the carotid bifurcation. There is a  moderate stenosis at the bifurcation...  The right internal carotid  artery is negative.     Left Carotid:  The left common carotid artery is unremarkable.   Atherosclerotic plaque is seen at the left carotid bifurcation. No  high-grade  stenosis..  The left internal carotid is negative.      Vertebrals:  The vertebral arteries are normal.    HEAD CTA:  Anterior Circulation: No occluded vessels are seen. There is a small,  2 mm distal left paraclinoid aneurysm. This projects superiorly. This  was also seen on the recent MR angiogram.. No right-sided aneurysm is  identified on this CT angiogram.    Posterior Circulation:  The basilar artery and its branches appear  normal.     MISC:: None.      Impression    IMPRESSION:   1. Moderate stenosis at the right carotid bifurcation. No high-grade  stenosis is seen  2. No stenosis of the left carotid bifurcation.  3. No occluded intracranial vessels or high-grade intracranial  vascular stenosis.  4. Small, 2 mm distal left paraclinoid aneurysm. This was also noted  on the recent MR angiogram. No right sided aneurysm is identified on  this study.      JASKARAN MACKENZIE MD   MR Brain w/o & w Contrast    Narrative    MRI BRAIN WITHOUT AND WITH CONTRAST  8/2/2020 2:50 PM    HISTORY:  4-day history of progressive right facial numbness, right  hand numbness and right foot numbness. Compare with MR imaging from  5/20/20     TECHNIQUE:  Multiplanar, multisequence MRI of the brain without and  with 10 mL Gadavist    COMPARISON: CT dated 8/2/2020, MR scan dated 5/29/2020    FINDINGS:     Intracranial contents: Diffusion-weighted images reveal a area of  restricted diffusion in the left thalamus. This corresponds to the  area of low-density seen on the recent CT. This is a new finding when  compared to the MR scan of 5/29/2020. This is consistent with a recent  but probably subacute lacunar type infarct. No hemorrhage. No mass  effect.  There are no gadolinium enhancing lesions. The arteries at  the base of the brain and the dural venous sinuses appear patent.     Sella:  No significant abnormality accounting for technique.     Orbit: No significant abnormality accounting for technique.      Sinus/mastoids: No significant  paranasal sinus mucosal disease. No  significant middle ear or mastoid effusion.    Bones/soft tissues: No aggressive osseous lesion involving the  calvarium, skull base, or visualized upper cervical spine.       Impression    IMPRESSION:    1. 0.8 cm area of restricted diffusion in the left thalamus. This is  consistent with a recent infarct. This corresponds to the area of  low-density seen on the recent noncontrast head CT.    2. No bleed or mass effect.      JASKARAN MACKENZIE MD       I personally reviewed the EKG tracing showing WITHIN NORMAL LIMITS

## 2020-08-02 NOTE — ED NOTES
Numbness to right side of hand and face started over 1 month ago pt states it was transient. Last Thursday pt said when the numbness came and did not go away. Pt said he is having a hard time buttoning his shirt and drinking fluids without leaking out of his mouth.

## 2020-08-02 NOTE — ED PROVIDER NOTES
History     Chief Complaint   Patient presents with     Numbness     Numbness to right side a face started on Thursday      HPI  Francois Lujan is a 69 year old male right hand dominant who presents with concern about right-sided facial numbness.  On arrival in the department patient reports over the last 4 days he has progressive right-sided facial numbness about the right cheek, right perioral numbness,  right hand and right lateral foot numbness.  Patient reports his right foot and leg feels spongy.  History history of diabetic neuropathy and reports he is not very compliant with monitoring his blood glucose.  His A1c was 7.4 June 2020.  He reports he has no extremity weakness and no headache, no neck pain, he also reports no speech difficulty and no visual symptoms.  Patient  had an MRI of the cervical spine today for follow-up prior to presentatin to the department for care. Patient was evaluated with similar symptoms of numbness in May 2020. He reported symptoms at the time were intermittent. Patient had  MRI and MRA imaging in May 2020.  After his MRI cervical spine today he was concerned that his numbness seemed to persist and that earlier this morning he water was dripping out of the right side of his mouth while drinking, and he noticed that he was holding a tea cup this morning and the teacup seem to fall he could not hold it straight.  Patient reports no headache.  No fever, no rash.  No numbness about the forehead.  Patient has multiple medical diagnoses including a history of acquired hypothyroidism, obstructive sleep apnea, history of lumbosacral radicular pain, depression, polymyalgia rheumatica, osteoarthritis, stage II chronic kidney disease, type 2 diabetes, chronic gouty arthropathy,   Patient is currently prescribed amlodipine, Lipitor, glipizide, levothyroxine, methotrexate, Lopressor, AndroGel, and Diovan. Patient reports taking ASA-325mg (dose increased after evaluation in May 2020).  Patient lives in [place], [place] with spouse.        ==============================================================================  Chart Review  MRI CERVICAL SPINE WITHOUT CONTRAST 8/2/2020 12:11 PM      HISTORY: Radiculopathy. Numbness and tingling of right face. Numbness  and tingling in right hand. Numbness of right foot.     TECHNIQUE: Multiplanar, multisequence MRI of the cervical spine  without contrast.      COMPARISON: Cervical spine CT 2/12/2010.     FINDINGS:  Alignment is significant for trace grade 1 anterolisthesis  of C4 on C5, C5 on C6, and C7 on T1. Bone marrow demonstrates fatty  endplate change at C6-C7. T1 hyperintense lesion within the inferior  cortex of T1 is consistent with benign intraosseous cavernous venous  malformation. No abnormal cord signal. No extraspinal abnormality is  identified.     Level by level as follows:     C2-C3:  Mild disc loss. Disc osteophyte complex. Mild bilateral facet  arthropathy. Mild bilateral foraminal stenosis. Mild spinal canal  stenosis.      C3-C4:  Mild disc height loss. Disc osteophyte complex. Mild bilateral  facet arthropathy. Moderate right foraminal stenosis. Severe left  foraminal stenosis. Mild spinal canal stenosis.      C4-C5:  Mild disc height loss. Disc osteophyte complex. Moderate  bilateral facet arthropathy. Severe bilateral foraminal stenosis. Mild  spinal canal stenosis.      C5-C6:  Mild disc height loss. Disc osteophyte complex. Moderate  bilateral facet arthropathy. Moderate right foraminal stenosis. Severe  left foraminal stenosis. Mild spinal canal stenosis.      C6-C7:  Moderate disc height loss. Disc osteophyte complex. Moderate  bilateral facet arthropathy. Moderate right foraminal stenosis. Severe  left foraminal stenosis. Mild spinal canal stenosis.     C7-T1: Mild disc height loss. No herniation. Moderate right facet  arthropathy. Severe left facet arthropathy. No foraminal or spinal  canal stenosis.                                                                       IMPRESSION:    1. Severe foraminal stenosis at multiple levels.  2. Mild spinal canal stenosis at multiple levels.      ARNAV PIMENTEL MD    MR ANGIOGRAM OF THE HEAD WITHOUT CONTRAST   5/29/2020 4:45 PM      HISTORY: Right-sided paresthesia.     TECHNIQUE:  3D time-of-flight MR angiogram of the head without  contrast.     COMPARISON: MRI head of same day.     FINDINGS: The major intracranial arteries including the proximal  branches of the anterior cerebral, middle cerebral, and posterior  cerebral arteries appear patent without vascular cutoff. There is mild  diffuse irregularity involving the bilateral carotid siphons, likely  due to atherosclerotic disease, without flow-limiting stenosis. There  is suggestion of an inferomedially directed saccular aneurysm versus  infundibulum arising from the paraclinoid/supraclinoid right internal  carotid artery (series 6 image 57) measuring approximately 2-2.5 mm.  Additionally, there is a superolaterally directed saccular outpouching  arising from the left internal carotid artery just distal to the  origin of the left ophthalmic artery (series 6 image 64) measuring  approximately 2-3 mm.                                                                    IMPRESSION:  1. No high-grade stenosis or large-vessel occlusion involving the  major intracranial arterial vasculature.  2. Findings concerning for small (2-3 mm) saccular aneurysms arising  from the right paraclinoid/supraclinoid and left ophthalmic segments  of the internal carotid arteries.     KRISHAN MATHIS MD    MRI BRAIN WITHOUT AND WITH CONTRAST  5/29/2020 4:44 PM      HISTORY:  Decreased sensation in face, right upper extremity, right  lower extremity x one week.      TECHNIQUE:  Multiplanar, multisequence MRI of the brain without and  with 10 mL Gadavist.      COMPARISON: CT head 2/12/2010.     FINDINGS: There is no evidence of acute infarct, hemorrhage, mass, or  herniation.  Mild global brain parenchymal volume loss. Minimal  presumed chronic small vessel ischemic change in the cerebral white  matter, commensurate with the patient's age. 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 There is no abnormal intracranial  enhancement.      Moderate right mastoid effusion. Orbits appear normal. The major  vascular flow voids at the base of the brain are maintained.                                                                      IMPRESSION:    1. No evidence of acute infarct, mass, hemorrhage, or herniation.  2. Mild age-related changes, as above.  3. Moderate right mastoid effusion.      KRISHAN MATHIS MD    MRA NECK WITHOUT AND WITH CONTRAST  5/29/2020 4:44 PM      HISTORY: Right-sided paresthesia.      TECHNIQUE: 2D time-of-flight MR angiogram of the neck without contrast  and 3D MR angiogram of the neck with  10 mL Gadavist. Estimates of  carotid stenoses are made relative to the distal internal carotid  artery diameters except as noted.      COMPARISON: None.      FINDINGS:    Normal origin of the great vessels from the aortic arch.      Right carotid artery: There is mild atherosclerotic disease of the  right carotid bifurcation with up to approximately 40-50% stenosis of  the proximal cervical right internal carotid artery by NASCET  criteria.     Left carotid artery: The left common and internal carotid arteries are  patent. No significant stenosis.      Vertebral arteries: Vertebral arteries appear patent without evidence  of dissection. Left vertebral artery is dominant. No significant  stenosis.                                                                       IMPRESSION:  1. There is up to 40-50% stenosis of the proximal cervical right  internal carotid artery, presumably related to atherosclerotic  disease.  2. Otherwise, no significant stenosis of the left cervical carotid or  bilateral  cervical vertebral arteries.     KRISHAN MATHIS MD    ==========================================================================================    Allergies:  Allergies   Allergen Reactions     Hydrocodone-Acetaminophen Itching     Iodine      Irbesartan      renal insuff and hyperkalemia       Lisinopril Nausea     Sulfa Drugs      Valsartan Difficulty breathing       Problem List:    Patient Active Problem List    Diagnosis Date Noted     Diabetic ulcer of other part of right foot associated with type 2 diabetes mellitus, unspecified ulcer stage (H) 06/08/2020     Priority: Medium     Obesity (BMI 35.0-39.9) with comorbidity (H) 11/20/2018     Priority: Medium     Benign neoplasm of colon 11/19/2018     Priority: Medium     Overview:   Created by Conversion       Benign essential hypertension 11/19/2018     Priority: Medium     Chronic gouty arthropathy 11/19/2018     Priority: Medium     Degeneration of lumbar or lumbosacral intervertebral disc 11/19/2018     Priority: Medium     Vitamin D deficiency 11/19/2018     Priority: Medium     Epistaxis 12/15/2017     Priority: Medium     Triceps tendon rupture, left, sequela 04/10/2017     Priority: Medium     Coronary artery disease involving native coronary artery of native heart without angina pectoris 03/03/2017     Priority: Medium     Alcohol dependence with other alcohol-induced disorder (H) 08/30/2016     Priority: Medium     Diabetic polyneuropathy associated with type 2 diabetes mellitus (H) 04/12/2016     Priority: Medium     CKD (chronic kidney disease) stage 2, GFR 60-89 ml/min 10/23/2015     Priority: Medium     Proteinuria 10/23/2015     Priority: Medium     Type 2 diabetes mellitus with microalbuminuria, without long-term current use of insulin (H) 10/23/2015     Priority: Medium     On valsartan        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     Priority: Medium     OA (osteoarthritis) of knee, right 04/29/2014     Priority: Medium     ACL (anterior cruciate  ligament) tear 04/29/2014     Priority: Medium     posttraumatic arthrosis lateral compartment       Multiple joint pain 04/23/2014     Priority: Medium     Elevated sed rate 03/03/2014     Priority: Medium     Elevated C-reactive protein (CRP) 03/03/2014     Priority: Medium     History of back surgery 03/03/2014     Priority: Medium     Polymyalgia rheumatica (H) 03/03/2014     Priority: Medium     Moderate major depression (H) 02/11/2014     Priority: Medium     Radicular pain of lumbosacral region 12/31/2013     Priority: Medium     Numbness of hand, right 12/12/2013     Priority: Medium     Hypertension goal BP (blood pressure) < 140/90 09/17/2013     Priority: Medium     ADELA (obstructive sleep apnea) 07/11/2013     Priority: Medium     Severe ADELA (7/10/2013 with AHI ~70, sherin desat 86%, insufficient time for CPAP titration)       Hyperlipidemia LDL goal <70 05/28/2013     Priority: Medium     S/P CABG (coronary artery bypass graft) 11/29/2012     Priority: Medium     11/17/11  PREOPERATIVE DIAGNOSIS:  Severe 3-vessel coronary artery disease with unstable angina.        POSTOPERATIVE DIAGNOSIS:  Severe 3-vessel coronary artery disease with unstable angina.        PROCEDURES:    1.  Coronary artery bypass grafting x3 with placement of left internal mammary artery to the left anterior descending artery, saphenous vein graft from the aorta to the third obtuse marginal branch of the circumflex coronary artery and saphenous vein graft from aorta to the distal right coronary artery.    2.  Placement of temporary ventricular pacing wires.    3.  Endoscopic vein harvest from the left leg.        Hypogonadotropic hypogonadism (H) 11/29/2012     Priority: Medium     Acquired hypothyroidism 11/29/2012     Priority: Medium     Hard of hearing 11/29/2012     Priority: Medium        Past Medical History:    Past Medical History:   Diagnosis Date     Arthritis      CAD (coronary artery disease)      Diabetes mellitus (H)       Erythema nodosum 1982     Gout      Hypertension      Malignant neoplasm (H)      Thyroid disease        Past Surgical History:    Past Surgical History:   Procedure Laterality Date     BACK SURGERY       BYPASS GRAFT ARTERY CORONARY  2011    Procedure:BYPASS GRAFT ARTERY CORONARY; CORONARY ARTERY BYPASS, Right, OM, LAD WITH ENDOVEIN HARVEST, ON PUMP; Surgeon:LEONEL MG; Location: OR     COLONOSCOPY N/A 2016    Procedure: COLONOSCOPY;  Surgeon: Isidro Humphreys MD;  Location: [facility]     HERNIA REPAIR      infantile hernia repair     ORTHOPEDIC SURGERY      Baker cyst removed - right knee, and mesiscus tear repair     REPAIR TENDON TRICEPS UPPER EXTREMITY Left 2017    Procedure: REPAIR TENDON TRICEPS UPPER EXTREMITY;  Surgeon: Rodriguez Kelley MD;  Location: [facility]       Family History:    Family History   Problem Relation Age of Onset     Heart Disease Father      Cancer Mother      C.A.D. Brother      Septicemia Brother 3     Lung Cancer Sister      Schizophrenia Sister        Social History:  Marital Status:   [2]  Social History     Tobacco Use     Smoking status: Former Smoker     Packs/day: 2.00     Years: 30.00     Pack years: 60.00     Last attempt to quit: 1997     Years since quittin.7     Smokeless tobacco: Never Used   Substance Use Topics     Alcohol use: Yes     Alcohol/week: 10.0 standard drinks     Types: 12 Cans of beer per week     Comment: occassionally      Drug use: No        Medications:    amLODIPine (NORVASC) 10 MG tablet  ASPIRIN PO  atorvastatin (LIPITOR) 80 MG tablet  glipiZIDE (GLUCOTROL XL) 10 MG 24 hr tablet  levothyroxine (SYNTHROID) 50 MCG tablet  metFORMIN (GLUCOPHAGE) 500 MG tablet  metoprolol tartrate (LOPRESSOR) 50 MG tablet  testosterone (ANDROGEL) 50 MG/5GM (1%) topical gel  valsartan (DIOVAN) 160 MG tablet  blood glucose (ACCU-CHEK SMARTVIEW) test strip  blood glucose (NO BRAND SPECIFIED) lancets standard          Review of Systems    Constitutional: Negative.    HENT: Negative.    Eyes: Negative.    Respiratory: Negative.    Cardiovascular: Negative.    Gastrointestinal: Negative.    Endocrine: Negative.    Genitourinary: Negative.    Musculoskeletal: Negative.    Skin: Negative.    Allergic/Immunologic: Negative.    Neurological: Positive for numbness (about the face, hand, and foot since thursday).   Hematological: Negative.    Psychiatric/Behavioral: Negative.    All other systems reviewed and are negative.      Physical Exam   BP: (!) 186/84  Pulse: 75  Heart Rate: 71  Temp: 98.9  F (37.2  C)  Resp: 20  Height: 182.9 cm (6')  Weight: 126.1 kg (278 lb)  SpO2: 97 %      Physical Exam  Constitutional:       Appearance: He is not toxic-appearing or diaphoretic.   HENT:      Head: Normocephalic and atraumatic.      Right Ear: Tympanic membrane normal.      Left Ear: Tympanic membrane normal.      Nose: Nose normal. No congestion or rhinorrhea.   Eyes:      General:         Left eye: No discharge.      Extraocular Movements: Extraocular movements intact.      Conjunctiva/sclera: Conjunctivae normal.      Pupils: Pupils are equal, round, and reactive to light.   Neck:      Musculoskeletal: Normal range of motion and neck supple. No neck rigidity or muscular tenderness.      Vascular: No carotid bruit.   Cardiovascular:      Rate and Rhythm: Normal rate and regular rhythm.   Pulmonary:      Effort: Pulmonary effort is normal. No respiratory distress.      Breath sounds: Normal breath sounds. No wheezing, rhonchi or rales.   Chest:      Chest wall: No tenderness.   Abdominal:      General: There is no distension.      Palpations: There is no mass.      Tenderness: There is no abdominal tenderness. There is no right CVA tenderness, left CVA tenderness, guarding or rebound.      Hernia: No hernia is present.   Musculoskeletal:         General: No swelling, tenderness, deformity or signs of injury.      Right lower leg: No edema.      Left lower leg:  No edema.   Lymphadenopathy:      Cervical: No cervical adenopathy.   Skin:     Capillary Refill: Capillary refill takes less than 2 seconds.      Coloration: Skin is not jaundiced or pale.      Findings: No bruising, erythema, lesion or rash.   Neurological:      General: No focal deficit present.      Mental Status: He is alert.   Psychiatric:         Mood and Affect: Mood normal.         Behavior: Behavior normal.         Thought Content: Thought content normal.         Judgment: Judgment normal.       [employer] Stroke Scale  Exam Interval: Baseline   Score    Level of consciousness: (0)   Alert, keenly responsive    LOC questions: (0)   Answers both questions correctly    LOC commands: (0)   Performs both tasks correctly    Best gaze: (0)   Normal    Visual: (0)   No visual loss    Facial palsy: (0)   Normal symmetrical movements    Motor arm (left): (0)   No drift    Motor arm (right): (0)   No drift    Motor leg (left): (0)   No drift    Motor leg (right): (0)   No drift    Limb ataxia: (0)   Absent    Sensory: (1)   Mild to moderate sensory loss    Best language: (0)   Normal- no aphasia    Dysarthria: (0)   Normal    Extinction and inattention: (0)   No abnormality        Total Score:  1       ED Course        Procedures               EKG Interpretation:      Interpreted by Omero Ramey MD  Time reviewed: 14:00  Symptoms at time of EKG: Facial, hand and foot numbness  Rhythm: normal sinus   Rate: Normal  Axis: Normal  Ectopy: none  Conduction: normal  ST Segments/ T Waves: Non-specific ST-T wave changes  Q Waves: nonspecific  Comparison to prior: When compared with EKG from 8/29/2020 no acute changes appreciated    Clinical Impression: no acute changes      Critical Care time:  30 minutes               ED medications:  Medications   0.9% sodium chloride BOLUS (0 mLs Intravenous Stopped 8/2/20 1420)   diazepam (VALIUM) tablet 5 mg (5 mg Oral Given 8/2/20 1424)   iopamidol  (ISOVUE-370) solution 70 mL (70 mLs Intravenous Given 8/2/20 1401)   sodium chloride 0.9 % bag 500mL for CT scan flush use (100 mLs Intravenous Given by Other 8/2/20 1506)   gadobutrol (GADAVIST) injection 10 mL (10 mLs Intravenous Given 8/2/20 1429)   clopidogrel (PLAVIX) tablet 300 mg (300 mg Oral Given 8/2/20 1641)         ED Vitals:  Vitals:    08/02/20 1530 08/02/20 1545 08/02/20 1600 08/02/20 1645   BP: (!) 153/75   (!) 175/88   Pulse: 70  72 80   Resp:       Temp:       TempSrc:       SpO2: 97% 98% 99%    Weight:       Height:         Vitals:    08/02/20 1824 08/02/20 2000 08/02/20 2045 08/02/20 2346   BP: (!) 173/71 (!) 169/69 (!) 162/74 (!) 148/64   BP Location:    Right arm   Pulse: 79  63 65   Resp:   18 18   Temp:   97  F (36.1  C) 97.9  F (36.6  C)   TempSrc:   Oral Oral   SpO2:  97% 98% 97%   Weight:   127.3 kg (280 lb 10.3 oz)    Height:   1.829 m (6')          ED labs and imaging:  Results for orders placed or performed during the hospital encounter of 08/02/20 (from the past 24 hour(s))   Glucose by meter   Result Value Ref Range    Glucose 246 (H) 70 - 99 mg/dL   CBC with platelets differential   Result Value Ref Range    WBC 8.3 4.0 - 11.0 10e9/L    RBC Count 3.78 (L) 4.4 - 5.9 10e12/L    Hemoglobin 12.2 (L) 13.3 - 17.7 g/dL    Hematocrit 35.7 (L) 40.0 - 53.0 %    MCV 94 78 - 100 fl    MCH 32.3 26.5 - 33.0 pg    MCHC 34.2 31.5 - 36.5 g/dL    RDW 12.5 10.0 - 15.0 %    Platelet Count 219 150 - 450 10e9/L    Diff Method Automated Method     % Neutrophils 49.3 %    % Lymphocytes 37.3 %    % Monocytes 8.0 %    % Eosinophils 4.6 %    % Basophils 0.6 %    % Immature Granulocytes 0.2 %    Nucleated RBCs 0 0 /100    Absolute Neutrophil 4.1 1.6 - 8.3 10e9/L    Absolute Lymphocytes 3.1 0.8 - 5.3 10e9/L    Absolute Monocytes 0.7 0.0 - 1.3 10e9/L    Absolute Eosinophils 0.4 0.0 - 0.7 10e9/L    Absolute Basophils 0.1 0.0 - 0.2 10e9/L    Abs Immature Granulocytes 0.0 0 - 0.4 10e9/L    Absolute Nucleated RBC 0.0     Basic metabolic panel   Result Value Ref Range    Sodium 132 (L) 133 - 144 mmol/L    Potassium 4.9 3.4 - 5.3 mmol/L    Chloride 104 94 - 109 mmol/L    Carbon Dioxide 23 20 - 32 mmol/L    Anion Gap 5 3 - 14 mmol/L    Glucose 244 (H) 70 - 99 mg/dL    Urea Nitrogen 21 7 - 30 mg/dL    Creatinine 0.93 0.66 - 1.25 mg/dL    GFR Estimate 83 >60 mL/min/[1.73_m2]    GFR Estimate If Black >90 >60 mL/min/[1.73_m2]    Calcium 8.2 (L) 8.5 - 10.1 mg/dL   Troponin I   Result Value Ref Range    Troponin I ES <0.015 0.000 - 0.045 ug/L   Hemaglobin A1C   Result Value Ref Range    Hemoglobin A1C 7.7 (H) 0 - 5.6 %   CT Head w/o Contrast    Narrative    CT SCAN OF THE HEAD WITHOUT CONTRAST   8/2/2020 2:16 PM     HISTORY: Four-day history of progressive and persistent right facial  numbness, right hand numbness and right foot numbness.  History of  diabetic neuropathy.  Evaluate for acute intracranial process.   Possible stroke.    TECHNIQUE:  Axial images of the head and coronal reformations without  IV contrast material. Radiation dose for this scan was reduced using  automated exposure control, adjustment of the mA and/or kV according  to patient size, or iterative reconstruction technique.    COMPARISON: Head MRI 5/29/2020.    FINDINGS:  Mild volume loss is present. White matter hypoattenuation  likely represents mild chronic small vessel ischemic change. The  cerebral hemispheres, brainstem, and cerebellum otherwise demonstrate  normal morphology and attenuation. Small area of curvilinear  hypoattenuation is present involving the left thalamus suggestive of  lacunar infarct (series 3 image 17). No evidence of acute ischemia,  hemorrhage, mass, mass effect or hydrocephalus. Partial opacification  of the right mastoid cavity, likely inflammatory. Paranasal sinuses  are well aerated.      Impression    IMPRESSION:   1. Small area of hypoattenuation within the left thalamus suggestive  of chronic lacunar infarct, new compared to  5/29/2020. MRI could be  performed if indicated.  2. Otherwise, no evidence of acute ischemia or hemorrhage.    ARNAV PIMENTEL MD   CTA Head Neck with Contrast    Narrative    CTA  HEAD NECK WITH CONTRAST  8/2/2020 2:21 PM     HISTORY: 4-day history of progressive and persistent right facial  numbness, right hand numbness and right foot numbness.  History of  diabetic neuropathy.  Evaluate for acute intracranial process.   Compare with MR imaging from 5/20/20    TECHNIQUE:  Precontrast localizing scans were followed by CT  angiography with an injection of 70 mL Isovue-370 IV contrast with  scans through the head and neck.  Images were transferred to a  separate 3-D workstation where multiplanar reformations and 3-D images  were created.  Estimates of carotid stenoses are made relative to the  distal internal carotid artery diameters except as noted. Radiation  dose for this scan was reduced using automated exposure control,  adjustment of the mA and/or kV according to patient size, or iterative  reconstruction technique.    COMPARISON: MR angiogram dated 5/29/2020    FINDINGS: Estimates of stenosis at the carotid bifurcations are  relative to the distal internal carotids.    ARCH: Calcified atherosclerotic plaque is seen in the arch of the  aorta.    NECK CTA:  Right Carotid:  The right common carotid artery is normal.  Calcified  atherosclerotic plaque is seen at the carotid bifurcation. There is a  moderate stenosis at the bifurcation...  The right internal carotid  artery is negative.     Left Carotid:  The left common carotid artery is unremarkable.   Atherosclerotic plaque is seen at the left carotid bifurcation. No  high-grade stenosis..  The left internal carotid is negative.      Vertebrals:  The vertebral arteries are normal.    HEAD CTA:  Anterior Circulation: No occluded vessels are seen. There is a small,  2 mm distal left paraclinoid aneurysm. This projects superiorly. This  was also seen on the recent  MR angiogram.. No right-sided aneurysm is  identified on this CT angiogram.    Posterior Circulation:  The basilar artery and its branches appear  normal.     MISC:: None.      Impression    IMPRESSION:   1. Moderate stenosis at the right carotid bifurcation. No high-grade  stenosis is seen  2. No stenosis of the left carotid bifurcation.  3. No occluded intracranial vessels or high-grade intracranial  vascular stenosis.  4. Small, 2 mm distal left paraclinoid aneurysm. This was also noted  on the recent MR angiogram. No right sided aneurysm is identified on  this study.      JASKARAN MACKENZIE MD   MR Brain w/o & w Contrast    Narrative    MRI BRAIN WITHOUT AND WITH CONTRAST  8/2/2020 2:50 PM    HISTORY:  4-day history of progressive right facial numbness, right  hand numbness and right foot numbness. Compare with MR imaging from  5/20/20     TECHNIQUE:  Multiplanar, multisequence MRI of the brain without and  with 10 mL Gadavist    COMPARISON: CT dated 8/2/2020, MR scan dated 5/29/2020    FINDINGS:     Intracranial contents: Diffusion-weighted images reveal a area of  restricted diffusion in the left thalamus. This corresponds to the  area of low-density seen on the recent CT. This is a new finding when  compared to the MR scan of 5/29/2020. This is consistent with a recent  but probably subacute lacunar type infarct. No hemorrhage. No mass  effect.  There are no gadolinium enhancing lesions. The arteries at  the base of the brain and the dural venous sinuses appear patent.     Sella:  No significant abnormality accounting for technique.     Orbit: No significant abnormality accounting for technique.      Sinus/mastoids: No significant paranasal sinus mucosal disease. No  significant middle ear or mastoid effusion.    Bones/soft tissues: No aggressive osseous lesion involving the  calvarium, skull base, or visualized upper cervical spine.       Impression    IMPRESSION:    1. 0.8 cm area of restricted diffusion in the  "left thalamus. This is  consistent with a recent infarct. This corresponds to the area of  low-density seen on the recent noncontrast head CT.    2. No bleed or mass effect.      JASKARAN MACKENZIE MD           Assessments & Plan (with Medical Decision Making)   Clinical impression: 69-year-old male right hand dominant with multiple medical diagnoses who presented with intermittent history of numbness now with symptoms of right facial numbness about the right cheek, right lips,right hand and right lateral foot that  persisted over the last 4 days.  Symptoms are due to what appears to be a recent left thalamic infarct on imaging today.  He is admitted to medicine for further stroke evaluation and care after expert consultation with stroke neurology.   Patient has underlying history of diabetic neuropathy.  Patient reported similar symptoms in May 2020- though intermittent and had neuro imaging and work-up that did not reveal any acute ischemic stroke however some abnormality was noted about the MRA of the neck with some stenosis at the proximal cervical right internal carotid artery.  He had MRI of the cervical spine obtained earlier in the day prior to presenting to the department which revealed severe foraminal stenosis at multiple levels and mild spinal canal stenosis at multiple levels.  Patient reported he was concerned that the numbness about the face, right hand, and right lateral foot was more prominent since his evaluation in May 2020 wanted to come in to be evaluated.  He reported no red flags on exam specifically no tick bite or rash, no headache or fever, no neck pain, no visual symptoms no speech difficulty no extremity weakness.  He did describe his right foot  As \"feeling spongy\" On my exam GCS was 15.  He had equal and symmetric upper extremity lower extremity strength.  He reported numbness that initially involve the right thumb, right index finger, long finger.   No numbness about the forearm or arm.  No " neck pain. NIH stroke score is 1-for sensory deficits that are mild about the face right hand and right lateral foot. He was evaluated as a stroke evaluation with 4 days of symptoms, outside of treatment window.      ED course and Plan:  Reviewed the medical record.  Office visit on June 8, 2020 and July 28.  Evaluated May 29, 2020 with right-sided numbness-the cause of his symptoms at the time was not clear but his aspirin therapy was increased to 325 mg.  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  There was no evidence of acute infarct.  MRA imaging of the neck did show 40 to 50% stenosis of the proximal cervical right internal carotid artery which was presumably related to atherosclerotic disease. See additional details in the medical record.  With progressive and persistent sensation of numbness without weakness with recent MR cervical spine imaging and MRA and MRI I reviewed patient's history and prior work-up with stroke neurology. I reviewed patient's recent MRI imaging and history as reported with Dr. Dela Cruz stroke neurology at [facility] at 1.29pm-who advised the patient have multimodal CT imaging and repeat MRI for comparison. Patient's blood glucose on arrival was 246. .  Patient is noted to have hives with iodine studies but tells me he has subsequent studies with contrast without hives and that he broke out with minimal hives in the past. He was offered oral Valium to help with anxiolysis and sense of claustrophobia for additional imaging. A1c is 7.7 today.  Negative troponin.  EKG in the department today showed normal sinus rhythm without acute ischemia.  No acute change from comparison from 5/29/2020    CT imaging of the head today revealed an area of small area of hypoattenuation left thalamus suggestive of a chronic lacunar  infarct which appeared new from  imaging from May 29, 2020.  CTA revealed moderate stenosis of the right carotid bifurcation with no high-grade stenosis noted.  MR revealed revealed a recent infarct in the left thalamus corresponding to the area of low density noted on recent noncontrast CT of the head.   After CT and MRI imaging , reviewed with Dr. Dela Cruz at 3:50 PM.  He recommended a Plavix load with plan for dual antiplatelet therapy for 3 weeks and likely begin full aspirin therapy with a platelet function assay for aspirin to ensure patient is an aspirin responder.  See stroke consult note for additional details.  Dr. Dela Cruz felt that patient could be admitted to [facility].    I spoke with Dr. JOSH Scott- at 5.10pm- admitting hospitalist  who agreed to assume care upon handoff for admission after reviewing rationale for admission, history of presentation, recent work-up in May 2020, MRI, CT findings and stroke neurology consultation and recommendations.  Plan of care and recommendation by stroke neurology was reviewed with the patient.         ED to Inpatient Handoff:    Discussed with Dr MAURICIO Scott at 5.10pm  Patient accepted for Inpatient Stay  Pending studies include SARS COV-2 PCR,  Code Status: Not Addressed           Disclaimer: This note consists of symbols derived from keyboarding, dictation and/or voice recognition software. As a result, there may be errors in the script that have gone undetected. Please consider this when interpreting information found in this chart.  I have reviewed the nursing notes.    I have reviewed the findings, diagnosis, plan and need for follow up with the patient.      New Prescriptions    No medications on file       Final diagnoses:   Left thalamic infarction (H) - noted on MRI and CT   Numbness - facial, hand and foot       8/2/2020   [facility] EMERGENCY DEPARTMENT     Omero Ramey MD  08/03/20 0005

## 2020-08-02 NOTE — PROGRESS NOTES
"After receiving \"blue slip\" indicating a high probability of admission, this chart was accessed to assess needs and begin patient care planning.   er asked to initiate Covid screening prior to admission  "

## 2020-08-02 NOTE — LETTER
To whom it may concern:          Francois Lujan is to be excused from work for medical reasons from 8/2/20 to 8/10/20.            Sincerely,        Del Proctor MD

## 2020-08-03 ENCOUNTER — APPOINTMENT (OUTPATIENT)
Dept: SPEECH THERAPY | Facility: CLINIC | Age: 70
DRG: 066 | End: 2020-08-03
Payer: COMMERCIAL

## 2020-08-03 ENCOUNTER — APPOINTMENT (OUTPATIENT)
Dept: CARDIOLOGY | Facility: CLINIC | Age: 70
DRG: 066 | End: 2020-08-03
Attending: FAMILY MEDICINE
Payer: COMMERCIAL

## 2020-08-03 VITALS
RESPIRATION RATE: 18 BRPM | OXYGEN SATURATION: 97 % | HEIGHT: 72 IN | TEMPERATURE: 97.7 F | BODY MASS INDEX: 38.01 KG/M2 | SYSTOLIC BLOOD PRESSURE: 160 MMHG | WEIGHT: 280.65 LBS | DIASTOLIC BLOOD PRESSURE: 61 MMHG | HEART RATE: 65 BPM

## 2020-08-03 LAB
CHOLEST SERPL-MCNC: 185 MG/DL
GLUCOSE BLDC GLUCOMTR-MCNC: 176 MG/DL (ref 70–99)
GLUCOSE BLDC GLUCOMTR-MCNC: 232 MG/DL (ref 70–99)
HDLC SERPL-MCNC: 55 MG/DL
LDLC SERPL CALC-MCNC: 93 MG/DL
NONHDLC SERPL-MCNC: 130 MG/DL
SARS-COV-2 RNA SPEC QL NAA+PROBE: NOT DETECTED
SPECIMEN SOURCE: NORMAL
TRIGL SERPL-MCNC: 184 MG/DL

## 2020-08-03 PROCEDURE — 40000893 ZZH STATISTIC PT IP EVAL DEFER: Performed by: PHYSICAL THERAPIST

## 2020-08-03 PROCEDURE — 80061 LIPID PANEL: CPT | Performed by: FAMILY MEDICINE

## 2020-08-03 PROCEDURE — 40000894 ZZH STATISTIC OT IP EVAL DEFER

## 2020-08-03 PROCEDURE — 99239 HOSP IP/OBS DSCHRG MGMT >30: CPT | Performed by: FAMILY MEDICINE

## 2020-08-03 PROCEDURE — 25000132 ZZH RX MED GY IP 250 OP 250 PS 637: Performed by: FAMILY MEDICINE

## 2020-08-03 PROCEDURE — 40000895 ZZH STATISTIC SLP IP EVAL DEFER: Performed by: SPEECH-LANGUAGE PATHOLOGIST

## 2020-08-03 PROCEDURE — 25000131 ZZH RX MED GY IP 250 OP 636 PS 637: Performed by: FAMILY MEDICINE

## 2020-08-03 PROCEDURE — 00000146 ZZHCL STATISTIC GLUCOSE BY METER IP

## 2020-08-03 PROCEDURE — 25500064 ZZH RX 255 OP 636: Performed by: FAMILY MEDICINE

## 2020-08-03 PROCEDURE — 36415 COLL VENOUS BLD VENIPUNCTURE: CPT | Performed by: FAMILY MEDICINE

## 2020-08-03 PROCEDURE — 93306 TTE W/DOPPLER COMPLETE: CPT

## 2020-08-03 PROCEDURE — 93306 TTE W/DOPPLER COMPLETE: CPT | Mod: 26 | Performed by: INTERNAL MEDICINE

## 2020-08-03 RX ORDER — ASPIRIN 81 MG/1
81 TABLET, CHEWABLE ORAL DAILY
Qty: 21 TABLET | Refills: 0 | Status: SHIPPED | OUTPATIENT
Start: 2020-08-04 | End: 2020-08-25

## 2020-08-03 RX ORDER — CLOPIDOGREL BISULFATE 75 MG/1
75 TABLET ORAL DAILY
Qty: 30 TABLET | Refills: 3 | Status: SHIPPED | OUTPATIENT
Start: 2020-08-04 | End: 2020-08-11

## 2020-08-03 RX ORDER — NICOTINE POLACRILEX 4 MG
15-30 LOZENGE BUCCAL
Status: DISCONTINUED | OUTPATIENT
Start: 2020-08-03 | End: 2020-08-03 | Stop reason: HOSPADM

## 2020-08-03 RX ORDER — DEXTROSE MONOHYDRATE 25 G/50ML
25-50 INJECTION, SOLUTION INTRAVENOUS
Status: DISCONTINUED | OUTPATIENT
Start: 2020-08-03 | End: 2020-08-03 | Stop reason: HOSPADM

## 2020-08-03 RX ADMIN — CLOPIDOGREL BISULFATE 75 MG: 75 TABLET ORAL at 09:17

## 2020-08-03 RX ADMIN — METOPROLOL TARTRATE 50 MG: 25 TABLET, FILM COATED ORAL at 09:17

## 2020-08-03 RX ADMIN — LEVOTHYROXINE SODIUM 50 MCG: 50 TABLET ORAL at 06:03

## 2020-08-03 RX ADMIN — AMLODIPINE BESYLATE 10 MG: 10 TABLET ORAL at 09:18

## 2020-08-03 RX ADMIN — ASPIRIN 81 MG 81 MG: 81 TABLET ORAL at 11:31

## 2020-08-03 RX ADMIN — INSULIN ASPART 2 UNITS: 100 INJECTION, SOLUTION INTRAVENOUS; SUBCUTANEOUS at 09:23

## 2020-08-03 RX ADMIN — VALSARTAN 160 MG: 40 TABLET ORAL at 11:31

## 2020-08-03 RX ADMIN — HUMAN ALBUMIN MICROSPHERES AND PERFLUTREN 2 ML: 10; .22 INJECTION, SOLUTION INTRAVENOUS at 08:45

## 2020-08-03 ASSESSMENT — ACTIVITIES OF DAILY LIVING (ADL)
ADLS_ACUITY_SCORE: 11

## 2020-08-03 NOTE — PLAN OF CARE
PT: Stopped in and talked with patient about mobility and home life. Patient has no concerns about returning home. B LE WNL with no deficits noted. Coordination screened with heel slide on shin, toe taps and supination/pronation test- all normal. He does complain of some residual numbness in right foot and hand but it doesn't affect any of his mobility. He has been up moving independently in his room, and he refuses to try the steps and states he knows they will go just fine at home. PT evaluation completed and no needs identified.       Letitia Cook  PT, DPT       8/3/2020   [facility]  [address] [place] [address]   [address]  [place] [address]  [email or web address]  Voicemail: [phone]

## 2020-08-03 NOTE — PROGRESS NOTES
Pt alert and oriented. Sinus Rhythm, BP Systolic 140-160's, all other VSS. Oxygenating on RA. Up independently in room. Urinal at bedside, adequate urine output. Eager to discharge home.     Faiza Castellanos RN

## 2020-08-03 NOTE — PLAN OF CARE
OT: Spoke with patient. Pt is up independently in room. No UE and coordination deficits noted. Pt's only complaint is R hand/foot numbness. He states it has improved since admission. Discussed safety with cooking (ie using burners and sharp objects). Pt states buttons have been difficult, however able to complete it independently. OT screen complete and no further needs identified.

## 2020-08-03 NOTE — DISCHARGE SUMMARY
[facility] Discharge Summary    Francois Lujan MRN# [id number]   Age: 69 year old YOB: 1950     Date of Admission:  8/2/2020  Date of Discharge::  8/3/2020  Admitting Physician:  Julio Scott MD  Discharge Physician:  Del Proctor MD, MD             Admission Diagnoses:   Numbness [R20.0]  Left thalamic infarction (H) [I63.9]          Principle Discharge Diagnosis:         Acute ischemic VBA thalamic stroke, left (H)    See hospital course for further active diagnoses addressed during this admission.                 Medications Prior to Admission:     Medications Prior to Admission   Medication Sig Dispense Refill Last Dose     amLODIPine (NORVASC) 10 MG tablet Take 1 tablet (10 mg) by mouth daily 45 tablet 3 8/1/2020 at Unknown time     ASPIRIN PO Take 324 mg by mouth daily    8/1/2020 at Unknown time     atorvastatin (LIPITOR) 80 MG tablet Take 1 tablet (80 mg) by mouth daily 90 tablet 3 8/1/2020 at Unknown time     glipiZIDE (GLUCOTROL XL) 10 MG 24 hr tablet Take 2 tablets (20 mg) by mouth daily (with breakfast) 180 tablet 3 8/1/2020 at Unknown time     levothyroxine (SYNTHROID) 50 MCG tablet Take 1 tablet (50 mcg) by mouth daily 90 tablet 3 8/1/2020 at Unknown time     metFORMIN (GLUCOPHAGE) 500 MG tablet Take 2 tablets (1,000 mg) by mouth 2 times daily (with meals) 360 tablet 3 8/1/2020 at Unknown time     metoprolol tartrate (LOPRESSOR) 50 MG tablet Take 1 tablet (50 mg) by mouth 2 times daily 180 tablet 3 8/1/2020 at Unknown time     testosterone (ANDROGEL) 50 MG/5GM (1%) topical gel Place 1 packet (50 mg of testosterone) onto the skin daily 90 packet 3 8/1/2020 at Unknown time     valsartan (DIOVAN) 160 MG tablet Take 1 tablet (160 mg) by mouth daily 90 tablet 3 8/1/2020 at Unknown time     blood glucose (ACCU-CHEK SMARTVIEW) test strip Use to test blood sugar 3 times daily or as directed. 100 each 12      blood glucose (NO BRAND SPECIFIED) lancets standard Use to test blood  sugar 3 times daily or as directed. 1 each 11              Discharge Medications:     Current Discharge Medication List      START taking these medications    Details   clopidogrel (PLAVIX) 75 MG tablet Take 1 tablet (75 mg) by mouth daily  Qty: 30 tablet, Refills: 3    Associated Diagnoses: Cerebrovascular accident (CVA), unspecified mechanism (H)         CONTINUE these medications which have CHANGED    Details   aspirin (ASA) 81 MG chewable tablet Take 1 tablet (81 mg) by mouth daily for 21 days Then stop  Qty: 21 tablet, Refills: 0    Associated Diagnoses: Cerebrovascular accident (CVA), unspecified mechanism (H)         CONTINUE these medications which have NOT CHANGED    Details   amLODIPine (NORVASC) 10 MG tablet Take 1 tablet (10 mg) by mouth daily  Qty: 45 tablet, Refills: 3    Associated Diagnoses: HTN, goal below 140/90      atorvastatin (LIPITOR) 80 MG tablet Take 1 tablet (80 mg) by mouth daily  Qty: 90 tablet, Refills: 3    Associated Diagnoses: Hyperlipidemia LDL goal <70      glipiZIDE (GLUCOTROL XL) 10 MG 24 hr tablet Take 2 tablets (20 mg) by mouth daily (with breakfast)  Qty: 180 tablet, Refills: 3    Associated Diagnoses: Type 2 diabetes mellitus with complication, without long-term current use of insulin (H)      levothyroxine (SYNTHROID) 50 MCG tablet Take 1 tablet (50 mcg) by mouth daily  Qty: 90 tablet, Refills: 3    Associated Diagnoses: Acquired hypothyroidism      metFORMIN (GLUCOPHAGE) 500 MG tablet Take 2 tablets (1,000 mg) by mouth 2 times daily (with meals)  Qty: 360 tablet, Refills: 3    Associated Diagnoses: Type 2 diabetes mellitus with complication, without long-term current use of insulin (H)      metoprolol tartrate (LOPRESSOR) 50 MG tablet Take 1 tablet (50 mg) by mouth 2 times daily  Qty: 180 tablet, Refills: 3    Associated Diagnoses: HTN, goal below 140/90      testosterone (ANDROGEL) 50 MG/5GM (1%) topical gel Place 1 packet (50 mg of testosterone) onto the skin daily  Qty:  90 packet, Refills: 3    Associated Diagnoses: Hypogonadism male      valsartan (DIOVAN) 160 MG tablet Take 1 tablet (160 mg) by mouth daily  Qty: 90 tablet, Refills: 3    Associated Diagnoses: HTN, goal below 140/90      blood glucose (ACCU-CHEK SMARTVIEW) test strip Use to test blood sugar 3 times daily or as directed.  Qty: 100 each, Refills: 12    Associated Diagnoses: Type 2 diabetes mellitus with complication, without long-term current use of insulin (H)      blood glucose (NO BRAND SPECIFIED) lancets standard Use to test blood sugar 3 times daily or as directed.  Qty: 1 each, Refills: 11    Associated Diagnoses: Type 2 diabetes mellitus with complication, without long-term current use of insulin (H)                   Consultations:   Tele consultation with stroke neurology           Brief History of Illness:     From Admission H+P:   Francois Lujan is a 69 year old male with past medical history of  acquired hypothyroidism, obstructive sleep apnea, history of lumbosacral radicular pain, depression, polymyalgia rheumatica, osteoarthritis, stage II chronic kidney disease, type 2 diabetes, chronic gouty arthropathy now presents on 8/2/2020 with right side numbness (right face, right hand, and right foot.  )  Symptoms started 4 days ago.   Patient denies any weakness in upper or lower extremities, no speech problem, no headache.  Patient denies any neck pain, chest pain or difficulty breathing.  Patient reports that he has had similar symptoms in 05/20/2020, at that time he had an MRI, MRA neck and MRI brain done showed no evidence of stroke, a showedThere is up to 40-50% stenosis of the proximal cervical right  internal carotid artery, presumably related to atherosclerotic  disease.  On arrival to the ED patient was not in acute distress, he was alert and oriented.  Vital signs on arrival showed:BP: (!) 186/84  Pulse: 75  Heart Rate: 71  Temp: 98.9  F (37.2  C)  Resp: 20  Height: 182.9 cm (6')  Weight: 126.1 kg  "(278 lb)  SpO2: 97 %  Labs showed: Blood sugar 246, CBC hemoglobin 12.2, normal blood count and platelets.  Hemoglobin A1c 7.7.  COVID-19 in process  EKG performed and showed: Sinus  Rhythm   Imaging studies: MRI brain showed recent infarct in the left thalamus  Patient was given Plavix 300 mg,and  Aspirin In the ED  Neurology was consulted by ED physician.  Neurology recommended Plavix load and aspirin.  Patient was admitted to [facility] for further evaluation and management   Patient was interviewed ad examined at bedside.             TODAY:     Subjective:  Doing well, feels significantly improved.  No trouble with vision, swallowing or speech.  Some very slight right sided facial weakness at mouth still but minimal.  Still slight tingly numbness in right hand but coordination much improved today.  No fever or chills.  Right lower extremities feels pretty good, still slightly \"spongy\" in sensation but strength and coordination seem good and patient feels steady on his feet.    No other pain.         ROS:   ROS: 10 point ROS neg other than the symptoms noted above in the HPI.     BP (!) 160/61 (BP Location: Right arm)   Pulse 65   Temp 97.7  F (36.5  C) (Oral)   Resp 18   Ht 1.829 m (6')   Wt 127.3 kg (280 lb 10.3 oz)   SpO2 97%   BMI 38.06 kg/m     EXAM:   GENERAL APPEARANCE ADULT: Alert, no acute distress, oriented x 3  EYES: PERRL, EOM normal, conjunctiva and lids normal, EOM normal  HENT: oral mucous membranes moist, head atraumatic and normocephalic  NECK: No adenopathy,masses or thyromegaly, supple  CV: regular rate and rhythm no murmur  Pulm: clear to auscultation bilaterally  Abdomen: soft, non-tender, no masses, no masses, normal bowel sounds.  MS: extremities normal, no peripheral edema  SKIN: no suspicious lesions or rashes  NEURO: Alert, oriented, speech and mentation normal, Cranial nerves 2-12 are normal other than very slight facial droop on therefore as above, Strength normal and symmetrical " in upper and lower extremities other than reported tingling in right hand but sensation present.  Sensation to light touch intact throughout upper and lower extremities bilaterally.   Reflexes 2+ and symmetrical at biceps, triceps, knees and ankles. Finger to nose and heel to shin testing is normal.  PSYCH: mentation appears normal, affect and mood normal          Hospital Course:         Acute ischemic VBA thalamic stroke, left (H)  8/2/20 -- Patient presents on 8/2/2020 with right side numbness (right face, right hand, and right foot)  Symptoms started 4 days ago.   Patient denies any weakness in upper or lower extremities, no speech problem, no headache.  Patient denies any neck pain, chest pain or difficulty breathing.  Patient reports that he has had similar symptoms in 05/20/2020, at that time he had an MRI, MRA neck and MRI brain done showed no evidence of stroke, a showedThere is up to 40-50% stenosis of the proximal cervical right internal carotid artery, presumably related to atherosclerotic disease.  MRI brain on admission today showed:  1. 0.8 cm area of restricted diffusion in the left thalamus. This is  consistent with a recent infarct. This corresponds to the area of  low-density seen on the recent noncontrast head CT.    2. No bleed or mass effect.  Patient was given Plavix 300 mg,and  Aspirin In the ED  Neurology was consulted by ED physician.  Neurology recommended Plavix load and aspirin.  8/3/2020 -- echo unremarkable including normal bubble study.  Patient doing well, just some very minimal right sided facial droop at mouth, slight tingling in right hand but with coordination now good and pretty good sensation.  Strength in right foot feels good.  Otherwise back to normal today.  Agree with stroke neurology's recommendations -   Aspirin 81 mg and plavix 75 mg daily x 3 weeks, then continue just plavix long-term.    -continue Lipitor 80 mg  Follow-up with primary care provider in 1 week.            Hypertension goal BP (blood pressure) < 140/90  Continue Home medication Norvasc 10 mg, valsartan and metoprolol.  Recheck at follow-up with primary care provider.        Type 2 diabetes mellitus with microalbuminuria, without long-term current use of insulin (H)  Continue home glipizide 10 mg, metformin 500 mg        S/P CABG (coronary artery bypass graft)  Patient denies any chest pain or angina symptoms  Continue Lipitor, antiplatelet medications as above.         Acquired hypothyroidism  Continue home Synthroid       Hyperlipidemia LDL goal <70  Continue Lipitor 80 mg       Code Status: full code                   Discharge Instructions and Follow-Up:     Discharge diet: Orders Placed This Encounter      Moderate Consistent CHO Diet       Discharge activity: Activity as tolerated   Discharge follow-up: Follow up with primary care provider in 7 days           Discharge Disposition:     Discharged to home      Attestation:  I have reviewed today's vital signs, notes, medications, labs and imaging.  Amount of time performed on this discharge summary: 45 minutes.    Del Proctor MD, MD

## 2020-08-03 NOTE — PLAN OF CARE
[facility] [facility] DISCHARGE NOTE    Patient discharged to home at 12:15 PM via ambulation. Accompanied by staff. Discharge instructions reviewed with patient, opportunity offered to ask questions. Prescriptions sent to patients preferred pharmacy. All belongings sent with patient.    Florencia Laura RN

## 2020-08-03 NOTE — PROGRESS NOTES
[place] [facility] ADMISSION NOTE    Patient admitted to room 2213 at approximately 2035 via wheel chair from emergency room. Patient was accompanied by transport tech.     Verbal SBAR report received from Jennifer PATEL prior to patient arrival.     Patient ambulated to bed independently. Patient alert and oriented X 3. The patient is not having any pain. 0-10 Pain Scale: 0. Admission vital signs: Blood pressure (!) 162/74, pulse 63, temperature 97  F (36.1  C), temperature source Oral, resp. rate 18, height 1.829 m (6'), weight 127.3 kg (280 lb 10.3 oz), SpO2 98 %. Patient was oriented to plan of care, call light, bed controls, tv, telephone, bathroom and visiting hours.     Risk Assessment    The following safety risks were identified during admission: none. Yellow risk band applied: NO.     Skin Initial Assessment    Patient refused skin check, denies issues.         Education    Patient has a [place] to Observation order: No  Observation education completed and documented: N/A      Lisbet Comer RN

## 2020-08-03 NOTE — PROGRESS NOTES
Pt reports no difficulty swallowing and that speech has improved.  He is mindful of no letting liquids spill out of mouth when drinking.  Passed swallow screen.

## 2020-08-04 ENCOUNTER — PATIENT OUTREACH (OUTPATIENT)
Dept: CARE COORDINATION | Facility: CLINIC | Age: 70
End: 2020-08-04

## 2020-08-04 NOTE — LETTER
[place] CARE COORDINATION  [facility]  [address]  [place] [address]    August 5, 2020    Francois Le  [address]  [place] [address]      Dear Francois,    I am a clinic care coordinator who works with Teresita Alvarez DO at [facility]. I am reaching out to you to introduce Clinic Care Coordination which is a voluntary, additional source of support for our patients.  Below is a description of clinic care coordination and how we can further assist you.      The clinic care coordinator team is made up of a registered nurse,  and community health worker who understand the health care system. The goal of clinic care coordination is to help you manage your health by establishing patient-centered goals. The team can assist you in meeting your health care goals by providing education, strengthening the communication among your providers, and supporting you with any resource needs.    Please feel free to contact me at [phone] with any questions or if you are interested in enrollment. We are focused on providing you with the highest-quality healthcare experience possible and that all starts with you.     Sincerely,     Bina Hanks, IAN, RN   [facility]  - Clinic Care Coordinator  Due to the current pandemic, I am working remotely. My direct number will ring to [place]. Please leave a brief message and I will return your call as quickly as possible.     [>50% of Time Spent on Counseling for ____] : Greater than 50% of the encounter time was spent on counseling for [unfilled] [Time Spent: ___ minutes] : I have spent [unfilled] minutes of face to face time with the patient

## 2020-08-04 NOTE — PROGRESS NOTES
Clinic Care Coordination Contact  [facility]    Referral Source: IP Report  Patient was admitted at [facility] from 8/2 to 8/3 with diagnosis of acute ischemic VBA thalmaic stroke, left.    Clinical Data: Care Coordinator Outreach    Outreach attempted x 1. Patient's wife,  reports patient is not available to come to phone. Provided  with RN CC name and contact information to share with patient for call back.    Plan: Care Coordinator will try to reach patient again in 1-2 business days.    IAN Og, RN   [facility]  - Clinic Care Coordinator  Phone: [phone]

## 2020-08-05 ENCOUNTER — PRE VISIT (OUTPATIENT)
Dept: NEUROLOGY | Facility: CLINIC | Age: 70
End: 2020-08-05

## 2020-08-05 NOTE — TELEPHONE ENCOUNTER
FUTURE VISIT INFORMATION      FUTURE VISIT INFORMATION:    Date: 8/6/2020    Time: 4pm    [place] [facility] [place]  REFERRAL INFORMATION:    Referring provider:  Teresita Alvarez DO    Referring providers clinic:  [facility]     Reason for visit/diagnosis  Numbness and tingling of Face, Hand and Foot     RECORDS REQUESTED FROM:       Clinic name Comments Records Status Imaging Status   Internal ZACHERY Alvarez-7/28/2020    ED Visit 5/29/2020    MR Brain 5/29/2020, 8/2/2020    [facility]

## 2020-08-05 NOTE — PROGRESS NOTES
Clinic Care Coordination Contact  [facility]/Voicemail    Referral Source: IP Report  See initial note below    Clinical Data: Care Coordinator Outreach    Outreach attempted x 2.  Left message on patient's voicemail with call back information and requested return call.    Plan: Care Coordinator will send care coordination introduction letter with care coordinator contact information and explanation of care coordination services via Freebase[employer]. Care Coordinator will do no further outreaches at this time.    IAN Og, RN   [facility]  - Clinic Care Coordinator  Phone: [phone]

## 2020-08-06 ENCOUNTER — VIRTUAL VISIT (OUTPATIENT)
Dept: NEUROLOGY | Facility: CLINIC | Age: 70
End: 2020-08-06
Attending: INTERNAL MEDICINE
Payer: COMMERCIAL

## 2020-08-06 DIAGNOSIS — I63.59 CEREBROVASCULAR ACCIDENT (CVA) DUE TO OCCLUSION OF OTHER CEREBRAL ARTERY (H): Primary | ICD-10-CM

## 2020-08-06 NOTE — PROGRESS NOTES
"Francois Lujan is a 69 year old male who is being evaluated via a billable telephone visit.      The patient has been notified of following:     \"This telephone visit will be conducted via a call between you and your physician/provider. We have found that certain health care needs can be provided without the need for a physical exam.  This service lets us provide the care you need with a short phone conversation.  If a prescription is necessary we can send it directly to your pharmacy.  If lab work is needed we can place an order for that and you can then stop by our lab to have the test done at a later time.    Telephone visits are billed at different rates depending on your insurance coverage. During this emergency period, for some insurers they may be billed the same as an in-person visit.  Please reach out to your insurance provider with any questions.    If during the course of the call the physician/provider feels a telephone visit is not appropriate, you will not be charged for this service.\"    Patient has given verbal consent for Telephone visit?  yes    What phone number would you like to be contacted at? [phone]    How would you [facility] to [facility]    Phone call duration: 21 minutes    Korey Porras EMT      "

## 2020-08-06 NOTE — LETTER
2020       RE: Francois Lujan  [address]  [facility] [address]     Dear Colleague,    Thank you for referring your patient, Francois Lujan, to the [facility] NEUROLOGY at [facility]. Please see a copy of my visit note below.    Francois Lujan is a 69 year old male who is being evaluated via a billable telephone visit.        Phone call duration: 21 minutes    RISHABH Zuniga        Service Date: 2020        Teresita Alvarez DO   [facility]    [address] [place] [address]      RE: Francois Lujan    MRN: [id number]    : 1950             Dear Dr. Alvarez:        We had a telephone visit over 20 minutes with Mr. Francois Lujan, a 69-year-old male who recently had an episode of residual numbness in the face and tongue and right hand and also the right foot.  Mr. Lujan is a very good historian.  He says that for about 3 weeks he had intermittent numbness inside the mouth and also in the first couple of digits of his right hand and then the right foot felt kind of spongy.  Eventually, the symptoms did become permanent, and he was evaluated for this and was found to have a left thalamic lesion vascular with the diffusion deficit.  He was seen in the Emergency Room at [facility] on the  and was evaluated because of the numbness and he had this diagnosis.  He has been on Plavix as well as aspirin for a month.  He does have hypertension and is on amlodipine 10 mg a day, atorvastatin, he is on Glucotrol 2 diabetes, thyroid and has been on metformin and metoprolol and also has been on testosterone since  for hypogonadism.  He is on Diovan and blood sugar meter.  In the [place] evaluation it is reported he has a history of acquired hypothyroidism, obesity, history of lumbar radiculopathy, depression, polymyalgia rheumatica, osteoarthritis, stage II chronic kidney disease, type 2 diabetes and presented to the ER  with right-sided symptoms as I mentioned without any weakness or speech.  He has had similar symptoms on 05/20 and did have an MRI and MRA of the neck and with INH stroke, but there was stenosis of the right internal carotid related to atherosclerosis.  In the ED his blood pressure was highly elevated at 183/84 and lab studies show his A1c was 7.7.  COVID was tested and in process.  EKG shows sinus rhythm and the MRI showed diffusion deficit in the left thalamus.  He was seen by Neurology who recommended a Plavix and aspirin and he was admitted for further evaluation and he was improved with very slight facial weakness and numbness in the right hand which was also much improved.  The right leg was okay motor.  Blood pressure was below the initial value and his neuro exam did show some mild sensory findings.  The patient has denied any neck pain and there was no evidence to suggest dissection and he was consulted with Neurology and they recommended Plavix and aspirin and echo was unremarkable including bubble studies and he was discharged on both for 3 weeks and then discontinued.  Hypertension with followup with primary care and type 2 diabetes with treatment was discussed and he has had a coronary artery bypass graft with that and not any symptoms and has hyperlipidemia.      FAMILY HISTORY:  Not obtained at this time.      ALLERGIES:  HYDROCODONE, LOSARTAN, LISINOPRIL SULFA AND AS ABOVE VALSARTAN.      SOCIAL HISTORY:  He brought up the fact that he has been drinking heavily.  He has quit in the last few days.  We offered him a recommendation of support for a chemical dependency program, but he says he would not do it.  He is not a very Confucianist person.  We recommended he get support somewhere.  He has not had any withdrawal in the past or seizure or alcohol withdrawal program.  We also recommend that he takes vitamins including B12, B complex.  We wonder about the role of the testosterone, but this is a topical  gel and he has been on it since .  His weight is increased and we recommended followup treatment with his primary doctor and he is on Lipitor and we do not foresee any further difficulties.      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  He had a CT angiogram done on  of the head and neck and this does show moderate stenosis of the right carotid, which is asymptomatic, no stenosis  and no occluded intracranial segment and there is a small 2 mm aneurysm.  He should be followed for the right carotid with ultrasound and we recommended that Dr. Alvarez.  Any further issues and alcohol treatment would be an issue to approach at some point, but at this point, is not inclined.      Sincerely,      Chinmay Madison         D: 2020   T: 2020   MT: tb      Name:     ANGELA WU   MRN:      [id number]        Account:      [id number]   :      1950           Service Date: 2020      Document: [id number]

## 2020-08-07 NOTE — PROGRESS NOTES
Service Date: 2020        Teresita Alvarez DO   [facility]    [address] [place] [address]      RE: Francois Lujan    MRN: [id number]    : 1950             Dear Dr. Alvarez:        We had a telephone visit over 20 minutes with Mr. Francois Lujan, a 69-year-old male who recently had an episode of residual numbness in the face and tongue and right hand and also the right foot.  Mr. Lujan is a very good historian.  He says that for about 3 weeks he had intermittent numbness inside the mouth and also in the first couple of digits of his right hand and then the right foot felt kind of spongy.  Eventually, the symptoms did become permanent, and he was evaluated for this and was found to have a left thalamic lesion vascular with the diffusion deficit.  He was seen in the Emergency Room at [facility] on the  and was evaluated because of the numbness and he had this diagnosis.  He has been on Plavix as well as aspirin for a month.  He does have hypertension and is on amlodipine 10 mg a day, atorvastatin, he is on Glucotrol 2 diabetes, thyroid and has been on metformin and metoprolol and also has been on testosterone since  for hypogonadism.  He is on Diovan and blood sugar meter.  In the [place] evaluation it is reported he has a history of acquired hypothyroidism, obesity, history of lumbar radiculopathy, depression, polymyalgia rheumatica, osteoarthritis, stage II chronic kidney disease, type 2 diabetes and presented to the ER with right-sided symptoms as I mentioned without any weakness or speech.  He has had similar symptoms on  and did have an MRI and MRA of the neck and with INH stroke, but there was stenosis of the right internal carotid related to atherosclerosis.  In the ED his blood pressure was highly elevated at 183/84 and lab studies show his A1c was 7.7.  COVID was tested and in process.  EKG shows sinus rhythm and the MRI showed  diffusion deficit in the left thalamus.  He was seen by Neurology who recommended a Plavix and aspirin and he was admitted for further evaluation and he was improved with very slight facial weakness and numbness in the right hand which was also much improved.  The right leg was okay motor.  Blood pressure was below the initial value and his neuro exam did show some mild sensory findings.  The patient has denied any neck pain and there was no evidence to suggest dissection and he was consulted with Neurology and they recommended Plavix and aspirin and echo was unremarkable including bubble studies and he was discharged on both for 3 weeks and then discontinued.  Hypertension with followup with primary care and type 2 diabetes with treatment was discussed and he has had a coronary artery bypass graft with that and not any symptoms and has hyperlipidemia.      FAMILY HISTORY:  Not obtained at this time.      ALLERGIES:  HYDROCODONE, LOSARTAN, LISINOPRIL SULFA AND AS ABOVE VALSARTAN.      SOCIAL HISTORY:  He brought up the fact that he has been drinking heavily.  He has quit in the last few days.  We offered him a recommendation of support for a chemical dependency program, but he says he would not do it.  He is not a very Mormonism person.  We recommended he get support somewhere.  He has not had any withdrawal in the past or seizure or alcohol withdrawal program.  We also recommend that he takes vitamins including B12, B complex.  We wonder about the role of the testosterone, but this is a topical gel and he has been on it since 1985.  His weight is increased and we recommended followup treatment with his primary doctor and he is on Lipitor and we do not foresee any further difficulties.      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  He had a CT angiogram done on  of the head and neck and this does show moderate stenosis of the right carotid, which is asymptomatic, no stenosis  and no occluded intracranial segment and there is a small 2 mm aneurysm.  He should be followed for the right carotid with ultrasound and we recommended that Dr. Alvarez.  Any further issues and alcohol treatment would be an issue to approach at some point, but at this point, is not inclined.      Sincerely,      Chinmay DOLAN MD             D: 2020   T: 2020   MT: simeon      Name:     ANGELA WU   MRN:      [id number]        Account:      [id number]   :      1950           Service Date: 2020      Document: [id number]

## 2020-08-11 ENCOUNTER — OFFICE VISIT (OUTPATIENT)
Dept: FAMILY MEDICINE | Facility: CLINIC | Age: 70
End: 2020-08-11
Payer: COMMERCIAL

## 2020-08-11 VITALS
WEIGHT: 275 LBS | SYSTOLIC BLOOD PRESSURE: 130 MMHG | RESPIRATION RATE: 16 BRPM | TEMPERATURE: 98 F | DIASTOLIC BLOOD PRESSURE: 62 MMHG | HEART RATE: 76 BPM | OXYGEN SATURATION: 97 % | BODY MASS INDEX: 37.3 KG/M2

## 2020-08-11 DIAGNOSIS — I65.21 CAROTID STENOSIS, RIGHT: ICD-10-CM

## 2020-08-11 DIAGNOSIS — I10 ESSENTIAL HYPERTENSION: ICD-10-CM

## 2020-08-11 DIAGNOSIS — I63.81 ACUTE ISCHEMIC VBA THALAMIC STROKE, LEFT (H): Primary | ICD-10-CM

## 2020-08-11 PROBLEM — I63.9 ACUTE ISCHEMIC STROKE (H): Status: RESOLVED | Noted: 2020-08-02 | Resolved: 2020-08-11

## 2020-08-11 PROBLEM — I63.9 STROKE (H): Status: RESOLVED | Noted: 2020-08-02 | Resolved: 2020-08-11

## 2020-08-11 PROCEDURE — 99495 TRANSJ CARE MGMT MOD F2F 14D: CPT | Performed by: INTERNAL MEDICINE

## 2020-08-11 RX ORDER — CLOPIDOGREL BISULFATE 75 MG/1
75 TABLET ORAL DAILY
Qty: 90 TABLET | Refills: 3 | Status: SHIPPED | OUTPATIENT
Start: 2020-08-11 | End: 2021-06-07

## 2020-08-11 ASSESSMENT — PATIENT HEALTH QUESTIONNAIRE - PHQ9: SUM OF ALL RESPONSES TO PHQ QUESTIONS 1-9: 0

## 2020-08-11 NOTE — PATIENT INSTRUCTIONS
1. Get carotid ultrasound in 1 year. Radiology test was ordered.  Please call [phone] to schedule.  2. Refilled the Clopidogrel  - long term medication  3. May consider physical therapy or occupational therapy  4. Stop the aspirin after 3 weeks  5. Keep up the good work staying away from alcohol.  If you need more help staying sober, let Dr. Alvarez know.  6. Work on diet and exercise  7. You can bring blood pressure cuff into clinic to ensure accuracy.  Goal blood pressure is < 130.  If the values are above this, we would change medications.         Dr. Alvarez's Tips for a Healthy Diet    1. Add more fresh fruits and vegetables to your diet.  The more colorful with the fruit or vegetable (think berries, spinach, carrots, peppers) the healthier it tends to be.  Juice is not a fruit.  Prepare the vegetables in a healthy way - steam, bake.  Avoid batter/breading, butter/oil to cook.  2. Add more fiber to your diet.  Swap out white bread, white rice, white pasta for whole grain versions.  Reduce the portion size and frequency of carbohydrates/starches.  3. Choose healthier fats such as nuts, olive oil, avocado, etc. Stay away from lard, butter.  4. Decrease the frequency and portion size of 'junk food' -pizza, candy, cookies, potato chips, etc.  5. Watch liquid calories such as coffee drinks, juice, soda, teas.  There tends to be excessive sugar in these beverages.  6. Increase protein in your diet.  Eggs, cheese, yogurt, nuts, lentils, chicken, fish are good healthy choices.  Protein keeps you lee longer, and you are less likely to have blood sugar spikes  7. Eat healthy at least 80% of the time.  It is ok for a special treat (Mom's spaghetti dinner, cake on your birthday) every once in a while, just not every day.  When are you going to indulge (think [place] Fair time), be sure you are eating extra healthy the day before and after.      Resources:    1. [place] Resources for Health and  Wellness:https://www.takingchartereza.[facility]/dig-yes-foods    2.   [place] Ways To Wellness:    https://www.[facility].org/services/ways-to-wellness  Ways to Wellness offers:    Nutrition and weight management     Corrective exercise and fitness training     Lifestyle and behavioral change     Healing services  Our team includes registered dietitians, certified personal trainers, life coaches, as well as a Culinary Educator.    3. [facility] for Cardiovascular Disease Prevention  Consider making an appointment at the [facility] if either of the following describes you:    You are an adult who has risk factors for cardiovascular disease. Risk factors include cholesterol elevations, diabetes, high blood pressure, elevated weight, inactive living, and history of tobacco use.     You don t have traditional risk factors but have a strong family history of cardiovascular disease, which may mean you have a higher of risk of cardiovascular disease.     4. Atomic Habits by Isidro Pierre.  This a good book that looks into our habits and how to sustain good healthy habits and get rid of bad habits.

## 2020-08-11 NOTE — PROGRESS NOTES
Subjective     Francois Lujan is a 69 year old male who presents to clinic today for the following health issues:    HPI     Numbness still in the right hand and Right foot     Hospital Follow-up Visit:    Hospital/Nursing Home/IP Rehab Facility: [facility]  Date of Admission: 8/2/20  Date of Discharge: 8/3/20  Reason(s) for Admission: Numbness [R20.0]; Left thalamic infarction (H) [I63.9]      Was your hospitalization related to COVID-19? No   Problems taking medications regularly:  None  Medication changes since discharge: None  Problems adhering to non-medication therapy:  None    Summary of hospitalization:  [facility] discharge summary reviewed  Diagnostic Tests/Treatments reviewed.  Follow up needed: Neurology  Other Healthcare Providers Involved in Patient s Care:         None  Update since discharge: improved.       Post Discharge Medication Reconciliation: discharge medications reconciled and changed, per note/orders.  Plan of care communicated with patient              Stroke:  --he had left thalamic infarct.  Started on plavix + aspirin 81 mg x 21 days, then just plavix  --at time of diagnosis, he had mild right sided facial weakness at mouth, tingling numbness of right hand, right leg feels 'spongy;  --he had  Virtual visit with Neurology on 8/6  --blood pressure 130-140 at home  --feels trouble with hand dexterity.  Declines physical therapy/OT    Carotid Stenosis:  --small saccular aneurysm in the brain.  --moderate right carotid stenosis        Current Outpatient Medications   Medication Sig Dispense Refill     amLODIPine (NORVASC) 10 MG tablet Take 1 tablet (10 mg) by mouth daily 45 tablet 3     aspirin (ASA) 81 MG chewable tablet Take 1 tablet (81 mg) by mouth daily for 21 days Then stop 21 tablet 0     atorvastatin (LIPITOR) 80 MG tablet Take 1 tablet (80 mg) by mouth daily 90 tablet 3     blood glucose (ACCU-CHEK SMARTVIEW) test strip Use to test blood sugar 3 times daily or as  directed. 100 each 12     blood glucose (NO BRAND SPECIFIED) lancets standard Use to test blood sugar 3 times daily or as directed. 1 each 11     clopidogrel (PLAVIX) 75 MG tablet Take 1 tablet (75 mg) by mouth daily 30 tablet 3     glipiZIDE (GLUCOTROL XL) 10 MG 24 hr tablet Take 2 tablets (20 mg) by mouth daily (with breakfast) 180 tablet 3     levothyroxine (SYNTHROID) 50 MCG tablet Take 1 tablet (50 mcg) by mouth daily 90 tablet 3     metFORMIN (GLUCOPHAGE) 500 MG tablet Take 2 tablets (1,000 mg) by mouth 2 times daily (with meals) 360 tablet 3     metoprolol tartrate (LOPRESSOR) 50 MG tablet Take 1 tablet (50 mg) by mouth 2 times daily 180 tablet 3     testosterone (ANDROGEL) 50 MG/5GM (1%) topical gel Place 1 packet (50 mg of testosterone) onto the skin daily 90 packet 3     valsartan (DIOVAN) 160 MG tablet Take 1 tablet (160 mg) by mouth daily 90 tablet 3       Reviewed and updated as needed this visit by Provider  Problems         Review of Systems   Constitutional, HEENT, cardiovascular, pulmonary, GI, , musculoskeletal, neuro, skin, endocrine and psych systems are negative, except as otherwise noted.      Objective    /62   Pulse 76   Temp 98  F (36.7  C) (Oral)   Resp 16   Wt 124.7 kg (275 lb)   SpO2 97%   BMI 37.30 kg/m    Body mass index is 37.3 kg/m .  Physical Exam   GENERAL APPEARANCE: healthy, alert, no distress and over weight  RESP: lungs clear to auscultation - no rales, rhonchi or wheezes  CV: regular rates and rhythm, normal S1 S2, no S3 or S4 and no murmur, click or rub  NEURO: Normal strength and tone, mentation intact, speech normal, DTR symmetrically normal in lower extremities, cranial nerves 2-12 intact except for mild right mouth droop and decreased sensation on the right side of the face and sensory deficit of entire right arm in stocking glove distribution          Assessment & Plan     1. Acute ischemic VBA thalamic stroke, left (H) -continue Plavix indefinitely.  He is  already had follow-up with neurology.  He knows to expect some permanent deficits possibly  - clopidogrel (PLAVIX) 75 MG tablet; Take 1 tablet (75 mg) by mouth daily  Dispense: 90 tablet; Refill: 3    2. Carotid stenosis, right -follow-up ultrasound in 1 year  - US Carotid Right; Future    3. Essential hypertension -discussed risk reduction of stroke.  Blood pressure is borderline although improved.  Advised to continue to monitor at home and follow-up if above goal of 140         Patient Instructions   1. Get carotid ultrasound in 1 year. Radiology test was ordered.  Please call [phone] to schedule.  2. Refilled the Clopidogrel  - long term medication  3. May consider physical therapy or occupational therapy  4. Stop the aspirin after 3 weeks  5. Keep up the good work staying away from alcohol.  If you need more help staying sober, let Dr. Alvarez know.  6. Work on diet and exercise  7. You can bring blood pressure cuff into clinic to ensure accuracy.  Goal blood pressure is < 130.  If the values are above this, we would change medications.         Dr. Alvarez's Tips for a Healthy Diet    1. Add more fresh fruits and vegetables to your diet.  The more colorful with the fruit or vegetable (think berries, spinach, carrots, peppers) the healthier it tends to be.  Juice is not a fruit.  Prepare the vegetables in a healthy way - steam, bake.  Avoid batter/breading, butter/oil to cook.  2. Add more fiber to your diet.  Swap out white bread, white rice, white pasta for whole grain versions.  Reduce the portion size and frequency of carbohydrates/starches.  3. Choose healthier fats such as nuts, olive oil, avocado, etc. Stay away from lard, butter.  4. Decrease the frequency and portion size of 'junk food' -pizza, candy, cookies, potato chips, etc.  5. Watch liquid calories such as coffee drinks, juice, soda, teas.  There tends to be excessive sugar in these beverages.  6. Increase protein in your diet.  Eggs, cheese,  yogurt, nuts, lentils, chicken, fish are good healthy choices.  Protein keeps you lee longer, and you are less likely to have blood sugar spikes  7. Eat healthy at least 80% of the time.  It is ok for a special treat (Mom's spaghetti dinner, cake on your birthday) every once in a while, just not every day.  When are you going to indulge (think [place] [employer] time), be sure you are eating extra healthy the day before and after.      Resources:    1. [place] Resources for Health and Wellness:https://www.[employer].[facility]/dig-yes-foods    2.   [place] Ways To Wellness:    https://www.[facility].org/services/ways-to-wellness  Ways to Wellness offers:    Nutrition and weight management     Corrective exercise and fitness training     Lifestyle and behavioral change     Healing services  Our team includes registered dietitians, certified personal trainers, life coaches, as well as a Culinary Educator.    3. [facility] for Cardiovascular Disease Prevention  Consider making an appointment at the [facility] if either of the following describes you:    You are an adult who has risk factors for cardiovascular disease. Risk factors include cholesterol elevations, diabetes, high blood pressure, elevated weight, inactive living, and history of tobacco use.     You don t have traditional risk factors but have a strong family history of cardiovascular disease, which may mean you have a higher of risk of cardiovascular disease.     4. Atomic Habits by Isidro Pierre.  This a good book that looks into our habits and how to sustain good healthy habits and get rid of bad habits.        No follow-ups on file.    Teresita Alvarez, DO  [facility]

## 2020-08-31 ENCOUNTER — HOSPITAL ENCOUNTER (OUTPATIENT)
Dept: ULTRASOUND IMAGING | Facility: CLINIC | Age: 70
Discharge: HOME | End: 2020-08-31
Attending: FAMILY MEDICINE | Admitting: FAMILY MEDICINE
Payer: COMMERCIAL

## 2020-08-31 DIAGNOSIS — E11.8 TYPE 2 DIABETES MELLITUS WITH COMPLICATION, WITHOUT LONG-TERM CURRENT USE OF INSULIN (H): ICD-10-CM

## 2020-08-31 DIAGNOSIS — I10 ESSENTIAL (PRIMARY) HYPERTENSION: ICD-10-CM

## 2020-08-31 PROCEDURE — 76775 US EXAM ABDO BACK WALL LIM: CPT

## 2020-09-01 ENCOUNTER — TELEPHONE (OUTPATIENT)
Dept: INTERNAL MEDICINE | Facility: CLINIC | Age: 70
End: 2020-09-01

## 2020-09-01 NOTE — TELEPHONE ENCOUNTER
Reason for call:  Patient reporting a symptom    Symptom or request: Slow heart rate    Duration (how long have symptoms been present): 2 weeks    Have you been treated for this before? No    Additional comments: Heat rate is 50 beats/minute, patient has no energy. Had stroke a month ago, on medications, they slow heart rate.Sees cardiologist, has not spoken to him.    Phone Number patient can be reached at:  Home number on file [phone] (home)    Best Time:  Any    Can we leave a detailed message on this number:  YES    Call taken on 9/1/2020 at 11:42 AM by Tresa Santos

## 2020-09-01 NOTE — TELEPHONE ENCOUNTER
"Routed to provider.  MARGARITAI.    S-(situation): Pt reports low pulses in the 50's over the past week and wonders if his medications need to be adjusted?  Pt wants to be seen in clinic.  Pt describes that with the low pulses, he feels fatigued and \"a little bit light-headed\" with exerting activity.  Feels lack of energy with activity.  Pt denies shortness of breath, chest pain, chest pressure, or breathing difficulties.    B-(background): HFU with Dr Alvarez on 8/11/20 for follow up of acute ischemic VBA, carotid stenosis, and hypertension.    A-(assessment): pulses in the 50's and fatigue, slightly light-headed with activity x 1 week.    R-(recommendations): Advised that pt contact his cardiologist.  Pt refuses.  He insists that he wants to see Dr Alvarez in clinic.    He made appt tomorrow.    Emerita Bolanos RN      "

## 2020-09-02 ENCOUNTER — OFFICE VISIT (OUTPATIENT)
Dept: FAMILY MEDICINE | Facility: CLINIC | Age: 70
End: 2020-09-02
Payer: COMMERCIAL

## 2020-09-02 VITALS
DIASTOLIC BLOOD PRESSURE: 60 MMHG | RESPIRATION RATE: 16 BRPM | BODY MASS INDEX: 37.43 KG/M2 | WEIGHT: 276 LBS | TEMPERATURE: 97.8 F | SYSTOLIC BLOOD PRESSURE: 132 MMHG | HEART RATE: 52 BPM

## 2020-09-02 DIAGNOSIS — I25.10 CORONARY ARTERY DISEASE INVOLVING NATIVE CORONARY ARTERY OF NATIVE HEART WITHOUT ANGINA PECTORIS: ICD-10-CM

## 2020-09-02 DIAGNOSIS — I10 ESSENTIAL HYPERTENSION: Primary | ICD-10-CM

## 2020-09-02 DIAGNOSIS — Z23 NEED FOR PROPHYLACTIC VACCINATION AND INOCULATION AGAINST INFLUENZA: ICD-10-CM

## 2020-09-02 PROCEDURE — G0008 ADMIN INFLUENZA VIRUS VAC: HCPCS | Performed by: INTERNAL MEDICINE

## 2020-09-02 PROCEDURE — 90662 IIV NO PRSV INCREASED AG IM: CPT | Performed by: INTERNAL MEDICINE

## 2020-09-02 PROCEDURE — 99214 OFFICE O/P EST MOD 30 MIN: CPT | Mod: 25 | Performed by: INTERNAL MEDICINE

## 2020-09-02 RX ORDER — METOPROLOL TARTRATE 25 MG/1
25 TABLET, FILM COATED ORAL 2 TIMES DAILY
Qty: 180 TABLET | Refills: 3 | Status: SHIPPED | OUTPATIENT
Start: 2020-09-02 | End: 2021-07-08

## 2020-09-02 NOTE — PROGRESS NOTES
"Subjective     Francois Lujan is a 69 year old male who presents to clinic today for the following health issues:    HPI     Shingles Shot:Handled VIS.  Flu shot:Today  Hep B Series: Handled VIS.  ADV: Handled    Triaged yesterday - \"Reported low pulse in 50's over the past week, feels lack of energy/fatigue, lightheaded with exerting activity, denies chest pain, SOB\"    Pulse monitored for a full minute in the clinic today: most part 52 but some 48 heart beat was noticed.    Vascular Disease Follow-up      How often do you take nitroglycerin? Never    Do you take an aspirin every day? No     History of CABG in 2011, stroke in Aug 2020    No EtOH x 1 month.    Has more dense numbness in right upper cheek and right hand    Hypertension: checking blood pressure at home, arm cuff.  Blood pressure 120s-150s        Review of Systems   Constitutional, HEENT, cardiovascular, pulmonary, gi and gu systems are negative, except as otherwise noted.      Objective    /60   Pulse 52   Temp 97.8  F (36.6  C) (Tympanic)   Resp 16   Wt 125.2 kg (276 lb)   BMI 37.43 kg/m    Body mass index is 37.43 kg/m .  Physical Exam   GENERAL APPEARANCE: healthy, alert, no distress and over weight  RESP: lungs clear to auscultation - no rales, rhonchi or wheezes  CV: normal S1 S2, no S3 or S4, no murmur, click or rub and bradycardia          Assessment & Plan     Essential hypertension -he should stay on a beta-blocker due to his vascular disease history.  Will reduce from 50 twice daily to 25 twice daily.  If the bradycardia persists, we will have to stop the beta-blocker entirely.  He can send a [employer] message with his blood pressure and pulses if the bradycardia persists.  - metoprolol tartrate (LOPRESSOR) 25 MG tablet; Take 1 tablet (25 mg) by mouth 2 times daily    Coronary artery disease involving native coronary artery of native heart without angina pectoris  - metoprolol tartrate (LOPRESSOR) 25 MG tablet; Take 1 tablet (25 mg) " by mouth 2 times daily    Need for prophylactic vaccination and inoculation against influenza  - FLUZONE HIGH DOSE 65+  [27178]  - ADMIN INFLUENZA (For MEDICARE Patients ONLY) []       Patient Instructions   1. Decrease metoprolol from 50 mg twice daily, 25 mg twice daily.  25 mg pill is waiting at pharmacy  2. If the low pulse continues, let Dr. Alvarez know.  3. Consider occupational hand therapy      No follow-ups on file.    Teresita Alvarez, DO  [facility]

## 2020-09-02 NOTE — PATIENT INSTRUCTIONS
1. Decrease metoprolol from 50 mg twice daily, 25 mg twice daily.  25 mg pill is waiting at pharmacy  2. If the low pulse continues, let Dr. Alvarez know.  3. Consider occupational hand therapy

## 2020-11-06 ENCOUNTER — OFFICE VISIT (OUTPATIENT)
Dept: ORTHOPEDICS | Facility: CLINIC | Age: 70
End: 2020-11-06
Payer: COMMERCIAL

## 2020-11-06 ENCOUNTER — ANCILLARY PROCEDURE (OUTPATIENT)
Dept: GENERAL RADIOLOGY | Facility: CLINIC | Age: 70
End: 2020-11-06
Attending: FAMILY MEDICINE
Payer: COMMERCIAL

## 2020-11-06 VITALS — DIASTOLIC BLOOD PRESSURE: 68 MMHG | SYSTOLIC BLOOD PRESSURE: 130 MMHG

## 2020-11-06 DIAGNOSIS — S99.912A INJURY OF LEFT ANKLE, INITIAL ENCOUNTER: ICD-10-CM

## 2020-11-06 DIAGNOSIS — S99.912A INJURY OF LEFT ANKLE, INITIAL ENCOUNTER: Primary | ICD-10-CM

## 2020-11-06 PROCEDURE — 99204 OFFICE O/P NEW MOD 45 MIN: CPT | Performed by: FAMILY MEDICINE

## 2020-11-06 PROCEDURE — 73610 X-RAY EXAM OF ANKLE: CPT | Mod: LT | Performed by: RADIOLOGY

## 2020-11-06 NOTE — PROGRESS NOTES
ASSESSMENT & PLAN  Francois was seen today for pain.    Diagnoses and all orders for this visit:    Injury of left ankle, initial encounter  -     XR Ankle Left G/E 3 Views; Future      Patient is a 69 year old male presenting for  evaluation of   Chief Complaint   Patient presents with     Left Lower Leg - Pain      # Left Ant Ankle Injury:  Notable after hitting leg on a piece of steel with pain worsening over the week.  Pt has TTP over anterior ankle with fluctuant hematoma palpable.  X-rays today showing no fracture.  Pt has HO diabetic foot neuropathy likely contributing to the decrease pain vs size of and hematoma with decreased sensation stable on exam.  Counseled patient on nature of condition and treatment options.  Given this plan as below, follow-up as needed    Treatment: Activities as tolerated  Physical Therapy HEP including ROM, if not improved can refer for formal PT  Injection None  Medications  Limited NSAIDs/Tylenol    Concerning signs/sx that would warrant urgent evaluation were discussed.  All questions were answered, patient understands and agrees with plan.      Return if symptoms worsen or fail to improve.      -----    SUBJECTIVE  Francois Lujan is a/an 69 year old male who is seen as a self referral for evaluation of left ankle pain. The patient is seen by themselves.    Onset: 3 week(s) ago. Patient describes injury as hitting leg on steel.   Location of Pain: left anterior ankle   Rating of Pain at worst: severe  Rating of Pain Currently: mild   Worsened by: walking  Better with: rest, elevation   Treatments tried: Aleve  Associated symptoms: swelling  Orthopedic history: NO, CVA 8/2/20  Relevant surgical history: NO  Past Medical History:   Diagnosis Date     Arthritis      CAD (coronary artery disease)      Diabetes mellitus (H)      Erythema nodosum 1982     Gout      Hypertension      Malignant neoplasm (H)     squamous cell CA removed from right hand     Thyroid disease      Social  History     Socioeconomic History     Marital status:      Spouse name: Not on file     Number of children: Not on file     Years of education: Not on file     Highest education level: Not on file   Occupational History     Not on file   Social Needs     Financial resource strain: Not on file     Food insecurity     Worry: Not on file     Inability: Not on file     Transportation needs     Medical: Not on file     Non-medical: Not on file   Tobacco Use     Smoking status: Former Smoker     Packs/day: 2.00     Years: 30.00     Pack years: 60.00     Quit date: 1997     Years since quittin.9     Smokeless tobacco: Never Used   Substance and Sexual Activity     Alcohol use: Yes     Alcohol/week: 10.0 standard drinks     Types: 12 Cans of beer per week     Comment: occassionally      Drug use: No     Sexual activity: Yes   Lifestyle     Physical activity     Days per week: Not on file     Minutes per session: Not on file     Stress: Not on file   Relationships     Social connections     Talks on phone: Not on file     Gets together: Not on file     Attends [employer] service: Not on file     Active member of club or organization: Not on file     Attends meetings of clubs or organizations: Not on file     Relationship status: Not on file     Intimate partner violence     Fear of current or ex partner: Not on file     Emotionally abused: Not on file     Physically abused: Not on file     Forced sexual activity: Not on file   Other Topics Concern     Parent/sibling w/ CABG, MI or angioplasty before 65F 55M? 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  No family history pertinent to the patient's problem today    REVIEW OF SYSTEMS:  10 point ROS is negative other than symptoms noted above in HPI, Past Medical History or as stated below  Constitutional: NEGATIVE for fever, chills, change in weight  Skin: NEGATIVE for worrisome rashes, moles or lesions  GI/: NEGATIVE for bowel or bladder changes  Neuro: NEGATIVE for weakness, dizziness or paresthesias    OBJECTIVE:  /68    General: healthy, alert and in no distress  HEENT: no scleral icterus or conjunctival erythema  Skin: no suspicious lesions or rash. No jaundice.  CV:  no pedal edema  Resp: normal respiratory effort without conversational dyspnea   Psych: normal mood and affect  Gait: normal steady gait with appropriate coordination and balance  Neuro: Decreased sensation in feet 2/2 known diabetic neuropathy  MSK:  LEFT ANKLE  Inspection:  Ant ankle swelling  Palpation:    Mild TTP over ant ankle  Range of Motion:     Plantarflexion limited slightly by pain / dorsiflexion limited slightly by pain / inversion limited slightly by pain / eversion limited slightly by pain  Strength:    full  Special Tests:    negative anterior drawer, negative talar tilt, negative valgus stress, negative forced external rotation/eversion, negative Ram sign, negative squeeze test.      LEFT FOOT  Inspection:  Mild dorsal ankle swelling  Palpation:   Non-tender.  Range of Motion:     Grossly intact and non-painful  Strength:    Grossly intact in all planes  Special Tests:    Positive: None    Negative: anterior drawer, calcaneal squeeze and Lisfranc joint tenderness    Independent visualization of the below image:  No results found for this or any previous visit (from the past 24 hour(s)).  Examination:  XR ANKLE LT G/E 3 VW     Date:  11/6/2020 1:58  PM      Clinical Information: Injury of left ankle, initial encounter.      Comparison: none.                                                                      Impression:     1.  Marked circumferential soft tissue swelling throughout the ankle.     2.  Negative for fracture or joint malalignment. No osseous lesion or  erosion.     3.  Mild degenerative arthrosis in the ankle, with tiny marginal  osteophytes. Hypertrophic changes in the midfoot.      SHEN GARNER MD     I  ordered, visualized and reviewed these images with the patient  No fracture, ant ankle soft tissue swelling, mild arthritis     Travis Mojica MD [facility]

## 2020-11-06 NOTE — PATIENT INSTRUCTIONS
Diagnosis: Left ankle injury likely bony contusion with hematoma  Image Findings: no fracture on x-ray likely mild arthritis   Treatment: home exercises  Medications: Limited tylenol/ibuprofen for pain for 1-2 weeks  Follow-up: As needed    Please call [phone]   Ask for my team if you have any questions or concerns    It was great seeing you today!    Travis Mojica MD, CA[facility]. [facility]

## 2020-11-06 NOTE — LETTER
11/6/2020         RE: Francois Lujan  [address]  [place] [address]        Dear Colleague,    Thank you for referring your patient, Francois Lujan, to the [facility] [place]. Please see a copy of my visit note below.    ASSESSMENT & PLAN  Francois was seen today for pain.    Diagnoses and all orders for this visit:    Injury of left ankle, initial encounter  -     XR Ankle Left G/E 3 Views; Future      Patient is a 69 year old male presenting for  evaluation of   Chief Complaint   Patient presents with     Left Lower Leg - Pain      # Left Ant Ankle Injury:  Notable after hitting leg on a piece of steel with pain worsening over the week.  Pt has TTP over anterior ankle with fluctuant hematoma palpable.  X-rays today showing no fracture.  Pt has HO diabetic foot neuropathy likely contributing to the decrease pain vs size of and hematoma with decreased sensation stable on exam.  Counseled patient on nature of condition and treatment options.  Given this plan as below, follow-up as needed    Treatment: Activities as tolerated  Physical Therapy HEP including ROM, if not improved can refer for formal PT  Injection None  Medications  Limited NSAIDs/Tylenol    Concerning signs/sx that would warrant urgent evaluation were discussed.  All questions were answered, patient understands and agrees with plan.      Return if symptoms worsen or fail to improve.      -----    SUBJECTIVE  Francois Lujan is a/an 69 year old male who is seen as a self referral for evaluation of left ankle pain. The patient is seen by themselves.    Onset: 3 week(s) ago. Patient describes injury as hitting leg on steel.   Location of Pain: left anterior ankle   Rating of Pain at worst: severe  Rating of Pain Currently: mild   Worsened by: walking  Better with: rest, elevation   Treatments tried: Aleve  Associated symptoms: swelling  Orthopedic history: NO, CVA 8/2/20  Relevant surgical history: NO  Past  Medical History:   Diagnosis Date     Arthritis      CAD (coronary artery disease)      Diabetes mellitus (H)      Erythema nodosum 1982     Gout      Hypertension      Malignant neoplasm (H)     squamous cell CA removed from right hand     Thyroid disease      Social History     Socioeconomic History     Marital status:      Spouse name: Not on file     Number of children: Not on file     Years of education: Not on file     Highest education level: Not on file   Occupational History     Not on file   Social Needs     Financial resource strain: Not on file     Food insecurity     Worry: Not on file     Inability: Not on file     Transportation needs     Medical: Not on file     Non-medical: Not on file   Tobacco Use     Smoking status: Former Smoker     Packs/day: 2.00     Years: 30.00     Pack years: 60.00     Quit date: 1997     Years since quittin.9     Smokeless tobacco: Never Used   Substance and Sexual Activity     Alcohol use: Yes     Alcohol/week: 10.0 standard drinks     Types: 12 Cans of beer per week     Comment: occassionally      Drug use: No     Sexual activity: Yes   Lifestyle     Physical activity     Days per week: Not on file     Minutes per session: Not on file     Stress: Not on file   Relationships     Social connections     Talks on phone: Not on file     Gets together: Not on file     Attends Hindu service: Not on file     Active member of club or organization: Not on file     Attends meetings of clubs or organizations: Not on file     Relationship status: Not on file     Intimate partner violence     Fear of current or ex partner: Not on file     Emotionally abused: Not on file     Physically abused: Not on file     Forced sexual activity: Not on file   Other Topics Concern     Parent/sibling w/ CABG, MI or angioplasty before 65F 55M? 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  No family history pertinent to the patient's problem today    REVIEW OF SYSTEMS:  10 point ROS is negative other than symptoms noted above in HPI, Past Medical History or as stated below  Constitutional: NEGATIVE for fever, chills, change in weight  Skin: NEGATIVE for worrisome rashes, moles or lesions  GI/: NEGATIVE for bowel or bladder changes  Neuro: NEGATIVE for weakness, dizziness or paresthesias    OBJECTIVE:  /68    General: healthy, alert and in no distress  HEENT: no scleral icterus or conjunctival erythema  Skin: no suspicious lesions or rash. No jaundice.  CV:  no pedal edema  Resp: normal respiratory effort without conversational dyspnea   Psych: normal mood and affect  Gait: normal steady gait with appropriate coordination and balance  Neuro: Decreased sensation in feet 2/2 known diabetic neuropathy  MSK:  LEFT ANKLE  Inspection:  Ant ankle swelling  Palpation:    Mild TTP over ant ankle  Range of Motion:     Plantarflexion limited slightly by pain / dorsiflexion limited slightly by pain / inversion limited slightly by pain / eversion limited slightly by pain  Strength:    full  Special Tests:    negative anterior drawer, negative talar tilt, negative valgus stress, negative forced external rotation/eversion, negative Ram sign, negative squeeze test.      LEFT FOOT  Inspection:  Mild dorsal ankle swelling  Palpation:   Non-tender.  Range of Motion:     Grossly intact and non-painful  Strength:    Grossly intact in all planes  Special Tests:    Positive:  None    Negative: anterior drawer, calcaneal squeeze and Lisfranc joint tenderness    Independent visualization of the below image:  No results found for this or any previous visit (from the past 24 hour(s)).  Examination:  XR ANKLE LT G/E 3 VW     Date:  11/6/2020 1:58 PM      Clinical Information: Injury of left ankle, initial encounter.      Comparison: none.                                                                      Impression:     1.  Marked circumferential soft tissue swelling throughout the ankle.     2.  Negative for fracture or joint malalignment. No osseous lesion or  erosion.     3.  Mild degenerative arthrosis in the ankle, with tiny marginal  osteophytes. Hypertrophic changes in the midfoot.      SHEN GARNER MD     I  ordered, visualized and reviewed these images with the patient  No fracture, ant ankle soft tissue swelling, mild arthritis     Travis Mojica MD [facility]        Again, thank you for allowing me to participate in the care of your patient.        Sincerely,        Travis Mojica MD

## 2020-11-17 ENCOUNTER — TELEPHONE (OUTPATIENT)
Dept: UROLOGY | Facility: CLINIC | Age: 70
End: 2020-11-17

## 2020-11-17 DIAGNOSIS — E29.1 HYPOGONADISM MALE: ICD-10-CM

## 2020-11-17 RX ORDER — TESTOSTERONE GEL, 1% 10 MG/G
50 GEL TRANSDERMAL DAILY
Qty: 90 PACKET | Refills: 3 | Status: CANCELLED | OUTPATIENT
Start: 2020-11-17

## 2020-11-17 NOTE — TELEPHONE ENCOUNTER
Called and confirmed pt has enough left to last until appt can be seen.    Pt scheduled.  Trudy SR   [facility]

## 2020-11-17 NOTE — TELEPHONE ENCOUNTER
Reason for Call:  Other prescription    Detailed comments: pt calling requesting refill of Testosterone Gel.    Pharmacy [facility]    Phone Number Patient can be reached at: Home number on file [phone] (home)    Best Time:     Can we leave a detailed message on this number? Yes    Call taken on 11/17/2020 at 8:49 AM by Janice Vogt

## 2020-11-30 ENCOUNTER — VIRTUAL VISIT (OUTPATIENT)
Dept: UROLOGY | Facility: CLINIC | Age: 70
End: 2020-11-30
Payer: COMMERCIAL

## 2020-11-30 DIAGNOSIS — E29.1 HYPOGONADISM MALE: Primary | ICD-10-CM

## 2020-11-30 PROCEDURE — 99212 OFFICE O/P EST SF 10 MIN: CPT | Mod: 95 | Performed by: UROLOGY

## 2020-11-30 RX ORDER — TESTOSTERONE GEL, 1% 10 MG/G
50 GEL TRANSDERMAL DAILY
Qty: 90 PACKET | Refills: 1 | Status: SHIPPED | OUTPATIENT
Start: 2020-11-30 | End: 2021-07-08

## 2020-11-30 NOTE — PROGRESS NOTES
Telephone visit    70-year-old male using AndroGel for hypogonadism.    He currently uses 50 mg packet daily.    Prescriptions are limited to 6 months and therefore he is calling for refill even though a 1 year supply was ordered in July of this year.    He also suffered a left thalamic stroke stroke recently.  He reports that he is recovering fairly well with only minimal permanent residuals.    He does not report any significant change in his voiding habits.  Specifically he is not experiencing urgency or urge incontinence.    Impression: History of hypogonadism managed with low-dose of AndroGel and a recent cerebrovascular accident have no apparent impact on his voiding habits.    Plan: I will set up a phone visit for 6 months from now as I plan to defer any lab tests until after the current Covid 19 issues have resolved.    Total time 10 minutes

## 2021-02-20 ENCOUNTER — HEALTH MAINTENANCE LETTER (OUTPATIENT)
Age: 71
End: 2021-02-20

## 2021-03-28 ENCOUNTER — HOSPITAL ENCOUNTER (EMERGENCY)
Facility: CLINIC | Age: 71
Discharge: HOME | End: 2021-03-28
Attending: NURSE PRACTITIONER | Admitting: NURSE PRACTITIONER
Payer: COMMERCIAL

## 2021-03-28 VITALS
HEIGHT: 72 IN | HEART RATE: 69 BPM | OXYGEN SATURATION: 100 % | TEMPERATURE: 97.8 F | SYSTOLIC BLOOD PRESSURE: 165 MMHG | RESPIRATION RATE: 18 BRPM | DIASTOLIC BLOOD PRESSURE: 61 MMHG | BODY MASS INDEX: 33.86 KG/M2 | WEIGHT: 250 LBS

## 2021-03-28 DIAGNOSIS — S91.339A PENETRATING FOOT WOUND: ICD-10-CM

## 2021-03-28 PROCEDURE — 99213 OFFICE O/P EST LOW 20 MIN: CPT | Performed by: NURSE PRACTITIONER

## 2021-03-28 PROCEDURE — G0463 HOSPITAL OUTPT CLINIC VISIT: HCPCS | Performed by: NURSE PRACTITIONER

## 2021-03-28 RX ORDER — CEPHALEXIN 500 MG/1
500 CAPSULE ORAL 4 TIMES DAILY
Qty: 40 CAPSULE | Refills: 0 | Status: SHIPPED | OUTPATIENT
Start: 2021-03-28 | End: 2021-04-07

## 2021-03-28 ASSESSMENT — ENCOUNTER SYMPTOMS: WOUND: 1

## 2021-03-28 ASSESSMENT — MIFFLIN-ST. JEOR: SCORE: 1931.99

## 2021-03-28 NOTE — ED PROVIDER NOTES
History     Chief Complaint   Patient presents with     Puncture Wound     multiple wound to sole of both feet, jumped on carpet strip of nails. pt is diabetic     HPI  Francois Lujan is a 70 year old male who presents the urgent care for evaluation of foot injury.  Prior to arrival patient was doing housework and stepped with both feet onto carpet nail strip.  Patient has advanced bilateral neuropathy and was unaware of the injury until a friend pointed this out and helped remove the nail strip.  Continues to not have complaints of pain but again has advanced neuropathy.  Presents for evaluation due to concern since he is diabetic.  No other complaints. Tetanus in 2012.     Allergies:  Allergies   Allergen Reactions     Hydrocodone-Acetaminophen Itching     Iodine      Irbesartan      renal insuff and hyperkalemia       Lisinopril Nausea     Sulfa Drugs      Valsartan Difficulty breathing       Problem List:    Patient Active Problem List    Diagnosis Date Noted     Carotid stenosis, right 08/11/2020     Priority: Medium     Acute ischemic VBA thalamic stroke, left (H) 08/02/2020     Priority: Medium     8/2020.  Right face numbness and slight facial droop       Diabetic ulcer of other part of right foot associated with type 2 diabetes mellitus, unspecified ulcer stage (H) 06/08/2020     Priority: Medium     Obesity (BMI 35.0-39.9) with comorbidity (H) 11/20/2018     Priority: Medium     Benign neoplasm of colon 11/19/2018     Priority: Medium     Overview:   Created by Conversion       Chronic gouty arthropathy 11/19/2018     Priority: Medium     Degeneration of lumbar or lumbosacral intervertebral disc 11/19/2018     Priority: Medium     Vitamin D deficiency 11/19/2018     Priority: Medium     Epistaxis 12/15/2017     Priority: Medium     Triceps tendon rupture, left, sequela 04/10/2017     Priority: Medium     Coronary artery disease involving native coronary artery of native heart without angina pectoris  03/03/2017     Priority: Medium     Alcohol dependence with other alcohol-induced disorder (H) 08/30/2016     Priority: Medium     Diabetic polyneuropathy associated with type 2 diabetes mellitus (H) 04/12/2016     Priority: Medium     CKD (chronic kidney disease) stage 2, GFR 60-89 ml/min 10/23/2015     Priority: Medium     Proteinuria 10/23/2015     Priority: Medium     Type 2 diabetes mellitus with microalbuminuria, without long-term current use of insulin (H) 10/23/2015     Priority: Medium     On valsartan        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     Priority: Medium     OA (osteoarthritis) of knee, right 04/29/2014     Priority: Medium     ACL (anterior cruciate ligament) tear 04/29/2014     Priority: Medium     posttraumatic arthrosis lateral compartment       Multiple joint pain 04/23/2014     Priority: Medium     Elevated sed rate 03/03/2014     Priority: Medium     Elevated C-reactive protein (CRP) 03/03/2014     Priority: Medium     History of back surgery 03/03/2014     Priority: Medium     Polymyalgia rheumatica (H) 03/03/2014     Priority: Medium     Moderate major depression (H) 02/11/2014     Priority: Medium     Radicular pain of lumbosacral region 12/31/2013     Priority: Medium     Numbness of hand, right 12/12/2013     Priority: Medium     Essential hypertension 09/17/2013     Priority: Medium     ADELA (obstructive sleep apnea) 07/11/2013     Priority: Medium     Severe ADELA (7/10/2013 with AHI ~70, sherin desat 86%, insufficient time for CPAP titration)       Hyperlipidemia LDL goal <70 05/28/2013     Priority: Medium     S/P CABG (coronary artery bypass graft) 11/29/2012     Priority: Medium     11/17/11  PREOPERATIVE DIAGNOSIS:  Severe 3-vessel coronary artery disease with unstable angina.        POSTOPERATIVE DIAGNOSIS:  Severe 3-vessel coronary artery disease with unstable angina.        PROCEDURES:    1.  Coronary artery bypass grafting x3 with placement of left internal mammary artery to the  left anterior descending artery, saphenous vein graft from the aorta to the third obtuse marginal branch of the circumflex coronary artery and saphenous vein graft from aorta to the distal right coronary artery.    2.  Placement of temporary ventricular pacing wires.    3.  Endoscopic vein harvest from the left leg.        Hypogonadotropic hypogonadism (H) 2012     Priority: Medium     Acquired hypothyroidism 2012     Priority: Medium     Hard of hearing 2012     Priority: Medium        Past Medical History:    Past Medical History:   Diagnosis Date     Arthritis      CAD (coronary artery disease)      Diabetes mellitus (H)      Erythema nodosum 1982     Gout      Hypertension      Malignant neoplasm (H)      Thyroid disease        Past Surgical History:    Past Surgical History:   Procedure Laterality Date     BACK SURGERY       BYPASS GRAFT ARTERY CORONARY  2011    Procedure:BYPASS GRAFT ARTERY CORONARY; CORONARY ARTERY BYPASS, Right, OM, LAD WITH ENDOVEIN HARVEST, ON PUMP; Surgeon:LEONEL MG; Location: OR     COLONOSCOPY N/A 2016    Procedure: COLONOSCOPY;  Surgeon: Isidro Humphreys MD;  Location: [facility]     HERNIA REPAIR      infantile hernia repair     ORTHOPEDIC SURGERY      Baker cyst removed - right knee, and mesiscus tear repair     REPAIR TENDON TRICEPS UPPER EXTREMITY Left 2017    Procedure: REPAIR TENDON TRICEPS UPPER EXTREMITY;  Surgeon: Rodriguez Kelley MD;  Location: [facility]       Family History:    Family History   Problem Relation Age of Onset     Heart Disease Father      Cancer Mother      C.A.D. Brother      Septicemia Brother 3     Lung Cancer Sister      Schizophrenia Sister        Social History:  Marital Status:   [2]  Social History     Tobacco Use     Smoking status: Former Smoker     Packs/day: 2.00     Years: 30.00     Pack years: 60.00     Quit date: 1997     Years since quittin.3     Smokeless tobacco: Never Used   Substance Use  Topics     Alcohol use: Yes     Alcohol/week: 10.0 standard drinks     Types: 12 Cans of beer per week     Comment: occassionally      Drug use: No        Medications:    cephALEXin (KEFLEX) 500 MG capsule  amLODIPine (NORVASC) 10 MG tablet  atorvastatin (LIPITOR) 80 MG tablet  blood glucose (ACCU-CHEK SMARTVIEW) test strip  blood glucose (NO BRAND SPECIFIED) lancets standard  clopidogrel (PLAVIX) 75 MG tablet  glipiZIDE (GLUCOTROL XL) 10 MG 24 hr tablet  levothyroxine (SYNTHROID) 50 MCG tablet  metFORMIN (GLUCOPHAGE) 500 MG tablet  metoprolol tartrate (LOPRESSOR) 25 MG tablet  testosterone (ANDROGEL) 50 MG/5GM (1%) topical gel  valsartan (DIOVAN) 160 MG tablet      Review of Systems   Skin: Positive for wound.   All other systems reviewed and are negative.    Physical Exam   BP: (!) 165/61  Pulse: 69  Temp: 97.8  F (36.6  C)  Resp: 18  Height: 182.9 cm (6')  Weight: 113.4 kg (250 lb)  SpO2: 100 %    Physical Exam  Constitutional:       General: He is not in acute distress.     Appearance: Normal appearance.   Cardiovascular:      Rate and Rhythm: Normal rate.   Pulmonary:      Effort: Pulmonary effort is normal.   Musculoskeletal: Normal range of motion.   Skin:     General: Skin is warm.      Capillary Refill: Capillary refill takes less than 2 seconds.      Comments: Two small puncture wounds to the sole of the right foot. No obvious punctures to the left foot. Pulses and perfusion are equal bilaterally. No overlying erythema, edema, or ecchymosis. No sign of infection.      Neurological:      General: No focal deficit present.      Mental Status: He is alert.       ED Course        Procedures    No results found for this or any previous visit (from the past 24 hour(s)).    Medications - No data to display    Assessments & Plan (with Medical Decision Making)   Francois Lujan is a 70 year old male who presents the urgent care for evaluation of foot injury.  Prior to arrival patient was doing housework and  stepped with both feet onto carpet nail strip.  Physical exam as above, no current concerns of infection.  Will cover with Keflex given diabetic status.  Educated on home wound care and reasons to seek reevaluation.  Patient is agreeable to plan of care and comfortable discharge.  Patient discharged in good condition and without difficulty ambulating.    I have reviewed the nursing notes.    I have reviewed the findings, diagnosis, plan and need for follow up with the patient.  Discharge Medication List as of 3/28/2021  2:19 PM      START taking these medications    Details   cephALEXin (KEFLEX) 500 MG capsule Take 1 capsule (500 mg) by mouth 4 times daily for 10 days, Disp-40 capsule, R-0, E-Prescribe           Final diagnoses:   Penetrating foot wound     3/28/2021   [facility] EMERGENCY DEPT     Minda Chapa, APRN CNP  03/28/21 [id number]

## 2021-04-08 ENCOUNTER — OFFICE VISIT (OUTPATIENT)
Dept: FAMILY MEDICINE | Facility: CLINIC | Age: 71
End: 2021-04-08
Payer: COMMERCIAL

## 2021-04-08 VITALS
HEART RATE: 58 BPM | SYSTOLIC BLOOD PRESSURE: 144 MMHG | DIASTOLIC BLOOD PRESSURE: 62 MMHG | RESPIRATION RATE: 16 BRPM | BODY MASS INDEX: 35.8 KG/M2 | OXYGEN SATURATION: 97 % | TEMPERATURE: 99.3 F | WEIGHT: 264 LBS

## 2021-04-08 DIAGNOSIS — F32.1 MODERATE MAJOR DEPRESSION (H): ICD-10-CM

## 2021-04-08 DIAGNOSIS — I10 HTN, GOAL BELOW 140/90: ICD-10-CM

## 2021-04-08 DIAGNOSIS — M35.3 POLYMYALGIA RHEUMATICA (H): ICD-10-CM

## 2021-04-08 DIAGNOSIS — E11.8 TYPE 2 DIABETES MELLITUS WITH COMPLICATION, WITHOUT LONG-TERM CURRENT USE OF INSULIN (H): Primary | ICD-10-CM

## 2021-04-08 DIAGNOSIS — B35.1 ONYCHOMYCOSIS: ICD-10-CM

## 2021-04-08 DIAGNOSIS — E23.0 HYPOGONADOTROPIC HYPOGONADISM (H): ICD-10-CM

## 2021-04-08 DIAGNOSIS — L97.519 DIABETIC ULCER OF OTHER PART OF RIGHT FOOT ASSOCIATED WITH TYPE 2 DIABETES MELLITUS, UNSPECIFIED ULCER STAGE (H): ICD-10-CM

## 2021-04-08 DIAGNOSIS — E66.01 MORBID OBESITY (H): ICD-10-CM

## 2021-04-08 DIAGNOSIS — M1A.09X0 IDIOPATHIC CHRONIC GOUT OF MULTIPLE SITES WITHOUT TOPHUS: ICD-10-CM

## 2021-04-08 DIAGNOSIS — F10.288 ALCOHOL DEPENDENCE WITH OTHER ALCOHOL-INDUCED DISORDER (H): ICD-10-CM

## 2021-04-08 DIAGNOSIS — E11.621 DIABETIC ULCER OF OTHER PART OF RIGHT FOOT ASSOCIATED WITH TYPE 2 DIABETES MELLITUS, UNSPECIFIED ULCER STAGE (H): ICD-10-CM

## 2021-04-08 LAB — HBA1C MFR BLD: 8.2 % (ref 0–5.6)

## 2021-04-08 PROCEDURE — 83036 HEMOGLOBIN GLYCOSYLATED A1C: CPT | Performed by: INTERNAL MEDICINE

## 2021-04-08 PROCEDURE — 99214 OFFICE O/P EST MOD 30 MIN: CPT | Performed by: INTERNAL MEDICINE

## 2021-04-08 PROCEDURE — 36415 COLL VENOUS BLD VENIPUNCTURE: CPT | Performed by: INTERNAL MEDICINE

## 2021-04-08 RX ORDER — TERBINAFINE HYDROCHLORIDE 250 MG/1
250 TABLET ORAL DAILY
Qty: 90 TABLET | Refills: 0 | Status: SHIPPED | OUTPATIENT
Start: 2021-04-08 | End: 2021-04-08

## 2021-04-08 RX ORDER — GLIPIZIDE 10 MG/1
10 TABLET, FILM COATED, EXTENDED RELEASE ORAL
Qty: 90 TABLET | Refills: 3 | Status: SHIPPED | OUTPATIENT
Start: 2021-04-08 | End: 2021-04-08

## 2021-04-08 RX ORDER — TERBINAFINE HYDROCHLORIDE 250 MG/1
250 TABLET ORAL DAILY
Qty: 90 TABLET | Refills: 0 | Status: SHIPPED | OUTPATIENT
Start: 2021-04-08 | End: 2021-07-08

## 2021-04-08 RX ORDER — GLIPIZIDE 10 MG/1
10 TABLET, FILM COATED, EXTENDED RELEASE ORAL
Qty: 90 TABLET | Refills: 3 | Status: SHIPPED | OUTPATIENT
Start: 2021-04-08 | End: 2021-06-01

## 2021-04-08 RX ORDER — ORAL SEMAGLUTIDE 3 MG/1
3 TABLET ORAL DAILY
Qty: 90 TABLET | Refills: 3 | Status: SHIPPED | OUTPATIENT
Start: 2021-04-08 | End: 2021-04-09

## 2021-04-08 RX ORDER — ALLOPURINOL 100 MG/1
100 TABLET ORAL DAILY
Qty: 90 TABLET | Refills: 3 | Status: SHIPPED | OUTPATIENT
Start: 2021-04-08 | End: 2021-07-08

## 2021-04-08 RX ORDER — ORAL SEMAGLUTIDE 3 MG/1
3 TABLET ORAL DAILY
Qty: 90 TABLET | Refills: 3 | Status: SHIPPED | OUTPATIENT
Start: 2021-04-08 | End: 2021-04-08

## 2021-04-08 ASSESSMENT — ANXIETY QUESTIONNAIRES
GAD7 TOTAL SCORE: 0
7. FEELING AFRAID AS IF SOMETHING AWFUL MIGHT HAPPEN: NOT AT ALL
2. NOT BEING ABLE TO STOP OR CONTROL WORRYING: NOT AT ALL
6. BECOMING EASILY ANNOYED OR IRRITABLE: NOT AT ALL
3. WORRYING TOO MUCH ABOUT DIFFERENT THINGS: NOT AT ALL
IF YOU CHECKED OFF ANY PROBLEMS ON THIS QUESTIONNAIRE, HOW DIFFICULT HAVE THESE PROBLEMS MADE IT FOR YOU TO DO YOUR WORK, TAKE CARE OF THINGS AT HOME, OR GET ALONG WITH OTHER PEOPLE: NOT DIFFICULT AT ALL
5. BEING SO RESTLESS THAT IT IS HARD TO SIT STILL: NOT AT ALL
1. FEELING NERVOUS, ANXIOUS, OR ON EDGE: NOT AT ALL

## 2021-04-08 ASSESSMENT — PATIENT HEALTH QUESTIONNAIRE - PHQ9
5. POOR APPETITE OR OVEREATING: NOT AT ALL
SUM OF ALL RESPONSES TO PHQ QUESTIONS 1-9: 0

## 2021-04-08 NOTE — PROGRESS NOTES
Assessment & Plan     Type 2 diabetes mellitus with complication, without long-term current use of insulin (H) not well controlled.  Start Rybelsus as patient prefers to avoid injections.  Decrease glipizide from 20 to 10 mg.  If the Rybelsus is expensive, would choose-Jardiance given his cardiovascular risk factors and now that he is not using alcohol  - **A1C FUTURE 3mo; Future  - Semaglutide (RYBELSUS) 3 MG TABS; Take 3 mg by mouth daily  - glipiZIDE (GLUCOTROL XL) 10 MG 24 hr tablet; Take 1 tablet (10 mg) by mouth daily (with breakfast)    HTN, goal below 140/90 -not well controlled.  Patient reports better controlled at home and declines to change medications at this time.  If blood pressure continues to be elevated, would increase valsartan to 320 mg    Alcohol dependence with other alcohol-induced disorder (H) -significantly cut back.  Now using 2-3 drinks 1 day/week    Idiopathic chronic gout of multiple sites without tophus -patient reports half a dozen or more episodes of gout per year.  It is bothersome.  He has been on allopurinol in the past.  Discussed that elevated uric acid is a cardiovascular risk.  He is agreeable to restarting the allopurinol  - **Uric acid FUTURE 2mo; Future  - allopurinol (ZYLOPRIM) 100 MG tablet; Take 1 tablet (100 mg) by mouth daily    Onychomycosis -patient would like to start treatment.  - terbinafine (LAMISIL) 250 MG tablet; Take 1 tablet (250 mg) by mouth daily    Diabetic ulcer of other part of right foot associated with type 2 diabetes mellitus, unspecified ulcer stage (H) -no ulcer currently.  Known issue that I take into account for their medical decisions, no current exacerbations or new concerns      Polymyalgia rheumatica (H) Known issue that I take into account for their medical decisions, no current exacerbations or new concerns      Moderate major depression (H) Known issue that I take into account for their medical decisions, no current exacerbations or new  concerns      Morbid obesity (H) -the Rybelsus should help with weight loss.  He is working on diet and exercise and has already lost some weight    Hypogonadotropic hypogonadism (H) -follows with urology        Patient Instructions   Stroke:  1. Need follow-up ultrasound in Aug 2021    Diabetes:   1. Not well controlled  2. Start Rybelsus 3 mg once daily  3. Decrease Glipizide from 20 mg daily to 10 mg daily.  Risk of low blood sugar with Rybelsus and Glipizide.  4. If Rybelsus is too expensive, review other drugs as below, Dr. Alvarez can Rx for an alternative - Januvia or Jardiance    Hypertension:   1. Continue metoprolol  2. If blood pressure remains elevated, we should increase Valsartan to 320 mg daily.  Goal blood pressure is consistently < 140 due to history of stroke    GOUT  1. Resume allopurinol  2. We will check uric acid levels next visit.    Toenail fungus  1. Take lamisil x 3 months        --Besides Metformin and glipizide, most other medications used to treat diabetes are often expensive    GLP1  Dulaglutide (Trulicity), taken by injection weekly  Exenatide extended release (Bydureon), taken by injection weekly  Exenatide (Byetta), taken by injection twice daily  Semaglutide (Ozempic), taken by injection weekly  Semaglutide (Rybelsus), taken by mouth once daily   Liraglutide (Victoza), taken by injection daily  Lixisenatide (Adlyxin), taken by injection daily  --This drug class is preferred due to improved blood sugar control, fewest side effects, weight loss as a desirable side effect    Dipeptidyl-peptidase 4 (DPP-4) inhibitors  Saxagliptin (Onglyza)  Sitagliptin (Januvia)  Linagliptin (Tradjenta)  Alogliptin  --This drug class is preferred for people who want to avoid injections.  It is not as effective as GLP-1      Sodium-glucose transporter 2 (SGLT2) inhibitors  Canagliflozin (Invokana)  Dapagliflozin (Farxiga)  Empagliflozin (Jardiance)  --This drug class is very effective and is preferred for  people want to avoid injections.  However it often has many side effects    Prescription Insulin  Long acting insulin  Glargine (Lantus, Basaglar)  Detemir (Levemir)  Glargine concentrated (Toujeo)  Degludec (Tresiba)  Other insulins that are intermediate acting or short acting      Over the counter Insulin  Relion brand available at [facility].  Various forms        No follow-ups on file.    Teresita Alvarez, DO  [facility]ARMEN Parrish is a 70 year old who presents for the following health issues     HPI       PHQ9/GAD7: handle  Shingles: Check insurance      ED/UC Followup:    Facility:  [facility] ED  Date of visit: 3/28/21  Reason for visit: Penetrating foot Wound  Current Status: Its better - resolved  --he was only dispensed 8 instead of 10 days of antibiotic.  He finished the 8 days  --no longer has pain.  No erythema or discharge         Diabetes Follow-up    How often are you checking your blood sugar? A few times a week  What time of day are you checking your blood sugars (select all that apply)?  Before meals  Have you had any blood sugars above 200?  Yes highest 200  Have you had any blood sugars below 70?  No lowest 105    What symptoms do you notice when your blood sugar is low?  Shaky and Weak    What concerns do you have today about your diabetes? Blood sugar is often over 200     Do you have any of these symptoms? (Select all that apply)  Redness, sores, or blisters on feet    Have you had a diabetic eye exam in the last 12 months? No     Glipizide 20 mg AM, metformin 1000 mg BID    Has nearly quit alcohol (2-3 drinks on a Friday) and has noted blood sugar is running higher.  He has been watching diet and has lost wt.                Hyperlipidemia Follow-Up      Are you regularly taking any medication or supplement to lower your cholesterol?   Yes- atorvastatin 80 mg    Are you having muscle aches or other side effects that you think could be caused  by your cholesterol lowering medication?  No     lipitor 80 mg    Hypertension Follow-up      Do you check your blood pressure regularly outside of the clinic? Yes     Are you following a low salt diet? Yes    Are your blood pressures ever more than 140 on the top number (systolic) OR more   than 90 on the bottom number (diastolic), for example 140/90? No   --Metoprolol 25 BID, valsartan 160 daily  --had bradycardia at higher dose of metoprolol    BP Readings from Last 2 Encounters:   04/08/21 (!) 162/70   03/28/21 (!) 165/61     Hemoglobin A1C (%)   Date Value   04/08/2021 8.2 (H)   08/02/2020 7.7 (H)     LDL Cholesterol Calculated (mg/dL)   Date Value   08/03/2020 93   07/28/2020 103 (H)       Right hand numbness: ongoing problem. Decreased dexterity. Did see Neurology after stroke who stated deficits were likely to be permanent     Nail fungus: right great toe.  Wonders about treatment.     Gout: long standing problem.  Previously on allopurinol (not on [facility] med list), stopped for unclear reasons.  Diet can bring on a flare.  --gets more than 6-7 flares per year.    Review of Systems   Constitutional, HEENT, cardiovascular, pulmonary, gi and gu systems are negative, except as otherwise noted.      Objective    BP (!) 162/70   Pulse 58   Temp 99.3  F (37.4  C) (Tympanic)   Resp 16   Wt 119.7 kg (264 lb)   SpO2 97%   BMI 35.80 kg/m    Body mass index is 35.8 kg/m .  Physical Exam   GENERAL APPEARANCE: alert, no distress and over weight  DIABETIC FOOT EXAM: DP absent bilateral, reduced sensation at 2/10 spots bilateral, onychomycosis and callus of left 4th toe    Results for orders placed or performed in visit on 04/08/21 (from the past 24 hour(s))   **A1C FUTURE anytime   Result Value Ref Range    Hemoglobin A1C 8.2 (H) 0 - 5.6 %

## 2021-04-08 NOTE — PATIENT INSTRUCTIONS
Stroke:  1. Need follow-up ultrasound in Aug 2021    Diabetes:   1. Not well controlled  2. Start Rybelsus 3 mg once daily  3. Decrease Glipizide from 20 mg daily to 10 mg daily.  Risk of low blood sugar with Rybelsus and Glipizide.  4. If Rybelsus is too expensive, review other drugs as below, Dr. Alvarez can Rx for an alternative - Januvia or Jardiance    Hypertension:   1. Continue metoprolol  2. If blood pressure remains elevated, we should increase Valsartan to 320 mg daily.  Goal blood pressure is consistently < 140 due to history of stroke    GOUT  1. Resume allopurinol  2. We will check uric acid levels next visit.    Toenail fungus  1. Take lamisil x 3 months        --Besides Metformin and glipizide, most other medications used to treat diabetes are often expensive    GLP1  Dulaglutide (Trulicity), taken by injection weekly  Exenatide extended release (Bydureon), taken by injection weekly  Exenatide (Byetta), taken by injection twice daily  Semaglutide (Ozempic), taken by injection weekly  Semaglutide (Rybelsus), taken by mouth once daily   Liraglutide (Victoza), taken by injection daily  Lixisenatide (Adlyxin), taken by injection daily  --This drug class is preferred due to improved blood sugar control, fewest side effects, weight loss as a desirable side effect    Dipeptidyl-peptidase 4 (DPP-4) inhibitors  Saxagliptin (Onglyza)  Sitagliptin (Januvia)  Linagliptin (Tradjenta)  Alogliptin  --This drug class is preferred for people who want to avoid injections.  It is not as effective as GLP-1      Sodium-glucose transporter 2 (SGLT2) inhibitors  Canagliflozin (Invokana)  Dapagliflozin (Farxiga)  Empagliflozin (Jardiance)  --This drug class is very effective and is preferred for people want to avoid injections.  However it often has many side effects    Prescription Insulin  Long acting insulin  Glargine (Lantus, Basaglar)  Detemir (Levemir)  Glargine concentrated (Toujeo)  Degludec (Tresiba)  Other insulins  that are intermediate acting or short acting      Over the counter Insulin  Relion brand available at [facility].  Various forms

## 2021-04-09 ENCOUNTER — MYC MEDICAL ADVICE (OUTPATIENT)
Dept: FAMILY MEDICINE | Facility: CLINIC | Age: 71
End: 2021-04-09

## 2021-04-09 DIAGNOSIS — E11.29 TYPE 2 DIABETES MELLITUS WITH MICROALBUMINURIA, WITHOUT LONG-TERM CURRENT USE OF INSULIN (H): Primary | ICD-10-CM

## 2021-04-09 DIAGNOSIS — R80.9 TYPE 2 DIABETES MELLITUS WITH MICROALBUMINURIA, WITHOUT LONG-TERM CURRENT USE OF INSULIN (H): Primary | ICD-10-CM

## 2021-04-09 ASSESSMENT — ANXIETY QUESTIONNAIRES: GAD7 TOTAL SCORE: 0

## 2021-04-09 NOTE — TELEPHONE ENCOUNTER
Forwarding MyChart request for alternative to semaglutide (Rybelsus) to PCP for review please.     Sandra Mcmanus RN  [facility]

## 2021-04-11 ENCOUNTER — HEALTH MAINTENANCE LETTER (OUTPATIENT)
Age: 71
End: 2021-04-11

## 2021-05-15 NOTE — TELEPHONE ENCOUNTER
"Requested Prescriptions   Pending Prescriptions Disp Refills     atorvastatin (LIPITOR) 40 MG tablet  Last Written Prescription Date:  03/03/17  Last Fill Quantity: 90,  # refills: 3   Last office visit: 1/5/2018 with prescribing provider:  01/05/18   Future Office Visit:     90 tablet 3     Sig: Take 1 tablet (40 mg) by mouth daily    Statins Protocol Passed    2/23/2018  7:35 AM       Passed - LDL on file in past 12 months    Recent Labs   Lab Test  09/21/17   0827   LDL  81          Passed - No abnormal creatine kinase in past 12 months    No lab results found.       Passed - Recent or future visit with authorizing provider    Patient had office visit in the last year or has a visit in the next 30 days with authorizing provider.  See \"Patient Info\" tab in inbasket, or \"Choose Columns\" in Meds & Orders section of the refill encounter.        Passed - Patient is age 18 or older          " PSYCH

## 2021-05-26 ENCOUNTER — RECORDS - HEALTHEAST (OUTPATIENT)
Dept: ADMINISTRATIVE | Facility: CLINIC | Age: 71
End: 2021-05-26

## 2021-05-30 ENCOUNTER — MYC MEDICAL ADVICE (OUTPATIENT)
Dept: FAMILY MEDICINE | Facility: CLINIC | Age: 71
End: 2021-05-30

## 2021-05-30 DIAGNOSIS — E11.8 TYPE 2 DIABETES MELLITUS WITH COMPLICATION, WITHOUT LONG-TERM CURRENT USE OF INSULIN (H): ICD-10-CM

## 2021-06-01 ENCOUNTER — RECORDS - HEALTHEAST (OUTPATIENT)
Dept: ADMINISTRATIVE | Facility: CLINIC | Age: 71
End: 2021-06-01

## 2021-06-01 RX ORDER — GLIPIZIDE 10 MG/1
10 TABLET, FILM COATED, EXTENDED RELEASE ORAL
Qty: 10 TABLET | Refills: 0 | Status: SHIPPED | OUTPATIENT
Start: 2021-06-01 | End: 2021-07-08

## 2021-06-07 DIAGNOSIS — I63.81 ACUTE ISCHEMIC VBA THALAMIC STROKE, LEFT (H): ICD-10-CM

## 2021-06-07 RX ORDER — CLOPIDOGREL BISULFATE 75 MG/1
TABLET ORAL
Qty: 90 TABLET | Refills: 3 | Status: SHIPPED | OUTPATIENT
Start: 2021-06-07 | End: 2021-12-28

## 2021-07-08 ENCOUNTER — OFFICE VISIT (OUTPATIENT)
Dept: FAMILY MEDICINE | Facility: CLINIC | Age: 71
End: 2021-07-08
Payer: COMMERCIAL

## 2021-07-08 VITALS
OXYGEN SATURATION: 96 % | SYSTOLIC BLOOD PRESSURE: 138 MMHG | TEMPERATURE: 96.2 F | DIASTOLIC BLOOD PRESSURE: 62 MMHG | BODY MASS INDEX: 36.16 KG/M2 | WEIGHT: 267 LBS | HEART RATE: 61 BPM | HEIGHT: 72 IN | RESPIRATION RATE: 16 BRPM

## 2021-07-08 DIAGNOSIS — I10 ESSENTIAL HYPERTENSION: ICD-10-CM

## 2021-07-08 DIAGNOSIS — Z00.00 ENCOUNTER FOR MEDICARE ANNUAL WELLNESS EXAM: Primary | ICD-10-CM

## 2021-07-08 DIAGNOSIS — M1A.09X0 IDIOPATHIC CHRONIC GOUT OF MULTIPLE SITES WITHOUT TOPHUS: ICD-10-CM

## 2021-07-08 DIAGNOSIS — R20.0 NUMBNESS OF HAND: ICD-10-CM

## 2021-07-08 DIAGNOSIS — S46.312S TRICEPS TENDON RUPTURE, LEFT, SEQUELA: Primary | ICD-10-CM

## 2021-07-08 DIAGNOSIS — N18.2 CKD (CHRONIC KIDNEY DISEASE) STAGE 2, GFR 60-89 ML/MIN: Primary | ICD-10-CM

## 2021-07-08 DIAGNOSIS — E11.29 TYPE 2 DIABETES MELLITUS WITH MICROALBUMINURIA, WITHOUT LONG-TERM CURRENT USE OF INSULIN (H): ICD-10-CM

## 2021-07-08 DIAGNOSIS — R80.9 TYPE 2 DIABETES MELLITUS WITH MICROALBUMINURIA, WITHOUT LONG-TERM CURRENT USE OF INSULIN (H): ICD-10-CM

## 2021-07-08 DIAGNOSIS — E11.8 TYPE 2 DIABETES MELLITUS WITH COMPLICATION, WITHOUT LONG-TERM CURRENT USE OF INSULIN (H): ICD-10-CM

## 2021-07-08 DIAGNOSIS — E03.9 ACQUIRED HYPOTHYROIDISM: ICD-10-CM

## 2021-07-08 DIAGNOSIS — E23.0 HYPOGONADOTROPIC HYPOGONADISM (H): ICD-10-CM

## 2021-07-08 DIAGNOSIS — I25.10 CORONARY ARTERY DISEASE INVOLVING NATIVE CORONARY ARTERY OF NATIVE HEART WITHOUT ANGINA PECTORIS: ICD-10-CM

## 2021-07-08 PROBLEM — M1A.9XX0 CHRONIC GOUTY ARTHROPATHY: Status: RESOLVED | Noted: 2018-11-19 | Resolved: 2021-07-08

## 2021-07-08 PROBLEM — M1A.00X0 CHRONIC GOUTY ARTHROPATHY: Status: RESOLVED | Noted: 2018-11-19 | Resolved: 2021-07-08

## 2021-07-08 PROBLEM — D12.6 BENIGN NEOPLASM OF COLON: Status: ACTIVE | Noted: 2018-11-19

## 2021-07-08 LAB
ANION GAP SERPL CALCULATED.3IONS-SCNC: 7 MMOL/L (ref 3–14)
BUN SERPL-MCNC: 28 MG/DL (ref 7–30)
CALCIUM SERPL-MCNC: 8.9 MG/DL (ref 8.5–10.1)
CHLORIDE SERPL-SCNC: 107 MMOL/L (ref 94–109)
CHOLEST SERPL-MCNC: 168 MG/DL
CO2 SERPL-SCNC: 23 MMOL/L (ref 20–32)
CREAT SERPL-MCNC: 0.92 MG/DL (ref 0.66–1.25)
CREAT UR-MCNC: 143 MG/DL
GFR SERPL CREATININE-BSD FRML MDRD: 83 ML/MIN/{1.73_M2}
GLUCOSE SERPL-MCNC: 158 MG/DL (ref 70–99)
HBA1C MFR BLD: 6.9 % (ref 0–5.6)
HDLC SERPL-MCNC: 73 MG/DL
LDLC SERPL CALC-MCNC: 76 MG/DL
MICROALBUMIN UR-MCNC: 1540 MG/L
MICROALBUMIN/CREAT UR: 1076.92 MG/G CR (ref 0–17)
NONHDLC SERPL-MCNC: 95 MG/DL
POTASSIUM SERPL-SCNC: 5.1 MMOL/L (ref 3.4–5.3)
SODIUM SERPL-SCNC: 137 MMOL/L (ref 133–144)
TRIGL SERPL-MCNC: 93 MG/DL
TSH SERPL DL<=0.005 MIU/L-ACNC: 1.51 MU/L (ref 0.4–4)
URATE SERPL-MCNC: 6.5 MG/DL (ref 3.5–7.2)

## 2021-07-08 PROCEDURE — 99214 OFFICE O/P EST MOD 30 MIN: CPT | Mod: 25 | Performed by: INTERNAL MEDICINE

## 2021-07-08 PROCEDURE — 80061 LIPID PANEL: CPT | Performed by: INTERNAL MEDICINE

## 2021-07-08 PROCEDURE — 84550 ASSAY OF BLOOD/URIC ACID: CPT | Performed by: INTERNAL MEDICINE

## 2021-07-08 PROCEDURE — 82043 UR ALBUMIN QUANTITATIVE: CPT | Performed by: INTERNAL MEDICINE

## 2021-07-08 PROCEDURE — 83036 HEMOGLOBIN GLYCOSYLATED A1C: CPT | Performed by: INTERNAL MEDICINE

## 2021-07-08 PROCEDURE — 80048 BASIC METABOLIC PNL TOTAL CA: CPT | Performed by: INTERNAL MEDICINE

## 2021-07-08 PROCEDURE — 84443 ASSAY THYROID STIM HORMONE: CPT | Performed by: INTERNAL MEDICINE

## 2021-07-08 PROCEDURE — G0438 PPPS, INITIAL VISIT: HCPCS | Performed by: INTERNAL MEDICINE

## 2021-07-08 PROCEDURE — 36415 COLL VENOUS BLD VENIPUNCTURE: CPT | Performed by: INTERNAL MEDICINE

## 2021-07-08 RX ORDER — METOPROLOL TARTRATE 25 MG/1
25 TABLET, FILM COATED ORAL 2 TIMES DAILY
Qty: 180 TABLET | Refills: 3 | Status: SHIPPED | OUTPATIENT
Start: 2021-07-08 | End: 2022-06-08

## 2021-07-08 RX ORDER — TESTOSTERONE GEL, 1% 10 MG/G
50 GEL TRANSDERMAL DAILY
Qty: 30 PACKET | Refills: 0 | Status: SHIPPED | OUTPATIENT
Start: 2021-07-08 | End: 2021-07-30

## 2021-07-08 RX ORDER — GLIPIZIDE 10 MG/1
10 TABLET, FILM COATED, EXTENDED RELEASE ORAL
Qty: 90 TABLET | Refills: 3 | Status: SHIPPED | OUTPATIENT
Start: 2021-07-08 | End: 2022-01-06

## 2021-07-08 RX ORDER — TESTOSTERONE GEL, 1% 10 MG/G
50 GEL TRANSDERMAL DAILY
Qty: 90 PACKET | Refills: 1 | Status: CANCELLED | OUTPATIENT
Start: 2021-07-08

## 2021-07-08 RX ORDER — LEVOTHYROXINE SODIUM 50 UG/1
50 TABLET ORAL DAILY
Qty: 90 TABLET | Refills: 3 | Status: SHIPPED | OUTPATIENT
Start: 2021-07-08 | End: 2022-06-08

## 2021-07-08 RX ORDER — AMLODIPINE BESYLATE 10 MG/1
10 TABLET ORAL DAILY
Qty: 90 TABLET | Refills: 3 | Status: SHIPPED | OUTPATIENT
Start: 2021-07-08 | End: 2022-06-08

## 2021-07-08 ASSESSMENT — MIFFLIN-ST. JEOR: SCORE: 2009.1

## 2021-07-08 NOTE — PROGRESS NOTES
Assessment & Plan     Encounter for Medicare annual wellness exam    Type 2 diabetes mellitus with microalbuminuria, without long-term current use of insulin (H) - improved.   - stable, refill provided. Recheck 6 months.  - Lipid panel reflex to direct LDL Fasting  - Basic metabolic panel  - metFORMIN (GLUCOPHAGE) 500 MG tablet; Take 2 tablets (1,000 mg) by mouth 2 times daily (with meals)  - glipiZIDE (GLUCOTROL XL) 10 MG 24 hr tablet; Take 1 tablet (10 mg) by mouth daily (with breakfast)  - OFFICE/OUTPT VISIT,EST,LEVL IV    Coronary artery disease involving native coronary artery of native heart without angina pectoris  - stable, refill provided  - metoprolol tartrate (LOPRESSOR) 25 MG tablet; Take 1 tablet (25 mg) by mouth 2 times daily  - OFFICE/OUTPT VISIT,EST,LEVL IV    Essential hypertension  - stable, refill provided  - metoprolol tartrate (LOPRESSOR) 25 MG tablet; Take 1 tablet (25 mg) by mouth 2 times daily  - amLODIPine (NORVASC) 10 MG tablet; Take 1 tablet (10 mg) by mouth daily  - OFFICE/OUTPT VISIT,SHANIKA VILA IV    Acquired hypothyroidism  - stable, refill provide  - levothyroxine (SYNTHROID) 50 MCG tablet; Take 1 tablet (50 mcg) by mouth daily  - OFFICE/OUTPT VISIT,EST,LEVL IV  - TSH with free T4 reflex    Hypogonadotropic hypogonadism (H) - short refill until he can contact urology who managse this med  - OFFICE/OUTPT VISIT,EST,LEVL IV  - testosterone (ANDROGEL) 50 MG/5GM (1%) topical gel; Place 1 packet (50 mg of testosterone) onto the skin daily    Idiopathic chronic gout of multiple sites without tophus - if uric acid > 6, would need to restart allopurinol  - **Uric acid FUTURE 2mo  - OFFICE/OUTPT VISIT,SHANIKA VILA IV    Type 2 diabetes mellitus with complication, without long-term current use of insulin (H)  - **A1C FUTURE 3mo  - OFFICE/OUTPT VISIT,EST,LEVL IV    Numbness of hand, right - see OT for hand brace   - OCCUPATIONAL THERAPY REFERRAL; Future             BMI:   Estimated body mass index  is 36.21 kg/m  as calculated from the following:    Height as of this encounter: 1.829 m (6').    Weight as of this encounter: 121.1 kg (267 lb).       Patient Instructions       Patient Education   Personalized Prevention Plan  You are due for the preventive services outlined below.  Your care team is available to assist you in scheduling these services.  If you have already completed any of these items, please share that information with your care team to update in your medical record.  Health Maintenance Due   Topic Date Due     ANNUAL REVIEW OF  ORDERS  Never done     Zoster (Shingles) Vaccine (1 of 2) Never done     Eye Exam  12/12/2014     FALL RISK ASSESSMENT  07/28/2021           Diabetes:  1. a1c is improved; keep up the good work    Hypertension:  1. Refills sent    Gout:  1. Ok to stay off allopurinol if the Uric acid is < 6    Low Testosterone  1. Contact DR. Frey's office for refill; short refill today      Return in about 53 weeks (around 7/14/2022) for Annual Wellness Visit.    Teresita Alvarez DO  [facility]    Juan F Parrish is a 70 year old who presents for the following health issues     HPI     Medicare Visit: today  Shingles: Check insurance  ADV:  handle      Stroke had : noticed that the tingling on his hand got worst - was wonder if should have a compression brace to ease the tingling has full ROM    Diabetes Follow-up    How often are you checking your blood sugar? A few times a week  What time of day are you checking your blood sugars (select all that apply)?  Before meals  Have you had any blood sugars above 200?  No (btwn 130 to 140  Have you had any blood sugars below 70?  No    What symptoms do you notice when your blood sugar is low?  None    What concerns do you have today about your diabetes? None     Do you have any of these symptoms? (Select all that apply)  Numbness in feet and Burning in feet    Have you had a diabetic eye exam in the last 12  "months? No     Never started Rybelsus, I ordered Jardiance but he never got it, tried to call, but c ouldn't get trhough to pharmacy;  Had problems with [facility]    Gout:   --no further episodes since last visit  --not taking allopurinol    Stroke: still having right foot and hand numbness; wonders if compression glove or brace would help.  Has increased sensation in the right hand  - 'more sensitive\".  There is no pain.  Occasional drops objects; did not do hand therapy after the stroke.  He tried a pair of compression gloves and it did help.        BP Readings from Last 2 Encounters:   07/08/21 138/62   04/08/21 (!) 144/62     Hemoglobin A1C (%)   Date Value   04/08/2021 8.2 (H)   08/02/2020 7.7 (H)     LDL Cholesterol Calculated (mg/dL)   Date Value   08/03/2020 93   07/28/2020 103 (H)               Hypertension Follow-up      Do you check your blood pressure regularly outside of the clinic? Yes     Are you following a low salt diet? Yes    Are your blood pressures ever more than 140 on the top number (systolic) OR more   than 90 on the bottom number (diastolic), for example 140/90? No      BP Readings from Last 6 Encounters:   07/08/21 138/62   04/08/21 (!) 144/62   03/28/21 (!) 165/61   11/06/20 130/68   09/02/20 132/60   08/11/20 130/62       Chronic Kidney Disease Follow-up      Do you take any over the counter pain medicine?: No        Annual Wellness Visit    Patient has been advised of split billing requirements and indicates understanding: Yes     Are you in the first 12 months of your Medicare Part B coverage?  No    Physical Health:    In general, how would you rate your overall physical health? fair    Outside of work, how many days during the week do you exercise?2-3 days/week    Outside of work, approximately how many minutes a day do you exercise?15-30 minutes    If you drink alcohol do you typically have >3 drinks per day or >7 drinks per week? No    Do you usually eat at least 4 servings of " "fruit and vegetables a day, include whole grains & fiber and avoid regularly eating high fat or \"junk\" foods? Yes    Do you have any problems taking medications regularly? No    Do you have any side effects from medications? none    Needs assistance for the following daily activities: no assistance needed    Which of the following safety concerns are present in your home?  none identified     Hearing impairment: No    In the past 6 months, have you been bothered by leaking of urine? no    Mental Health:    In general, how would you rate your overall mental or emotional health? fair  PHQ-2 Score:      Do you feel safe in your environment? Yes    Have you ever done Advance Care Planning? (For example, a Health Directive, POLST, or a discussion with a medical provider or your loved ones about your wishes)? No, advance care planning information given to patient to review.  Patient plans to discuss their wishes with loved ones or provider.      Fall risk:  Fallen 2 or more times in the past year?: No  Any fall with injury in the past year?: No    Cognitive Screenin) Repeat 3 items (Leader, Season, Table)  All 3 words repeated back.  2) Clock draw: NORMAL  3) 3 item recall: Recalls 3 objects  Results: 3 items recalled: COGNITIVE IMPAIRMENT LESS LIKELY    Mini-CogTM Copyright S Chacha. Licensed by the author for use in [facility]; reprinted with permission (soob@.[facility]). All rights reserved.      Do you have sleep apnea, excessive snoring or daytime drowsiness?: no    Current providers sharing in care for this patient include:   Patient Care Team:  Teresita Alvarez DO as PCP - General (Internal Medicine)  Teresita Alvarez DO as Assigned PCP  Chinmay Madison MD as Assigned Neuroscience Provider  Travis Mojica MD as Assigned Musculoskeletal Provider  MOLLY Frey MD as Assigned Surgical Provider    Patient has been advised of split billing requirements and indicates understanding: " Yes    Review of Systems   Constitutional, HEENT, cardiovascular, pulmonary, GI, , musculoskeletal, neuro, skin, endocrine and psych systems are negative, except as otherwise noted.      Objective    /62   Pulse 61   Temp 96.2  F (35.7  C) (Tympanic)   Resp 16   Ht 1.829 m (6')   Wt 121.1 kg (267 lb)   SpO2 96%   BMI 36.21 kg/m    Body mass index is 36.21 kg/m .  Physical Exam

## 2021-07-08 NOTE — RESULT ENCOUNTER NOTE
Uric acid is at goal. We will recheck at next visit in 6 months.  Thyroid, Kidney function, blood sugar, electrolytes, cholesterol are normal.  a1c discussed at time of visit.

## 2021-07-08 NOTE — RESULT ENCOUNTER NOTE
There is a significant amount of protein in the urine, slightly improved from 1 year ago.  Because there is still such a large amount of protein in the urine, I recommend that you see nephrology.  Referral placed - [facility] in [place]

## 2021-07-08 NOTE — PATIENT INSTRUCTIONS
Patient Education   Personalized Prevention Plan  You are due for the preventive services outlined below.  Your care team is available to assist you in scheduling these services.  If you have already completed any of these items, please share that information with your care team to update in your medical record.  Health Maintenance Due   Topic Date Due     ANNUAL REVIEW OF HM ORDERS  Never done     Zoster (Shingles) Vaccine (1 of 2) Never done     Eye Exam  12/12/2014     FALL RISK ASSESSMENT  07/28/2021           Diabetes:  1. a1c is improved; keep up the good work    Hypertension:  1. Refills sent    Gout:  1. Ok to stay off allopurinol if the Uric acid is < 6    Low Testosterone  1. Contact DR. Frey's office for refill; short refill today

## 2021-07-29 DIAGNOSIS — E23.0 HYPOGONADOTROPIC HYPOGONADISM (H): ICD-10-CM

## 2021-07-30 RX ORDER — TESTOSTERONE GEL, 1% 10 MG/G
50 GEL TRANSDERMAL DAILY
Qty: 150 G | Refills: 0 | Status: SHIPPED | OUTPATIENT
Start: 2021-07-30 | End: 2021-12-31

## 2021-09-05 DIAGNOSIS — I10 HTN, GOAL BELOW 140/90: ICD-10-CM

## 2021-09-07 RX ORDER — VALSARTAN 160 MG/1
160 TABLET ORAL DAILY
Qty: 90 TABLET | Refills: 3 | Status: SHIPPED | OUTPATIENT
Start: 2021-09-07 | End: 2022-06-08

## 2021-09-07 NOTE — TELEPHONE ENCOUNTER
"Routing refill request to provider for review/approval because:  Drug interaction warning    Requested Prescriptions   Pending Prescriptions Disp Refills     valsartan (DIOVAN) 160 MG tablet [Pharmacy Med Name: valsartan 160 mg tablet] 90 tablet 3     Sig: Take 1 tablet (160 mg) by mouth daily       Angiotensin-II Receptors Passed - 9/5/2021  8:00 AM        Passed - Last blood pressure under 140/90 in past 12 months     BP Readings from Last 3 Encounters:   07/08/21 138/62   04/08/21 (!) 144/62   03/28/21 (!) 165/61                 Passed - Recent (12 mo) or future (30 days) visit within the authorizing provider's specialty     Patient has had an office visit with the authorizing provider or a provider within the authorizing providers department within the previous 12 mos or has a future within next 30 days. See \"Patient Info\" tab in inbasket, or \"Choose Columns\" in Meds & Orders section of the refill encounter.              Passed - Medication is active on med list        Passed - Patient is age 18 or older        Passed - Normal serum creatinine on file in past 12 months     Recent Labs   Lab Test 07/08/21  0937   CR 0.92       Ok to refill medication if creatinine is low          Passed - Normal serum potassium on file in past 12 months     Recent Labs   Lab Test 07/08/21  0937   POTASSIUM 5.1                         "

## 2021-09-23 ENCOUNTER — TELEPHONE (OUTPATIENT)
Dept: UROLOGY | Facility: CLINIC | Age: 71
End: 2021-09-23

## 2021-09-23 DIAGNOSIS — E78.5 HYPERLIPIDEMIA LDL GOAL <70: ICD-10-CM

## 2021-09-23 RX ORDER — ATORVASTATIN CALCIUM 80 MG/1
80 TABLET, FILM COATED ORAL DAILY
Qty: 90 TABLET | Refills: 3 | Status: SHIPPED | OUTPATIENT
Start: 2021-09-23 | End: 2022-09-08

## 2021-09-23 NOTE — TELEPHONE ENCOUNTER
Prior Authorization Retail Medication Request    Medication/Dose: testosterone (ANDROGEL/TESTIM) 50 MG/5GM (1%) topical gel  ICD code (if different than what is on RX):    Previously Tried and Failed:    Rationale:      Insurance Name:  [employer]  Insurance ID:  [id number]     Pharmacy comments:  He has had a PA for this in the past. Please ask for as long as possible.      Pharmacy Information (if different than what is on RX)  Name:  [facility]  Phone:  [phone]

## 2021-09-23 NOTE — TELEPHONE ENCOUNTER
Prior Authorization Approval    Authorization Effective Date: 8/24/2021  Authorization Expiration Date: 9/23/2022  Medication: testosterone (ANDROGEL/TESTIM) 50 MG/5GM (1%) topical gel  Approved Dose/Quantity:    Reference #:     Insurance Company: [employer] - Phone [phone] Fax [phone]  Expected CoPay:       CoPay Card Available:      Foundation Assistance Needed:    Which Pharmacy is filling the prescription (Not needed for infusion/clinic administered): [facility] - [place], [place] - [address] [address] [facility]  Pharmacy Notified: Yes  Patient Notified: Yes  **Instructed pharmacy to notify patient when script is ready to /ship.**

## 2021-09-23 NOTE — TELEPHONE ENCOUNTER
Central Prior Authorization Team   Phone: [phone]    PA Initiation    Medication: testosterone (ANDROGEL/TESTIM) 50 MG/5GM (1%) topical gel  Insurance Company: [employer] - Phone [phone] Fax [phone]  Pharmacy Filling the Rx: [facility] - [place], [place] - [address] [address]  Filling Pharmacy Phone: [phone]  Filling Pharmacy Fax:    Start Date: 9/23/2021

## 2021-09-25 ENCOUNTER — HEALTH MAINTENANCE LETTER (OUTPATIENT)
Age: 71
End: 2021-09-25

## 2021-12-23 DIAGNOSIS — E23.0 HYPOGONADOTROPIC HYPOGONADISM (H): ICD-10-CM

## 2021-12-23 NOTE — TELEPHONE ENCOUNTER
Requested Prescriptions   Pending Prescriptions Disp Refills     testosterone (ANDROGEL/TESTIM) 50 MG/5GM (1%) topical gel 150 g 0     Sig: Place 1 packet (50 mg of testosterone) onto the skin daily       There is no refill protocol information for this order        Last office visit: Visit date not found with prescribing provider:  Dr. Frey    Future Office Visit:   Next 5 appointments (look out 90 days)    Jan 06, 2022  8:00 AM  (Arrive by 7:40 AM)  Provider Visit with Teresita Alvarez DO  [facility]  Arrive at: Clinic [address] [address]  [phone]               [facility]

## 2021-12-29 ENCOUNTER — MYC MEDICAL ADVICE (OUTPATIENT)
Dept: FAMILY MEDICINE | Facility: CLINIC | Age: 71
End: 2021-12-29
Payer: COMMERCIAL

## 2021-12-31 RX ORDER — TESTOSTERONE GEL, 1% 10 MG/G
50 GEL TRANSDERMAL DAILY
Qty: 150 G | Refills: 0 | Status: SHIPPED | OUTPATIENT
Start: 2021-12-31 | End: 2022-04-13

## 2022-01-06 ENCOUNTER — OFFICE VISIT (OUTPATIENT)
Dept: FAMILY MEDICINE | Facility: CLINIC | Age: 72
End: 2022-01-06
Payer: COMMERCIAL

## 2022-01-06 VITALS
TEMPERATURE: 97.4 F | DIASTOLIC BLOOD PRESSURE: 66 MMHG | WEIGHT: 273 LBS | RESPIRATION RATE: 14 BRPM | HEIGHT: 72 IN | BODY MASS INDEX: 36.98 KG/M2 | SYSTOLIC BLOOD PRESSURE: 158 MMHG | HEART RATE: 80 BPM | OXYGEN SATURATION: 97 %

## 2022-01-06 DIAGNOSIS — F32.1 MODERATE MAJOR DEPRESSION (H): ICD-10-CM

## 2022-01-06 DIAGNOSIS — F10.288 ALCOHOL DEPENDENCE WITH OTHER ALCOHOL-INDUCED DISORDER (H): ICD-10-CM

## 2022-01-06 DIAGNOSIS — M35.3 POLYMYALGIA RHEUMATICA (H): ICD-10-CM

## 2022-01-06 DIAGNOSIS — M1A.09X0 IDIOPATHIC CHRONIC GOUT OF MULTIPLE SITES WITHOUT TOPHUS: ICD-10-CM

## 2022-01-06 DIAGNOSIS — E11.29 TYPE 2 DIABETES MELLITUS WITH MICROALBUMINURIA, WITHOUT LONG-TERM CURRENT USE OF INSULIN (H): Primary | ICD-10-CM

## 2022-01-06 DIAGNOSIS — R20.0 NUMBNESS OF HAND: ICD-10-CM

## 2022-01-06 DIAGNOSIS — E66.01 MORBID OBESITY (H): ICD-10-CM

## 2022-01-06 DIAGNOSIS — R80.9 TYPE 2 DIABETES MELLITUS WITH MICROALBUMINURIA, WITHOUT LONG-TERM CURRENT USE OF INSULIN (H): Primary | ICD-10-CM

## 2022-01-06 DIAGNOSIS — E23.0 HYPOGONADOTROPIC HYPOGONADISM (H): ICD-10-CM

## 2022-01-06 PROBLEM — L97.519 DIABETIC ULCER OF OTHER PART OF RIGHT FOOT ASSOCIATED WITH TYPE 2 DIABETES MELLITUS, UNSPECIFIED ULCER STAGE (H): Status: RESOLVED | Noted: 2020-06-08 | Resolved: 2022-01-06

## 2022-01-06 PROBLEM — E11.621 DIABETIC ULCER OF OTHER PART OF RIGHT FOOT ASSOCIATED WITH TYPE 2 DIABETES MELLITUS, UNSPECIFIED ULCER STAGE (H): Status: RESOLVED | Noted: 2020-06-08 | Resolved: 2022-01-06

## 2022-01-06 LAB
HBA1C MFR BLD: 10.5 % (ref 0–5.6)
URATE SERPL-MCNC: 6 MG/DL (ref 3.5–7.2)

## 2022-01-06 PROCEDURE — 36415 COLL VENOUS BLD VENIPUNCTURE: CPT | Performed by: INTERNAL MEDICINE

## 2022-01-06 PROCEDURE — 99214 OFFICE O/P EST MOD 30 MIN: CPT | Performed by: INTERNAL MEDICINE

## 2022-01-06 PROCEDURE — 84550 ASSAY OF BLOOD/URIC ACID: CPT | Performed by: INTERNAL MEDICINE

## 2022-01-06 PROCEDURE — 83036 HEMOGLOBIN GLYCOSYLATED A1C: CPT | Performed by: INTERNAL MEDICINE

## 2022-01-06 RX ORDER — GLIPIZIDE 10 MG/1
10 TABLET, FILM COATED, EXTENDED RELEASE ORAL 2 TIMES DAILY
Qty: 180 TABLET | Refills: 3 | Status: SHIPPED | OUTPATIENT
Start: 2022-01-06 | End: 2022-09-16

## 2022-01-06 ASSESSMENT — PATIENT HEALTH QUESTIONNAIRE - PHQ9
SUM OF ALL RESPONSES TO PHQ QUESTIONS 1-9: 9
5. POOR APPETITE OR OVEREATING: SEVERAL DAYS

## 2022-01-06 ASSESSMENT — ANXIETY QUESTIONNAIRES
5. BEING SO RESTLESS THAT IT IS HARD TO SIT STILL: SEVERAL DAYS
3. WORRYING TOO MUCH ABOUT DIFFERENT THINGS: SEVERAL DAYS
2. NOT BEING ABLE TO STOP OR CONTROL WORRYING: SEVERAL DAYS
IF YOU CHECKED OFF ANY PROBLEMS ON THIS QUESTIONNAIRE, HOW DIFFICULT HAVE THESE PROBLEMS MADE IT FOR YOU TO DO YOUR WORK, TAKE CARE OF THINGS AT HOME, OR GET ALONG WITH OTHER PEOPLE: NOT DIFFICULT AT ALL
1. FEELING NERVOUS, ANXIOUS, OR ON EDGE: SEVERAL DAYS
7. FEELING AFRAID AS IF SOMETHING AWFUL MIGHT HAPPEN: SEVERAL DAYS
GAD7 TOTAL SCORE: 9
6. BECOMING EASILY ANNOYED OR IRRITABLE: NEARLY EVERY DAY

## 2022-01-06 ASSESSMENT — MIFFLIN-ST. JEOR: SCORE: 2031.32

## 2022-01-06 NOTE — PROGRESS NOTES
Assessment & Plan     Type 2 diabetes mellitus with microalbuminuria, without long-term current use of insulin (H) -uncontrolled.  Patient recognizes that he needs to make lifestyle changes.  He is agreeable to increasing the glipizide to twice a day.  Start checking blood sugar.  If blood sugars are not at goal within 1 month, follow-up sooner.  Next step would be to consider Ozempic or something similar.  - Hemoglobin A1c  - glipiZIDE (GLUCOTROL XL) 10 MG 24 hr tablet; Take 1 tablet (10 mg) by mouth 2 times daily  - **A1C FUTURE 3mo; Future  - Albumin Random Urine Quantitative with Creat Ratio; Future    Obesity (BMI 35.0-39.9) with comorbidity (H) -discussed diet and exercise    Numbness of hand, right -due to stroke.  Discussed gabapentin versus Cymbalta.  He declines for now.    Polymyalgia rheumatica (H) Known issue that I take into account for their medical decisions, no current exacerbations or new concerns    Alcohol dependence with other alcohol-induced disorder (H) -discussed cessation. Known issue that I take into account for their medical decisions, no current exacerbations or new concerns    Moderate major depression (H) -discussed Cymbalta.  He declines medications at this time    Hypogonadotropic hypogonadism (H) -he worries that this will worsen with any antidepressant.  Declines medication for mental health at this time    Idiopathic chronic gout of multiple sites without tophus  - Uric acid             BMI:   Estimated body mass index is 37.03 kg/m  as calculated from the following:    Height as of this encounter: 1.829 m (6').    Weight as of this encounter: 123.8 kg (273 lb).       Patient Instructions   Neuropathy/Mood:  1. Two options - Gabapentin for neuropathy.  Start at low doses.  Potential side effects - wt gain, leg swelling, drowsiness or brain fog.  Often well tolerated at low doses.  Second option is Cymbalta (duloxetine) - works for mental health and neuropathy.  Sexual dysfunction  is possible side effects.    Diabetes:  1. blood sugar is too high  2. Start checking blood sugar 1-2 x day.  3. Increase glipizide to 10 mg twice daily.  4. If you start having low blood sugar, let Dr. Alvarez know.  5. Follow-up in 3 months, non-fasting labs prior to visit.  6. You are due for dilated eye exam.  Have the report sent to Dr. Alvarez's office.      Kidneys:  1. Discussed referral to [facility] in [place] but you declined.    Hypertension:  1. Recheck today.  If remains high, RN blood pressure check in 2 weeks      Return in about 3 months (around 4/6/2022) for Diabetes Check with lab prior.    Teresita Alvarez, [facility]    Juan F Parrish is a 71 year old who presents for the following health issues     HPI     Diabetes Follow-up      How often are you checking your blood sugar? Not at all    What concerns do you have today about your diabetes? None and Other: Discuss numbness in feet and hands (been that way since stroke)      Do you have any of these symptoms? (Select all that apply)  Numbness in feet, Burning in feet, Blurry vision and Weight gain    Have you had a diabetic eye exam in the last 12 months? Yes- Date of last eye exam: about 2 years ago,  Location: at [facility]. [facility]      Cut way back on EtOH, only drinking 1-2 days/week    Nephropathy 1+ gm.  Saw nephrology 4-5 years ago, not since.  No leg swelling    Depression: reports is worse.  Doesn't want to take any meds  --has been on fluoxetine              Hyperlipidemia Follow-Up      Are you regularly taking any medication or supplement to lower your cholesterol?   Yes- atorvastatin     Are you having muscle aches or other side effects that you think could be caused by your cholesterol lowering medication?  No    Hypertension Follow-up      Do you check your blood pressure regularly outside of the clinic? Yes     Are you following a low salt diet? Yes,  occasional     Are your blood pressures ever more than 140 on the top number (systolic) OR more   than 90 on the bottom number (diastolic), for example 140/90? Yes    BP Readings from Last 2 Encounters:   01/06/22 (!) 148/68   07/08/21 138/62     Hemoglobin A1C POCT (%)   Date Value   07/08/2021 6.9 (H)   04/08/2021 8.2 (H)     Hemoglobin A1C (%)   Date Value   01/06/2022 10.5 (H)     LDL Cholesterol Calculated (mg/dL)   Date Value   07/08/2021 76   08/03/2020 93         How many servings of fruits and vegetables do you eat daily?  4 or more    On average, how many sweetened beverages do you drink each day (Examples: soda, juice, sweet tea, etc.  Do NOT count diet or artificially sweetened beverages)?   0    How many days per week do you exercise enough to make your heart beat faster? 3 or less    How many minutes a day do you exercise enough to make your heart beat faster? 10 - 19    How many days per week do you miss taking your medication? 0      Follow-up stroke:  --right hand neuropathy from stroke is becoming more bothersome.  Right foot is numb leading to balance problems      Current Outpatient Medications   Medication Sig Dispense Refill     amLODIPine (NORVASC) 10 MG tablet Take 1 tablet (10 mg) by mouth daily 90 tablet 3     atorvastatin (LIPITOR) 80 MG tablet Take 1 tablet (80 mg) by mouth daily 90 tablet 3     clopidogrel (PLAVIX) 75 MG tablet Take 1 tablet (75 mg) by mouth daily 30 tablet 0     glipiZIDE (GLUCOTROL XL) 10 MG 24 hr tablet Take 1 tablet (10 mg) by mouth daily (with breakfast) 90 tablet 3     levothyroxine (SYNTHROID) 50 MCG tablet Take 1 tablet (50 mcg) by mouth daily 90 tablet 3     metFORMIN (GLUCOPHAGE) 500 MG tablet Take 2 tablets (1,000 mg) by mouth 2 times daily (with meals) 360 tablet 3     metoprolol tartrate (LOPRESSOR) 25 MG tablet Take 1 tablet (25 mg) by mouth 2 times daily 180 tablet 3     testosterone (ANDROGEL/TESTIM) 50 MG/5GM (1%) topical gel Place 1 packet (50 mg of  testosterone) onto the skin daily 150 g 0     valsartan (DIOVAN) 160 MG tablet Take 1 tablet (160 mg) by mouth daily 90 tablet 3     blood glucose (ACCU-CHEK SMARTVIEW) test strip Use to test blood sugar 3 times daily or as directed. (Patient not taking: Reported on 7/8/2021) 100 each 12     blood glucose (NO BRAND SPECIFIED) lancets standard Use to test blood sugar 3 times daily or as directed. (Patient not taking: Reported on 7/8/2021) 1 each 11         Review of Systems   Constitutional, HEENT, cardiovascular, pulmonary, gi and gu systems are negative, except as otherwise noted.      Objective    BP (!) 148/68   Pulse 80   Temp 97.4  F (36.3  C) (Tympanic)   Resp 14   Ht 1.829 m (6')   Wt 123.8 kg (273 lb)   SpO2 97%   BMI 37.03 kg/m    Body mass index is 37.03 kg/m .  Physical Exam   GENERAL APPEARANCE: healthy, alert, no distress and over weight  DIABETIC FOOT EXAM: normal DP and PT pulses, no trophic changes or ulcerative lesions and reduced sensation at 8/10 spots of right foot and 6/10 spots left foot  PSYCH: mentation appears normal and affect flat    Results for orders placed or performed in visit on 01/06/22 (from the past 24 hour(s))   Uric acid   Result Value Ref Range    Uric Acid 6.0 3.5 - 7.2 mg/dL   Hemoglobin A1c   Result Value Ref Range    Hemoglobin A1C 10.5 (H) 0.0 - 5.6 %

## 2022-01-06 NOTE — PATIENT INSTRUCTIONS
Neuropathy/Mood:  1. Two options - Gabapentin for neuropathy.  Start at low doses.  Potential side effects - wt gain, leg swelling, drowsiness or brain fog.  Often well tolerated at low doses.  Second option is Cymbalta (duloxetine) - works for mental health and neuropathy.  Sexual dysfunction is possible side effects.    Diabetes:  1. blood sugar is too high  2. Start checking blood sugar 1-2 x day.  3. Increase glipizide to 10 mg twice daily.  4. If you start having low blood sugar, let Dr. Alvarez know.  5. Follow-up in 3 months, non-fasting labs prior to visit.  6. You are due for dilated eye exam.  Have the report sent to Dr. Alvarez's office.      Kidneys:  1. Discussed referral to [facility] in [place] but you declined.    Hypertension:  1. Recheck today.  If remains high, RN blood pressure check in 2 weeks

## 2022-01-07 ASSESSMENT — ANXIETY QUESTIONNAIRES: GAD7 TOTAL SCORE: 9

## 2022-01-20 ENCOUNTER — TRANSFERRED RECORDS (OUTPATIENT)
Dept: HEALTH INFORMATION MANAGEMENT | Facility: CLINIC | Age: 72
End: 2022-01-20
Payer: COMMERCIAL

## 2022-01-20 LAB — RETINOPATHY: NEGATIVE

## 2022-02-03 ENCOUNTER — TELEPHONE (OUTPATIENT)
Dept: FAMILY MEDICINE | Facility: CLINIC | Age: 72
End: 2022-02-03
Payer: COMMERCIAL

## 2022-02-03 NOTE — TELEPHONE ENCOUNTER
Panel Management:    Patient's blood pressure was elevated at last clinic visit with Dr. Alvarez.  It is important to get blood pressure < 140/80    Please contact the patient and have them schedule RN visit for free blood pressure check.

## 2022-02-03 NOTE — LETTER
February 8, 2022      Francois Lujan  [address]  [place] [address]        Dear Francois,     Your healthcare team cares about your health. To provide you with the best care,   we have reviewed your chart and based on our findings, we see that you are due to:     - HYPERTENSION FOLLOW UP: Blood pressure check with nurse only    Thank you for choosing [facility] for your healthcare needs. For any questions,   concerns, or to schedule an appointment please contact the clinic.       Healthy Regards,      Your [facility] Care Team

## 2022-02-03 NOTE — TELEPHONE ENCOUNTER
Patient Quality Outreach    Patient is due for the following:   Hypertension -  BP check       Patient's blood pressure was elevated at last clinic visit with Dr. Alvarez.  It is important to get blood pressure < 140/80     Please contact the patient and have them schedule RN visit for free blood pressure check    Type of outreach:    Phone, left message for patient/parent to call back.      Questions for provider review:    None     Cyril Shaffer, CMA

## 2022-02-17 PROBLEM — F52.8 PSYCHOSEXUAL DYSFUNCTION WITH INHIBITED SEXUAL EXCITEMENT: Status: RESOLVED | Noted: 2018-11-19 | Resolved: 2019-09-06

## 2022-03-04 DIAGNOSIS — I63.81 ACUTE ISCHEMIC VBA THALAMIC STROKE, LEFT (H): ICD-10-CM

## 2022-03-07 ENCOUNTER — MYC MEDICAL ADVICE (OUTPATIENT)
Dept: FAMILY MEDICINE | Facility: CLINIC | Age: 72
End: 2022-03-07
Payer: COMMERCIAL

## 2022-03-07 ENCOUNTER — MYC REFILL (OUTPATIENT)
Dept: FAMILY MEDICINE | Facility: CLINIC | Age: 72
End: 2022-03-07
Payer: COMMERCIAL

## 2022-03-07 DIAGNOSIS — I63.81 ACUTE ISCHEMIC VBA THALAMIC STROKE, LEFT (H): ICD-10-CM

## 2022-03-07 RX ORDER — CLOPIDOGREL BISULFATE 75 MG/1
75 TABLET ORAL DAILY
Qty: 30 TABLET | Refills: 0 | Status: CANCELLED | OUTPATIENT
Start: 2022-03-07

## 2022-03-07 RX ORDER — CLOPIDOGREL BISULFATE 75 MG/1
75 TABLET ORAL DAILY
Qty: 30 TABLET | Refills: 0 | Status: SHIPPED | OUTPATIENT
Start: 2022-03-07 | End: 2022-03-29

## 2022-03-07 NOTE — TELEPHONE ENCOUNTER
"Requested Prescriptions   Pending Prescriptions Disp Refills    clopidogrel (PLAVIX) 75 MG tablet [Pharmacy Med Name: clopidogrel 75 mg tablet] 7 tablet 0     Sig: Take 1 tablet (75 mg) by mouth daily        Plavix Failed - 3/4/2022  3:46 PM        Failed - Normal HGB on file in past 12 months     Recent Labs   Lab Test 08/02/20  1309   HGB 12.2*                 Failed - Normal Platelets on file in past 12 months       Recent Labs   Lab Test 08/02/20  1309                  Passed - No active PPI on record unless is Protonix        Passed - Recent (12 mo) or future (30 days) visit within the authorizing provider's specialty     Patient has had an office visit with the authorizing provider or a provider within the authorizing providers department within the previous 12 mos or has a future within next 30 days. See \"Patient Info\" tab in inbasket, or \"Choose Columns\" in Meds & Orders section of the refill encounter.              Passed - Medication is active on med list        Passed - Patient is age 18 or older              "

## 2022-03-08 NOTE — TELEPHONE ENCOUNTER
Provider sent refill yesterday after this message.  MyChart sent and closing encounter.     Sandra Mcmanus RN  [facility]

## 2022-03-28 DIAGNOSIS — I63.81 ACUTE ISCHEMIC VBA THALAMIC STROKE, LEFT (H): ICD-10-CM

## 2022-03-29 RX ORDER — CLOPIDOGREL BISULFATE 75 MG/1
75 TABLET ORAL DAILY
Qty: 90 TABLET | Refills: 0 | Status: SHIPPED | OUTPATIENT
Start: 2022-03-29 | End: 2022-07-05

## 2022-03-29 NOTE — TELEPHONE ENCOUNTER
"Requested Prescriptions   Pending Prescriptions Disp Refills     clopidogrel (PLAVIX) 75 MG tablet [Pharmacy Med Name: clopidogrel 75 mg tablet] 30 tablet 0     Sig: Take 1 tablet (75 mg) by mouth daily Please follow up with primary care provider for further refills.       Plavix Failed - 3/28/2022  8:02 AM        Failed - Normal HGB on file in past 12 months     Recent Labs   Lab Test 08/02/20  1309   HGB 12.2*               Failed - Normal Platelets on file in past 12 months     Recent Labs   Lab Test 08/02/20  1309                  Passed - No active PPI on record unless is Protonix        Passed - Recent (12 mo) or future (30 days) visit within the authorizing provider's specialty     Patient has had an office visit with the authorizing provider or a provider within the authorizing providers department within the previous 12 mos or has a future within next 30 days. See \"Patient Info\" tab in inbasket, or \"Choose Columns\" in Meds & Orders section of the refill encounter.              Passed - Medication is active on med list        Passed - Patient is age 18 or older             "

## 2022-04-12 ENCOUNTER — MYC MEDICAL ADVICE (OUTPATIENT)
Dept: FAMILY MEDICINE | Facility: CLINIC | Age: 72
End: 2022-04-12
Payer: COMMERCIAL

## 2022-04-13 ENCOUNTER — TELEPHONE (OUTPATIENT)
Dept: UROLOGY | Facility: CLINIC | Age: 72
End: 2022-04-13
Payer: COMMERCIAL

## 2022-04-13 DIAGNOSIS — E23.0 HYPOGONADOTROPIC HYPOGONADISM (H): ICD-10-CM

## 2022-04-13 RX ORDER — TESTOSTERONE GEL, 1% 10 MG/G
50 GEL TRANSDERMAL DAILY
Qty: 150 G | Refills: 0 | Status: SHIPPED | OUTPATIENT
Start: 2022-04-13 | End: 2022-06-21

## 2022-04-13 NOTE — TELEPHONE ENCOUNTER
Last Written Prescription Date:    Last Fill Quantity: ,  # refills:    Last office visit: Visit date not found with prescribing provider:     Future Office Visit:        Requested Prescriptions   Pending Prescriptions Disp Refills     testosterone (ANDROGEL/TESTIM) 50 MG/5GM (1%) topical gel 150 g 0     Sig: Place 1 packet (50 mg of testosterone) onto the skin daily       There is no refill protocol information for this order

## 2022-04-13 NOTE — TELEPHONE ENCOUNTER
Prior Authorization Retail Medication Request    Medication/Dose:   ICD code (if different than what is on RX):    Previously Tried and Failed:    Rationale:      Insurance Name:  3-[employer] Ph: [phone]   Insurance ID:  [id number]       Pharmacy Information (if different than what is on RX)  Name:    Phone:

## 2022-04-13 NOTE — LETTER
[facility]  [address] [place] [address]  Phone: [phone]       April 13, 2022         Francois Lujan  [address]  [place] [address]            Dear Francois:    We are concerned about your health care.  We recently provided you with medication refills.  Many medications require routine follow-up with your doctor.    Your prescription(s) have been refilled so you may have time for the above noted follow-up. Please call [phone] to schedule soon so we can assure you have an appointment before your next refills are needed.    Thank you,      Nelson Frey MD (Paul)/ tr

## 2022-04-18 NOTE — TELEPHONE ENCOUNTER
PA Initiation    Medication: testosterone (ANDROGEL/TESTIM) 50 MG/5GM (1%) topical gel  Insurance Company:    Pharmacy Filling the Rx: [facility] - [place], [place] - [address] [address]  Filling Pharmacy Phone: [phone]  Filling Pharmacy Fax:    Start Date: 4/18/2022

## 2022-04-18 NOTE — TELEPHONE ENCOUNTER
Prior Authorization Retail Medication Request    Medication/Dose: testosterone (ANDROGEL/TESTIM) 50 MG/5GM (1%) topical gel  ICD code (if different than what is on RX):    Previously Tried and Failed:    Rationale:      Insurance Name:  BCBS MEDICARE ADVANTAGE   Insurance ID:  [id number]       Pharmacy Information (if different than what is on RX)  Name:  [facility]  Phone:  [phone]

## 2022-04-20 NOTE — TELEPHONE ENCOUNTER
Prior Authorization Approval    Authorization Effective Date: 1/18/2022  Authorization Expiration Date: 4/18/2023  Medication: testosterone (ANDROGEL/TESTIM) 50 MG/5GM (1%) topical gel  Approved Dose/Quantity: 75  Reference #:     Insurance Company:    Expected CoPay:       CoPay Card Available:      Foundation Assistance Needed:    Which Pharmacy is filling the prescription (Not needed for infusion/clinic administered): [facility] - [place], [place] - [address] [address] [facility] [address] [facility]  Pharmacy Notified: Yes  Patient Notified: YesComment:  PHARMACY WILL NOTIFY WHEN READY

## 2022-04-21 ENCOUNTER — HOSPITAL ENCOUNTER (EMERGENCY)
Facility: CLINIC | Age: 72
Discharge: HOME | End: 2022-04-21
Attending: FAMILY MEDICINE | Admitting: FAMILY MEDICINE
Payer: COMMERCIAL

## 2022-04-21 VITALS
DIASTOLIC BLOOD PRESSURE: 71 MMHG | WEIGHT: 265 LBS | OXYGEN SATURATION: 98 % | HEART RATE: 74 BPM | TEMPERATURE: 98 F | HEIGHT: 72 IN | BODY MASS INDEX: 35.89 KG/M2 | RESPIRATION RATE: 19 BRPM | SYSTOLIC BLOOD PRESSURE: 151 MMHG

## 2022-04-21 DIAGNOSIS — R00.2 PALPITATIONS: ICD-10-CM

## 2022-04-21 DIAGNOSIS — I49.3 PVC'S (PREMATURE VENTRICULAR CONTRACTIONS): ICD-10-CM

## 2022-04-21 DIAGNOSIS — R80.9 TYPE 2 DIABETES MELLITUS WITH MICROALBUMINURIA, WITHOUT LONG-TERM CURRENT USE OF INSULIN (H): ICD-10-CM

## 2022-04-21 DIAGNOSIS — I25.10 CORONARY ARTERY DISEASE INVOLVING NATIVE CORONARY ARTERY OF NATIVE HEART WITHOUT ANGINA PECTORIS: ICD-10-CM

## 2022-04-21 DIAGNOSIS — I49.1 ATRIAL ECTOPY: ICD-10-CM

## 2022-04-21 DIAGNOSIS — D64.9 ANEMIA, UNSPECIFIED TYPE: ICD-10-CM

## 2022-04-21 DIAGNOSIS — E11.29 TYPE 2 DIABETES MELLITUS WITH MICROALBUMINURIA, WITHOUT LONG-TERM CURRENT USE OF INSULIN (H): ICD-10-CM

## 2022-04-21 DIAGNOSIS — E03.9 ACQUIRED HYPOTHYROIDISM: ICD-10-CM

## 2022-04-21 LAB
ALBUMIN SERPL-MCNC: 3.5 G/DL (ref 3.4–5)
ALP SERPL-CCNC: 162 U/L (ref 40–150)
ALT SERPL W P-5'-P-CCNC: 17 U/L (ref 0–70)
ANION GAP SERPL CALCULATED.3IONS-SCNC: 7 MMOL/L (ref 3–14)
AST SERPL W P-5'-P-CCNC: 12 U/L (ref 0–45)
BASOPHILS # BLD AUTO: 0.1 10E3/UL (ref 0–0.2)
BASOPHILS NFR BLD AUTO: 1 %
BILIRUB SERPL-MCNC: 0.4 MG/DL (ref 0.2–1.3)
BUN SERPL-MCNC: 21 MG/DL (ref 7–30)
CALCIUM SERPL-MCNC: 8.8 MG/DL (ref 8.5–10.1)
CHLORIDE BLD-SCNC: 108 MMOL/L (ref 94–109)
CO2 SERPL-SCNC: 23 MMOL/L (ref 20–32)
CREAT SERPL-MCNC: 0.85 MG/DL (ref 0.66–1.25)
EOSINOPHIL # BLD AUTO: 0.3 10E3/UL (ref 0–0.7)
EOSINOPHIL NFR BLD AUTO: 4 %
ERYTHROCYTE [DISTWIDTH] IN BLOOD BY AUTOMATED COUNT: 13.3 % (ref 10–15)
FERRITIN SERPL-MCNC: 27 NG/ML (ref 26–388)
GFR SERPL CREATININE-BSD FRML MDRD: >90 ML/MIN/1.73M2
GLUCOSE BLD-MCNC: 222 MG/DL (ref 70–99)
HCT VFR BLD AUTO: 32.1 % (ref 40–53)
HGB BLD-MCNC: 10.7 G/DL (ref 13.3–17.7)
HOLD SPECIMEN: NORMAL
IMM GRANULOCYTES # BLD: 0 10E3/UL
IMM GRANULOCYTES NFR BLD: 0 %
IRON SATN MFR SERPL: 22 % (ref 15–46)
IRON SERPL-MCNC: 88 UG/DL (ref 35–180)
LYMPHOCYTES # BLD AUTO: 3.4 10E3/UL (ref 0.8–5.3)
LYMPHOCYTES NFR BLD AUTO: 39 %
MCH RBC QN AUTO: 31.9 PG (ref 26.5–33)
MCHC RBC AUTO-ENTMCNC: 33.3 G/DL (ref 31.5–36.5)
MCV RBC AUTO: 96 FL (ref 78–100)
MONOCYTES # BLD AUTO: 0.6 10E3/UL (ref 0–1.3)
MONOCYTES NFR BLD AUTO: 7 %
NEUTROPHILS # BLD AUTO: 4.3 10E3/UL (ref 1.6–8.3)
NEUTROPHILS NFR BLD AUTO: 49 %
NRBC # BLD AUTO: 0 10E3/UL
NRBC BLD AUTO-RTO: 0 /100
NT-PROBNP SERPL-MCNC: 444 PG/ML (ref 0–900)
PLATELET # BLD AUTO: 257 10E3/UL (ref 150–450)
POTASSIUM BLD-SCNC: 4.6 MMOL/L (ref 3.4–5.3)
PROT SERPL-MCNC: 7.6 G/DL (ref 6.8–8.8)
RBC # BLD AUTO: 3.35 10E6/UL (ref 4.4–5.9)
SODIUM SERPL-SCNC: 138 MMOL/L (ref 133–144)
TIBC SERPL-MCNC: 392 UG/DL (ref 240–430)
TSH SERPL DL<=0.005 MIU/L-ACNC: 1.24 MU/L (ref 0.4–4)
VIT B12 SERPL-MCNC: 418 PG/ML (ref 193–986)
WBC # BLD AUTO: 8.7 10E3/UL (ref 4–11)

## 2022-04-21 PROCEDURE — 83550 IRON BINDING TEST: CPT | Performed by: FAMILY MEDICINE

## 2022-04-21 PROCEDURE — 99284 EMERGENCY DEPT VISIT MOD MDM: CPT | Performed by: FAMILY MEDICINE

## 2022-04-21 PROCEDURE — 82728 ASSAY OF FERRITIN: CPT | Performed by: FAMILY MEDICINE

## 2022-04-21 PROCEDURE — 83880 ASSAY OF NATRIURETIC PEPTIDE: CPT | Performed by: FAMILY MEDICINE

## 2022-04-21 PROCEDURE — 36415 COLL VENOUS BLD VENIPUNCTURE: CPT | Performed by: FAMILY MEDICINE

## 2022-04-21 PROCEDURE — 82607 VITAMIN B-12: CPT | Performed by: FAMILY MEDICINE

## 2022-04-21 PROCEDURE — 84443 ASSAY THYROID STIM HORMONE: CPT | Performed by: FAMILY MEDICINE

## 2022-04-21 PROCEDURE — 80053 COMPREHEN METABOLIC PANEL: CPT | Performed by: FAMILY MEDICINE

## 2022-04-21 PROCEDURE — 85004 AUTOMATED DIFF WBC COUNT: CPT | Performed by: FAMILY MEDICINE

## 2022-04-21 ASSESSMENT — ENCOUNTER SYMPTOMS
HEADACHES: 0
ABDOMINAL PAIN: 0
PALPITATIONS: 1
LIGHT-HEADEDNESS: 1
DIAPHORESIS: 0
CONSTIPATION: 0
BLOOD IN STOOL: 0
WHEEZING: 0
SHORTNESS OF BREATH: 1
DYSURIA: 0
DIARRHEA: 0
FREQUENCY: 0
NAUSEA: 0
FEVER: 0
COUGH: 0
SORE THROAT: 0
SINUS PRESSURE: 0
VOMITING: 0
CHILLS: 0

## 2022-04-21 NOTE — DISCHARGE INSTRUCTIONS
ICD-10-CM    1. Atrial ectopy  I49.1    2. PVC's (premature ventricular contractions)  I49.3     no serious findings.  given heart history will obtain zio monitor and follow-up primary provider.   3. Coronary artery disease involving native coronary artery of native heart without angina pectoris  I25.10    4. Type 2 diabetes mellitus with microalbuminuria, without long-term current use of insulin (H)  E11.29     R80.9     glucose >200 today. watch blood sugars.   5. Acquired hypothyroidism  E03.9     thyroid testing is reassuring.  stay on same replacement   6. Palpitations  R00.2 Leadless EKG Monitor 8 to 14 Days   7. Anemia, unspecified type  D64.9     unclear cause.  decrease or eliminate alcohol. avoid caffeine. no ibuprofen.  await B12 vitamin level.  consider repeat colonoscopy - last normal in 2016.  follow-up next week for hgb recheck and discussion on labs

## 2022-04-21 NOTE — ED PROVIDER NOTES
History     Chief Complaint   Patient presents with     Irregular Heart Beat     X 1 week.  On plavix.  HR in triage 43-89.  Denies pain.  Dizzy at times.  H/o prior CVA and CABG x3     HPI  Francois Lujan is a 71 year old male who Zentz with a history of coronary bypass graft, hypothyroidism hyperlipidemia hypertension coronary disease carotid stenosis and prior CVA, type 2 diabetes, heavy alcohol use and binge type drinking 6/day when he drinks about twice a week.  He has been experiencing for the last 6 weeks a sense of some lightheadedness when he stands and some mild dyspnea on exertion.  No leg edema.  No wheezing.  No history of congestive heart failure.  No chest pain.  Has been experiencing some palpitations recently.  These occurred in the last few days.  He feels like he is missing a beat at times.  Heart rate has been variable at home.  He has no obvious significant reduced walk distance.      Denies recent prolonged travel (>3 hours) by car or plane, history or FHx of venous thromboembolism, recent surgery (last 4 weeks), active cancer history, hypercoagulable state, estrogen or other medications/conditions causing VTE or  new unilateral swelling or pain in the legs or calves.      Allergies:  Allergies   Allergen Reactions     Hydrocodone-Acetaminophen Itching     Iodine      Irbesartan      renal insuff and hyperkalemia       Lisinopril Nausea     Sulfa Drugs      Valsartan Difficulty breathing       Problem List:    Patient Active Problem List    Diagnosis Date Noted     Moderate major depression (H) 01/06/2022     Priority: Medium     Chronic idiopathic gout of multiple sites 04/08/2021     Priority: Medium     Previously on allopurinol       Carotid stenosis, right 08/11/2020     Priority: Medium     Acute ischemic VBA thalamic stroke, left (H) 08/02/2020     Priority: Medium     8/2020.  Right face numbness and slight facial droop       Obesity (BMI 35.0-39.9) with comorbidity (H) 11/20/2018      Priority: Medium     Benign neoplasm of colon 11/19/2018     Priority: Medium     Degeneration of lumbar or lumbosacral intervertebral disc 11/19/2018     Priority: Medium     Vitamin D deficiency 11/19/2018     Priority: Medium     Epistaxis 12/15/2017     Priority: Medium     Triceps tendon rupture, left, sequela 04/10/2017     Priority: Medium     Coronary artery disease involving native coronary artery of native heart without angina pectoris 03/03/2017     Priority: Medium     Alcohol dependence with other alcohol-induced disorder (H) 08/30/2016     Priority: Medium     Since 8/2020, drinking 1 day per week, 2-3 drinks.       Diabetic polyneuropathy associated with type 2 diabetes mellitus (H) 04/12/2016     Priority: Medium     CKD (chronic kidney disease) stage 2, GFR 60-89 ml/min 10/23/2015     Priority: Medium     Type 2 diabetes mellitus with microalbuminuria, without long-term current use of insulin (H) 10/23/2015     Priority: Medium     On valsartan. Referred to Nephrology 7/21.       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     Priority: Medium     OA (osteoarthritis) of knee, right 04/29/2014     Priority: Medium     ACL (anterior cruciate ligament) tear 04/29/2014     Priority: Medium     posttraumatic arthrosis lateral compartment       Multiple joint pain 04/23/2014     Priority: Medium     History of back surgery 03/03/2014     Priority: Medium     Polymyalgia rheumatica (H) 03/03/2014     Priority: Medium     Moderate major depression (H) 02/11/2014     Priority: Medium     Radicular pain of lumbosacral region 12/31/2013     Priority: Medium     Numbness of hand, right 12/12/2013     Priority: Medium     Essential hypertension 09/17/2013     Priority: Medium     ADELA (obstructive sleep apnea) 07/11/2013     Priority: Medium     Severe ADELA (7/10/2013 with AHI ~70, sherin desat 86%, insufficient time for CPAP titration)       Hyperlipidemia LDL goal <70 05/28/2013     Priority: Medium     S/P CABG  (coronary artery bypass graft) 11/29/2012     Priority: Medium     11/17/11  PREOPERATIVE DIAGNOSIS:  Severe 3-vessel coronary artery disease with unstable angina.        POSTOPERATIVE DIAGNOSIS:  Severe 3-vessel coronary artery disease with unstable angina.        PROCEDURES:    1.  Coronary artery bypass grafting x3 with placement of left internal mammary artery to the left anterior descending artery, saphenous vein graft from the aorta to the third obtuse marginal branch of the circumflex coronary artery and saphenous vein graft from aorta to the distal right coronary artery.    2.  Placement of temporary ventricular pacing wires.    3.  Endoscopic vein harvest from the left leg.        Hypogonadotropic hypogonadism (H) 11/29/2012     Priority: Medium     Acquired hypothyroidism 11/29/2012     Priority: Medium     Hard of hearing 11/29/2012     Priority: Medium        Past Medical History:    Past Medical History:   Diagnosis Date     Arthritis      CAD (coronary artery disease)      Diabetes mellitus (H)      Erythema nodosum 1982     Gout      Hypertension      Malignant neoplasm (H)      Thyroid disease        Past Surgical History:    Past Surgical History:   Procedure Laterality Date     BACK SURGERY       BYPASS GRAFT ARTERY CORONARY  11/17/2011    Procedure:BYPASS GRAFT ARTERY CORONARY; CORONARY ARTERY BYPASS, Right, OM, LAD WITH ENDOVEIN HARVEST, ON PUMP; Surgeon:LEONEL MG; Location:[facility]     COLONOSCOPY N/A 6/9/2016    Procedure: COLONOSCOPY;  Surgeon: Isidro Humphreys MD;  Location: [facility]     HERNIA REPAIR      infantile hernia repair     ORTHOPEDIC SURGERY      Baker cyst removed - right knee, and mesiscus tear repair     HI LAMINEC/FACETECT/FORAMIN,LUMBAR 1 SEG      Description: Laminectomy Decompress, Facetectomy, Foraminotomy Lumbar Seg;  Recorded: 12/13/2007;  Comments: '71     HI MASTECTOMY FOR GYNECOMASTIA      Description: Breast Surgery Mastectomy For Gynecomastia Bilateral;  Recorded:  2007;     REPAIR TENDON TRICEPS UPPER EXTREMITY Left 2017    Procedure: REPAIR TENDON TRICEPS UPPER EXTREMITY;  Surgeon: Rodriguez Kelley MD;  Location: [facility] CABG, VEIN, SINGLE      Description: CABG (CABG);  Proc Date: 2011;  Comments: 3 vessel       Family History:    Family History   Problem Relation Age of Onset     Heart Disease Father      Cancer Mother      C.A.D. Brother      Septicemia Brother 3     Lung Cancer Sister      Schizophrenia Sister        Social History:  Marital Status:   [2]  Social History     Tobacco Use     Smoking status: Former Smoker     Packs/day: 2.00     Years: 30.00     Pack years: 60.00     Quit date: 1997     Years since quittin.4     Smokeless tobacco: Never Used   Vaping Use     Vaping Use: Never used   Substance Use Topics     Alcohol use: Yes     Alcohol/week: 10.0 standard drinks     Types: 12 Cans of beer per week     Comment: on the weekends about 5 drinks a weekend      Drug use: No        Medications:    amLODIPine (NORVASC) 10 MG tablet  atorvastatin (LIPITOR) 80 MG tablet  blood glucose (ACCU-CHEK SMARTVIEW) test strip  blood glucose (NO BRAND SPECIFIED) lancets standard  clopidogrel (PLAVIX) 75 MG tablet  glipiZIDE (GLUCOTROL XL) 10 MG 24 hr tablet  levothyroxine (SYNTHROID) 50 MCG tablet  metFORMIN (GLUCOPHAGE) 500 MG tablet  metoprolol tartrate (LOPRESSOR) 25 MG tablet  testosterone (ANDROGEL/TESTIM) 50 MG/5GM (1%) topical gel  valsartan (DIOVAN) 160 MG tablet          Review of Systems   Constitutional: Negative for chills, diaphoresis and fever.   HENT: Negative for ear pain, sinus pressure and sore throat.    Eyes: Negative for visual disturbance.   Respiratory: Positive for shortness of breath. Negative for cough and wheezing.    Cardiovascular: Positive for palpitations. Negative for chest pain.   Gastrointestinal: Negative for abdominal pain, blood in stool, constipation, diarrhea, nausea and vomiting.    Genitourinary: Negative for dysuria, frequency and urgency.   Skin: Negative for rash.   Neurological: Positive for light-headedness. Negative for headaches.   All other systems reviewed and are negative.      Physical Exam   BP: (!) 198/71  Pulse: (!) 43  Temp: 98  F (36.7  C)  Resp: 18  Height: 182.9 cm (6')  Weight: 120.2 kg (265 lb)  SpO2: 99 %      Physical Exam  Constitutional:       General: He is in acute distress.      Appearance: He is not diaphoretic.   HENT:      Head: Atraumatic.      Nose: No rhinorrhea.   Eyes:      Conjunctiva/sclera: Conjunctivae normal.   Cardiovascular:      Rate and Rhythm: Normal rate and regular rhythm.      Heart sounds: No murmur heard.  Pulmonary:      Effort: Pulmonary effort is normal. No respiratory distress.      Breath sounds: Normal breath sounds. No stridor. No wheezing or rhonchi.   Chest:      Chest wall: No tenderness.   Abdominal:      General: Abdomen is flat. There is no distension.      Tenderness: There is no abdominal tenderness. There is no guarding.   Musculoskeletal:      Cervical back: Neck supple.      Right lower leg: No edema.      Left lower leg: No edema.   Skin:     Coloration: Skin is not pale.      Findings: No rash.   Neurological:      General: No focal deficit present.      Mental Status: He is alert and oriented to person, place, and time.      Cranial Nerves: No cranial nerve deficit.      Sensory: No sensory deficit.      Motor: No weakness.         ED Course                 Procedures                EKG Interpretation:      Interpreted by Austin Zhou MD  EKG done at 1030 hrs. demonstrates a sinus rhythm 72 bpm normal axis.  No ST change.  No T wave changes.  Poor R progression.  No acute waves.  Normal intervals with exception of a prolonged .  No ectopy.  Normal conduction.  Impression sinus rhythm 72 bpm poor R progression probable prior anterior septal MI.  No other change on EKG        Critical Care time:  none                Results for orders placed or performed during the hospital encounter of 04/21/22 (from the past 24 hour(s))   [place] Draw *Canceled*    Narrative    The following orders were created for panel order [place] Draw.  Procedure                               Abnormality         Status                     ---------                               -----------         ------                       Please view results for these tests on the individual orders.   CBC with platelets differential    Narrative    The following orders were created for panel order CBC with platelets differential.  Procedure                               Abnormality         Status                     ---------                               -----------         ------                     CBC with platelets and d...[[id number]]  Abnormal            Final result                 Please view results for these tests on the individual orders.   Comprehensive metabolic panel   Result Value Ref Range    Sodium 138 133 - 144 mmol/L    Potassium 4.6 3.4 - 5.3 mmol/L    Chloride 108 94 - 109 mmol/L    Carbon Dioxide (CO2) 23 20 - 32 mmol/L    Anion Gap 7 3 - 14 mmol/L    Urea Nitrogen 21 7 - 30 mg/dL    Creatinine 0.85 0.66 - 1.25 mg/dL    Calcium 8.8 8.5 - 10.1 mg/dL    Glucose 222 (H) 70 - 99 mg/dL    Alkaline Phosphatase 162 (H) 40 - 150 U/L    AST 12 0 - 45 U/L    ALT 17 0 - 70 U/L    Protein Total 7.6 6.8 - 8.8 g/dL    Albumin 3.5 3.4 - 5.0 g/dL    Bilirubin Total 0.4 0.2 - 1.3 mg/dL    GFR Estimate >90 >60 mL/min/1.73m2   TSH with free T4 reflex   Result Value Ref Range    TSH 1.24 0.40 - 4.00 mU/L   [facility] Draw    Narrative    The following orders were created for panel order [place] Draw.  Procedure                               Abnormality         Status                     ---------                               -----------         ------                     Extra Blue Top Tube[[id number]]                              Final result               Extra  Red Top Tube[[id number]]                                                          Extra Green Top (Lithium...[[id number]]                      Final result               Extra Purple Top Tube[[id number]]                            Final result                 Please view results for these tests on the individual orders.   CBC with platelets and differential   Result Value Ref Range    WBC Count 8.7 4.0 - 11.0 10e3/uL    RBC Count 3.35 (L) 4.40 - 5.90 10e6/uL    Hemoglobin 10.7 (L) 13.3 - 17.7 g/dL    Hematocrit 32.1 (L) 40.0 - 53.0 %    MCV 96 78 - 100 fL    MCH 31.9 26.5 - 33.0 pg    MCHC 33.3 31.5 - 36.5 g/dL    RDW 13.3 10.0 - 15.0 %    Platelet Count 257 150 - 450 10e3/uL    % Neutrophils 49 %    % Lymphocytes 39 %    % Monocytes 7 %    % Eosinophils 4 %    % Basophils 1 %    % Immature Granulocytes 0 %    NRBCs per 100 WBC 0 <1 /100    Absolute Neutrophils 4.3 1.6 - 8.3 10e3/uL    Absolute Lymphocytes 3.4 0.8 - 5.3 10e3/uL    Absolute Monocytes 0.6 0.0 - 1.3 10e3/uL    Absolute Eosinophils 0.3 0.0 - 0.7 10e3/uL    Absolute Basophils 0.1 0.0 - 0.2 10e3/uL    Absolute Immature Granulocytes 0.0 <=0.4 10e3/uL    Absolute NRBCs 0.0 10e3/uL   Extra Blue Top Tube   Result Value Ref Range    Hold Specimen JIC    Extra Green Top (Lithium Heparin) Tube   Result Value Ref Range    Hold Specimen JIC    Extra Purple Top Tube   Result Value Ref Range    Hold Specimen JIC    Ferritin   Result Value Ref Range    Ferritin 27 26 - 388 ng/mL   Vitamin B12   Result Value Ref Range    Vitamin B12 418 193 - 986 pg/mL   Iron and iron binding capacity   Result Value Ref Range    Iron 88 35 - 180 ug/dL    Iron Binding Capacity 392 240 - 430 ug/dL    Iron Sat Index 22 15 - 46 %   NT pro BNP   Result Value Ref Range    N terminal Pro BNP Inpatient 444 0 - 900 pg/mL       Medications - No data to display    Assessments & Plan (with Medical Decision Making)     MDM: Francois Lujan is a 71 year old male presents with a history of  cardiovascular disease including prior CVA affecting numbness in his right hand and distal arm, history of coronary artery bypass, heavy alcohol use and binge type drinking that continues.  Presents here with a sense of palpitations in the last day.  Feels the skipping a beat.  Over the last 5 to 6 weeks has had some sense of orthostasis, and some sense of dyspnea on exertion.  Denies new weakness or neurologic changes.  No VTE risk.    Findings on laboratory testing also revealing new anemia down from 12-10 no obvious known blood in the stool black tarry stools.  MCV is about 94.  Plan for iron studies, B12.       I look at the monitor and see frequent PVCs and sometimes PACs.  However he is in sinus rhythm and typically without bradycardia.  We discussed cutting back on alcohol, following up in clinic, Zio monitor, reevaluation regarding anemia.  Additional recommendations as below.  Precautions given for return.      I have reviewed the nursing notes.    I have reviewed the findings, diagnosis, plan and need for follow up with the patient.       New Prescriptions    No medications on file       Final diagnoses:   Atrial ectopy   PVC's (premature ventricular contractions) - no serious findings.  given heart history will obtain zio monitor and follow-up primary provider.   Coronary artery disease involving native coronary artery of native heart without angina pectoris   Type 2 diabetes mellitus with microalbuminuria, without long-term current use of insulin (H) - glucose >200 today. watch blood sugars.   Acquired hypothyroidism - thyroid testing is reassuring.  stay on same replacement   Palpitations   Anemia, unspecified type - unclear cause.  decrease or eliminate alcohol. avoid caffeine. no ibuprofen.  await B12 vitamin level.  consider repeat colonoscopy - last normal in 2016.  follow-up next week for hgb recheck and discussion on labs       4/21/2022   [facility] EMERGENCY DEPT     Austin Zhou  MD BERNADETTE  04/21/22 [id number]

## 2022-04-25 ENCOUNTER — HOSPITAL ENCOUNTER (OUTPATIENT)
Dept: CARDIOLOGY | Facility: CLINIC | Age: 72
Discharge: HOME | End: 2022-04-25
Attending: FAMILY MEDICINE | Admitting: FAMILY MEDICINE
Payer: COMMERCIAL

## 2022-04-25 DIAGNOSIS — R00.2 PALPITATIONS: ICD-10-CM

## 2022-04-25 PROCEDURE — 93246 EXT ECG>7D<15D RECORDING: CPT

## 2022-05-06 ENCOUNTER — MYC MEDICAL ADVICE (OUTPATIENT)
Dept: FAMILY MEDICINE | Facility: CLINIC | Age: 72
End: 2022-05-06
Payer: COMMERCIAL

## 2022-05-06 ENCOUNTER — TELEPHONE (OUTPATIENT)
Dept: FAMILY MEDICINE | Facility: CLINIC | Age: 72
End: 2022-05-06
Payer: COMMERCIAL

## 2022-05-06 NOTE — TELEPHONE ENCOUNTER
Panel Management:    Patient is due for a diabetes check up.  They are due for  non-fasting blood work.  A1C and CBC    My chart message sent

## 2022-05-07 ENCOUNTER — HEALTH MAINTENANCE LETTER (OUTPATIENT)
Age: 72
End: 2022-05-07

## 2022-05-17 DIAGNOSIS — I49.1 PREMATURE ATRIAL CONTRACTION: Primary | ICD-10-CM

## 2022-05-17 NOTE — RESULT ENCOUNTER NOTE
The heart monitor shows frequent extra beats called PACs/PVCs.  The symptoms that you had corresponded to these irregular heartbeats.  These are not inherently dangerous rhythms, but can cause people symptoms.  Referral placed to cardiology to discuss if treatment is needed

## 2022-05-26 ENCOUNTER — OFFICE VISIT (OUTPATIENT)
Dept: CARDIOLOGY | Facility: CLINIC | Age: 72
End: 2022-05-26
Attending: INTERNAL MEDICINE
Payer: COMMERCIAL

## 2022-05-26 ENCOUNTER — IMMUNIZATION (OUTPATIENT)
Dept: FAMILY MEDICINE | Facility: CLINIC | Age: 72
End: 2022-05-26
Payer: COMMERCIAL

## 2022-05-26 VITALS
HEART RATE: 60 BPM | WEIGHT: 272.4 LBS | OXYGEN SATURATION: 99 % | DIASTOLIC BLOOD PRESSURE: 66 MMHG | BODY MASS INDEX: 36.94 KG/M2 | SYSTOLIC BLOOD PRESSURE: 140 MMHG

## 2022-05-26 DIAGNOSIS — I25.10 CORONARY ARTERY DISEASE INVOLVING NATIVE CORONARY ARTERY OF NATIVE HEART WITHOUT ANGINA PECTORIS: Primary | ICD-10-CM

## 2022-05-26 DIAGNOSIS — I49.1 PREMATURE ATRIAL CONTRACTION: ICD-10-CM

## 2022-05-26 PROCEDURE — 99204 OFFICE O/P NEW MOD 45 MIN: CPT | Performed by: INTERNAL MEDICINE

## 2022-05-26 PROCEDURE — 91306 COVID-19,PF,MODERNA (18+ YRS BOOSTER .25ML): CPT

## 2022-05-26 PROCEDURE — 0064A COVID-19,PF,MODERNA (18+ YRS BOOSTER .25ML): CPT

## 2022-05-26 NOTE — LETTER
5/26/2022    Teresitakarlee Alvarez, DO  [address] [facility] [address]    RE: Francois Lujan       Dear Colleague,     I had the pleasure of seeing Francois BERNADETTE Lujan in the [facility].           Vascular Cardiology Consultation      HPI:  This is a pleasant 71 year old with PMH CAD s/p CABG (LIMA-LAD, SVG-OM3, SVG-dRCA) 2012, CVA, carotid dz, HTN, HLD, depression, DMII, CKDII, PMR, ADELA, hypothyroidism, ETOH abuse in remission here for evaluation.     Was having exertional dyspnea and palpitations for last few weeks. 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 Very physical job and having dyspnea on occasion. Also occasional leg edema.    Visited ED 4/21/22. Had ziopatch placed. 14 day Zio 4/25/22: sinus w/ 1st degree AVB and BBB  with avg HR 74; 12 runs SVT with longest lasting 18 beats; freq PVCs (16.4% burden); symptoms reported associated with freq PVCs. Echo 2020: EF 55-60%; no WMAs; negative bubble study; no valve disease. Has not had repeat echocardiogram since.     He reports overall symptoms improved since cessation of beer. He denies chest pain or pressure. No syncope. Still with some leg edema.    ASSESSMENT/PLAN:     1. Dyspnea: could be related to PVCs but given history CABG history I'd like to evaluate for any drop in LV dysfunction as he is several years out from surgery.   -echocardiogram, if any wall motion abnormalities will perform ischemic evaluation    2. PVCs: on metoprolol 25 mg twice a day to continue  -I suspect lifestyle changes have helped his symptoms improve  -echocardiogram as above  -discussed increasing beta blocker which patient would like to defer on at this time     Can follow up with cardiology in one year, sooner if needed based on echo findings.    Total time spent with patient and evaluation, documentation and review of records 40 minutes.    Patricia Higgins MD MSC  [facility]      PAST MEDICAL HISTORY  Past Medical History:    Diagnosis Date     Arthritis      CAD (coronary artery disease)      Diabetes mellitus (H)      Erythema nodosum 1982     Gout      Hypertension      Malignant neoplasm (H)     squamous cell CA removed from right hand     Thyroid disease        CURRENT MEDICATIONS  Current Outpatient Medications   Medication Sig Dispense Refill     amLODIPine (NORVASC) 10 MG tablet Take 1 tablet (10 mg) by mouth daily 90 tablet 3     atorvastatin (LIPITOR) 80 MG tablet Take 1 tablet (80 mg) by mouth daily 90 tablet 3     blood glucose (ACCU-CHEK SMARTVIEW) test strip Use to test blood sugar 3 times daily or as directed. 100 each 12     blood glucose (NO BRAND SPECIFIED) lancets standard Use to test blood sugar 3 times daily or as directed. 1 each 11     clopidogrel (PLAVIX) 75 MG tablet Take 1 tablet (75 mg) by mouth daily Please follow up with primary care provider for further refills. 90 tablet 0     glipiZIDE (GLUCOTROL XL) 10 MG 24 hr tablet Take 1 tablet (10 mg) by mouth 2 times daily 180 tablet 3     levothyroxine (SYNTHROID) 50 MCG tablet Take 1 tablet (50 mcg) by mouth daily 90 tablet 3     metFORMIN (GLUCOPHAGE) 500 MG tablet Take 2 tablets (1,000 mg) by mouth 2 times daily (with meals) 360 tablet 3     metoprolol tartrate (LOPRESSOR) 25 MG tablet Take 1 tablet (25 mg) by mouth 2 times daily 180 tablet 3     testosterone (ANDROGEL/TESTIM) 50 MG/5GM (1%) topical gel Place 1 packet (50 mg of testosterone) onto the skin daily 150 g 0     valsartan (DIOVAN) 160 MG tablet Take 1 tablet (160 mg) by mouth daily 90 tablet 3       PAST SURGICAL HISTORY:  Past Surgical History:   Procedure Laterality Date     BACK SURGERY       BYPASS GRAFT ARTERY CORONARY  11/17/2011    Procedure:BYPASS GRAFT ARTERY CORONARY; CORONARY ARTERY BYPASS, Right, OM, LAD WITH ENDOVEIN HARVEST, ON PUMP; Surgeon:LEONEL MG; Location:[facility]     COLONOSCOPY N/A 6/9/2016    Procedure: COLONOSCOPY;  Surgeon: Isidro Humphreys MD;  Location: [facility]     HERNIA  REPAIR      infantile hernia repair     ORTHOPEDIC SURGERY      Baker cyst removed - right knee, and mesiscus tear repair     [place] LAMINEC/FACETECT/FORAMIN,LUMBAR 1 SEG      Description: Laminectomy Decompress, Facetectomy, Foraminotomy Lumbar Seg;  Recorded: 2007;  Comments: '71     KS MASTECTOMY FOR GYNECOMASTIA      Description: Breast Surgery Mastectomy For Gynecomastia Bilateral;  Recorded: 2007;     REPAIR TENDON TRICEPS UPPER EXTREMITY Left 2017    Procedure: REPAIR TENDON TRICEPS UPPER EXTREMITY;  Surgeon: Rodriguez Kelley MD;  Location: [facility]     [facility] CABG, VEIN, SINGLE      Description: CABG (CABG);  Proc Date: 2011;  Comments: 3 vessel       ALLERGIES     Allergies   Allergen Reactions     Hydrocodone-Acetaminophen Itching     Iodine      Irbesartan      renal insuff and hyperkalemia       Lisinopril Nausea     Sulfa Drugs      Valsartan Difficulty breathing       FAMILY HISTORY  Family History   Problem Relation Age of Onset     Heart Disease Father      Cancer Mother      C.A.D. Brother      Septicemia Brother 3     Lung Cancer Sister      Schizophrenia Sister        SOCIAL HISTORY  Social History     Socioeconomic History     Marital status:      Spouse name: Not on file     Number of children: Not on file     Years of education: Not on file     Highest education level: Not on file   Occupational History     Not on file   Tobacco Use     Smoking status: Former Smoker     Packs/day: 2.00     Years: 30.00     Pack years: 60.00     Quit date: 1997     Years since quittin.5     Smokeless tobacco: Never Used   Vaping Use     Vaping Use: Never used   Substance and Sexual Activity     Alcohol use: Yes     Alcohol/week: 10.0 standard drinks     Types: 12 Cans of beer per week     Comment: on the weekends about 5 drinks a weekend      Drug use: No     Sexual activity: Yes   Other Topics Concern     Parent/sibling w/ CABG, MI or angioplasty before 65F 55M? 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     Hobby Hazards No     Sleep Concern Yes     Comment:  apnea     Stress Concern Yes     Weight Concern No     Comment:  pt lost 35lbs     Special Diet No     Back Care No     Exercise No     Bike Helmet No     Seat Belt Yes     Self-Exams No   Social History Narrative     Not on file     Social Determinants of Health     Financial Resource Strain: Not on file   Food Insecurity: Not on file   Transportation Needs: Not on file   Physical Activity: Not on file   Stress: Not on file   Social Connections: Not on file   Intimate Partner Violence: Not on file   Housing Stability: Not on file       ROS:   See HPI    EXAM:  BP (!) 140/66 (BP Location: Right arm, Patient Position: Sitting, Cuff Size: Adult Large)   Pulse 60   Wt 123.6 kg (272 lb 6.4 oz)   SpO2 99%   BMI 36.94 kg/m    In general, the patient is a pleasant male in no apparent distress.    HEENT: NC/AT.  PERRLA.  EOMI.  Sclerae white, not injected.    Neck: No adenopathy.  No thyromegaly. Carotids +2/2 bilaterally without bruits.  No jugular venous distension.   Heart: RRR. Normal S1, S2 splits physiologically. No murmur, rub, click, or gallop. The PMI is in the 5th ICS in the midclavicular line. There is no heave.    Lungs: CTA.  No ronchi, wheezes, rales.    Abdomen: Soft, nontender, nondistended. No organomegaly. No AAA.  No bruits.   Extremities: No clubbing, cyanosis, or edema.   Vascular: No bruits are noted.    Labs:  LIPID RESULTS:  Lab Results   Component Value Date    CHOL 168 07/08/2021    HDL 73 07/08/2021    LDL 76 07/08/2021    TRIG 93 07/08/2021    CHOLHDLRATIO 3.0 12/10/2013    NHDL 95 07/08/2021       LIVER ENZYME RESULTS:  Lab Results   Component Value Date    AST 12 04/21/2022    AST 24 05/29/2020    ALT 17 04/21/2022    ALT 26 05/29/2020       CBC RESULTS:  Lab Results   Component Value  Date    WBC 8.7 04/21/2022    WBC 8.3 08/02/2020    RBC 3.35 (L) 04/21/2022    RBC 3.78 (L) 08/02/2020    HGB 10.7 (L) 04/21/2022    HGB 12.2 (L) 08/02/2020    HCT 32.1 (L) 04/21/2022    HCT 35.7 (L) 08/02/2020    MCV 96 04/21/2022    MCV 94 08/02/2020    MCH 31.9 04/21/2022    MCH 32.3 08/02/2020    MCHC 33.3 04/21/2022    MCHC 34.2 08/02/2020    RDW 13.3 04/21/2022    RDW 12.5 08/02/2020     04/21/2022     08/02/2020       BMP RESULTS:  Lab Results   Component Value Date     04/21/2022     07/08/2021    POTASSIUM 4.6 04/21/2022    POTASSIUM 5.1 07/08/2021    CHLORIDE 108 04/21/2022    CHLORIDE 107 07/08/2021    CO2 23 04/21/2022    CO2 23 07/08/2021    ANIONGAP 7 04/21/2022    ANIONGAP 7 07/08/2021     (H) 04/21/2022     (H) 07/08/2021    BUN 21 04/21/2022    BUN 28 07/08/2021    CR 0.85 04/21/2022    CR 0.92 07/08/2021    GFRESTIMATED >90 04/21/2022    GFRESTIMATED 83 07/08/2021    GFRESTBLACK >90 07/08/2021    AFRICA 8.8 04/21/2022    AFRICA 8.9 07/08/2021        A1C RESULTS:  Lab Results   Component Value Date    A1C 10.5 (H) 01/06/2022    A1C 6.9 (H) 07/08/2021       Thank you for allowing me to participate in the care of your patient.      Sincerely,     Patricia Higgins MD     [facility]  cc:   Teresita Alvarez, DO  [address] [place] [address]

## 2022-05-26 NOTE — PROGRESS NOTES
Vascular Cardiology Consultation      HPI:  This is a pleasant 71 year old with PMH CAD s/p CABG (LIMA-LAD, SVG-OM3, SVG-dRCA) 2012, CVA, carotid dz, HTN, HLD, depression, DMII, CKDII, PMR, ADELA, hypothyroidism, ETOH abuse in remission here for evaluation.     Was having exertional dyspnea and palpitations for last few weeks. 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 Also occasional leg edema.    Visited ED 4/21/22. Had ziopatch placed. 14 day Zio 4/25/22: sinus w/ 1st degree AVB and BBB  with avg HR 74; 12 runs SVT with longest lasting 18 beats; freq PVCs (16.4% burden); symptoms reported associated with freq PVCs. Echo 2020: EF 55-60%; no WMAs; negative bubble study; no valve disease. Has not had repeat echocardiogram since.     He reports overall symptoms improved since cessation of beer. He denies chest pain or pressure. No syncope. Still with some leg edema.    ASSESSMENT/PLAN:     1. Dyspnea: could be related to PVCs but given history CABG history I'd like to evaluate for any drop in LV dysfunction as he is several years out from surgery.   -echocardiogram, if any wall motion abnormalities will perform ischemic evaluation    2. PVCs: on metoprolol 25 mg twice a day to continue  -I suspect lifestyle changes have helped his symptoms improve  -echocardiogram as above  -discussed increasing beta blocker which patient would like to defer on at this time     Can follow up with cardiology in one year, sooner if needed based on echo findings.    Total time spent with patient and evaluation, documentation and review of records 40 minutes.    Patricia Higgins MD MSC  [facility]      PAST MEDICAL HISTORY  Past Medical History:   Diagnosis Date     Arthritis      CAD (coronary artery disease)      Diabetes mellitus (H)      Erythema nodosum 1982     Gout      Hypertension      Malignant neoplasm (H)     squamous cell CA removed from right hand      Thyroid disease        CURRENT MEDICATIONS  Current Outpatient Medications   Medication Sig Dispense Refill     amLODIPine (NORVASC) 10 MG tablet Take 1 tablet (10 mg) by mouth daily 90 tablet 3     atorvastatin (LIPITOR) 80 MG tablet Take 1 tablet (80 mg) by mouth daily 90 tablet 3     blood glucose (ACCU-CHEK SMARTVIEW) test strip Use to test blood sugar 3 times daily or as directed. 100 each 12     blood glucose (NO BRAND SPECIFIED) lancets standard Use to test blood sugar 3 times daily or as directed. 1 each 11     clopidogrel (PLAVIX) 75 MG tablet Take 1 tablet (75 mg) by mouth daily Please follow up with primary care provider for further refills. 90 tablet 0     glipiZIDE (GLUCOTROL XL) 10 MG 24 hr tablet Take 1 tablet (10 mg) by mouth 2 times daily 180 tablet 3     levothyroxine (SYNTHROID) 50 MCG tablet Take 1 tablet (50 mcg) by mouth daily 90 tablet 3     metFORMIN (GLUCOPHAGE) 500 MG tablet Take 2 tablets (1,000 mg) by mouth 2 times daily (with meals) 360 tablet 3     metoprolol tartrate (LOPRESSOR) 25 MG tablet Take 1 tablet (25 mg) by mouth 2 times daily 180 tablet 3     testosterone (ANDROGEL/TESTIM) 50 MG/5GM (1%) topical gel Place 1 packet (50 mg of testosterone) onto the skin daily 150 g 0     valsartan (DIOVAN) 160 MG tablet Take 1 tablet (160 mg) by mouth daily 90 tablet 3       PAST SURGICAL HISTORY:  Past Surgical History:   Procedure Laterality Date     BACK SURGERY       BYPASS GRAFT ARTERY CORONARY  11/17/2011    Procedure:BYPASS GRAFT ARTERY CORONARY; CORONARY ARTERY BYPASS, Right, OM, LAD WITH ENDOVEIN HARVEST, ON PUMP; Surgeon:LEONEL MG; Location: OR     COLONOSCOPY N/A 6/9/2016    Procedure: COLONOSCOPY;  Surgeon: Isidro Humphreys MD;  Location: [facility]     HERNIA REPAIR      infantile hernia repair     ORTHOPEDIC SURGERY      Baker cyst removed - right knee, and mesiscus tear repair     [place] LAMINEC/FACETECT/FORAMIN,LUMBAR 1 SEG      Description: Laminectomy Decompress, Facetectomy,  Foraminotomy Lumbar Seg;  Recorded: 2007;  Comments: '71     CT MASTECTOMY FOR GYNECOMASTIA      Description: Breast Surgery Mastectomy For Gynecomastia Bilateral;  Recorded: 2007;     REPAIR TENDON TRICEPS UPPER EXTREMITY Left 2017    Procedure: REPAIR TENDON TRICEPS UPPER EXTREMITY;  Surgeon: Rodriguez Kelley MD;  Location: [facility]     [facility] CABG, VEIN, SINGLE      Description: CABG (CABG);  Proc Date: 2011;  Comments: 3 vessel       ALLERGIES     Allergies   Allergen Reactions     Hydrocodone-Acetaminophen Itching     Iodine      Irbesartan      renal insuff and hyperkalemia       Lisinopril Nausea     Sulfa Drugs      Valsartan Difficulty breathing       FAMILY HISTORY  Family History   Problem Relation Age of Onset     Heart Disease Father      Cancer Mother      C.A.D. Brother      Septicemia Brother 3     Lung Cancer Sister      Schizophrenia Sister        SOCIAL HISTORY  Social History     Socioeconomic History     Marital status:      Spouse name: Not on file     Number of children: Not on file     Years of education: Not on file     Highest education level: Not on file   Occupational History     Not on file   Tobacco Use     Smoking status: Former Smoker     Packs/day: 2.00     Years: 30.00     Pack years: 60.00     Quit date: 1997     Years since quittin.5     Smokeless tobacco: Never Used   Vaping Use     Vaping Use: Never used   Substance and Sexual Activity     Alcohol use: Yes     Alcohol/week: 10.0 standard drinks     Types: 12 Cans of beer per week     Comment: on the weekends about 5 drinks a weekend      Drug use: No     Sexual activity: Yes   Other Topics Concern     Parent/sibling w/ CABG, MI or angioplasty before 65F 55M? 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     Hobby Hazards No     Sleep Concern Yes     Comment:  apnea      Stress Concern Yes     Weight Concern No     Comment:  pt lost 35lbs     Special Diet No     Back Care No     Exercise No     Bike Helmet No     Seat Belt Yes     Self-Exams No   Social History Narrative     Not on file     Social Determinants of Health     Financial Resource Strain: Not on file   Food Insecurity: Not on file   Transportation Needs: Not on file   Physical Activity: Not on file   Stress: Not on file   Social Connections: Not on file   Intimate Partner Violence: Not on file   Housing Stability: Not on file       ROS:   See HPI    EXAM:  BP (!) 140/66 (BP Location: Right arm, Patient Position: Sitting, Cuff Size: Adult Large)   Pulse 60   Wt 123.6 kg (272 lb 6.4 oz)   SpO2 99%   BMI 36.94 kg/m    In general, the patient is a pleasant male in no apparent distress.    HEENT: NC/AT.  PERRLA.  EOMI.  Sclerae white, not injected.    Neck: No adenopathy.  No thyromegaly. Carotids +2/2 bilaterally without bruits.  No jugular venous distension.   Heart: RRR. Normal S1, S2 splits physiologically. No murmur, rub, click, or gallop. The PMI is in the 5th ICS in the midclavicular line. There is no heave.    Lungs: CTA.  No ronchi, wheezes, rales.    Abdomen: Soft, nontender, nondistended. No organomegaly. No AAA.  No bruits.   Extremities: No clubbing, cyanosis, or edema.   Vascular: No bruits are noted.    Labs:  LIPID RESULTS:  Lab Results   Component Value Date    CHOL 168 07/08/2021    HDL 73 07/08/2021    LDL 76 07/08/2021    TRIG 93 07/08/2021    CHOLHDLRATIO 3.0 12/10/2013    NHDL 95 07/08/2021       LIVER ENZYME RESULTS:  Lab Results   Component Value Date    AST 12 04/21/2022    AST 24 05/29/2020    ALT 17 04/21/2022    ALT 26 05/29/2020       CBC RESULTS:  Lab Results   Component Value Date    WBC 8.7 04/21/2022    WBC 8.3 08/02/2020    RBC 3.35 (L) 04/21/2022    RBC 3.78 (L) 08/02/2020    HGB 10.7 (L) 04/21/2022    HGB 12.2 (L) 08/02/2020    HCT 32.1 (L) 04/21/2022    HCT 35.7 (L) 08/02/2020    MCV  96 04/21/2022    MCV 94 08/02/2020    MCH 31.9 04/21/2022    MCH 32.3 08/02/2020    MCHC 33.3 04/21/2022    MCHC 34.2 08/02/2020    RDW 13.3 04/21/2022    RDW 12.5 08/02/2020     04/21/2022     08/02/2020       BMP RESULTS:  Lab Results   Component Value Date     04/21/2022     07/08/2021    POTASSIUM 4.6 04/21/2022    POTASSIUM 5.1 07/08/2021    CHLORIDE 108 04/21/2022    CHLORIDE 107 07/08/2021    CO2 23 04/21/2022    CO2 23 07/08/2021    ANIONGAP 7 04/21/2022    ANIONGAP 7 07/08/2021     (H) 04/21/2022     (H) 07/08/2021    BUN 21 04/21/2022    BUN 28 07/08/2021    CR 0.85 04/21/2022    CR 0.92 07/08/2021    GFRESTIMATED >90 04/21/2022    GFRESTIMATED 83 07/08/2021    GFRESTBLACK >90 07/08/2021    AFRICA 8.8 04/21/2022    AFRICA 8.9 07/08/2021        A1C RESULTS:  Lab Results   Component Value Date    A1C 10.5 (H) 01/06/2022    A1C 6.9 (H) 07/08/2021

## 2022-06-08 DIAGNOSIS — I25.10 CORONARY ARTERY DISEASE INVOLVING NATIVE CORONARY ARTERY OF NATIVE HEART WITHOUT ANGINA PECTORIS: ICD-10-CM

## 2022-06-08 DIAGNOSIS — I10 HTN, GOAL BELOW 140/90: ICD-10-CM

## 2022-06-08 DIAGNOSIS — I10 ESSENTIAL HYPERTENSION: ICD-10-CM

## 2022-06-08 DIAGNOSIS — R80.9 TYPE 2 DIABETES MELLITUS WITH MICROALBUMINURIA, WITHOUT LONG-TERM CURRENT USE OF INSULIN (H): ICD-10-CM

## 2022-06-08 DIAGNOSIS — E03.9 ACQUIRED HYPOTHYROIDISM: ICD-10-CM

## 2022-06-08 DIAGNOSIS — E11.29 TYPE 2 DIABETES MELLITUS WITH MICROALBUMINURIA, WITHOUT LONG-TERM CURRENT USE OF INSULIN (H): ICD-10-CM

## 2022-06-08 RX ORDER — VALSARTAN 160 MG/1
TABLET ORAL
Qty: 90 TABLET | Refills: 3 | Status: SHIPPED | OUTPATIENT
Start: 2022-06-08 | End: 2023-06-06

## 2022-06-08 RX ORDER — LEVOTHYROXINE SODIUM 50 UG/1
TABLET ORAL
Qty: 90 TABLET | Refills: 3 | Status: SHIPPED | OUTPATIENT
Start: 2022-06-08 | End: 2023-06-06

## 2022-06-08 RX ORDER — AMLODIPINE BESYLATE 10 MG/1
TABLET ORAL
Qty: 90 TABLET | Refills: 3 | Status: SHIPPED | OUTPATIENT
Start: 2022-06-08 | End: 2023-06-06

## 2022-06-08 RX ORDER — METOPROLOL TARTRATE 25 MG/1
TABLET, FILM COATED ORAL
Qty: 180 TABLET | Refills: 3 | Status: SHIPPED | OUTPATIENT
Start: 2022-06-08 | End: 2022-09-16 | Stop reason: SINTOL

## 2022-06-08 NOTE — TELEPHONE ENCOUNTER
Routing refill request to provider for review/approval because:  Labs not current:  A1C  Blood pressure is not at goal.  BP Readings from Last 6 Encounters:   05/26/22 (!) 140/66   04/21/22 (!) 151/71   01/06/22 (!) 158/66   07/08/21 138/62   04/08/21 (!) 144/62   03/28/21 (!) 165/61     Thank you    Antonina OVERTON RN

## 2022-06-16 ENCOUNTER — OFFICE VISIT (OUTPATIENT)
Dept: FAMILY MEDICINE | Facility: CLINIC | Age: 72
End: 2022-06-16

## 2022-06-16 ENCOUNTER — HOSPITAL ENCOUNTER (OUTPATIENT)
Dept: CARDIOLOGY | Facility: CLINIC | Age: 72
Discharge: HOME | End: 2022-06-16
Attending: INTERNAL MEDICINE | Admitting: INTERNAL MEDICINE
Payer: COMMERCIAL

## 2022-06-16 VITALS
SYSTOLIC BLOOD PRESSURE: 142 MMHG | WEIGHT: 270 LBS | RESPIRATION RATE: 16 BRPM | HEIGHT: 72 IN | TEMPERATURE: 97.1 F | HEART RATE: 54 BPM | BODY MASS INDEX: 36.57 KG/M2 | DIASTOLIC BLOOD PRESSURE: 62 MMHG | OXYGEN SATURATION: 98 %

## 2022-06-16 DIAGNOSIS — E23.0 HYPOGONADOTROPIC HYPOGONADISM (H): ICD-10-CM

## 2022-06-16 DIAGNOSIS — I25.10 CORONARY ARTERY DISEASE INVOLVING NATIVE CORONARY ARTERY OF NATIVE HEART WITHOUT ANGINA PECTORIS: ICD-10-CM

## 2022-06-16 DIAGNOSIS — E11.29 TYPE 2 DIABETES MELLITUS WITH MICROALBUMINURIA, WITHOUT LONG-TERM CURRENT USE OF INSULIN (H): ICD-10-CM

## 2022-06-16 DIAGNOSIS — R80.9 TYPE 2 DIABETES MELLITUS WITH MICROALBUMINURIA, WITHOUT LONG-TERM CURRENT USE OF INSULIN (H): ICD-10-CM

## 2022-06-16 DIAGNOSIS — Z13.220 SCREENING FOR HYPERLIPIDEMIA: ICD-10-CM

## 2022-06-16 DIAGNOSIS — D50.9 IRON DEFICIENCY ANEMIA, UNSPECIFIED IRON DEFICIENCY ANEMIA TYPE: Primary | ICD-10-CM

## 2022-06-16 DIAGNOSIS — G47.33 OSA (OBSTRUCTIVE SLEEP APNEA): ICD-10-CM

## 2022-06-16 DIAGNOSIS — I10 HTN, GOAL BELOW 140/90: ICD-10-CM

## 2022-06-16 LAB
CREAT UR-MCNC: 34 MG/DL
ERYTHROCYTE [DISTWIDTH] IN BLOOD BY AUTOMATED COUNT: 13.1 % (ref 10–15)
FERRITIN SERPL-MCNC: 33 NG/ML (ref 26–388)
HBA1C MFR BLD: 6.8 % (ref 0–5.6)
HCT VFR BLD AUTO: 33.4 % (ref 40–53)
HGB BLD-MCNC: 11.2 G/DL (ref 13.3–17.7)
IRON SERPL-MCNC: 71 UG/DL (ref 35–180)
LVEF ECHO: NORMAL
MCH RBC QN AUTO: 32.7 PG (ref 26.5–33)
MCHC RBC AUTO-ENTMCNC: 33.5 G/DL (ref 31.5–36.5)
MCV RBC AUTO: 98 FL (ref 78–100)
MICROALBUMIN UR-MCNC: 246 MG/L
MICROALBUMIN/CREAT UR: 723.53 MG/G CR (ref 0–17)
PLATELET # BLD AUTO: 222 10E3/UL (ref 150–450)
RBC # BLD AUTO: 3.42 10E6/UL (ref 4.4–5.9)
RETICS # AUTO: 0.05 10E6/UL (ref 0.03–0.1)
RETICS/RBC NFR AUTO: 1.4 % (ref 0.5–2)
WBC # BLD AUTO: 9.2 10E3/UL (ref 4–11)

## 2022-06-16 PROCEDURE — 99214 OFFICE O/P EST MOD 30 MIN: CPT | Performed by: INTERNAL MEDICINE

## 2022-06-16 PROCEDURE — 85045 AUTOMATED RETICULOCYTE COUNT: CPT | Performed by: INTERNAL MEDICINE

## 2022-06-16 PROCEDURE — 36415 COLL VENOUS BLD VENIPUNCTURE: CPT | Performed by: INTERNAL MEDICINE

## 2022-06-16 PROCEDURE — 82728 ASSAY OF FERRITIN: CPT | Performed by: INTERNAL MEDICINE

## 2022-06-16 PROCEDURE — 93306 TTE W/DOPPLER COMPLETE: CPT | Mod: 26 | Performed by: INTERNAL MEDICINE

## 2022-06-16 PROCEDURE — 83036 HEMOGLOBIN GLYCOSYLATED A1C: CPT | Performed by: INTERNAL MEDICINE

## 2022-06-16 PROCEDURE — 93306 TTE W/DOPPLER COMPLETE: CPT

## 2022-06-16 PROCEDURE — 82043 UR ALBUMIN QUANTITATIVE: CPT | Performed by: INTERNAL MEDICINE

## 2022-06-16 PROCEDURE — 85027 COMPLETE CBC AUTOMATED: CPT | Performed by: INTERNAL MEDICINE

## 2022-06-16 PROCEDURE — 83540 ASSAY OF IRON: CPT | Performed by: INTERNAL MEDICINE

## 2022-06-16 RX ORDER — VALSARTAN 320 MG/1
320 TABLET ORAL DAILY
Refills: 3 | Status: CANCELLED | OUTPATIENT
Start: 2022-06-16

## 2022-06-16 NOTE — PATIENT INSTRUCTIONS
Hypertension:  Blood pressures not well controlled.  Increase valsartan from 160 mg to 320 mg - you declined for now  If we continue to see blood pressure running higher, would recommend the increase in med    Diabetes:  Continue current meds   If you start having more frequent low blood sugar, follow-up with DR. Alvarez     Fatigue:  May be due to low Testosterone  Be sure to use topical T daily  Recommend recheck T levels in about 1 month - check fasting before 8 am

## 2022-06-16 NOTE — RESULT ENCOUNTER NOTE
A1c discussed at the time of visit.  Iron levels are normal, recommend to continue the iron replacement since it seems to be helping the hemoglobin.  There is less protein in the urine compared to 1 year ago which is good.
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
## 2022-06-17 NOTE — RESULT ENCOUNTER NOTE
EF 60%; no valve disease; asc aorta borderline dilated at 3.7 cm; no changes from previous per reader. Pt notified of results via [facility]

## 2022-06-21 DIAGNOSIS — E23.0 HYPOGONADOTROPIC HYPOGONADISM (H): ICD-10-CM

## 2022-06-21 RX ORDER — TESTOSTERONE GEL, 1% 10 MG/G
50 GEL TRANSDERMAL DAILY
Qty: 150 G | Refills: 0 | Status: SHIPPED | OUTPATIENT
Start: 2022-06-21 | End: 2022-09-20

## 2022-06-21 NOTE — TELEPHONE ENCOUNTER
Last visit: 11/30/2020 for hypogonadism  To provider for approval    Ada LEAHY RN  [facility]

## 2022-06-21 NOTE — TELEPHONE ENCOUNTER
Requested Prescriptions   Pending Prescriptions Disp Refills     testosterone (ANDROGEL/TESTIM) 50 MG/5GM (1%) topical gel 150 g 0     Sig: Place 1 packet (50 mg of testosterone) onto the skin daily       There is no refill protocol information for this order        Last office visit: Visit date not found with prescribing provider:  Dr. Frey    Future Office Visit:          Gavino Pulliams  [facility]

## 2022-07-05 ENCOUNTER — TELEPHONE (OUTPATIENT)
Dept: FAMILY MEDICINE | Facility: CLINIC | Age: 72
End: 2022-07-05

## 2022-07-05 NOTE — TELEPHONE ENCOUNTER
Patient Quality Outreach    Patient is due for the following:   Hypertension -  Hypertension follow-up visit  Physical  - Due after 7/8/22    NEXT STEPS:   Schedule a yearly physical    Type of outreach:    Sent letter.      Questions for provider review:    None     Lamar Ornelas MA

## 2022-08-18 ENCOUNTER — TELEPHONE (OUTPATIENT)
Dept: CARDIOLOGY | Facility: CLINIC | Age: 72
End: 2022-08-18

## 2022-08-18 NOTE — TELEPHONE ENCOUNTER
[place] Community Measures Blood Pressure guideline reviewed.  Patients recent blood pressure is outside of guideline parameters.  Called pt to review, no answer.  Left voicemail message asking patient to check their blood pressure using a home blood pressure cuff or by going to a [facility].  Patient instructed to then call [phone] ([place]) and leave a message with their name, date of birth, and blood pressure reading that was completed within the last 24 hours and where it was completed.  Will await call back for further review.    SOURAV Rich

## 2022-08-22 ENCOUNTER — APPOINTMENT (OUTPATIENT)
Dept: GENERAL RADIOLOGY | Facility: CLINIC | Age: 72
DRG: 194 | End: 2022-08-22
Attending: FAMILY MEDICINE
Payer: COMMERCIAL

## 2022-08-22 ENCOUNTER — HOSPITAL ENCOUNTER (INPATIENT)
Facility: CLINIC | Age: 72
LOS: 3 days | Discharge: HOME | DRG: 194 | End: 2022-08-26
Attending: FAMILY MEDICINE | Admitting: HOSPITALIST
Payer: COMMERCIAL

## 2022-08-22 DIAGNOSIS — M25.50 MULTIPLE JOINT PAIN: ICD-10-CM

## 2022-08-22 DIAGNOSIS — Z87.891 PERSONAL HISTORY OF TOBACCO USE, PRESENTING HAZARDS TO HEALTH: ICD-10-CM

## 2022-08-22 DIAGNOSIS — R06.02 SHORTNESS OF BREATH: ICD-10-CM

## 2022-08-22 DIAGNOSIS — R50.9 FEVER, UNKNOWN ORIGIN: ICD-10-CM

## 2022-08-22 DIAGNOSIS — M35.3 POLYMYALGIA RHEUMATICA (H): ICD-10-CM

## 2022-08-22 DIAGNOSIS — J18.9 COMMUNITY ACQUIRED PNEUMONIA OF LEFT LOWER LOBE OF LUNG: ICD-10-CM

## 2022-08-22 DIAGNOSIS — M10.9 ACUTE GOUTY ARTHROPATHY: ICD-10-CM

## 2022-08-22 DIAGNOSIS — J18.9 COMMUNITY ACQUIRED PNEUMONIA, UNSPECIFIED LATERALITY: Primary | ICD-10-CM

## 2022-08-22 DIAGNOSIS — Z11.52 ENCOUNTER FOR SCREENING LABORATORY TESTING FOR SEVERE ACUTE RESPIRATORY SYNDROME CORONAVIRUS 2 (SARS-COV-2): ICD-10-CM

## 2022-08-22 PROBLEM — E66.01 MORBID OBESITY (H): Status: ACTIVE | Noted: 2018-11-20

## 2022-08-22 LAB
ALBUMIN SERPL-MCNC: 3.5 G/DL (ref 3.4–5)
ALBUMIN UR-MCNC: 100 MG/DL
ALP SERPL-CCNC: 188 U/L (ref 40–150)
ALT SERPL W P-5'-P-CCNC: 18 U/L (ref 0–70)
ANION GAP SERPL CALCULATED.3IONS-SCNC: 9 MMOL/L (ref 3–14)
APPEARANCE UR: CLEAR
AST SERPL W P-5'-P-CCNC: 21 U/L (ref 0–45)
BASOPHILS # BLD AUTO: 0 10E3/UL (ref 0–0.2)
BASOPHILS NFR BLD AUTO: 0 %
BILIRUB SERPL-MCNC: 1.6 MG/DL (ref 0.2–1.3)
BILIRUB UR QL STRIP: NEGATIVE
BUN SERPL-MCNC: 18 MG/DL (ref 7–30)
CALCIUM SERPL-MCNC: 8.3 MG/DL (ref 8.5–10.1)
CHLORIDE BLD-SCNC: 98 MMOL/L (ref 94–109)
CO2 SERPL-SCNC: 27 MMOL/L (ref 20–32)
COLOR UR AUTO: ABNORMAL
CREAT SERPL-MCNC: 1.04 MG/DL (ref 0.66–1.25)
CRP SERPL-MCNC: 195 MG/L (ref 0–8)
EOSINOPHIL # BLD AUTO: 0.1 10E3/UL (ref 0–0.7)
EOSINOPHIL NFR BLD AUTO: 1 %
ERYTHROCYTE [DISTWIDTH] IN BLOOD BY AUTOMATED COUNT: 13.4 % (ref 10–15)
FLUAV RNA SPEC QL NAA+PROBE: NEGATIVE
FLUBV RNA RESP QL NAA+PROBE: NEGATIVE
GFR SERPL CREATININE-BSD FRML MDRD: 77 ML/MIN/1.73M2
GLUCOSE BLD-MCNC: 187 MG/DL (ref 70–99)
GLUCOSE UR STRIP-MCNC: NEGATIVE MG/DL
HCT VFR BLD AUTO: 35.7 % (ref 40–53)
HGB BLD-MCNC: 11.9 G/DL (ref 13.3–17.7)
HGB UR QL STRIP: ABNORMAL
HOLD SPECIMEN: NORMAL
HOLD SPECIMEN: NORMAL
HYALINE CASTS: 1 /LPF
IMM GRANULOCYTES # BLD: 0.1 10E3/UL
IMM GRANULOCYTES NFR BLD: 1 %
KETONES UR STRIP-MCNC: NEGATIVE MG/DL
LACTATE SERPL-SCNC: 0.7 MMOL/L (ref 0.7–2)
LEUKOCYTE ESTERASE UR QL STRIP: NEGATIVE
LIPASE SERPL-CCNC: 109 U/L (ref 73–393)
LYMPHOCYTES # BLD AUTO: 2.3 10E3/UL (ref 0.8–5.3)
LYMPHOCYTES NFR BLD AUTO: 14 %
MCH RBC QN AUTO: 32 PG (ref 26.5–33)
MCHC RBC AUTO-ENTMCNC: 33.3 G/DL (ref 31.5–36.5)
MCV RBC AUTO: 96 FL (ref 78–100)
MONOCYTES # BLD AUTO: 1.9 10E3/UL (ref 0–1.3)
MONOCYTES NFR BLD AUTO: 11 %
MUCOUS THREADS #/AREA URNS LPF: PRESENT /LPF
NEUTROPHILS # BLD AUTO: 11.9 10E3/UL (ref 1.6–8.3)
NEUTROPHILS NFR BLD AUTO: 73 %
NITRATE UR QL: NEGATIVE
NRBC # BLD AUTO: 0 10E3/UL
NRBC BLD AUTO-RTO: 0 /100
NT-PROBNP SERPL-MCNC: 877 PG/ML (ref 0–900)
PH UR STRIP: 6 [PH] (ref 5–7)
PLATELET # BLD AUTO: 258 10E3/UL (ref 150–450)
POTASSIUM BLD-SCNC: 3.8 MMOL/L (ref 3.4–5.3)
PROCALCITONIN SERPL IA-MCNC: 0.21 NG/ML
PROT SERPL-MCNC: 8.7 G/DL (ref 6.8–8.8)
RBC # BLD AUTO: 3.72 10E6/UL (ref 4.4–5.9)
RBC URINE: 2 /HPF
SARS-COV-2 RNA RESP QL NAA+PROBE: NEGATIVE
SODIUM SERPL-SCNC: 134 MMOL/L (ref 133–144)
SP GR UR STRIP: 1.02 (ref 1–1.03)
SQUAMOUS EPITHELIAL: <1 /HPF
TROPONIN I SERPL HS-MCNC: 10 NG/L
UROBILINOGEN UR STRIP-MCNC: 4 MG/DL
WBC # BLD AUTO: 16.3 10E3/UL (ref 4–11)
WBC URINE: 1 /HPF

## 2022-08-22 PROCEDURE — 96374 THER/PROPH/DIAG INJ IV PUSH: CPT | Performed by: FAMILY MEDICINE

## 2022-08-22 PROCEDURE — 84484 ASSAY OF TROPONIN QUANT: CPT | Performed by: FAMILY MEDICINE

## 2022-08-22 PROCEDURE — 83605 ASSAY OF LACTIC ACID: CPT

## 2022-08-22 PROCEDURE — 99220 PR INITIAL OBSERVATION CARE,LEVEL III: CPT

## 2022-08-22 PROCEDURE — 87636 SARSCOV2 & INF A&B AMP PRB: CPT | Performed by: FAMILY MEDICINE

## 2022-08-22 PROCEDURE — 93010 ELECTROCARDIOGRAM REPORT: CPT | Performed by: FAMILY MEDICINE

## 2022-08-22 PROCEDURE — 96365 THER/PROPH/DIAG IV INF INIT: CPT

## 2022-08-22 PROCEDURE — G0378 HOSPITAL OBSERVATION PER HR: HCPCS

## 2022-08-22 PROCEDURE — 85025 COMPLETE CBC W/AUTO DIFF WBC: CPT | Performed by: FAMILY MEDICINE

## 2022-08-22 PROCEDURE — 250N000013 HC RX MED GY IP 250 OP 250 PS 637: Performed by: FAMILY MEDICINE

## 2022-08-22 PROCEDURE — 250N000013 HC RX MED GY IP 250 OP 250 PS 637

## 2022-08-22 PROCEDURE — 96361 HYDRATE IV INFUSION ADD-ON: CPT | Performed by: FAMILY MEDICINE

## 2022-08-22 PROCEDURE — 250N000011 HC RX IP 250 OP 636: Performed by: FAMILY MEDICINE

## 2022-08-22 PROCEDURE — 99285 EMERGENCY DEPT VISIT HI MDM: CPT | Mod: CS,25 | Performed by: FAMILY MEDICINE

## 2022-08-22 PROCEDURE — 83880 ASSAY OF NATRIURETIC PEPTIDE: CPT | Performed by: FAMILY MEDICINE

## 2022-08-22 PROCEDURE — 86140 C-REACTIVE PROTEIN: CPT | Performed by: FAMILY MEDICINE

## 2022-08-22 PROCEDURE — 93005 ELECTROCARDIOGRAM TRACING: CPT | Performed by: FAMILY MEDICINE

## 2022-08-22 PROCEDURE — 96361 HYDRATE IV INFUSION ADD-ON: CPT

## 2022-08-22 PROCEDURE — 84145 PROCALCITONIN (PCT): CPT

## 2022-08-22 PROCEDURE — 99285 EMERGENCY DEPT VISIT HI MDM: CPT | Mod: CS | Performed by: FAMILY MEDICINE

## 2022-08-22 PROCEDURE — 258N000003 HC RX IP 258 OP 636: Performed by: FAMILY MEDICINE

## 2022-08-22 PROCEDURE — 96375 TX/PRO/DX INJ NEW DRUG ADDON: CPT

## 2022-08-22 PROCEDURE — 36415 COLL VENOUS BLD VENIPUNCTURE: CPT

## 2022-08-22 PROCEDURE — C9803 HOPD COVID-19 SPEC COLLECT: HCPCS | Performed by: FAMILY MEDICINE

## 2022-08-22 PROCEDURE — 83690 ASSAY OF LIPASE: CPT | Performed by: FAMILY MEDICINE

## 2022-08-22 PROCEDURE — 81001 URINALYSIS AUTO W/SCOPE: CPT | Performed by: FAMILY MEDICINE

## 2022-08-22 PROCEDURE — 80053 COMPREHEN METABOLIC PANEL: CPT | Performed by: FAMILY MEDICINE

## 2022-08-22 PROCEDURE — 87205 SMEAR GRAM STAIN: CPT

## 2022-08-22 PROCEDURE — 36415 COLL VENOUS BLD VENIPUNCTURE: CPT | Performed by: FAMILY MEDICINE

## 2022-08-22 PROCEDURE — 71045 X-RAY EXAM CHEST 1 VIEW: CPT

## 2022-08-22 PROCEDURE — 87040 BLOOD CULTURE FOR BACTERIA: CPT | Performed by: FAMILY MEDICINE

## 2022-08-22 RX ORDER — SODIUM CHLORIDE 9 MG/ML
INJECTION, SOLUTION INTRAVENOUS CONTINUOUS
Status: DISCONTINUED | OUTPATIENT
Start: 2022-08-22 | End: 2022-08-22

## 2022-08-22 RX ORDER — ONDANSETRON 2 MG/ML
4 INJECTION INTRAMUSCULAR; INTRAVENOUS EVERY 30 MIN PRN
Status: DISCONTINUED | OUTPATIENT
Start: 2022-08-22 | End: 2022-08-22

## 2022-08-22 RX ORDER — ONDANSETRON 2 MG/ML
4 INJECTION INTRAMUSCULAR; INTRAVENOUS EVERY 6 HOURS PRN
Status: DISCONTINUED | OUTPATIENT
Start: 2022-08-22 | End: 2022-08-22

## 2022-08-22 RX ORDER — SODIUM CHLORIDE 9 MG/ML
INJECTION, SOLUTION INTRAVENOUS CONTINUOUS
Status: DISCONTINUED | OUTPATIENT
Start: 2022-08-22 | End: 2022-08-23

## 2022-08-22 RX ORDER — CEFTRIAXONE 1 G/1
1 INJECTION, POWDER, FOR SOLUTION INTRAMUSCULAR; INTRAVENOUS ONCE
Status: COMPLETED | OUTPATIENT
Start: 2022-08-22 | End: 2022-08-22

## 2022-08-22 RX ORDER — NICOTINE POLACRILEX 4 MG
15-30 LOZENGE BUCCAL
Status: DISCONTINUED | OUTPATIENT
Start: 2022-08-22 | End: 2022-08-26 | Stop reason: HOSPADM

## 2022-08-22 RX ORDER — ONDANSETRON 2 MG/ML
4 INJECTION INTRAMUSCULAR; INTRAVENOUS EVERY 6 HOURS PRN
Status: DISCONTINUED | OUTPATIENT
Start: 2022-08-22 | End: 2022-08-26 | Stop reason: HOSPADM

## 2022-08-22 RX ORDER — DEXTROSE MONOHYDRATE 25 G/50ML
25-50 INJECTION, SOLUTION INTRAVENOUS
Status: DISCONTINUED | OUTPATIENT
Start: 2022-08-22 | End: 2022-08-26 | Stop reason: HOSPADM

## 2022-08-22 RX ORDER — CEFTRIAXONE 1 G/1
1 INJECTION, POWDER, FOR SOLUTION INTRAMUSCULAR; INTRAVENOUS EVERY 24 HOURS
Status: DISCONTINUED | OUTPATIENT
Start: 2022-08-23 | End: 2022-08-26 | Stop reason: HOSPADM

## 2022-08-22 RX ORDER — AMOXICILLIN 250 MG
1-2 CAPSULE ORAL 2 TIMES DAILY
Status: DISCONTINUED | OUTPATIENT
Start: 2022-08-22 | End: 2022-08-26 | Stop reason: HOSPADM

## 2022-08-22 RX ORDER — ONDANSETRON 4 MG/1
4 TABLET, ORALLY DISINTEGRATING ORAL EVERY 6 HOURS PRN
Status: DISCONTINUED | OUTPATIENT
Start: 2022-08-22 | End: 2022-08-26 | Stop reason: HOSPADM

## 2022-08-22 RX ORDER — AMLODIPINE BESYLATE 10 MG/1
10 TABLET ORAL AT BEDTIME
Status: DISCONTINUED | OUTPATIENT
Start: 2022-08-22 | End: 2022-08-26 | Stop reason: HOSPADM

## 2022-08-22 RX ORDER — TESTOSTERONE GEL, 1% 10 MG/G
50 GEL TRANSDERMAL DAILY
Status: DISCONTINUED | OUTPATIENT
Start: 2022-08-23 | End: 2022-08-22 | Stop reason: RX

## 2022-08-22 RX ORDER — ATORVASTATIN CALCIUM 80 MG/1
80 TABLET, FILM COATED ORAL AT BEDTIME
Status: DISCONTINUED | OUTPATIENT
Start: 2022-08-22 | End: 2022-08-26 | Stop reason: HOSPADM

## 2022-08-22 RX ORDER — CLOPIDOGREL BISULFATE 75 MG/1
75 TABLET ORAL AT BEDTIME
Status: DISCONTINUED | OUTPATIENT
Start: 2022-08-22 | End: 2022-08-26 | Stop reason: HOSPADM

## 2022-08-22 RX ORDER — VALSARTAN 160 MG/1
160 TABLET ORAL AT BEDTIME
Status: DISCONTINUED | OUTPATIENT
Start: 2022-08-22 | End: 2022-08-26 | Stop reason: HOSPADM

## 2022-08-22 RX ORDER — METOPROLOL TARTRATE 25 MG/1
25 TABLET, FILM COATED ORAL AT BEDTIME
Status: DISCONTINUED | OUTPATIENT
Start: 2022-08-22 | End: 2022-08-26 | Stop reason: HOSPADM

## 2022-08-22 RX ORDER — ONDANSETRON 4 MG/1
4 TABLET, ORALLY DISINTEGRATING ORAL EVERY 6 HOURS PRN
Status: DISCONTINUED | OUTPATIENT
Start: 2022-08-22 | End: 2022-08-22

## 2022-08-22 RX ORDER — LEVOTHYROXINE SODIUM 50 UG/1
50 TABLET ORAL DAILY
Status: DISCONTINUED | OUTPATIENT
Start: 2022-08-23 | End: 2022-08-26 | Stop reason: HOSPADM

## 2022-08-22 RX ORDER — GLIPIZIDE 10 MG/1
10 TABLET, FILM COATED, EXTENDED RELEASE ORAL
Status: DISCONTINUED | OUTPATIENT
Start: 2022-08-23 | End: 2022-08-26 | Stop reason: HOSPADM

## 2022-08-22 RX ORDER — POLYETHYLENE GLYCOL 3350 17 G/17G
17 POWDER, FOR SOLUTION ORAL DAILY PRN
Status: DISCONTINUED | OUTPATIENT
Start: 2022-08-22 | End: 2022-08-26 | Stop reason: HOSPADM

## 2022-08-22 RX ADMIN — METOPROLOL TARTRATE 25 MG: 25 TABLET, FILM COATED ORAL at 22:53

## 2022-08-22 RX ADMIN — CEFTRIAXONE 1 G: 1 INJECTION, POWDER, FOR SOLUTION INTRAMUSCULAR; INTRAVENOUS at 15:31

## 2022-08-22 RX ADMIN — AZITHROMYCIN MONOHYDRATE 500 MG: 500 INJECTION, POWDER, LYOPHILIZED, FOR SOLUTION INTRAVENOUS at 16:10

## 2022-08-22 RX ADMIN — SENNOSIDES AND DOCUSATE SODIUM 1 TABLET: 50; 8.6 TABLET ORAL at 20:25

## 2022-08-22 RX ADMIN — SODIUM CHLORIDE 1000 ML: 9 INJECTION, SOLUTION INTRAVENOUS at 13:53

## 2022-08-22 RX ADMIN — VALSARTAN 160 MG: 160 TABLET ORAL at 22:56

## 2022-08-22 RX ADMIN — CLOPIDOGREL BISULFATE 75 MG: 75 TABLET ORAL at 22:53

## 2022-08-22 RX ADMIN — ATORVASTATIN CALCIUM 80 MG: 80 TABLET, FILM COATED ORAL at 22:52

## 2022-08-22 RX ADMIN — SODIUM CHLORIDE: 9 INJECTION, SOLUTION INTRAVENOUS at 18:44

## 2022-08-22 RX ADMIN — METFORMIN HYDROCHLORIDE 1000 MG: 500 TABLET, FILM COATED ORAL at 20:25

## 2022-08-22 RX ADMIN — AMLODIPINE BESYLATE 10 MG: 10 TABLET ORAL at 22:53

## 2022-08-22 RX ADMIN — IBUPROFEN 600 MG: 200 TABLET, FILM COATED ORAL at 13:52

## 2022-08-22 ASSESSMENT — ACTIVITIES OF DAILY LIVING (ADL)
ADLS_ACUITY_SCORE: 37
ADLS_ACUITY_SCORE: 35
ADLS_ACUITY_SCORE: 35
ADLS_ACUITY_SCORE: 37
ADLS_ACUITY_SCORE: 35
ADLS_ACUITY_SCORE: 37

## 2022-08-22 NOTE — H&P
[facility]    History and Physical  Hospital Medicine       Date of Admission:  8/22/2022  Date of Service: 8/22/2022     Assessment & Plan   Francois Lujan is a 71 year old male who presents on 8/22/2022 with fever, dyspnea for four days.     Community acquired pneumonia of left lower lobe of lung  Acute respiratory distress with hypoxia  4 days worsening cough productive of green sputum, febrile, fatigue, slight dyspnea.  On presentation chest x-ray shows patchy left lower lobe infiltrate suspicious for pneumonia.  Not requiring oxygen at rest, though spo2 dropped to mid 80s with ambulation.  On presentation, procalcitonin elevated 0.21, leukocytosis 16.3, , febrile to 102.2 Fahrenheit.  No sick contacts, no rigors, COVID and influenza tests negative. Initiated on ceftriaxone and azithromycin in ER.  -Continue azithromycin 500 mg IV every 24 hours, plan for at least 3 days of therapy  -Continue ceftriaxone 1 g IV every 24 hours  -Continuous pulse ox  -Oxygen available as needed to maintain SPO2 >89%  -Repeat CRP in a.m.  -Strep pneumo urine antigen  -Sputum culture in process  -Follow blood cultures    S/P Coronary artery bypass graft  History of ischemic VBA thalamic stroke, left  Coronary artery bypass x3 November 2012.  Acute ischemic VBA thalamus stroke August 2020.  Patient's only residual deficit is pins-and-needles sensation and right hand.  Currently stable on clopidogrel 75 mg daily.  No chest pain on presentation, BNP and troponin within normal limits on admission.  -Continue PTA clopidogrel 75 mg at bedtime    Essential hypertension  -Continue PTA amlodipine 10 mg at bedtime with hold parameters  -Continue PTA metoprolol tartrate 25 mg at bedtime with hold parameters  -Continue PTA valsartan 160 mg at bedtime with hold parameters    Polymyalgia rheumatica   Has been on prednisone in the past, not currently on home med list.  Patient denies any current symptoms.  CRP  currently elevated and polymyalgia rheumatica could be contributing, however previous CRPs were never >25, so suspect acute elevated CRP is due to infectious process above and not due to PMR.  No acute interventions.    Type 2 diabetes mellitus with microalbuminuria  Diabetic polyneuropathy   Managed at home with metformin 1000 mg twice daily and glipizide 10 mg daily.  Glucose on presentation elevated to 187.  Last hemoglobin A1c 6/16/2022 6.8.  -Continue PTA metformin  -Continue PTA glipizide  -Medium  sliding scale correction insulin available  -Monitor for hypoglycemia, treat accordingly    Acquired hypothyroidism  Stable, most recent TSH 4/21/2022 1.24.  Managed at home with levothyroxine 50 mcg daily.  -Continue PTA levothyroxine 50 mcg daily    Hyperlipidemia LDL goal <70  -Continue PTA atorvastatin 80 mg at bedtime    Obstructive sleep apnea  -May use home CPAP if available    Alcohol overuse  Patient used to drink daily, has decreased drinking to 2-3 beers every few days with friends.  No history of withdrawal.  Low suspicion patient will require CIWA, no current symptoms.    Obesity (BMI 35.0-39.9) with comorbidity  Contributes to overall morbidity and mortality    Moderate major depression   Chronic, controlled.  Not on any outpatient pharmacologic management.  No acute interventions.    Clinically Significant Risk Factors Present on Admission          # Hypocalcemia: Ca = 8.3 mg/dL (Ref range: 8.5 - 10.1 mg/dL) and/or iCa = N/A on admission, will replace as needed       # Platelet Defect: home medication list includes an antiplatelet medication  # Hypertension: home medication list includes antihypertensive(s)    # DMII: A1C = N/A within past 3 months         Diet:  regular diet  DVT Prophylaxis: Ambulate every shift, and continue PTA clopidogrel  Lazo Catheter: Not present  Code Status:  full code  Lines: PIV    Disposition Plan   Awaiting care coordination huddle, likely back home once dyspnea and  infection are resolving.  Entered: Ila Arce PA-C 08/22/2022, 3:57 PM     Status: Patient is appropriate for observation  Ila Arce PA-C        The patient's care was discussed with the Attending Physician, Dr. Jay Campbell, and the patient.    Primary Care Physician   Teresita Alvarez [phone]    History is obtained from the patient, who is a good historian, handoff from ER provider, and review of old records via the EMR.    History of Present Illness   Francois Lujan is a 71 year old male with past medical history of coronary artery disease status post cab, ischemic thalamic stroke, chronic kidney disease, type 2 diabetes mellitus with polyneuropathy, essential hypertension, hyperlipidemia, major depression, obesity, obstructive sleep apnea, polymyalgia rheumatica now presents on 8/22/2022 with shortness of breath, fever.    Patient had increased coughing, onset of dyspnea and fever beginning 4 days ago (Thursday).  His cough has been gradually worsening, now productive of a dark green sputum.  No hemoptysis.  Has some posterior lower lung pain with coughing fits, otherwise no pain with respiration.  Breathing is slightly worse with lying down, this is not new for him.  Has difficulty primarily with inspiration, no issues with expiration.  He denies any sick contacts, significant chills or rigors.  Endorses feeling some weight loss over the past 4 days due to decreased appetite.  Feels feverish now.  No history of chronic lung conditions, though did have a fungal lung infection about 25 years ago that he says he got from a car air conditioner.  No chest pain, headaches, dizziness.     Patient endorses baseline difficulty hearing, that has been worse due to the feeling of mucus buildup in his ears.  Denies any other headaches, sinus congestion, pain or discharge from eyes or nose.  Had some increased fatigue day of admission.  Denies a history of CHF, and endorses minimal baseline  lower extremity edema that is no worse than usual.    Upon arrival to the emergency department the patient received a lab work-up revealing a leukocytosis, anemia, elevated CRP.  Chest x-ray revealed patchy left lower lobe airspace disease.  The patient received a 1 L fluid bolus, azithromycin IV, ceftriaxone, and ibuprofen in the ER.    Review of Systems   Constitutional: See HPI.  Eyes: denies changes in vision, discharge, or pain in eyes  HENT: Feels baseline hearing loss is slightly worse due to the feeling of mucus buildup in ears.  Denies sore throat  Respiratory: See HPI  Cardiovascular: denies chest pain, heart palpitations, lightheadedness, syncope  Gastroenterology: denies constipation, diarrhea, GERD symptoms  Genitourinary: denies dysuria  Integumentary: no new rashes or skin changes  Musculoskeletal: denies new muscle pain or joint trauma  Neuro: denies numbness, tingling, headaches, tremor  Psychiatric: denies significant changes to mood    Past Medical History    Past Medical History:   Diagnosis Date     Erythema nodosum 1982     Malignant neoplasm (H)     squamous cell CA removed from right hand     Patient Active Problem List    Diagnosis Date Noted     Community acquired pneumonia of left lower lobe of lung 08/22/2022     Priority: Medium     Moderate major depression (H) 01/06/2022     Priority: Medium     Chronic idiopathic gout of multiple sites 04/08/2021     Priority: Medium     Carotid stenosis, right 08/11/2020     Priority: Medium     Acute ischemic VBA thalamic stroke, left (H) 08/02/2020     Priority: Medium     Obesity (BMI 35.0-39.9) with comorbidity (H) 11/20/2018     Priority: Medium     Benign neoplasm of colon 11/19/2018     Priority: Medium     Degeneration of lumbar or lumbosacral intervertebral disc 11/19/2018     Priority: Medium     Vitamin D deficiency 11/19/2018     Priority: Medium     Epistaxis 12/15/2017     Priority: Medium     Triceps tendon rupture, left, sequela  04/10/2017     Priority: Medium     Coronary artery disease involving native coronary artery of native heart without angina pectoris 03/03/2017     Priority: Medium     Alcohol dependence with other alcohol-induced disorder (H) 08/30/2016     Priority: Medium     Diabetic polyneuropathy associated with type 2 diabetes mellitus (H) 04/12/2016     Priority: Medium     CKD (chronic kidney disease) stage 2, GFR 60-89 ml/min 10/23/2015     Priority: Medium     Type 2 diabetes mellitus with microalbuminuria 10/23/2015     Priority: Medium     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     Priority: Medium     OA (osteoarthritis) of knee, right 04/29/2014     Priority: Medium     ACL (anterior cruciate ligament) tear 04/29/2014     Priority: Medium     Multiple joint pain 04/23/2014     Priority: Medium     History of back surgery 03/03/2014     Priority: Medium     Polymyalgia rheumatica (H) 03/03/2014     Priority: Medium     Moderate major depression (H) 02/11/2014     Priority: Medium     Radicular pain of lumbosacral region 12/31/2013     Priority: Medium     Numbness of hand, right 12/12/2013     Priority: Medium     Essential hypertension 09/17/2013     Priority: Medium     ADELA (obstructive sleep apnea) 07/11/2013     Priority: Medium     Hyperlipidemia LDL goal <70 05/28/2013     Priority: Medium     S/P CABG (coronary artery bypass graft) 11/29/2012     Priority: Medium     Hypogonadotropic hypogonadism (H) 11/29/2012     Priority: Medium     Acquired hypothyroidism 11/29/2012     Priority: Medium     Hard of hearing 11/29/2012     Priority: Medium      Past Surgical History   Past Surgical History:   Procedure Laterality Date     BACK SURGERY       BYPASS GRAFT ARTERY CORONARY  11/17/2011    Procedure:BYPASS GRAFT ARTERY CORONARY; CORONARY ARTERY BYPASS, Right, OM, LAD WITH ENDOVEIN HARVEST, ON PUMP; Surgeon:LEONEL MG; Location:[facility]     COLONOSCOPY N/A 6/9/2016    Procedure: COLONOSCOPY;  Surgeon: Isidro Humphreys MD;   Location: [facility]     HERNIA REPAIR      infantile hernia repair     ORTHOPEDIC SURGERY      Baker cyst removed - right knee, and mesiscus tear repair     AK LAMINEC/FACETECT/FORAMIN,LUMBAR 1 SEG      Description: Laminectomy Decompress, Facetectomy, Foraminotomy Lumbar Seg;  Recorded: 12/13/2007;  Comments: '71     AK MASTECTOMY FOR GYNECOMASTIA      Description: Breast Surgery Mastectomy For Gynecomastia Bilateral;  Recorded: 12/13/2007;     REPAIR TENDON TRICEPS UPPER EXTREMITY Left 4/11/2017    Procedure: REPAIR TENDON TRICEPS UPPER EXTREMITY;  Surgeon: Rodriguez Kelley MD;  Location: [facility] CABG, VEIN, SINGLE      Description: CABG (CABG);  Proc Date: 11/09/2011;  Comments: 3 vessel      Prior to Admission Medications   Prior to Admission Medications   Prescriptions Last Dose Informant Patient Reported? Taking?   amLODIPine (NORVASC) 10 MG tablet   No No   Sig: TAKE 1 TABLET BY MOUTH ONCE DAILY   atorvastatin (LIPITOR) 80 MG tablet   No No   Sig: Take 1 tablet (80 mg) by mouth daily   blood glucose (ACCU-CHEK SMARTVIEW) test strip   No No   Sig: Use to test blood sugar 3 times daily or as directed.   blood glucose (NO BRAND SPECIFIED) lancets standard   No No   Sig: Use to test blood sugar 3 times daily or as directed.   clopidogrel (PLAVIX) 75 MG tablet   No No   Sig: Take 1 tablet (75 mg) by mouth daily Please follow up with primary care provider for further refills.   glipiZIDE (GLUCOTROL XL) 10 MG 24 hr tablet   No No   Sig: Take 1 tablet (10 mg) by mouth 2 times daily   levothyroxine (SYNTHROID/LEVOTHROID) 50 MCG tablet   No No   Sig: TAKE 1 TABLET BY MOUTH EVERY MORNING ON AN EMPTY STOMACH   metFORMIN (GLUCOPHAGE) 500 MG tablet   No No   Sig: TAKE 2 TABLETS BY MOUTH TWICE DAILY WITH MEALS   metoprolol tartrate (LOPRESSOR) 25 MG tablet   No No   Sig: TAKE 1 TABLET BY MOUTH TWICE DAILY   Patient taking differently: Once daily   testosterone (ANDROGEL/TESTIM) 50 MG/5GM (1%) topical gel   No No    Sig: Place 1 packet (50 mg of testosterone) onto the skin daily Appointment needed for further refills: [phone]   valsartan (DIOVAN) 160 MG tablet   No No   Sig: TAKE 1 TABLET BY MOUTH ONCE DAILY      Facility-Administered Medications: None     Allergies   Allergies   Allergen Reactions     Hydrocodone-Acetaminophen Itching     Iodine      Irbesartan      renal insuff and hyperkalemia       Lisinopril Nausea     Sulfa Drugs      Valsartan Difficulty breathing     Family History    Family History   Problem Relation Age of Onset     Heart Disease Father      Cancer Mother      C.A.D. Brother      Septicemia Brother 3     Lung Cancer Sister      Schizophrenia Sister      Social History   Social History     Socioeconomic History     Marital status:    Tobacco Use     Smoking status: Former Smoker     Packs/day: 2.00     Years: 30.00     Pack years: 60.00     Quit date: 1997     Years since quittin.7     Smokeless tobacco: Never Used   Vaping Use     Vaping Use: Never used   Substance and Sexual Activity     Alcohol use: Yes     Alcohol/week: 10.0 standard drinks     Types: 12 Cans of beer per week     Comment: on the weekends about 5 drinks a weekend      Drug use: No     Sexual activity: Yes   Other Topics Concern     Parent/sibling w/ CABG, MI or angioplasty before 65F 55M? 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      Constitutional: Alert, oriented, cooperative, no apparent distress, appears nontoxic  Eyes: Eyes are clear, pupils are equal, round.  No scleral icterus.  HENT: Oropharynx is clear and moist.  Tongue is midline.  No evidence of cranial trauma.  Cardiovascular: Regular rate and rhythm, some S2 splitting noted, otherwise no murmurs auscultated. JVP is normal. Good peripheral pulses in wrists bilaterally. No lower extremity edema.  Respiratory: Respirations unlabored on room air, moving air well.  No dyspnea with conversation.  Lung sounds decreased throughout all lung fields, some crackles present in left lower lobe, otherwise generally clear throughout all other lung fields.  No wheezes, rhonchi.  GI: Nondistended, firm but not rigid, non-tender to palpation, normal bowel sounds throughout  Genitourinary: Dark yellow, not cloudy urine present in bedside urinal.  Musculoskeletal: Normal muscle bulk , no obvious joint deformities.  Skin: Warm and dry, no rashes.   Neurologic: Neck supple. Cranial nerves are grossly intact.  is symmetric.  No tremor.    Data   Data reviewed today:   Recent Labs   Lab 08/22/22  1340   WBC 16.3*   HGB 11.9*   MCV 96         POTASSIUM 3.8   CHLORIDE 98   CO2 27   BUN 18   CR 1.04   ANIONGAP 9   [place] 8.3*   *   ALBUMIN 3.5   PROTTOTAL 8.7   BILITOTAL 1.6*   ALKPHOS 188*   ALT 18   AST 21   LIPASE 109     Recent Results (from the past 24 hour(s))   XR Chest Port 1 View    Narrative    XR CHEST PORT 1 VIEW   8/22/2022 3:03 PM   HISTORY: Cough, fever, short of air  COMPARISON: CT 7/28/2017    Impression    IMPRESSION: Stable cardiomediastinal silhouette status post median  sternotomy and CABG. Patchy left lower lobe airspace disease  suspicious for pneumonia. No pleural effusion or pneumothorax. No  acute bony abnormality.  ISAIAS MABRY MD   SYSTEM ID:  [id number]     I personally reviewed the EKG tracing showing sinus rhythm without significant ST or T wave  changes. No axis deviation.

## 2022-08-22 NOTE — ED NOTES
[facility]   ED Nurse to Floor Handoff     Francois Lujan, a 71 year old, male  They arrived in the ED by car from home    ED Chief Complaint: Fever    Final diagnoses:   Patient Active Problem List    Diagnosis Date Noted     Community acquired pneumonia of left lower lobe of lung 08/22/2022     Priority: Medium     Moderate major depression (H) 01/06/2022     Priority: Medium     Chronic idiopathic gout of multiple sites 04/08/2021     Priority: Medium     Previously on allopurinol       Carotid stenosis, right 08/11/2020     Priority: Medium     Acute ischemic VBA thalamic stroke, left (H) 08/02/2020     Priority: Medium     8/2020.  Right face numbness and slight facial droop       Obesity (BMI 35.0-39.9) with comorbidity (H) 11/20/2018     Priority: Medium     Benign neoplasm of colon 11/19/2018     Priority: Medium     Degeneration of lumbar or lumbosacral intervertebral disc 11/19/2018     Priority: Medium     Vitamin D deficiency 11/19/2018     Priority: Medium     Epistaxis 12/15/2017     Priority: Medium     Triceps tendon rupture, left, sequela 04/10/2017     Priority: Medium     Coronary artery disease involving native coronary artery of native heart without angina pectoris 03/03/2017     Priority: Medium     Alcohol dependence with other alcohol-induced disorder (H) 08/30/2016     Priority: Medium     Since 8/2020, drinking 1 day per week, 2-3 drinks.       Diabetic polyneuropathy associated with type 2 diabetes mellitus (H) 04/12/2016     Priority: Medium     CKD (chronic kidney disease) stage 2, GFR 60-89 ml/min 10/23/2015     Priority: Medium     Type 2 diabetes mellitus with microalbuminuria, without long-term current use of insulin (H) 10/23/2015     Priority: Medium     On valsartan. Referred to Nephrology 7/21.       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     Priority: Medium     OA (osteoarthritis) of knee, right 04/29/2014     Priority: Medium     ACL (anterior cruciate  ligament) tear 04/29/2014     Priority: Medium     posttraumatic arthrosis lateral compartment       Multiple joint pain 04/23/2014     Priority: Medium     History of back surgery 03/03/2014     Priority: Medium     Polymyalgia rheumatica (H) 03/03/2014     Priority: Medium     Moderate major depression (H) 02/11/2014     Priority: Medium     Radicular pain of lumbosacral region 12/31/2013     Priority: Medium     Numbness of hand, right 12/12/2013     Priority: Medium     Essential hypertension 09/17/2013     Priority: Medium     ADELA (obstructive sleep apnea) 07/11/2013     Priority: Medium     Severe ADELA (7/10/2013 with AHI ~70, sherin desat 86%, insufficient time for CPAP titration)  Does not tolerate CPAP        Hyperlipidemia LDL goal <70 05/28/2013     Priority: Medium     S/P CABG (coronary artery bypass graft) 11/29/2012     Priority: Medium     11/17/11  PREOPERATIVE DIAGNOSIS:  Severe 3-vessel coronary artery disease with unstable angina.        POSTOPERATIVE DIAGNOSIS:  Severe 3-vessel coronary artery disease with unstable angina.        PROCEDURES:    1.  Coronary artery bypass grafting x3 with placement of left internal mammary artery to the left anterior descending artery, saphenous vein graft from the aorta to the third obtuse marginal branch of the circumflex coronary artery and saphenous vein graft from aorta to the distal right coronary artery.    2.  Placement of temporary ventricular pacing wires.    3.  Endoscopic vein harvest from the left leg.        Hypogonadotropic hypogonadism (H) 11/29/2012     Priority: Medium     Acquired hypothyroidism 11/29/2012     Priority: Medium     Hard of hearing 11/29/2012     Priority: Medium    (J18.9) Community acquired pneumonia of left lower lobe of lung    Plan: IV antibiotics        Needed?: No    Allergies:    Allergies   Allergen Reactions     Hydrocodone-Acetaminophen Itching     Iodine      Irbesartan      renal insuff and hyperkalemia        Lisinopril Nausea     Sulfa Drugs      Valsartan Difficulty breathing       Past Medical Hx:   Past Medical History:   Diagnosis Date     Arthritis      CAD (coronary artery disease)      Diabetes mellitus (H)      Erythema nodosum 1982     Gout      Hypertension      Malignant neoplasm (H)     squamous cell CA removed from right hand     Thyroid disease        Vital Signs:  BP (!) 170/71   Pulse 94   Temp (!) 102.2  F (39  C) (Tympanic)   Resp (!) 32   Wt 117 kg (258 lb)   SpO2 92%   BMI 34.99 kg/m       Elimination Status: Continent: Yes     Activity Level: Independent    Baseline Mental status: WDL  Current Mental Status changes: at basesline    Infection present or suspected this encounter: yes respiratory  Sepsis suspected: No  Patient tested for COVID 19 prior to admission: YES     Activity level - Baseline/Home:  Independent  Activity Level - Current:   Independent    Bariatric equipment needed?: No    In the ED these meds were given:   Medications   sodium chloride 0.9% infusion (has no administration in time range)   ondansetron (ZOFRAN) injection 4 mg (4 mg Intravenous Not Given 8/22/22 1406)   azithromycin (ZITHROMAX) 500 mg in sodium chloride 0.9 % 250 mL intermittent infusion (500 mg Intravenous New Bag 8/22/22 1610)   0.9% sodium chloride BOLUS (0 mLs Intravenous Stopped 8/22/22 1530)   ibuprofen (ADVIL/MOTRIN) tablet 600 mg (600 mg Oral Given 8/22/22 1352)   cefTRIAXone (ROCEPHIN) 1 g vial to attach to  mL bag for ADULTS or NS 50 mL bag for PEDS (0 g Intravenous Stopped 8/22/22 1611)       Drips running?  No      Current LDAs: (Line status:999863)    Labs results:   Labs Ordered and Resulted from Time of ED Arrival Up to the Time of Departure from the ED  Results for orders placed or performed during the hospital encounter of 08/22/22 (from the past 24 hour(s))   Symptomatic; Yes; 8/19/2022 Influenza A/B & SARS-CoV2 (COVID-19) Virus PCR Multiplex Nasopharyngeal    Specimen:  Nasopharyngeal; Swab   Result Value Ref Range    Influenza A PCR Negative Negative    Influenza B PCR Negative Negative    SARS CoV2 PCR Negative Negative    Narrative    Testing was performed using the christina SARS-CoV-2 & Influenza A/B Assay on the christina [employer] System. This test should be ordered for the detection of SARS-CoV-2 and influenza viruses in individuals who meet clinical and/or epidemiological criteria. Test performance is unknown in asymptomatic patients. This test is for in vitro diagnostic use under the FDA EUA for laboratories certified under CLIA to perform moderate and/or high complexity testing. This test has not been FDA cleared or approved. A negative result does not rule out the presence of PCR inhibitors in the specimen or target RNA in concentration below the limit of detection for the assay. If only one viral target is positive but coinfection with multiple targets is suspected, the sample should be re-tested with another FDA cleared, approved or authorized test, if coinfection would change clinical management. [facility] are certified under the Clinical Laboratory Improvement Amendments of 1988 (CLIA-88) as  qualified to perform moderate and/or high complexity laboratory testing.   CBC with platelets differential    Narrative    The following orders were created for panel order CBC with platelets differential.  Procedure                               Abnormality         Status                     ---------                               -----------         ------                     CBC with platelets and d...[[id number]]  Abnormal            Final result                 Please view results for these tests on the individual orders.   CRP inflammation   Result Value Ref Range    CRP Inflammation 195.0 (H) 0.0 - 8.0 mg/L   Comprehensive metabolic panel   Result Value Ref Range    Sodium 134 133 - 144 mmol/L    Potassium 3.8 3.4 - 5.3 mmol/L    Chloride 98 94 - 109 mmol/L     Carbon Dioxide (CO2) 27 20 - 32 mmol/L    Anion Gap 9 3 - 14 mmol/L    Urea Nitrogen 18 7 - 30 mg/dL    Creatinine 1.04 0.66 - 1.25 mg/dL    Calcium 8.3 (L) 8.5 - 10.1 mg/dL    Glucose 187 (H) 70 - 99 mg/dL    Alkaline Phosphatase 188 (H) 40 - 150 U/L    AST 21 0 - 45 U/L    ALT 18 0 - 70 U/L    Protein Total 8.7 6.8 - 8.8 g/dL    Albumin 3.5 3.4 - 5.0 g/dL    Bilirubin Total 1.6 (H) 0.2 - 1.3 mg/dL    GFR Estimate 77 >60 mL/min/1.73m2   Lipase   Result Value Ref Range    Lipase 109 73 - 393 U/L   Troponin I   Result Value Ref Range    Troponin I High Sensitivity 10 <79 ng/L   Nt probnp inpatient (BNP)   Result Value Ref Range    N terminal Pro BNP Inpatient 877 0 - 900 pg/mL   CBC with platelets and differential   Result Value Ref Range    WBC Count 16.3 (H) 4.0 - 11.0 10e3/uL    RBC Count 3.72 (L) 4.40 - 5.90 10e6/uL    Hemoglobin 11.9 (L) 13.3 - 17.7 g/dL    Hematocrit 35.7 (L) 40.0 - 53.0 %    MCV 96 78 - 100 fL    MCH 32.0 26.5 - 33.0 pg    MCHC 33.3 31.5 - 36.5 g/dL    RDW 13.4 10.0 - 15.0 %    Platelet Count 258 150 - 450 10e3/uL    % Neutrophils 73 %    % Lymphocytes 14 %    % Monocytes 11 %    % Eosinophils 1 %    % Basophils 0 %    % Immature Granulocytes 1 %    NRBCs per 100 WBC 0 <1 /100    Absolute Neutrophils 11.9 (H) 1.6 - 8.3 10e3/uL    Absolute Lymphocytes 2.3 0.8 - 5.3 10e3/uL    Absolute Monocytes 1.9 (H) 0.0 - 1.3 10e3/uL    Absolute Eosinophils 0.1 0.0 - 0.7 10e3/uL    Absolute Basophils 0.0 0.0 - 0.2 10e3/uL    Absolute Immature Granulocytes 0.1 <=0.4 10e3/uL    Absolute NRBCs 0.0 10e3/uL   [place] Draw    Narrative    The following orders were created for panel order [place] Draw.  Procedure                               Abnormality         Status                     ---------                               -----------         ------                     Extra Blue Top Tube[[id number]]                              Final result               Extra Red Top Tube[[id number]]                                Final result                 Please view results for these tests on the individual orders.   Extra Blue Top Tube   Result Value Ref Range    Hold Specimen     Extra Red Top Tube   Result Value Ref Range    Hold Specimen     UA with Microscopic reflex to Culture    Specimen: Urine, Clean Catch   Result Value Ref Range    Color Urine Dark Yellow (A) Colorless, Straw, Light Yellow, Yellow    Appearance Urine Clear Clear    Glucose Urine Negative Negative mg/dL    Bilirubin Urine Negative Negative    Ketones Urine Negative Negative mg/dL    Specific Gravity Urine 1.025 1.003 - 1.035    Blood Urine Trace (A) Negative    pH Urine 6.0 5.0 - 7.0    Protein Albumin Urine 100  (A) Negative mg/dL    Urobilinogen Urine 4.0 (A) Normal, 2.0 mg/dL    Nitrite Urine Negative Negative    Leukocyte Esterase Urine Negative Negative    Mucus Urine Present (A) None Seen /LPF    RBC Urine 2 <=2 /HPF    WBC Urine 1 <=5 /HPF    Squamous Epithelials Urine <1 <=1 /HPF    Hyaline Casts Urine 1 <=2 /LPF    Narrative    Urine Culture not indicated   XR Chest Port 1 View    Narrative    XR CHEST PORT 1 VIEW   8/22/2022 3:03 PM     HISTORY: Cough, fever, short of air    COMPARISON: CT 7/28/2017      Impression    IMPRESSION: Stable cardiomediastinal silhouette status post median  sternotomy and CABG. Patchy left lower lobe airspace disease  suspicious for pneumonia. No pleural effusion or pneumothorax. No  acute bony abnormality.    ISAIAS MABRY MD         SYSTEM ID:  [id number]       Imaging Studies:   Recent Results (from the past 24 hour(s))  @[id number]@    Recent vital signs: BP: [unfilled], T: 102.2, HR: 94, R: 32     [place] Coma Scale Score:    GCS:   Motor 6=Obeys commands   Verbal 5=Oriented   Eye Opening 4=Spontaneous   Total: 15        Risk for violent behavior: No     Pt needs tele? No  Skin/wound Issues: N/A    Code Status: Full Code    Pain control: good    Nausea control: pt had none    Abnormal labs/tests/findings requiring  intervention: CRP and WBC elevated    Family present during ED course? No   Family Comments/Social Situation comments: N/A    Tasks needing completion: None    Ruth Pascual RN

## 2022-08-22 NOTE — ED PROVIDER NOTES
History     Chief Complaint   Patient presents with     Fever     Illness beginning Thursday. Patient concerned for pneumonia. Patient having temperatures up to 103.5. Having significant shortness of breath, patient states his respiratory rate is 60/minute. Patient reporting weight loss of 13 lbs in the last three days. Patient reporting issues with blood sugars due to this. Patient had two negative Covid tests today.       HPI  Francois Lujan is a 71 year old male, past medical history is significant for depression, gout, CVA, obesity, vitamin D deficiency, epistaxis, CAD, alcohol dependence, diabetic polyneuropathy, stage II renal disease, type 2 diabetes, OA, polymyalgia rheumatica, depression, hypertension, ADELA, hyperlipidemia, presents to the emergency department with concerns of fever beginning 4 days prior to presentation.  History is obtained from the patient who states that beginning about 4 days ago he awoke with chills and sweats and subsequently developed a productive cough.  Fever tactile.  Anorectic but no nausea or vomiting.  He has been keeping up with fluids but his appetite is way down and has not been eating much in the way of solid food.  He notes no chest pain or abdominal pain back pain or change in bowel habit.  No diarrhea recently.  He was with a group of friends last week but everyone seemed healthy, he does not think he has had any infectious exposure and specifically no COVID exposure.  He had 2 negative COVID test already today at home.      Allergies:  Allergies   Allergen Reactions     Hydrocodone-Acetaminophen Itching     Iodine      Irbesartan      renal insuff and hyperkalemia       Lisinopril Nausea     Sulfa Drugs      Valsartan Difficulty breathing       Problem List:    Patient Active Problem List    Diagnosis Date Noted     Community acquired pneumonia of left lower lobe of lung 08/22/2022     Priority: Medium     Moderate major depression (H) 01/06/2022     Priority:  Medium     Chronic idiopathic gout of multiple sites 04/08/2021     Priority: Medium     Previously on allopurinol       Carotid stenosis, right 08/11/2020     Priority: Medium     Acute ischemic VBA thalamic stroke, left (H) 08/02/2020     Priority: Medium     8/2020.  Right face numbness and slight facial droop       Obesity (BMI 35.0-39.9) with comorbidity (H) 11/20/2018     Priority: Medium     Benign neoplasm of colon 11/19/2018     Priority: Medium     Degeneration of lumbar or lumbosacral intervertebral disc 11/19/2018     Priority: Medium     Vitamin D deficiency 11/19/2018     Priority: Medium     Epistaxis 12/15/2017     Priority: Medium     Triceps tendon rupture, left, sequela 04/10/2017     Priority: Medium     Coronary artery disease involving native coronary artery of native heart without angina pectoris 03/03/2017     Priority: Medium     Alcohol dependence with other alcohol-induced disorder (H) 08/30/2016     Priority: Medium     Since 8/2020, drinking 1 day per week, 2-3 drinks.       Diabetic polyneuropathy associated with type 2 diabetes mellitus (H) 04/12/2016     Priority: Medium     CKD (chronic kidney disease) stage 2, GFR 60-89 ml/min 10/23/2015     Priority: Medium     Type 2 diabetes mellitus with microalbuminuria, without long-term current use of insulin (H) 10/23/2015     Priority: Medium     On valsartan. Referred to Nephrology 7/21.       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     Priority: Medium     OA (osteoarthritis) of knee, right 04/29/2014     Priority: Medium     ACL (anterior cruciate ligament) tear 04/29/2014     Priority: Medium     posttraumatic arthrosis lateral compartment       Multiple joint pain 04/23/2014     Priority: Medium     History of back surgery 03/03/2014     Priority: Medium     Polymyalgia rheumatica (H) 03/03/2014     Priority: Medium     Moderate major depression (H) 02/11/2014     Priority: Medium     Radicular pain of lumbosacral region 12/31/2013      Priority: Medium     Numbness of hand, right 12/12/2013     Priority: Medium     Essential hypertension 09/17/2013     Priority: Medium     ADELA (obstructive sleep apnea) 07/11/2013     Priority: Medium     Severe ADELA (7/10/2013 with AHI ~70, sherin desat 86%, insufficient time for CPAP titration)  Does not tolerate CPAP        Hyperlipidemia LDL goal <70 05/28/2013     Priority: Medium     S/P CABG (coronary artery bypass graft) 11/29/2012     Priority: Medium     11/17/11  PREOPERATIVE DIAGNOSIS:  Severe 3-vessel coronary artery disease with unstable angina.        POSTOPERATIVE DIAGNOSIS:  Severe 3-vessel coronary artery disease with unstable angina.        PROCEDURES:    1.  Coronary artery bypass grafting x3 with placement of left internal mammary artery to the left anterior descending artery, saphenous vein graft from the aorta to the third obtuse marginal branch of the circumflex coronary artery and saphenous vein graft from aorta to the distal right coronary artery.    2.  Placement of temporary ventricular pacing wires.    3.  Endoscopic vein harvest from the left leg.        Hypogonadotropic hypogonadism (H) 11/29/2012     Priority: Medium     Acquired hypothyroidism 11/29/2012     Priority: Medium     Hard of hearing 11/29/2012     Priority: Medium        Past Medical History:    Past Medical History:   Diagnosis Date     Arthritis      CAD (coronary artery disease)      Diabetes mellitus (H)      Erythema nodosum 1982     Gout      Hypertension      Malignant neoplasm (H)      Thyroid disease        Past Surgical History:    Past Surgical History:   Procedure Laterality Date     BACK SURGERY       BYPASS GRAFT ARTERY CORONARY  11/17/2011    Procedure:BYPASS GRAFT ARTERY CORONARY; CORONARY ARTERY BYPASS, Right, OM, LAD WITH ENDOVEIN HARVEST, ON PUMP; Surgeon:LEONEL MG; Location: OR     COLONOSCOPY N/A 6/9/2016    Procedure: COLONOSCOPY;  Surgeon: Isidro Humphreys MD;  Location: [facility]     HERNIA REPAIR       infantile hernia repair     ORTHOPEDIC SURGERY      Baker cyst removed - right knee, and mesiscus tear repair     NH LAMINEC/FACETECT/FORAMIN,LUMBAR 1 SEG      Description: Laminectomy Decompress, Facetectomy, Foraminotomy Lumbar Seg;  Recorded: 2007;  Comments: '71     NH MASTECTOMY FOR GYNECOMASTIA      Description: Breast Surgery Mastectomy For Gynecomastia Bilateral;  Recorded: 2007;     REPAIR TENDON TRICEPS UPPER EXTREMITY Left 2017    Procedure: REPAIR TENDON TRICEPS UPPER EXTREMITY;  Surgeon: Rodriguez Kelley MD;  Location: [facility] CABG, VEIN, SINGLE      Description: CABG (CABG);  Proc Date: 2011;  Comments: 3 vessel       Family History:    Family History   Problem Relation Age of Onset     Heart Disease Father      Cancer Mother      C.A.D. Brother      Septicemia Brother 3     Lung Cancer Sister      Schizophrenia Sister        Social History:  Marital Status:   [2]  Social History     Tobacco Use     Smoking status: Former Smoker     Packs/day: 2.00     Years: 30.00     Pack years: 60.00     Quit date: 1997     Years since quittin.7     Smokeless tobacco: Never Used   Vaping Use     Vaping Use: Never used   Substance Use Topics     Alcohol use: Yes     Alcohol/week: 10.0 standard drinks     Types: 12 Cans of beer per week     Comment: on the weekends about 5 drinks a weekend      Drug use: No        Medications:    amLODIPine (NORVASC) 10 MG tablet  atorvastatin (LIPITOR) 80 MG tablet  blood glucose (ACCU-CHEK SMARTVIEW) test strip  blood glucose (NO BRAND SPECIFIED) lancets standard  clopidogrel (PLAVIX) 75 MG tablet  glipiZIDE (GLUCOTROL XL) 10 MG 24 hr tablet  levothyroxine (SYNTHROID/LEVOTHROID) 50 MCG tablet  metFORMIN (GLUCOPHAGE) 500 MG tablet  metoprolol tartrate (LOPRESSOR) 25 MG tablet  testosterone (ANDROGEL/TESTIM) 50 MG/5GM (1%) topical gel  valsartan (DIOVAN) 160 MG tablet          Review of Systems   All other systems reviewed and are  negative.      Physical Exam   BP: (!) 170/71  Pulse: 94  Temp: (!) 102.2  F (39  C)  Resp: (!) 32  Weight: 117 kg (258 lb)  SpO2: 92 %      Physical Exam  Vitals and nursing note reviewed.   Constitutional:       Appearance: He is obese. He is ill-appearing.   HENT:      Head: Normocephalic and atraumatic.      Right Ear: Tympanic membrane normal.      Left Ear: Tympanic membrane normal.      Nose: Nose normal.      Mouth/Throat:      Mouth: Mucous membranes are dry.      Pharynx: Oropharynx is clear.   Eyes:      Extraocular Movements: Extraocular movements intact.      Conjunctiva/sclera: Conjunctivae normal.      Pupils: Pupils are equal, round, and reactive to light.   Cardiovascular:      Rate and Rhythm: Normal rate and regular rhythm.      Pulses: Normal pulses.      Heart sounds: Normal heart sounds.   Pulmonary:      Effort: Pulmonary effort is normal.      Breath sounds: Normal breath sounds.   Abdominal:      General: Bowel sounds are normal.      Palpations: Abdomen is soft.   Musculoskeletal:         General: Normal range of motion.      Cervical back: Normal range of motion and neck supple.   Skin:     General: Skin is warm and dry.   Neurological:      General: No focal deficit present.      Mental Status: He is alert and oriented to person, place, and time.   Psychiatric:         Behavior: Behavior normal.         ED Course                 Procedures              EKG Interpretation:      Interpreted by Del Aranda MD  Time reviewed: Time obtained 1342 time interpreted same 91 bpm sinus rhythm, Q waves in V1 and V2, no acute ST-T wave changes.  Normal axis.  Comparison is made to cardiogram dated 4/21/2022 and there are no sniffing and changes.            Critical Care time:  none               Results for orders placed or performed during the hospital encounter of 08/22/22 (from the past 24 hour(s))   Symptomatic; Yes; 8/19/2022 Influenza A/B & SARS-CoV2 (COVID-19) Virus PCR Multiplex  Nasopharyngeal    Specimen: Nasopharyngeal; Swab   Result Value Ref Range    Influenza A PCR Negative Negative    Influenza B PCR Negative Negative    SARS CoV2 PCR Negative Negative    Narrative    Testing was performed using the christina SARS-CoV-2 & Influenza A/B Assay on the christina Chio System. This test should be ordered for the detection of SARS-CoV-2 and influenza viruses in individuals who meet clinical and/or epidemiological criteria. Test performance is unknown in asymptomatic patients. This test is for in vitro diagnostic use under the FDA EUA for laboratories certified under CLIA to perform moderate and/or high complexity testing. This test has not been FDA cleared or approved. A negative result does not rule out the presence of PCR inhibitors in the specimen or target RNA in concentration below the limit of detection for the assay. If only one viral target is positive but coinfection with multiple targets is suspected, the sample should be re-tested with another FDA cleared, approved or authorized test, if coinfection would change clinical management. [facility] are certified under the Clinical Laboratory Improvement Amendments of 1988 (CLIA-88) as  qualified to perform moderate and/or high complexity laboratory testing.   CBC with platelets differential    Narrative    The following orders were created for panel order CBC with platelets differential.  Procedure                               Abnormality         Status                     ---------                               -----------         ------                     CBC with platelets and d...[[id number]]  Abnormal            Final result                 Please view results for these tests on the individual orders.   CRP inflammation   Result Value Ref Range    CRP Inflammation 195.0 (H) 0.0 - 8.0 mg/L   Comprehensive metabolic panel   Result Value Ref Range    Sodium 134 133 - 144 mmol/L    Potassium 3.8 3.4 - 5.3 mmol/L     Chloride 98 94 - 109 mmol/L    Carbon Dioxide (CO2) 27 20 - 32 mmol/L    Anion Gap 9 3 - 14 mmol/L    Urea Nitrogen 18 7 - 30 mg/dL    Creatinine 1.04 0.66 - 1.25 mg/dL    Calcium 8.3 (L) 8.5 - 10.1 mg/dL    Glucose 187 (H) 70 - 99 mg/dL    Alkaline Phosphatase 188 (H) 40 - 150 U/L    AST 21 0 - 45 U/L    ALT 18 0 - 70 U/L    Protein Total 8.7 6.8 - 8.8 g/dL    Albumin 3.5 3.4 - 5.0 g/dL    Bilirubin Total 1.6 (H) 0.2 - 1.3 mg/dL    GFR Estimate 77 >60 mL/min/1.73m2   Lipase   Result Value Ref Range    Lipase 109 73 - 393 U/L   Troponin I   Result Value Ref Range    Troponin I High Sensitivity 10 <79 ng/L   Nt probnp inpatient (BNP)   Result Value Ref Range    N terminal Pro BNP Inpatient 877 0 - 900 pg/mL   CBC with platelets and differential   Result Value Ref Range    WBC Count 16.3 (H) 4.0 - 11.0 10e3/uL    RBC Count 3.72 (L) 4.40 - 5.90 10e6/uL    Hemoglobin 11.9 (L) 13.3 - 17.7 g/dL    Hematocrit 35.7 (L) 40.0 - 53.0 %    MCV 96 78 - 100 fL    MCH 32.0 26.5 - 33.0 pg    MCHC 33.3 31.5 - 36.5 g/dL    RDW 13.4 10.0 - 15.0 %    Platelet Count 258 150 - 450 10e3/uL    % Neutrophils 73 %    % Lymphocytes 14 %    % Monocytes 11 %    % Eosinophils 1 %    % Basophils 0 %    % Immature Granulocytes 1 %    NRBCs per 100 WBC 0 <1 /100    Absolute Neutrophils 11.9 (H) 1.6 - 8.3 10e3/uL    Absolute Lymphocytes 2.3 0.8 - 5.3 10e3/uL    Absolute Monocytes 1.9 (H) 0.0 - 1.3 10e3/uL    Absolute Eosinophils 0.1 0.0 - 0.7 10e3/uL    Absolute Basophils 0.0 0.0 - 0.2 10e3/uL    Absolute Immature Granulocytes 0.1 <=0.4 10e3/uL    Absolute NRBCs 0.0 10e3/uL   [place] Draw    Narrative    The following orders were created for panel order [place] Draw.  Procedure                               Abnormality         Status                     ---------                               -----------         ------                     Extra Blue Top Tube[[id number]]                              Final result               Extra Red Top  Tube[[id number]]                               Final result                 Please view results for these tests on the individual orders.   Extra Blue Top Tube   Result Value Ref Range    Hold Specimen     Extra Red Top Tube   Result Value Ref Range    Hold Specimen     UA with Microscopic reflex to Culture    Specimen: Urine, Clean Catch   Result Value Ref Range    Color Urine Dark Yellow (A) Colorless, Straw, Light Yellow, Yellow    Appearance Urine Clear Clear    Glucose Urine Negative Negative mg/dL    Bilirubin Urine Negative Negative    Ketones Urine Negative Negative mg/dL    Specific Gravity Urine 1.025 1.003 - 1.035    Blood Urine Trace (A) Negative    pH Urine 6.0 5.0 - 7.0    Protein Albumin Urine 100  (A) Negative mg/dL    Urobilinogen Urine 4.0 (A) Normal, 2.0 mg/dL    Nitrite Urine Negative Negative    Leukocyte Esterase Urine Negative Negative    Mucus Urine Present (A) None Seen /LPF    RBC Urine 2 <=2 /HPF    WBC Urine 1 <=5 /HPF    Squamous Epithelials Urine <1 <=1 /HPF    Hyaline Casts Urine 1 <=2 /LPF    Narrative    Urine Culture not indicated   XR Chest Port 1 View    Narrative    XR CHEST PORT 1 VIEW   8/22/2022 3:03 PM     HISTORY: Cough, fever, short of air    COMPARISON: CT 7/28/2017      Impression    IMPRESSION: Stable cardiomediastinal silhouette status post median  sternotomy and CABG. Patchy left lower lobe airspace disease  suspicious for pneumonia. No pleural effusion or pneumothorax. No  acute bony abnormality.    ISAIAS MABRY MD         SYSTEM ID:  [id number]       Medications   sodium chloride 0.9% infusion (has no administration in time range)   ondansetron (ZOFRAN) injection 4 mg (4 mg Intravenous Not Given 8/22/22 1406)   cefTRIAXone (ROCEPHIN) 1 g vial to attach to  mL bag for ADULTS or NS 50 mL bag for PEDS (1 g Intravenous New Bag 8/22/22 1531)   azithromycin (ZITHROMAX) 500 mg in sodium chloride 0.9 % 250 mL intermittent infusion (has no administration in time range)    0.9% sodium chloride BOLUS (0 mLs Intravenous Stopped 8/22/22 1530)   ibuprofen (ADVIL/MOTRIN) tablet 600 mg (600 mg Oral Given 8/22/22 1352)   3:47 PM  Patient ambulated by ERT, desats into the mid 80s and becomes more tachypneic if not frankly tachycardic.  No chest pain.  I discussed disposition options with the patient including what I would anticipate to be brief admission to observation status versus potential disposition to home.  I think given the patient's comorbidities it would be most reasonable to admit him to observation status and I counseled him in that direction.  He was in agreement with this plan.  I spoke with Sunitha for the hospitalist service who accepted the care of the patient upon admission.  Transition orders were placed.      Assessments & Plan (with Medical Decision Making)   71-year-old male past medical history reviewed as above who presents to the emergency department with concerns of fever chills sweats cough shortness of breath as discussed in the HPI.  No focal findings on exam however the patient does appear ill, mildly tachypneic and without  muscle use.  Chest x-ray is consistent with left lower lobe pneumonia, increased white cell count and CRP also consistent.  Blood cultures were obtained the patient was empirically treated with antibiotics including Rocephin and Zithromax.  Per the time stamp above he was counseled for admission here to observation status.    Disclaimer: This note consists of symbols derived from keyboarding, dictation and/or voice recognition software. As a result, there may be errors in the script that have gone undetected. Please consider this when interpreting information found in this chart.      I have reviewed the nursing notes.    I have reviewed the findings, diagnosis, plan and need for follow up with the patient.          New Prescriptions    No medications on file       Final diagnoses:   Community acquired pneumonia of left lower lobe of  lung       8/22/2022   [facility] EMERGENCY DEPT     Del Aranda MD  08/22/22 1342

## 2022-08-22 NOTE — PROGRESS NOTES
Spoke with patient who felt he was in the emergency department. I offered patient Covid/influenza testing and chest xray from urgent care but he prefers to have visit in the ED as 'he feels very unwell' and desires a further workup than can be accommodated in the UC.

## 2022-08-22 NOTE — ED NOTES
DATE & TIME: 8/22/22 1624      Cognitive Concerns/ Orientation : Alert and oriented x 4  ABNL VS/O2: O2 92% on room air. HR 58  MOBILITY: Independent  PAIN MANAGMENT: Given ibuprofen 600 mg at 1352  ABNL LAB/BG: Elevated CRP and wbc count  DRAIN/DEVICES: 18 g IV right upper forearm  TELEMETRY RHYTHM: N/A  TESTS/PROCEDURES: Chest x ray

## 2022-08-23 PROBLEM — R50.9 FEVER, UNKNOWN ORIGIN: Status: ACTIVE | Noted: 2022-08-23

## 2022-08-23 PROBLEM — Z87.891 PERSONAL HISTORY OF TOBACCO USE, PRESENTING HAZARDS TO HEALTH: Status: ACTIVE | Noted: 2022-08-23

## 2022-08-23 PROBLEM — R06.02 SHORTNESS OF BREATH: Status: ACTIVE | Noted: 2022-08-23

## 2022-08-23 PROBLEM — Z11.52 ENCOUNTER FOR SCREENING LABORATORY TESTING FOR SEVERE ACUTE RESPIRATORY SYNDROME CORONAVIRUS 2 (SARS-COV-2): Status: ACTIVE | Noted: 2022-08-23

## 2022-08-23 LAB
ANION GAP SERPL CALCULATED.3IONS-SCNC: 6 MMOL/L (ref 3–14)
BUN SERPL-MCNC: 20 MG/DL (ref 7–30)
CALCIUM SERPL-MCNC: 8 MG/DL (ref 8.5–10.1)
CHLORIDE BLD-SCNC: 103 MMOL/L (ref 94–109)
CO2 SERPL-SCNC: 26 MMOL/L (ref 20–32)
CREAT SERPL-MCNC: 0.98 MG/DL (ref 0.66–1.25)
CRP SERPL-MCNC: 215 MG/L (ref 0–8)
ERYTHROCYTE [DISTWIDTH] IN BLOOD BY AUTOMATED COUNT: 13.5 % (ref 10–15)
GFR SERPL CREATININE-BSD FRML MDRD: 82 ML/MIN/1.73M2
GLUCOSE BLD-MCNC: 147 MG/DL (ref 70–99)
GLUCOSE BLDC GLUCOMTR-MCNC: 107 MG/DL (ref 70–99)
GLUCOSE BLDC GLUCOMTR-MCNC: 133 MG/DL (ref 70–99)
GLUCOSE BLDC GLUCOMTR-MCNC: 154 MG/DL (ref 70–99)
GLUCOSE BLDC GLUCOMTR-MCNC: 156 MG/DL (ref 70–99)
GLUCOSE BLDC GLUCOMTR-MCNC: 199 MG/DL (ref 70–99)
HCT VFR BLD AUTO: 30.6 % (ref 40–53)
HGB BLD-MCNC: 10.1 G/DL (ref 13.3–17.7)
MCH RBC QN AUTO: 32.1 PG (ref 26.5–33)
MCHC RBC AUTO-ENTMCNC: 33 G/DL (ref 31.5–36.5)
MCV RBC AUTO: 97 FL (ref 78–100)
PLATELET # BLD AUTO: 248 10E3/UL (ref 150–450)
POTASSIUM BLD-SCNC: 3.7 MMOL/L (ref 3.4–5.3)
RBC # BLD AUTO: 3.15 10E6/UL (ref 4.4–5.9)
S PNEUM AG SPEC QL: NEGATIVE
SODIUM SERPL-SCNC: 135 MMOL/L (ref 133–144)
WBC # BLD AUTO: 15.9 10E3/UL (ref 4–11)

## 2022-08-23 PROCEDURE — 82962 GLUCOSE BLOOD TEST: CPT

## 2022-08-23 PROCEDURE — 250N000012 HC RX MED GY IP 250 OP 636 PS 637

## 2022-08-23 PROCEDURE — 80048 BASIC METABOLIC PNL TOTAL CA: CPT

## 2022-08-23 PROCEDURE — 250N000011 HC RX IP 250 OP 636: Performed by: FAMILY MEDICINE

## 2022-08-23 PROCEDURE — 86140 C-REACTIVE PROTEIN: CPT

## 2022-08-23 PROCEDURE — 85027 COMPLETE CBC AUTOMATED: CPT

## 2022-08-23 PROCEDURE — 120N000001 HC R&B MED SURG/OB

## 2022-08-23 PROCEDURE — 36415 COLL VENOUS BLD VENIPUNCTURE: CPT

## 2022-08-23 PROCEDURE — 250N000013 HC RX MED GY IP 250 OP 250 PS 637: Performed by: INTERNAL MEDICINE

## 2022-08-23 PROCEDURE — 96372 THER/PROPH/DIAG INJ SC/IM: CPT

## 2022-08-23 PROCEDURE — 250N000009 HC RX 250: Performed by: INTERNAL MEDICINE

## 2022-08-23 PROCEDURE — 258N000003 HC RX IP 258 OP 636: Performed by: FAMILY MEDICINE

## 2022-08-23 PROCEDURE — 99233 SBSQ HOSP IP/OBS HIGH 50: CPT | Performed by: HOSPITALIST

## 2022-08-23 PROCEDURE — 250N000013 HC RX MED GY IP 250 OP 250 PS 637

## 2022-08-23 PROCEDURE — G0378 HOSPITAL OBSERVATION PER HR: HCPCS

## 2022-08-23 PROCEDURE — 250N000013 HC RX MED GY IP 250 OP 250 PS 637: Performed by: HOSPITALIST

## 2022-08-23 PROCEDURE — 87899 AGENT NOS ASSAY W/OPTIC: CPT

## 2022-08-23 RX ORDER — ACETAMINOPHEN 325 MG/1
650 TABLET ORAL EVERY 6 HOURS PRN
Status: DISCONTINUED | OUTPATIENT
Start: 2022-08-23 | End: 2022-08-26 | Stop reason: HOSPADM

## 2022-08-23 RX ORDER — NAPROXEN 250 MG/1
500 TABLET ORAL 2 TIMES DAILY PRN
Status: DISCONTINUED | OUTPATIENT
Start: 2022-08-23 | End: 2022-08-25

## 2022-08-23 RX ORDER — IPRATROPIUM BROMIDE AND ALBUTEROL SULFATE 2.5; .5 MG/3ML; MG/3ML
3 SOLUTION RESPIRATORY (INHALATION) EVERY 4 HOURS PRN
Status: DISCONTINUED | OUTPATIENT
Start: 2022-08-23 | End: 2022-08-26 | Stop reason: HOSPADM

## 2022-08-23 RX ADMIN — METFORMIN HYDROCHLORIDE 1000 MG: 500 TABLET, FILM COATED ORAL at 16:56

## 2022-08-23 RX ADMIN — Medication 3 MG: at 02:08

## 2022-08-23 RX ADMIN — SODIUM CHLORIDE: 9 INJECTION, SOLUTION INTRAVENOUS at 07:41

## 2022-08-23 RX ADMIN — INSULIN ASPART 1 UNITS: 100 INJECTION, SOLUTION INTRAVENOUS; SUBCUTANEOUS at 07:39

## 2022-08-23 RX ADMIN — CEFTRIAXONE 1 G: 1 INJECTION, POWDER, FOR SOLUTION INTRAMUSCULAR; INTRAVENOUS at 16:56

## 2022-08-23 RX ADMIN — AZITHROMYCIN MONOHYDRATE 500 MG: 500 INJECTION, POWDER, LYOPHILIZED, FOR SOLUTION INTRAVENOUS at 18:28

## 2022-08-23 RX ADMIN — ATORVASTATIN CALCIUM 80 MG: 80 TABLET, FILM COATED ORAL at 22:17

## 2022-08-23 RX ADMIN — METOPROLOL TARTRATE 25 MG: 25 TABLET, FILM COATED ORAL at 22:17

## 2022-08-23 RX ADMIN — SENNOSIDES AND DOCUSATE SODIUM 2 TABLET: 50; 8.6 TABLET ORAL at 07:37

## 2022-08-23 RX ADMIN — INSULIN ASPART 2 UNITS: 100 INJECTION, SOLUTION INTRAVENOUS; SUBCUTANEOUS at 11:14

## 2022-08-23 RX ADMIN — ZOLPIDEM TARTRATE 2.5 MG: 5 TABLET ORAL at 23:01

## 2022-08-23 RX ADMIN — AMLODIPINE BESYLATE 10 MG: 10 TABLET ORAL at 22:17

## 2022-08-23 RX ADMIN — GLIPIZIDE 10 MG: 10 TABLET, EXTENDED RELEASE ORAL at 07:37

## 2022-08-23 RX ADMIN — ACETAMINOPHEN 650 MG: 325 TABLET ORAL at 23:01

## 2022-08-23 RX ADMIN — SENNOSIDES AND DOCUSATE SODIUM 2 TABLET: 50; 8.6 TABLET ORAL at 22:16

## 2022-08-23 RX ADMIN — CLOPIDOGREL BISULFATE 75 MG: 75 TABLET ORAL at 22:17

## 2022-08-23 RX ADMIN — IPRATROPIUM BROMIDE AND ALBUTEROL SULFATE 3 ML: 2.5; .5 SOLUTION RESPIRATORY (INHALATION) at 01:58

## 2022-08-23 RX ADMIN — METFORMIN HYDROCHLORIDE 1000 MG: 500 TABLET, FILM COATED ORAL at 07:35

## 2022-08-23 RX ADMIN — LEVOTHYROXINE SODIUM 50 MCG: 0.05 TABLET ORAL at 07:37

## 2022-08-23 RX ADMIN — NAPROXEN 500 MG: 250 TABLET ORAL at 18:28

## 2022-08-23 ASSESSMENT — ACTIVITIES OF DAILY LIVING (ADL)
WALKING_OR_CLIMBING_STAIRS_DIFFICULTY: NO
CHANGE_IN_FUNCTIONAL_STATUS_SINCE_ONSET_OF_CURRENT_ILLNESS/INJURY: YES
ADLS_ACUITY_SCORE: 35
ADLS_ACUITY_SCORE: 26
FALL_HISTORY_WITHIN_LAST_SIX_MONTHS: NO
ADLS_ACUITY_SCORE: 35
DRESSING/BATHING_DIFFICULTY: NO
ADLS_ACUITY_SCORE: 35
TOILETING_ISSUES: NO
ADLS_ACUITY_SCORE: 35
ADLS_ACUITY_SCORE: 35
DOING_ERRANDS_INDEPENDENTLY_DIFFICULTY: NO
ADLS_ACUITY_SCORE: 35
CONCENTRATING,_REMEMBERING_OR_MAKING_DECISIONS_DIFFICULTY: NO
ADLS_ACUITY_SCORE: 35
DIFFICULTY_EATING/SWALLOWING: NO
WEAR_GLASSES_OR_BLIND: YES
VISION_MANAGEMENT: GLASSES

## 2022-08-23 NOTE — PROGRESS NOTES
[facility] [place] ADMISSION NOTE    Patient admitted to room 2300 at approximately 1815 via wheel chair from emergency room. Patient was accompanied by transport tech.     Verbal SBAR report received from JORGE Miranda prior to patient arrival.     Patient ambulated to bed with stand-by assist. Patient alert and oriented X 4. The patient is not having any pain.  . Admission vital signs: Blood pressure (!) 167/68, pulse 93, temperature 98.4  F (36.9  C), temperature source Oral, resp. rate 28, weight 117 kg (258 lb), SpO2 91 %. Patient was oriented to plan of care, call light, bed controls, tv, telephone, bathroom and visiting hours.     Risk Assessment    The following safety risks were identified during admission: none. Yellow risk band applied: NO.     Skin Initial Assessment    This writer admitted this patient and completed a full skin assessment and Adrian score in the Adult PCS flowsheet. Appropriate interventions initiated as needed. Patient declined a secondary skin assessment.    Education    Patient has a [place] to Observation order: Yes  Observation education completed and documented: Yes    Ila Mojica RN

## 2022-08-23 NOTE — PROGRESS NOTES
Neuro: A&Ox4.  Cardiac: Tachycardic  (albuterol neb was given)  Respiratory: Fine crackles in bases. Tachypnic (20-32). Neb given for SOB at rest and wheezing. On 2L NC  GI/: WNL  Mobility: SBA.   Diet: Reg    Pain: Denies.   Skin: WNL, dry patch of skin low back.  LDA: PIV infusing NS @ 125ml/hr.     VS:  Afebrile.    Plan: IV antibiotics    Still needs urine sample collected. Pt went to the bathroom independently and forgot to pee in the cup.

## 2022-08-23 NOTE — PROGRESS NOTES
[facility]    Medicine Progress Note - Hospitalist Service    Date of Admission:  8/22/2022    Assessment & Plan     Francois Lujan is a 71 year old male who presents on 8/22/2022 with fever, dyspnea for four days.     Community acquired pneumonia of left lower lobe of lung  Acute respiratory distress with hypoxia  4 days worsening cough productive of green sputum, febrile, fatigue, slight dyspnea.  On presentation chest x-ray shows patchy left lower lobe infiltrate suspicious for pneumonia.  Not requiring oxygen at rest, though spo2 dropped to mid 80s with ambulation.  On presentation, procalcitonin elevated 0.21, leukocytosis 16.3, , febrile to 102.2 Fahrenheit.  No sick contacts, no rigors, COVID and influenza tests negative. Initiated on ceftriaxone and azithromycin in ER.  -Continue azithromycin 500 mg IV every 24 hours, plan for at least 3 days of therapy  -Continue ceftriaxone 1 g IV every 24 hours  -Continuous pulse ox  -Oxygen available as needed to maintain SPO2 >89%  -Repeat CRP in a.m.  -Strep pneumo urine antigen  -Sputum culture NL silas  -Blood cultures so far negative    S/P Coronary artery bypass graft  History of ischemic VBA thalamic stroke, left  Coronary artery bypass x3 November 2012.  Acute ischemic VBA thalamus stroke August 2020.  Patient's only residual deficit is pins-and-needles sensation and right hand.  Currently stable on clopidogrel 75 mg daily.  No chest pain on presentation, BNP and troponin within normal limits on admission.  -Continue PTA clopidogrel 75 mg at bedtime    Essential hypertension  -Continue PTA amlodipine 10 mg at bedtime with hold parameters  -Continue PTA metoprolol tartrate 25 mg at bedtime with hold parameters  -Continue PTA valsartan 160 mg at bedtime with hold parameters    Polymyalgia rheumatica   Has been on prednisone in the past, not currently on home med list.  Patient denies any current symptoms.  CRP currently elevated  and polymyalgia rheumatica could be contributing, however previous CRPs were never >25, so suspect acute elevated CRP is due to infectious process above and not due to PMR.  No acute interventions.    Type 2 diabetes mellitus with microalbuminuria  Diabetic polyneuropathy   Managed at home with metformin 1000 mg twice daily and glipizide 10 mg daily.  Glucose on presentation elevated to 187.  Last hemoglobin A1c 6/16/2022 6.8.  -Continue PTA metformin  -Continue PTA glipizide  -Medium  sliding scale correction insulin available  -Monitor for hypoglycemia, treat accordingly    Acquired hypothyroidism  Stable, most recent TSH 4/21/2022 1.24.  Managed at home with levothyroxine 50 mcg daily.  -Continue PTA levothyroxine 50 mcg daily    Hyperlipidemia LDL goal <70  -Continue PTA atorvastatin 80 mg at bedtime    Obstructive sleep apnea  -May use home CPAP if available    Alcohol overuse  Patient used to drink daily, has decreased drinking to 2-3 beers every few days with friends.  No history of withdrawal.  Low suspicion patient will require CIWA, no current symptoms.    Obesity (BMI 35.0-39.9) with comorbidity  Contributes to overall morbidity and mortality    Moderate major depression   Chronic, controlled.  Not on any outpatient pharmacologic management.  No acute interventions.       Diet: Regular Diet Adult    DVT Prophylaxis: Ambulate every shift, and continue PTA   Lazo Catheter: Not present  Central Lines: None  Cardiac Monitoring: None  Code Status: Full Code      Disposition Plan      Expected Discharge Date: 08/24/2022                The patient's care was discussed with the Bedside Nurse and Patient.    Jag Pollock MD  Hospitalist Service  [facility]  Securely message with the Vocera Web Console (learn more here)  Text page via [email or web address] Paging/Directory         Clinically Significant Risk Factors Present on Admission          # Hypocalcemia: Ca = 8.0 mg/dL (Ref range: 8.5 - 10.1  mg/dL) and/or iCa = N/A on admission, will replace as needed       # Platelet Defect: home medication list includes an antiplatelet medication  # Hypertension: home medication list includes antihypertensive(s)    # DMII: A1C = N/A within past 3 months        ______________________________________________________________________    Interval History   Feels better but still has some cough and congestion. NO SOB. Has occasional chills, no fever today. No CP.     Data reviewed today: I reviewed all medications, new labs and imaging results over the last 24 hours. I personally reviewed no images or EKG's today.    Physical Exam   Vital Signs: Temp: 99.9  F (37.7  C) Temp src: Oral BP: (!) 142/57 Pulse: 87   Resp: 18 SpO2: 90 % O2 Device: None (Room air) Oxygen Delivery: 2 LPM  Weight: 258 lbs 0 oz  Constitutional: Alert, oriented, cooperative, no apparent distress, appears nontoxic  Eyes: Eyes are clear, pupils are equal, round.  No scleral icterus.  HENT: Oropharynx is clear and moist.  Tongue is midline.  No evidence of cranial trauma.  Cardiovascular: Regular rate and rhythm, some S2 splitting noted, otherwise no murmurs auscultated. JVP is normal. Good peripheral pulses in wrists bilaterally. No lower extremity edema.  Respiratory: Respirations unlabored on room air, moving air well.  No dyspnea with conversation.  Lung sounds decreased throughout all lung fields, resolved crackles previously present in left lower lobe, otherwise generally clear throughout all other lung fields.  No wheezes, scattered rhonchi.  GI: Large abdomen, Nondistended, firm but not rigid, non-tender to palpation, normal bowel sounds throughout  Genitourinary: Dark yellow, not cloudy urine present in bedside urinal.  Musculoskeletal: Normal muscle bulk , no obvious joint deformities. 1+ pretibial edema of distal legs  Skin: Warm and dry, no rashes.   Neurologic: Neck supple. Cranial nerves are grossly intact.  is symmetric.  No  tremor.    Data   Recent Labs   Lab 08/23/22  1627 08/23/22  1110 08/23/22  0722 08/23/22  0522 08/23/22  0005 08/22/22  1340   WBC  --   --   --  15.9*  --  16.3*   HGB  --   --   --  10.1*  --  11.9*   MCV  --   --   --  97  --  96   PLT  --   --   --  248  --  258   NA  --   --   --  135  --  134   POTASSIUM  --   --   --  3.7  --  3.8   CHLORIDE  --   --   --  103  --  98   CO2  --   --   --  26  --  27   BUN  --   --   --  20  --  18   CR  --   --   --  0.98  --  1.04   ANIONGAP  --   --   --  6  --  9   AFRICA  --   --   --  8.0*  --  8.3*   * 199* 154* 147*   < > 187*   ALBUMIN  --   --   --   --   --  3.5   PROTTOTAL  --   --   --   --   --  8.7   BILITOTAL  --   --   --   --   --  1.6*   ALKPHOS  --   --   --   --   --  188*   ALT  --   --   --   --   --  18   AST  --   --   --   --   --  21   LIPASE  --   --   --   --   --  109    < > = values in this interval not displayed.

## 2022-08-23 NOTE — UTILIZATION REVIEW
Admission Status; Secondary Review Determination       Under the authority of the Utilization Management Committee, the utilization review process indicated a secondary review on the above patient. The review outcome is based on review of the medical records, discussions with staff, and applying clinical experience noted on the date of the review.     (x) Inpatient Status Appropriate - This patient's medical care is consistent with medical management for inpatient care and reasonable inpatient medical practice.     RATIONALE FOR DETERMINATION      Patient requires inpatient admission versus short stay observation or outpatient treatment for the following reasons:    72 y/o man with complex medical problems : DM 2 with neuropathy, polyneuropathy, HTN, Hypothyroidism,  CAD, Hx of CVA, PMR, ETOH abuse, obesity, ADELA present with cough and fever. The patient was found with LLL infiltrate, elevated pro calcitonin level, hypoxemia, leukocytosis. The patient was started on iv antibiotics.   The patient continues to be dyspneic, tachycardic, tachypneic requiring 2 L of oxygen, the leukocytosis persists.  71-year-old man immunocompromised secondary uncontrolled diabetes and continues alcohol abuse is admitted with pneumonia and hypoxemia.  He needs IV antibiotics, he needs close monitoring of his vital signs, he needs close monitoring of his blood sugars he needs frequent nebulizer treatment.    The expected length of stay at the time of admission was more than 2 nights because of the severity of illness, intensity of service provided, and risk for adverse outcome. Inpatient admission is appropriate.     Dr Pollock notified     This document was produced using voice recognition software       The information on this document is developed by the utilization review team in order for the business office to ensure compliance. This only denotes the appropriateness of proper admission status and does not reflect the quality of  care rendered.   The definitions of Inpatient Status and Observation Status used in making the determination above are those provided in the CMS Coverage Manual, Chapter 1 and Chapter 6, section 70.4.   Sincerely,   JAIME LOWE MD   Utilization Review  Physician Advisor  [facility]

## 2022-08-23 NOTE — PLAN OF CARE
Goal Outcome Evaluation:        Patient IV fluid running at 125 lung sounds crackles in bases also +1 +2 lower extremity edema rounding MD is aware IV fluid stopped per MD ordered

## 2022-08-23 NOTE — PROVIDER NOTIFICATION
"0140: Notified Dr. Andriy Westbrook: \"Pt c/o of SOB at rest/wheezing. Here for CAP. Would PRN nebs be a possibility? Thanks!\"    RESPONSE: PRN neb ordered  "

## 2022-08-23 NOTE — PROGRESS NOTES
Patient alert and oriented x 4. Vitals stable, although patient has been hypertensive and tachypneic since admission. Sats 90 - 92% on 2 LPM O2 via nasal cannula. Denies pain. Minimal appetite. Ambulated to the bathroom with standby assist.    Vital signs:  Temp: 98.4  F (36.9  C) Temp src: Oral BP: (!) 167/68 Pulse: 93   Resp: 28 SpO2: 91 % O2 Device: Nasal cannula Oxygen Delivery: 2 LPM

## 2022-08-24 LAB
BASOPHILS # BLD AUTO: 0 10E3/UL (ref 0–0.2)
BASOPHILS NFR BLD AUTO: 0 %
EOSINOPHIL # BLD AUTO: 0.5 10E3/UL (ref 0–0.7)
EOSINOPHIL NFR BLD AUTO: 3 %
ERYTHROCYTE [DISTWIDTH] IN BLOOD BY AUTOMATED COUNT: 13.6 % (ref 10–15)
GLUCOSE BLDC GLUCOMTR-MCNC: 107 MG/DL (ref 70–99)
GLUCOSE BLDC GLUCOMTR-MCNC: 115 MG/DL (ref 70–99)
GLUCOSE BLDC GLUCOMTR-MCNC: 121 MG/DL (ref 70–99)
GLUCOSE BLDC GLUCOMTR-MCNC: 123 MG/DL (ref 70–99)
GLUCOSE BLDC GLUCOMTR-MCNC: 131 MG/DL (ref 70–99)
GLUCOSE BLDC GLUCOMTR-MCNC: 132 MG/DL (ref 70–99)
GLUCOSE BLDC GLUCOMTR-MCNC: 74 MG/DL (ref 70–99)
HCT VFR BLD AUTO: 30.9 % (ref 40–53)
HGB BLD-MCNC: 10.1 G/DL (ref 13.3–17.7)
HOLD SPECIMEN: NORMAL
IMM GRANULOCYTES # BLD: 0.2 10E3/UL
IMM GRANULOCYTES NFR BLD: 1 %
LYMPHOCYTES # BLD AUTO: 2.7 10E3/UL (ref 0.8–5.3)
LYMPHOCYTES NFR BLD AUTO: 17 %
MCH RBC QN AUTO: 31.9 PG (ref 26.5–33)
MCHC RBC AUTO-ENTMCNC: 32.7 G/DL (ref 31.5–36.5)
MCV RBC AUTO: 98 FL (ref 78–100)
MONOCYTES # BLD AUTO: 1.9 10E3/UL (ref 0–1.3)
MONOCYTES NFR BLD AUTO: 12 %
NEUTROPHILS # BLD AUTO: 10.7 10E3/UL (ref 1.6–8.3)
NEUTROPHILS NFR BLD AUTO: 67 %
NRBC # BLD AUTO: 0 10E3/UL
NRBC BLD AUTO-RTO: 0 /100
NT-PROBNP SERPL-MCNC: 779 PG/ML (ref 0–900)
PLATELET # BLD AUTO: 269 10E3/UL (ref 150–450)
RBC # BLD AUTO: 3.17 10E6/UL (ref 4.4–5.9)
WBC # BLD AUTO: 15.8 10E3/UL (ref 4–11)

## 2022-08-24 PROCEDURE — 250N000013 HC RX MED GY IP 250 OP 250 PS 637: Performed by: INTERNAL MEDICINE

## 2022-08-24 PROCEDURE — 36415 COLL VENOUS BLD VENIPUNCTURE: CPT | Performed by: HOSPITALIST

## 2022-08-24 PROCEDURE — 250N000013 HC RX MED GY IP 250 OP 250 PS 637

## 2022-08-24 PROCEDURE — 120N000001 HC R&B MED SURG/OB

## 2022-08-24 PROCEDURE — 85025 COMPLETE CBC W/AUTO DIFF WBC: CPT | Performed by: HOSPITALIST

## 2022-08-24 PROCEDURE — 83880 ASSAY OF NATRIURETIC PEPTIDE: CPT | Performed by: HOSPITALIST

## 2022-08-24 PROCEDURE — 250N000011 HC RX IP 250 OP 636: Performed by: FAMILY MEDICINE

## 2022-08-24 PROCEDURE — 99233 SBSQ HOSP IP/OBS HIGH 50: CPT | Performed by: HOSPITALIST

## 2022-08-24 PROCEDURE — 250N000011 HC RX IP 250 OP 636: Performed by: HOSPITALIST

## 2022-08-24 PROCEDURE — 258N000003 HC RX IP 258 OP 636: Performed by: FAMILY MEDICINE

## 2022-08-24 PROCEDURE — 250N000013 HC RX MED GY IP 250 OP 250 PS 637: Performed by: HOSPITALIST

## 2022-08-24 RX ORDER — FUROSEMIDE 10 MG/ML
40 INJECTION INTRAMUSCULAR; INTRAVENOUS EVERY 4 HOURS
Status: COMPLETED | OUTPATIENT
Start: 2022-08-24 | End: 2022-08-24

## 2022-08-24 RX ADMIN — AZITHROMYCIN MONOHYDRATE 500 MG: 500 INJECTION, POWDER, LYOPHILIZED, FOR SOLUTION INTRAVENOUS at 16:37

## 2022-08-24 RX ADMIN — CEFTRIAXONE 1 G: 1 INJECTION, POWDER, FOR SOLUTION INTRAMUSCULAR; INTRAVENOUS at 15:50

## 2022-08-24 RX ADMIN — ZOLPIDEM TARTRATE 2.5 MG: 5 TABLET ORAL at 21:13

## 2022-08-24 RX ADMIN — FUROSEMIDE 40 MG: 10 INJECTION, SOLUTION INTRAMUSCULAR; INTRAVENOUS at 15:50

## 2022-08-24 RX ADMIN — FUROSEMIDE 40 MG: 10 INJECTION, SOLUTION INTRAMUSCULAR; INTRAVENOUS at 11:13

## 2022-08-24 RX ADMIN — METOPROLOL TARTRATE 25 MG: 25 TABLET, FILM COATED ORAL at 21:13

## 2022-08-24 RX ADMIN — VALSARTAN 160 MG: 160 TABLET ORAL at 21:17

## 2022-08-24 RX ADMIN — METFORMIN HYDROCHLORIDE 1000 MG: 500 TABLET, FILM COATED ORAL at 17:26

## 2022-08-24 RX ADMIN — ATORVASTATIN CALCIUM 80 MG: 80 TABLET, FILM COATED ORAL at 21:13

## 2022-08-24 RX ADMIN — GLIPIZIDE 10 MG: 10 TABLET, EXTENDED RELEASE ORAL at 07:30

## 2022-08-24 RX ADMIN — SENNOSIDES AND DOCUSATE SODIUM 2 TABLET: 50; 8.6 TABLET ORAL at 21:14

## 2022-08-24 RX ADMIN — NAPROXEN 500 MG: 250 TABLET ORAL at 06:44

## 2022-08-24 RX ADMIN — Medication 3 MG: at 21:13

## 2022-08-24 RX ADMIN — SENNOSIDES AND DOCUSATE SODIUM 2 TABLET: 50; 8.6 TABLET ORAL at 07:29

## 2022-08-24 RX ADMIN — NAPROXEN 500 MG: 250 TABLET ORAL at 22:34

## 2022-08-24 RX ADMIN — METFORMIN HYDROCHLORIDE 1000 MG: 500 TABLET, FILM COATED ORAL at 07:29

## 2022-08-24 RX ADMIN — AMLODIPINE BESYLATE 10 MG: 10 TABLET ORAL at 21:13

## 2022-08-24 RX ADMIN — LEVOTHYROXINE SODIUM 50 MCG: 0.05 TABLET ORAL at 07:30

## 2022-08-24 RX ADMIN — CLOPIDOGREL BISULFATE 75 MG: 75 TABLET ORAL at 21:13

## 2022-08-24 ASSESSMENT — ACTIVITIES OF DAILY LIVING (ADL)
ADLS_ACUITY_SCORE: 26
ADLS_ACUITY_SCORE: 31
ADLS_ACUITY_SCORE: 26
ADLS_ACUITY_SCORE: 31
ADLS_ACUITY_SCORE: 26
ADLS_ACUITY_SCORE: 31

## 2022-08-24 NOTE — PROGRESS NOTES
CLINICAL NUTRITION SERVICES - ASSESSMENT NOTE     Nutrition Prescription    RECOMMENDATIONS FOR MDs/PROVIDERS TO ORDER:  Increase patient bowel regiment; last noted bowel movement one week ago.    Malnutrition Status:    Moderate malnutrition in the context of acute illness    Recommendations already ordered by Registered Dietitian (RD):  Please send ensure max vanilla once daily    Future/Additional Recommendations:  Monitor patient weight, intakes, labs and medications  Monitor patient tolerance of supplement     REASON FOR ASSESSMENT  Francois Lujan is a/an 71 year old male assessed by the dietitian for Admission Nutrition Risk Screen for positive    NUTRITION HISTORY  Patient presents with community acquired pneumonia, coronary artery bypass graft, HTN, polymyalgia rheumatica, type 2 DM w/ microalbuminuria, polyneuropathy, HLD, ADELA, alcohol overuse, obesity, moderate major depression  -Per RN notes patient has a poor appetite.  -Patient last BM about one week ago per verbal report    Per patient interview  Patient reports weight gain related to fluid. Patient stated a typical weight to be abound 250 lbs  Patient reports a decreased appetite for about one week. RD educated on the importance of protein intakes for the preservation of LBM; patient was agreeable to trying ensure max vanilla  -Per patient he is hopeful to discharge soon; no other nutrition concerns where identified at this time.     CURRENT NUTRITION ORDERS  Diet: Orders Placed This Encounter      Regular Diet Adult    Intake/Tolerance: 0-25%    LABS  Labs reviewed  Glucose elevated-198 mg/dL    MEDICATIONS  Medications reviewed  Scheduled: Lipitor, Zithromax, Rocephin, Glucotrol XL, Novolog, Synthroid, Glucophage, Lopressor, Senokot,  Continuous: None  PRN: none pertinent given 8/25    ANTHROPOMETRICS  Height: 0 cm (Data Unavailable)  Most Recent Weight: 117 kg (258 lb)    IBW: 80.9 kg  BMI: Obesity Grade I BMI 30-34.9  Weight History:   Wt  Readings from Last 15 Encounters:   08/22/22 117 kg (258 lb)   06/16/22 122.5 kg (270 lb)   05/26/22 123.6 kg (272 lb 6.4 oz)   04/21/22 120.2 kg (265 lb)   01/06/22 123.8 kg (273 lb)   07/08/21 121.1 kg (267 lb)   04/08/21 119.7 kg (264 lb)   03/28/21 113.4 kg (250 lb)   09/02/20 125.2 kg (276 lb)   08/11/20 124.7 kg (275 lb)   08/02/20 127.3 kg (280 lb 10.3 oz)   07/28/20 126.3 kg (278 lb 6.4 oz)   06/08/20 126.6 kg (279 lb)   05/29/20 120.2 kg (265 lb)   12/13/19 122.5 kg (270 lb)   5.1% non significant weight loss noted within 3 months.    Dosing Weight: 89.9 kg-adjusted BW used    ASSESSED NUTRITION NEEDS  Estimated Energy Needs: 2,247-2,697 kcals/day (25 - 30 kcals/kg)  Justification: Maintenance  Estimated Protein Needs:  grams protein/day (1.1 - 1.4 grams of pro/kg)  Justification: Increased needs  Estimated Fluid Needs: 2,247-2,697 mL/day (1 mL/kcal)   Justification: Per provider pending fluid status    PHYSICAL FINDINGS  See malnutrition section below.  Moderate edema noted    MALNUTRITION  % Intake: < 75% for > 7 days (moderate)  % Weight Loss: Weight loss does not meet criteria  Subcutaneous Fat Loss: None observed  Muscle Loss: None observed  Fluid Accumulation/Edema: Moderate  Malnutrition Diagnosis: Moderate malnutrition in the context of acute illness    NUTRITION DIAGNOSIS  Inadequate oral intake related to decreased appetite as evidenced by patient report of poor intakes prior to admission    INTERVENTIONS  Implementation  Collaboration with other providers-IDT rounds  Medical food supplement therapy-Please send ensure max once daily    Goals  Patient to consume % of nutritionally adequate meal trays TID, or the equivalent with supplements/snacks.     Monitoring/Evaluation  Progress toward goals will be monitored and evaluated per protocol.    Tatiana Orozco RDN, YULIANA  Clinical Dietitian  Office: [phone]  Weekend pager: [phone]

## 2022-08-24 NOTE — PROGRESS NOTES
"Patient alert and oriented x 4. Patient developed a low-grade fever this evening but vitals were otherwise stable. Sats 90 - 94% on 1 LPM O2 via nasal cannula. Patient noted pain in \"all\" his joints. Provider was notified and prescribed PRN naproxen, which was ineffective. Patient declined any additional pain intervention but ambulated in the room with standby assist. Poor appetite. Mild edema in bilateral feet and ankles persists. PRN Ambien requested and given.    Vital signs:  Temp: 98.8  F (37.1  C) Temp src: Oral BP: 111/49 Pulse: 91   Resp: 20 SpO2: 94 % O2 Device: Nasal cannula Oxygen Delivery: 1 LPM       "

## 2022-08-24 NOTE — PLAN OF CARE
"Goal Outcome Evaluation:  Pt is A/O, able to make needs known.   Blood pressure 121/47, pulse 75, temperature 97.7  F (36.5  C), temperature source Oral, resp. rate 16, weight 117 kg (258 lb), SpO2 93 %.  Stable on room air.   Pt is SBA.   Verbalizing joint pain that he states is a \"medication reaction\"   Pt has bilateral lower extremity swelling and joint tenderness.   Given PRN dose of Naprosen.  Continue to assess per plan of care.                    "

## 2022-08-25 ENCOUNTER — APPOINTMENT (OUTPATIENT)
Dept: GENERAL RADIOLOGY | Facility: CLINIC | Age: 72
DRG: 194 | End: 2022-08-25
Attending: HOSPITALIST
Payer: COMMERCIAL

## 2022-08-25 LAB
ALBUMIN SERPL-MCNC: 2.9 G/DL (ref 3.4–5)
ALP SERPL-CCNC: 202 U/L (ref 40–150)
ALT SERPL W P-5'-P-CCNC: 38 U/L (ref 0–70)
ANION GAP SERPL CALCULATED.3IONS-SCNC: 7 MMOL/L (ref 3–14)
AST SERPL W P-5'-P-CCNC: 67 U/L (ref 0–45)
BACTERIA SPT CULT: NORMAL
BASOPHILS # BLD AUTO: 0 10E3/UL (ref 0–0.2)
BASOPHILS NFR BLD AUTO: 0 %
BILIRUB SERPL-MCNC: 0.6 MG/DL (ref 0.2–1.3)
BUN SERPL-MCNC: 33 MG/DL (ref 7–30)
CALCIUM SERPL-MCNC: 8.4 MG/DL (ref 8.5–10.1)
CHLORIDE BLD-SCNC: 104 MMOL/L (ref 94–109)
CO2 SERPL-SCNC: 26 MMOL/L (ref 20–32)
CREAT SERPL-MCNC: 1.13 MG/DL (ref 0.66–1.25)
CRP SERPL-MCNC: 246 MG/L (ref 0–8)
EOSINOPHIL # BLD AUTO: 0.6 10E3/UL (ref 0–0.7)
EOSINOPHIL NFR BLD AUTO: 4 %
ERYTHROCYTE [DISTWIDTH] IN BLOOD BY AUTOMATED COUNT: 13.6 % (ref 10–15)
GFR SERPL CREATININE-BSD FRML MDRD: 69 ML/MIN/1.73M2
GLUCOSE BLD-MCNC: 129 MG/DL (ref 70–99)
GLUCOSE BLDC GLUCOMTR-MCNC: 104 MG/DL (ref 70–99)
GLUCOSE BLDC GLUCOMTR-MCNC: 133 MG/DL (ref 70–99)
GLUCOSE BLDC GLUCOMTR-MCNC: 137 MG/DL (ref 70–99)
GLUCOSE BLDC GLUCOMTR-MCNC: 146 MG/DL (ref 70–99)
GLUCOSE BLDC GLUCOMTR-MCNC: 198 MG/DL (ref 70–99)
GRAM STAIN RESULT: NORMAL
HCT VFR BLD AUTO: 29.8 % (ref 40–53)
HGB BLD-MCNC: 10.1 G/DL (ref 13.3–17.7)
IMM GRANULOCYTES # BLD: 0.1 10E3/UL
IMM GRANULOCYTES NFR BLD: 1 %
LYMPHOCYTES # BLD AUTO: 2.7 10E3/UL (ref 0.8–5.3)
LYMPHOCYTES NFR BLD AUTO: 19 %
MCH RBC QN AUTO: 32.3 PG (ref 26.5–33)
MCHC RBC AUTO-ENTMCNC: 33.9 G/DL (ref 31.5–36.5)
MCV RBC AUTO: 95 FL (ref 78–100)
MONOCYTES # BLD AUTO: 1.5 10E3/UL (ref 0–1.3)
MONOCYTES NFR BLD AUTO: 10 %
NEUTROPHILS # BLD AUTO: 9.3 10E3/UL (ref 1.6–8.3)
NEUTROPHILS NFR BLD AUTO: 66 %
NRBC # BLD AUTO: 0 10E3/UL
NRBC BLD AUTO-RTO: 0 /100
PLATELET # BLD AUTO: 287 10E3/UL (ref 150–450)
POTASSIUM BLD-SCNC: 3.9 MMOL/L (ref 3.4–5.3)
PROT SERPL-MCNC: 7.8 G/DL (ref 6.8–8.8)
RBC # BLD AUTO: 3.13 10E6/UL (ref 4.4–5.9)
SODIUM SERPL-SCNC: 137 MMOL/L (ref 133–144)
WBC # BLD AUTO: 14.1 10E3/UL (ref 4–11)

## 2022-08-25 PROCEDURE — 36415 COLL VENOUS BLD VENIPUNCTURE: CPT | Performed by: HOSPITALIST

## 2022-08-25 PROCEDURE — 99233 SBSQ HOSP IP/OBS HIGH 50: CPT | Performed by: HOSPITALIST

## 2022-08-25 PROCEDURE — 250N000011 HC RX IP 250 OP 636: Performed by: FAMILY MEDICINE

## 2022-08-25 PROCEDURE — 85025 COMPLETE CBC W/AUTO DIFF WBC: CPT | Performed by: HOSPITALIST

## 2022-08-25 PROCEDURE — 120N000001 HC R&B MED SURG/OB

## 2022-08-25 PROCEDURE — 250N000013 HC RX MED GY IP 250 OP 250 PS 637

## 2022-08-25 PROCEDURE — 80053 COMPREHEN METABOLIC PANEL: CPT | Performed by: HOSPITALIST

## 2022-08-25 PROCEDURE — 250N000013 HC RX MED GY IP 250 OP 250 PS 637: Performed by: INTERNAL MEDICINE

## 2022-08-25 PROCEDURE — 250N000013 HC RX MED GY IP 250 OP 250 PS 637: Performed by: HOSPITALIST

## 2022-08-25 PROCEDURE — 86140 C-REACTIVE PROTEIN: CPT | Performed by: HOSPITALIST

## 2022-08-25 PROCEDURE — 258N000003 HC RX IP 258 OP 636: Performed by: FAMILY MEDICINE

## 2022-08-25 PROCEDURE — 71046 X-RAY EXAM CHEST 2 VIEWS: CPT

## 2022-08-25 RX ORDER — INDOMETHACIN 50 MG/1
50 CAPSULE ORAL 3 TIMES DAILY PRN
Status: DISCONTINUED | OUTPATIENT
Start: 2022-08-26 | End: 2022-08-26 | Stop reason: HOSPADM

## 2022-08-25 RX ADMIN — VALSARTAN 160 MG: 160 TABLET ORAL at 21:01

## 2022-08-25 RX ADMIN — INSULIN ASPART 1 UNITS: 100 INJECTION, SOLUTION INTRAVENOUS; SUBCUTANEOUS at 07:58

## 2022-08-25 RX ADMIN — CEFTRIAXONE 1 G: 1 INJECTION, POWDER, FOR SOLUTION INTRAMUSCULAR; INTRAVENOUS at 15:55

## 2022-08-25 RX ADMIN — INSULIN ASPART 2 UNITS: 100 INJECTION, SOLUTION INTRAVENOUS; SUBCUTANEOUS at 12:54

## 2022-08-25 RX ADMIN — ATORVASTATIN CALCIUM 80 MG: 80 TABLET, FILM COATED ORAL at 21:01

## 2022-08-25 RX ADMIN — AZITHROMYCIN MONOHYDRATE 500 MG: 500 INJECTION, POWDER, LYOPHILIZED, FOR SOLUTION INTRAVENOUS at 16:46

## 2022-08-25 RX ADMIN — Medication 3 MG: at 21:01

## 2022-08-25 RX ADMIN — AMLODIPINE BESYLATE 10 MG: 10 TABLET ORAL at 21:01

## 2022-08-25 RX ADMIN — METFORMIN HYDROCHLORIDE 1000 MG: 500 TABLET, FILM COATED ORAL at 07:56

## 2022-08-25 RX ADMIN — CLOPIDOGREL BISULFATE 75 MG: 75 TABLET ORAL at 21:01

## 2022-08-25 RX ADMIN — LEVOTHYROXINE SODIUM 50 MCG: 0.05 TABLET ORAL at 07:56

## 2022-08-25 RX ADMIN — SENNOSIDES AND DOCUSATE SODIUM 1 TABLET: 50; 8.6 TABLET ORAL at 07:56

## 2022-08-25 RX ADMIN — GLIPIZIDE 10 MG: 10 TABLET, EXTENDED RELEASE ORAL at 07:56

## 2022-08-25 RX ADMIN — ZOLPIDEM TARTRATE 2.5 MG: 5 TABLET ORAL at 21:01

## 2022-08-25 RX ADMIN — NAPROXEN 500 MG: 250 TABLET ORAL at 16:00

## 2022-08-25 RX ADMIN — METOPROLOL TARTRATE 25 MG: 25 TABLET, FILM COATED ORAL at 21:01

## 2022-08-25 RX ADMIN — METFORMIN HYDROCHLORIDE 1000 MG: 500 TABLET, FILM COATED ORAL at 16:48

## 2022-08-25 ASSESSMENT — ACTIVITIES OF DAILY LIVING (ADL)
ADLS_ACUITY_SCORE: 26
ADLS_ACUITY_SCORE: 27
ADLS_ACUITY_SCORE: 26
ADLS_ACUITY_SCORE: 27
ADLS_ACUITY_SCORE: 26
ADLS_ACUITY_SCORE: 27
ADLS_ACUITY_SCORE: 26
ADLS_ACUITY_SCORE: 26

## 2022-08-25 NOTE — PROGRESS NOTES
Patient is alert and calm.  He initially denied pain but later reported some pain in his left knee.  He stated that at rest it was a 5/10 and with movement a 10/10.  PRN pain medication was administered per the MAR.  Patient appeared to be able to sleep after that.  He did however choose to sleep in the chair.  Patient continues to have 3+ pitting edema in the ankles and feet.  He states that his legs feel much better though.      Temp: 98  F (36.7  C) Temp src: Oral BP: 132/54 Pulse: 78   Resp: 18 SpO2: 92 %   Weight: 122 kg (268 lb 15.4 oz)  O2 Device: None (Room air)

## 2022-08-25 NOTE — PROGRESS NOTES
Patient using call light to notify staff with needs. Oral medication given for right knee pain. Patient waiting on MD to round for additional plan.

## 2022-08-25 NOTE — PROGRESS NOTES
[facility]    Medicine Progress Note - Hospitalist Service    Date of Admission:  8/22/2022    Assessment & Plan            Francois Lujan is a 71 year old male who presents on 8/22/2022 with fever, dyspnea for four days.     Community acquired pneumonia of left lower lobe of lung  Acute respiratory distress with hypoxia  4 days worsening cough productive of green sputum, febrile, fatigue, slight dyspnea.  On presentation chest x-ray shows patchy left lower lobe infiltrate suspicious for pneumonia.  Not requiring oxygen at rest, though spo2 dropped to mid 80s with ambulation.  On presentation, procalcitonin elevated 0.21, leukocytosis 16.3, , febrile to 102.2 Fahrenheit.  No sick contacts, no rigors, COVID and influenza tests negative. Initiated on ceftriaxone and azithromycin in ER.  -Continue azithromycin 500 mg IV every 24 hours, plan for at least 3 days of therapy  -Continue ceftriaxone 1 g IV every 24 hours  -Continuous pulse ox  -Oxygen available as needed to maintain SPO2 >89%  -Repeat CRP in a.m.  -Strep pneumo urine antigen  -Sputum culture NL silas  -Blood cultures so far negative    S/P Coronary artery bypass graft  History of ischemic VBA thalamic stroke, left  Coronary artery bypass x3 November 2012.  Acute ischemic VBA thalamus stroke August 2020.  Patient's only residual deficit is pins-and-needles sensation and right hand.  Currently stable on clopidogrel 75 mg daily.  No chest pain on presentation, BNP and troponin within normal limits on admission.  -Continue PTA clopidogrel 75 mg at bedtime    Essential hypertension  -Continue PTA amlodipine 10 mg at bedtime with hold parameters  -Continue PTA metoprolol tartrate 25 mg at bedtime with hold parameters  -Continue PTA valsartan 160 mg at bedtime with hold parameters    Polymyalgia rheumatica   Has been on prednisone in the past, not currently on home med list.  Patient denies any current symptoms.  CRP currently  elevated and polymyalgia rheumatica could be contributing, however previous CRPs were never >25, so suspect acute elevated CRP is due to infectious process above and not due to PMR.  No acute interventions.    Type 2 diabetes mellitus with microalbuminuria  Diabetic polyneuropathy   Managed at home with metformin 1000 mg twice daily and glipizide 10 mg daily.  Glucose on presentation elevated to 187.  Last hemoglobin A1c 6/16/2022 6.8.  -Continue PTA metformin  -Continue PTA glipizide  -Medium  sliding scale correction insulin available  -Monitor for hypoglycemia, treat accordingly    Acquired hypothyroidism  Stable, most recent TSH 4/21/2022 1.24.  Managed at home with levothyroxine 50 mcg daily.  -Continue PTA levothyroxine 50 mcg daily    Hyperlipidemia LDL goal <70  -Continue PTA atorvastatin 80 mg at bedtime    Obstructive sleep apnea  -May use home CPAP if available    Alcohol overuse  Patient used to drink daily, has decreased drinking to 2-3 beers every few days with friends.  No history of withdrawal.  Low suspicion patient will require CIWA, no current symptoms.    Obesity (BMI 35.0-39.9) with comorbidity  Contributes to overall morbidity and mortality    Moderate major depression   Chronic, controlled.  Not on any outpatient pharmacologic management.  No acute interventions.         Diet: Regular Diet Adult    DVT Prophylaxis: Enoxaparin (Lovenox) SQ  Lazo Catheter: Not present  Central Lines: None  Cardiac Monitoring: None  Code Status: Full Code      Disposition Plan      Expected Discharge Date: 08/25/2022                The patient's care was discussed with the Bedside Nurse and Patient.    Jag Pollock MD  Hospitalist Service  [facility]  Securely message with the Vocera Web Console (learn more here)  Text page via [employer] Paging/Directory         Clinically Significant Risk Factors Present on Admission               # DMII: A1C = N/A within past 3 months         ______________________________________________________________________    Interval History   Patient has no fever but continues with occasional hacking cough. No CP. Has 5 lbs weight gain as per records. BNP is WNL. WBC stays elevated as well as CRP which is higher than yesterday. Patient denies any chills over past 24 hours. Plan is to repeat CXR tomorrow.     Data reviewed today: I reviewed all medications, new labs and imaging results over the last 24 hours. I personally reviewed no images or EKG's today.    Physical Exam   Vital Signs: Temp: 98.3  F (36.8  C) Temp src: Oral BP: (!) 144/59 Pulse: 84   Resp: 18 SpO2: 94 % O2 Device: None (Room air) Oxygen Delivery: 1 LPM  Weight: 268 lbs 15.38 oz  Constitutional: Alert, oriented, cooperative, no apparent distress, appears nontoxic. Has hacking cough intermittently, started just now as per patient.   Eyes: Eyes are clear, pupils are equal, round.  No scleral icterus.  HENT: Oropharynx is clear and moist.  Tongue is midline.  No evidence of cranial trauma.  Cardiovascular: Regular rate and rhythm, some S2 splitting noted, otherwise no murmurs auscultated. JVP is normal. Good peripheral pulses in wrists bilaterally. No lower extremity edema.  Respiratory: Respirations unlabored on room air, moving air well.  No dyspnea with conversation.  Lung sounds decreased throughout all lung fields, resolved crackles previously present in left lower lobe, otherwise generally clear throughout all other lung fields.  No wheezes, scattered rhonchi.  GI: Large abdomen, Nondistended, firm but not rigid, non-tender to palpation, normal bowel sounds throughout  Genitourinary: Dark yellow, not cloudy urine present in bedside urinal.  Musculoskeletal: Normal muscle bulk , no obvious joint deformities. 1+ pretibial edema of distal legs  Skin: Warm and dry, no rashes.   Neurologic: Neck supple. Cranial nerves are grossly intact.  is symmetric.  No tremor.    Data   Recent Labs    Lab 08/24/22  1601 08/24/22  1142 08/24/22  1107 08/24/22  0815 08/23/22  0722 08/23/22  0522 08/23/22  0005 08/22/22  1340   WBC  --   --   --  15.8*  --  15.9*  --  16.3*   HGB  --   --   --  10.1*  --  10.1*  --  11.9*   MCV  --   --   --  98  --  97  --  96   PLT  --   --   --  269  --  248  --  258   NA  --   --   --   --   --  135  --  134   POTASSIUM  --   --   --   --   --  3.7  --  3.8   CHLORIDE  --   --   --   --   --  103  --  98   CO2  --   --   --   --   --  26  --  27   BUN  --   --   --   --   --  20  --  18   CR  --   --   --   --   --  0.98  --  1.04   ANIONGAP  --   --   --   --   --  6  --  9   AFRICA  --   --   --   --   --  8.0*  --  8.3*   * 123* 121*  --    < > 147*   < > 187*   ALBUMIN  --   --   --   --   --   --   --  3.5   PROTTOTAL  --   --   --   --   --   --   --  8.7   BILITOTAL  --   --   --   --   --   --   --  1.6*   ALKPHOS  --   --   --   --   --   --   --  188*   ALT  --   --   --   --   --   --   --  18   AST  --   --   --   --   --   --   --  21   LIPASE  --   --   --   --   --   --   --  109    < > = values in this interval not displayed.

## 2022-08-26 VITALS
BODY MASS INDEX: 36.96 KG/M2 | OXYGEN SATURATION: 92 % | DIASTOLIC BLOOD PRESSURE: 51 MMHG | SYSTOLIC BLOOD PRESSURE: 149 MMHG | RESPIRATION RATE: 16 BRPM | WEIGHT: 272.49 LBS | TEMPERATURE: 98 F | HEART RATE: 78 BPM

## 2022-08-26 LAB
GLUCOSE BLDC GLUCOMTR-MCNC: 116 MG/DL (ref 70–99)
GLUCOSE BLDC GLUCOMTR-MCNC: 136 MG/DL (ref 70–99)

## 2022-08-26 PROCEDURE — 250N000011 HC RX IP 250 OP 636: Performed by: HOSPITALIST

## 2022-08-26 PROCEDURE — 250N000013 HC RX MED GY IP 250 OP 250 PS 637

## 2022-08-26 PROCEDURE — 99239 HOSP IP/OBS DSCHRG MGMT >30: CPT | Performed by: HOSPITALIST

## 2022-08-26 RX ORDER — DEXAMETHASONE SODIUM PHOSPHATE 10 MG/ML
6 INJECTION, SOLUTION INTRAMUSCULAR; INTRAVENOUS ONCE
Status: COMPLETED | OUTPATIENT
Start: 2022-08-26 | End: 2022-08-26

## 2022-08-26 RX ORDER — GUAIFENESIN AND DEXTROMETHORPHAN HYDROBROMIDE 600; 30 MG/1; MG/1
1 TABLET, EXTENDED RELEASE ORAL EVERY 12 HOURS
Qty: 14 TABLET | Refills: 0 | Status: SHIPPED | OUTPATIENT
Start: 2022-08-26 | End: 2022-09-02

## 2022-08-26 RX ORDER — PREDNISONE 20 MG/1
40 TABLET ORAL DAILY
Qty: 6 TABLET | Refills: 0 | Status: SHIPPED | OUTPATIENT
Start: 2022-08-26 | End: 2022-08-29

## 2022-08-26 RX ORDER — AZITHROMYCIN 250 MG/1
500 TABLET, FILM COATED ORAL ONCE
Qty: 2 TABLET | Refills: 0 | Status: SHIPPED | OUTPATIENT
Start: 2022-08-26 | End: 2022-08-26

## 2022-08-26 RX ORDER — KETOROLAC TROMETHAMINE 30 MG/ML
30 INJECTION, SOLUTION INTRAMUSCULAR; INTRAVENOUS ONCE
Status: COMPLETED | OUTPATIENT
Start: 2022-08-26 | End: 2022-08-26

## 2022-08-26 RX ADMIN — LEVOTHYROXINE SODIUM 50 MCG: 0.05 TABLET ORAL at 08:18

## 2022-08-26 RX ADMIN — SENNOSIDES AND DOCUSATE SODIUM 1 TABLET: 50; 8.6 TABLET ORAL at 08:18

## 2022-08-26 RX ADMIN — METFORMIN HYDROCHLORIDE 1000 MG: 500 TABLET, FILM COATED ORAL at 08:17

## 2022-08-26 RX ADMIN — GLIPIZIDE 10 MG: 10 TABLET, EXTENDED RELEASE ORAL at 08:18

## 2022-08-26 RX ADMIN — KETOROLAC TROMETHAMINE 30 MG: 30 INJECTION, SOLUTION INTRAMUSCULAR; INTRAVENOUS at 10:50

## 2022-08-26 RX ADMIN — DEXAMETHASONE SODIUM PHOSPHATE 6 MG: 10 INJECTION, SOLUTION INTRAMUSCULAR; INTRAVENOUS at 10:50

## 2022-08-26 ASSESSMENT — ACTIVITIES OF DAILY LIVING (ADL)
ADLS_ACUITY_SCORE: 27

## 2022-08-26 NOTE — DISCHARGE SUMMARY
[facility]  Hospitalist Discharge Summary      Date of Admission:  8/22/2022  Date of Discharge:  8/26/2022  Discharging Provider: Jag Pollock MD  Discharge Service: Hospitalist Service    Discharge Diagnoses   Community-acquired pneumonia  Chronic gout      Follow-ups Needed After Discharge   Follow-up Appointments     Follow-up and recommended labs and tests       Follow up with primary care provider, Teresita Alvarez, within 7 days   for hospital follow- up.  The following labs/tests are recommended: CBC,   CRP, uric acid.  Patient benefits from follow-up with endocrinologist   and/or orthopedist due to gout attacks, currently involving left knee   lateral region.             Unresulted Labs Ordered in the Past 30 Days of this Admission     Date and Time Order Name Status Description    8/22/2022  1:28 PM Blood Culture Peripheral Blood Preliminary     8/22/2022  1:28 PM Blood Culture Line, venous Preliminary       These results will be followed up by PCP    Discharge Disposition   Discharged to home  Condition at discharge: Stable      Hospital Course          Francois Lujan is a 71 year old male who presents on 8/22/2022 with fever, dyspnea for four days.     Community acquired pneumonia of left lower lobe of lung  Acute respiratory distress with hypoxia  4 days worsening cough productive of green sputum, febrile, fatigue, slight dyspnea.  On presentation chest x-ray shows patchy left lower lobe infiltrate suspicious for pneumonia.  Not requiring oxygen at rest, though spo2 dropped to mid 80s with ambulation.  On presentation, procalcitonin elevated 0.21, leukocytosis 16.3, , febrile to 102.2 Fahrenheit.  No sick contacts, no rigors, COVID and influenza tests negative. Initiated on ceftriaxone and azithromycin in ER.  -Continued azithromycin 500 mg IV every 24 hours, plan for at least 5 days of therapy  -Continued ceftriaxone 1 g IV every 24 hours  -Repeated CRP which  was high most likely due to acute Attack  -Strep pneumo urine antigen is negative  -Sputum culture showed NL silsa  -Blood cultures were negative    S/P Coronary artery bypass graft  History of ischemic VBA thalamic stroke, left  Coronary artery bypass x3 November 2012.  Acute ischemic VBA thalamus stroke August 2020.  Patient's only residual deficit is pins-and-needles sensation and right hand.  Currently stable on clopidogrel 75 mg daily.  No chest pain on presentation, BNP and troponin within normal limits on admission.  -Continued PTA clopidogrel 75 mg at bedtime    Essential hypertension  -Continued PTA amlodipine 10 mg at bedtime with hold parameters  -Continued PTA metoprolol tartrate 25 mg at bedtime with hold parameters  -Continued PTA valsartan 160 mg at bedtime with hold parameters    Polymyalgia rheumatica   Has been on prednisone in the past, not currently on home med list.  Patient denies any current symptoms.  CRP was elevated and polymyalgia rheumatica could be contributing, however previous CRPs were never >25, so suspected acute elevated CRP is due to infectious process and gout attack above and not due to PMR.  Patient stayed afebrile.  Patient stayed afebrile no acute interventions.    Type 2 diabetes mellitus with microalbuminuria  Diabetic polyneuropathy   Managed at home with metformin 1000 mg twice daily and glipizide 10 mg daily.  Glucose on presentation elevated to 187.  Last hemoglobin A1c 6/16/2022 6.8.  -Continue PTA metformin  -Continue PTA glipizide  -Medium  sliding scale correction insulin available  -Monitor for hypoglycemia, treat accordingly    Acquired hypothyroidism  Stable, most recent TSH 4/21/2022 1.24.  Managed at home with levothyroxine 50 mcg daily.  -Continue PTA levothyroxine 50 mcg daily    Hyperlipidemia LDL goal <70  -Continue PTA atorvastatin 80 mg at bedtime    Obstructive sleep apnea  -May use home CPAP if available    Alcohol overuse  Patient used to drink daily,  has decreased drinking to 2-3 beers every few days with friends.  No history of withdrawal.  Low suspicion patient will require CIWA, no current symptoms.    Obesity (BMI 35.0-39.9) with comorbidity  Contributes to overall morbidity and mortality    Moderate major depression   Chronic, controlled.  Not on any outpatient pharmacologic management.  No acute interventions.    Chronic Gout with exacerbation  Dx as per pt. Developed Lt lat knee pain and tenderness last 2 days of stay. Indomethacin did not help. On day of discharge due to increase in pain was given Toradol IM, Decadron 6 mg IV and prescription for 40 mg prednisone every day x 3 days and plan to F/U with PCP, Ortho and endocrinologist.      Consultations This Hospital Stay   None    Code Status   Full Code    Time Spent on this Encounter   I, Jag Pollock MD, personally saw the patient today and spent greater than 30 minutes discharging this patient.       Jag Pollock MD  [facility] SURGICAL  [address] [address]  Phone: [phone]  Fax: [phone]  ______________________________________________________________________    Physical Exam   Vital Signs: Temp: 98  F (36.7  C) Temp src: Oral BP: (!) 149/51 Pulse: 78   Resp: 16 SpO2: 92 % O2 Device: None (Room air)    Weight: 272 lbs 7.82 oz  Constitutional: Alert, oriented, cooperative, no apparent distress, appears nontoxic. Has hacking cough intermittently, started just now as per patient.   Eyes: Eyes are clear, pupils are equal, round.  No scleral icterus.  HENT: Oropharynx is clear and moist.  Tongue is midline.  No evidence of cranial trauma.  Cardiovascular: Regular rate and rhythm, some S2 splitting noted, otherwise no murmurs auscultated. JVP is normal. Good peripheral pulses in wrists bilaterally. No lower extremity edema.  Respiratory: Respirations unlabored on room air, moving air well.  No dyspnea with conversation.  Lung sounds are normal  throughout all lung fields, resolved crackles previously present in left lower lobe, otherwise generally clear throughout all other lung fields.  No wheezes or rhonchi.  GI: Large abdomen, Nondistended, firm but not rigid, non-tender to palpation, normal bowel sounds throughout  Genitourinary: Dark yellow, not cloudy urine present in bedside urinal.  Musculoskeletal: Lt lateral knee is tender over the common tendon insertion area.  No joint line tenderness or joint effusion. Normal muscle bulk , no obvious joint deformities. 1+ pretibial edema of distal legs  Skin: Warm and dry, no rashes.   Neurologic: Neck supple. Cranial nerves are grossly intact.  is symmetric.  No tremor.       Primary Care Physician   Teresita Alvarez    Discharge Orders      Reason for your hospital stay    Community-acquired pneumonia     Follow-up and recommended labs and tests     Follow up with primary care provider, Teresita Alvarez, within 7 days for hospital follow- up.  The following labs/tests are recommended: CBC, CRP, uric acid.  Patient benefits from follow-up with endocrinologist and/or orthopedist due to gout attacks, currently involving left knee lateral region.     Activity    Your activity upon discharge: activity as tolerated     Diet    Follow this diet upon discharge: Orders Placed This Encounter      Snacks/Supplements Adult: Ensure Max Protein (bariatric); Between Meals      Regular Diet Adult       Significant Results and Procedures   Most Recent 3 CBC's:  Recent Labs   Lab Test 08/25/22  0455 08/24/22  0815 08/23/22  0522   WBC 14.1* 15.8* 15.9*   HGB 10.1* 10.1* 10.1*   MCV 95 98 97    269 248     Most Recent 3 BMP's:  Recent Labs   Lab Test 08/26/22  0725 08/26/22  0225 08/25/22  2131 08/25/22  0756 08/25/22  0455 08/23/22  0722 08/23/22  0522 08/23/22  0005 08/22/22  1340   NA  --   --   --   --  137  --  135  --  134   POTASSIUM  --   --   --   --  3.9  --  3.7  --  3.8   CHLORIDE  --   --   --    --  104  --  103  --  98   CO2  --   --   --   --  26  --  26  --  27   BUN  --   --   --   --  33*  --  20  --  18   CR  --   --   --   --  1.13  --  0.98  --  1.04   ANIONGAP  --   --   --   --  7  --  6  --  9   AFRICA  --   --   --   --  8.4*  --  8.0*  --  8.3*   * 116* 133*   < > 129*   < > 147*   < > 187*    < > = values in this interval not displayed.     Most Recent ESR & CRP:  Recent Labs   Lab Test 08/25/22  0455 08/22/22  1340 07/28/20  0752   SED  --   --  22*   .0*   < > 4.9    < > = values in this interval not displayed.       Discharge Medications   Current Discharge Medication List      START taking these medications    Details   azithromycin (ZITHROMAX) 250 MG tablet Take 2 tablets (500 mg) by mouth once for 1 dose  Qty: 2 tablet, Refills: 0    Associated Diagnoses: Community acquired pneumonia, unspecified laterality      dextromethorphan-guaiFENesin (MUCINEX DM)  MG 12 hr tablet Take 1 tablet by mouth every 12 hours for 7 days  Qty: 14 tablet, Refills: 0    Associated Diagnoses: Community acquired pneumonia, unspecified laterality      predniSONE (DELTASONE) 20 MG tablet Take 2 tablets (40 mg) by mouth daily for 3 days  Qty: 6 tablet, Refills: 0    Comments: For Gout attack  Associated Diagnoses: Multiple joint pain; Polymyalgia rheumatica (H); Acute gouty arthropathy         CONTINUE these medications which have NOT CHANGED    Details   amLODIPine (NORVASC) 10 MG tablet TAKE 1 TABLET BY MOUTH ONCE DAILY  Qty: 90 tablet, Refills: 3    Comments: This prescription was filled on 3/19/2022. Any refills authorized will be placed on file.  Associated Diagnoses: Essential hypertension      atorvastatin (LIPITOR) 80 MG tablet Take 1 tablet (80 mg) by mouth daily  Qty: 90 tablet, Refills: 3    Associated Diagnoses: Hyperlipidemia LDL goal <70      blood glucose (ACCU-CHEK SMARTVIEW) test strip Use to test blood sugar 3 times daily or as directed.  Qty: 100 each, Refills: 12    Associated  Diagnoses: Type 2 diabetes mellitus with complication, without long-term current use of insulin (H)      clopidogrel (PLAVIX) 75 MG tablet Take 1 tablet (75 mg) by mouth daily Please follow up with primary care provider for further refills.  Qty: 90 tablet, Refills: 1    Associated Diagnoses: Acute ischemic VBA thalamic stroke, left (H)      glipiZIDE (GLUCOTROL XL) 10 MG 24 hr tablet Take 1 tablet (10 mg) by mouth 2 times daily  Qty: 180 tablet, Refills: 3    Associated Diagnoses: Type 2 diabetes mellitus with microalbuminuria, without long-term current use of insulin (H)      levothyroxine (SYNTHROID/LEVOTHROID) 50 MCG tablet TAKE 1 TABLET BY MOUTH EVERY MORNING ON AN EMPTY STOMACH  Qty: 90 tablet, Refills: 3    Comments: This prescription was filled on 3/19/2022. Any refills authorized will be placed on file.  Associated Diagnoses: Acquired hypothyroidism      metFORMIN (GLUCOPHAGE) 500 MG tablet TAKE 2 TABLETS BY MOUTH TWICE DAILY WITH MEALS  Qty: 360 tablet, Refills: 3    Comments: This prescription was filled on 3/19/2022. Any refills authorized will be placed on file.  Associated Diagnoses: Type 2 diabetes mellitus with microalbuminuria, without long-term current use of insulin (H)      metoprolol tartrate (LOPRESSOR) 25 MG tablet TAKE 1 TABLET BY MOUTH TWICE DAILY  Qty: 180 tablet, Refills: 3    Comments: This prescription was filled on 3/19/2022. Any refills authorized will be placed on file.  Associated Diagnoses: Coronary artery disease involving native coronary artery of native heart without angina pectoris; Essential hypertension      testosterone (ANDROGEL/TESTIM) 50 MG/5GM (1%) topical gel Place 1 packet (50 mg of testosterone) onto the skin daily Appointment needed for further refills: [phone]  Qty: 150 g, Refills: 0    Associated Diagnoses: Hypogonadotropic hypogonadism (H)      valsartan (DIOVAN) 160 MG tablet TAKE 1 TABLET BY MOUTH ONCE DAILY  Qty: 90 tablet, Refills: 3    Comments: This  prescription was filled on 3/19/2022. Any refills authorized will be placed on file.  Associated Diagnoses: HTN, goal below 140/90      blood glucose (NO BRAND SPECIFIED) lancets standard Use to test blood sugar 3 times daily or as directed.  Qty: 1 each, Refills: 11    Associated Diagnoses: Type 2 diabetes mellitus with complication, without long-term current use of insulin (H)           Allergies   Allergies   Allergen Reactions     Hydrocodone-Acetaminophen Itching     Iodine      Irbesartan      renal insuff and hyperkalemia       Lisinopril Nausea     Sulfa Drugs      Valsartan Difficulty breathing

## 2022-08-26 NOTE — PROGRESS NOTES
[facility] DISCHARGE NOTE    Patient discharged to home at 11:29 AM via wheel chair. Accompanied by spouse and staff. Discharge instructions reviewed with patient, opportunity offered to ask questions. Prescriptions sent to patients preferred pharmacy. All belongings sent with patient.    Saranya Joshi RN

## 2022-08-26 NOTE — PROGRESS NOTES
Patient is calm and cooperative.  He stated he was having difficulty sleeping.  Patient reports pain in the left knee due to gout.  Patient wanted to try a heat pack on it.  Patient stated he is frustrated that he is still here and though he is looking forward to going home, he has a lot of work at home that is overwhelming to him.  He stated that between his wife's health challenges and his own it is getting difficult to keep up with everything.      Temp: 97.9  F (36.6  C) Temp src: Oral BP: (!) 147/53 Pulse: 78   Resp: 16 SpO2: 91 %   Weight: 121.6 kg (268 lb 1.3 oz)  O2 Device: None (Room air) at

## 2022-08-26 NOTE — PROGRESS NOTES
[facility]    Medicine Progress Note - Hospitalist Service    Date of Admission:  8/22/2022    Assessment & Plan        Francois Lujan is a 71 year old male who presents on 8/22/2022 with fever, dyspnea for four days.     Community acquired pneumonia of left lower lobe of lung  Acute respiratory distress with hypoxia  4 days worsening cough productive of green sputum, febrile, fatigue, slight dyspnea.  On presentation chest x-ray shows patchy left lower lobe infiltrate suspicious for pneumonia.  Not requiring oxygen at rest, though spo2 dropped to mid 80s with ambulation.  On presentation, procalcitonin elevated 0.21, leukocytosis 16.3, , febrile to 102.2 Fahrenheit.  No sick contacts, no rigors, COVID and influenza tests negative. Initiated on ceftriaxone and azithromycin in ER.  -Continue azithromycin 500 mg IV every 24 hours, plan for at least 3 days of therapy  -Continue ceftriaxone 1 g IV every 24 hours  -Continuous pulse ox  -Oxygen available as needed to maintain SPO2 >89%  -Repeat CRP in a.m.  -Strep pneumo urine antigen  -Sputum culture NL silas  -Blood cultures so far negative    S/P Coronary artery bypass graft  History of ischemic VBA thalamic stroke, left  Coronary artery bypass x3 November 2012.  Acute ischemic VBA thalamus stroke August 2020.  Patient's only residual deficit is pins-and-needles sensation and right hand.  Currently stable on clopidogrel 75 mg daily.  No chest pain on presentation, BNP and troponin within normal limits on admission.  -Continue PTA clopidogrel 75 mg at bedtime    Essential hypertension  -Continue PTA amlodipine 10 mg at bedtime with hold parameters  -Continue PTA metoprolol tartrate 25 mg at bedtime with hold parameters  -Continue PTA valsartan 160 mg at bedtime with hold parameters    Polymyalgia rheumatica   Has been on prednisone in the past, not currently on home med list.  Patient denies any current symptoms.  CRP currently  elevated and polymyalgia rheumatica could be contributing, however previous CRPs were never >25, so suspect acute elevated CRP is due to infectious process above and not due to PMR.  No acute interventions.    Type 2 diabetes mellitus with microalbuminuria  Diabetic polyneuropathy   Managed at home with metformin 1000 mg twice daily and glipizide 10 mg daily.  Glucose on presentation elevated to 187.  Last hemoglobin A1c 6/16/2022 6.8.  -Continue PTA metformin  -Continue PTA glipizide  -Medium  sliding scale correction insulin available  -Monitor for hypoglycemia, treat accordingly    Acquired hypothyroidism  Stable, most recent TSH 4/21/2022 1.24.  Managed at home with levothyroxine 50 mcg daily.  -Continue PTA levothyroxine 50 mcg daily    Hyperlipidemia LDL goal <70  -Continue PTA atorvastatin 80 mg at bedtime    Obstructive sleep apnea  -May use home CPAP if available    Alcohol overuse  Patient used to drink daily, has decreased drinking to 2-3 beers every few days with friends.  No history of withdrawal.  Low suspicion patient will require CIWA, no current symptoms.    Obesity (BMI 35.0-39.9) with comorbidity  Contributes to overall morbidity and mortality    Moderate major depression   Chronic, controlled.  Not on any outpatient pharmacologic management.  No acute interventions.     Diet: Regular Diet Adult  Snacks/Supplements Adult: Ensure Max Protein (bariatric); Between Meals    DVT Prophylaxis: Enoxaparin (Lovenox) SQ  Lazo Catheter: Not present  Central Lines: None  Cardiac Monitoring: None  Code Status: Full Code      Disposition Plan     Expected Discharge Date: 08/25/2022                The patient's care was discussed with the Bedside Nurse and Patient.    Jag Pollock MD  Hospitalist Service  [facility]  Securely message with the Vocera Web Console (learn more here)  Text page via [employer] Paging/Directory         Clinically Significant Risk Factors Present on Admission                # DMII: A1C = N/A within past 3 months    # Moderate Malnutrition: based on nutrition assessment     ______________________________________________________________________    Interval History   Feels better. Has occasional cough but no SOB or phlegm or fever. C/O Lt lateral knee point pain and tenderness of about 5 cm in size he attributes to chronic gout he has. Indomethacin was prescribed. Due to lateral Lt knee bursitis. This might be the cause of elevated CRP and WBC since PNA signs and Sxs have mostly resolved other than occasional cough. Plan is discontinue to home tomorrow.     Data reviewed today: I reviewed all medications, new labs and imaging results over the last 24 hours. I personally reviewed no images or EKG's today.    Physical Exam   Vital Signs: Temp: 98.5  F (36.9  C) Temp src: Oral BP: (!) 157/64 Pulse: 78   Resp: 18 SpO2: 96 % O2 Device: None (Room air)    Weight: 268 lbs 1.27 oz  Constitutional: Alert, oriented, cooperative, no apparent distress, appears nontoxic. Has hacking cough intermittently, started just now as per patient.   Eyes: Eyes are clear, pupils are equal, round.  No scleral icterus.  HENT: Oropharynx is clear and moist.  Tongue is midline.  No evidence of cranial trauma.  Cardiovascular: Regular rate and rhythm, some S2 splitting noted, otherwise no murmurs auscultated. JVP is normal. Good peripheral pulses in wrists bilaterally. No lower extremity edema.  Respiratory: Respirations unlabored on room air, moving air well.  No dyspnea with conversation.  Lung sounds decreased throughout all lung fields, resolved crackles previously present in left lower lobe, otherwise generally clear throughout all other lung fields.  No wheezes, scattered rhonchi.  GI: Large abdomen, Nondistended, firm but not rigid, non-tender to palpation, normal bowel sounds throughout  Genitourinary: Dark yellow, not cloudy urine present in bedside urinal.  Musculoskeletal: Lt lateral knee is  tender over the common tendon insertion area.  No joint line tenderness or joint effusion. Normal muscle bulk , no obvious joint deformities. 1+ pretibial edema of distal legs  Skin: Warm and dry, no rashes.   Neurologic: Neck supple. Cranial nerves are grossly intact.  is symmetric.  No tremor.    Data   Recent Labs   Lab 08/25/22  1645 08/25/22  1052 08/25/22  0756 08/25/22  0455 08/24/22  1107 08/24/22  0815 08/23/22  0722 08/23/22  0522 08/23/22  0005 08/22/22  1340   WBC  --   --   --  14.1*  --  15.8*  --  15.9*  --  16.3*   HGB  --   --   --  10.1*  --  10.1*  --  10.1*  --  11.9*   MCV  --   --   --  95  --  98  --  97  --  96   PLT  --   --   --  287  --  269  --  248  --  258   NA  --   --   --  137  --   --   --  135  --  134   POTASSIUM  --   --   --  3.9  --   --   --  3.7  --  3.8   CHLORIDE  --   --   --  104  --   --   --  103  --  98   CO2  --   --   --  26  --   --   --  26  --  27   BUN  --   --   --  33*  --   --   --  20  --  18   CR  --   --   --  1.13  --   --   --  0.98  --  1.04   ANIONGAP  --   --   --  7  --   --   --  6  --  9   AFRICA  --   --   --  8.4*  --   --   --  8.0*  --  8.3*   * 198* 146* 129*   < >  --    < > 147*   < > 187*   ALBUMIN  --   --   --  2.9*  --   --   --   --   --  3.5   PROTTOTAL  --   --   --  7.8  --   --   --   --   --  8.7   BILITOTAL  --   --   --  0.6  --   --   --   --   --  1.6*   ALKPHOS  --   --   --  202*  --   --   --   --   --  188*   ALT  --   --   --  38  --   --   --   --   --  18   AST  --   --   --  67*  --   --   --   --   --  21   LIPASE  --   --   --   --   --   --   --   --   --  109    < > = values in this interval not displayed.

## 2022-08-27 ENCOUNTER — HEALTH MAINTENANCE LETTER (OUTPATIENT)
Age: 72
End: 2022-08-27

## 2022-08-27 LAB
BACTERIA BLD CULT: NO GROWTH
BACTERIA BLD CULT: NO GROWTH

## 2022-08-30 ENCOUNTER — PATIENT OUTREACH (OUTPATIENT)
Dept: CARE COORDINATION | Facility: CLINIC | Age: 72
End: 2022-08-30

## 2022-08-30 NOTE — PROGRESS NOTES
Clinic Care Coordination Contact  [facility]/Voicemail       Clinical Data: Care Coordinator Outreach  Outreach attempted x 2.  Left message on patient's voicemail with call back information and requested return call.  Plan:  Care Coordinator will do no further outreaches at this time.    Natalia WHITING Community Health Worker  Clinic Care Coordination  [facility]  Phone: [phone]

## 2022-09-06 DIAGNOSIS — E78.5 HYPERLIPIDEMIA LDL GOAL <70: ICD-10-CM

## 2022-09-07 NOTE — TELEPHONE ENCOUNTER
"Due to medication not passing protocol, routing to provider for review.     Melly Antoine RN'  Requested Prescriptions   Pending Prescriptions Disp Refills     atorvastatin (LIPITOR) 80 MG tablet [Pharmacy Med Name: atorvastatin 80 mg tablet] 90 tablet 3     Sig: Take 1 tablet (80 mg) by mouth daily       Statins Protocol Failed - 9/6/2022  8:00 AM        Failed - LDL on file in past 12 months     Recent Labs   Lab Test 07/08/21  0937   LDL 76             Passed - No abnormal creatine kinase in past 12 months     No lab results found.             Passed - Recent (12 mo) or future (30 days) visit within the authorizing provider's specialty     Patient has had an office visit with the authorizing provider or a provider within the authorizing providers department within the previous 12 mos or has a future within next 30 days. See \"Patient Info\" tab in inbasket, or \"Choose Columns\" in Meds & Orders section of the refill encounter.              Passed - Medication is active on med list        Passed - Patient is age 18 or older           Last Written Prescription Date:  9/23/21  Last Fill Quantity: 90,  # refills: 3   Last office visit: Visit date not found with prescribing provider:     Future Office Visit:            "

## 2022-09-08 RX ORDER — ATORVASTATIN CALCIUM 80 MG/1
80 TABLET, FILM COATED ORAL DAILY
Qty: 90 TABLET | Refills: 3 | Status: SHIPPED | OUTPATIENT
Start: 2022-09-08 | End: 2023-06-23

## 2022-09-15 DIAGNOSIS — E23.0 HYPOGONADOTROPIC HYPOGONADISM (H): ICD-10-CM

## 2022-09-15 NOTE — TELEPHONE ENCOUNTER
Requested Prescriptions   Pending Prescriptions Disp Refills     testosterone (ANDROGEL/TESTIM) 50 MG/5GM (1%) topical gel 150 g 0     Sig: Place 1 packet (50 mg of testosterone) onto the skin daily Appointment needed for further refills: [phone]       There is no refill protocol information for this order        Last office visit: Visit date not found with prescribing provider:  Dr. Frey    Future Office Visit:          Gavino Pulliams  [facility]

## 2022-09-15 NOTE — TELEPHONE ENCOUNTER
Medication not on nurse protocol. Sent to provider for review.  Gypsy Farah RN BSN PHN    LOV 11/30/2020

## 2022-09-16 ENCOUNTER — OFFICE VISIT (OUTPATIENT)
Dept: FAMILY MEDICINE | Facility: CLINIC | Age: 72
End: 2022-09-16
Payer: COMMERCIAL

## 2022-09-16 VITALS
TEMPERATURE: 97.1 F | HEIGHT: 72 IN | BODY MASS INDEX: 34.27 KG/M2 | RESPIRATION RATE: 12 BRPM | HEART RATE: 88 BPM | WEIGHT: 253 LBS | DIASTOLIC BLOOD PRESSURE: 70 MMHG | SYSTOLIC BLOOD PRESSURE: 124 MMHG | OXYGEN SATURATION: 97 %

## 2022-09-16 DIAGNOSIS — Z23 HIGH PRIORITY FOR 2019-NCOV VACCINE: ICD-10-CM

## 2022-09-16 DIAGNOSIS — M1A.09X0 IDIOPATHIC CHRONIC GOUT OF MULTIPLE SITES WITHOUT TOPHUS: ICD-10-CM

## 2022-09-16 DIAGNOSIS — Z13.220 SCREENING FOR HYPERLIPIDEMIA: ICD-10-CM

## 2022-09-16 DIAGNOSIS — E11.29 TYPE 2 DIABETES MELLITUS WITH MICROALBUMINURIA, WITHOUT LONG-TERM CURRENT USE OF INSULIN (H): ICD-10-CM

## 2022-09-16 DIAGNOSIS — Z23 NEED FOR PROPHYLACTIC VACCINATION AND INOCULATION AGAINST INFLUENZA: ICD-10-CM

## 2022-09-16 DIAGNOSIS — R80.9 TYPE 2 DIABETES MELLITUS WITH MICROALBUMINURIA, WITHOUT LONG-TERM CURRENT USE OF INSULIN (H): ICD-10-CM

## 2022-09-16 DIAGNOSIS — I25.10 CORONARY ARTERY DISEASE INVOLVING NATIVE CORONARY ARTERY OF NATIVE HEART WITHOUT ANGINA PECTORIS: ICD-10-CM

## 2022-09-16 DIAGNOSIS — E23.0 HYPOGONADOTROPIC HYPOGONADISM (H): ICD-10-CM

## 2022-09-16 DIAGNOSIS — E78.5 HYPERLIPIDEMIA LDL GOAL <70: ICD-10-CM

## 2022-09-16 DIAGNOSIS — J18.9 COMMUNITY ACQUIRED PNEUMONIA OF LEFT LOWER LOBE OF LUNG: Primary | ICD-10-CM

## 2022-09-16 LAB
CHOLEST SERPL-MCNC: 157 MG/DL
HBA1C MFR BLD: 7.1 % (ref 0–5.6)
HDLC SERPL-MCNC: 56 MG/DL
LDLC SERPL CALC-MCNC: 79 MG/DL
NONHDLC SERPL-MCNC: 101 MG/DL
SHBG SERPL-SCNC: 75 NMOL/L (ref 11–80)
TRIGL SERPL-MCNC: 111 MG/DL
URATE SERPL-MCNC: 9.3 MG/DL (ref 3.4–7)

## 2022-09-16 PROCEDURE — G0008 ADMIN INFLUENZA VIRUS VAC: HCPCS | Performed by: INTERNAL MEDICINE

## 2022-09-16 PROCEDURE — 83036 HEMOGLOBIN GLYCOSYLATED A1C: CPT | Performed by: INTERNAL MEDICINE

## 2022-09-16 PROCEDURE — 0134A COVID-19,PF,MODERNA BIVALENT: CPT | Performed by: INTERNAL MEDICINE

## 2022-09-16 PROCEDURE — 91313 COVID-19,PF,MODERNA BIVALENT: CPT | Performed by: INTERNAL MEDICINE

## 2022-09-16 PROCEDURE — 36415 COLL VENOUS BLD VENIPUNCTURE: CPT | Performed by: INTERNAL MEDICINE

## 2022-09-16 PROCEDURE — 84403 ASSAY OF TOTAL TESTOSTERONE: CPT | Performed by: INTERNAL MEDICINE

## 2022-09-16 PROCEDURE — 90662 IIV NO PRSV INCREASED AG IM: CPT | Performed by: INTERNAL MEDICINE

## 2022-09-16 PROCEDURE — 99214 OFFICE O/P EST MOD 30 MIN: CPT | Mod: 25 | Performed by: INTERNAL MEDICINE

## 2022-09-16 PROCEDURE — 84550 ASSAY OF BLOOD/URIC ACID: CPT | Performed by: INTERNAL MEDICINE

## 2022-09-16 PROCEDURE — 84270 ASSAY OF SEX HORMONE GLOBUL: CPT | Performed by: INTERNAL MEDICINE

## 2022-09-16 PROCEDURE — 80061 LIPID PANEL: CPT | Performed by: INTERNAL MEDICINE

## 2022-09-16 RX ORDER — COLCHICINE 0.6 MG/1
0.6 TABLET ORAL DAILY
Qty: 90 TABLET | Refills: 0 | Status: SHIPPED | OUTPATIENT
Start: 2022-09-16 | End: 2022-12-15

## 2022-09-16 RX ORDER — ALLOPURINOL 300 MG/1
300 TABLET ORAL DAILY
Qty: 90 TABLET | Refills: 3 | Status: SHIPPED | OUTPATIENT
Start: 2022-09-16 | End: 2023-06-23

## 2022-09-16 RX ORDER — GLIPIZIDE 5 MG/1
5 TABLET, FILM COATED, EXTENDED RELEASE ORAL DAILY
Qty: 90 TABLET | Refills: 3 | Status: SHIPPED | OUTPATIENT
Start: 2022-09-16 | End: 2023-11-10

## 2022-09-16 ASSESSMENT — PAIN SCALES - GENERAL: PAINLEVEL: WORST PAIN (10)

## 2022-09-16 ASSESSMENT — PATIENT HEALTH QUESTIONNAIRE - PHQ9: SUM OF ALL RESPONSES TO PHQ QUESTIONS 1-9: 0

## 2022-09-16 NOTE — PROGRESS NOTES
Assessment & Plan     Community acquired pneumonia of left lower lobe of lung -still having fatigue but otherwise symptoms are improving.  Interestingly, the abnormal lung sounds were on the right and not the left side.  Expect ongoing resolution over the next 2 weeks.  No follow-up imaging is needed    Idiopathic chronic gout of multiple sites without tophus -evidence of gout in elbow, knee, foot.  Start allopurinol but needs prophylaxis during this time.  NSAIDs contraindicated due to coronary artery disease history and dual antiplatelet therapy.  Prednisone will raise blood sugars as they already have.  Colchicine seems the lowest risk although there is an interaction with the statin-myopathies.  Advised to monitor for myopathies, if this occurs, would then order prednisone.  Take the colchicine for 3 months.  - Uric acid; Future  - allopurinol (ZYLOPRIM) 300 MG tablet; Take 1 tablet (300 mg) by mouth daily  - colchicine (COLCYRS) 0.6 MG tablet; Take 1 tablet (0.6 mg) by mouth daily  - Uric acid    Type 2 diabetes mellitus with microalbuminuria, without long-term current use of insulin (H) -slightly worse due to recent prednisone.  Having occasional hypoglycemia, so we will decrease the glipizide from 10 mg once daily to 5 mg once daily  - HEMOGLOBIN A1C; Future  - glipiZIDE (GLUCOTROL XL) 5 MG 24 hr tablet; Take 1 tablet (5 mg) by mouth daily  - HEMOGLOBIN A1C    Coronary artery disease involving native coronary artery of native heart without angina pectoris -felt dizzy on the metoprolol  - BETA BLOCKER NOT PRESCRIBED (INTENTIONAL); Please choose reason not prescribed from choices below.    High priority for 2019-nCoV vaccine    Hypogonadotropic hypogonadism (H)  - Testosterone Free and Total    Hyperlipidemia LDL goal <70    Screening for hyperlipidemia  - Lipid panel reflex to direct LDL Non-fasting    Need for prophylactic vaccination and inoculation against influenza  - INFLUENZA, QUAD, HIGH DOSE, PF, 65YR  "+ (FLUZONE HD)  - COVID-19,PF,MODERNA BIVALENT 18+Yrs             BMI:   Estimated body mass index is 34.31 kg/m  as calculated from the following:    Height as of this encounter: 1.829 m (6' 0.01\").    Weight as of this encounter: 114.8 kg (253 lb).       Patient Instructions   Diabetes:  1. Decrease glipizide from 10 mg once daily to 5 mg once daily due to low blood sugar  2. If you start seeing blood sugar > 200, let Dr. Alvarez know    Gout:  1. Start allopurinol 300 mg daily  2. Start Colchicine to prevent uric acid buildup - x 3 months, then stop  3. Drug interaction with atorvastatin - muscle aches/weakness, yumi horse type cramps;  if this occurs, let Dr. Alvarez know  4. Blood work today  5. Potential side effects of colchicine - nausea, or diarrhea    Pneumonia:  1. Expect you will continue to regain energy over the next few weeks  2. Can still hear signs of pneumonia in the lung, but expect this will improve over the next 2 weeks or so      No follow-ups on file.    Teresita Alvarez, DO  [facility] is a 71 year old accompanied by his self, presenting for the following health issues:  Hospital F/U, Health Maintenance (Aware due for flu, covid, and shingles has to go to pharmacy, aware due for wellness ), and Imm/Inj (Flu Shot - COVID-19 VACCINE)      HPI       Chief Complaint   Patient presents with     Hospital F/U     Health Maintenance     Aware due for flu, covid, and shingles has to go to pharmacy, aware due for wellness      Imm/Inj     Flu Shot - COVID-19 VACCINE         Hospital Follow-up Visit:    Hospital/Nursing Home/IP Rehab Facility: [facility]  Date of Admission: 8/22/22  Date of Discharge: 8/26/22  Reason(s) for Admission: Community-acquired pneumonia  Chronic gout    Was your hospitalization related to COVID-19? No   Problems taking medications regularly:  Stopped taking metoprolol 25 mg was dizzy when took " it  Medication changes since discharge: Discharge Medications is all done taking them           Current Discharge Medication List            START taking these medications     Details   azithromycin (ZITHROMAX) 250 MG tablet Take 2 tablets (500 mg) by mouth once for 1 dose  Qty: 2 tablet, Refills: 0     Associated Diagnoses: Community acquired pneumonia, unspecified laterality       dextromethorphan-guaiFENesin (MUCINEX DM)  MG 12 hr tablet Take 1 tablet by mouth every 12 hours for 7 days  Qty: 14 tablet, Refills: 0     Associated Diagnoses: Community acquired pneumonia, unspecified laterality       predniSONE (DELTASONE) 20 MG tablet Take 2 tablets (40 mg) by mouth daily for 3 days  Qty: 6 tablet, Refills: 0     Comments: For Gout attack  Associated Diagnoses: Multiple joint pain; Polymyalgia rheumatica (H); Acute gouty arthropathy           Problems adhering to non-medication therapy:  None    Summary of hospitalization:  [facility] discharge summary reviewed  Diagnostic Tests/Treatments reviewed.  Follow up needed: none  Other Healthcare Providers Involved in Patient s Care:         None  Update since discharge: improved.         Post Medication Reconciliation Status:        Plan of care communicated with patient               Gout:  --hosp recommend follow-up with Endo, Rheum or Ortho to manage gout  --left knee affected at time of discharge   --Developed Lt lat knee pain and tenderness last 2 days of stay. Indomethacin did not help. On day of discharge due to increase in pain was given Toradol IM, Decadron 6 mg IV and prescription for 40 mg prednisone every day x 3 days and plan to F/U with PCP, Ortho and endocrinologist.    -He is not on a loop or thiazide diuretic-  --he is not on allopurinol.  Last uric acid in January was 6  --he thinks he is having flares of gout in multiple joints now - feet, right elbow, left knee, left hip;  Hard to sleep  --known OA   --prednisone helped but then  came back  --he feels that he 'always has gout'    Pneumonia:  --finished course of antibiotic   --was very dizzy after discharge from hospital; pre-syncope  --stopped metoprolol and this helped  --still feeling a bit weak due to low BP and blood sugar but feels he has otherwise recovered from pneumonia     Diabetes:  --fasting blood sugar 110s  --wt is down on purpose prior to hosp stay, but down further due to illness  --occ hypoglycemia - 3 in the last 1 month - 60s  --no blood sugar > 200    Social:  --under more stress with conflict w/his wife      Wt Readings from Last 5 Encounters:   09/16/22 114.8 kg (253 lb)   08/26/22 123.6 kg (272 lb 7.8 oz)   06/16/22 122.5 kg (270 lb)   05/26/22 123.6 kg (272 lb 6.4 oz)   04/21/22 120.2 kg (265 lb)           Current Outpatient Medications   Medication Sig Dispense Refill     amLODIPine (NORVASC) 10 MG tablet TAKE 1 TABLET BY MOUTH ONCE DAILY (Patient taking differently: Take 10 mg by mouth At Bedtime) 90 tablet 3     atorvastatin (LIPITOR) 80 MG tablet Take 1 tablet (80 mg) by mouth daily DUE FOR LAB 90 tablet 3     blood glucose (ACCU-CHEK SMARTVIEW) test strip Use to test blood sugar 3 times daily or as directed. 100 each 12     blood glucose (NO BRAND SPECIFIED) lancets standard Use to test blood sugar 3 times daily or as directed. 1 each 11     clopidogrel (PLAVIX) 75 MG tablet Take 1 tablet (75 mg) by mouth daily Please follow up with primary care provider for further refills. (Patient taking differently: Take 75 mg by mouth At Bedtime Please follow up with primary care provider for further refills.) 90 tablet 1     glipiZIDE (GLUCOTROL XL) 10 MG 24 hr tablet Take 1 tablet (10 mg) by mouth 2 times daily (Patient taking differently: Take 10 mg by mouth daily) 180 tablet 3     levothyroxine (SYNTHROID/LEVOTHROID) 50 MCG tablet TAKE 1 TABLET BY MOUTH EVERY MORNING ON AN EMPTY STOMACH (Patient taking differently: Take 50 mcg by mouth daily) 90 tablet 3     metFORMIN  "(GLUCOPHAGE) 500 MG tablet TAKE 2 TABLETS BY MOUTH TWICE DAILY WITH MEALS (Patient taking differently: Take 1,000 mg by mouth 2 times daily (with meals)) 360 tablet 3     testosterone (ANDROGEL/TESTIM) 50 MG/5GM (1%) topical gel Place 1 packet (50 mg of testosterone) onto the skin daily Appointment needed for further refills: [phone] 150 g 0     valsartan (DIOVAN) 160 MG tablet TAKE 1 TABLET BY MOUTH ONCE DAILY (Patient taking differently: Take 160 mg by mouth At Bedtime) 90 tablet 3     metoprolol tartrate (LOPRESSOR) 25 MG tablet TAKE 1 TABLET BY MOUTH TWICE DAILY (Patient not taking: Reported on 9/16/2022) 180 tablet 3       Review of Systems   Constitutional, HEENT, cardiovascular, pulmonary, GI, , musculoskeletal, neuro, skin, endocrine and psych systems are negative, except as otherwise noted.      Objective    /70 (BP Location: Right arm, Patient Position: Sitting, Cuff Size: Adult Large)   Pulse 88   Temp 97.1  F (36.2  C) (Tympanic)   Resp 12   Ht 1.829 m (6' 0.01\")   Wt 114.8 kg (253 lb)   SpO2 97%   BMI 34.31 kg/m    Body mass index is 34.31 kg/m .  Physical Exam   GENERAL APPEARANCE: healthy, alert and no distress  RESP: left lung clear;  Rhonchi in RLL and RML clears with deep breathing  CV: regular rates and rhythm, normal S1 S2, no S3 or S4 and no murmur, click or rub  MS: mild warmth and erythema of left knee, right great toe      Results for orders placed or performed in visit on 09/16/22 (from the past 24 hour(s))   Testosterone Free and Total    Narrative    The following orders were created for panel order Testosterone Free and Total.  Procedure                               Abnormality         Status                     ---------                               -----------         ------                     Sex Hormone Binding Glob...[[id number]]                      In process                 Testosterone Free and Total[[id number]]                      In process               "     Please view results for these tests on the individual orders.   HEMOGLOBIN A1C   Result Value Ref Range    Hemoglobin A1C 7.1 (H) 0.0 - 5.6 %

## 2022-09-16 NOTE — RESULT ENCOUNTER NOTE
The uric acid is quite high as expected.  Cholesterol is stable compared to 1 year ago.  Hemoglobin A1c is slightly higher not surprising given recent steroids.

## 2022-09-16 NOTE — PATIENT INSTRUCTIONS
Diabetes:  Decrease glipizide from 10 mg once daily to 5 mg once daily due to low blood sugar  If you start seeing blood sugar > 200, let Dr. Alvarez know    Gout:  Start allopurinol 300 mg daily  Start Colchicine to prevent uric acid buildup - x 3 months, then stop  Drug interaction with atorvastatin - muscle aches/weakness, yumi horse type cramps;  if this occurs, let Dr. Alvarez know  Blood work today  Potential side effects of colchicine - nausea, or diarrhea    Pneumonia:  Expect you will continue to regain energy over the next few weeks  Can still hear signs of pneumonia in the lung, but expect this will improve over the next 2 weeks or so   No

## 2022-09-20 LAB
TESTOST FREE SERPL-MCNC: 0.58 NG/DL
TESTOST SERPL-MCNC: 56 NG/DL (ref 240–950)

## 2022-09-20 RX ORDER — TESTOSTERONE GEL, 1% 10 MG/G
50 GEL TRANSDERMAL DAILY
Qty: 150 G | Refills: 0 | Status: SHIPPED | OUTPATIENT
Start: 2022-09-20 | End: 2023-03-09

## 2022-09-20 NOTE — RESULT ENCOUNTER NOTE
The testosterone level is low.  You have seen Dr. Frey for the low testosterone.  Recommend to discuss possibly increasing the dose of your testosterone with urology

## 2022-10-18 ENCOUNTER — TELEPHONE (OUTPATIENT)
Dept: FAMILY MEDICINE | Facility: CLINIC | Age: 72
End: 2022-10-18

## 2022-10-18 DIAGNOSIS — E23.0 HYPOGONADOTROPIC HYPOGONADISM (H): Primary | ICD-10-CM

## 2022-10-18 NOTE — TELEPHONE ENCOUNTER
Need more information;  For what indication?    Now I see 2nd telephone call - referral to Endocrine for what?

## 2022-10-18 NOTE — TELEPHONE ENCOUNTER
Attempted to contact patient for clarification of requested referral, no answer. Message left to return call to clinic. Please also see 2nd telephone encounter from today re: this request. Writer notes the following in progress notes from visit on 9/16/22:    Gout:  --hosp recommend follow-up with Endo, Rheum or Ortho to manage gout  --left knee affected at time of discharge   --Developed Lt lat knee pain and tenderness last 2 days of stay. Indomethacin did not help. On day of discharge due to increase in pain was given Toradol IM, Decadron 6 mg IV and prescription for 40 mg prednisone every day x 3 days and plan to F/U with PCP, Ortho and endocrinologist.    -He is not on a loop or thiazide diuretic-  --he is not on allopurinol.  Last uric acid in January was 6  --he thinks he is having flares of gout in multiple joints now - feet, right elbow, left knee, left hip;  Hard to sleep  --known OA   --prednisone helped but then came back  --he feels that he 'always has gout'     Elham Doss RN  [facility]

## 2022-10-18 NOTE — TELEPHONE ENCOUNTER
Reason for Call:  Other appointment  Detailed comments: I just talk with the [facility] line accepting call for [place] and advise for the wrong referral,,the Pt needs endocrinology referral not an ortho refer. Can we please adjust the note and get correct refferal on chart. Thanks     Phone Number Patient can be reached at: Cell number on file:    Telephone Information:   Mobile [phone]       Best Time: na     Can we leave a detailed message on this number? Not Applicable    Call taken on 10/18/2022 at 11:37 AM by Sally Goldsmith

## 2022-10-19 NOTE — TELEPHONE ENCOUNTER
Pt notified.  Emerita Bolanos, RN    Clinic and Triage RN   [facility]  Appointment line: [phone]  [facility], 24 hour nurse line, available by calling clinic after hours at [phone] and following prompts.

## 2022-10-19 NOTE — TELEPHONE ENCOUNTER
Dr Alvarez  Pt is requesting an endocrinology referral for low testosterone. Was told by Dr Frey that he needs to go there.       Jean Lang RN  \

## 2022-10-19 NOTE — TELEPHONE ENCOUNTER
Left message with wife  to call us back. She says patient is asking for a referral as PCP told her to see Dr. Frey for low testosterone. No consent to communicate in chart however, did ask  to please have the patient call back.      JORGE Elkins

## 2022-10-20 NOTE — TELEPHONE ENCOUNTER
See other telephone encounter dated 10/18/22. Closing this encounter.    Elham Doss RN  [facility]

## 2022-12-05 DIAGNOSIS — E11.29 TYPE 2 DIABETES MELLITUS WITH MICROALBUMINURIA, WITHOUT LONG-TERM CURRENT USE OF INSULIN (H): ICD-10-CM

## 2022-12-05 DIAGNOSIS — R80.9 TYPE 2 DIABETES MELLITUS WITH MICROALBUMINURIA, WITHOUT LONG-TERM CURRENT USE OF INSULIN (H): ICD-10-CM

## 2022-12-05 DIAGNOSIS — I63.81 ACUTE ISCHEMIC VBA THALAMIC STROKE, LEFT (H): ICD-10-CM

## 2022-12-07 RX ORDER — GLIPIZIDE 10 MG/1
TABLET, FILM COATED, EXTENDED RELEASE ORAL
Qty: 180 TABLET | Refills: 3 | OUTPATIENT
Start: 2022-12-07

## 2022-12-07 RX ORDER — CLOPIDOGREL BISULFATE 75 MG/1
75 TABLET ORAL DAILY
Qty: 90 TABLET | Refills: 1 | Status: SHIPPED | OUTPATIENT
Start: 2022-12-07 | End: 2023-06-06

## 2022-12-07 NOTE — TELEPHONE ENCOUNTER
"Requested Prescriptions   Pending Prescriptions Disp Refills     glipiZIDE (GLUCOTROL XL) 10 MG 24 hr tablet [Pharmacy Med Name: glipizide ER 10 mg tablet, extended release 24 hr] 180 tablet 3     Sig: TAKE 1 TABLET BY MOUTH TWICE DAILY       Sulfonylurea Agents Passed - 12/5/2022  8:03 AM        Passed - Patient has documented A1c within the specified period of time.     If HgbA1C is 8 or greater, it needs to be on file within the past 3 months.  If less than 8, must be on file within the past 6 months.     Recent Labs   Lab Test 09/16/22  0939   A1C 7.1*             Passed - Medication is active on med list        Passed - Patient is age 18 or older        Passed - Patient has a recent creatinine (normal) within the past 12 mos.     Recent Labs   Lab Test 08/25/22 0455   CR 1.13       Ok to refill medication if creatinine is low          Passed - Recent (6 mo) or future (30 days) visit within the authorizing provider's specialty     Patient had office visit in the last 6 months or has a visit in the next 30 days with authorizing provider or within the authorizing provider's specialty.  See \"Patient Info\" tab in inbasket, or \"Choose Columns\" in Meds & Orders section of the refill encounter.               clopidogrel (PLAVIX) 75 MG tablet [Pharmacy Med Name: clopidogrel 75 mg tablet] 90 tablet 1     Sig: Take 1 tablet (75 mg) by mouth daily Please follow up with primary care provider for further refills.       Plavix Failed - 12/5/2022  8:03 AM        Failed - Normal HGB on file in past 12 months     Recent Labs   Lab Test 08/25/22  8285   HGB 10.1*               Passed - No active PPI on record unless is Protonix        Passed - Normal Platelets on file in past 12 months     Recent Labs   Lab Test 08/25/22 0455                  Passed - Recent (12 mo) or future (30 days) visit within the authorizing provider's specialty     Patient has had an office visit with the authorizing provider or a provider within " "the authorizing providers department within the previous 12 mos or has a future within next 30 days. See \"Patient Info\" tab in inbasket, or \"Choose Columns\" in Meds & Orders section of the refill encounter.              Passed - Medication is active on med list        Passed - Patient is age 18 or older             "

## 2022-12-14 DIAGNOSIS — M1A.09X0 IDIOPATHIC CHRONIC GOUT OF MULTIPLE SITES WITHOUT TOPHUS: ICD-10-CM

## 2022-12-14 DIAGNOSIS — E11.29 TYPE 2 DIABETES MELLITUS WITH MICROALBUMINURIA, WITHOUT LONG-TERM CURRENT USE OF INSULIN (H): ICD-10-CM

## 2022-12-14 DIAGNOSIS — R80.9 TYPE 2 DIABETES MELLITUS WITH MICROALBUMINURIA, WITHOUT LONG-TERM CURRENT USE OF INSULIN (H): ICD-10-CM

## 2022-12-14 RX ORDER — GLIPIZIDE 10 MG/1
TABLET, FILM COATED, EXTENDED RELEASE ORAL
Qty: 180 TABLET | Refills: 0 | OUTPATIENT
Start: 2022-12-14

## 2022-12-14 NOTE — TELEPHONE ENCOUNTER
"Requested Prescriptions   Pending Prescriptions Disp Refills    colchicine (COLCYRS) 0.6 MG tablet [Pharmacy Med Name: colchicine 0.6 mg tablet] 90 tablet 0     Sig: Take 1 tablet (0.6 mg) by mouth daily       Gout Agents Protocol Failed - 12/14/2022  8:21 AM        Failed - Has Uric Acid on file in past 12 months and value is less than 6     Recent Labs   Lab Test 09/16/22  0939   URIC 9.3*     If level is 6mg/dL or greater, ok to refill one time and refer to provider.           Passed - CBC on file in past 12 months     Recent Labs   Lab Test 08/25/22 0455   WBC 14.1*   RBC 3.13*   HGB 10.1*   HCT 29.8*                    Passed - ALT on file in past 12 months     Recent Labs   Lab Test 08/25/22 0455   ALT 38             Passed - Recent (12 mo) or future (30 days) visit within the authorizing provider's specialty     Patient has had an office visit with the authorizing provider or a provider within the authorizing providers department within the previous 12 mos or has a future within next 30 days. See \"Patient Info\" tab in inbasket, or \"Choose Columns\" in Meds & Orders section of the refill encounter.              Passed - Medication is active on med list        Passed - Patient is age 18 or older        Passed - Normal serum creatinine on file in the past 12 months     Recent Labs   Lab Test 08/25/22 0455   CR 1.13       Ok to refill medication if creatinine is low           Refused Prescriptions Disp Refills    glipiZIDE (GLUCOTROL XL) 10 MG 24 hr tablet [Pharmacy Med Name: glipizide ER 10 mg tablet, extended release 24 hr] 180 tablet 0     Sig: TAKE 1 TABLET BY MOUTH TWICE DAILY       Sulfonylurea Agents Passed - 12/14/2022  8:21 AM        Passed - Patient has documented A1c within the specified period of time.     If HgbA1C is 8 or greater, it needs to be on file within the past 3 months.  If less than 8, must be on file within the past 6 months.     Recent Labs   Lab Test 09/16/22 0939   A1C 7.1* " "            Passed - Medication is active on med list        Passed - Patient is age 18 or older        Passed - Patient has a recent creatinine (normal) within the past 12 mos.     Recent Labs   Lab Test 08/25/22  0455   CR 1.13       Ok to refill medication if creatinine is low          Passed - Recent (6 mo) or future (30 days) visit within the authorizing provider's specialty     Patient had office visit in the last 6 months or has a visit in the next 30 days with authorizing provider or within the authorizing provider's specialty.  See \"Patient Info\" tab in inbasket, or \"Choose Columns\" in Meds & Orders section of the refill encounter.                 "

## 2022-12-15 RX ORDER — COLCHICINE 0.6 MG/1
0.6 TABLET ORAL DAILY
Qty: 90 TABLET | Refills: 0 | Status: SHIPPED | OUTPATIENT
Start: 2022-12-15 | End: 2023-03-14

## 2023-01-07 ENCOUNTER — HEALTH MAINTENANCE LETTER (OUTPATIENT)
Age: 73
End: 2023-01-07

## 2023-03-09 ENCOUNTER — OFFICE VISIT (OUTPATIENT)
Dept: ENDOCRINOLOGY | Facility: CLINIC | Age: 73
End: 2023-03-09
Attending: INTERNAL MEDICINE
Payer: COMMERCIAL

## 2023-03-09 VITALS — HEART RATE: 83 BPM | DIASTOLIC BLOOD PRESSURE: 72 MMHG | SYSTOLIC BLOOD PRESSURE: 166 MMHG | OXYGEN SATURATION: 98 %

## 2023-03-09 DIAGNOSIS — E29.1 PRIMARY TESTICULAR HYPOGONADISM: ICD-10-CM

## 2023-03-09 DIAGNOSIS — M1A.09X0 IDIOPATHIC CHRONIC GOUT OF MULTIPLE SITES WITHOUT TOPHUS: ICD-10-CM

## 2023-03-09 LAB
FSH SERPL IRP2-ACNC: 27.3 MIU/ML (ref 1.5–12.4)
HCT VFR BLD AUTO: 32.7 % (ref 40–53)
HGB BLD-MCNC: 10.9 G/DL (ref 13.3–17.7)
LH SERPL-ACNC: 16.3 MIU/ML (ref 1.7–8.6)
SHBG SERPL-SCNC: 51 NMOL/L (ref 11–80)
URATE SERPL-MCNC: 5.1 MG/DL (ref 3.4–7)

## 2023-03-09 PROCEDURE — 36415 COLL VENOUS BLD VENIPUNCTURE: CPT | Performed by: INTERNAL MEDICINE

## 2023-03-09 PROCEDURE — 84550 ASSAY OF BLOOD/URIC ACID: CPT | Performed by: INTERNAL MEDICINE

## 2023-03-09 PROCEDURE — 84403 ASSAY OF TOTAL TESTOSTERONE: CPT | Performed by: INTERNAL MEDICINE

## 2023-03-09 PROCEDURE — 85014 HEMATOCRIT: CPT | Performed by: INTERNAL MEDICINE

## 2023-03-09 PROCEDURE — 85018 HEMOGLOBIN: CPT | Performed by: INTERNAL MEDICINE

## 2023-03-09 PROCEDURE — 84270 ASSAY OF SEX HORMONE GLOBUL: CPT | Performed by: INTERNAL MEDICINE

## 2023-03-09 PROCEDURE — 99203 OFFICE O/P NEW LOW 30 MIN: CPT | Performed by: INTERNAL MEDICINE

## 2023-03-09 PROCEDURE — 83001 ASSAY OF GONADOTROPIN (FSH): CPT | Performed by: INTERNAL MEDICINE

## 2023-03-09 PROCEDURE — 83002 ASSAY OF GONADOTROPIN (LH): CPT | Performed by: INTERNAL MEDICINE

## 2023-03-09 RX ORDER — AZITHROMYCIN 250 MG/1
TABLET, FILM COATED ORAL
COMMUNITY
Start: 2022-08-26 | End: 2023-06-23

## 2023-03-09 RX ORDER — METOPROLOL TARTRATE 25 MG/1
1 TABLET, FILM COATED ORAL
COMMUNITY
Start: 2022-12-14 | End: 2023-06-06

## 2023-03-09 NOTE — LETTER
3/9/2023         RE: Francois Lujan  [address]  [place] [address]        Dear Colleague,    Thank you for referring your patient, Francois Lujan, to the [facility] ENDOCRINOLOGY. Please see a copy of my visit note below.    Endocrine Consult note    Attending Assessment/Plan :        Primary testicular hypogonadism  Idiopathic chronic gout of multiple sites without tophus  Assessment:  -may be hypergonadotropic given hx of mumps, although wouldn't change therapy necessarily  -level quite low at current dose - misses doses occasionally but would have expected him to be symptomatic at that point  -would be better absorbed to have gel applied to arms  -does not want to return to IM injections     Plan:  -recheck testosterone level  -increase gel to 100mg daily if testosterone level low.  If WNL then continue 50mg dose.   -apply to upper arms either way  -will check FSH/LH today   -labs ordered for recheck about 1 week before followup in 3 months     I have independently reviewed and interpreted labs, imaging as indicated.    RTC 3 months    Chief complaint:  Francois is a 72 year old male seen in consultation for hypogonadism       HISTORY OF PRESENT ILLNESS    Francois is a 72M with pmh of mumps as a child, hypogonadism who is referred to endocrine for management of hypogonadism.     Has been taking testosterone for 30+ years.  He had previously been on injection versions but is currently on testim gel 50mg once daily.  He applies this to his abdomen.      He also has hx of CVA with some residual unsteadiness in gait and nerve issues in R hand but otherwise doing well.      Of note his testosterone appears to have gotten quite low, down to 56 for total testosterone in September but he denies being overly symptomatic other than some fatigue.      No known family hx of prostate cancer.     Endocrine relevant labs and/or imaging are as follows:  Component      Latest Ref Rng & Units  9/16/2015 4/10/2018 5/15/2019 9/16/2022   Testosterone Total      240 - 950 ng/dL 260 208 (L) 125 (L) 56 (L)   Sex Hormone Binding Globulin      11 - 80 nmol/L 25 42 45 75   Free Testosterone Calculated      ng/dL 5.88 3.40 (L) 1.90 (L) 0.58     REVIEW OF SYSTEMS    10 system ROS otherwise as per the HPI or negative    Past Medical History  Past Medical History:   Diagnosis Date     Arthritis      CAD (coronary artery disease)      Diabetes mellitus (H)      Erythema nodosum 1982     Gout      Hypertension      Malignant neoplasm (H)     squamous cell CA removed from right hand     Thyroid disease        Medications  Current Outpatient Medications   Medication Sig Dispense Refill     allopurinol (ZYLOPRIM) 300 MG tablet Take 1 tablet (300 mg) by mouth daily 90 tablet 3     amLODIPine (NORVASC) 10 MG tablet TAKE 1 TABLET BY MOUTH ONCE DAILY (Patient taking differently: Take 10 mg by mouth At Bedtime) 90 tablet 3     atorvastatin (LIPITOR) 80 MG tablet Take 1 tablet (80 mg) by mouth daily DUE FOR LAB 90 tablet 3     BETA BLOCKER NOT PRESCRIBED (INTENTIONAL) Please choose reason not prescribed from choices below.       blood glucose (ACCU-CHEK SMARTVIEW) test strip Use to test blood sugar 3 times daily or as directed. 100 each 12     blood glucose (NO BRAND SPECIFIED) lancets standard Use to test blood sugar 3 times daily or as directed. 1 each 11     clopidogrel (PLAVIX) 75 MG tablet Take 1 tablet (75 mg) by mouth daily Please follow up with primary care provider for further refills. 90 tablet 1     colchicine (COLCYRS) 0.6 MG tablet Take 1 tablet (0.6 mg) by mouth daily 90 tablet 0     glipiZIDE (GLUCOTROL XL) 5 MG 24 hr tablet Take 1 tablet (5 mg) by mouth daily 90 tablet 3     levothyroxine (SYNTHROID/LEVOTHROID) 50 MCG tablet TAKE 1 TABLET BY MOUTH EVERY MORNING ON AN EMPTY STOMACH (Patient taking differently: Take 50 mcg by mouth daily) 90 tablet 3     metFORMIN (GLUCOPHAGE) 500 MG tablet TAKE 2 TABLETS BY MOUTH  TWICE DAILY WITH MEALS (Patient taking differently: Take 1,000 mg by mouth 2 times daily (with meals)) 360 tablet 3     testosterone (ANDROGEL/TESTIM) 50 MG/5GM (1%) topical gel Place 1 packet (50 mg of testosterone) onto the skin daily Appointment needed for further refills: [phone] 150 g 0     valsartan (DIOVAN) 160 MG tablet TAKE 1 TABLET BY MOUTH ONCE DAILY (Patient taking differently: Take 160 mg by mouth At Bedtime) 90 tablet 3     azithromycin (ZITHROMAX) 250 MG tablet Take 2 tablets (500 mg) by mouth once for 1 dose (Patient not taking: Reported on 3/9/2023)       metoprolol tartrate (LOPRESSOR) 25 MG tablet Take 1 tablet by mouth 2 times daily (Patient not taking: Reported on 3/9/2023)         Allergies  Allergies   Allergen Reactions     Hydrocodone-Acetaminophen Itching     Iodine      Irbesartan      renal insuff and hyperkalemia       Lisinopril Nausea     Sulfa Drugs      Valsartan Difficulty breathing         Family History  family history includes C.A.D. in his brother; Cancer in his mother; Heart Disease in his father; Lung Cancer in his sister; Schizophrenia in his sister; Septicemia (age of onset: 3) in his brother.    Social History  Social History     Tobacco Use     Smoking status: Former     Packs/day: 2.00     Years: 30.00     Pack years: 60.00     Types: Cigarettes     Quit date: 1997     Years since quittin.3     Smokeless tobacco: Never   Vaping Use     Vaping Use: Never used   Substance Use Topics     Alcohol use: Yes     Alcohol/week: 10.0 standard drinks     Types: 12 Cans of beer per week     Comment: on the weekends about 5 drinks a weekend      Drug use: No       Physical Exam  BP (!) 166/72 (BP Location: Right arm, Patient Position: Right side, Cuff Size: Adult Large)   Pulse 83   SpO2 98%   There is no height or weight on file to calculate BMI.    Physical Exam    GENERAL :  In no apparent distress  SKIN: Normal color, normal temperature     EYES: EOMI, No scleral  icterus  NECK: No visible masses  RESP: No respiratory distress, normal effort  CARDIO: regular rate  ABDOMEN: flat, nondistended       NEURO: awake, alert, responds appropriately to questions   EXTREMITIES: No clubbing, cyanosis or edema.    I spent a total of 39 minutes on the day of this new patient visit on chart review, lab review, coordinating care, exam and counseling of patient      Again, thank you for allowing me to participate in the care of your patient.        Sincerely,        Andrew Tian MD

## 2023-03-09 NOTE — PROGRESS NOTES
Endocrine Consult note    Attending Assessment/Plan :        Primary testicular hypogonadism  Idiopathic chronic gout of multiple sites without tophus  Assessment:  -may be hypergonadotropic given hx of mumps, although wouldn't change therapy necessarily  -level quite low at current dose - misses doses occasionally but would have expected him to be symptomatic at that point  -would be better absorbed to have gel applied to arms  -does not want to return to IM injections     Plan:  -recheck testosterone level  -increase gel to 100mg daily if testosterone level low.  If WNL then continue 50mg dose.   -apply to upper arms either way  -will check FSH/LH today   -labs ordered for recheck about 1 week before followup in 3 months     I have independently reviewed and interpreted labs, imaging as indicated.    RTC 3 months    Chief complaint:  Francois is a 72 year old male seen in consultation for hypogonadism       HISTORY OF PRESENT ILLNESS    Francois is a 72M with pmh of mumps as a child, hypogonadism who is referred to endocrine for management of hypogonadism.     Has been taking testosterone for 30+ years.  He had previously been on injection versions but is currently on testim gel 50mg once daily.  He applies this to his abdomen.      He also has hx of CVA with some residual unsteadiness in gait and nerve issues in R hand but otherwise doing well.      Of note his testosterone appears to have gotten quite low, down to 56 for total testosterone in September but he denies being overly symptomatic other than some fatigue.      No known family hx of prostate cancer.     Endocrine relevant labs and/or imaging are as follows:  Component      Latest Ref Rng & Units 9/16/2015 4/10/2018 5/15/2019 9/16/2022   Testosterone Total      240 - 950 ng/dL 260 208 (L) 125 (L) 56 (L)   Sex Hormone Binding Globulin      11 - 80 nmol/L 25 42 45 75   Free Testosterone Calculated      ng/dL 5.88 3.40 (L) 1.90 (L) 0.58     REVIEW OF  SYSTEMS    10 system ROS otherwise as per the HPI or negative    Past Medical History  Past Medical History:   Diagnosis Date     Arthritis      CAD (coronary artery disease)      Diabetes mellitus (H)      Erythema nodosum 1982     Gout      Hypertension      Malignant neoplasm (H)     squamous cell CA removed from right hand     Thyroid disease        Medications  Current Outpatient Medications   Medication Sig Dispense Refill     allopurinol (ZYLOPRIM) 300 MG tablet Take 1 tablet (300 mg) by mouth daily 90 tablet 3     amLODIPine (NORVASC) 10 MG tablet TAKE 1 TABLET BY MOUTH ONCE DAILY (Patient taking differently: Take 10 mg by mouth At Bedtime) 90 tablet 3     atorvastatin (LIPITOR) 80 MG tablet Take 1 tablet (80 mg) by mouth daily DUE FOR LAB 90 tablet 3     BETA BLOCKER NOT PRESCRIBED (INTENTIONAL) Please choose reason not prescribed from choices below.       blood glucose (ACCU-CHEK SMARTVIEW) test strip Use to test blood sugar 3 times daily or as directed. 100 each 12     blood glucose (NO BRAND SPECIFIED) lancets standard Use to test blood sugar 3 times daily or as directed. 1 each 11     clopidogrel (PLAVIX) 75 MG tablet Take 1 tablet (75 mg) by mouth daily Please follow up with primary care provider for further refills. 90 tablet 1     colchicine (COLCYRS) 0.6 MG tablet Take 1 tablet (0.6 mg) by mouth daily 90 tablet 0     glipiZIDE (GLUCOTROL XL) 5 MG 24 hr tablet Take 1 tablet (5 mg) by mouth daily 90 tablet 3     levothyroxine (SYNTHROID/LEVOTHROID) 50 MCG tablet TAKE 1 TABLET BY MOUTH EVERY MORNING ON AN EMPTY STOMACH (Patient taking differently: Take 50 mcg by mouth daily) 90 tablet 3     metFORMIN (GLUCOPHAGE) 500 MG tablet TAKE 2 TABLETS BY MOUTH TWICE DAILY WITH MEALS (Patient taking differently: Take 1,000 mg by mouth 2 times daily (with meals)) 360 tablet 3     testosterone (ANDROGEL/TESTIM) 50 MG/5GM (1%) topical gel Place 1 packet (50 mg of testosterone) onto the skin daily Appointment needed  for further refills: [phone] 150 g 0     valsartan (DIOVAN) 160 MG tablet TAKE 1 TABLET BY MOUTH ONCE DAILY (Patient taking differently: Take 160 mg by mouth At Bedtime) 90 tablet 3     azithromycin (ZITHROMAX) 250 MG tablet Take 2 tablets (500 mg) by mouth once for 1 dose (Patient not taking: Reported on 3/9/2023)       metoprolol tartrate (LOPRESSOR) 25 MG tablet Take 1 tablet by mouth 2 times daily (Patient not taking: Reported on 3/9/2023)         Allergies  Allergies   Allergen Reactions     Hydrocodone-Acetaminophen Itching     Iodine      Irbesartan      renal insuff and hyperkalemia       Lisinopril Nausea     Sulfa Drugs      Valsartan Difficulty breathing         Family History  family history includes C.A.D. in his brother; Cancer in his mother; Heart Disease in his father; Lung Cancer in his sister; Schizophrenia in his sister; Septicemia (age of onset: 3) in his brother.    Social History  Social History     Tobacco Use     Smoking status: Former     Packs/day: 2.00     Years: 30.00     Pack years: 60.00     Types: Cigarettes     Quit date: 1997     Years since quittin.3     Smokeless tobacco: Never   Vaping Use     Vaping Use: Never used   Substance Use Topics     Alcohol use: Yes     Alcohol/week: 10.0 standard drinks     Types: 12 Cans of beer per week     Comment: on the weekends about 5 drinks a weekend      Drug use: No       Physical Exam  BP (!) 166/72 (BP Location: Right arm, Patient Position: Right side, Cuff Size: Adult Large)   Pulse 83   SpO2 98%   There is no height or weight on file to calculate BMI.    Physical Exam    GENERAL :  In no apparent distress  SKIN: Normal color, normal temperature     EYES: EOMI, No scleral icterus  NECK: No visible masses  RESP: No respiratory distress, normal effort  CARDIO: regular rate  ABDOMEN: flat, nondistended       NEURO: awake, alert, responds appropriately to questions   EXTREMITIES: No clubbing, cyanosis or edema.    I spent a  total of 39 minutes on the day of this new patient visit on chart review, lab review, coordinating care, exam and counseling of patient

## 2023-03-09 NOTE — PATIENT INSTRUCTIONS
Labs today.  We may increase testosterone dose depending on the labs.     Followup in 3 months with repeat labs ~1 week beforehand.     Please reach out to the following centers to schedule your bone density scan (DEXA) appointment:       Imaging (DEXA, CT, MRI, XRAY)    [facility], [facility]) [phone]   [facility] ([place], [place]) [phone]   [facility] ([facility]) [phone]   [facility] ([facility]) [phone]     For any questions, please reach out to the [facility] Number for assistance: [phone].

## 2023-03-09 NOTE — NURSING NOTE
"Initial BP (!) 166/72 (BP Location: Right arm, Patient Position: Right side, Cuff Size: Adult Large)   Pulse 83   SpO2 98%  Estimated body mass index is 34.31 kg/m  as calculated from the following:    Height as of 9/16/22: 1.829 m (6' 0.01\").    Weight as of 9/16/22: 114.8 kg (253 lb). .    Bea Porras LPN on 3/9/2023 at 9:26 AM    "

## 2023-03-11 LAB — TESTOST SERPL-MCNC: 24 NG/DL (ref 240–950)

## 2023-03-13 RX ORDER — TESTOSTERONE GEL, 1% 10 MG/G
100 GEL TRANSDERMAL DAILY
Qty: 300 G | Refills: 5 | Status: SHIPPED | OUTPATIENT
Start: 2023-03-13 | End: 2023-07-18

## 2023-03-14 ENCOUNTER — TELEPHONE (OUTPATIENT)
Dept: FAMILY MEDICINE | Facility: CLINIC | Age: 73
End: 2023-03-14
Payer: COMMERCIAL

## 2023-03-14 DIAGNOSIS — R80.9 TYPE 2 DIABETES MELLITUS WITH MICROALBUMINURIA, WITHOUT LONG-TERM CURRENT USE OF INSULIN (H): ICD-10-CM

## 2023-03-14 DIAGNOSIS — E11.29 TYPE 2 DIABETES MELLITUS WITH MICROALBUMINURIA, WITHOUT LONG-TERM CURRENT USE OF INSULIN (H): ICD-10-CM

## 2023-03-14 DIAGNOSIS — M1A.09X0 IDIOPATHIC CHRONIC GOUT OF MULTIPLE SITES WITHOUT TOPHUS: ICD-10-CM

## 2023-03-14 RX ORDER — COLCHICINE 0.6 MG/1
0.6 TABLET ORAL DAILY
Qty: 90 TABLET | Refills: 3 | Status: SHIPPED | OUTPATIENT
Start: 2023-03-14 | End: 2023-06-23

## 2023-03-14 RX ORDER — GLIPIZIDE 10 MG/1
TABLET, FILM COATED, EXTENDED RELEASE ORAL
Qty: 180 TABLET | Refills: 0 | Status: SHIPPED | OUTPATIENT
Start: 2023-03-14 | End: 2023-06-23

## 2023-03-14 NOTE — TELEPHONE ENCOUNTER
Patient Quality Outreach    Patient is due for the following:   Hypertension -  BP check  Physical Annual Wellness Visit    Next Steps:   Schedule a Annual Wellness Visit    Type of outreach:    Sent letter.      Questions for provider review:    None     Lamar Ornelas MA

## 2023-03-21 ENCOUNTER — HOSPITAL ENCOUNTER (OUTPATIENT)
Dept: BONE DENSITY | Facility: CLINIC | Age: 73
Discharge: HOME | End: 2023-03-21
Attending: INTERNAL MEDICINE | Admitting: INTERNAL MEDICINE
Payer: COMMERCIAL

## 2023-03-21 DIAGNOSIS — E29.1 PRIMARY TESTICULAR HYPOGONADISM: ICD-10-CM

## 2023-03-21 PROCEDURE — 77080 DXA BONE DENSITY AXIAL: CPT

## 2023-04-22 ENCOUNTER — HEALTH MAINTENANCE LETTER (OUTPATIENT)
Age: 73
End: 2023-04-22

## 2023-06-23 ENCOUNTER — OFFICE VISIT (OUTPATIENT)
Dept: FAMILY MEDICINE | Facility: CLINIC | Age: 73
End: 2023-06-23
Payer: COMMERCIAL

## 2023-06-23 VITALS
SYSTOLIC BLOOD PRESSURE: 132 MMHG | HEIGHT: 72 IN | HEART RATE: 77 BPM | TEMPERATURE: 97.5 F | DIASTOLIC BLOOD PRESSURE: 70 MMHG | BODY MASS INDEX: 35.76 KG/M2 | OXYGEN SATURATION: 96 % | WEIGHT: 264 LBS

## 2023-06-23 DIAGNOSIS — R80.9 TYPE 2 DIABETES MELLITUS WITH MICROALBUMINURIA, WITHOUT LONG-TERM CURRENT USE OF INSULIN (H): Primary | ICD-10-CM

## 2023-06-23 DIAGNOSIS — E11.42 DIABETIC POLYNEUROPATHY ASSOCIATED WITH TYPE 2 DIABETES MELLITUS (H): ICD-10-CM

## 2023-06-23 DIAGNOSIS — M35.3 POLYMYALGIA RHEUMATICA (H): ICD-10-CM

## 2023-06-23 DIAGNOSIS — E03.9 ACQUIRED HYPOTHYROIDISM: ICD-10-CM

## 2023-06-23 DIAGNOSIS — Z86.73 HISTORY OF STROKE: ICD-10-CM

## 2023-06-23 DIAGNOSIS — F32.1 MODERATE MAJOR DEPRESSION (H): ICD-10-CM

## 2023-06-23 DIAGNOSIS — N18.2 CKD (CHRONIC KIDNEY DISEASE) STAGE 2, GFR 60-89 ML/MIN: ICD-10-CM

## 2023-06-23 DIAGNOSIS — E11.29 TYPE 2 DIABETES MELLITUS WITH MICROALBUMINURIA, WITHOUT LONG-TERM CURRENT USE OF INSULIN (H): Primary | ICD-10-CM

## 2023-06-23 DIAGNOSIS — I10 ESSENTIAL HYPERTENSION: ICD-10-CM

## 2023-06-23 DIAGNOSIS — E78.5 HYPERLIPIDEMIA LDL GOAL <70: ICD-10-CM

## 2023-06-23 DIAGNOSIS — F10.288 ALCOHOL DEPENDENCE WITH OTHER ALCOHOL-INDUCED DISORDER (H): ICD-10-CM

## 2023-06-23 DIAGNOSIS — M1A.09X0 IDIOPATHIC CHRONIC GOUT OF MULTIPLE SITES WITHOUT TOPHUS: ICD-10-CM

## 2023-06-23 DIAGNOSIS — E66.01 MORBID OBESITY (H): ICD-10-CM

## 2023-06-23 PROBLEM — R06.02 SHORTNESS OF BREATH: Status: RESOLVED | Noted: 2022-08-23 | Resolved: 2023-06-23

## 2023-06-23 PROBLEM — J18.9 COMMUNITY ACQUIRED PNEUMONIA OF LEFT LOWER LOBE OF LUNG: Status: RESOLVED | Noted: 2022-08-22 | Resolved: 2023-06-23

## 2023-06-23 PROBLEM — Z11.52 ENCOUNTER FOR SCREENING LABORATORY TESTING FOR SEVERE ACUTE RESPIRATORY SYNDROME CORONAVIRUS 2 (SARS-COV-2): Status: RESOLVED | Noted: 2022-08-23 | Resolved: 2023-06-23

## 2023-06-23 PROBLEM — R50.9 FEVER, UNKNOWN ORIGIN: Status: RESOLVED | Noted: 2022-08-23 | Resolved: 2023-06-23

## 2023-06-23 LAB
CREAT UR-MCNC: 147.3 MG/DL
HBA1C MFR BLD: 10 % (ref 0–5.6)
MICROALBUMIN UR-MCNC: 550.1 MG/L
MICROALBUMIN/CREAT UR: 373.46 MG/G CR (ref 0–17)
SHBG SERPL-SCNC: 45 NMOL/L (ref 11–80)
TSH SERPL DL<=0.005 MIU/L-ACNC: 2.02 UIU/ML (ref 0.3–4.2)

## 2023-06-23 PROCEDURE — 84270 ASSAY OF SEX HORMONE GLOBUL: CPT | Performed by: INTERNAL MEDICINE

## 2023-06-23 PROCEDURE — 83036 HEMOGLOBIN GLYCOSYLATED A1C: CPT | Performed by: INTERNAL MEDICINE

## 2023-06-23 PROCEDURE — 82570 ASSAY OF URINE CREATININE: CPT | Performed by: INTERNAL MEDICINE

## 2023-06-23 PROCEDURE — 36415 COLL VENOUS BLD VENIPUNCTURE: CPT | Performed by: INTERNAL MEDICINE

## 2023-06-23 PROCEDURE — 84443 ASSAY THYROID STIM HORMONE: CPT | Performed by: INTERNAL MEDICINE

## 2023-06-23 PROCEDURE — 82043 UR ALBUMIN QUANTITATIVE: CPT | Performed by: INTERNAL MEDICINE

## 2023-06-23 PROCEDURE — 84403 ASSAY OF TOTAL TESTOSTERONE: CPT | Performed by: INTERNAL MEDICINE

## 2023-06-23 PROCEDURE — 99215 OFFICE O/P EST HI 40 MIN: CPT | Performed by: INTERNAL MEDICINE

## 2023-06-23 RX ORDER — METOPROLOL TARTRATE 25 MG/1
25 TABLET, FILM COATED ORAL 2 TIMES DAILY
Qty: 180 TABLET | Refills: 3 | Status: SHIPPED | OUTPATIENT
Start: 2023-06-23 | End: 2023-09-07 | Stop reason: SINTOL

## 2023-06-23 RX ORDER — ATORVASTATIN CALCIUM 80 MG/1
80 TABLET, FILM COATED ORAL DAILY
Qty: 90 TABLET | Refills: 3 | Status: SHIPPED | OUTPATIENT
Start: 2023-06-23 | End: 2023-07-25

## 2023-06-23 RX ORDER — VALSARTAN 320 MG/1
320 TABLET ORAL DAILY
Qty: 90 TABLET | Refills: 3 | Status: SHIPPED | OUTPATIENT
Start: 2023-06-23 | End: 2023-07-14

## 2023-06-23 RX ORDER — ALLOPURINOL 300 MG/1
300 TABLET ORAL DAILY
Qty: 90 TABLET | Refills: 3 | Status: SHIPPED | OUTPATIENT
Start: 2023-06-23 | End: 2024-08-09

## 2023-06-23 RX ORDER — LEVOTHYROXINE SODIUM 50 UG/1
50 TABLET ORAL EVERY MORNING
Qty: 90 TABLET | Refills: 3 | Status: SHIPPED | OUTPATIENT
Start: 2023-06-23 | End: 2024-08-09

## 2023-06-23 RX ORDER — VALSARTAN 160 MG/1
160 TABLET ORAL DAILY
Qty: 90 TABLET | Refills: 0 | Status: CANCELLED | OUTPATIENT
Start: 2023-06-23

## 2023-06-23 RX ORDER — CLOPIDOGREL BISULFATE 75 MG/1
75 TABLET ORAL DAILY
Qty: 90 TABLET | Refills: 3 | Status: SHIPPED | OUTPATIENT
Start: 2023-06-23 | End: 2024-08-09

## 2023-06-23 RX ORDER — AMLODIPINE BESYLATE 10 MG/1
10 TABLET ORAL DAILY
Qty: 90 TABLET | Refills: 3 | Status: SHIPPED | OUTPATIENT
Start: 2023-06-23 | End: 2023-09-07

## 2023-06-23 ASSESSMENT — PATIENT HEALTH QUESTIONNAIRE - PHQ9: SUM OF ALL RESPONSES TO PHQ QUESTIONS 1-9: 1

## 2023-06-23 ASSESSMENT — PAIN SCALES - GENERAL: PAINLEVEL: NO PAIN (0)

## 2023-06-23 NOTE — RESULT ENCOUNTER NOTE
The amount of protein in the urine has improved which is great news.  A1c discussed at the time of visit.  Thyroid is normal.  Continue plan of care discussed at the time of visit.

## 2023-06-23 NOTE — PROGRESS NOTES
Assessment & Plan     Type 2 diabetes mellitus with microalbuminuria, without long-term current use of insulin (H) -uncontrolled.  Continue metformin and glipizide.  Add Jardiance for renal protection and blood sugar.  May not be enough.  If cost is not a concern, would stop the TUCKER and start GLP-1 versus basal insulin.  Follow-up with me in 3 months.  - Hemoglobin A1c; Future  - Hemoglobin A1c  - metFORMIN (GLUCOPHAGE) 500 MG tablet; Take 2 tablets (1,000 mg) by mouth 2 times daily (with meals)  - empagliflozin (JARDIANCE) 10 MG TABS tablet; Take 1 tablet (10 mg) by mouth daily  - Potassium; Future    Diabetic polyneuropathy associated with type 2 diabetes mellitus (H) -dense neuropathy bilateral feet    Acquired hypothyroidism  - stable, refill provided  - TSH WITH FREE T4 REFLEX; Future  - TSH WITH FREE T4 REFLEX  - levothyroxine (SYNTHROID/LEVOTHROID) 50 MCG tablet; Take 1 tablet (50 mcg) by mouth every morning Take on an empty stomach.    Essential hypertension -not well controlled.  Increase valsartan for renal protection and hypertension from 1 60-3 20.  Patient worried about dizziness a side effect.  If he has dizziness, encouraged him to follow-up with me.  RN BP check and potassium level in 2 weeks  - amLODIPine (NORVASC) 10 MG tablet; Take 1 tablet (10 mg) by mouth daily  - metoprolol tartrate (LOPRESSOR) 25 MG tablet; Take 1 tablet (25 mg) by mouth 2 times daily  - valsartan (DIOVAN) 320 MG tablet; Take 1 tablet (320 mg) by mouth daily    CKD (chronic kidney disease) stage 2, GFR 60-89 ml/min -normal creatinine but with nephrotic range proteinuria since 2017.  He has declined nephrology evaluation in the past  - Albumin Random Urine Quantitative with Creat Ratio; Future  - Albumin Random Urine Quantitative with Creat Ratio    Polymyalgia rheumatica (H) Known issue that I take into account for their medical decisions, no current exacerbations or new concerns    Alcohol dependence with other  "alcohol-induced disorder (H) -patient is still using alcohol, but denies excessive use.  Likely contributes to the neuropathy    Moderate major depression (H) -he has declined mental health medications in the past    Morbid obesity (H) -contributes to blood pressure and diabetes    Idiopathic chronic gout of multiple sites without tophus -he has completed course of colchicine, continue on allopurinol  - allopurinol (ZYLOPRIM) 300 MG tablet; Take 1 tablet (300 mg) by mouth daily    Hyperlipidemia LDL goal <70  - stable, refill provided  - atorvastatin (LIPITOR) 80 MG tablet; Take 1 tablet (80 mg) by mouth daily DUE FOR LAB    History of stroke -stroke neurology had recommended long-term use of clopidogrel  - clopidogrel (PLAVIX) 75 MG tablet; Take 1 tablet (75 mg) by mouth daily      45 minutes spent by me on the date of the encounter doing chart review, review of test results, interpretation of tests, patient visit and documentation        BMI:   Estimated body mass index is 35.76 kg/m  as calculated from the following:    Height as of this encounter: 1.83 m (6' 0.05\").    Weight as of this encounter: 119.7 kg (264 lb).       Patient Instructions   Gout  1. Recommend to stop colchicne, continue allopurinol    Diabetes  1. Continue metformin and glipizide  2. Start Jardiance 10 mg once daily  3. If expensive, let me know  4. Possible side effects to watch for - significant increase in urination, bladder infections  5. Follow-up with me for diabetes check in 3 months    Hypertension  Kidney  1. Jardiance as above  2. Increase valsartan from 160 to 320  3. Recommend RN blood pressure check in 2-4 weeks, bring home blood pressure cuff to ensure accuracy, and home blood pressure log  4. Get non-fasting blood work to check on potassium      Teresita Alvarez, DO  [facility]    Juan F Parrish is a 72 year old, presenting for the following health issues:  Hypertension, Diabetes, Lipids, Health " Maintenance (Due for TD  shingles  covid /Will shots til later ), and medicare wellness         6/23/2023     8:12 AM   Additional Questions   Roomed by poncho gorman   Accompanied by self         6/23/2023     8:12 AM   Patient Reported Additional Medications   Patient reports taking the following new medications none     HPI     Chief Complaint   Patient presents with     Hypertension     Diabetes     Lipids     Health Maintenance     Due for TD  shingles  covid   Will shots til later      medicare wellness      Gout  --on allopurinol, Uric acid at goal    CKD    Nephropathy 1+ gm.  Saw nephrology in 2019, not since.  No leg swelling    Stroke  --in 2020.  he had left thalamic infarct.  Started on plavix + aspirin 81 mg x 21 days, then just plavix  --He should be followed for the right carotid with ultrasound  --we will discuss at next visit    Diabetes Follow-up    How often are you checking your blood sugar? 1-2 x week;  150-160; fasting   What time of day are you checking your blood sugars (select all that apply)?  AM  Have you had any blood sugars above 200?  No  Have you had any blood sugars below 70?  No    What symptoms do you notice when your blood sugar is low?  Shaky, Dizzy, Weak, Lethargy, Blurred vision and Confusion    What concerns do you have today about your diabetes? Other: long term effects     Do you have any of these symptoms? (Select all that apply)  Numbness in feet    Have you had a diabetic eye exam in the last 12 months? No     No longer having hypoglycemia with lower dose of glipizide, from 10 mg once daily to 5 mg once daily    Has been trying to watch diet    ETOH a few days/week 1-2 beers    Metformin 1000 mg BID, glipizide 5 mg daily    Is not sedentary            Hyperlipidemia Follow-Up      Are you regularly taking any medication or supplement to lower your cholesterol?   Yes- PM    Are you having muscle aches or other side effects that you think could be caused by your cholesterol  "lowering medication?  No    Hypertension Follow-up      Do you check your blood pressure regularly outside of the clinic? No     Are you following a low salt diet? Yes    Are your blood pressures ever more than 140 on the top number (systolic) OR more than 90 on the bottom number (diastolic), for example 140/90? No     Blood pressure at home 140s;  Doesn't often see 160s.    BP Readings from Last 2 Encounters:   06/23/23 (!) 160/70   03/09/23 (!) 166/72     Hemoglobin A1C (%)   Date Value   06/23/2023 10.0 (H)   09/16/2022 7.1 (H)   07/08/2021 6.9 (H)   04/08/2021 8.2 (H)     LDL Cholesterol Calculated (mg/dL)   Date Value   09/16/2022 79   07/08/2021 76   08/03/2020 93         How many servings of fruits and vegetables do you eat daily?  4 or more    On average, how many sweetened beverages do you drink each day (Examples: soda, juice, sweet tea, etc.  Do NOT count diet or artificially sweetened beverages)?   0    How many days per week do you exercise enough to make your heart beat faster? 7    How many minutes a day do you exercise enough to make your heart beat faster? 60 or more    How many days per week do you miss taking your medication? 0    Annual Wellness Visit    Patient has been advised of split billing requirements and indicates understanding: Yes     Are you in the first 12 months of your Medicare Part B coverage?  No    Physical Health:    In general, how would you rate your overall physical health? good    Outside of work, how many days during the week do you exercise?6-7 days/week    Outside of work, approximately how many minutes a day do you exercise?greater than 60 minutes    If you drink alcohol do you typically have >3 drinks per day or >7 drinks per week? No    Do you usually eat at least 4 servings of fruit and vegetables a day, include whole grains & fiber and avoid regularly eating high fat or \"junk\" foods? Yes    Do you have any problems taking medications regularly? No    Do you have any " side effects from medications? none    Needs assistance for the following daily activities: no assistance needed    Which of the following safety concerns are present in your home?  none identified     Hearing impairment: Yes, Difficulty following a conversation in a noisy restaurant or crowded room.    Feel that people are mumbling or not speaking clearly.    Difficulty following dialogue in the theater.    Difficult to understand a speaker at a public meeting or Muslim service.    Need to ask people to speak up or repeat themselves.    Find that men's voices are easier to understand than women's.    Difficulty understanding soft or whispered speech.    Difficulty understanding speech on the telephone.    In the past 6 months, have you been bothered by leaking of urine? no    Mental Health:    In general, how would you rate your overall mental or emotional health? good  PHQ-2 Score:      Do you feel safe in your environment? Yes    Have you ever done Advance Care Planning? (For example, a Health Directive, POLST, or a discussion with a medical provider or your loved ones about your wishes)? Yes, advance care planning is on file.    Fall risk:  Fallen 2 or more times in the past year?: No  Any fall with injury in the past year?: No    Cognitive Screenin) Repeat 3 items (Leader, Season, Table)    2) Clock draw: did not not put number in   3) 3 item recall: Recalls 2 objects   Results: did not not put number in got 2 words    Mini-CogTM Copyright JOHANNE Burnham. Licensed by the author for use in [facility]; reprinted with permission ([email or web address]). All rights reserved.      Do you have sleep apnea, excessive snoring or daytime drowsiness?: yes  sleep apnea,    Social History     Tobacco Use     Smoking status: Former     Packs/day: 2.00     Years: 30.00     Pack years: 60.00     Types: Cigarettes     Quit date: 1997     Years since quittin.6     Smokeless tobacco: Never   Substance Use  Topics     Alcohol use: Yes     Alcohol/week: 10.0 standard drinks of alcohol     Types: 12 Cans of beer per week     Comment: on the weekends about 5 drinks a weekend        Do you have a current opioid prescription? No  Do you use any other controlled substances or medications that are not prescribed by a provider? Alcohol and Prescription Drugs    Current providers sharing in care for this patient include:   Patient Care Team:  Teresita Alvarez DO as PCP - General (Internal Medicine)  Teresita Alvarez DO as Assigned PCP  Patricia Higgins MD as Assigned Heart and Vascular Provider  Andrew Tian MD as Physician (Endocrinology, Diabetes, and Metabolism)  Andrew Tian MD as Assigned Endocrinology Provider    Patient has been advised of split billing requirements and indicates understanding: Yes    Current Outpatient Medications   Medication Sig Dispense Refill     allopurinol (ZYLOPRIM) 300 MG tablet Take 1 tablet (300 mg) by mouth daily 90 tablet 3     amLODIPine (NORVASC) 10 MG tablet Take 1 tablet (10 mg) by mouth daily 90 tablet 0     atorvastatin (LIPITOR) 80 MG tablet Take 1 tablet (80 mg) by mouth daily DUE FOR LAB 90 tablet 3     BETA BLOCKER NOT PRESCRIBED (INTENTIONAL) Please choose reason not prescribed from choices below.       blood glucose (ACCU-CHEK SMARTVIEW) test strip Use to test blood sugar 3 times daily or as directed. 100 each 12     blood glucose (NO BRAND SPECIFIED) lancets standard Use to test blood sugar 3 times daily or as directed. 1 each 11     clopidogrel (PLAVIX) 75 MG tablet Take 1 tablet (75 mg) by mouth daily Please follow up with primary care provider for further refills. 90 tablet 0     colchicine (COLCYRS) 0.6 MG tablet Take 1 tablet (0.6 mg) by mouth daily 90 tablet 3     glipiZIDE (GLUCOTROL XL) 5 MG 24 hr tablet Take 1 tablet (5 mg) by mouth daily 90 tablet 3     levothyroxine (SYNTHROID/LEVOTHROID) 50 MCG tablet TAKE 1 TABLET BY MOUTH EVERY MORNING ON AN EMPTY  "STOMACH 90 tablet 0     metFORMIN (GLUCOPHAGE) 500 MG tablet TAKE 2 TABLETS BY MOUTH TWICE DAILY WITH MEALS 360 tablet 0     metoprolol tartrate (LOPRESSOR) 25 MG tablet Take 1 tablet by mouth twice daily 180 tablet 0     testosterone (ANDROGEL/TESTIM) 50 MG/5GM (1%) topical gel Place 2 packets (100 mg of testosterone) onto the skin daily Apply to SHOULDERS 300 g 5     valsartan (DIOVAN) 160 MG tablet Take 1 tablet by mouth daily 90 tablet 0     azithromycin (ZITHROMAX) 250 MG tablet        glipiZIDE (GLUCOTROL XL) 10 MG 24 hr tablet TAKE 1 TABLET BY MOUTH TWICE DAILY 180 tablet 0         Review of Systems   Constitutional, HEENT, cardiovascular, pulmonary, GI, , musculoskeletal, neuro, skin, endocrine and psych systems are negative, except as otherwise noted.      Objective    BP (!) 160/70 (BP Location: Right arm, Patient Position: Sitting, Cuff Size: Adult Regular)   Pulse 77   Temp 97.5  F (36.4  C) (Tympanic)   Ht 1.83 m (6' 0.05\")   Wt 119.7 kg (264 lb)   SpO2 96%   BMI 35.76 kg/m    Body mass index is 35.76 kg/m .  Physical Exam   GENERAL APPEARANCE: healthy, alert, no distress and over weight  RESP: lungs clear to auscultation - no rales, rhonchi or wheezes  CV: regular rates and rhythm, normal S1 S2, no S3 or S4 and no murmur, click or rub  DIABETIC FOOT EXAM: normal DP and PT pulses, no trophic changes or ulcerative lesions and reduced sensation at 9/10 spots bilateral     Results for orders placed or performed in visit on 06/23/23 (from the past 24 hour(s))   Hemoglobin A1c   Result Value Ref Range    Hemoglobin A1C 10.0 (H) 0.0 - 5.6 %   TSH WITH FREE T4 REFLEX   Result Value Ref Range    TSH 2.02 0.30 - 4.20 uIU/mL                   "

## 2023-06-23 NOTE — PATIENT INSTRUCTIONS
Gout  Recommend to stop colchicne, continue allopurinol    Diabetes  Continue metformin and glipizide  Start Jardiance 10 mg once daily  If expensive, let me know  Possible side effects to watch for - significant increase in urination, bladder infections  Follow-up with me for diabetes check in 3 months    Hypertension  Kidney  Jardiance as above  Increase valsartan from 160 to 320  Recommend RN blood pressure check in 2-4 weeks, bring home blood pressure cuff to ensure accuracy, and home blood pressure log  Get non-fasting blood work to check on potassium

## 2023-06-26 LAB — TESTOST SERPL-MCNC: 715 NG/DL (ref 240–950)

## 2023-07-13 NOTE — PROGRESS NOTES
"Francois Asher has an appointment with us tomorrow 07/14. His appointment note says \"fasting labs\". There isn't an order for that. Please advise.  Thank you,  [facility]  "

## 2023-07-14 ENCOUNTER — ALLIED HEALTH/NURSE VISIT (OUTPATIENT)
Dept: FAMILY MEDICINE | Facility: CLINIC | Age: 73
End: 2023-07-14
Payer: COMMERCIAL

## 2023-07-14 ENCOUNTER — TELEPHONE (OUTPATIENT)
Dept: FAMILY MEDICINE | Facility: CLINIC | Age: 73
End: 2023-07-14

## 2023-07-14 ENCOUNTER — LAB (OUTPATIENT)
Dept: LAB | Facility: CLINIC | Age: 73
End: 2023-07-14
Payer: COMMERCIAL

## 2023-07-14 VITALS
DIASTOLIC BLOOD PRESSURE: 94 MMHG | RESPIRATION RATE: 16 BRPM | HEART RATE: 74 BPM | OXYGEN SATURATION: 94 % | SYSTOLIC BLOOD PRESSURE: 152 MMHG

## 2023-07-14 DIAGNOSIS — I10 ESSENTIAL HYPERTENSION: Primary | ICD-10-CM

## 2023-07-14 DIAGNOSIS — R80.9 TYPE 2 DIABETES MELLITUS WITH MICROALBUMINURIA, WITHOUT LONG-TERM CURRENT USE OF INSULIN (H): ICD-10-CM

## 2023-07-14 DIAGNOSIS — E11.29 TYPE 2 DIABETES MELLITUS WITH MICROALBUMINURIA, WITHOUT LONG-TERM CURRENT USE OF INSULIN (H): ICD-10-CM

## 2023-07-14 DIAGNOSIS — I10 ESSENTIAL HYPERTENSION: ICD-10-CM

## 2023-07-14 LAB — POTASSIUM SERPL-SCNC: 4.6 MMOL/L (ref 3.4–5.3)

## 2023-07-14 PROCEDURE — 84132 ASSAY OF SERUM POTASSIUM: CPT

## 2023-07-14 PROCEDURE — 36415 COLL VENOUS BLD VENIPUNCTURE: CPT

## 2023-07-14 PROCEDURE — 99207 PR NO CHARGE NURSE ONLY: CPT

## 2023-07-14 RX ORDER — VALSARTAN 160 MG/1
160 TABLET ORAL DAILY
Qty: 90 TABLET | Refills: 3 | Status: SHIPPED | OUTPATIENT
Start: 2023-07-14 | End: 2023-07-25

## 2023-07-14 NOTE — TELEPHONE ENCOUNTER
Okay to continue on valsartan at the lower dose.  Recommend appointment in the next 1-2 weeks for discussion of high blood pressure and dizziness

## 2023-07-14 NOTE — PROGRESS NOTES
Francois Lujan is a 72 year old year old patient who comes in today for a Blood Pressure check because of medication change.  Vital Signs as repeated by RN   OV 6/23: increased Valsartan from 160mg to 320mg daily.  Pt states he took the increased dose for 5-6 days & had to stop due to dizziness. Pt went back to taking 160mg daily.   States dizziness started almost right away. No dizziness at rest. Dizziness when going from sitting to standing or when bending over. Happened almost every time.  *pt had labs today    /70 p74        152/64 p74    Patient is not taking medication as prescribed  Patient is not tolerating medications well.  Patient is monitoring Blood Pressure at home. has not checked lately, machine not working. Needs to buy new machine  Current complaints: none-had dizziness on increased dose of Valsartan  Disposition:  routed to provider for review.  Preferred pharmacy: [facility], [place]    Jennifer Martin RN

## 2023-07-14 NOTE — TELEPHONE ENCOUNTER
Francois Odonnell BERNADETTE Lujan is a 72 year old year old patient who comes in today for a Blood Pressure check because of medication change.  Vital Signs as repeated by RN   OV 6/23: increased Valsartan from 160mg to 320mg daily.  Pt states he took the increased dose for 5-6 days & had to stop due to dizziness. Pt went back to taking 160mg daily.   States dizziness started almost right away. No dizziness at rest. Dizziness when going from sitting to standing or when bending over. Happened almost every time.  *pt had labs today     *per OV notes: pt to see provider if dizziness. Ok to schedule same day appt?    /70 p74        152/64 p74     Patient is not taking medication as prescribed  Patient is not tolerating medications well.  Patient is monitoring Blood Pressure at home. has not checked lately, machine not working. Needs to buy new machine  Current complaints: none-had dizziness on increased dose of Valsartan  Disposition:  routed to provider for review.  Preferred pharmacy: [facility], [place]     Jennifer Martin RN

## 2023-07-18 ENCOUNTER — VIRTUAL VISIT (OUTPATIENT)
Dept: ENDOCRINOLOGY | Facility: CLINIC | Age: 73
End: 2023-07-18
Payer: COMMERCIAL

## 2023-07-18 VITALS — BODY MASS INDEX: 36.57 KG/M2 | HEIGHT: 72 IN | WEIGHT: 270 LBS

## 2023-07-18 DIAGNOSIS — E29.1 PRIMARY TESTICULAR HYPOGONADISM: ICD-10-CM

## 2023-07-18 PROCEDURE — 99213 OFFICE O/P EST LOW 20 MIN: CPT | Mod: 93 | Performed by: INTERNAL MEDICINE

## 2023-07-18 RX ORDER — TESTOSTERONE GEL, 1% 10 MG/G
GEL TRANSDERMAL
Qty: 250 G | Refills: 5 | Status: SHIPPED | OUTPATIENT
Start: 2023-07-18 | End: 2024-02-05

## 2023-07-18 ASSESSMENT — PAIN SCALES - GENERAL: PAINLEVEL: NO PAIN (0)

## 2023-07-18 NOTE — LETTER
7/18/2023         RE: Francois Lujan  [address]  [place] [address]        Dear Colleague,    Thank you for referring your patient, Francois Lujan, to the [facility] ENDOCRINOLOGY. Please see a copy of my visit note below.    Francois Lujan is a 72 year old male who is being evaluated via a billable telephone visit.     What phone number would you like to be contacted at? [phone]  How would you like to obtain your AVS? 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  Pt seeing PCP in a few weeks for followup so will defer to them  -dexa shows no osteoporosis    Plan:  -reduce average testim dose to 75mg by applying alternating doses of 75 and 100mg  -recheck testosterone labs in 2 months.  Will instruct pt to decrease down to 50mg if total T comes back still >600  -followup 4 months with another lab recheck 1-2 weeks ahead of visit    I have independently reviewed and interpreted labs, imaging as indicated.    RTC 4 months    Chief complaint:  Francois is a 72 year old male seen in consultation for hypogonadism       HISTORY OF PRESENT ILLNESS    Last HPI:  Francois is a 72M with pmh of mumps as a child, hypogonadism who is referred to endocrine for management of hypogonadism.     Has been taking testosterone for 30+ years.  He had previously been on injection versions but is currently on testim gel  50mg once daily.  He applies this to his abdomen.      He also has hx of CVA with some residual unsteadiness in gait and nerve issues in R hand but otherwise doing well.      Of note his testosterone appears to have gotten quite low, down to 56 for total testosterone in September but he denies being overly symptomatic other than some fatigue.      No known family hx of prostate cancer.     Presents today via telephone for followup. Since last visit he started applying testim only on shoulders and increased dose to 100mg.  Reports noticeable improvement in energy level.  Wife has noted some increased agitation at times per pt, otherwise no side effects.  Testosterone level was low 700s but was not taken right after applying gel as he has been doing that at night.     Briefly discussed a1c going up - seeing PCP in 2 weeks for more followup on this. Had a lot of stressors and was ill recently so could be transient.   Endocrine relevant labs and/or imaging are as follows:  Component      Latest Ref Rng & Units 9/16/2015 4/10/2018 5/15/2019 9/16/2022   Testosterone Total      240 - 950 ng/dL 260 208 (L) 125 (L) 56 (L)   Sex Hormone Binding Globulin      11 - 80 nmol/L 25 42 45 75   Free Testosterone Calculated      ng/dL 5.88 3.40 (L) 1.90 (L) 0.58         REVIEW OF SYSTEMS    10 system ROS otherwise as per the HPI or negative    Past Medical History  Past Medical History:   Diagnosis Date     Arthritis      CAD (coronary artery disease)      Diabetes mellitus (H)      Erythema nodosum 1982     Gout      Hypertension      Malignant neoplasm (H)     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 100 each 12     blood glucose (NO BRAND SPECIFIED) lancets standard Use to test blood sugar 3 times daily or as directed. 1 each 11     clopidogrel (PLAVIX) 75 MG tablet Take 1 tablet (75 mg) by mouth daily 90 tablet 3     empagliflozin (JARDIANCE) 10 MG TABS tablet Take 1 tablet (10 mg) by mouth daily 90 tablet 3     glipiZIDE (GLUCOTROL XL) 5 MG 24 hr tablet Take 1 tablet (5 mg) by mouth daily 90 tablet 3     levothyroxine (SYNTHROID/LEVOTHROID) 50 MCG tablet Take 1 tablet (50 mcg) by mouth every morning Take on an empty stomach. 90 tablet 3     metFORMIN (GLUCOPHAGE) 500 MG tablet Take 2 tablets (1,000 mg) by mouth 2 times daily (with meals) 360 tablet 3     metoprolol tartrate (LOPRESSOR) 25 MG tablet Take 1 tablet (25 mg) by mouth 2 times daily 180 tablet 3     testosterone (ANDROGEL/TESTIM) 50 MG/5GM (1%) topical gel Place 2 packets (100 mg of testosterone) onto the skin daily Apply to SHOULDERS 300 g 5     valsartan (DIOVAN) 160 MG tablet Take 1 tablet (160 mg) by mouth daily 90 tablet 3       Allergies  Allergies   Allergen Reactions     Hydrocodone-Acetaminophen Itching     Iodine      Irbesartan      renal insuff and hyperkalemia       Lisinopril Nausea     Sulfa Antibiotics          Family History  family history includes C.A.D. in his brother; Cancer in his mother; Heart Disease in his father; Lung Cancer in his sister; Schizophrenia in his sister; Septicemia (age of onset: 3) in his brother.    Social History  Social History     Tobacco Use     Smoking status: Former     Packs/day: 2.00     Years: 30.00     Pack years: 60.00     Types: Cigarettes     Quit date: 1997     Years since quittin.6     Smokeless tobacco: Never   Vaping Use     Vaping Use: Never used   Substance Use Topics     Alcohol use: Yes     Alcohol/week: 10.0 standard drinks of  alcohol     Types: 12 Cans of beer per week     Comment: on the weekends about 5 drinks a weekend      Drug use: No     Duration of phone call - 12 min discussing testosterone dosing, symptoms, monitoring plans.       Again, thank you for allowing me to participate in the care of your patient.        Sincerely,        Andrew Tian MD

## 2023-07-18 NOTE — PROGRESS NOTES
Francois Lujan is a 72 year old male who is being evaluated via a billable telephone visit.     What phone number would you like to be contacted at? [phone]  How would you like to obtain your AVS? 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  Pt seeing PCP in a few weeks for followup so will defer to them  -dexa shows no osteoporosis    Plan:  -reduce average testim dose to 75mg by applying alternating doses of 75 and 100mg  -recheck testosterone labs in 2 months.  Will instruct pt to decrease down to 50mg if total T comes back still >600  -followup 4 months with another lab recheck 1-2 weeks ahead of visit    I have independently reviewed and interpreted labs, imaging as indicated.    RTC 4 months    Chief complaint:  Francois is a 72 year old male seen in consultation for hypogonadism       HISTORY OF PRESENT ILLNESS    Last HPI:  Francois is a 72M with pmh of mumps as a child, hypogonadism who is referred to endocrine for management of hypogonadism.     Has been taking testosterone for 30+ years.  He had previously been on injection versions but is currently on testim gel 50mg once daily.  He applies this to his abdomen.      He also has hx of CVA with some residual unsteadiness in gait and nerve issues in R hand but otherwise doing well.      Of note his testosterone appears to have gotten quite low, down to 56 for total  testosterone in September but he denies being overly symptomatic other than some fatigue.      No known family hx of prostate cancer.     Presents today via telephone for followup. Since last visit he started applying testim only on shoulders and increased dose to 100mg.  Reports noticeable improvement in energy level.  Wife has noted some increased agitation at times per pt, otherwise no side effects.  Testosterone level was low 700s but was not taken right after applying gel as he has been doing that at night.     Briefly discussed a1c going up - seeing PCP in 2 weeks for more followup on this. Had a lot of stressors and was ill recently so could be transient.   Endocrine relevant labs and/or imaging are as follows:  Component      Latest Ref Rng & Units 9/16/2015 4/10/2018 5/15/2019 9/16/2022   Testosterone Total      240 - 950 ng/dL 260 208 (L) 125 (L) 56 (L)   Sex Hormone Binding Globulin      11 - 80 nmol/L 25 42 45 75   Free Testosterone Calculated      ng/dL 5.88 3.40 (L) 1.90 (L) 0.58         REVIEW OF SYSTEMS    10 system ROS otherwise as per the HPI or negative    Past Medical History  Past Medical History:   Diagnosis Date     Arthritis      CAD (coronary artery disease)      Diabetes mellitus (H)      Erythema nodosum 1982     Gout      Hypertension      Malignant neoplasm (H)     squamous cell CA removed from right hand     Thyroid disease        Medications  Current Outpatient Medications   Medication Sig Dispense Refill     allopurinol (ZYLOPRIM) 300 MG tablet Take 1 tablet (300 mg) by mouth daily 90 tablet 3     amLODIPine (NORVASC) 10 MG tablet Take 1 tablet (10 mg) by mouth daily 90 tablet 3     atorvastatin (LIPITOR) 80 MG tablet Take 1 tablet (80 mg) by mouth daily DUE FOR LAB 90 tablet 3     blood glucose (ACCU-CHEK SMARTVIEW) test strip Use to test blood sugar 3 times daily or as directed. 100 each 12     blood glucose (NO BRAND SPECIFIED) lancets standard Use to test blood sugar 3 times daily  or as directed. 1 each 11     clopidogrel (PLAVIX) 75 MG tablet Take 1 tablet (75 mg) by mouth daily 90 tablet 3     empagliflozin (JARDIANCE) 10 MG TABS tablet Take 1 tablet (10 mg) by mouth daily 90 tablet 3     glipiZIDE (GLUCOTROL XL) 5 MG 24 hr tablet Take 1 tablet (5 mg) by mouth daily 90 tablet 3     levothyroxine (SYNTHROID/LEVOTHROID) 50 MCG tablet Take 1 tablet (50 mcg) by mouth every morning Take on an empty stomach. 90 tablet 3     metFORMIN (GLUCOPHAGE) 500 MG tablet Take 2 tablets (1,000 mg) by mouth 2 times daily (with meals) 360 tablet 3     metoprolol tartrate (LOPRESSOR) 25 MG tablet Take 1 tablet (25 mg) by mouth 2 times daily 180 tablet 3     testosterone (ANDROGEL/TESTIM) 50 MG/5GM (1%) topical gel Place 2 packets (100 mg of testosterone) onto the skin daily Apply to SHOULDERS 300 g 5     valsartan (DIOVAN) 160 MG tablet Take 1 tablet (160 mg) by mouth daily 90 tablet 3       Allergies  Allergies   Allergen Reactions     Hydrocodone-Acetaminophen Itching     Iodine      Irbesartan      renal insuff and hyperkalemia       Lisinopril Nausea     Sulfa Antibiotics          Family History  family history includes C.A.D. in his brother; Cancer in his mother; Heart Disease in his father; Lung Cancer in his sister; Schizophrenia in his sister; Septicemia (age of onset: 3) in his brother.    Social History  Social History     Tobacco Use     Smoking status: Former     Packs/day: 2.00     Years: 30.00     Pack years: 60.00     Types: Cigarettes     Quit date: 1997     Years since quittin.6     Smokeless tobacco: Never   Vaping Use     Vaping Use: Never used   Substance Use Topics     Alcohol use: Yes     Alcohol/week: 10.0 standard drinks of alcohol     Types: 12 Cans of beer per week     Comment: on the weekends about 5 drinks a weekend      Drug use: No     Duration of phone call - 12 min discussing testosterone dosing, symptoms, monitoring plans.

## 2023-07-18 NOTE — PATIENT INSTRUCTIONS
Decrease testosterone dose by altering taking 100mg one day and then 50mg the next day.     Come in for repeat labs in 2 months.     Followup in 4 months with repeat labs again 1-2 weeks before visit.

## 2023-07-24 DIAGNOSIS — E78.5 HYPERLIPIDEMIA LDL GOAL <70: ICD-10-CM

## 2023-07-24 DIAGNOSIS — E11.29 TYPE 2 DIABETES MELLITUS WITH MICROALBUMINURIA, WITHOUT LONG-TERM CURRENT USE OF INSULIN (H): ICD-10-CM

## 2023-07-24 DIAGNOSIS — R80.9 TYPE 2 DIABETES MELLITUS WITH MICROALBUMINURIA, WITHOUT LONG-TERM CURRENT USE OF INSULIN (H): ICD-10-CM

## 2023-07-25 ENCOUNTER — OFFICE VISIT (OUTPATIENT)
Dept: FAMILY MEDICINE | Facility: CLINIC | Age: 73
End: 2023-07-25
Payer: COMMERCIAL

## 2023-07-25 VITALS
HEIGHT: 72 IN | OXYGEN SATURATION: 97 % | DIASTOLIC BLOOD PRESSURE: 60 MMHG | HEART RATE: 95 BPM | SYSTOLIC BLOOD PRESSURE: 134 MMHG | BODY MASS INDEX: 36.04 KG/M2 | TEMPERATURE: 97.1 F | RESPIRATION RATE: 20 BRPM | WEIGHT: 266.1 LBS

## 2023-07-25 DIAGNOSIS — I10 ESSENTIAL HYPERTENSION: Primary | ICD-10-CM

## 2023-07-25 DIAGNOSIS — R80.9 TYPE 2 DIABETES MELLITUS WITH MICROALBUMINURIA, WITHOUT LONG-TERM CURRENT USE OF INSULIN (H): ICD-10-CM

## 2023-07-25 DIAGNOSIS — E11.29 TYPE 2 DIABETES MELLITUS WITH MICROALBUMINURIA, WITHOUT LONG-TERM CURRENT USE OF INSULIN (H): ICD-10-CM

## 2023-07-25 DIAGNOSIS — E11.42 DIABETIC POLYNEUROPATHY ASSOCIATED WITH TYPE 2 DIABETES MELLITUS (H): ICD-10-CM

## 2023-07-25 DIAGNOSIS — N18.2 CKD (CHRONIC KIDNEY DISEASE) STAGE 2, GFR 60-89 ML/MIN: ICD-10-CM

## 2023-07-25 PROCEDURE — 99214 OFFICE O/P EST MOD 30 MIN: CPT | Performed by: INTERNAL MEDICINE

## 2023-07-25 RX ORDER — VALSARTAN 320 MG/1
320 TABLET ORAL AT BEDTIME
Qty: 90 TABLET | Refills: 3 | Status: SHIPPED | OUTPATIENT
Start: 2023-07-25 | End: 2023-09-07 | Stop reason: SINTOL

## 2023-07-25 RX ORDER — ATORVASTATIN CALCIUM 80 MG/1
80 TABLET, FILM COATED ORAL DAILY
Qty: 90 TABLET | Refills: 3 | Status: SHIPPED | OUTPATIENT
Start: 2023-07-25 | End: 2024-08-28

## 2023-07-25 ASSESSMENT — PAIN SCALES - GENERAL: PAINLEVEL: MILD PAIN (3)

## 2023-07-25 NOTE — TELEPHONE ENCOUNTER
"Has appointment scheduled for this morning      Requested Prescriptions   Pending Prescriptions Disp Refills    atorvastatin (LIPITOR) 80 MG tablet 90 tablet 3     Sig: Take 1 tablet (80 mg) by mouth daily DUE FOR LAB       Statins Protocol Passed - 7/24/2023  9:28 AM        Passed - LDL on file in past 12 months     Recent Labs   Lab Test 09/16/22  0939   LDL 79             Passed - No abnormal creatine kinase in past 12 months     No lab results found.             Passed - Recent (12 mo) or future (30 days) visit within the authorizing provider's specialty     Patient has had an office visit with the authorizing provider or a provider within the authorizing providers department within the previous 12 mos or has a future within next 30 days. See \"Patient Info\" tab in inbasket, or \"Choose Columns\" in Meds & Orders section of the refill encounter.              Passed - Medication is active on med list        Passed - Patient is age 18 or older          metFORMIN (GLUCOPHAGE) 500 MG tablet 360 tablet 3     Sig: Take 2 tablets (1,000 mg) by mouth 2 times daily (with meals)       Biguanide Agents Passed - 7/24/2023  9:28 AM        Passed - Patient is age 10 or older        Passed - Patient has documented A1c within the specified period of time.     If HgbA1C is 8 or greater, it needs to be on file within the past 3 months.  If less than 8, must be on file within the past 6 months.     Recent Labs   Lab Test 06/23/23  0946   A1C 10.0*             Passed - Patient's CR is NOT>1.4 OR Patient's EGFR is NOT<45 within past 12 mos.     Recent Labs   Lab Test 08/25/22  0455 04/21/22  1045 07/08/21  0937   GFRESTIMATED 69   < > 83   GFRESTBLACK  --   --  >90    < > = values in this interval not displayed.       Recent Labs   Lab Test 08/25/22  0455   CR 1.13             Passed - Patient does NOT have a diagnosis of CHF.        Passed - Medication is active on med list        Passed - Recent (6 mo) or future (30 days) visit " "within the authorizing provider's specialty     Patient had office visit in the last 6 months or has a visit in the next 30 days with authorizing provider or within the authorizing provider's specialty.  See \"Patient Info\" tab in inbasket, or \"Choose Columns\" in Meds & Orders section of the refill encounter.                 "

## 2023-07-25 NOTE — PATIENT INSTRUCTIONS
Hypertension  1. Continue valsartan 320 mg at bedtime;  if dizziness gets much worse, let me know, we can decrease back to 160    Diabetes  1.  Continue current medications - follow-up with me end of Sept/early Oct- get lab prior to visit  2. Next option would be to stop Glipizide and start once daily Lantus

## 2023-07-25 NOTE — PROGRESS NOTES
Assessment & Plan     Essential hypertension -improved with higher dose of the valsartan.  Moving the valsartan to bedtime helped decrease the dizziness.  Continue medication  - valsartan (DIOVAN) 320 MG tablet; Take 1 tablet (320 mg) by mouth At Bedtime    Type 2 diabetes mellitus with microalbuminuria, without long-term current use of insulin (H)-improving control.  Follow-up with me in 2 months for next A1c check.  If remains uncontrolled, would add basal insulin and stop sulfonylurea  - Hemoglobin A1c; Future    CKD (chronic kidney disease) stage 2, GFR 60-89 ml/min -microalbuminuria has improved.  He has declined renal referral  - BASIC METABOLIC PANEL; Future    Diabetic polyneuropathy associated with type 2 diabetes mellitus (H)  - Known issue that I take into account for their medical decisions, no current exacerbations or new concerns      Patient Instructions   Hypertension  1. Continue valsartan 320 mg at bedtime;  if dizziness gets much worse, let me know, we can decrease back to 160    Diabetes  1.  Continue current medications - follow-up with me end of Sept/early Oct- get lab prior to visit  2. Next option would be to stop Glipizide and start once daily Lantus    [facility]    Juan F Parrish is a 72 year old, presenting for the following health issues:  Hypertension (valsartan (DIOVAN) 160 MG tablet is taking at night time was getting dizzy during the day time when taking this total mg is 360 ), Diabetes, Lipids, and Health Maintenance (Dont  want shots or AWV )        7/25/2023     7:07 AM   Additional Questions   Roomed by poncho gorman   Accompanied by self         7/25/2023     7:07 AM   Patient Reported Additional Medications   Patient reports taking the following new medications none     HPI     Chief Complaint   Patient presents with    Hypertension     valsartan (DIOVAN) 160 MG tablet is taking at night time was getting dizzy during the day  time when taking this total mg is 360     Diabetes    Lipids    Health Maintenance     Dont  want shots or AWV        Diabetes Follow-up    How often are you checking your blood sugar? One time daily - 120-140s - improving  What time of day are you checking your blood sugars (select all that apply)?   AM  Have you had any blood sugars above 200?  No  Have you had any blood sugars below 70?  No  What symptoms do you notice when your blood sugar is low?  Shaky, Dizzy, Weak, Lethargy, Blurred vision, and Confusion  What concerns do you have today about your diabetes? None   Do you have any of these symptoms? (Select all that apply)  Numbness in feet and Burning in feet  Have you had a diabetic eye exam in the last 12 months? No  Last visit in June I added jardiance for renal protection and diabetes. It was expensive at $41/mo;  he reports this is reasonable for him  Endo recommended basal insulin at his hypogonad visit 7/18  He is having urinary frequency on the jardiance  He has cut out refined sugar            Hyperlipidemia Follow-Up    Are you regularly taking any medication or supplement to lower your cholesterol?   Yes- PM  Are you having muscle aches or other side effects that you think could be caused by your cholesterol lowering medication?  No    Hypertension Follow-up    Do you check your blood pressure regularly outside of the clinic? No   Are you following a low salt diet? Yes  Are your blood pressures ever more than 140 on the top number (systolic) OR more than 90 on the bottom number (diastolic), for example 140/90? No  At our last visit 6/23, I increased valsartan from 160 to 320 mg.  He got dizzy w/the higher dose.  RN blood pressure visit 7/14  Pt states he took the increased dose for 5-6 days & had to stop due to dizziness. Pt went back to taking 160mg daily.   States dizziness started almost right away. No dizziness at rest. Dizziness when going from sitting to standing or when bending over.  Happened almost every time.  *pt had labs today     /70 p74        152/64 p74    --after this RN blood pressure visit, he switched the valsartan to HS at 320 mg;  he stil has a bit of dizziness but not as bad as when he was taking it during the day  --he is not checking blood pressure at home because his machine is not working    BP Readings from Last 2 Encounters:   07/25/23 134/60   07/14/23 (!) 152/94     Hemoglobin A1C (%)   Date Value   06/23/2023 10.0 (H)   09/16/2022 7.1 (H)   07/08/2021 6.9 (H)   04/08/2021 8.2 (H)     LDL Cholesterol Calculated (mg/dL)   Date Value   09/16/2022 79   07/08/2021 76   08/03/2020 93     How many servings of fruits and vegetables do you eat daily?  4 or more  On average, how many sweetened beverages do you drink each day (Examples: soda, juice, sweet tea, etc.  Do NOT count diet or artificially sweetened beverages)?   0  How many days per week do you exercise enough to make your heart beat faster? 7  How many minutes a day do you exercise enough to make your heart beat faster? 60 or more  How many days per week do you miss taking your medication? 0        Current Outpatient Medications   Medication Sig Dispense Refill    allopurinol (ZYLOPRIM) 300 MG tablet Take 1 tablet (300 mg) by mouth daily 90 tablet 3    amLODIPine (NORVASC) 10 MG tablet Take 1 tablet (10 mg) by mouth daily 90 tablet 3    atorvastatin (LIPITOR) 80 MG tablet Take 1 tablet (80 mg) by mouth daily DUE FOR LAB 90 tablet 3    blood glucose (ACCU-CHEK SMARTVIEW) test strip Use to test blood sugar 3 times daily or as directed. 100 each 12    blood glucose (NO BRAND SPECIFIED) lancets standard Use to test blood sugar 3 times daily or as directed. 1 each 11    clopidogrel (PLAVIX) 75 MG tablet Take 1 tablet (75 mg) by mouth daily 90 tablet 3    empagliflozin (JARDIANCE) 10 MG TABS tablet Take 1 tablet (10 mg) by mouth daily 90 tablet 3    glipiZIDE (GLUCOTROL XL) 5 MG 24 hr tablet Take 1 tablet (5 mg) by mouth  "daily 90 tablet 3    levothyroxine (SYNTHROID/LEVOTHROID) 50 MCG tablet Take 1 tablet (50 mcg) by mouth every morning Take on an empty stomach. 90 tablet 3    metFORMIN (GLUCOPHAGE) 500 MG tablet Take 2 tablets (1,000 mg) by mouth 2 times daily (with meals) 360 tablet 3    metoprolol tartrate (LOPRESSOR) 25 MG tablet Take 1 tablet (25 mg) by mouth 2 times daily 180 tablet 3    testosterone (ANDROGEL/TESTIM) 50 MG/5GM (1%) topical gel Apply to SHOULDERS.  Alternate dosing - 100mg (2 packets) one day then 50mg (1 packet) the next day 250 g 5    valsartan (DIOVAN) 160 MG tablet Take 1 tablet (160 mg) by mouth daily (Patient taking differently: Take 320 mg by mouth At Bedtime) 90 tablet 3           Review of Systems   Constitutional, HEENT, cardiovascular, pulmonary, gi and gu systems are negative, except as otherwise noted.      Objective    /60 (BP Location: Right arm, Patient Position: Sitting, Cuff Size: Adult Regular)   Pulse 95   Temp 97.1  F (36.2  C) (Tympanic)   Resp 20   Ht 1.83 m (6' 0.05\")   Wt 120.7 kg (266 lb 1.6 oz)   SpO2 97%   BMI 36.04 kg/m    Body mass index is 36.04 kg/m .  Physical Exam   GENERAL APPEARANCE: healthy, alert, and no distress                      "

## 2023-08-02 ENCOUNTER — HOSPITAL ENCOUNTER (EMERGENCY)
Facility: CLINIC | Age: 73
Discharge: HOME | End: 2023-08-02
Payer: COMMERCIAL

## 2023-08-02 VITALS
TEMPERATURE: 97.6 F | OXYGEN SATURATION: 97 % | HEART RATE: 79 BPM | DIASTOLIC BLOOD PRESSURE: 67 MMHG | SYSTOLIC BLOOD PRESSURE: 160 MMHG | RESPIRATION RATE: 18 BRPM

## 2023-08-02 DIAGNOSIS — L97.412 DIABETIC ULCER OF RIGHT MIDFOOT ASSOCIATED WITH TYPE 2 DIABETES MELLITUS, WITH FAT LAYER EXPOSED (H): ICD-10-CM

## 2023-08-02 DIAGNOSIS — E11.621 DIABETIC ULCER OF RIGHT MIDFOOT ASSOCIATED WITH TYPE 2 DIABETES MELLITUS, WITH FAT LAYER EXPOSED (H): ICD-10-CM

## 2023-08-02 PROCEDURE — 99214 OFFICE O/P EST MOD 30 MIN: CPT

## 2023-08-02 PROCEDURE — G0463 HOSPITAL OUTPT CLINIC VISIT: HCPCS

## 2023-08-02 RX ORDER — CEPHALEXIN 500 MG/1
500 CAPSULE ORAL 4 TIMES DAILY
Qty: 40 CAPSULE | Refills: 0 | Status: SHIPPED | OUTPATIENT
Start: 2023-08-02 | End: 2023-08-12

## 2023-08-02 ASSESSMENT — ACTIVITIES OF DAILY LIVING (ADL): ADLS_ACUITY_SCORE: 37

## 2023-08-02 ASSESSMENT — ENCOUNTER SYMPTOMS: WOUND: 1

## 2023-08-02 NOTE — DISCHARGE INSTRUCTIONS
Keflex as prescribed for 10 days. Referral for wound care was placed today. Return sooner for new or worsening concerns.

## 2023-08-02 NOTE — ED PROVIDER NOTES
History     Chief Complaint   Patient presents with    Sore     X 1 week at least unnoticed. On right bottom of foot. Patient reports he has diabetes.      HPI  Francois Lujan is a 72 year old male who presents urgent care for concern of foot wound.  Patient states he noted a wound on the bottom of his right foot today.  Patient has known neuropathy and has a significant history of type 2 diabetes and is currently managed with oral medications at this time.  Patient denies any pain at the site of the wound.  States he has noted some mild redness surrounding the wound.  He has not had any difficulty with walking.  He is unsure how long the wound has been there.  He has not had any fevers or other infectious symptoms.  Denies any history of prior diabetic wounds in the past.  No other new concerns at this time.    Allergies:  Allergies   Allergen Reactions    Hydrocodone-Acetaminophen Itching    Iodine     Irbesartan      renal insuff and hyperkalemia      Lisinopril Nausea    Sulfa Antibiotics        Problem List:    Patient Active Problem List    Diagnosis Date Noted    Personal history of tobacco use, presenting hazards to health 08/23/2022     Priority: Medium    Moderate major depression (H) 01/06/2022     Priority: Medium    Chronic idiopathic gout of multiple sites 04/08/2021     Priority: Medium     Previously on allopurinol      Carotid stenosis, right 08/11/2020     Priority: Medium    Acute ischemic VBA thalamic stroke, left (H) 08/02/2020     Priority: Medium     8/2020.  Right face numbness and slight facial droop      Obesity (BMI 35.0-39.9) with comorbidity (H) 11/20/2018     Priority: Medium    Benign neoplasm of colon 11/19/2018     Priority: Medium    Degeneration of lumbar or lumbosacral intervertebral disc 11/19/2018     Priority: Medium    Vitamin D deficiency 11/19/2018     Priority: Medium    Epistaxis 12/15/2017     Priority: Medium    Triceps tendon rupture, left, sequela 04/10/2017      Priority: Medium    Coronary artery disease involving native coronary artery of native heart without angina pectoris 03/03/2017     Priority: Medium    Alcohol dependence with other alcohol-induced disorder (H) 08/30/2016     Priority: Medium     Since 8/2020, drinking 1 day per week, 2-3 drinks.      Diabetic polyneuropathy associated with type 2 diabetes mellitus (H) 04/12/2016     Priority: Medium    CKD (chronic kidney disease) stage 2, GFR 60-89 ml/min 10/23/2015     Priority: Medium    Type 2 diabetes mellitus with microalbuminuria, without long-term current use of insulin (H) 10/23/2015     Priority: Medium     On valsartan. Referred to Nephrology 7/21.      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     Priority: Medium    OA (osteoarthritis) of knee, right 04/29/2014     Priority: Medium    ACL (anterior cruciate ligament) tear 04/29/2014     Priority: Medium     posttraumatic arthrosis lateral compartment      Multiple joint pain 04/23/2014     Priority: Medium    History of back surgery 03/03/2014     Priority: Medium    Polymyalgia rheumatica (H) 03/03/2014     Priority: Medium    Moderate major depression (H) 02/11/2014     Priority: Medium    Radicular pain of lumbosacral region 12/31/2013     Priority: Medium    Numbness of hand, right 12/12/2013     Priority: Medium    Essential hypertension 09/17/2013     Priority: Medium    ADELA (obstructive sleep apnea) 07/11/2013     Priority: Medium     Severe ADELA (7/10/2013 with AHI ~70, sherin desat 86%, insufficient time for CPAP titration)  Does not tolerate CPAP       Hyperlipidemia LDL goal <70 05/28/2013     Priority: Medium    S/P CABG (coronary artery bypass graft) 11/29/2012     Priority: Medium     11/17/11  PREOPERATIVE DIAGNOSIS:  Severe 3-vessel coronary artery disease with unstable angina.        POSTOPERATIVE DIAGNOSIS:  Severe 3-vessel coronary artery disease with unstable angina.        PROCEDURES:    1.  Coronary artery bypass grafting x3 with placement  of left internal mammary artery to the left anterior descending artery, saphenous vein graft from the aorta to the third obtuse marginal branch of the circumflex coronary artery and saphenous vein graft from aorta to the distal right coronary artery.    2.  Placement of temporary ventricular pacing wires.    3.  Endoscopic vein harvest from the left leg.       Hypogonadotropic hypogonadism (H) 11/29/2012     Priority: Medium    Acquired hypothyroidism 11/29/2012     Priority: Medium    Hard of hearing 11/29/2012     Priority: Medium        Past Medical History:    Past Medical History:   Diagnosis Date    Arthritis     CAD (coronary artery disease)     Diabetes mellitus (H)     Erythema nodosum 1982    Gout     Hypertension     Malignant neoplasm (H)     Thyroid disease        Past Surgical History:    Past Surgical History:   Procedure Laterality Date    BACK SURGERY      BYPASS GRAFT ARTERY CORONARY  11/17/2011    Procedure:BYPASS GRAFT ARTERY CORONARY; CORONARY ARTERY BYPASS, Right, OM, LAD WITH ENDOVEIN HARVEST, ON PUMP; Surgeon:LEONEL MG; Location:[facility]    COLONOSCOPY N/A 6/9/2016    Procedure: COLONOSCOPY;  Surgeon: Isidro Humphreys MD;  Location: [facility]    HERNIA REPAIR      infantile hernia repair    ORTHOPEDIC SURGERY      Baker cyst removed - right knee, and mesiscus tear repair    DE LAMINEC/FACETECT/FORAMIN,LUMBAR 1 SEG      Description: Laminectomy Decompress, Facetectomy, Foraminotomy Lumbar Seg;  Recorded: 12/13/2007;  Comments: '71    DE MASTECTOMY FOR GYNECOMASTIA      Description: Breast Surgery Mastectomy For Gynecomastia Bilateral;  Recorded: 12/13/2007;    REPAIR TENDON TRICEPS UPPER EXTREMITY Left 4/11/2017    Procedure: REPAIR TENDON TRICEPS UPPER EXTREMITY;  Surgeon: Rodriguez Kelley MD;  Location: [facility] CABG, VEIN, SINGLE      Description: CABG (CABG);  Proc Date: 11/09/2011;  Comments: 3 vessel       Family History:    Family History   Problem Relation Age of Onset    Heart  Disease Father     Cancer Mother     C.A.D. Brother     Septicemia Brother 3    Lung Cancer Sister     Schizophrenia Sister        Social History:  Marital Status:   [2]  Social History     Tobacco Use    Smoking status: Former     Packs/day: 2.00     Years: 30.00     Pack years: 60.00     Types: Cigarettes     Quit date: 1997     Years since quittin.7    Smokeless tobacco: Never   Vaping Use    Vaping Use: Never used   Substance Use Topics    Alcohol use: Yes     Alcohol/week: 10.0 standard drinks of alcohol     Types: 12 Cans of beer per week     Comment: on the weekends about 5 drinks a weekend     Drug use: No        Medications:    allopurinol (ZYLOPRIM) 300 MG tablet  amLODIPine (NORVASC) 10 MG tablet  atorvastatin (LIPITOR) 80 MG tablet  blood glucose (ACCU-CHEK SMARTVIEW) test strip  blood glucose (NO BRAND SPECIFIED) lancets standard  cephALEXin (KEFLEX) 500 MG capsule  clopidogrel (PLAVIX) 75 MG tablet  empagliflozin (JARDIANCE) 10 MG TABS tablet  glipiZIDE (GLUCOTROL XL) 5 MG 24 hr tablet  levothyroxine (SYNTHROID/LEVOTHROID) 50 MCG tablet  metFORMIN (GLUCOPHAGE) 500 MG tablet  metoprolol tartrate (LOPRESSOR) 25 MG tablet  testosterone (ANDROGEL/TESTIM) 50 MG/5GM (1%) topical gel  valsartan (DIOVAN) 320 MG tablet          Review of Systems   Skin:  Positive for wound.   All other systems reviewed and are negative.      Physical Exam   BP: (!) 160/67  Pulse: 79  Temp: 97.6  F (36.4  C)  Resp: 18  SpO2: 97 %      Physical Exam  Constitutional:       General: He is not in acute distress.     Appearance: He is well-developed. He is not diaphoretic.   HENT:      Head: Normocephalic and atraumatic.      Nose: Nose normal.      Mouth/Throat:      Mouth: Mucous membranes are moist.   Eyes:      General: No scleral icterus.     Conjunctiva/sclera: Conjunctivae normal.   Cardiovascular:      Rate and Rhythm: Normal rate.      Pulses:           Dorsalis pedis pulses are 1+ on the right side.    Pulmonary:      Effort: Pulmonary effort is normal.   Abdominal:      General: Abdomen is flat.   Musculoskeletal:      Cervical back: Normal range of motion and neck supple.        Feet:    Feet:      Right foot:      Skin integrity: Ulcer and erythema present.      Comments: 1 cm x 1 cm foot ulcer noted on the medial pad of the right foot just proximal to the right toe.  See image below.  There is some mild surrounding erythema noted as well.  Skin:     General: Skin is warm and dry.      Findings: No rash.   Neurological:      Mental Status: He is alert and oriented to person, place, and time.         ED Course                 Procedures           No results found for this or any previous visit (from the past 24 hour(s)).    Medications - No data to display    Assessments & Plan (with Medical Decision Making)   Patient presents to urgent care for concern of foot wound that was first noted today.  Patient is afebrile on arrival and vital signs otherwise reassuring.  Patient is noted to have some mild erythema surrounding the wound on the right foot. As such, we will cover him with Keflex for concern of infection.  Wound itself is consistent with a diabetic foot ulcer.  Patient was provided with a nonadherent dressing in the department today.  Referral for wound care was placed for further management of the foot ulcer.  Have low concern for any osteomyelitis at this time.  Recommended return for any other new or worsening symptoms including concerns for worsening infection.  These return precautions were reviewed.  Patient was discharged in good condition and is agreeable to above plan.    I have reviewed the nursing notes.    I have reviewed the findings, diagnosis, plan and need for follow up with the patient.          Discharge Medication List as of 8/2/2023  3:16 PM        START taking these medications    Details   cephALEXin (KEFLEX) 500 MG capsule Take 1 capsule (500 mg) by mouth 4 times daily for 10 days,  Disp-40 capsule, R-0, E-Prescribe             Final diagnoses:   Diabetic ulcer of right midfoot associated with type 2 diabetes mellitus, with fat layer exposed (H)       8/2/2023   [facility] EMERGENCY DEPT    Disclaimer:  This note consists of symbols derived from keyboarding, dictation and/or voice recognition software.  As a result, there may be errors in the script that have gone undetected.  Please consider this when interpreting information found in this chart.         Bulmaro Schultz APRN CNP  08/02/23 [id number]

## 2023-08-04 DIAGNOSIS — E11.29 TYPE 2 DIABETES MELLITUS WITH MICROALBUMINURIA, WITHOUT LONG-TERM CURRENT USE OF INSULIN (H): Primary | ICD-10-CM

## 2023-08-04 DIAGNOSIS — R80.9 TYPE 2 DIABETES MELLITUS WITH MICROALBUMINURIA, WITHOUT LONG-TERM CURRENT USE OF INSULIN (H): Primary | ICD-10-CM

## 2023-08-05 DIAGNOSIS — E11.8 TYPE 2 DIABETES MELLITUS WITH COMPLICATION, WITHOUT LONG-TERM CURRENT USE OF INSULIN (H): ICD-10-CM

## 2023-08-07 RX ORDER — BLOOD SUGAR DIAGNOSTIC
STRIP MISCELLANEOUS
Qty: 100 STRIP | Refills: 2 | Status: SHIPPED | OUTPATIENT
Start: 2023-08-07

## 2023-08-08 ENCOUNTER — ANCILLARY ORDERS (OUTPATIENT)
Dept: VASCULAR SURGERY | Facility: CLINIC | Age: 73
End: 2023-08-08

## 2023-08-08 ENCOUNTER — ANCILLARY PROCEDURE (OUTPATIENT)
Dept: VASCULAR ULTRASOUND | Facility: CLINIC | Age: 73
End: 2023-08-08
Attending: PODIATRIST
Payer: COMMERCIAL

## 2023-08-08 DIAGNOSIS — R80.9 TYPE 2 DIABETES MELLITUS WITH MICROALBUMINURIA, WITHOUT LONG-TERM CURRENT USE OF INSULIN (H): ICD-10-CM

## 2023-08-08 DIAGNOSIS — E11.29 TYPE 2 DIABETES MELLITUS WITH MICROALBUMINURIA, WITHOUT LONG-TERM CURRENT USE OF INSULIN (H): Primary | ICD-10-CM

## 2023-08-08 DIAGNOSIS — E11.29 TYPE 2 DIABETES MELLITUS WITH MICROALBUMINURIA, WITHOUT LONG-TERM CURRENT USE OF INSULIN (H): ICD-10-CM

## 2023-08-08 DIAGNOSIS — R80.9 TYPE 2 DIABETES MELLITUS WITH MICROALBUMINURIA, WITHOUT LONG-TERM CURRENT USE OF INSULIN (H): Primary | ICD-10-CM

## 2023-08-08 PROCEDURE — 93923 UPR/LXTR ART STDY 3+ LVLS: CPT

## 2023-08-08 PROCEDURE — 93926 LOWER EXTREMITY STUDY: CPT | Mod: RT

## 2023-08-15 ENCOUNTER — OFFICE VISIT (OUTPATIENT)
Dept: VASCULAR SURGERY | Facility: CLINIC | Age: 73
End: 2023-08-15
Payer: COMMERCIAL

## 2023-08-15 VITALS — RESPIRATION RATE: 18 BRPM | DIASTOLIC BLOOD PRESSURE: 66 MMHG | SYSTOLIC BLOOD PRESSURE: 150 MMHG

## 2023-08-15 DIAGNOSIS — L97.511 DIABETIC ULCER OF OTHER PART OF RIGHT FOOT ASSOCIATED WITH DIABETES MELLITUS DUE TO UNDERLYING CONDITION, LIMITED TO BREAKDOWN OF SKIN (H): ICD-10-CM

## 2023-08-15 DIAGNOSIS — M20.11 HALLUX ABDUCTO VALGUS, RIGHT: ICD-10-CM

## 2023-08-15 DIAGNOSIS — M20.41 HAMMERTOE OF RIGHT FOOT: ICD-10-CM

## 2023-08-15 DIAGNOSIS — M25.371 RIGHT ANKLE INSTABILITY: Primary | ICD-10-CM

## 2023-08-15 DIAGNOSIS — E08.621 DIABETIC ULCER OF OTHER PART OF RIGHT FOOT ASSOCIATED WITH DIABETES MELLITUS DUE TO UNDERLYING CONDITION, LIMITED TO BREAKDOWN OF SKIN (H): ICD-10-CM

## 2023-08-15 PROCEDURE — 99203 OFFICE O/P NEW LOW 30 MIN: CPT | Mod: 25 | Performed by: PODIATRIST

## 2023-08-15 PROCEDURE — G0463 HOSPITAL OUTPT CLINIC VISIT: HCPCS | Performed by: PODIATRIST

## 2023-08-15 PROCEDURE — 11042 DBRDMT SUBQ TIS 1ST 20SQCM/<: CPT | Performed by: PODIATRIST

## 2023-08-15 ASSESSMENT — PAIN SCALES - GENERAL: PAINLEVEL: NO PAIN (0)

## 2023-08-15 NOTE — PATIENT INSTRUCTIONS
Important lnstructions    Call to schedule a follow up with Dr. Shields [phone]    WEIGHT BEARING STATUS: You are to remain NON WEIGHT BEARING on your right foot. NON WEIGHT BEARING MEANS NO PRESSURE ON YOUR FOOT OR HEEL AT ANY TIME FOR ANY REASON!    2. OFFLOADING DEVICE: Must use a A ROLLING KNEE WALKER at all times! (do not use affected foot to push wheelchair)    3. STABILIZATION DEVICE: Use a CAM BOOT . You will need to WEAR THIS ANYTIME YOU ARE UP AND OUT OF BED, IT IS OKAY TO REMOVE WHEN YOU ARE SLEEPING..     4. ELEVATE: Elevating your leg means laying with your head on a pillow and your foot ABOVE YOUR HEART.     5. DO NOT MOVE YOUR FOOT.  There is a risk of worsening the wound or incision. To give yourself a higher chance of healing, please DO NOT swing foot back and forth and wiggle foot/toes especially when inside a stabilization device. Limited movement is allowable with therapy as recommended by the doctor.     6. TAKE A PROTEIN SHAKE TWICE A DAY.  (For ex: Boost, Ensure, Glucerna)      Dressing Change lnstructions          - Dressing Application of  Endoform for right foot wound:    1. Endoform should be cut to the size of the wound.  It should touch the edges of the ulcer. It is okay if it overlap the edges a small amount.  Then, moisten the Endoform with saline.(If it is easier for you, you can moisten it before laying it in the wound)    2. Cover the wound with Endoform, followed by dry gauze, and secure with roll gauze if needed.     3. Endoform is naturally used by the body over time so you may not find it in the wound when you change your dressing.  If you do find some, gently cleanse the wound with saline. Do not remove the remaining Endoform, which may appear as an off-white to warren gel, just add Endoform on top.  Usually, more Endoform will need to be added every 5-7 days.     4. Change your top dressing every 1-2 days or as needed depending on drainage.    -Endoform is a collagen  dressing created from ovine (sheep) fore-stomach tissue. The collagen extracellular matrix transforms into a soft conforming sheet, which is naturally incorporated into the wound over time.  Collagen dressings are used to stimulate wound healing.   ,        It IS NOT ok to get your wound wet in the bath or shower    SEEK MEDICAL CARE IF:  You have an increase in swelling, pain, or redness around the wound.  You have an increase in the amount of pus coming from the wound.  There is a bad smell coming from the wound.  The wound appears to be worsening/enlarging  You have a fever greater than 101.5 F      It is ok to continue current wound care treatment/products for the next 2-3 days until new wound care supplies are ordered and arrive. If longer than this please contact our office at [phone].        We want to hear from you!   In the next few weeks, you should receive a call or email to complete a survey about your visit at [facility]. Please help us improve your appointment experience by letting us know how we did today. We strive to make your experience good and value any ways in which we could do better.      We value your input and suggestions.    Thank you for choosing the [facility]!

## 2023-08-15 NOTE — PROGRESS NOTES
"      FOOT AND ANKLE SURGERY/PODIATRY CONSULT NOTE        ASSESSMENT:   Diabetic Ulceration right foot  HAV  Hammertoe        TREATMENT:  ***    -After discussion of risk factors nursing staff removed dressing, cleansed wound and consent obtained 2% Lidocaine HCL jelly was applied, under clean conditions, the *** ulceration(s) were debrided using .{vas debridement tissue:879466}.  Devitalized and nonviable tissue, along with any fibrin and slough, was removed to improve granulation tissue formation, stimulate wound healing, decrease overall bacteria load, disrupt biofilm formation and decrease edge senescence. Wound drainage was {Drainage Amt:255447} {DRAINAGE:522154}. Total excisional debridement was *** sq cm {VAS debridement tissue:188875:o} with a depth of *** cm.   Ulcers were improved afterwards and .  Measures were as noted on the flow sheet. {Blank multiple:19196::\"A gauze dressing\",\"Foam with a gauze dressing\",\"Collagen with a gauze dresssing\",\"Medi-honey with a gauze dressing\"} was applied. {Blank multiple:19196::\"He\",\"She\"} will continue to apply {Blank multiple:19196::\"a gauze dressing\",\"Foam with a gauze dressing\",\"Collagen with a gauze dresssing\",\"Medi-honey with a gauze dressing\"} {Blank multiple:19196::\"qday\",\"bid\"}.    -*** will follow-up in ***    Elijah Oliva DPM  [facility] [place]      HPI: Francois Lujan was seen today at the request of  ***.      Past Medical History:   Diagnosis Date    Arthritis     CAD (coronary artery disease)     Diabetes mellitus (H)     Erythema nodosum 1982    Gout     Hypertension     Malignant neoplasm (H)     squamous cell CA removed from right hand    Thyroid disease        Past Surgical History:   Procedure Laterality Date    BACK SURGERY      BYPASS GRAFT ARTERY CORONARY  11/17/2011    Procedure:BYPASS GRAFT ARTERY CORONARY; CORONARY ARTERY BYPASS, Right, OM, LAD WITH ENDOVEIN HARVEST, ON PUMP; Surgeon:LEONEL MG; Location: OR "    COLONOSCOPY N/A 6/9/2016    Procedure: COLONOSCOPY;  Surgeon: Isidro Humphreys MD;  Location: [facility]    HERNIA REPAIR      infantile hernia repair    ORTHOPEDIC SURGERY      Baker cyst removed - right knee, and mesiscus tear repair    [place] LAMINEC/FACETECT/FORAMIN,LUMBAR 1 SEG      Description: Laminectomy Decompress, Facetectomy, Foraminotomy Lumbar Seg;  Recorded: 12/13/2007;  Comments: '71    [place] MASTECTOMY FOR GYNECOMASTIA      Description: Breast Surgery Mastectomy For Gynecomastia Bilateral;  Recorded: 12/13/2007;    REPAIR TENDON TRICEPS UPPER EXTREMITY Left 4/11/2017    Procedure: REPAIR TENDON TRICEPS UPPER EXTREMITY;  Surgeon: Rodriguez Kelley MD;  Location: [facility]    [email or web address] CABG, VEIN, SINGLE      Description: CABG (CABG);  Proc Date: 11/09/2011;  Comments: 3 vessel        Allergies   Allergen Reactions    Hydrocodone-Acetaminophen Itching    Iodine     Irbesartan      renal insuff and hyperkalemia      Lisinopril Nausea    Sulfa Antibiotics          Current Outpatient Medications:     allopurinol (ZYLOPRIM) 300 MG tablet, Take 1 tablet (300 mg) by mouth daily, Disp: 90 tablet, Rfl: 3    amLODIPine (NORVASC) 10 MG tablet, Take 1 tablet (10 mg) by mouth daily, Disp: 90 tablet, Rfl: 3    atorvastatin (LIPITOR) 80 MG tablet, Take 1 tablet (80 mg) by mouth daily DUE FOR LAB, Disp: 90 tablet, Rfl: 3    clopidogrel (PLAVIX) 75 MG tablet, Take 1 tablet (75 mg) by mouth daily, Disp: 90 tablet, Rfl: 3    empagliflozin (JARDIANCE) 10 MG TABS tablet, Take 1 tablet (10 mg) by mouth daily, Disp: 90 tablet, Rfl: 3    glipiZIDE (GLUCOTROL XL) 5 MG 24 hr tablet, Take 1 tablet (5 mg) by mouth daily, Disp: 90 tablet, Rfl: 3    levothyroxine (SYNTHROID/LEVOTHROID) 50 MCG tablet, Take 1 tablet (50 mcg) by mouth every morning Take on an empty stomach., Disp: 90 tablet, Rfl: 3    metFORMIN (GLUCOPHAGE) 500 MG tablet, Take 2 tablets (1,000 mg) by mouth 2 times daily (with meals), Disp: 360 tablet, Rfl: 3    testosterone  "(ANDROGEL/TESTIM) 50 MG/5GM (1%) topical gel, Apply to SHOULDERS.  Alternate dosing - 100mg (2 packets) one day then 50mg (1 packet) the next day, Disp: 250 g, Rfl: 5    valsartan (DIOVAN) 320 MG tablet, Take 1 tablet (320 mg) by mouth At Bedtime, Disp: 90 tablet, Rfl: 3    blood glucose (ACCU-CHEK GUIDE) test strip, TEST ONCE DAILY AS DIRECTED, Disp: 100 strip, Rfl: 2    blood glucose (NO BRAND SPECIFIED) lancets standard, Use to test blood sugar 3 times daily or as directed., Disp: 1 each, Rfl: 11    metoprolol tartrate (LOPRESSOR) 25 MG tablet, Take 1 tablet (25 mg) by mouth 2 times daily (Patient not taking: Reported on 8/15/2023), Disp: 180 tablet, Rfl: 3    Social History     Social History Narrative    Not on file       Family History   Problem Relation Age of Onset    Heart Disease Father     Cancer Mother     C.A.D. Brother     Septicemia Brother 3    Lung Cancer Sister     Schizophrenia Sister        Review of Systems - 10 point Review of Systems is negative except for *** which is noted in HPI.      OBJECTIVE:  Appearance: {appearance:930994::\"alert, well appearing, and in no distress\"}.    BP (!) 150/66   Resp 18     BMI= There is no height or weight on file to calculate BMI.    General appearance: Patient is alert and fully cooperative with history & exam.  No sign of distress is noted during the visit.     Psychiatric: Affect is pleasant & appropriate.  Patient appears motivated to improve health.     Respiratory: Breathing is regular & unlabored while sitting.     HEENT: Hearing is intact to spoken word.  Speech is clear.  No gross evidence of visual impairment that would impact ambulation.    Vascular: Dorsalis pedis {Blank multiple:52074::\"palpable\",\"non-palpable\"}{Blank multiple:19942::\"Right\",\"Left\",\"Bilateral\"}.  Dermatologic:   VASC Wound rt foot (Active)   Pre Size Length 0.8 08/15/23 1400   Pre Size Width 0.7 08/15/23 1400   Pre Size Depth 0.2 08/15/23 1400   Pre Total Sq cm 0.56 08/15/23 " "1400     Neurologic: {Blank multiple:19196::\"Diminished\",\"Intact\"} to light touch {Blank multiple:19196::\"Right\",\"Left\",\"Bilateral\"}.  Musculoskeletal: Contracted digits noted {Blank multiple:19196::\"Right\",\"Left\",\"Bilateral\"}.      "

## 2023-08-15 NOTE — LETTER
8/15/2023             [facility]             Wound Dressing Rx and Order Form             Order Status:New Order             Verbal: Cassia Saldana  [facility]   Account # [id number]  Fax: [phone]  Customer Service: [phone]          Patient Info:  Name: Francois Lujan  : 1950  Address:   [address] [place] [address]  [place] [address]  Phone: [phone]      Insurance Info:  PRIMARY INSURANCE: Payor: [email or web address] / Plan: [email or web address] MEDICARE ADVANTAGE / Product Type: Medicare /   Primary Policy ID#: [id number]  SECONDARY INSURANCE:  N/A   Secondary Policy ID#: N/A    Physician Info:   Name:  Elijah Oliva DPM   Dept Address/Phones:   [facility] [address] [place] [id number] [phone]  Dept: [phone]  Fax: [phone]    No data recorded    Wound info:  Encounter Diagnoses   Name Primary?    Diabetic ulcer of other part of right foot associated with diabetes mellitus due to underlying condition, limited to breakdown of skin (H)     Hallux abducto valgus, right     Hammertoe of right foot     Right ankle instability Yes     VASC Wound rt foot (Active)   Pre Size Length 0.8 08/15/23 1400   Pre Size Width 0.7 08/15/23 1400   Pre Size Depth 0.2 08/15/23 1400   Pre Total Sq cm 0.56 08/15/23 1400     Drainage: moderate  Thickness:  Full  Duration of Need: 60  Days Supply: 30  Start Date: 8/15/2023  Starter Kit: Ancillary Kit (saline, gloves, gauze)  Qualifying wound/Debridement: Yes     Dressing Type Brand Size Days Supply  15/30 Quantity  changes/week   Primary Endoform   2''x2'' 30 Weekly   Secondary Square gauze   4''x4'' 30 Daily    Sof form roll gauze   4''x75'' 30 Daily   Tape Papertape  1in 30 Daily           OK to forward to covered supplier.    Electronically Signed Physician: Elijah Oliva DPM Date: 8/15/2023

## 2023-08-15 NOTE — PROGRESS NOTES
FOOT AND ANKLE SURGERY/PODIATRY CONSULT NOTE        ASSESSMENT:   Diabetic ulceration right foot  HAV  [place]        TREATMENT:  -I discussed with the patient that the ulceration along the plantar medial aspect of the first MPJ right foot is stable without signs of infection.    -I reviewed recent JJ report which indicates a right TBI of 107 mmHg.  This is more than adequate for wound healing.    -I discussed the principles of wound healing today including the importance of nonweightbearing on the involved limb, good vascular perfusion, good glycemic control and the absence of infection.     Most recent hemoglobin L4t-noy 10.0% on 6/23.  I have asked him to try to reduce his figure below 8%.    -After discussion of risk factors nursing staff removed dressing, cleansed wound and consent obtained 2% Lidocaine HCL jelly was applied, under clean conditions, the right foot ulceration(s) were debrided using .into the subcutaneous tissue.  Devitalized and nonviable tissue, along with any fibrin and slough, was removed to improve granulation tissue formation, stimulate wound healing, decrease overall bacteria load, disrupt biofilm formation and decrease edge senescence. Wound drainage was none No. Total excisional debridement was 0.56 sq cm into the subcutaneous tissue with a depth of 0.2 cm.   Ulcers were improved afterwards and .  Measures were as noted on the flow sheet. Collagen with a gauze dresssing was applied. He will continue to apply Collagen with a gauze dresssing  as directed .    -Referred for a cam boot and knee walker to remain nonweightbearing on the right foot.    -Patient is indicated he may want to continue outpatient wound cares in [place].  I have asked him to contact Dr. Shields's office for scheduling.  Otherwise I like to see him for follow-up in 3 weeks time.    Elijah Oliva DPM  [facility]      HPI: Francois Lujan was seen today for a right foot  ulceration.  Patient states he first noticed a drop of blood on his sock 2 weeks ago and shortly after became aware of a sore along the plantar right forefoot.  Patient reports he stays active at this time currently walking in boots.  Past medical history significant for type 2 diabetes.    Past Medical History:   Diagnosis Date    Arthritis     CAD (coronary artery disease)     Diabetes mellitus (H)     Erythema nodosum 1982    Gout     Hypertension     Malignant neoplasm (H)     squamous cell CA removed from right hand    Thyroid disease        Past Surgical History:   Procedure Laterality Date    BACK SURGERY      BYPASS GRAFT ARTERY CORONARY  11/17/2011    Procedure:BYPASS GRAFT ARTERY CORONARY; CORONARY ARTERY BYPASS, Right, OM, LAD WITH ENDOVEIN HARVEST, ON PUMP; Surgeon:LEONEL MG; Location:[facility]    COLONOSCOPY N/A 6/9/2016    Procedure: COLONOSCOPY;  Surgeon: Isidro Humphreys MD;  Location: [facility]    HERNIA REPAIR      infantile hernia repair    ORTHOPEDIC SURGERY      Baker cyst removed - right knee, and mesiscus tear repair    IN LAMINEC/FACETECT/FORAMIN,LUMBAR 1 SEG      Description: Laminectomy Decompress, Facetectomy, Foraminotomy Lumbar Seg;  Recorded: 12/13/2007;  Comments: '71    IN MASTECTOMY FOR GYNECOMASTIA      Description: Breast Surgery Mastectomy For Gynecomastia Bilateral;  Recorded: 12/13/2007;    REPAIR TENDON TRICEPS UPPER EXTREMITY Left 4/11/2017    Procedure: REPAIR TENDON TRICEPS UPPER EXTREMITY;  Surgeon: Rodriguez Kelley MD;  Location: [facility]    [facility] CABG, VEIN, SINGLE      Description: CABG (CABG);  Proc Date: 11/09/2011;  Comments: 3 vessel        Allergies   Allergen Reactions    Hydrocodone-Acetaminophen Itching    Iodine     Irbesartan      renal insuff and hyperkalemia      Lisinopril Nausea    Sulfa Antibiotics          Current Outpatient Medications:     allopurinol (ZYLOPRIM) 300 MG tablet, Take 1 tablet (300 mg) by mouth daily, Disp: 90 tablet, Rfl: 3    amLODIPine  (NORVASC) 10 MG tablet, Take 1 tablet (10 mg) by mouth daily, Disp: 90 tablet, Rfl: 3    atorvastatin (LIPITOR) 80 MG tablet, Take 1 tablet (80 mg) by mouth daily DUE FOR LAB, Disp: 90 tablet, Rfl: 3    clopidogrel (PLAVIX) 75 MG tablet, Take 1 tablet (75 mg) by mouth daily, Disp: 90 tablet, Rfl: 3    empagliflozin (JARDIANCE) 10 MG TABS tablet, Take 1 tablet (10 mg) by mouth daily, Disp: 90 tablet, Rfl: 3    glipiZIDE (GLUCOTROL XL) 5 MG 24 hr tablet, Take 1 tablet (5 mg) by mouth daily, Disp: 90 tablet, Rfl: 3    levothyroxine (SYNTHROID/LEVOTHROID) 50 MCG tablet, Take 1 tablet (50 mcg) by mouth every morning Take on an empty stomach., Disp: 90 tablet, Rfl: 3    metFORMIN (GLUCOPHAGE) 500 MG tablet, Take 2 tablets (1,000 mg) by mouth 2 times daily (with meals), Disp: 360 tablet, Rfl: 3    testosterone (ANDROGEL/TESTIM) 50 MG/5GM (1%) topical gel, Apply to SHOULDERS.  Alternate dosing - 100mg (2 packets) one day then 50mg (1 packet) the next day, Disp: 250 g, Rfl: 5    valsartan (DIOVAN) 320 MG tablet, Take 1 tablet (320 mg) by mouth At Bedtime, Disp: 90 tablet, Rfl: 3    blood glucose (ACCU-CHEK GUIDE) test strip, TEST ONCE DAILY AS DIRECTED, Disp: 100 strip, Rfl: 2    blood glucose (NO BRAND SPECIFIED) lancets standard, Use to test blood sugar 3 times daily or as directed., Disp: 1 each, Rfl: 11    metoprolol tartrate (LOPRESSOR) 25 MG tablet, Take 1 tablet (25 mg) by mouth 2 times daily (Patient not taking: Reported on 8/15/2023), Disp: 180 tablet, Rfl: 3    Social History     Social History Narrative    Not on file       Family History   Problem Relation Age of Onset    Heart Disease Father     Cancer Mother     C.A.D. Brother     Septicemia Brother 3    Lung Cancer Sister     Schizophrenia Sister        Review of Systems - 10 point Review of Systems is negative except for right foot ulcer which is noted in HPI.      OBJECTIVE:  Appearance: alert, well appearing, and in no distress.    BP (!) 150/66   Resp 18      BMI= There is no height or weight on file to calculate BMI.    General appearance: Patient is alert and fully cooperative with history & exam.  No sign of distress is noted during the visit.     Psychiatric: Affect is pleasant & appropriate.  Patient appears motivated to improve health.     Respiratory: Breathing is regular & unlabored while sitting.     HEENT: Hearing is intact to spoken word.  Speech is clear.  No gross evidence of visual impairment that would impact ambulation.    Vascular: Dorsalis pedis non-palpableRight.  Dermatologic:   VASC Wound rt foot (Active)   Pre Size Length 0.8 08/15/23 1400   Pre Size Width 0.7 08/15/23 1400   Pre Size Depth 0.2 08/15/23 1400   Pre Total Sq cm 0.56 08/15/23 1400   Ulceration plantar first MPJ right foot has granular base with minimal periwound maceration, no erythema.  No increased depth ulceration right foot.  Neurologic: Diminished to light touch Right.  Musculoskeletal: Contracted digits noted Right.  Mild bunion deformity right foot.

## 2023-08-16 ENCOUNTER — TELEPHONE (OUTPATIENT)
Dept: WOUND CARE | Facility: CLINIC | Age: 73
End: 2023-08-16
Payer: COMMERCIAL

## 2023-08-16 NOTE — TELEPHONE ENCOUNTER
Phone call received from pt's spouse about scheduling Francois for follow up on a foot wound here instead of with Dr. Oliva. Called and spoke with the pt's spouse to update them that this wound clinic is nurse run and would need a provider order/referral in order to see the pt and that the nurses here do covisits with Dr. Shields (vs Dr. Oliva seeing pts and determining the wound plan of care). Educated the pt's spouse that Dr. Oliva does have a [place] clinic which would be closer for them. Based on that, pt's spouse voiced planning to stay with seeing Dr. Oliva and plans to call to see if they can reschedule and be seen in [place] instead.      Cristy Hernandez RN, CWOCN  [phone]

## 2023-08-22 ENCOUNTER — TELEPHONE (OUTPATIENT)
Dept: FAMILY MEDICINE | Facility: CLINIC | Age: 73
End: 2023-08-22
Payer: COMMERCIAL

## 2023-08-22 NOTE — TELEPHONE ENCOUNTER
Patient Quality Outreach    Patient is due for the following:   Hypertension -  BP check  Physical Annual Wellness Visit    Next Steps:   Schedule a Annual Wellness Visit    Type of outreach:    Sent letter.    Next Steps:  Reach out within 90 days via Letter.    Max number of attempts reached: Yes. Will try again in 90 days if patient still on fail list.    Questions for provider review:    None           Lamar Ornelas MA  Chart routed to .

## 2023-08-24 ENCOUNTER — TELEPHONE (OUTPATIENT)
Dept: VASCULAR SURGERY | Facility: CLINIC | Age: 73
End: 2023-08-24
Payer: COMMERCIAL

## 2023-08-24 NOTE — TELEPHONE ENCOUNTER
Patient's wife  called, they haven't received supplies yet from [facility]. They still have several dressing changes left. Writer called [facility], order was not received. Writer will refax.

## 2023-08-31 ENCOUNTER — MYC MEDICAL ADVICE (OUTPATIENT)
Dept: FAMILY MEDICINE | Facility: CLINIC | Age: 73
End: 2023-08-31
Payer: COMMERCIAL

## 2023-08-31 NOTE — TELEPHONE ENCOUNTER
Called pt regarding mychart to triage and find out more info. No answer. Left message to call.        Jean Lang RN

## 2023-09-01 NOTE — TELEPHONE ENCOUNTER
Forwarding to providers in PCP's absence.  Should patient decrease valsartan back to 160 mg?    Patient last saw PCP 7/25/23 (note pasted below) had been undergoing adjustments to valsartan with dosing between 160 mg and 320 mg depending on BP and dizziness.  When dizziness had improved, was increased and taken at night to combat this side effect.   Patient seemed to do okay for a little while, but now since diabetes better controlled and he's lost weight, he's had lower BP's and lightheadedness when bending over and standing back up.   BP's have been as low as 109/66 and averaging around 120/70.   He currently takes amlodipine 10 mg daily and valsartan 320 mg daily. He does not take metoprolol-on list, but states was discontinued due to low heart rate. List states he's not taking it, but may need to be discontinued from list.   Patient denies other concerning symptoms like weakness/numbness, vision or speech issues, chest pain or breathing issues.   RN assisted patient with scheduling f/u appt with PCP next Thursday to discuss, but will forward to covering providers to see if they recommend a medication adjustment in the meantime, as PCP's note below had suggested for such circumstances.     Sandra Mcmanus RN  [facility]      Patient Instructions   Hypertension  1. Continue valsartan 320 mg at bedtime;  if dizziness gets much worse, let me know, we can decrease back to 160

## 2023-09-07 ENCOUNTER — OFFICE VISIT (OUTPATIENT)
Dept: VASCULAR SURGERY | Facility: CLINIC | Age: 73
End: 2023-09-07
Attending: PODIATRIST
Payer: COMMERCIAL

## 2023-09-07 ENCOUNTER — OFFICE VISIT (OUTPATIENT)
Dept: FAMILY MEDICINE | Facility: CLINIC | Age: 73
End: 2023-09-07
Payer: COMMERCIAL

## 2023-09-07 VITALS
RESPIRATION RATE: 16 BRPM | HEIGHT: 72 IN | TEMPERATURE: 96.5 F | DIASTOLIC BLOOD PRESSURE: 72 MMHG | HEART RATE: 72 BPM | OXYGEN SATURATION: 98 % | BODY MASS INDEX: 34.02 KG/M2 | SYSTOLIC BLOOD PRESSURE: 142 MMHG | WEIGHT: 251.2 LBS

## 2023-09-07 VITALS — DIASTOLIC BLOOD PRESSURE: 78 MMHG | SYSTOLIC BLOOD PRESSURE: 160 MMHG | HEART RATE: 86 BPM | OXYGEN SATURATION: 96 %

## 2023-09-07 DIAGNOSIS — N18.2 CKD (CHRONIC KIDNEY DISEASE) STAGE 2, GFR 60-89 ML/MIN: ICD-10-CM

## 2023-09-07 DIAGNOSIS — I10 ESSENTIAL HYPERTENSION: ICD-10-CM

## 2023-09-07 DIAGNOSIS — L97.511 DIABETIC ULCER OF OTHER PART OF RIGHT FOOT ASSOCIATED WITH DIABETES MELLITUS DUE TO UNDERLYING CONDITION, LIMITED TO BREAKDOWN OF SKIN (H): ICD-10-CM

## 2023-09-07 DIAGNOSIS — E11.29 TYPE 2 DIABETES MELLITUS WITH MICROALBUMINURIA, WITHOUT LONG-TERM CURRENT USE OF INSULIN (H): ICD-10-CM

## 2023-09-07 DIAGNOSIS — I65.21 STENOSIS OF RIGHT CAROTID ARTERY: ICD-10-CM

## 2023-09-07 DIAGNOSIS — I25.10 CORONARY ARTERY DISEASE INVOLVING NATIVE CORONARY ARTERY OF NATIVE HEART WITHOUT ANGINA PECTORIS: ICD-10-CM

## 2023-09-07 DIAGNOSIS — E08.621 DIABETIC ULCER OF OTHER PART OF RIGHT FOOT ASSOCIATED WITH DIABETES MELLITUS DUE TO UNDERLYING CONDITION, LIMITED TO BREAKDOWN OF SKIN (H): Primary | ICD-10-CM

## 2023-09-07 DIAGNOSIS — L97.511 DIABETIC ULCER OF OTHER PART OF RIGHT FOOT ASSOCIATED WITH DIABETES MELLITUS DUE TO UNDERLYING CONDITION, LIMITED TO BREAKDOWN OF SKIN (H): Primary | ICD-10-CM

## 2023-09-07 DIAGNOSIS — Z00.00 ENCOUNTER FOR MEDICARE ANNUAL WELLNESS EXAM: Primary | ICD-10-CM

## 2023-09-07 DIAGNOSIS — E08.621 DIABETIC ULCER OF OTHER PART OF RIGHT FOOT ASSOCIATED WITH DIABETES MELLITUS DUE TO UNDERLYING CONDITION, LIMITED TO BREAKDOWN OF SKIN (H): ICD-10-CM

## 2023-09-07 DIAGNOSIS — Z23 NEED FOR VACCINATION: ICD-10-CM

## 2023-09-07 DIAGNOSIS — R80.9 TYPE 2 DIABETES MELLITUS WITH MICROALBUMINURIA, WITHOUT LONG-TERM CURRENT USE OF INSULIN (H): ICD-10-CM

## 2023-09-07 LAB
ANION GAP SERPL CALCULATED.3IONS-SCNC: 12 MMOL/L (ref 7–15)
BUN SERPL-MCNC: 27.5 MG/DL (ref 8–23)
CALCIUM SERPL-MCNC: 9.7 MG/DL (ref 8.8–10.2)
CHLORIDE SERPL-SCNC: 104 MMOL/L (ref 98–107)
CHOLEST SERPL-MCNC: 119 MG/DL
CREAT SERPL-MCNC: 0.97 MG/DL (ref 0.67–1.17)
CREAT UR-MCNC: 105.5 MG/DL
DEPRECATED HCO3 PLAS-SCNC: 22 MMOL/L (ref 22–29)
EGFRCR SERPLBLD CKD-EPI 2021: 83 ML/MIN/1.73M2
GLUCOSE SERPL-MCNC: 107 MG/DL (ref 70–99)
HBA1C MFR BLD: 6.9 % (ref 0–5.6)
HCT VFR BLD AUTO: 36.3 % (ref 40–53)
HDLC SERPL-MCNC: 48 MG/DL
HGB BLD-MCNC: 12.1 G/DL (ref 13.3–17.7)
LDLC SERPL CALC-MCNC: 48 MG/DL
MICROALBUMIN UR-MCNC: 226.2 MG/L
MICROALBUMIN/CREAT UR: 214.41 MG/G CR (ref 0–17)
NONHDLC SERPL-MCNC: 71 MG/DL
POTASSIUM SERPL-SCNC: 4.5 MMOL/L (ref 3.4–5.3)
SODIUM SERPL-SCNC: 138 MMOL/L (ref 136–145)
TRIGL SERPL-MCNC: 114 MG/DL

## 2023-09-07 PROCEDURE — 90471 IMMUNIZATION ADMIN: CPT | Performed by: INTERNAL MEDICINE

## 2023-09-07 PROCEDURE — 80048 BASIC METABOLIC PNL TOTAL CA: CPT | Performed by: INTERNAL MEDICINE

## 2023-09-07 PROCEDURE — 82043 UR ALBUMIN QUANTITATIVE: CPT | Performed by: INTERNAL MEDICINE

## 2023-09-07 PROCEDURE — 83036 HEMOGLOBIN GLYCOSYLATED A1C: CPT | Performed by: INTERNAL MEDICINE

## 2023-09-07 PROCEDURE — G0463 HOSPITAL OUTPT CLINIC VISIT: HCPCS | Performed by: PODIATRIST

## 2023-09-07 PROCEDURE — G0439 PPPS, SUBSEQ VISIT: HCPCS | Performed by: INTERNAL MEDICINE

## 2023-09-07 PROCEDURE — 80061 LIPID PANEL: CPT | Performed by: INTERNAL MEDICINE

## 2023-09-07 PROCEDURE — 85018 HEMOGLOBIN: CPT | Performed by: INTERNAL MEDICINE

## 2023-09-07 PROCEDURE — 99214 OFFICE O/P EST MOD 30 MIN: CPT | Mod: 25 | Performed by: INTERNAL MEDICINE

## 2023-09-07 PROCEDURE — 85014 HEMATOCRIT: CPT | Performed by: INTERNAL MEDICINE

## 2023-09-07 PROCEDURE — 36415 COLL VENOUS BLD VENIPUNCTURE: CPT | Performed by: INTERNAL MEDICINE

## 2023-09-07 PROCEDURE — 90662 IIV NO PRSV INCREASED AG IM: CPT | Performed by: INTERNAL MEDICINE

## 2023-09-07 PROCEDURE — 84403 ASSAY OF TOTAL TESTOSTERONE: CPT | Performed by: INTERNAL MEDICINE

## 2023-09-07 PROCEDURE — 82570 ASSAY OF URINE CREATININE: CPT | Performed by: INTERNAL MEDICINE

## 2023-09-07 PROCEDURE — 99214 OFFICE O/P EST MOD 30 MIN: CPT | Performed by: PODIATRIST

## 2023-09-07 RX ORDER — OLMESARTAN MEDOXOMIL 20 MG/1
20 TABLET ORAL DAILY
Qty: 90 TABLET | Refills: 3 | Status: SHIPPED | OUTPATIENT
Start: 2023-09-07 | End: 2024-08-09

## 2023-09-07 RX ORDER — AMLODIPINE BESYLATE 5 MG/1
5 TABLET ORAL DAILY
Qty: 90 TABLET | Refills: 3 | Status: SHIPPED | OUTPATIENT
Start: 2023-09-07

## 2023-09-07 ASSESSMENT — PAIN SCALES - GENERAL
PAINLEVEL: NO PAIN (0)
PAINLEVEL: NO PAIN (0)

## 2023-09-07 NOTE — PATIENT INSTRUCTIONS
Congratulations! Your wound has healed.    For the next 3 weeks Dr. Oliva would like you to remain NON WEIGHT BEARING MEANS NO PRESSURE ON YOUR FOOT OR HEEL AT ANY TIME FOR ANY REASON! on your right foot with the use of your CAM BOOT and knee walker, to allow the newly healed skin to become stronger. After 3 weeks of non-weight bearing on your right foot you may begin walking with the use of your CAM boot until you receive your diabetic shoes/inserts.    You may begin showering as normal in 3 weeks    You should avoid soaking your right foot for the next  4 weeks, this includes swimming and hot tubs.    Continue to monitor the area for breakdown & call us if your wound reopens.    Your doctor recommends you use a pumice stone to get rid of your callous. You can purchase a pumice stone at your local drug store.    How to use your Pumice Stone        1. Soak your calloused skin in warm water. The most common part of the body to exfoliate with a pumice stone is the feet. Heels tend to develop a layer of hard, calloused skin that can become cracked or scaled. Soak the calloused body part in warm water for about five minutes to soften the skin.    2. Wait until your dry skin has softened. The skin will be easier to remove if it's soft and supple. Feel your skin after several minutes of soaking. If it still feels tough, wait a few more minutes (giving the water a warm-up if necessary). If it's soft, your skin is ready for the pumice stone.     3. Wet the stone. Wetting the stone will help it slide more easily across your skin, rather than catching on it. Run the stone under warm water, or dip it in the water where you're soaking your skin, in order to thoroughly wet it.    4. Rub it gently over the calloused area. Use a circular motion to start sloughing away the dead skin with the pumice stone. If the skin is nice and soft, it should start coming right off. Keep going until you remove the dead skin and get to the fresh,  supple skin underneath.   Don't press too hard. Light pressure is all that is needed; let the surface of the stone do the work.   If you're working on your feet, focus on the heels, the sides of your toes, and other areas where dry skin tends to build up.    5. Rinse and repeat. Rinse off the dead skin and take a look to see if you need to keep going. If you still see bits of dead skin, go over the area again with the pumice stone. Continue using the stone on the area until you're satisfied with the results.   Since the pumice stone will wear down slightly while you use it, you may need to turn it over to get a fresh surface you can use to exfoliate your skin.   Rinse the pumice stone often to keep its surface clean and effective.    6. Dry and moisturize your skin. When you're finished, use a towel to pat your skin dry. Coat the area with an oil or cream to prevent it from drying out too quickly. Your formerly calloused skin should now be soft, supple and gleaming.   Coconut oil, almond oil, or body lotion are all fine to use to condition your skin after pumicing.   Repeat as often as needed to keep your skin in good shape.    CARING FOR YOUR PUMICE STONE:    1. Scrub it after use. Dead skin will build up in the pores of the stone as you use it, so you'll want to clean the stone after use. Use a scrub brush to scrub the stone while holding it under running water. Add a bit of soap to help clean the stone completely. This way your stone will be clean and ready to use next time you need it.     2. Allow it to completely dry out. Set the pumice stone in a dry place so that it doesn't stay damp in between uses. Some pumice stones come with a string attached that allows you to hang the stone to dry. If you let the stone stay wet, bacteria could grow in the pores, making it unsafe to use.     3. Boil it if necessary. Every once in a while, you'll want to give the stone a deep cleaning to make sure it isn't harboring  bacteria. Bring a small pot of water to a full boil, drop in the stone, and boil it for five minutes. Use tongs to remove the stone from the water and allow it to dry completely before storing.   If you use the stone frequently, boil it every two weeks to ensure it stays clean.   If you'd like, you can add a capful of bleach to the water to be certain all the bacteria is killed.    4. Replace the stone when it wears down. Pumice is a soft stone that will eventually wear away after you've used it for awhile. When it gets too small to handle easily, or the surface becomes too smooth to be effective, purchase a new one. Pumice stones are inexpensive and can be found at any store that sells beauty supplies.       [facility]  [facility]  [address]  [place] [address]  Phone: [phone]  Fax: [phone] [facility]  [address] [place] [address]  Home Medical Equipment, [address] Phone: [phone]  Orthotics and Prosthetics, [address]  Phone [phone] [facility]  [address]  [place] [address]  Phone: [phone]   Fax: [phone]   [facility]  [address]  [place] [address]  Phone: [phone]  Fax: [phone] [facility]   Home Medical Equipment   [address], [place] [address]  Phone :[phone]  Orthotics and Prosthetics  [address], [address], [facility] [address]  Phone:[phone]   [facility]  [address]  [place] [address]  Phone: [phone]   Fax: [phone]   [facility]   [address]  [place] [address]  Phone: [phone]  Fax: [phone] [facility]   [address]  [place] [address]  Phone: [phone] [address] [place] [address]  Phone :[phone]             WEARING YOUR CUSTOM FOOT ORTHOTICS   Most insurance plans cover one pair of orthotics per year. You must check with your   insurance plan to see what your payment responsibility will be. Please call your   insurance company by calling the number on the back of your insurance card.   Orthotic's are non-refundable and non-returnable.   Orthotics are made of various designs. Some orthotics are covered with material that extends beyond your toes. If your orthotic is of this design, you will likely need to trim the toe end to get a proper fit. The insole from your shoe can be used as a template. Simply overlay the shoe insert on top of the custom orthotic. Align the heel end while tracing the length of the insert onto the custom orthotic. Use a large scissor to trim the toe end until you get a proper fit in the shoe.   The orthotic needs to be pushed as far back in the shoe as possible. The heel portion should not ride forward so as not to irritate your heel.   Orthotics are designed to work with socks. Excessive perspiration will shorten the life span of the orthotics. Remove the orthotic from the shoe frequently for proper drying.   The break-in period lasts for weeks. People new to orthotics will likely experience new aches and pains. The orthotic is forcing your foot into a new position. Arch, foot and leg muscle aches and fatigue are common during these weeks. Minor discomfort can be considered normal break in phenomenon. Start wearing your orthotic around your home your first day. Limited activity for one to two hours is recommended. You can increase one or two additional hours each day provided the aches and pains are subsiding. The degree of discomfort, fatigue and problems will dictate the speed of break in. You may require multiple weeks to work up to full time use.   Do not continue wearing your orthotics if they are creating problems such as blisters  or sores. Do not hesitate to call the clinic to speak with a nurse regarding orthotic   break in, fit, trimming, etc. You may also need to see the doctor if the orthotics are   simply not working out. Adjustments are sometimes made to improve orthotic   function.     Orthotics will only work in certain styles and types of shoes. Orthotics rarely work in dress shoes. Slip-ons, clogs, sandals and heels are particularly troublesome. Specially designed orthotics may be necessary for these types of shoes. Your custom orthotic was designed for activities that require appropriate walking or running shoes. Lace up athletic shoes, walking shoes or work boots should work appropriately. You may need a wider or longer shoe. Shoes with a removable  or insert work best. In general, you want to remove an insert from the shoe before placing the orthotic into the shoe. Shoes without a removable liner may not work as well.     When purchasing new shoes, bring your orthotics along to get a proper fit. Shop at stores that are familiar with orthotics.   Frequent washing of the orthotic may shorten the life span of the top cover. The top cover can be replaced but will generally last one to five years depending on use and foot perspiration.       We want to hear from you!   In the next few weeks, you should receive a call or email to complete a survey about your visit at [facility]. Please help us improve your appointment experience by letting us know how we did today. We strive to make your experience good and value any ways in which we could do better.      We value your input and suggestions.    Thank you for choosing the [facility]!

## 2023-09-07 NOTE — PROGRESS NOTES
Assessment & Plan     Encounter for Medicare annual wellness exam    Essential hypertension  With orthostatic type dizziness that did not fully resolve with stopping the valsartan.  We will decrease amlodipine from 10 to 5 mg.  We will restart ARB due to his history of microalbuminuria with nephrotic range proteinuria.  Hopefully the long-acting nature of the olmesartan will help stabilize blood pressure and cause less dizziness.  Follow-up if dizziness is persisting.  - amLODIPine (NORVASC) 5 MG tablet; Take 1 tablet (5 mg) by mouth daily  - olmesartan (BENICAR) 20 MG tablet; Take 1 tablet (20 mg) by mouth daily    CKD (chronic kidney disease) stage 2, GFR 60-89 ml/min  As above  - BASIC METABOLIC PANEL    Type 2 diabetes mellitus with microalbuminuria, without long-term current use of insulin (H)  Blood sugar is likely to be much better controlled given his weight loss and reports of blood sugar at home.  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  Get updated ultrasound  - US Carotid Bilateral; Future    Need for vaccination  - INFLUENZA VACCINE 65+ (FLUZONE HD)      Patient Instructions   Hypertension  Dizziness  Kidney Disease  Decrease Amlodipine from 10 to 5 mg once daily  Stay off valsartan  For blood pressure and kidney protection, start  "olmesartan 20 mg once daily (low dose)  Recommend RN blood pressure check in 2-4 weeks.    Diabetes  Blood work today    Health Care Maintenance  You are due for COVID-19 and Influenza vaccine(s)  You are due for dilated eye exam.  Have the report sent to Dr. Alvarez's office.      Patient Education  Personalized Prevention Plan  You are due for the preventive services outlined below.  Your care team is available to assist you in scheduling these services.  If you have already completed any of these items, please share that information with your care team to update in your medical record.  Health Maintenance Due   Topic Date Due    COVID-19 Vaccine (6 - Moderna series) 01/16/2023    Eye Exam  01/20/2023    Basic Metabolic Panel  08/25/2023    Flu Vaccine (1) 09/01/2023    Cholesterol Lab  09/16/2023     Learning About Being Physically Active  What is physical activity?     Being physically active means doing any kind of activity that gets your body moving.  The types of physical activity that can help you get fit and stay healthy include:  Aerobic or \"cardio\" activities. These make your heart beat faster and make you breathe harder, such as brisk walking, riding a bike, or running. They strengthen your heart and lungs and build up your endurance.  Strength training activities. These make your muscles work against, or \"resist,\" something. Examples include lifting weights or doing push-ups. These activities help tone and strengthen your muscles and bones.  Stretches. These let you move your joints and muscles through their full range of motion. Stretching helps you be more flexible.  Reaching a balance between these three types of physical activity is important because each one contributes to your overall fitness.  What are the benefits of being active?  Being active is one of the best things you can do for your health. It helps you to:  Feel stronger and have more energy to do all the things you like to do.  Focus " "better at school or work.  Feel, think, and sleep better.  Reach and stay at a healthy weight.  Lose fat and build lean muscle.  Lower your risk for serious health problems, including diabetes, heart attack, high blood pressure, and some cancers.  Keep your heart, lungs, bones, muscles, and joints strong and healthy.  How can you make being active part of your life?  Start slowly. Make it your long-term goal to get at least 30 minutes of exercise on most days of the week. Walking is a good choice. You also may want to do other activities, such as running, swimming, cycling, or playing tennis or team sports.  Pick activities that you like--ones that make your heart beat faster, your muscles stronger, and your muscles and joints more flexible. If you find more than one thing you like doing, do them all. You don't have to do the same thing every day.  Get your heart pumping every day. Any activity that makes your heart beat faster and keeps it at that rate for a while counts.  Here are some great ways to get your heart beating faster:  Go for a brisk walk, run, or bike ride.  Go for a hike or swim.  Go in-line skating.  Play a game of touch football, basketball, or soccer.  Ride a bike.  Play tennis or racquetball.  Climb stairs.  Even some household chores can be aerobic--just do them at a faster pace. Vacuuming, raking or mowing the lawn, sweeping the garage, and washing and waxing the car all can help get your heart rate up.  Strengthen your muscles during the week. You don't have to lift heavy weights or grow big, bulky muscles to get stronger. Doing a few simple activities that make your muscles work against, or \"resist,\" something can help you get stronger.  For example, you can:  Do push-ups or sit-ups, which use your own body weight as resistance.  Lift weights or dumbbells or use stretch bands at home or in a gym or community center.  Stretch your muscles often. Stretching will help you as you become more " "active. It can help you stay flexible, loosen tight muscles, and avoid injury. It can also help improve your balance and posture and can be a great way to relax.  Be sure to stretch the muscles you'll be using when you work out. It's best to warm your muscles slightly before you stretch them. Walk or do some other light aerobic activity for a few minutes, and then start stretching.  When you stretch your muscles:  Do it slowly. Stretching is not about going fast or making sudden movements.  Don't push or bounce during a stretch.  Hold each stretch for at least 15 to 30 seconds, if you can. You should feel a stretch in the muscle, but not pain.  Breathe out as you do the stretch. Then breathe in as you hold the stretch. Don't hold your breath.  If you're worried about how more activity might affect your health, have a checkup before you start. Follow any special advice your doctor gives you for getting a smart start.  Where can you learn more?  Go to https://www.[employer].LogFire/patiented  Enter W332 in the search box to learn more about \"Learning About Being Physically Active.\"  Current as of: October 10, 2022               Content Version: 13.7    3352-3169 [employer].   Care instructions adapted under license by your healthcare professional. If you have questions about a medical condition or this instruction, always ask your healthcare professional. [employer] disclaims any warranty or liability for your use of this information.      Hearing Loss: Care Instructions  Overview     Hearing loss is a sudden or slow decrease in how well you hear. It can range from slight to profound. Permanent hearing loss can occur with aging. It also can happen when you are exposed long-term to loud noise. Examples include listening to loud music, riding motorcycles, or being around other loud machines.  Hearing loss can affect your work and home life. It can make you feel lonely or depressed. You may feel " that you have lost your independence. But hearing aids and other devices can help you hear better and feel connected to others.  Follow-up care is a key part of your treatment and safety. Be sure to make and go to all appointments, and call your doctor if you are having problems. It's also a good idea to know your test results and keep a list of the medicines you take.  How can you care for yourself at home?  Avoid loud noises whenever possible. This helps keep your hearing from getting worse.  Always wear hearing protection around loud noises.  Wear a hearing aid as directed.  A professional can help you pick a hearing aid that will work best for you.  You can also get hearing aids over the counter for mild to moderate hearing loss.  Have hearing tests as your doctor suggests. They can show whether your hearing has changed. Your hearing aid may need to be adjusted.  Use other devices as needed. These may include:  Telephone amplifiers and hearing aids that can connect to a television, stereo, radio, or microphone.  Devices that use lights or vibrations. These alert you to the doorbell, a ringing telephone, or a baby monitor.  Television closed-captioning. This shows the words at the bottom of the screen. Most new TVs can do this.  TTY (text telephone). This lets you type messages back and forth on the telephone instead of talking or listening. These devices are also called TDD. When messages are typed on the keyboard, they are sent over the phone line to a receiving TTY. The message is shown on a monitor.  Use text messaging, social media, and email if it is hard for you to communicate by telephone.  Try to learn a listening technique called speechreading. It is not lipreading. You pay attention to people's gestures, expressions, posture, and tone of voice. These clues can help you understand what a person is saying. Face the person you are talking to, and have them face you. Make sure the lighting is good. You  "need to see the other person's face clearly.  Think about counseling if you need help to adjust to your hearing loss.  When should you call for help?  Watch closely for changes in your health, and be sure to contact your doctor if:    You think your hearing is getting worse.     You have new symptoms, such as dizziness or nausea.   Where can you learn more?  Go to https://www.[employer].net/patiented  Enter R798 in the search box to learn more about \"Hearing Loss: Care Instructions.\"  Current as of: March 1, 2023               Content Version: 13.7    8548-3893 [employer].   Care instructions adapted under license by your healthcare professional. If you have questions about a medical condition or this instruction, always ask your healthcare professional. [employer] disclaims any warranty or liability for your use of this information.      Your Health Risk Assessment indicates you feel you are not in good emotional health.    Recreation   Recreation is not limited to sports and team events. It includes any activity that provides relaxation, interest, enjoyment, and exercise. Recreation provides an outlet for physical, mental, and social energy. It can give a sense of worth and achievement. It can help you stay healthy.    Mental Exercise and Social Involvement  Mental and emotional health is as important as physical health. Keep in touch with friends and family. Stay as active as possible. Continue to learn and challenge yourself.   Things you can do to stay mentally active are:  Learn something new, like a foreign language or musical instrument.   Play SCRABBLE or do crossword puzzles. If you cannot find people to play these games with you at home, you can play them with others on your computer through the Internet.   Join a games club--anything from card games to chess or checkers or lawn bowling.   Start a new hobby.   Go back to school.   Volunteer.   Read.   Keep up with world " events.       Teresita Alvarez DO  [facility]    Juan F Parrish is a 72 year old, presenting for the following health issues:  Dizziness        9/7/2023     8:11 AM   Additional Questions   Roomed by mervin BONDS     Dizziness  Onset/Duration: 6 to 7 weeks. Description:   Do you feel faint: No  Does it feel like the surroundings (bed, room) are moving: No  Unsteady/off balance: YES  Have you passed out or fallen: No  Intensity: moderate  Progression of Symptoms: improving  Accompanying Signs & Symptoms:  Heart palpitations or chest pain: No  Nausea, vomiting: No  Weakness or lack of coordination in arms or legs: No  Vision or speech changes: No  Numbness or tingling: YES- due to stroke   Ringing in ears (Tinnitus): No  Hearing Loss: YES- nothing new   History:   Head trauma/concussion history: No  Previous similar symptoms: No   Recent bleeding history: No  Any new medications (BP?): No  Precipitating factors:   Worse with activity: YES- bending down   Worse with head movement: No  Alleviating factors:   Does staying in a fixed position give relief: YES  Therapies tried and outcome: None    Diabetes  --blood sugar are all < 140  --had foot ulcer, seeing podiatry and wound RN  --has stopped using EtOH, cut out sugar      Wt Readings from Last 5 Encounters:   09/07/23 113.9 kg (251 lb 3.2 oz)   07/25/23 120.7 kg (266 lb 1.6 oz)   07/18/23 122.5 kg (270 lb)   06/23/23 119.7 kg (264 lb)   09/16/22 114.8 kg (253 lb)         Hypertension Follow-up  *Stopped taking his Valsartan 3 weeks ago  Do you check your blood pressure regularly outside of the clinic? Yes at home. Results have been 120-125/ 68-70  Are you following a low salt diet? Yes  Are your blood pressures ever more than 140 on the top number (systolic) OR more than 90 on the bottom number (diastolic), for example 140/90? YES   He has significant proteinuria, hence the valsartan  We have discussed renal referral that he has  "declined his blood pressure did improve with higher dose of valsartan, and moving the valsartan to bedtime did help with the dizziness.  Since her last visit in July he has been working on weight loss and has lost 20 pounds.  He started developing orthostatic dizziness  He is since stopped his valsartan 320 mg.  Previously he was on 160 mg.  He is also on amlodipine 10 mg daily  Metoprolol was stopped due to bradycardia  Blood pressure at home has been 120s or less.  Taking blood pressure first thing in AM.  Felix Omron arm cuff blood pressure   Will still have dizziness with bending over;  the dizziness has improved with stopping valsartan but still present.  The dizziness is interfering with yard work  Sometimes he feels like he may faint  Dizziness is daily; blood sugar during episodes is NOT hypoglycemic    Carotid MRA 2020   There is up to 40-50% stenosis of the proximal cervical right  internal carotid artery, presumably related to atherosclerotic  disease.    Annual Wellness Visit    Patient has been advised of split billing requirements and indicates understanding: Yes     Are you in the first 12 months of your Medicare Part B coverage?  No    Physical Health:  In general, how would you rate your overall physical health? good  Outside of work, how many days during the week do you exercise?none  Outside of work, approximately how many minutes a day do you exercise?not applicable  If you drink alcohol do you typically have >3 drinks per day or >7 drinks per week? No  Do you usually eat at least 4 servings of fruit and vegetables a day, include whole grains & fiber and avoid regularly eating high fat or \"junk\" foods? Yes  Do you have any problems taking medications regularly? No  Do you have any side effects from medications? none  Needs assistance for the following daily activities: no assistance needed  Which of the following safety concerns are present in your home?  none identified   Hearing impairment: " Yes, Need to ask people to speak up or repeat themselves.  In the past 6 months, have you been bothered by leaking of urine? no    Mental Health:  In general, how would you rate your overall mental or emotional health? fair  PHQ-2 Score:      Do you feel safe in your environment? Yes    Have you ever done Advance Care Planning? (For example, a Health Directive, POLST, or a discussion with a medical provider or your loved ones about your wishes)? Yes, advance care planning is on file.    Fall risk:  Fallen 2 or more times in the past year?: No  Any fall with injury in the past year?: No  click delete button to remove this line now  Cognitive Screenin) Repeat 3 items (Leader, Season, Table)    2) Clock draw: NORMAL  3) 3 item recall: Recalls 3 objects  Results: 3 items recalled: COGNITIVE IMPAIRMENT LESS LIKELY    Mini-CogTM Copyright JOHANNE Burnham. Licensed by the author for use in [facility]; reprinted with permission ([email or web address]). All rights reserved.      Do you have sleep apnea, excessive snoring or daytime drowsiness? : no    Social History     Tobacco Use    Smoking status: Former     Packs/day: 2.00     Years: 30.00     Pack years: 60.00     Types: Cigarettes     Quit date: 1997     Years since quittin.8    Smokeless tobacco: Never   Substance Use Topics    Alcohol use: Yes     Alcohol/week: 10.0 standard drinks of alcohol     Types: 12 Cans of beer per week     Comment: on the weekends about 5 drinks a weekend               No data to display              Do you have a current opioid prescription? No  Do you use any other controlled substances or medications that are not prescribed by a provider? None    Current providers sharing in care for this patient include:   Patient Care Team:  Teresita Alvarez DO as PCP - General (Internal Medicine)  Teresita Alvarez DO as Assigned PCP  Patricia Higgins MD as Assigned Heart and Vascular Provider  Andrew Tian MD as Physician  (Endocrinology, Diabetes, and Metabolism)  Andrew Tian MD as Assigned Endocrinology Provider  Elijah Oliva DPM as Assigned Surgical Provider    The following health maintenance items are reviewed in Epic and correct as of today:  Health Maintenance   Topic Date Due    MEDICARE ANNUAL WELLNESS VISIT  07/08/2022    COVID-19 Vaccine (6 - Moderna series) 01/16/2023    EYE EXAM  01/20/2023    BMP  08/25/2023    INFLUENZA VACCINE (1) 09/01/2023    LIPID  09/16/2023    ZOSTER IMMUNIZATION (1 of 2) 12/29/2023 (Originally 11/19/2000)    DTAP/TDAP/TD IMMUNIZATION (4 - Td or Tdap) 12/29/2023 (Originally 11/29/2022)    A1C  09/23/2023    PHQ-9  12/23/2023    MICROALBUMIN  06/23/2024    TSH W/FREE T4 REFLEX  06/23/2024    DIABETIC FOOT EXAM  06/23/2024    ANNUAL REVIEW OF HM ORDERS  06/23/2024    FALL RISK ASSESSMENT  09/07/2024    COLORECTAL CANCER SCREENING  06/09/2026    ADVANCE CARE PLANNING  09/07/2028    HEPATITIS C SCREENING  Completed    DEPRESSION ACTION PLAN  Completed    Pneumococcal Vaccine: 65+ Years  Completed    URINALYSIS  Completed    AORTIC ANEURYSM SCREENING (SYSTEM ASSIGNED)  Completed    IPV IMMUNIZATION  Aged Out    HPV IMMUNIZATION  Aged Out    MENINGITIS IMMUNIZATION  Aged Out       Patient has been advised of split billing requirements and indicates understanding: Yes    Appropriate preventive services were discussed with this patient, including applicable screening as appropriate for fall prevention, nutrition, physical activity, Tobacco-use cessation, weight loss and cognition.  Checklist reviewing preventive services available has been given to the patient.           Current Outpatient Medications   Medication Sig Dispense Refill    allopurinol (ZYLOPRIM) 300 MG tablet Take 1 tablet (300 mg) by mouth daily 90 tablet 3    amLODIPine (NORVASC) 10 MG tablet Take 1 tablet (10 mg) by mouth daily 90 tablet 3    atorvastatin (LIPITOR) 80 MG tablet Take 1 tablet (80 mg) by mouth daily DUE FOR LAB  "90 tablet 3    blood glucose (ACCU-CHEK GUIDE) test strip TEST ONCE DAILY AS DIRECTED 100 strip 2    blood glucose (NO BRAND SPECIFIED) lancets standard Use to test blood sugar 3 times daily or as directed. 1 each 11    clopidogrel (PLAVIX) 75 MG tablet Take 1 tablet (75 mg) by mouth daily 90 tablet 3    empagliflozin (JARDIANCE) 10 MG TABS tablet Take 1 tablet (10 mg) by mouth daily 90 tablet 3    glipiZIDE (GLUCOTROL XL) 5 MG 24 hr tablet Take 1 tablet (5 mg) by mouth daily 90 tablet 3    levothyroxine (SYNTHROID/LEVOTHROID) 50 MCG tablet Take 1 tablet (50 mcg) by mouth every morning Take on an empty stomach. 90 tablet 3    metFORMIN (GLUCOPHAGE) 500 MG tablet Take 2 tablets (1,000 mg) by mouth 2 times daily (with meals) 360 tablet 3    testosterone (ANDROGEL/TESTIM) 50 MG/5GM (1%) topical gel Apply to SHOULDERS.  Alternate dosing - 100mg (2 packets) one day then 50mg (1 packet) the next day 250 g 5    metoprolol tartrate (LOPRESSOR) 25 MG tablet Take 1 tablet (25 mg) by mouth 2 times daily (Patient not taking: Reported on 8/15/2023) 180 tablet 3    valsartan (DIOVAN) 320 MG tablet Take 1 tablet (320 mg) by mouth At Bedtime (Patient not taking: Reported on 9/7/2023) 90 tablet 3       Review of Systems   Constitutional, HEENT, cardiovascular, pulmonary, gi and gu systems are negative, except as otherwise noted.      Objective    BP (!) 142/62   Pulse 72   Temp (!) 96.5  F (35.8  C) (Tympanic)   Resp 16   Ht 1.83 m (6' 0.05\")   Wt 113.9 kg (251 lb 3.2 oz)   SpO2 98%   BMI 34.02 kg/m    Body mass index is 34.02 kg/m .  Physical Exam   GENERAL: healthy, alert and no distress  EYES: Eyes grossly normal to inspection, PERRL and conjunctivae and sclerae normal  HENT: ear canals and TM's normal, nose and mouth without ulcers or lesions  NECK: no adenopathy, no asymmetry, masses, or scars and thyroid normal to palpation  RESP: lungs clear to auscultation - no rales, rhonchi or wheezes  CV: regular rate and rhythm, " normal S1 S2, no S3 or S4, no murmur, click or rub, no peripheral edema and peripheral pulses strong  ABDOMEN: soft, nontender, no hepatosplenomegaly, no masses and bowel sounds normal  MS: no gross musculoskeletal defects noted, no edema  SKIN: no suspicious lesions or rashes  NEURO: Normal strength and tone, mentation intact and speech normal  PSYCH: mentation appears normal, affect normal/bright    Results for orders placed or performed in visit on 09/07/23 (from the past 24 hour(s))   Hemoglobin and hematocrit   Result Value Ref Range    Hemoglobin 12.1 (L) 13.3 - 17.7 g/dL    Hematocrit 36.3 (L) 40.0 - 53.0 %                   He is at risk for lack of exercise and has been provided with information to increase physical activity for the benefit of his well-being.  The patient was provided with written information regarding signs of hearing loss.  The patient was provided with suggestions to help him develop a healthy emotional lifestyle.

## 2023-09-07 NOTE — RESULT ENCOUNTER NOTE
There is less protein in the urine compared to 2 months ago which is good.  Kidney function and electrolytes are normal.  The hemoglobin A1c and cholesterol are much improved.  Keep up the good work.    Continue plan of care discussed at the time of visit.

## 2023-09-07 NOTE — PATIENT INSTRUCTIONS
"Hypertension  Dizziness  Kidney Disease  Decrease Amlodipine from 10 to 5 mg once daily  Stay off valsartan  For blood pressure and kidney protection, start olmesartan 20 mg once daily (low dose)  Recommend RN blood pressure check in 2-4 weeks.    Diabetes  Blood work today    Health Care Maintenance  You are due for COVID-19 and Influenza vaccine(s)  You are due for dilated eye exam.  Have the report sent to Dr. Alvarez's office.      Patient Education   Personalized Prevention Plan  You are due for the preventive services outlined below.  Your care team is available to assist you in scheduling these services.  If you have already completed any of these items, please share that information with your care team to update in your medical record.  Health Maintenance Due   Topic Date Due    COVID-19 Vaccine (6 - Moderna series) 01/16/2023    Eye Exam  01/20/2023    Basic Metabolic Panel  08/25/2023    Flu Vaccine (1) 09/01/2023    Cholesterol Lab  09/16/2023     Learning About Being Physically Active  What is physical activity?     Being physically active means doing any kind of activity that gets your body moving.  The types of physical activity that can help you get fit and stay healthy include:  Aerobic or \"cardio\" activities. These make your heart beat faster and make you breathe harder, such as brisk walking, riding a bike, or running. They strengthen your heart and lungs and build up your endurance.  Strength training activities. These make your muscles work against, or \"resist,\" something. Examples include lifting weights or doing push-ups. These activities help tone and strengthen your muscles and bones.  Stretches. These let you move your joints and muscles through their full range of motion. Stretching helps you be more flexible.  Reaching a balance between these three types of physical activity is important because each one contributes to your overall fitness.  What are the benefits of being active?  Being " "active is one of the best things you can do for your health. It helps you to:  Feel stronger and have more energy to do all the things you like to do.  Focus better at school or work.  Feel, think, and sleep better.  Reach and stay at a healthy weight.  Lose fat and build lean muscle.  Lower your risk for serious health problems, including diabetes, heart attack, high blood pressure, and some cancers.  Keep your heart, lungs, bones, muscles, and joints strong and healthy.  How can you make being active part of your life?  Start slowly. Make it your long-term goal to get at least 30 minutes of exercise on most days of the week. Walking is a good choice. You also may want to do other activities, such as running, swimming, cycling, or playing tennis or team sports.  Pick activities that you like--ones that make your heart beat faster, your muscles stronger, and your muscles and joints more flexible. If you find more than one thing you like doing, do them all. You don't have to do the same thing every day.  Get your heart pumping every day. Any activity that makes your heart beat faster and keeps it at that rate for a while counts.  Here are some great ways to get your heart beating faster:  Go for a brisk walk, run, or bike ride.  Go for a hike or swim.  Go in-line skating.  Play a game of touch football, basketball, or soccer.  Ride a bike.  Play tennis or racquetball.  Climb stairs.  Even some household chores can be aerobic--just do them at a faster pace. Vacuuming, raking or mowing the lawn, sweeping the garage, and washing and waxing the car all can help get your heart rate up.  Strengthen your muscles during the week. You don't have to lift heavy weights or grow big, bulky muscles to get stronger. Doing a few simple activities that make your muscles work against, or \"resist,\" something can help you get stronger.  For example, you can:  Do push-ups or sit-ups, which use your own body weight as resistance.  Lift " "weights or dumbbells or use stretch bands at home or in a gym or community center.  Stretch your muscles often. Stretching will help you as you become more active. It can help you stay flexible, loosen tight muscles, and avoid injury. It can also help improve your balance and posture and can be a great way to relax.  Be sure to stretch the muscles you'll be using when you work out. It's best to warm your muscles slightly before you stretch them. Walk or do some other light aerobic activity for a few minutes, and then start stretching.  When you stretch your muscles:  Do it slowly. Stretching is not about going fast or making sudden movements.  Don't push or bounce during a stretch.  Hold each stretch for at least 15 to 30 seconds, if you can. You should feel a stretch in the muscle, but not pain.  Breathe out as you do the stretch. Then breathe in as you hold the stretch. Don't hold your breath.  If you're worried about how more activity might affect your health, have a checkup before you start. Follow any special advice your doctor gives you for getting a smart start.  Where can you learn more?  Go to https://www.NetEffect.net/patiented  Enter W332 in the search box to learn more about \"Learning About Being Physically Active.\"  Current as of: October 10, 2022               Content Version: 13.7    4887-6403 [employer].   Care instructions adapted under license by your healthcare professional. If you have questions about a medical condition or this instruction, always ask your healthcare professional. [employer] disclaims any warranty or liability for your use of this information.      Hearing Loss: Care Instructions  Overview     Hearing loss is a sudden or slow decrease in how well you hear. It can range from slight to profound. Permanent hearing loss can occur with aging. It also can happen when you are exposed long-term to loud noise. Examples include listening to loud music, riding " motorcycles, or being around other loud machines.  Hearing loss can affect your work and home life. It can make you feel lonely or depressed. You may feel that you have lost your independence. But hearing aids and other devices can help you hear better and feel connected to others.  Follow-up care is a key part of your treatment and safety. Be sure to make and go to all appointments, and call your doctor if you are having problems. It's also a good idea to know your test results and keep a list of the medicines you take.  How can you care for yourself at home?  Avoid loud noises whenever possible. This helps keep your hearing from getting worse.  Always wear hearing protection around loud noises.  Wear a hearing aid as directed.  A professional can help you pick a hearing aid that will work best for you.  You can also get hearing aids over the counter for mild to moderate hearing loss.  Have hearing tests as your doctor suggests. They can show whether your hearing has changed. Your hearing aid may need to be adjusted.  Use other devices as needed. These may include:  Telephone amplifiers and hearing aids that can connect to a television, stereo, radio, or microphone.  Devices that use lights or vibrations. These alert you to the doorbell, a ringing telephone, or a baby monitor.  Television closed-captioning. This shows the words at the bottom of the screen. Most new TVs can do this.  TTY (text telephone). This lets you type messages back and forth on the telephone instead of talking or listening. These devices are also called TDD. When messages are typed on the keyboard, they are sent over the phone line to a receiving TTY. The message is shown on a monitor.  Use text messaging, social media, and email if it is hard for you to communicate by telephone.  Try to learn a listening technique called speechreading. It is not lipreading. You pay attention to people's gestures, expressions, posture, and tone of voice. These  "clues can help you understand what a person is saying. Face the person you are talking to, and have them face you. Make sure the lighting is good. You need to see the other person's face clearly.  Think about counseling if you need help to adjust to your hearing loss.  When should you call for help?  Watch closely for changes in your health, and be sure to contact your doctor if:    You think your hearing is getting worse.     You have new symptoms, such as dizziness or nausea.   Where can you learn more?  Go to https://www.[employer].net/patiented  Enter R798 in the search box to learn more about \"Hearing Loss: Care Instructions.\"  Current as of: March 1, 2023               Content Version: 13.7    0367-4551 [employer].   Care instructions adapted under license by your healthcare professional. If you have questions about a medical condition or this instruction, always ask your healthcare professional. [employer] disclaims any warranty or liability for your use of this information.      Your Health Risk Assessment indicates you feel you are not in good emotional health.    Recreation   Recreation is not limited to sports and team events. It includes any activity that provides relaxation, interest, enjoyment, and exercise. Recreation provides an outlet for physical, mental, and social energy. It can give a sense of worth and achievement. It can help you stay healthy.    Mental Exercise and Social Involvement  Mental and emotional health is as important as physical health. Keep in touch with friends and family. Stay as active as possible. Continue to learn and challenge yourself.   Things you can do to stay mentally active are:  Learn something new, like a foreign language or musical instrument.   Play SCRABBLE or do crossword puzzles. If you cannot find people to play these games with you at home, you can play them with others on your computer through the Internet.   Join a games " club--anything from card games to chess or checkers or lawn bowling.   Start a new hobby.   Go back to school.   Volunteer.   Read.   Keep up with world events.

## 2023-09-07 NOTE — NURSING NOTE
Immunizations Administered       Name Date Dose VIS Date Route    Influenza Vaccine 65+ (Fluzone HD) 9/7/23  9:25 AM 0.7 mL 8/6/21 Intramuscular          Prior to immunization administration, verified patients identity using patient s name and date of birth. Please see Immunization Activity for additional information.     Screening Questionnaire for Adult Immunization    Are you sick today?   No   Do you have allergies to medications, food, a vaccine component or latex?   No   Have you ever had a serious reaction after receiving a vaccination?   No   Do you have a long-term health problem with heart, lung, kidney, or metabolic disease (e.g., diabetes), asthma, a blood disorder, no spleen, complement component deficiency, a cochlear implant, or a spinal fluid leak?  Are you on long-term aspirin therapy?   No   Do you have cancer, leukemia, HIV/AIDS, or any other immune system problem?   No   Do you have a parent, brother, or sister with an immune system problem?   No   In the past 3 months, have you taken medications that affect  your immune system, such as prednisone, other steroids, or anticancer drugs; drugs for the treatment of rheumatoid arthritis, Crohn s disease, or psoriasis; or have you had radiation treatments?   No   Have you had a seizure, or a brain or other nervous system problem?   No   During the past year, have you received a transfusion of blood or blood    products, or been given immune (gamma) globulin or antiviral drug?   No   For women: Are you pregnant or is there a chance you could become       pregnant during the next month?   No   Have you received any vaccinations in the past 4 weeks?   No     Immunization questionnaire answers were all negative.      Patient instructed to remain in clinic for 15 minutes afterwards, and to report any adverse reactions.     Screening performed by Tanya STEPHEN LPN on 9/7/2023 at 9:28 AM.

## 2023-09-07 NOTE — PROGRESS NOTES
FOOT AND ANKLE SURGERY/PODIATRY Progress Note      ASSESSMENT:   Diabetic ulceration right foot  HAV  Jana      TREATMENT:  -The right foot ulceration has resolved.     -We discussed that due to his anatomy, there is a pressure point which will continue to build callus tissue. If the callus tissue becomes too thick, skin breakdown will likely occur.     -I recommend use of a pumice stone x2-3 per week to remove callus tissue. I have asked that he return for sharp debridement of the callus prn.     -I have referred him to [employer] [facility] for diabetic shoes and inserts to offload the area of increased skin pressure. He will remain limited walking in the CAM boot until the diabetic inserts/shoes are available.     -He is discharged from my care at this time but encouraged to return as concerns develop.     Elijah Oliva DPM  [facility]      HPI: Francois Lujan was seen again today for right foot ulceration.  Patient reports that he is remained limited walking in the cam boot as directed.    Past Medical History:   Diagnosis Date    Arthritis     CAD (coronary artery disease)     Diabetes mellitus (H)     Erythema nodosum 1982    Gout     Hypertension     Malignant neoplasm (H)     squamous cell CA removed from right hand    Thyroid disease        Past Surgical History:   Procedure Laterality Date    BACK SURGERY      BYPASS GRAFT ARTERY CORONARY  11/17/2011    Procedure:BYPASS GRAFT ARTERY CORONARY; CORONARY ARTERY BYPASS, Right, OM, LAD WITH ENDOVEIN HARVEST, ON PUMP; Surgeon:LEONEL MG; Location: OR    COLONOSCOPY N/A 6/9/2016    Procedure: COLONOSCOPY;  Surgeon: Isidro Humphreys MD;  Location: [facility]    HERNIA REPAIR      infantile hernia repair    ORTHOPEDIC SURGERY      Baker cyst removed - right knee, and mesiscus tear repair    AR LAMINEC/FACETECT/FORAMIN,LUMBAR 1 SEG      Description: Laminectomy Decompress, Facetectomy, Foraminotomy Lumbar Seg;  Recorded: 12/13/2007;  Comments:  '71    VA MASTECTOMY FOR GYNECOMASTIA      Description: Breast Surgery Mastectomy For Gynecomastia Bilateral;  Recorded: 12/13/2007;    REPAIR TENDON TRICEPS UPPER EXTREMITY Left 4/11/2017    Procedure: REPAIR TENDON TRICEPS UPPER EXTREMITY;  Surgeon: Rodriguez Kelley MD;  Location: [facility] CABG, VEIN, SINGLE      Description: CABG (CABG);  Proc Date: 11/09/2011;  Comments: 3 vessel       Allergies   Allergen Reactions    Hydrocodone-Acetaminophen Itching    Iodine     Irbesartan      renal insuff and hyperkalemia      Lisinopril Nausea    Sulfa Antibiotics          Current Outpatient Medications:     allopurinol (ZYLOPRIM) 300 MG tablet, Take 1 tablet (300 mg) by mouth daily, Disp: 90 tablet, Rfl: 3    amLODIPine (NORVASC) 5 MG tablet, Take 1 tablet (5 mg) by mouth daily, Disp: 90 tablet, Rfl: 3    atorvastatin (LIPITOR) 80 MG tablet, Take 1 tablet (80 mg) by mouth daily DUE FOR LAB, Disp: 90 tablet, Rfl: 3    blood glucose (ACCU-CHEK GUIDE) test strip, TEST ONCE DAILY AS DIRECTED, Disp: 100 strip, Rfl: 2    blood glucose (NO BRAND SPECIFIED) lancets standard, Use to test blood sugar 3 times daily or as directed., Disp: 1 each, Rfl: 11    clopidogrel (PLAVIX) 75 MG tablet, Take 1 tablet (75 mg) by mouth daily, Disp: 90 tablet, Rfl: 3    empagliflozin (JARDIANCE) 10 MG TABS tablet, Take 1 tablet (10 mg) by mouth daily, Disp: 90 tablet, Rfl: 3    glipiZIDE (GLUCOTROL XL) 5 MG 24 hr tablet, Take 1 tablet (5 mg) by mouth daily, Disp: 90 tablet, Rfl: 3    levothyroxine (SYNTHROID/LEVOTHROID) 50 MCG tablet, Take 1 tablet (50 mcg) by mouth every morning Take on an empty stomach., Disp: 90 tablet, Rfl: 3    metFORMIN (GLUCOPHAGE) 500 MG tablet, Take 2 tablets (1,000 mg) by mouth 2 times daily (with meals), Disp: 360 tablet, Rfl: 3    olmesartan (BENICAR) 20 MG tablet, Take 1 tablet (20 mg) by mouth daily, Disp: 90 tablet, Rfl: 3    testosterone (ANDROGEL/TESTIM) 50 MG/5GM (1%) topical gel, Apply to SHOULDERS.   Alternate dosing - 100mg (2 packets) one day then 50mg (1 packet) the next day, Disp: 250 g, Rfl: 5    Review of Systems - 10 point Review of Systems is negative except for right foot ulcer which is noted in HPI.      OBJECTIVE:  BP (!) 160/78   Pulse 86   SpO2 96%   General appearance: Patient is alert and fully cooperative with history & exam.  No sign of distress is noted during the visit.    Vascular: Dorsalis pedis palpableRight.  Dermatologic:    VASC Wound rt foot (Active)   Pre Size Length 0.8 08/15/23 1400   Pre Size Width 0.7 08/15/23 1400   Pre Size Depth 0.2 08/15/23 1400   Pre Total Sq cm 0.56 08/15/23 1400   Post Size Length 0 09/07/23 1300   Post Size Width 0 09/07/23 1300   Post Size Depth 0 09/07/23 1300   Post Total Sq cm 0 09/07/23 1300   Description callous 09/07/23 1200     Neurologic: Diminished to light touch Right.  Musculoskeletal: Contracted digits noted Right.      Picture:

## 2023-09-09 LAB — TESTOST SERPL-MCNC: 451 NG/DL (ref 240–950)

## 2023-10-17 ENCOUNTER — MEDICAL CORRESPONDENCE (OUTPATIENT)
Dept: HEALTH INFORMATION MANAGEMENT | Facility: CLINIC | Age: 73
End: 2023-10-17
Payer: COMMERCIAL

## 2023-10-23 ENCOUNTER — PATIENT OUTREACH (OUTPATIENT)
Dept: GASTROENTEROLOGY | Facility: CLINIC | Age: 73
End: 2023-10-23
Payer: COMMERCIAL

## 2023-11-09 DIAGNOSIS — R80.9 TYPE 2 DIABETES MELLITUS WITH MICROALBUMINURIA, WITHOUT LONG-TERM CURRENT USE OF INSULIN (H): ICD-10-CM

## 2023-11-09 DIAGNOSIS — E11.29 TYPE 2 DIABETES MELLITUS WITH MICROALBUMINURIA, WITHOUT LONG-TERM CURRENT USE OF INSULIN (H): ICD-10-CM

## 2023-11-09 NOTE — TELEPHONE ENCOUNTER
Pending Prescriptions:                       Disp   Refills    empagliflozin (JARDIANCE) 10 MG TABS tabl*90 tab*3            Sig: Take 1 tablet (10 mg) by mouth daily

## 2023-11-10 DIAGNOSIS — E11.29 TYPE 2 DIABETES MELLITUS WITH MICROALBUMINURIA, WITHOUT LONG-TERM CURRENT USE OF INSULIN (H): ICD-10-CM

## 2023-11-10 DIAGNOSIS — R80.9 TYPE 2 DIABETES MELLITUS WITH MICROALBUMINURIA, WITHOUT LONG-TERM CURRENT USE OF INSULIN (H): ICD-10-CM

## 2023-11-10 RX ORDER — GLIPIZIDE 5 MG/1
TABLET, FILM COATED, EXTENDED RELEASE ORAL
Qty: 90 TABLET | Refills: 0 | Status: SHIPPED | OUTPATIENT
Start: 2023-11-10 | End: 2024-02-19

## 2023-11-21 ENCOUNTER — TELEPHONE (OUTPATIENT)
Dept: FAMILY MEDICINE | Facility: CLINIC | Age: 73
End: 2023-11-21
Payer: COMMERCIAL

## 2023-11-21 NOTE — LETTER
November 27, 2023      Francois Lujan  [address]  [place] [address]        Dear Francois,     Your team at [facility] cares about your health. We have reviewed your chart and based on our findings; we are making the following recommendations to better manage your health.     You are in particular need of attention regarding the following:     HYPERTENSION FOLLOW UP: Blood pressure check with nurse, please call [phone] to schedule this appointment.    If you have already completed these items, please contact the clinic via phone or   UserApphart so your care team can review and update your records. Thank you for   choosing [facility] for your healthcare needs. For any questions,   concerns, or to schedule an appointment please contact our clinic.    Healthy Regards,      Your [facility] Care Team

## 2023-11-27 NOTE — TELEPHONE ENCOUNTER
Patient Quality Outreach    Patient is due for the following:   Hypertension -  BP check    Next Steps:   Schedule a nurse only visit for bp check    Type of outreach:    Sent letter.      Questions for provider review:    None           Elizabeth Olivier, CMA

## 2024-02-01 DIAGNOSIS — E29.1 PRIMARY TESTICULAR HYPOGONADISM: ICD-10-CM

## 2024-02-02 ENCOUNTER — MYC MEDICAL ADVICE (OUTPATIENT)
Dept: FAMILY MEDICINE | Facility: CLINIC | Age: 74
End: 2024-02-02
Payer: COMMERCIAL

## 2024-02-02 DIAGNOSIS — E29.1 PRIMARY TESTICULAR HYPOGONADISM: ICD-10-CM

## 2024-02-03 ENCOUNTER — MYC MEDICAL ADVICE (OUTPATIENT)
Dept: ENDOCRINOLOGY | Facility: CLINIC | Age: 74
End: 2024-02-03
Payer: COMMERCIAL

## 2024-02-03 DIAGNOSIS — E29.1 PRIMARY TESTICULAR HYPOGONADISM: ICD-10-CM

## 2024-02-05 ENCOUNTER — TELEPHONE (OUTPATIENT)
Dept: ENDOCRINOLOGY | Facility: CLINIC | Age: 74
End: 2024-02-05
Payer: COMMERCIAL

## 2024-02-05 RX ORDER — TESTOSTERONE GEL, 1% 10 MG/G
GEL TRANSDERMAL
Qty: 250 G | Refills: 3 | Status: SHIPPED | OUTPATIENT
Start: 2024-02-05

## 2024-02-05 RX ORDER — TESTOSTERONE GEL, 1% 10 MG/G
GEL TRANSDERMAL
Qty: 250 G | Refills: 5 | Status: SHIPPED | OUTPATIENT
Start: 2024-02-05 | End: 2024-02-05

## 2024-02-05 RX ORDER — TESTOSTERONE GEL, 1% 10 MG/G
GEL TRANSDERMAL
Qty: 250 G | Refills: 5 | Status: CANCELLED | OUTPATIENT
Start: 2024-02-05

## 2024-02-05 RX ORDER — TESTOSTERONE GEL, 1% 10 MG/G
GEL TRANSDERMAL
Qty: 250 G | Refills: 0 | OUTPATIENT
Start: 2024-02-05

## 2024-02-05 NOTE — TELEPHONE ENCOUNTER
Patient call:     Appointment type: new endocrine   Provider: provider who sees low testosterone in 3 months or soonest available after that   Return date: ^^  Speciality phone number: [phone]  Additional appointment(s) needed:   Additional notes: LVM and sent myc x1   Prev pt of Dr. Asmita Elizabeth on 2/5/2024 at 11:27 AM

## 2024-02-05 NOTE — TELEPHONE ENCOUNTER
Med pended for signature. Please sign. LOV 7/2023.   Basilia BINGHAM RN BSN PHN  Specialty Clinics

## 2024-02-10 ENCOUNTER — HEALTH MAINTENANCE LETTER (OUTPATIENT)
Age: 74
End: 2024-02-10

## 2024-02-19 DIAGNOSIS — R80.9 TYPE 2 DIABETES MELLITUS WITH MICROALBUMINURIA, WITHOUT LONG-TERM CURRENT USE OF INSULIN (H): ICD-10-CM

## 2024-02-19 DIAGNOSIS — E11.29 TYPE 2 DIABETES MELLITUS WITH MICROALBUMINURIA, WITHOUT LONG-TERM CURRENT USE OF INSULIN (H): ICD-10-CM

## 2024-02-19 RX ORDER — GLIPIZIDE 5 MG/1
TABLET, FILM COATED, EXTENDED RELEASE ORAL
Qty: 90 TABLET | Refills: 0 | Status: SHIPPED | OUTPATIENT
Start: 2024-02-19 | End: 2024-05-03

## 2024-03-21 DIAGNOSIS — R80.9 TYPE 2 DIABETES MELLITUS WITH MICROALBUMINURIA, WITHOUT LONG-TERM CURRENT USE OF INSULIN (H): ICD-10-CM

## 2024-03-21 DIAGNOSIS — E11.29 TYPE 2 DIABETES MELLITUS WITH MICROALBUMINURIA, WITHOUT LONG-TERM CURRENT USE OF INSULIN (H): ICD-10-CM

## 2024-04-09 ENCOUNTER — TELEPHONE (OUTPATIENT)
Dept: FAMILY MEDICINE | Facility: CLINIC | Age: 74
End: 2024-04-09
Payer: COMMERCIAL

## 2024-05-02 DIAGNOSIS — R80.9 TYPE 2 DIABETES MELLITUS WITH MICROALBUMINURIA, WITHOUT LONG-TERM CURRENT USE OF INSULIN (H): ICD-10-CM

## 2024-05-02 DIAGNOSIS — E11.29 TYPE 2 DIABETES MELLITUS WITH MICROALBUMINURIA, WITHOUT LONG-TERM CURRENT USE OF INSULIN (H): ICD-10-CM

## 2024-05-02 NOTE — TELEPHONE ENCOUNTER
Pending Prescriptions:                       Disp   Refills    glipiZIDE (GLUCOTROL XL) 5 MG 24 hr table*90 tab*0            Sig: TAKE 1 TABLET BY MOUTH DAILY    Routing refill request to provider for review/approval because:  Hemoglobin A1C   Date Value Ref Range Status   09/07/2023 6.9 (H) 0.0 - 5.6 % Final   07/08/2021 6.9 (H) 0 - 5.6 % Final     Comment:     Normal <5.7% Prediabetes 5.7-6.4%  Diabetes 6.5% or higher - adopted from ADA   consensus guidelines.       Jean Lang, RN

## 2024-05-03 RX ORDER — GLIPIZIDE 5 MG/1
5 TABLET, FILM COATED, EXTENDED RELEASE ORAL DAILY
Qty: 90 TABLET | Refills: 0 | Status: SHIPPED | OUTPATIENT
Start: 2024-05-03 | End: 2024-08-09

## 2024-05-03 NOTE — TELEPHONE ENCOUNTER
Called patient to update on refill request and to schedule appt.  Unable to leave voicemail.     Annette Bower  PSC, Primary Care

## 2024-06-25 DIAGNOSIS — E11.29 TYPE 2 DIABETES MELLITUS WITH MICROALBUMINURIA, WITHOUT LONG-TERM CURRENT USE OF INSULIN (H): ICD-10-CM

## 2024-06-25 DIAGNOSIS — R80.9 TYPE 2 DIABETES MELLITUS WITH MICROALBUMINURIA, WITHOUT LONG-TERM CURRENT USE OF INSULIN (H): ICD-10-CM

## 2024-06-25 RX ORDER — EMPAGLIFLOZIN 10 MG/1
10 TABLET, FILM COATED ORAL DAILY
Qty: 90 TABLET | Refills: 0 | Status: SHIPPED | OUTPATIENT
Start: 2024-06-25 | End: 2024-10-02

## 2024-06-25 NOTE — TELEPHONE ENCOUNTER
Requested Prescriptions   Pending Prescriptions Disp Refills    JARDIANCE 10 MG TABS tablet [Pharmacy Med Name: Jardiance Oral Tablet 10 MG] 90 tablet 0     Sig: Take 1 tablet (10 mg) by mouth daily       Sodium Glucose Co-Transport Inhibitor Agents Failed - 6/25/2024 12:13 PM        Failed - Patient has documented A1c within the specified period of time.     If HgbA1C is 8 or greater, it needs to be on file within the past 3 months.  If less than 8, must be on file within the past 6 months.     Recent Labs   Lab Test 09/07/23  0934   A1C 6.9*             Failed - Recent (6 mo) or future (90 days) visit within the authorizing provider's specialty     The patient must have completed an in-person or virtual visit within the past 6 months or has a future visit scheduled within the next 90 days with the authorizing provider s specialty.  Urgent care and e-visits do not quality as an office visit for this protocol.          Passed - Medication is active on med list        Passed - Has GFR on file in past 12 months and most recent value is normal        Passed - Medication indicated for associated diagnosis     Medication is associated with one or more of the following diagnoses:     Diabetic nephropathy, With Albuminuria - Type 2 diabetes mellitus     Disorder of cardiovascular system; Prophylaxis - Type 2 diabetes mellitus     Type 2 diabetes mellitus    Disorder of cardiovascular system; Prophylaxis - Heart failure   Chronic kidney disease, (At risk of progression) to reduce the risk of sustained   estimated GFR decline, end-stage kidney disease, cardiovascular death,   and hospitalization for heart failure     Heart failure, (NYHA class II to IV, reduced ejection fraction) to reduce risk of  cardiovascular death and hospitalization           Passed - Patient is age 18 or older        Passed - Patient has documented normal Potassium within the last 12 mos.     Recent Labs   Lab Test 09/07/23  0934   POTASSIUM 4.5

## 2024-06-29 ENCOUNTER — HEALTH MAINTENANCE LETTER (OUTPATIENT)
Age: 74
End: 2024-06-29

## 2024-08-08 DIAGNOSIS — E11.29 TYPE 2 DIABETES MELLITUS WITH MICROALBUMINURIA, WITHOUT LONG-TERM CURRENT USE OF INSULIN (H): ICD-10-CM

## 2024-08-08 DIAGNOSIS — R80.9 TYPE 2 DIABETES MELLITUS WITH MICROALBUMINURIA, WITHOUT LONG-TERM CURRENT USE OF INSULIN (H): ICD-10-CM

## 2024-08-08 DIAGNOSIS — I10 ESSENTIAL HYPERTENSION: ICD-10-CM

## 2024-08-08 DIAGNOSIS — Z86.73 HISTORY OF STROKE: ICD-10-CM

## 2024-08-08 DIAGNOSIS — E03.9 ACQUIRED HYPOTHYROIDISM: ICD-10-CM

## 2024-08-08 DIAGNOSIS — M1A.09X0 IDIOPATHIC CHRONIC GOUT OF MULTIPLE SITES WITHOUT TOPHUS: ICD-10-CM

## 2024-08-09 RX ORDER — ALLOPURINOL 300 MG/1
300 TABLET ORAL DAILY
Qty: 90 TABLET | Refills: 0 | Status: SHIPPED | OUTPATIENT
Start: 2024-08-09

## 2024-08-09 RX ORDER — LEVOTHYROXINE SODIUM 50 UG/1
50 TABLET ORAL EVERY MORNING
Qty: 90 TABLET | Refills: 0 | Status: SHIPPED | OUTPATIENT
Start: 2024-08-09

## 2024-08-09 RX ORDER — GLIPIZIDE 5 MG/1
5 TABLET, FILM COATED, EXTENDED RELEASE ORAL DAILY
Qty: 90 TABLET | Refills: 0 | Status: SHIPPED | OUTPATIENT
Start: 2024-08-09

## 2024-08-09 RX ORDER — OLMESARTAN MEDOXOMIL 20 MG/1
20 TABLET ORAL DAILY
Qty: 90 TABLET | Refills: 0 | Status: SHIPPED | OUTPATIENT
Start: 2024-08-09

## 2024-08-09 RX ORDER — CLOPIDOGREL BISULFATE 75 MG/1
75 TABLET ORAL DAILY
Qty: 90 TABLET | Refills: 0 | Status: SHIPPED | OUTPATIENT
Start: 2024-08-09

## 2024-08-09 NOTE — TELEPHONE ENCOUNTER
Left voicemail with patient informing him that refill was sent to pharmacy and to call us to schedule a follow up with fasting labs.    Maty Tucker on 8/9/2024 at 11:23 AM

## 2024-08-13 ENCOUNTER — TELEPHONE (OUTPATIENT)
Dept: FAMILY MEDICINE | Facility: CLINIC | Age: 74
End: 2024-08-13
Payer: COMMERCIAL

## 2024-08-13 NOTE — TELEPHONE ENCOUNTER
Patient Quality Outreach    Patient is due for the following:   Diabetes -  Diabetic Follow-Up Visit  Hypertension -  BP check  Depression  -  Depression follow-up visit    Next Steps:   Schedule a office visit for dm, depression, BP    Type of outreach:    Sent letter.    Next Steps:  Reach out within 90 days via Letter.    Max number of attempts reached: Yes. Will try again in 90 days if patient still on fail list.    Questions for provider review:    None           Lamar Ornelas MA  Chart routed to .

## 2024-08-27 DIAGNOSIS — E78.5 HYPERLIPIDEMIA LDL GOAL <70: ICD-10-CM

## 2024-08-28 ENCOUNTER — TELEPHONE (OUTPATIENT)
Dept: FAMILY MEDICINE | Facility: CLINIC | Age: 74
End: 2024-08-28
Payer: COMMERCIAL

## 2024-08-28 DIAGNOSIS — E78.5 HYPERLIPIDEMIA LDL GOAL <70: ICD-10-CM

## 2024-08-28 RX ORDER — ATORVASTATIN CALCIUM 80 MG/1
80 TABLET, FILM COATED ORAL DAILY
Qty: 100 TABLET | Refills: 0 | Status: SHIPPED | OUTPATIENT
Start: 2024-08-28

## 2024-08-28 RX ORDER — ATORVASTATIN CALCIUM 80 MG/1
80 TABLET, FILM COATED ORAL DAILY
Qty: 90 TABLET | Refills: 0 | Status: SHIPPED | OUTPATIENT
Start: 2024-08-28 | End: 2024-08-28

## 2024-10-01 DIAGNOSIS — E11.29 TYPE 2 DIABETES MELLITUS WITH MICROALBUMINURIA, WITHOUT LONG-TERM CURRENT USE OF INSULIN (H): ICD-10-CM

## 2024-10-01 DIAGNOSIS — R80.9 TYPE 2 DIABETES MELLITUS WITH MICROALBUMINURIA, WITHOUT LONG-TERM CURRENT USE OF INSULIN (H): ICD-10-CM

## 2024-10-02 RX ORDER — EMPAGLIFLOZIN 10 MG/1
10 TABLET, FILM COATED ORAL DAILY
Qty: 90 TABLET | Refills: 0 | Status: SHIPPED | OUTPATIENT
Start: 2024-10-02 | End: 2024-10-25

## 2024-10-10 ENCOUNTER — TELEPHONE (OUTPATIENT)
Dept: FAMILY MEDICINE | Facility: CLINIC | Age: 74
End: 2024-10-10
Payer: COMMERCIAL

## 2024-10-10 NOTE — TELEPHONE ENCOUNTER
"RN team please triage.  Appointment scheduled (in Aug) for end of Oct and appointment note states \"possible internal bleeding and feeling fatigued\"    "

## 2024-10-11 NOTE — TELEPHONE ENCOUNTER
"Routing to PCP as FYI and advise if needed (provider request triage pt)    Pt c/o having \"1-2 black liquid stools a day for past 6-7 weeks.  Feeling fatigued and weak.\"  Last week and a half pt has not had any black stools or complaints of weakness/fatigue.  Pt able to do normal physical activity without difficulty.  No soa or cp.  Pt admits to drinking 4-5 beers a day during the 6-7 weeks of black stool.  Pt has not had any ETOH for past 1 1/2 weeks.  Advised pt to refrain from ETOH, keep 10/25 clinic appointment, and call if symptoms return or go to ER if symptoms worsen.  Please advise if anything else needed.    Keena MARSHALL RN  [facility]    "

## 2024-10-11 NOTE — TELEPHONE ENCOUNTER
Attempted to reach pt for possible ADS today or office visit Monday.  Left message for patient to callback.  Dr. Alvarez requesting pt to be seen today or Monday.    Keena MARSHALL RN  [facility]

## 2024-10-14 NOTE — TELEPHONE ENCOUNTER
"Patient is reached and given PCP's recommendations with understanding voiced.  He declines ADS or clinic visit today, states he now has other commitments/engagements.   He states he has an appt with Dr. Alvarez in 2 weeks, so will discuss then.  RN did reiterate that a visit was advised sooner; however, patient still declines, states he's feeling much better, has been having normal stools and \"great energy\", no longer fatigued, so he would like to wait.    He was advised to reach out in the meantime or present to ER as needed should symptoms return.  Understanding voiced.    Sandra Mcmanus RN  [facility]    "

## 2024-10-25 ENCOUNTER — OFFICE VISIT (OUTPATIENT)
Dept: FAMILY MEDICINE | Facility: CLINIC | Age: 74
End: 2024-10-25
Payer: COMMERCIAL

## 2024-10-25 VITALS
HEIGHT: 72 IN | RESPIRATION RATE: 16 BRPM | SYSTOLIC BLOOD PRESSURE: 170 MMHG | BODY MASS INDEX: 34.89 KG/M2 | HEART RATE: 82 BPM | TEMPERATURE: 97 F | OXYGEN SATURATION: 98 % | WEIGHT: 257.6 LBS | DIASTOLIC BLOOD PRESSURE: 72 MMHG

## 2024-10-25 DIAGNOSIS — E11.29 TYPE 2 DIABETES MELLITUS WITH MICROALBUMINURIA, WITHOUT LONG-TERM CURRENT USE OF INSULIN (H): ICD-10-CM

## 2024-10-25 DIAGNOSIS — E78.5 HYPERLIPIDEMIA LDL GOAL <70: ICD-10-CM

## 2024-10-25 DIAGNOSIS — Z00.00 ENCOUNTER FOR MEDICARE ANNUAL WELLNESS EXAM: Primary | ICD-10-CM

## 2024-10-25 DIAGNOSIS — I10 ESSENTIAL HYPERTENSION: ICD-10-CM

## 2024-10-25 DIAGNOSIS — E23.0 HYPOPITUITARISM (H): ICD-10-CM

## 2024-10-25 DIAGNOSIS — E23.0 HYPOGONADOTROPIC HYPOGONADISM (H): ICD-10-CM

## 2024-10-25 DIAGNOSIS — R80.9 TYPE 2 DIABETES MELLITUS WITH MICROALBUMINURIA, WITHOUT LONG-TERM CURRENT USE OF INSULIN (H): ICD-10-CM

## 2024-10-25 DIAGNOSIS — Z86.73 HISTORY OF STROKE: ICD-10-CM

## 2024-10-25 DIAGNOSIS — E03.9 ACQUIRED HYPOTHYROIDISM: ICD-10-CM

## 2024-10-25 DIAGNOSIS — M1A.09X0 IDIOPATHIC CHRONIC GOUT OF MULTIPLE SITES WITHOUT TOPHUS: ICD-10-CM

## 2024-10-25 DIAGNOSIS — F32.1 MODERATE MAJOR DEPRESSION (H): ICD-10-CM

## 2024-10-25 DIAGNOSIS — F10.288 ALCOHOL DEPENDENCE WITH OTHER ALCOHOL-INDUCED DISORDER (H): ICD-10-CM

## 2024-10-25 DIAGNOSIS — K92.1 MELENA: ICD-10-CM

## 2024-10-25 DIAGNOSIS — E66.01 MORBID OBESITY (H): ICD-10-CM

## 2024-10-25 PROBLEM — S46.312S: Status: RESOLVED | Noted: 2017-04-10 | Resolved: 2024-10-25

## 2024-10-25 PROBLEM — L97.511 DIABETIC ULCER OF OTHER PART OF RIGHT FOOT ASSOCIATED WITH DIABETES MELLITUS DUE TO UNDERLYING CONDITION, LIMITED TO BREAKDOWN OF SKIN (H): Status: RESOLVED | Noted: 2023-08-15 | Resolved: 2024-10-25

## 2024-10-25 PROBLEM — R04.0 EPISTAXIS: Status: RESOLVED | Noted: 2017-12-15 | Resolved: 2024-10-25

## 2024-10-25 PROBLEM — E08.621 DIABETIC ULCER OF OTHER PART OF RIGHT FOOT ASSOCIATED WITH DIABETES MELLITUS DUE TO UNDERLYING CONDITION, LIMITED TO BREAKDOWN OF SKIN (H): Status: RESOLVED | Noted: 2023-08-15 | Resolved: 2024-10-25

## 2024-10-25 PROBLEM — Z87.891 PERSONAL HISTORY OF TOBACCO USE, PRESENTING HAZARDS TO HEALTH: Status: RESOLVED | Noted: 2022-08-23 | Resolved: 2024-10-25

## 2024-10-25 LAB
ANION GAP SERPL CALCULATED.3IONS-SCNC: 11 MMOL/L (ref 7–15)
BUN SERPL-MCNC: 31.4 MG/DL (ref 8–23)
CALCIUM SERPL-MCNC: 9 MG/DL (ref 8.8–10.4)
CHLORIDE SERPL-SCNC: 103 MMOL/L (ref 98–107)
CHOLEST SERPL-MCNC: 132 MG/DL
CREAT SERPL-MCNC: 1.27 MG/DL (ref 0.67–1.17)
CREAT UR-MCNC: 62 MG/DL
EGFRCR SERPLBLD CKD-EPI 2021: 60 ML/MIN/1.73M2
ERYTHROCYTE [DISTWIDTH] IN BLOOD BY AUTOMATED COUNT: 15.5 % (ref 10–15)
EST. AVERAGE GLUCOSE BLD GHB EST-MCNC: 128 MG/DL
FASTING STATUS PATIENT QL REPORTED: YES
FASTING STATUS PATIENT QL REPORTED: YES
GLUCOSE SERPL-MCNC: 111 MG/DL (ref 70–99)
HBA1C MFR BLD: 6.1 % (ref 0–5.6)
HCO3 SERPL-SCNC: 20 MMOL/L (ref 22–29)
HCT VFR BLD AUTO: 33.1 % (ref 40–53)
HDLC SERPL-MCNC: 66 MG/DL
HGB BLD-MCNC: 10.2 G/DL (ref 13.3–17.7)
LDLC SERPL CALC-MCNC: 55 MG/DL
MCH RBC QN AUTO: 30.5 PG (ref 26.5–33)
MCHC RBC AUTO-ENTMCNC: 30.8 G/DL (ref 31.5–36.5)
MCV RBC AUTO: 99 FL (ref 78–100)
MICROALBUMIN UR-MCNC: 229.7 MG/L
MICROALBUMIN/CREAT UR: 370.48 MG/G CR (ref 0–17)
NONHDLC SERPL-MCNC: 66 MG/DL
PLATELET # BLD AUTO: 248 10E3/UL (ref 150–450)
POTASSIUM SERPL-SCNC: 5.5 MMOL/L (ref 3.4–5.3)
RBC # BLD AUTO: 3.34 10E6/UL (ref 4.4–5.9)
SODIUM SERPL-SCNC: 134 MMOL/L (ref 135–145)
TRIGL SERPL-MCNC: 53 MG/DL
TSH SERPL DL<=0.005 MIU/L-ACNC: 0.69 UIU/ML (ref 0.3–4.2)
URATE SERPL-MCNC: 3.6 MG/DL (ref 3.4–7)
WBC # BLD AUTO: 10 10E3/UL (ref 4–11)

## 2024-10-25 PROCEDURE — 85027 COMPLETE CBC AUTOMATED: CPT | Performed by: INTERNAL MEDICINE

## 2024-10-25 PROCEDURE — 90480 ADMN SARSCOV2 VAC 1/ONLY CMP: CPT | Performed by: INTERNAL MEDICINE

## 2024-10-25 PROCEDURE — 82570 ASSAY OF URINE CREATININE: CPT | Performed by: INTERNAL MEDICINE

## 2024-10-25 PROCEDURE — 99214 OFFICE O/P EST MOD 30 MIN: CPT | Mod: 25 | Performed by: INTERNAL MEDICINE

## 2024-10-25 PROCEDURE — G0008 ADMIN INFLUENZA VIRUS VAC: HCPCS | Performed by: INTERNAL MEDICINE

## 2024-10-25 PROCEDURE — 90662 IIV NO PRSV INCREASED AG IM: CPT | Performed by: INTERNAL MEDICINE

## 2024-10-25 PROCEDURE — 36415 COLL VENOUS BLD VENIPUNCTURE: CPT | Performed by: INTERNAL MEDICINE

## 2024-10-25 PROCEDURE — G0439 PPPS, SUBSEQ VISIT: HCPCS | Performed by: INTERNAL MEDICINE

## 2024-10-25 PROCEDURE — 91320 SARSCV2 VAC 30MCG TRS-SUC IM: CPT | Performed by: INTERNAL MEDICINE

## 2024-10-25 PROCEDURE — 82043 UR ALBUMIN QUANTITATIVE: CPT | Performed by: INTERNAL MEDICINE

## 2024-10-25 PROCEDURE — 80048 BASIC METABOLIC PNL TOTAL CA: CPT | Performed by: INTERNAL MEDICINE

## 2024-10-25 PROCEDURE — 84550 ASSAY OF BLOOD/URIC ACID: CPT | Performed by: INTERNAL MEDICINE

## 2024-10-25 PROCEDURE — 80061 LIPID PANEL: CPT | Performed by: INTERNAL MEDICINE

## 2024-10-25 PROCEDURE — 83036 HEMOGLOBIN GLYCOSYLATED A1C: CPT | Performed by: INTERNAL MEDICINE

## 2024-10-25 PROCEDURE — 84443 ASSAY THYROID STIM HORMONE: CPT | Performed by: INTERNAL MEDICINE

## 2024-10-25 RX ORDER — AMLODIPINE BESYLATE 5 MG/1
5 TABLET ORAL DAILY
Qty: 90 TABLET | Refills: 3 | Status: SHIPPED | OUTPATIENT
Start: 2024-10-25 | End: 2024-10-25

## 2024-10-25 RX ORDER — OLMESARTAN MEDOXOMIL 5 MG/1
10 TABLET ORAL DAILY
Qty: 180 TABLET | Refills: 3 | Status: SHIPPED | OUTPATIENT
Start: 2024-10-25

## 2024-10-25 RX ORDER — OLMESARTAN MEDOXOMIL 20 MG/1
20 TABLET ORAL DAILY
Qty: 90 TABLET | Refills: 3 | Status: SHIPPED | OUTPATIENT
Start: 2024-10-25 | End: 2024-10-25

## 2024-10-25 RX ORDER — LEVOTHYROXINE SODIUM 50 UG/1
50 TABLET ORAL EVERY MORNING
Qty: 90 TABLET | Refills: 3 | Status: SHIPPED | OUTPATIENT
Start: 2024-10-25

## 2024-10-25 RX ORDER — GLIPIZIDE 5 MG/1
5 TABLET, FILM COATED, EXTENDED RELEASE ORAL DAILY
Qty: 90 TABLET | Refills: 3 | Status: SHIPPED | OUTPATIENT
Start: 2024-10-25

## 2024-10-25 RX ORDER — CLOPIDOGREL BISULFATE 75 MG/1
75 TABLET ORAL DAILY
Qty: 90 TABLET | Refills: 3 | Status: SHIPPED | OUTPATIENT
Start: 2024-10-25

## 2024-10-25 RX ORDER — AMLODIPINE BESYLATE 10 MG/1
10 TABLET ORAL DAILY
Qty: 90 TABLET | Refills: 3 | Status: SHIPPED | OUTPATIENT
Start: 2024-10-25

## 2024-10-25 RX ORDER — ALLOPURINOL 300 MG/1
300 TABLET ORAL DAILY
Qty: 90 TABLET | Refills: 3 | Status: SHIPPED | OUTPATIENT
Start: 2024-10-25

## 2024-10-25 RX ORDER — SPIRONOLACTONE 25 MG/1
25 TABLET ORAL DAILY
Status: CANCELLED | OUTPATIENT
Start: 2024-10-25

## 2024-10-25 RX ORDER — PANTOPRAZOLE SODIUM 40 MG/1
40 TABLET, DELAYED RELEASE ORAL DAILY
Qty: 14 TABLET | Refills: 0 | Status: SHIPPED | OUTPATIENT
Start: 2024-10-25 | End: 2024-11-08

## 2024-10-25 RX ORDER — ATORVASTATIN CALCIUM 80 MG/1
80 TABLET, FILM COATED ORAL DAILY
Qty: 90 TABLET | Refills: 3 | Status: SHIPPED | OUTPATIENT
Start: 2024-10-25

## 2024-10-25 SDOH — HEALTH STABILITY: PHYSICAL HEALTH: ON AVERAGE, HOW MANY MINUTES DO YOU ENGAGE IN EXERCISE AT THIS LEVEL?: 0 MIN

## 2024-10-25 SDOH — HEALTH STABILITY: PHYSICAL HEALTH: ON AVERAGE, HOW MANY DAYS PER WEEK DO YOU ENGAGE IN MODERATE TO STRENUOUS EXERCISE (LIKE A BRISK WALK)?: 0 DAYS

## 2024-10-25 ASSESSMENT — PATIENT HEALTH QUESTIONNAIRE - PHQ9
SUM OF ALL RESPONSES TO PHQ QUESTIONS 1-9: 1
SUM OF ALL RESPONSES TO PHQ QUESTIONS 1-9: 1
10. IF YOU CHECKED OFF ANY PROBLEMS, HOW DIFFICULT HAVE THESE PROBLEMS MADE IT FOR YOU TO DO YOUR WORK, TAKE CARE OF THINGS AT HOME, OR GET ALONG WITH OTHER PEOPLE: NOT DIFFICULT AT ALL

## 2024-10-25 ASSESSMENT — SOCIAL DETERMINANTS OF HEALTH (SDOH): HOW OFTEN DO YOU GET TOGETHER WITH FRIENDS OR RELATIVES?: MORE THAN THREE TIMES A WEEK

## 2024-10-25 ASSESSMENT — PAIN SCALES - GENERAL: PAINLEVEL_OUTOF10: MODERATE PAIN (4)

## 2024-10-25 NOTE — PROGRESS NOTES
Preventive Care Visit  [facility]  Teresita Alvarez DO, Internal Medicine  Oct 25, 2024      Assessment & Plan     Encounter for Medicare annual wellness exam    Type 2 diabetes mellitus with microalbuminuria, without long-term current use of insulin (H)  A1c is well-controlled on current regimen.  Occasional hypoglycemia and had 1 severe episode in the setting of excessive alcohol intake.  Discussed transitioning to Ozempic but he declines for now.  If he experiences more frequent or ongoing severe hypoglycemia then we should make the change.  Follow-up in 6 months  - glipiZIDE (GLUCOTROL XL) 5 MG 24 hr tablet; Take 1 tablet (5 mg) by mouth daily.  - empagliflozin (JARDIANCE) 10 MG TABS tablet; Take 1 tablet (10 mg) by mouth daily.  - metFORMIN (GLUCOPHAGE) 500 MG tablet; Take 2 tablets (1,000 mg) by mouth 2 times daily (with meals).  - olmesartan (BENICAR) 20 MG tablet; Take 1 tablet (20 mg) by mouth daily.  - OPTOMETRY REFERRAL; Future  - Hemoglobin A1c  - Basic metabolic panel  (Ca, Cl, CO2, Creat, Gluc, K, Na, BUN)  - Lipid panel reflex to direct LDL Fasting  - Albumin Random Urine Quantitative with Creat Ratio  - **CBC with platelets FUTURE 2mo; Future  - Hemoglobin A1c; Future    Essential hypertension  Long discussion about starting spironolactone 25 mg once daily but he declines for now.  - amLODIPine (NORVASC) 5 MG tablet; Take 1 tablet (5 mg) by mouth daily.  - olmesartan (BENICAR) 20 MG tablet; Take 1 tablet (20 mg) by mouth daily.    Moderate major depression (H)  Known issue that I take into account for their medical decisions, no current exacerbations or new concerns    Alcohol dependence with other alcohol-induced disorder (H)  Known issue that I take into account for their medical decisions, no current exacerbations or new concerns.  He declines to cut back    Hypopituitarism (H)  Follows with endo     Morbid obesity (H)  weight loss would help with blood pressure  control    History of stroke  Continue med  - clopidogrel (PLAVIX) 75 MG tablet; Take 1 tablet (75 mg) by mouth daily.    Acquired hypothyroidism   - stable, refill provided  - levothyroxine (SYNTHROID/LEVOTHROID) 50 MCG tablet; Take 1 tablet (50 mcg) by mouth every morning. Take on an empty stomach.  - TSH with free T4 reflex    Idiopathic chronic gout of multiple sites without tophus   - stable, refill provided  - allopurinol (ZYLOPRIM) 300 MG tablet; Take 1 tablet (300 mg) by mouth daily.  - CBC with platelets  - Uric acid    Hyperlipidemia LDL goal <70   - stable, refill provided  - atorvastatin (LIPITOR) 80 MG tablet; Take 1 tablet (80 mg) by mouth daily.    Melena  In the setting of excessive NSAID use.  Symptoms have resolved for over a month and his hemoglobin is slightly below his baseline.  Recommend to start 2 weeks of pantoprazole to heal any residual ulcer/gastritis.  If symptoms recur, he should have an upper and lower endoscopy  - Iron; Future  - Ferritin; Future  - pantoprazole (PROTONIX) 40 MG EC tablet; Take 1 tablet (40 mg) by mouth daily for 14 days.  - Ferritin  - Iron  - **CBC with platelets FUTURE 2mo; Future    Hypogonadotropic hypogonadism (H)  Follow-up with endo next week  - Testosterone, total; Future  - Testosterone, total    Patient has been advised of split billing requirements and indicates understanding: Yes        BMI  Estimated body mass index is 34.94 kg/m  as calculated from the following:    Height as of this encounter: 1.829 m (6').    Weight as of this encounter: 116.8 kg (257 lb 9.6 oz).   Weight management plan: Discussed healthy diet and exercise guidelines    Counseling  Appropriate preventive services were addressed with this patient via screening, questionnaire, or discussion as appropriate for fall prevention, nutrition, physical activity, Tobacco-use cessation, social engagement, weight loss and cognition.  Checklist reviewing preventive services available has been  given to the patient.  Reviewed patient's diet, addressing concerns and/or questions.   The patient was instructed to see the dentist every 6 months.   The patient reports drinking more than 3 alcoholic drinks per day and/or more than 7 drhnks per week. The patient was counseled and given information about possible harmful effects of excessive alcohol intake.The patient was provided with written information regarding signs of hearing loss.           Juan F Parrish is a 73 year old, presenting for the following:  AWV (No longer having bleeding symptoms ), Hypertension, Diabetes, Lipids, and Thyroid Disease        10/25/2024     6:35 AM   Additional Questions   Roomed by poncho   Accompanied by self         10/25/2024     6:35 AM   Patient Reported Additional Medications   Patient reports taking the following new medications none           HPI      Chief Complaint   Patient presents with    AWV     No longer having bleeding symptoms     Hypertension    Diabetes    Lipids    Thyroid Disease     ?GI Bleed?  --about 6-7 weeks ago, he noted 1-2 months of melena. He was feeling very weak.  He started metamucil and iron and symptoms resolved  --4-5 beers, 3-4 x week  --last episode was ~ 1 month ago; has since stopped the iron  --no abdominal pain, nausea during episode  --his back was acting up and he was using more naproxen during this time    Diabetes Follow-up    How often are you checking your blood sugar? A few times a week  What time of day are you checking your blood sugars (select all that apply)?   am  Have you had any blood sugars above 200?  No  Have you had any blood sugars below 70?  Yes sometimes  What symptoms do you notice when your blood sugar is low?  Shaky  What concerns do you have today about your diabetes? Other: long term effects   Do you have any of these symptoms? (Select all that apply)  Numbness in feet and Blurry vision  Have you had a diabetic eye exam in the last 12 months? No  He has  had some overnight hypoglycemia (40s), had symptoms with this.  Last was ~ 3 months ago.  He reports the low was related to excessive EtOH and not eating enough  Takes glipizide in AM but often not eating breakfast            Hyperlipidemia Follow-Up    Are you regularly taking any medication or supplement to lower your cholesterol?   Yes- pm  Are you having muscle aches or other side effects that you think could be caused by your cholesterol lowering medication?  No    Hypertension Follow-up    Do you check your blood pressure regularly outside of the clinic? Yes sometimes   Are you following a low salt diet? Yes  Are your blood pressures ever more than 140 on the top number (systolic) OR more than 90 on the bottom number (diastolic), for example 140/90? Yes  He is not on hydrochlorothiazide due to history of gout  Is noting blood pressure is running higher at home 140-150+    BP Readings from Last 2 Encounters:   10/25/24 (!) 170/72   09/07/23 (!) 160/78     Hemoglobin A1C (%)   Date Value   09/07/2023 6.9 (H)   06/23/2023 10.0 (H)   07/08/2021 6.9 (H)   04/08/2021 8.2 (H)     LDL Cholesterol Calculated (mg/dL)   Date Value   09/07/2023 48   09/16/2022 79   07/08/2021 76   08/03/2020 93         Hypothyroidism Follow-up    Since last visit, patient describes the following symptoms: Weight stable, no hair loss, no skin changes, no constipation, no loose stools    Health Care Directive  Patient does not have a Health Care Directive: Discussed advance care planning with patient; information given to patient to review.      10/25/2024   General Health   How would you rate your overall physical health? Good   Feel stress (tense, anxious, or unable to sleep) Not at all            10/25/2024   Nutrition   Diet: Carbohydrate counting            10/25/2024   Exercise   Days per week of moderate/strenous exercise 0 days   Average minutes spent exercising at this level 0 min      (!) EXERCISE CONCERN      10/25/2024   Social  Factors   Frequency of gathering with friends or relatives More than three times a week   Worry food won't last until get money to buy more No   Food not last or not have enough money for food? No   Do you have housing? (Housing is defined as stable permanent housing and does not include staying ouside in a car, in a tent, in an abandoned building, in an overnight shelter, or couch-surfing.) Yes   Are you worried about losing your housing? No   Lack of transportation? No   Unable to get utilities (heat,electricity)? No            10/25/2024   Fall Risk   Fallen 2 or more times in the past year? No     No    Trouble with walking or balance? Yes     Yes    Reason Gait Speed Test Not Completed Patient declines       Patient-reported    Multiple values from one day are sorted in reverse-chronological order          10/25/2024   Activities of Daily Living- Home Safety   Needs help with the following daily activites None of the above   Safety concerns in the home None of the above            10/25/2024   Dental   Dentist two times every year? (!) NO            10/25/2024   Hearing Screening   Hearing concerns? (!) I FEEL THAT PEOPLE ARE MUMBLING OR NOT SPEAKING CLEARLY.    (!) I NEED TO ASK PEOPLE TO SPEAK UP OR REPEAT THEMSELVES.    (!) IT'S HARDER TO UNDERSTAND WOMEN'S VOICES THAN MEN'S VOICES.    (!) IT'S HARD TO FOLLOW A CONVERSATION IN A NOISY RESTAURANT OR CROWDED ROOM.    (!) TROUBLE UNDESTANDING A SPEAKER IN A PUBLIC MEETING OR [employer] SERVICE.    (!) TROUBLE FOLLOWING DIALOGUE IN THE THEATHER.    (!) TROUBLE UNDERSTANDING SOFT OR WHISPERED SPEECH.    (!) TROUBLE UNDERSTANDING SPEECH ON THE TELEPHONE       Multiple values from one day are sorted in reverse-chronological order         10/25/2024   Driving Risk Screening   Patient/family members have concerns about driving No            10/25/2024   General Alertness/Fatigue Screening   Have you been more tired than usual lately? No            10/25/2024   Urinary  Incontinence Screening   Bothered by leaking urine in past 6 months No            10/25/2024   TB Screening   Were you born outside of the [place]? Yes          Today's PHQ-9 Score:       10/25/2024     6:40 AM   PHQ-9 SCORE   PHQ-9 Total Score MyChart 1 (Minimal depression)   PHQ-9 Total Score 1        Patient-reported         10/25/2024   Substance Use   Alcohol more than 3/day or more than 7/wk Yes   How often do you have a drink containing alcohol 2 to 3 times a week   How many alcohol drinks on typical day 3 or 4   How often do you have 5+ drinks at one occasion Monthly   Audit 2/3 Score 3   How often not able to stop drinking once started Never   How often failed to do what normally expected Never   How often needed first drink in am after a heavy drinking session Never   How often feeling of guilt or remorse after drinking Never   How often unable to remember what happened the night before Never   Have you or someone else been injured because of your drinking No   Has anyone been concerned or suggested you cut down on drinking No   TOTAL SCORE - AUDIT 6   Do you have a current opioid prescription? No   How severe/bad is pain from 1 to 10? 4/10   Do you use any other substances recreationally? No        Social History     Tobacco Use    Smoking status: Former     Current packs/day: 0.00     Average packs/day: 2.0 packs/day for 30.0 years (60.0 ttl pk-yrs)     Types: Cigarettes     Start date: 1967     Quit date: 1997     Years since quittin.9    Smokeless tobacco: Never   Vaping Use    Vaping status: Never Used   Substance Use Topics    Alcohol use: Yes     Alcohol/week: 10.0 standard drinks of alcohol     Types: 12 Cans of beer per week     Comment: on the weekends about 5 drinks a weekend     Drug use: No           10/25/2024   AAA Screening   Family history of Abdominal Aortic Aneurysm (AAA)? No      ASCVD Risk   The ASCVD Risk score (Jenny STEPHENS, et al., 2019) failed to calculate for the  following reasons:    Risk score cannot be calculated because patient has a medical history suggesting prior/existing ASCVD            Reviewed and updated as needed this visit by Provider                    Current Outpatient Medications   Medication Sig Dispense Refill    allopurinol (ZYLOPRIM) 300 MG tablet Take 1 tablet (300 mg) by mouth daily 90 tablet 0    amLODIPine (NORVASC) 5 MG tablet Take 1 tablet (5 mg) by mouth daily 90 tablet 3    atorvastatin (LIPITOR) 80 MG tablet Take 1 tablet (80 mg) by mouth daily. DUE FOR  tablet 0    blood glucose (ACCU-CHEK GUIDE) test strip TEST ONCE DAILY AS DIRECTED 100 strip 2    blood glucose (NO BRAND SPECIFIED) lancets standard Use to test blood sugar 3 times daily or as directed. 1 each 11    clopidogrel (PLAVIX) 75 MG tablet Take 1 tablet (75 mg) by mouth daily 90 tablet 0    glipiZIDE (GLUCOTROL XL) 5 MG 24 hr tablet TAKE ONE TABLET BY MOUTH ONE TIME DAILY 90 tablet 0    JARDIANCE 10 MG TABS tablet Take 1 tablet (10 mg) by mouth daily 90 tablet 0    levothyroxine (SYNTHROID/LEVOTHROID) 50 MCG tablet Take 1 tablet (50 mcg) by mouth every morning. Take on an empty stomach. 90 tablet 0    metFORMIN (GLUCOPHAGE) 500 MG tablet Take 2 tablets (1,000 mg) by mouth 2 times daily (with meals) 360 tablet 0    olmesartan (BENICAR) 20 MG tablet Take 1 tablet (20 mg) by mouth daily 90 tablet 0    testosterone (ANDROGEL/TESTIM) 50 MG/5GM (1%) topical gel Apply to SHOULDERS.  Alternate dosing - 100mg (2 packets) one day then 50mg (1 packet) the next day.  FOLLOWUP APPOINTMENT NEEDED FOR REFILLS 250 g 3     Current providers sharing in care for this patient include:  Patient Care Team:  Teresita Alvarez DO as PCP - General (Internal Medicine)  Teresita Alvarez DO as Assigned PCP  Andrew Tian MD as Physician (Endocrinology, Diabetes, and Metabolism)  Andrew Tian MD as Assigned Endocrinology Provider  Elijah Oliva DPM as Assigned Surgical Provider    The  following health maintenance items are reviewed in Epic and correct as of today:  Health Maintenance   Topic Date Due    ZOSTER IMMUNIZATION (1 of 2) Never done    RSV VACCINE (1 - Risk 60-74 years 1-dose series) Never done    DTAP/TDAP/TD IMMUNIZATION (4 - Td or Tdap) 11/29/2022    EYE EXAM  01/20/2023    A1C  12/07/2023    TSH W/FREE T4 REFLEX  06/23/2024    DIABETIC FOOT EXAM  06/23/2024    ANNUAL REVIEW OF HM ORDERS  06/23/2024    MEDICARE ANNUAL WELLNESS VISIT  09/07/2024    BMP  09/07/2024    LIPID  09/07/2024    MICROALBUMIN  09/07/2024    PHQ-9  04/25/2025    FALL RISK ASSESSMENT  10/25/2025    COLORECTAL CANCER SCREENING  06/09/2026    ADVANCE CARE PLANNING  09/07/2028    HEPATITIS C SCREENING  Completed    DEPRESSION ACTION PLAN  Completed    INFLUENZA VACCINE  Completed    Pneumococcal Vaccine: 65+ Years  Completed    URINALYSIS  Completed    AORTIC ANEURYSM SCREENING (SYSTEM ASSIGNED)  Completed    COVID-19 Vaccine  Completed    HPV IMMUNIZATION  Aged Out    MENINGITIS IMMUNIZATION  Aged Out    RSV MONOCLONAL ANTIBODY  Aged Out    LUNG CANCER SCREENING  Discontinued         Review of Systems  Constitutional, neuro, ENT, endocrine, pulmonary, cardiac, gastrointestinal, genitourinary, musculoskeletal, integument and psychiatric systems are negative, except as otherwise noted.     Objective    Exam  BP (!) 170/72 (BP Location: Left arm, Patient Position: Sitting, Cuff Size: Adult Regular)   Pulse 82   Temp 97  F (36.1  C) (Tympanic)   Resp 16   Ht 1.829 m (6')   Wt 116.8 kg (257 lb 9.6 oz)   SpO2 98%   BMI 34.94 kg/m     Estimated body mass index is 34.94 kg/m  as calculated from the following:    Height as of this encounter: 1.829 m (6').    Weight as of this encounter: 116.8 kg (257 lb 9.6 oz).    Physical Exam  GENERAL: alert and no distress  EYES: Eyes grossly normal to inspection, PERRL and conjunctivae and sclerae normal  HENT: ear canals and TM's normal, nose and mouth without ulcers or  lesions  NECK: no adenopathy, no asymmetry, masses, or scars  RESP: lungs clear to auscultation - no rales, rhonchi or wheezes  CV: regular rate and rhythm, normal S1 S2, no S3 or S4, no murmur, click or rub, no peripheral edema  ABDOMEN: soft, nontender, no hepatosplenomegaly, no masses and bowel sounds normal  MS: no gross musculoskeletal defects noted, no edema  SKIN: no suspicious lesions or rashes  NEURO: Normal strength and tone, mentation intact and speech normal  PSYCH: mentation appears normal, affect normal/bright  Diabetic foot exam: normal DP and PT pulses, no trophic changes or ulcerative lesions, and reduced sensation at 8/10 spots bilateral          10/25/2024   Mini Cog   Clock Draw Score 2 Normal   3 Item Recall 3 objects recalled   Mini Cog Total Score 5                 Signed Electronically by: Teresita Alvarez DO

## 2024-10-25 NOTE — PATIENT INSTRUCTIONS
Health Care Maintenance  You are due for  RSV, Shingles, and Tetanus vaccine(s) - get at retail pharmacy  Please fill out healthcare directive     Diabetes  Blood work today  Take the glipizide before a meal, can move to dinner since you eat dinner consistently  If A1c is not well controlled, I would recommend to change Glipizide to Ozempic  If you start having more frequent or another severe low blood sugar, let me know via phone or My Chart    Possible GI Bleed  Will check hemoglobin today  If low, I would recommend GI scope procedures  Recommend to start Pantoprazole 40 mg daily x 2 weeks to ensure any residual ulcer is healed    Hypertension  Continue amlodipine and Olmesartan  I recommend to start spironolactone 25 mg once daily - you declined for now  Can spread out blood pressure medications     Patient Education   Preventive Care Advice   This is general advice given by our system to help you stay healthy. However, your care team may have specific advice just for you. Please talk to your care team about your preventive care needs.  Nutrition  Eat 5 or more servings of fruits and vegetables each day.  Try wheat bread, brown rice and whole grain pasta (instead of white bread, rice, and pasta).  Get enough calcium and vitamin D. Check the label on foods and aim for 100% of the RDA (recommended daily allowance).  Lifestyle  Exercise at least 150 minutes each week  (30 minutes a day, 5 days a week).  Do muscle strengthening activities 2 days a week. These help control your weight and prevent disease.  No smoking.  Wear sunscreen to prevent skin cancer.  Have a dental exam and cleaning every 6 months.  Yearly exams  See your health care team every year to talk about:  Any changes in your health.  Any medicines your care team has prescribed.  Preventive care, family planning, and ways to prevent chronic diseases.  Shots (vaccines)   HPV shots (up to age 26), if you've never had them before.  Hepatitis B shots (up  to age 59), if you've never had them before.  COVID-19 shot: Get this shot when it's due.  Flu shot: Get a flu shot every year.  Tetanus shot: Get a tetanus shot every 10 years.  Pneumococcal, hepatitis A, and RSV shots: Ask your care team if you need these based on your risk.  Shingles shot (for age 50 and up)  General health tests  Diabetes screening:  Starting at age 35, Get screened for diabetes at least every 3 years.  If you are younger than age 35, ask your care team if you should be screened for diabetes.  Cholesterol test: At age 39, start having a cholesterol test every 5 years, or more often if advised.  Bone density scan (DEXA): At age 50, ask your care team if you should have this scan for osteoporosis (brittle bones).  Hepatitis C: Get tested at least once in your life.  STIs (sexually transmitted infections)  Before age 24: Ask your care team if you should be screened for STIs.  After age 24: Get screened for STIs if you're at risk. You are at risk for STIs (including HIV) if:  You are sexually active with more than one person.  You don't use condoms every time.  You or a partner was diagnosed with a sexually transmitted infection.  If you are at risk for HIV, ask about PrEP medicine to prevent HIV.  Get tested for HIV at least once in your life, whether you are at risk for HIV or not.  Cancer screening tests  Cervical cancer screening: If you have a cervix, begin getting regular cervical cancer screening tests starting at age 21.  Breast cancer scan (mammogram): If you've ever had breasts, begin having regular mammograms starting at age 40. This is a scan to check for breast cancer.  Colon cancer screening: It is important to start screening for colon cancer at age 45.  Have a colonoscopy test every 10 years (or more often if you're at risk) Or, ask your provider about stool tests like a FIT test every year or Cologuard test every 3 years.  To learn more about your testing options, visit:   .  For  help making a decision, visit:   https://bit.ly/vp37887.  Prostate cancer screening test: If you have a prostate, ask your care team if a prostate cancer screening test (PSA) at age 55 is right for you.  Lung cancer screening: If you are a current or former smoker ages 50 to 80, ask your care team if ongoing lung cancer screenings are right for you.  For informational purposes only. Not to replace the advice of your health care provider. Copyright   2023 [employer]. All rights reserved. Clinically reviewed by the  [employer] [facility]. [employer] 706330 - REV 01/24.  Preventing Falls: Care Instructions  Injuries and health problems such as trouble walking or poor eyesight can increase your risk of falling. So can some medicines. But there are things you can do to help prevent falls. You can exercise to get stronger. You can also arrange your home to make it safer.    Talk to your doctor about the medicines you take. Ask if any of them increase the risk of falls and whether they can be changed or stopped.   Try to exercise regularly. It can help improve your strength and balance. This can help lower your risk of falling.         Practice fall safety and prevention.   Wear low-heeled shoes that fit well and give your feet good support. Talk to your doctor if you have foot problems that make this hard.  Carry a cellphone or wear a medical alert device that you can use to call for help.  Use stepladders instead of chairs to reach high objects. Don't climb if you're at risk for falls. Ask for help, if needed.  Wear the correct eyeglasses, if you need them.        Make your home safer.   Remove rugs, cords, clutter, and furniture from walkways.  Keep your house well lit. Use night-lights in hallways and bathrooms.  Install and use sturdy handrails on stairways.  Wear nonskid footwear, even inside. Don't walk barefoot or in socks without shoes.        Be safe outside.   Use handrails, curb  "cuts, and ramps whenever possible.  Keep your hands free by using a shoulder bag or backpack.  Try to walk in well-lit areas. Watch out for uneven ground, changes in pavement, and debris.  Be careful in the winter. Walk on the grass or gravel when sidewalks are slippery. Use de-icer on steps and walkways. Add non-slip devices to shoes.    Put grab bars and nonskid mats in your shower or tub and near the toilet. Try to use a shower chair or bath bench when bathing.   Get into a tub or shower by putting in your weaker leg first. Get out with your strong side first. Have a phone or medical alert device in the bathroom with you.   Where can you learn more?  Go to https://www.[employer].net/patiented  Enter G117 in the search box to learn more about \"Preventing Falls: Care Instructions.\"  Current as of: July 17, 2023  Content Version: 14.2 2024 [employer].   Care instructions adapted under license by your healthcare professional. If you have questions about a medical condition or this instruction, always ask your healthcare professional. [employer] disclaims any warranty or liability for your use of this information.    Hearing Loss: Care Instructions  Overview     Hearing loss is a sudden or slow decrease in how well you hear. It can range from slight to profound. Permanent hearing loss can occur with aging. It also can happen when you are exposed long-term to loud noise. Examples include listening to loud music, riding motorcycles, or being around other loud machines.  Hearing loss can affect your work and home life. It can make you feel lonely or depressed. You may feel that you have lost your independence. But hearing aids and other devices can help you hear better and feel connected to others.  Follow-up care is a key part of your treatment and safety. Be sure to make and go to all appointments, and call your doctor if you are having problems. It's also a good idea to know your test " results and keep a list of the medicines you take.  How can you care for yourself at home?  Avoid loud noises whenever possible. This helps keep your hearing from getting worse.  Always wear hearing protection around loud noises.  Wear a hearing aid as directed.  A professional can help you pick a hearing aid that will work best for you.  You can also get hearing aids over the counter for mild to moderate hearing loss.  Have hearing tests as your doctor suggests. They can show whether your hearing has changed. Your hearing aid may need to be adjusted.  Use other devices as needed. These may include:  Telephone amplifiers and hearing aids that can connect to a television, stereo, radio, or microphone.  Devices that use lights or vibrations. These alert you to the doorbell, a ringing telephone, or a baby monitor.  Television closed-captioning. This shows the words at the bottom of the screen. Most new TVs can do this.  TTY (text telephone). This lets you type messages back and forth on the telephone instead of talking or listening. These devices are also called TDD. When messages are typed on the keyboard, they are sent over the phone line to a receiving TTY. The message is shown on a monitor.  Use text messaging, social media, and email if it is hard for you to communicate by telephone.  Try to learn a listening technique called speechreading. It is not lipreading. You pay attention to people's gestures, expressions, posture, and tone of voice. These clues can help you understand what a person is saying. Face the person you are talking to, and have them face you. Make sure the lighting is good. You need to see the other person's face clearly.  Think about counseling if you need help to adjust to your hearing loss.  When should you call for help?  Watch closely for changes in your health, and be sure to contact your doctor if:    You think your hearing is getting worse.     You have new symptoms, such as dizziness or  "nausea.   Where can you learn more?  Go to https://www.[employer]Strem[employer].net/patiented  Enter R798 in the search box to learn more about \"Hearing Loss: Care Instructions.\"  Current as of: September 27, 2023  Content Version: 14.2 2024 [employer].   Care instructions adapted under license by your healthcare professional. If you have questions about a medical condition or this instruction, always ask your healthcare professional. Healthwise[employer] Inc[employer] disclaims any warranty or liability for your use of this information.    9 Ways to Cut Back on Drinking  Maybe you've found yourself drinking more alcohol than you'd prefer. If you want to cut back, here are some ideas to try.    Think before you drink.  Do you really want a drink, or is it just a habit? If you're used to having a drink at a certain time, try doing something else then.     Look for substitutes.  Find some no-alcohol drinks that you enjoy, like flavored seltzer water, tea with honey, or tonic with a slice of lime. Or try alcohol-free beer or \"virgin\" cocktails (without the alcohol).     Drink more water.  Use water to quench your thirst. Drink a glass of water before you have any alcohol. Have another glass along with every drink or between drinks.     Shrink your drink.  For example, have a bottle of beer instead of a pint. Use a smaller glass for wine. Choose drinks with lower alcohol content (ABV%). Or use less liquor and more mixer in cocktails.     Slow down.  It's easy to drink quickly and without thinking about it. Pay attention, and make each drink last longer.     Do the math.  Total up how much you spend on alcohol each month. How much is that a year? If you cut back, what could you do with the money you save?     Take a break.  Choose a day or two each week when you won't drink at all. Notice how you feel on those days, physically and emotionally. How did you sleep? Do you feel better? Over time, add more break days.     Count " "calories.  Would you like to lose some weight? For some people that's a good motivator for cutting back. Figure out how many calories are in each drink. How many does that add up to in a day? In a week? In a month?     Practice saying no.  Be ready when someone offers you a drink. Try: \"Thanks, I've had enough.\" Or \"Thanks, but I'm cutting back.\" Or \"No, thanks. I feel better when I drink less.\"   Current as of: November 15, 2023  Content Version: 14.2 20[address] [facility] [employer].   Care instructions adapted under license by your healthcare professional. If you have questions about a medical condition or this instruction, always ask your healthcare professional. Healthwise, Incorporated disclaims any warranty or liability for your use of this information.     "

## 2024-10-30 ENCOUNTER — OFFICE VISIT (OUTPATIENT)
Dept: ENDOCRINOLOGY | Facility: CLINIC | Age: 74
End: 2024-10-30
Payer: COMMERCIAL

## 2024-10-30 VITALS — HEART RATE: 89 BPM | DIASTOLIC BLOOD PRESSURE: 50 MMHG | SYSTOLIC BLOOD PRESSURE: 152 MMHG | OXYGEN SATURATION: 97 %

## 2024-10-30 DIAGNOSIS — E29.1 PRIMARY TESTICULAR HYPOGONADISM: ICD-10-CM

## 2024-10-30 PROCEDURE — 99214 OFFICE O/P EST MOD 30 MIN: CPT | Performed by: NURSE PRACTITIONER

## 2024-10-30 RX ORDER — TESTOSTERONE GEL, 1% 10 MG/G
GEL TRANSDERMAL
Qty: 250 G | Refills: 3 | Status: SHIPPED | OUTPATIENT
Start: 2024-10-30

## 2024-10-30 NOTE — LETTER
"10/30/2024      Francois Lujan  [address]  [place] [address]      Dear Colleague,    Thank you for referring your patient, Francois Lujan, to the [facility]. Please see a copy of my visit note below.    [facility] ENDOCRINOLOGY    Endocrine Note 10/30/2024    Francois Lujan, 1950, [id number]          Reason for visit      1. Primary testicular hypogonadism        History     Francois Lujan is a very pleasant 73 year old old male who presents for follow up.  SUMMARY:    Francois is here today as a OLIVIA from Dr Tian for Primary testicular hypogonadism.    \"-may be hypergonadotropic given hx of mumps. Francois is a 72M with pmh of mumps as a child, hypogonadism who is referred to endocrine for management of hypogonadism. Has been taking testosterone for 30+ years.  He had previously been on injection versions but is currently on testim gel 50mg once daily.  He applies this to his abdomen. He also has hx of CVA with some residual unsteadiness in gait and nerve issues in R hand but otherwise doing well.\"    Mr Lujan is unhappy with the fact that there are no Endocrinologists in [place] any longer and that he had to travel \"90 miles round trip\" today. I am not happy about the fact that there are no Endocrinologists in [place] either. But here we are.     He has been using one gel irma (50 mg) daily. He reports that he was applying one on one day and then 2 on the next. He felt as though that was \"too much\" and \"feels better with only one\". He does have a hx of using both IM injections and then transdermal patches.     He has been receiving therapy since 1985.     He reports today no increased Acne, increased difficulty with urination or increased irritability (he says, anyway).     He feels that his energy levels are good on this dose.       Past Medical History     Patient Active Problem List   Diagnosis     S/P CABG (coronary artery bypass graft)     Hypogonadotropic " hypogonadism (H)     Acquired hypothyroidism     Hard of hearing     Hyperlipidemia LDL goal <70     ADELA (obstructive sleep apnea)     Essential hypertension     Numbness of hand, right     Radicular pain of lumbosacral region     History of back surgery     Multiple joint pain     OA (osteoarthritis) of knee, right     ACL (anterior cruciate ligament) tear     Necrobiosis lipoidica     CKD (chronic kidney disease) stage 2, GFR 60-89 ml/min     Type 2 diabetes mellitus with microalbuminuria, without long-term current use of insulin (H)     Diabetic polyneuropathy associated with type 2 diabetes mellitus (H)     Alcohol dependence with other alcohol-induced disorder (H)     Coronary artery disease involving native coronary artery of native heart without angina pectoris     Benign neoplasm of colon     Degeneration of lumbar or lumbosacral intervertebral disc     Vitamin D deficiency     Obesity (BMI 35.0-39.9) with comorbidity (H)     History of stroke     Carotid stenosis, right     Chronic idiopathic gout of multiple sites     Moderate major depression (H)     Hallux abducto valgus, right     Hammertoe of right foot       Family History       family history includes C.A.D. in his brother; Cancer in his mother; Heart Disease in his father; Lung Cancer in his sister; Schizophrenia in his sister; Septicemia (age of onset: 3) in his brother.    Social History      reports that he quit smoking about 26 years ago. His smoking use included cigarettes. He started smoking about 57 years ago. He has a 60 pack-year smoking history. He has never used smokeless tobacco. He reports current alcohol use of about 10.0 standard drinks of alcohol per week. He reports that he does not use drugs.      Review of Systems     Patient has no polyuria or polydipsia, no chest pain, dyspnea or TIA's, no numbness, tingling or pain in extremities  Remainder negative except as noted in HPI.      Vital Signs     BP (!) 152/50 (BP Location: Right  "arm, Patient Position: Sitting, Cuff Size: Adult Regular)   Pulse 89   SpO2 97%   Wt Readings from Last 3 Encounters:   10/25/24 116.8 kg (257 lb 9.6 oz)   09/07/23 113.9 kg (251 lb 3.2 oz)   07/25/23 120.7 kg (266 lb 1.6 oz)       Physical Exam     GENERAL:  Normal, NIRD,  EYES:  Pupils equal, round and reactive to light; no proptosis, lid lag or  periorbital edema.  THYROID:  Thyroid is normal.  No tenderness or bruit  NECK: No lymph nodes  MUSCULOSKELETAL:  Muscle strength grossly normal without evidence of wasting.  HEART:  Regular rate and rhythm without murmur.  LUNGS:  Clear to auscultation.  ABDOMEN: Soft, non-tender, no masses or organomegaly  NEURO: No tremors          Assessment     1. Primary testicular hypogonadism        Plan     I contacted Dr Alvarez, pt's PCP regarding his labs, and she offered to follow him with the T therapy, which would be very appropriate and would make him much happier. She will contact me with problems or concerns.           Teresita Martinez NP  [facility]  10/30/2024  10:50 AM        Lab Results   Lab Results: personally reviewed.   not applicable    Fingerstick Blood Glucose: @OHLMBKD18UJR(POCGLUFGR:10)@    Last Hbg A1C: No results found for: \"HGBA1C\"   Recent Labs   Lab Test 10/25/24  0744   TSH 0.69     Invalid input(s): \"CORTPRE\", \"[id number]\", \"[id number]\"  @LABRCNTIP(NA,K,CL,co2,bun,creatinine,calcium,labalbu,prot,bilitot,alkphos,alt,ast,glucose)@        No results found for: \"TESTOSTERONE\"  Untested Testosterone, Free  No components found for: \"LABTEST\"    Current Medications     Outpatient Medications Prior to Visit   Medication Sig Dispense Refill     allopurinol (ZYLOPRIM) 300 MG tablet Take 1 tablet (300 mg) by mouth daily. 90 tablet 3     amLODIPine (NORVASC) 10 MG tablet Take 1 tablet (10 mg) by mouth daily. 90 tablet 3     atorvastatin (LIPITOR) 80 MG tablet Take 1 tablet (80 mg) by mouth daily. 90 tablet 3     blood glucose (ACCU-CHEK GUIDE) test strip TEST " ONCE DAILY AS DIRECTED 100 strip 2     blood glucose (NO BRAND SPECIFIED) lancets standard Use to test blood sugar 3 times daily or as directed. 1 each 11     clopidogrel (PLAVIX) 75 MG tablet Take 1 tablet (75 mg) by mouth daily. 90 tablet 3     empagliflozin (JARDIANCE) 10 MG TABS tablet Take 1 tablet (10 mg) by mouth daily. 90 tablet 3     glipiZIDE (GLUCOTROL XL) 5 MG 24 hr tablet Take 1 tablet (5 mg) by mouth daily. 90 tablet 3     levothyroxine (SYNTHROID/LEVOTHROID) 50 MCG tablet Take 1 tablet (50 mcg) by mouth every morning. Take on an empty stomach. 90 tablet 3     metFORMIN (GLUCOPHAGE) 500 MG tablet Take 2 tablets (1,000 mg) by mouth 2 times daily (with meals). 360 tablet 3     olmesartan (BENICAR) 5 MG tablet Take 2 tablets (10 mg) by mouth daily. 180 tablet 3     pantoprazole (PROTONIX) 40 MG EC tablet Take 1 tablet (40 mg) by mouth daily for 14 days. 14 tablet 0     testosterone (ANDROGEL/TESTIM) 50 MG/5GM (1%) topical gel Apply to SHOULDERS.  Alternate dosing - 100mg (2 packets) one day then 50mg (1 packet) the next day.  FOLLOWUP APPOINTMENT NEEDED FOR REFILLS 250 g 3     No facility-administered medications prior to visit.                                                 Again, thank you for allowing me to participate in the care of your patient.        Sincerely,        Teresita Martinez NP

## 2024-10-30 NOTE — PROGRESS NOTES
"[facility] ENDOCRINOLOGY    Endocrine Note 10/30/2024    Francois Lujan, 1950, [id number]          Reason for visit      1. Primary testicular hypogonadism        History     Francois Lujan is a very pleasant 73 year old old male who presents for follow up.  SUMMARY:    Francois is here today as a OLIVIA from Dr Tian for Primary testicular hypogonadism.    \"-may be hypergonadotropic given hx of mumps. Francois is a 72M with pmh of mumps as a child, hypogonadism who is referred to endocrine for management of hypogonadism. Has been taking testosterone for 30+ years.  He had previously been on injection versions but is currently on testim gel 50mg once daily.  He applies this to his abdomen. He also has hx of CVA with some residual unsteadiness in gait and nerve issues in R hand but otherwise doing well.\"    Mr Lujan is unhappy with the fact that there are no Endocrinologists in [place] any longer and that he had to travel \"90 miles round trip\" today. I am not happy about the fact that there are no Endocrinologists in [place] either. But here we are.     He has been using one gel irma (50 mg) daily. He reports that he was applying one on one day and then 2 on the next. He felt as though that was \"too much\" and \"feels better with only one\". He does have a hx of using both IM injections and then transdermal patches.     He has been receiving therapy since 1985.     He reports today no increased Acne, increased difficulty with urination or increased irritability (he says, anyway).     He feels that his energy levels are good on this dose.       Past Medical History     Patient Active Problem List   Diagnosis    S/P CABG (coronary artery bypass graft)    Hypogonadotropic hypogonadism (H)    Acquired hypothyroidism    Hard of hearing    Hyperlipidemia LDL goal <70    ADELA (obstructive sleep apnea)    Essential hypertension    Numbness of hand, right    Radicular pain of lumbosacral region    History of back " surgery    Multiple joint pain    OA (osteoarthritis) of knee, right    ACL (anterior cruciate ligament) tear    Necrobiosis lipoidica    CKD (chronic kidney disease) stage 2, GFR 60-89 ml/min    Type 2 diabetes mellitus with microalbuminuria, without long-term current use of insulin (H)    Diabetic polyneuropathy associated with type 2 diabetes mellitus (H)    Alcohol dependence with other alcohol-induced disorder (H)    Coronary artery disease involving native coronary artery of native heart without angina pectoris    Benign neoplasm of colon    Degeneration of lumbar or lumbosacral intervertebral disc    Vitamin D deficiency    Obesity (BMI 35.0-39.9) with comorbidity (H)    History of stroke    Carotid stenosis, right    Chronic idiopathic gout of multiple sites    Moderate major depression (H)    Hallux abducto valgus, right    Hammertoe of right foot       Family History       family history includes C.A.D. in his brother; Cancer in his mother; Heart Disease in his father; Lung Cancer in his sister; Schizophrenia in his sister; Septicemia (age of onset: 3) in his brother.    Social History      reports that he quit smoking about 26 years ago. His smoking use included cigarettes. He started smoking about 57 years ago. He has a 60 pack-year smoking history. He has never used smokeless tobacco. He reports current alcohol use of about 10.0 standard drinks of alcohol per week. He reports that he does not use drugs.      Review of Systems     Patient has no polyuria or polydipsia, no chest pain, dyspnea or TIA's, no numbness, tingling or pain in extremities  Remainder negative except as noted in HPI.      Vital Signs     BP (!) 152/50 (BP Location: Right arm, Patient Position: Sitting, Cuff Size: Adult Regular)   Pulse 89   SpO2 97%   Wt Readings from Last 3 Encounters:   10/25/24 116.8 kg (257 lb 9.6 oz)   09/07/23 113.9 kg (251 lb 3.2 oz)   07/25/23 120.7 kg (266 lb 1.6 oz)       Physical Exam     GENERAL:   "Normal, NIRD,  EYES:  Pupils equal, round and reactive to light; no proptosis, lid lag or  periorbital edema.  THYROID:  Thyroid is normal.  No tenderness or bruit  NECK: No lymph nodes  MUSCULOSKELETAL:  Muscle strength grossly normal without evidence of wasting.  HEART:  Regular rate and rhythm without murmur.  LUNGS:  Clear to auscultation.  ABDOMEN: Soft, non-tender, no masses or organomegaly  NEURO: No tremors          Assessment     1. Primary testicular hypogonadism        Plan     I contacted Dr Alvarez, pt's PCP regarding his labs, and she offered to follow him with the T therapy, which would be very appropriate and would make him much happier. She will contact me with problems or concerns.           Teresita Martinez NP  [facility]  10/30/2024  10:50 AM        Lab Results   Lab Results: personally reviewed.   not applicable    Fingerstick Blood Glucose: @CULQFYE10ASZ(POCGLUFGR:10)@    Last Hbg A1C: No results found for: \"HGBA1C\"   Recent Labs   Lab Test 10/25/24  0744   TSH 0.69     Invalid input(s): \"CORTPRE\", \"[id number]\", \"[id number]\"  @LABRCNTIP(NA,K,CL,co2,bun,creatinine,calcium,labalbu,prot,bilitot,alkphos,alt,ast,glucose)@        No results found for: \"TESTOSTERONE\"  Untested Testosterone, Free  No components found for: \"LABTEST\"    Current Medications     Outpatient Medications Prior to Visit   Medication Sig Dispense Refill    allopurinol (ZYLOPRIM) 300 MG tablet Take 1 tablet (300 mg) by mouth daily. 90 tablet 3    amLODIPine (NORVASC) 10 MG tablet Take 1 tablet (10 mg) by mouth daily. 90 tablet 3    atorvastatin (LIPITOR) 80 MG tablet Take 1 tablet (80 mg) by mouth daily. 90 tablet 3    blood glucose (ACCU-CHEK GUIDE) test strip TEST ONCE DAILY AS DIRECTED 100 strip 2    blood glucose (NO BRAND SPECIFIED) lancets standard Use to test blood sugar 3 times daily or as directed. 1 each 11    clopidogrel (PLAVIX) 75 MG tablet Take 1 tablet (75 mg) by mouth daily. 90 tablet 3    empagliflozin " (JARDIANCE) 10 MG TABS tablet Take 1 tablet (10 mg) by mouth daily. 90 tablet 3    glipiZIDE (GLUCOTROL XL) 5 MG 24 hr tablet Take 1 tablet (5 mg) by mouth daily. 90 tablet 3    levothyroxine (SYNTHROID/LEVOTHROID) 50 MCG tablet Take 1 tablet (50 mcg) by mouth every morning. Take on an empty stomach. 90 tablet 3    metFORMIN (GLUCOPHAGE) 500 MG tablet Take 2 tablets (1,000 mg) by mouth 2 times daily (with meals). 360 tablet 3    olmesartan (BENICAR) 5 MG tablet Take 2 tablets (10 mg) by mouth daily. 180 tablet 3    pantoprazole (PROTONIX) 40 MG EC tablet Take 1 tablet (40 mg) by mouth daily for 14 days. 14 tablet 0    testosterone (ANDROGEL/TESTIM) 50 MG/5GM (1%) topical gel Apply to SHOULDERS.  Alternate dosing - 100mg (2 packets) one day then 50mg (1 packet) the next day.  FOLLOWUP APPOINTMENT NEEDED FOR REFILLS 250 g 3     No facility-administered medications prior to visit.

## 2024-11-01 DIAGNOSIS — K92.1 MELENA: Primary | ICD-10-CM

## 2024-11-01 DIAGNOSIS — E23.0 HYPOGONADOTROPIC HYPOGONADISM (H): ICD-10-CM

## 2024-11-05 ENCOUNTER — MYC MEDICAL ADVICE (OUTPATIENT)
Dept: FAMILY MEDICINE | Facility: CLINIC | Age: 74
End: 2024-11-05
Payer: COMMERCIAL

## 2024-11-10 DIAGNOSIS — R80.9 TYPE 2 DIABETES MELLITUS WITH MICROALBUMINURIA, WITHOUT LONG-TERM CURRENT USE OF INSULIN (H): ICD-10-CM

## 2024-11-10 DIAGNOSIS — I10 ESSENTIAL HYPERTENSION: ICD-10-CM

## 2024-11-10 DIAGNOSIS — E11.29 TYPE 2 DIABETES MELLITUS WITH MICROALBUMINURIA, WITHOUT LONG-TERM CURRENT USE OF INSULIN (H): ICD-10-CM

## 2024-11-11 RX ORDER — OLMESARTAN MEDOXOMIL 20 MG/1
20 TABLET ORAL DAILY
Qty: 90 TABLET | Refills: 0 | OUTPATIENT
Start: 2024-11-11

## 2024-12-03 ENCOUNTER — TELEPHONE (OUTPATIENT)
Dept: FAMILY MEDICINE | Facility: CLINIC | Age: 74
End: 2024-12-03
Payer: COMMERCIAL

## 2024-12-03 NOTE — TELEPHONE ENCOUNTER
Patient Quality Outreach    Patient is due for the following:   Hypertension -  BP check    Action(s) Taken:   Schedule a nurse only visit for bp    Type of outreach:    Sent letter.    Questions for provider review:    None           Lamar Ornelas MA  Chart routed to .

## 2024-12-09 ENCOUNTER — TRANSFERRED RECORDS (OUTPATIENT)
Dept: HEALTH INFORMATION MANAGEMENT | Facility: CLINIC | Age: 74
End: 2024-12-09
Payer: COMMERCIAL

## 2025-02-02 ENCOUNTER — HEALTH MAINTENANCE LETTER (OUTPATIENT)
Age: 75
End: 2025-02-02

## 2025-02-19 ENCOUNTER — TELEPHONE (OUTPATIENT)
Dept: FAMILY MEDICINE | Facility: CLINIC | Age: 75
End: 2025-02-19
Payer: COMMERCIAL

## 2025-02-19 ENCOUNTER — MYC REFILL (OUTPATIENT)
Dept: ENDOCRINOLOGY | Facility: CLINIC | Age: 75
End: 2025-02-19
Payer: COMMERCIAL

## 2025-02-19 DIAGNOSIS — E29.1 PRIMARY TESTICULAR HYPOGONADISM: ICD-10-CM

## 2025-02-19 RX ORDER — TESTOSTERONE GEL, 1% 10 MG/G
GEL TRANSDERMAL
Qty: 250 G | Refills: 3 | OUTPATIENT
Start: 2025-02-19

## 2025-02-19 NOTE — TELEPHONE ENCOUNTER
Prior Authorization Retail Medication Request    Medication/Dose: Testosterone gel 1%  Diagnosis and ICD code (if different than what is on RX):    New/renewal/insurance change PA/secondary ins. PA:  Previously Tried and Failed:    Rationale:  temporary fill letter received; patient has used since 2009    Insurance   Primary: [facility]  Insurance ID:  [id number]    Secondary (if applicable):  Insurance ID:      Pharmacy Information (if different than what is on RX)  Name:    Phone:    Fax:    Clinic Information  Preferred routing pool for dept communication: p [id number]

## 2025-02-22 NOTE — TELEPHONE ENCOUNTER
Central Prior Authorization Team   Phone: [phone]    PA Initiation    Medication: Testosterone gel 1%  Insurance Company: [employer] - Phone [phone] Fax [phone]  Pharmacy Filling the Rx: [id number] - [place], [place] - [address] [place] [address]  Filling Pharmacy Phone: [phone]  Filling Pharmacy Fax:    Start Date: 2/22/2025    Per call to pharmacy, they are processing a quantity of 300 per 40 days.  Will send PA for that quantity/day supply

## 2025-02-25 NOTE — TELEPHONE ENCOUNTER
Prior Authorization Approval    Authorization Effective Date: 2/23/2025  Authorization Expiration Date: 2/23/2026  Medication: Testosterone gel 1%  Approved Dose/Quantity:   Reference #:     Insurance Company: [employer] - Phone [phone] Fax [phone]  Expected CoPay:       CoPay Card Available:      Foundation Assistance Needed:    Which Pharmacy is filling the prescription (Not needed for infusion/clinic administered): [id number] - [place], [place] - [address] [place] [address]  Pharmacy Notified:  yes  Patient Notified:  yes- Pharmacy will contact patient when ready to /ship

## 2025-03-27 ENCOUNTER — HOSPITAL ENCOUNTER (INPATIENT)
Facility: CLINIC | Age: 75
DRG: 378 | End: 2025-03-27
Attending: FAMILY MEDICINE | Admitting: STUDENT IN AN ORGANIZED HEALTH CARE EDUCATION/TRAINING PROGRAM
Payer: COMMERCIAL

## 2025-03-27 DIAGNOSIS — D62 ANEMIA DUE TO BLOOD LOSS, ACUTE: ICD-10-CM

## 2025-03-27 DIAGNOSIS — Z79.01 LONG TERM (CURRENT) USE OF ANTICOAGULANTS: ICD-10-CM

## 2025-03-27 DIAGNOSIS — K92.1 MELENA: ICD-10-CM

## 2025-03-27 DIAGNOSIS — Z95.1 POSTSURGICAL AORTOCORONARY BYPASS STATUS: Primary | ICD-10-CM

## 2025-03-27 DIAGNOSIS — Z79.899 DRUG THERAPY: ICD-10-CM

## 2025-03-27 DIAGNOSIS — E11.42 DIABETIC POLYNEUROPATHY ASSOCIATED WITH TYPE 2 DIABETES MELLITUS (H): ICD-10-CM

## 2025-03-27 PROBLEM — I65.21 CAROTID STENOSIS, RIGHT: Status: ACTIVE | Noted: 2020-08-11

## 2025-03-27 PROBLEM — E66.01 MORBID OBESITY (H): Status: ACTIVE | Noted: 2018-11-20

## 2025-03-27 PROBLEM — M1A.09X0 CHRONIC IDIOPATHIC GOUT OF MULTIPLE SITES: Status: ACTIVE | Noted: 2021-04-08

## 2025-03-27 LAB
ABO + RH BLD: NORMAL
ALBUMIN SERPL BCG-MCNC: 4.2 G/DL (ref 3.5–5.2)
ALP SERPL-CCNC: 116 U/L (ref 40–150)
ALT SERPL W P-5'-P-CCNC: 14 U/L (ref 0–70)
ANION GAP SERPL CALCULATED.3IONS-SCNC: 15 MMOL/L (ref 7–15)
AST SERPL W P-5'-P-CCNC: 16 U/L (ref 0–45)
BASOPHILS # BLD AUTO: 0.1 10E3/UL (ref 0–0.2)
BASOPHILS NFR BLD AUTO: 1 %
BILIRUB SERPL-MCNC: 0.2 MG/DL
BLD GP AB SCN SERPL QL: NEGATIVE
BLD PROD TYP BPU: NORMAL
BLD PROD TYP BPU: NORMAL
BLOOD COMPONENT TYPE: NORMAL
BLOOD COMPONENT TYPE: NORMAL
BUN SERPL-MCNC: 26.7 MG/DL (ref 8–23)
CALCIUM SERPL-MCNC: 9.3 MG/DL (ref 8.8–10.4)
CHLORIDE SERPL-SCNC: 106 MMOL/L (ref 98–107)
CODING SYSTEM: NORMAL
CODING SYSTEM: NORMAL
CREAT SERPL-MCNC: 1.21 MG/DL (ref 0.67–1.17)
CROSSMATCH: NORMAL
CROSSMATCH: NORMAL
EGFRCR SERPLBLD CKD-EPI 2021: 63 ML/MIN/1.73M2
EOSINOPHIL # BLD AUTO: 0.3 10E3/UL (ref 0–0.7)
EOSINOPHIL NFR BLD AUTO: 3 %
ERYTHROCYTE [DISTWIDTH] IN BLOOD BY AUTOMATED COUNT: 15.4 % (ref 10–15)
GLUCOSE BLDC GLUCOMTR-MCNC: 121 MG/DL (ref 70–99)
GLUCOSE SERPL-MCNC: 149 MG/DL (ref 70–99)
HCO3 SERPL-SCNC: 19 MMOL/L (ref 22–29)
HCT VFR BLD AUTO: 20.5 % (ref 40–53)
HGB BLD-MCNC: 6.5 G/DL (ref 13.3–17.7)
HGB BLD-MCNC: 7.1 G/DL (ref 13.3–17.7)
IMM GRANULOCYTES # BLD: 0 10E3/UL
IMM GRANULOCYTES NFR BLD: 0 %
INR PPP: 1.13 (ref 0.85–1.15)
ISSUE DATE AND TIME: NORMAL
ISSUE DATE AND TIME: NORMAL
LYMPHOCYTES # BLD AUTO: 2.8 10E3/UL (ref 0.8–5.3)
LYMPHOCYTES NFR BLD AUTO: 31 %
MCH RBC QN AUTO: 31 PG (ref 26.5–33)
MCHC RBC AUTO-ENTMCNC: 31.7 G/DL (ref 31.5–36.5)
MCV RBC AUTO: 98 FL (ref 78–100)
MONOCYTES # BLD AUTO: 0.7 10E3/UL (ref 0–1.3)
MONOCYTES NFR BLD AUTO: 8 %
NEUTROPHILS # BLD AUTO: 5.3 10E3/UL (ref 1.6–8.3)
NEUTROPHILS NFR BLD AUTO: 58 %
NRBC # BLD AUTO: 0 10E3/UL
NRBC BLD AUTO-RTO: 0 /100
PLATELET # BLD AUTO: 278 10E3/UL (ref 150–450)
POTASSIUM SERPL-SCNC: 4.4 MMOL/L (ref 3.4–5.3)
PROT SERPL-MCNC: 7 G/DL (ref 6.4–8.3)
RBC # BLD AUTO: 2.1 10E6/UL (ref 4.4–5.9)
SODIUM SERPL-SCNC: 140 MMOL/L (ref 135–145)
SPECIMEN EXP DATE BLD: NORMAL
UNIT ABO/RH: NORMAL
UNIT ABO/RH: NORMAL
UNIT NUMBER: NORMAL
UNIT NUMBER: NORMAL
UNIT STATUS: NORMAL
UNIT STATUS: NORMAL
UNIT TYPE ISBT: 6200
UNIT TYPE ISBT: 6200
WBC # BLD AUTO: 9.2 10E3/UL (ref 4–11)

## 2025-03-27 PROCEDURE — 85610 PROTHROMBIN TIME: CPT | Performed by: FAMILY MEDICINE

## 2025-03-27 PROCEDURE — 36415 COLL VENOUS BLD VENIPUNCTURE: CPT | Performed by: FAMILY MEDICINE

## 2025-03-27 PROCEDURE — 99222 1ST HOSP IP/OBS MODERATE 55: CPT

## 2025-03-27 PROCEDURE — 82435 ASSAY OF BLOOD CHLORIDE: CPT | Performed by: FAMILY MEDICINE

## 2025-03-27 PROCEDURE — 85025 COMPLETE CBC W/AUTO DIFF WBC: CPT | Performed by: FAMILY MEDICINE

## 2025-03-27 PROCEDURE — 250N000011 HC RX IP 250 OP 636

## 2025-03-27 PROCEDURE — 250N000013 HC RX MED GY IP 250 OP 250 PS 637

## 2025-03-27 PROCEDURE — 250N000011 HC RX IP 250 OP 636: Performed by: FAMILY MEDICINE

## 2025-03-27 PROCEDURE — 99285 EMERGENCY DEPT VISIT HI MDM: CPT | Performed by: FAMILY MEDICINE

## 2025-03-27 PROCEDURE — 86850 RBC ANTIBODY SCREEN: CPT | Performed by: FAMILY MEDICINE

## 2025-03-27 PROCEDURE — 82247 BILIRUBIN TOTAL: CPT | Performed by: FAMILY MEDICINE

## 2025-03-27 PROCEDURE — 36415 COLL VENOUS BLD VENIPUNCTURE: CPT

## 2025-03-27 PROCEDURE — 86900 BLOOD TYPING SEROLOGIC ABO: CPT | Performed by: FAMILY MEDICINE

## 2025-03-27 PROCEDURE — 120N000001 HC R&B MED SURG/OB

## 2025-03-27 PROCEDURE — 82040 ASSAY OF SERUM ALBUMIN: CPT | Performed by: FAMILY MEDICINE

## 2025-03-27 PROCEDURE — P9016 RBC LEUKOCYTES REDUCED: HCPCS | Performed by: FAMILY MEDICINE

## 2025-03-27 PROCEDURE — 99285 EMERGENCY DEPT VISIT HI MDM: CPT | Mod: 25 | Performed by: FAMILY MEDICINE

## 2025-03-27 PROCEDURE — 86923 COMPATIBILITY TEST ELECTRIC: CPT | Performed by: FAMILY MEDICINE

## 2025-03-27 PROCEDURE — 85018 HEMOGLOBIN: CPT

## 2025-03-27 RX ORDER — SUCRALFATE ORAL 1 G/10ML
1 SUSPENSION ORAL
Status: DISCONTINUED | OUTPATIENT
Start: 2025-03-27 | End: 2025-03-29 | Stop reason: HOSPADM

## 2025-03-27 RX ORDER — AMLODIPINE BESYLATE 10 MG/1
10 TABLET ORAL DAILY
Status: DISCONTINUED | OUTPATIENT
Start: 2025-03-28 | End: 2025-03-29 | Stop reason: HOSPADM

## 2025-03-27 RX ORDER — CLOPIDOGREL BISULFATE 75 MG/1
75 TABLET ORAL DAILY
Status: DISCONTINUED | OUTPATIENT
Start: 2025-03-27 | End: 2025-03-29 | Stop reason: HOSPADM

## 2025-03-27 RX ORDER — LOSARTAN POTASSIUM 25 MG/1
25 TABLET ORAL DAILY
Status: DISCONTINUED | OUTPATIENT
Start: 2025-03-28 | End: 2025-03-29 | Stop reason: HOSPADM

## 2025-03-27 RX ORDER — ALLOPURINOL 300 MG/1
300 TABLET ORAL DAILY
Status: DISCONTINUED | OUTPATIENT
Start: 2025-03-28 | End: 2025-03-29 | Stop reason: HOSPADM

## 2025-03-27 RX ORDER — CALCIUM CARBONATE 500 MG/1
1000 TABLET, CHEWABLE ORAL 4 TIMES DAILY PRN
Status: DISCONTINUED | OUTPATIENT
Start: 2025-03-27 | End: 2025-03-29 | Stop reason: HOSPADM

## 2025-03-27 RX ORDER — ONDANSETRON 4 MG/1
4 TABLET, ORALLY DISINTEGRATING ORAL EVERY 6 HOURS PRN
Status: DISCONTINUED | OUTPATIENT
Start: 2025-03-27 | End: 2025-03-29 | Stop reason: HOSPADM

## 2025-03-27 RX ORDER — DEXTROSE MONOHYDRATE 25 G/50ML
25-50 INJECTION, SOLUTION INTRAVENOUS
Status: DISCONTINUED | OUTPATIENT
Start: 2025-03-27 | End: 2025-03-29 | Stop reason: HOSPADM

## 2025-03-27 RX ORDER — GLIPIZIDE 5 MG/1
5 TABLET, FILM COATED, EXTENDED RELEASE ORAL DAILY
Status: DISCONTINUED | OUTPATIENT
Start: 2025-03-28 | End: 2025-03-29 | Stop reason: HOSPADM

## 2025-03-27 RX ORDER — LEVOTHYROXINE SODIUM 50 UG/1
50 TABLET ORAL EVERY MORNING
Status: DISCONTINUED | OUTPATIENT
Start: 2025-03-28 | End: 2025-03-29 | Stop reason: HOSPADM

## 2025-03-27 RX ORDER — ATORVASTATIN CALCIUM 80 MG/1
80 TABLET, FILM COATED ORAL DAILY
Status: DISCONTINUED | OUTPATIENT
Start: 2025-03-28 | End: 2025-03-29 | Stop reason: HOSPADM

## 2025-03-27 RX ORDER — ONDANSETRON 2 MG/ML
4 INJECTION INTRAMUSCULAR; INTRAVENOUS EVERY 6 HOURS PRN
Status: DISCONTINUED | OUTPATIENT
Start: 2025-03-27 | End: 2025-03-29 | Stop reason: HOSPADM

## 2025-03-27 RX ORDER — SODIUM CHLORIDE, SODIUM LACTATE, POTASSIUM CHLORIDE, CALCIUM CHLORIDE 600; 310; 30; 20 MG/100ML; MG/100ML; MG/100ML; MG/100ML
1000 INJECTION, SOLUTION INTRAVENOUS CONTINUOUS
Status: DISCONTINUED | OUTPATIENT
Start: 2025-03-27 | End: 2025-03-29

## 2025-03-27 RX ORDER — NICOTINE POLACRILEX 4 MG
15-30 LOZENGE BUCCAL
Status: DISCONTINUED | OUTPATIENT
Start: 2025-03-27 | End: 2025-03-29 | Stop reason: HOSPADM

## 2025-03-27 RX ORDER — PROCHLORPERAZINE MALEATE 5 MG/1
5 TABLET ORAL EVERY 6 HOURS PRN
Status: DISCONTINUED | OUTPATIENT
Start: 2025-03-27 | End: 2025-03-29 | Stop reason: HOSPADM

## 2025-03-27 RX ORDER — NICOTINE POLACRILEX 4 MG
15-30 LOZENGE BUCCAL
Status: DISCONTINUED | OUTPATIENT
Start: 2025-03-27 | End: 2025-03-27

## 2025-03-27 RX ORDER — TETRAHYDROZOLINE HCL 0.05 %
1 DROPS OPHTHALMIC (EYE) 3 TIMES DAILY PRN
COMMUNITY

## 2025-03-27 RX ORDER — DEXTROSE MONOHYDRATE 25 G/50ML
25-50 INJECTION, SOLUTION INTRAVENOUS
Status: DISCONTINUED | OUTPATIENT
Start: 2025-03-27 | End: 2025-03-27

## 2025-03-27 RX ADMIN — PANTOPRAZOLE SODIUM 40 MG: 40 INJECTION, POWDER, FOR SOLUTION INTRAVENOUS at 16:52

## 2025-03-27 RX ADMIN — SUCRALFATE 1 G: 1 SUSPENSION ORAL at 20:55

## 2025-03-27 RX ADMIN — PANTOPRAZOLE SODIUM 40 MG: 40 INJECTION, POWDER, FOR SOLUTION INTRAVENOUS at 20:55

## 2025-03-27 ASSESSMENT — COLUMBIA-SUICIDE SEVERITY RATING SCALE - C-SSRS
6. HAVE YOU EVER DONE ANYTHING, STARTED TO DO ANYTHING, OR PREPARED TO DO ANYTHING TO END YOUR LIFE?: NO
1. IN THE PAST MONTH, HAVE YOU WISHED YOU WERE DEAD OR WISHED YOU COULD GO TO SLEEP AND NOT WAKE UP?: NO
2. HAVE YOU ACTUALLY HAD ANY THOUGHTS OF KILLING YOURSELF IN THE PAST MONTH?: NO

## 2025-03-27 ASSESSMENT — ACTIVITIES OF DAILY LIVING (ADL)
ADLS_ACUITY_SCORE: 37
ADLS_ACUITY_SCORE: 37
ADLS_ACUITY_SCORE: 57
ADLS_ACUITY_SCORE: 57
ADLS_ACUITY_SCORE: 37
ADLS_ACUITY_SCORE: 37
ADLS_ACUITY_SCORE: 57
ADLS_ACUITY_SCORE: 57

## 2025-03-27 NOTE — ED NOTES
[facility]   Admission Handoff    The patient is Francois Lujan, 74 year old who arrived in the ED by CAR from home with a complaint of Generalized Weakness and Rectal Bleeding  . The patient's current symptoms are new and during this time the symptoms have remained the same. In the ED, patient was diagnosed with   Final diagnoses:   Melena   Anemia due to blood loss, acute         Needed?: No    Allergies:    Allergies   Allergen Reactions    Hydrocodone-Acetaminophen Itching    Iodine     Irbesartan      renal insuff and hyperkalemia      Lisinopril Nausea    Sulfa Antibiotics        Past Medical Hx:   Past Medical History:   Diagnosis Date    Arthritis     CAD (coronary artery disease)     Diabetes mellitus (H)     Diabetic ulcer of other part of right foot associated with diabetes mellitus due to underlying condition, limited to breakdown of skin (H) 08/15/2023    Erythema nodosum 01/01/1982    Gout     Hypertension     Malignant neoplasm (H)     squamous cell CA removed from right hand    Polymyalgia rheumatica 03/03/2014    Thyroid disease     Triceps tendon rupture, left, sequela 04/10/2017       Initial vitals were: BP: (!) 155/54  Pulse: 87  Temp: 97.7  F (36.5  C)  Resp: 14  Height: 182.9 cm (6')  Weight: 112.5 kg (248 lb)  SpO2: 99 %   Recent vital Signs: BP (!) 147/62   Pulse 103   Temp 97.7  F (36.5  C) (Oral)   Resp 13   Ht 1.829 m (6')   Wt 112.5 kg (248 lb)   SpO2 97%   BMI 33.63 kg/m      Elimination Status: Continent: Yes     Activity Level: SBA    Fall Status: Reason for falls risk:  Mobility  activity supervised    Baseline Mental status: WDL  Current Mental Status changes: at basesline    Infection present or suspected this encounter: no  Sepsis suspected: No    Isolation type:     Bariatric equipment needed?: No    In the ED these meds were given:   Medications   calcium carbonate (TUMS) chewable tablet 1,000 mg (has no administration in time  range)   clopidogrel (PLAVIX) tablet 75 mg ( Oral Automatically Held 3/30/25 0800)   pantoprazole (PROTONIX) IV push injection 40 mg (40 mg Intravenous $Given 3/27/25 8094)       Drips running?  No    Home pump  No    Current LDAs: Peripheral IV: Site right AC; Gauge 18  none     Results:   Labs/Imaging  Ordered and Resulted from Time of ED Arrival Up to the Time of Departure from the ED  Results for orders placed or performed during the hospital encounter of 03/27/25 (from the past 24 hours)   CBC with Platelets & Differential    Narrative    The following orders were created for panel order CBC with Platelets & Differential.  Procedure                               Abnormality         Status                     ---------                               -----------         ------                     CBC with platelets and ...[[id number]]  Abnormal            Final result                 Please view results for these tests on the individual orders.   Comprehensive metabolic panel   Result Value Ref Range    Sodium 140 135 - 145 mmol/L    Potassium 4.4 3.4 - 5.3 mmol/L    Carbon Dioxide (CO2) 19 (L) 22 - 29 mmol/L    Anion Gap 15 7 - 15 mmol/L    Urea Nitrogen 26.7 (H) 8.0 - 23.0 mg/dL    Creatinine 1.21 (H) 0.67 - 1.17 mg/dL    GFR Estimate 63 >60 mL/min/1.73m2    Calcium 9.3 8.8 - 10.4 mg/dL    Chloride 106 98 - 107 mmol/L    Glucose 149 (H) 70 - 99 mg/dL    Alkaline Phosphatase 116 40 - 150 U/L    AST 16 0 - 45 U/L    ALT 14 0 - 70 U/L    Protein Total 7.0 6.4 - 8.3 g/dL    Albumin 4.2 3.5 - 5.2 g/dL    Bilirubin Total 0.2 <=1.2 mg/dL   INR   Result Value Ref Range    INR 1.13 0.85 - 1.15   CBC with platelets and differential   Result Value Ref Range    WBC Count 9.2 4.0 - 11.0 10e3/uL    RBC Count 2.10 (L) 4.40 - 5.90 10e6/uL    Hemoglobin 6.5 (LL) 13.3 - 17.7 g/dL    Hematocrit 20.5 (L) 40.0 - 53.0 %    MCV 98 78 - 100 fL    MCH 31.0 26.5 - 33.0 pg    MCHC 31.7 31.5 - 36.5 g/dL    RDW 15.4 (H) 10.0 - 15.0 %     Platelet Count 278 150 - 450 10e3/uL    % Neutrophils 58 %    % Lymphocytes 31 %    % Monocytes 8 %    % Eosinophils 3 %    % Basophils 1 %    % Immature Granulocytes 0 %    NRBCs per 100 WBC 0 <1 /100    Absolute Neutrophils 5.3 1.6 - 8.3 10e3/uL    Absolute Lymphocytes 2.8 0.8 - 5.3 10e3/uL    Absolute Monocytes 0.7 0.0 - 1.3 10e3/uL    Absolute Eosinophils 0.3 0.0 - 0.7 10e3/uL    Absolute Basophils 0.1 0.0 - 0.2 10e3/uL    Absolute Immature Granulocytes 0.0 <=0.4 10e3/uL    Absolute NRBCs 0.0 10e3/uL   ABO/Rh type and screen    Narrative    The following orders were created for panel order ABO/Rh type and screen.  Procedure                               Abnormality         Status                     ---------                               -----------         ------                     Adult Type and Screen[[id number]]                           Edited Result - FINAL        Please view results for these tests on the individual orders.   Adult Type and Screen   Result Value Ref Range    ABO/RH(D) A POS     Antibody Screen Negative Negative    SPECIMEN EXPIRATION DATE [id number]    ABO/Rh type and screen *Canceled*    Narrative    The following orders were created for panel order ABO/Rh type and screen.  Procedure                               Abnormality         Status                     ---------                               -----------         ------                       Please view results for these tests on the individual orders.   Prepare red blood cells (unit)   Result Value Ref Range    Blood Component Type Red Blood Cells     Product Code Z5531H10     Unit Status Ready for issue     Unit Number [id number]     CROSSMATCH Compatible     CODING SYSTEM [id number]    Prepare red blood cells (unit)   Result Value Ref Range    Blood Component Type Red Blood Cells     Product Code N1618P00     Unit Status Ready for issue     Unit Number [id number]     CROSSMATCH Compatible     CODING SYSTEM [id number]         For the majority of the shift this patient's behavior was Green     Cardiac Rhythm: Normal Sinus  Pt needs tele? Yes  Skin/wound Issues: None    Code Status: Full Code    Pain control: pt had none    Nausea control: pt had none    Abnormal labs/tests/findings requiring intervention: hemoglobin 6.5    Patient tested for COVID 19 prior to admission: NO     OBS brochure/video discussed/provided to patient/family: N/A     Family present during ED course? No     Family Comments/Social Situation comments:     Tasks needing completion:  pt to have 2 units of PRBC    Mao Van RN

## 2025-03-27 NOTE — ED TRIAGE NOTES
Pt presented with black tarry stools x 3-4 weeks, with generalized weakness. Pt reports intermittent dizziness, alcohol use 2-3 x per week with 6 beers for 50 years. Pt denied abd pain, iron tabs, recent tums, esophogeal varices.      Triage Assessment (Adult)       Row Name 03/27/25 1515          Triage Assessment    Airway WDL WDL        Respiratory WDL    Respiratory WDL WDL        Skin Circulation/Temperature WDL    Skin Circulation/Temperature WDL WDL        Cardiac WDL    Cardiac WDL WDL        Peripheral/Neurovascular WDL    Peripheral Neurovascular WDL WDL        Cognitive/Neuro/Behavioral WDL    Cognitive/Neuro/Behavioral WDL WDL

## 2025-03-27 NOTE — ED PROVIDER NOTES
HPI   Patient is a 74-year-old male presenting with generalized weakness and black, tarry stool.  Per my chart review, the patient has a history of obesity and diabetes type 2, taking glipizide and metformin.  He has had a CABG, is known to have hyperlipidemia and hypertension.  He takes Norvasc, Lipitor, Plavix, Jardiance.  He drinks 4-6 beers 4-5 times a week over the past 50 years.  He has not had any over the past week.  He had a colonoscopy in 2016 but no upper endoscopy performed.    The patient has recognized black, tarry stool since about 4 weeks ago.  Recently it has happened every time he has a bowel movement, 3-4 times a week.  He denies abdominal pain.  He feels like he is pale and more rundown than usual.  No chest pain or shortness of breath.  No lightheadedness or fainting.  No nausea or vomiting.  No fever.  No ecchymosis, epistaxis, bleeding from the gums, blood in urine, or petechiae.      Allergies:  Allergies   Allergen Reactions    Hydrocodone-Acetaminophen Itching    Iodine     Irbesartan      renal insuff and hyperkalemia      Lisinopril Nausea    Sulfa Antibiotics      Problem List:    Patient Active Problem List    Diagnosis Date Noted    Melena 03/27/2025     Priority: Medium    Anemia due to blood loss, acute 03/27/2025     Priority: Medium    Hallux abducto valgus, right 08/15/2023     Priority: Medium    Hammertoe of right foot 08/15/2023     Priority: Medium    Moderate major depression (H) 01/06/2022     Priority: Medium    Chronic idiopathic gout of multiple sites 04/08/2021     Priority: Medium     Previously on allopurinol      Carotid stenosis, right 08/11/2020     Priority: Medium    History of stroke 08/02/2020     Priority: Medium     8/2020.  Right face numbness and slight facial droop. Thalmic stroke      Obesity (BMI 35.0-39.9) with comorbidity (H) 11/20/2018     Priority: Medium    Benign neoplasm of colon 11/19/2018     Priority: Medium    Degeneration of lumbar or  lumbosacral intervertebral disc 11/19/2018     Priority: Medium    Vitamin D deficiency 11/19/2018     Priority: Medium    Coronary artery disease involving native coronary artery of native heart without angina pectoris 03/03/2017     Priority: Medium    Alcohol dependence with other alcohol-induced disorder (H) 08/30/2016     Priority: Medium    Diabetic polyneuropathy associated with type 2 diabetes mellitus (H) 04/12/2016     Priority: Medium    CKD (chronic kidney disease) stage 2, GFR 60-89 ml/min 10/23/2015     Priority: Medium    Type 2 diabetes mellitus with microalbuminuria, without long-term current use of insulin (H) 10/23/2015     Priority: Medium     On valsartan. Referred to Nephrology 7/21.      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     Priority: Medium    OA (osteoarthritis) of knee, right 04/29/2014     Priority: Medium    ACL (anterior cruciate ligament) tear 04/29/2014     Priority: Medium     posttraumatic arthrosis lateral compartment      Multiple joint pain 04/23/2014     Priority: Medium    History of back surgery 03/03/2014     Priority: Medium    Radicular pain of lumbosacral region 12/31/2013     Priority: Medium    Numbness of hand, right 12/12/2013     Priority: Medium    Essential hypertension 09/17/2013     Priority: Medium    ADELA (obstructive sleep apnea) 07/11/2013     Priority: Medium     Severe ADELA (7/10/2013 with AHI ~70, sherin desat 86%, insufficient time for CPAP titration)  Does not tolerate CPAP       Hyperlipidemia LDL goal <70 05/28/2013     Priority: Medium    S/P CABG (coronary artery bypass graft) 11/29/2012     Priority: Medium     11/17/11  PREOPERATIVE DIAGNOSIS:  Severe 3-vessel coronary artery disease with unstable angina.        POSTOPERATIVE DIAGNOSIS:  Severe 3-vessel coronary artery disease with unstable angina.        PROCEDURES:    1.  Coronary artery bypass grafting x3 with placement of left internal mammary artery to the left anterior descending artery, saphenous  vein graft from the aorta to the third obtuse marginal branch of the circumflex coronary artery and saphenous vein graft from aorta to the distal right coronary artery.    2.  Placement of temporary ventricular pacing wires.    3.  Endoscopic vein harvest from the left leg.       Hypogonadotropic hypogonadism 11/29/2012     Priority: Medium    Acquired hypothyroidism 11/29/2012     Priority: Medium    Hard of hearing 11/29/2012     Priority: Medium      Past Medical History:    Past Medical History:   Diagnosis Date    Arthritis     CAD (coronary artery disease)     Diabetes mellitus (H)     Diabetic ulcer of other part of right foot associated with diabetes mellitus due to underlying condition, limited to breakdown of skin (H) 08/15/2023    Erythema nodosum 01/01/1982    Gout     Hypertension     Malignant neoplasm (H)     Polymyalgia rheumatica 03/03/2014    Thyroid disease     Triceps tendon rupture, left, sequela 04/10/2017     Past Surgical History:    Past Surgical History:   Procedure Laterality Date    BACK SURGERY      BYPASS GRAFT ARTERY CORONARY  11/17/2011    Procedure:BYPASS GRAFT ARTERY CORONARY; CORONARY ARTERY BYPASS, Right, OM, LAD WITH ENDOVEIN HARVEST, ON PUMP; Surgeon:LEONEL MG; Location: OR    COLONOSCOPY N/A 6/9/2016    Procedure: COLONOSCOPY;  Surgeon: Isidro Humphreys MD;  Location: [facility]    HERNIA REPAIR      infantile hernia repair    ORTHOPEDIC SURGERY      Baker cyst removed - right knee, and mesiscus tear repair    OR LAMINEC/FACETECT/FORAMIN,LUMBAR 1 SEG      Description: Laminectomy Decompress, Facetectomy, Foraminotomy Lumbar Seg;  Recorded: 12/13/2007;  Comments: '71    OR MASTECTOMY FOR GYNECOMASTIA      Description: Breast Surgery Mastectomy For Gynecomastia Bilateral;  Recorded: 12/13/2007;    REPAIR TENDON TRICEPS UPPER EXTREMITY Left 4/11/2017    Procedure: REPAIR TENDON TRICEPS UPPER EXTREMITY;  Surgeon: Rodriguez Kelley MD;  Location: [facility]    [facility] CABG, VEIN, SINGLE       Description: CABG (CABG);  Proc Date: 2011;  Comments: 3 vessel     Family History:    Family History   Problem Relation Age of Onset    Heart Disease Father     Cancer Mother     C.A.D. Brother     Septicemia Brother 3    Lung Cancer Sister     Schizophrenia Sister      Social History:  Marital Status:   [2]  Social History     Tobacco Use    Smoking status: Former     Current packs/day: 0.00     Average packs/day: 2.0 packs/day for 30.0 years (60.0 ttl pk-yrs)     Types: Cigarettes     Start date: 1967     Quit date: 1997     Years since quittin.3    Smokeless tobacco: Never   Vaping Use    Vaping status: Never Used   Substance Use Topics    Alcohol use: Yes     Alcohol/week: 10.0 standard drinks of alcohol     Types: 12 Cans of beer per week     Comment: on the weekends about 5 drinks a weekend     Drug use: No      Medications:    allopurinol (ZYLOPRIM) 300 MG tablet  amLODIPine (NORVASC) 10 MG tablet  atorvastatin (LIPITOR) 80 MG tablet  blood glucose (ACCU-CHEK GUIDE) test strip  blood glucose (NO BRAND SPECIFIED) lancets standard  clopidogrel (PLAVIX) 75 MG tablet  empagliflozin (JARDIANCE) 10 MG TABS tablet  glipiZIDE (GLUCOTROL XL) 5 MG 24 hr tablet  levothyroxine (SYNTHROID/LEVOTHROID) 50 MCG tablet  metFORMIN (GLUCOPHAGE) 500 MG tablet  olmesartan (BENICAR) 5 MG tablet  testosterone (ANDROGEL/TESTIM) 50 MG/5GM (1%) topical gel      Review of Systems   All other systems reviewed and are negative.      PE   BP: (!) 155/54  Pulse: 87  Temp: 97.7  F (36.5  C)  Resp: 14  Height: 182.9 cm (6')  Weight: 112.5 kg (248 lb)  SpO2: 99 %  Physical Exam  Vitals and nursing note reviewed.   Constitutional:       General: He is not in acute distress.     Comments: Cooperative, answering questions well.  Pale.   HENT:      Head: Atraumatic.      Right Ear: External ear normal.      Left Ear: External ear normal.      Nose: Nose normal.      Mouth/Throat:      Mouth: Mucous membranes are  moist.      Pharynx: Oropharynx is clear.   Eyes:      General: No scleral icterus.     Extraocular Movements: Extraocular movements intact.      Conjunctiva/sclera: Conjunctivae normal.      Pupils: Pupils are equal, round, and reactive to light.   Cardiovascular:      Rate and Rhythm: Normal rate.   Pulmonary:      Effort: Pulmonary effort is normal. No respiratory distress.   Abdominal:      Palpations: Abdomen is soft.      Tenderness: There is no abdominal tenderness.   Musculoskeletal:         General: Normal range of motion.      Cervical back: Normal range of motion.   Skin:     General: Skin is warm and dry.   Neurological:      Mental Status: He is alert and oriented to person, place, and time.   Psychiatric:         Behavior: Behavior normal.         ED COURSE and MDM   1536.  Patient has generalized weakness, is pale on examination, and has had melena described.  Lab values pending.    1611.  Hemoglobin 6.5.  Transfusion of packed red blood cells ordered.  47 patient continues to have black, tarry stool.  Patient needs an upper endoscopy.    1650.  Patient reviewed with general surgery, Dr. Collins.  She is recommending holding Plavix.  Treating symptomatically.  Continue PPI.  Patient accepted by the hospital service.    Electronic medical chart reviewed, including medical problems, medications, medical allergies, social history.  Recent hospitalizations and surgical procedures reviewed.  Recent clinic visits and consultations reviewed.  Recent labs and test results reviewed.  Nursing notes reviewed.    The patient, their parent if applicable, and/or their medical decision maker(s) and I have reviewed all of the available historical information, applicable PMH, physical exam findings, and objective diagnostic data gathered during this ED visit.  We then discussed all work-up options and then together agreed upon the course taken during this visit.  The ultimate disposition and plan was a cooperative  decision made between myself and the patient, their parent if applicable, and/or their legal decision maker(s).  The risks and benefits of all decisions made during this visit were discussed to the best of my abilities given the circumstances, and all parties are understanding of the pertinent ramifications of these decisions.      LABS  Labs Ordered and Resulted from Time of ED Arrival to Time of ED Departure   COMPREHENSIVE METABOLIC PANEL - Abnormal       Result Value    Sodium 140      Potassium 4.4      Carbon Dioxide (CO2) 19 (*)     Anion Gap 15      Urea Nitrogen 26.7 (*)     Creatinine 1.21 (*)     GFR Estimate 63      Calcium 9.3      Chloride 106      Glucose 149 (*)     Alkaline Phosphatase 116      AST 16      ALT 14      Protein Total 7.0      Albumin 4.2      Bilirubin Total 0.2     CBC WITH PLATELETS AND DIFFERENTIAL - Abnormal    WBC Count 9.2      RBC Count 2.10 (*)     Hemoglobin 6.5 (*)     Hematocrit 20.5 (*)     MCV 98      MCH 31.0      MCHC 31.7      RDW 15.4 (*)     Platelet Count 278      % Neutrophils 58      % Lymphocytes 31      % Monocytes 8      % Eosinophils 3      % Basophils 1      % Immature Granulocytes 0      NRBCs per 100 WBC 0      Absolute Neutrophils 5.3      Absolute Lymphocytes 2.8      Absolute Monocytes 0.7      Absolute Eosinophils 0.3      Absolute Basophils 0.1      Absolute Immature Granulocytes 0.0      Absolute NRBCs 0.0     INR - Normal    INR 1.13     TYPE AND SCREEN, ADULT    ABO/RH(D) A POS      SPECIMEN EXPIRATION DATE [id number]     PREPARE RED BLOOD CELLS (UNIT)   ABO/RH TYPE AND SCREEN       IMAGING  Images reviewed by me.  Radiology report also reviewed.  No orders to display       Procedures    Medications   pantoprazole (PROTONIX) IV push injection 40 mg (has no administration in time range)   calcium carbonate (TUMS) chewable tablet 1,000 mg (has no administration in time range)   clopidogrel (PLAVIX) tablet 75 mg ( Oral Automatically Held 3/30/25  0800)         IMPRESSION       ICD-10-CM    1. Melena  K92.1       2. Anemia due to blood loss, acute  D62                Medication List      There are no discharge medications for this visit.                             Sumit Kumar MD  03/27/25 [id number]

## 2025-03-27 NOTE — CONSULTS
Discussed patient with ED as I was paged.   Pt is on plavix 2/2 CVA.  Reporting tarry stool the past few weeks.    HGb 6.5.    I recommend Q8hr Hgb after transfusion   Hold plavix. Treat conservatively with PPI and carafate.    If pt Hgb stabilized; we will set up EGD as output.   If pt Hgb continues to decreased; will consider EGD while inpatient.    Detail consult in AM.      Tonny-Tisha DO Collins FACOS       77

## 2025-03-27 NOTE — H&P
[facility]    History and Physical  Hospital Medicine       Date of Admission:  3/27/2025  Date of Service: 3/27/2025     Assessment & Plan   Francois Lujan is a 74 year old male with past medical history of T2DM with polyneuropathy, CAD s/p CABG, obesity, depression, hyperlipidemia, HTN, CKD, chronic gout, anemia, ETOH use with dependence, hypothyroidism now presents on 3/27/2025 with melena. He is being admitted for management of presumed UGIB requiring blood transfusion.     Anemia due to blood loss, acute on chronic  Presumed upper GI bleeding    Presents with 3-4wks melena & progressive fatigue. Hemodynamically stable. Hemoglobin 6.5 in ER (baseline 10.0 - 10.5). Received 2u PRBC in ER.     ER discussed with gen surg who will consult in AM & consider scope if hemoglobin does not improve with blood products.     Screening colonoscopy 2016 showed sigmoid diverticulosis. No previous EGD available. Does endorse history of duodenal ulcer while living in [place] in 1970's.    Clinical picture most consistent with upper GI bleeding, could be recurrent duodenal ulcer vs gastritis probably 2/2 situational stress (cannot rule out ETOH as contributor).   - General surgery consult, appreciate assistance & recommendations   - S/p 2u pRBC in ER. Check hemoglobin ~2hrs post transfusion, then Q8hrs   - Conditional 1u PRBC available for hemoglobin <7   - Pantoprazole 40mg IV BID   - Carafate 4x daily   - HOLD PTA plavix   - NPO for possible     CAD s/p CABG 2012  Carotid stenosis, right  LIMA to LAD, SVG to OM3, SVG to dRCA. Previously following with Dr. Higgins of [facility]; last visit May 2022.   - HOLD PTA clopidogrel  - Continue PTA atorvastatin 80mg daily     Essential hypertension  Mildly hypertensive since presentation (-150's) which appears to be near his baseline.   - Continue PTA olmesartan 10mg daily   - Continue PTA amlodipine 10mg daily     Type 2 diabetes  mellitus  Diabetic polyneuropathy    Good control; hemoglobin A1c 6.1% on 10/25/2024. Glucose mildly elevated in ER (149).   - HOLD PTA metformin while NPO for possible endoscopy  - HOLD PTA glipizide 5mg while NPO for possible endoscopy  - Medium sliding scale insulin while holding oral diabetic meds  - Hypoglycemia monitoring & management protocols in place    Hypothyroidism  - Continue PTA levothyroxine 50mcg daily     Chronic kidney disease, stage 2  Baseline creatinine ~1.0. Creatinine on presentation 1.21. GFR 63.    Chronic idiopathic gout  - Continue PTA allopurinol 300mg daily     Moderate major depression  Noted in history, not on any outpatient pharmacologic management.     Clinically Significant Risk Factors Present on Admission                 # Drug Induced Platelet Defect: home medication list includes an antiplatelet medication   # Hypertension: Noted on problem list      # Anemia: based on hgb <11  # Anemia: based on hgb <11       # Obesity: Estimated body mass index is 33.63 kg/m  as calculated from the following:    Height as of this encounter: 1.829 m (6').    Weight as of this encounter: 112.5 kg (248 lb).        # History of CABG: noted on surgical history        Diet: NPO for Medical/Clinical Reasons Except for: Meds    DVT Prophylaxis: Ambulate every shift  Lazo Catheter: Not present  Code Status:  DNR, DNI  Lines: PIV    Disposition Plan   Medically Ready for Discharge: Anticipated in 2-4 Days     The patient's care was discussed with the Attending Physician, Dr. Brenden Ram, bedside RN, and the patient .    Ila Grimaldo PA-C        Primary Care Physician   Teresita Alvarez [phone]    History is obtained from the patient, who is a good historian, handoff from ER provider, and review of old records via the EMR.    History of Present Illness   Francois Lujan is a 74 year old male with past medical history of T2DM with polyneuropathy, CAD s/p CABG, obesity, depression,  hyperlipidemia, HTN, CKD, chronic gout, anemia, ETOH use with dependence, hypothyroidism now presents on 3/27/2025 with melena.     ~3-4wks of melanotic stools about 2 episodes daily. Started out liquid, progressed to firm & formed. No hematochezia. Felt constipated for the past 4 days PTA. Last BM was AM 3/27, small black formed stool. Denies abdominal pain but does feel bloated. Denies nausea or emesis. Has had decreased PO intake, but still taking in fluids & some solids. Denies chest pain, dyspnea. Denies lightheadedness or dizziness. Does say he had a duodenal ulcer when he lived in [place] in 1970's. Says this was during a period of significant stress. He has had a colonoscopy but never had an EGD. Endorses 4-5 drinks 3-4 times per week with his friends. Endorses some stress with current situations with his life. Feels under control, but does feel increased stress compared to baseline.     Upon arrival to ER patient received lab and imaging workup. ER discussed with gen surg who recommend monitoring hemoglobin after transfusion and if not improving then surgery would consider scoping.     Review of Systems    ROS: 10 point ROS neg other than the symptoms noted above in the HPI.    Past Medical History    Past Medical History:   Diagnosis Date    Arthritis     CAD (coronary artery disease)     Diabetes mellitus (H)     Diabetic ulcer of other part of right foot associated with diabetes mellitus due to underlying condition, limited to breakdown of skin (H) 08/15/2023    Erythema nodosum 01/01/1982    Gout     Hypertension     Malignant neoplasm (H)     squamous cell CA removed from right hand    Polymyalgia rheumatica 03/03/2014    Thyroid disease     Triceps tendon rupture, left, sequela 04/10/2017     Past Surgical History   Past Surgical History:   Procedure Laterality Date    BACK SURGERY      BYPASS GRAFT ARTERY CORONARY  11/17/2011    Procedure:BYPASS GRAFT ARTERY CORONARY; CORONARY ARTERY BYPASS, Right,  OM, LAD WITH ENDOVEIN HARVEST, ON PUMP; Surgeon:LEONEL MG; Location:[facility]    COLONOSCOPY N/A 6/9/2016    Procedure: COLONOSCOPY;  Surgeon: Isidro Humphreys MD;  Location: [facility]    HERNIA REPAIR      infantile hernia repair    ORTHOPEDIC SURGERY      Baker cyst removed - right knee, and mesiscus tear repair    CT LAMINEC/FACETECT/FORAMIN,LUMBAR 1 SEG      Description: Laminectomy Decompress, Facetectomy, Foraminotomy Lumbar Seg;  Recorded: 12/13/2007;  Comments: '71    CT MASTECTOMY FOR GYNECOMASTIA      Description: Breast Surgery Mastectomy For Gynecomastia Bilateral;  Recorded: 12/13/2007;    REPAIR TENDON TRICEPS UPPER EXTREMITY Left 4/11/2017    Procedure: REPAIR TENDON TRICEPS UPPER EXTREMITY;  Surgeon: Rodriguez Kelley MD;  Location: [facility]    [facility] CABG, VEIN, SINGLE      Description: CABG (CABG);  Proc Date: 11/09/2011;  Comments: 3 vessel      Prior to Admission Medications   Prior to Admission Medications   Prescriptions Last Dose Informant Patient Reported? Taking?   Multiple Vitamins-Minerals (MULTIVITAMIN ADULTS) TABS 3/27/2025 Morning Self Yes Yes   Sig: Take 1 tablet by mouth daily.   allopurinol (ZYLOPRIM) 300 MG tablet 3/27/2025 Morning Self No Yes   Sig: Take 1 tablet (300 mg) by mouth daily.   amLODIPine (NORVASC) 10 MG tablet 3/27/2025 Morning Self No Yes   Sig: Take 1 tablet (10 mg) by mouth daily.   atorvastatin (LIPITOR) 80 MG tablet 3/27/2025 Morning Self No Yes   Sig: Take 1 tablet (80 mg) by mouth daily.   blood glucose (ACCU-CHEK GUIDE) test strip Past Week Self No Yes   Sig: TEST ONCE DAILY AS DIRECTED   blood glucose (NO BRAND SPECIFIED) lancets standard  Self No No   Sig: Use to test blood sugar 3 times daily or as directed.   clopidogrel (PLAVIX) 75 MG tablet 3/27/2025 Morning Self No Yes   Sig: Take 1 tablet (75 mg) by mouth daily.   empagliflozin (JARDIANCE) 10 MG TABS tablet  Self No No   Sig: Take 1 tablet (10 mg) by mouth daily.   glipiZIDE (GLUCOTROL XL) 5 MG 24 hr tablet  3/27/2025 Morning Self No Yes   Sig: Take 1 tablet (5 mg) by mouth daily.   levothyroxine (SYNTHROID/LEVOTHROID) 50 MCG tablet 3/27/2025 Morning Self No Yes   Sig: Take 1 tablet (50 mcg) by mouth every morning. Take on an empty stomach.   metFORMIN (GLUCOPHAGE) 500 MG tablet 3/27/2025 Morning Self No Yes   Sig: Take 2 tablets (1,000 mg) by mouth 2 times daily (with meals).   olmesartan (BENICAR) 5 MG tablet 3/27/2025 Morning Self No Yes   Sig: Take 2 tablets (10 mg) by mouth daily.   testosterone (ANDROGEL/TESTIM) 50 MG/5GM (1%) topical gel 3/27/2025 Morning Self No Yes   Sig: Apply 1 packet to shoulders daily.   tetrahydrozoline (VISINE) 0.05 % ophthalmic solution 3/27/2025 Morning Self Yes Yes   Sig: Place 1 drop into both eyes 3 times daily as needed.      Facility-Administered Medications: None     Allergies   Allergies   Allergen Reactions    Hydrocodone-Acetaminophen Itching    Iodine     Irbesartan      renal insuff and hyperkalemia      Lisinopril Nausea    Sulfa Antibiotics      Family History    Family History   Problem Relation Age of Onset    Heart Disease Father     Cancer Mother     C.A.D. Brother     Septicemia Brother 3    Lung Cancer Sister     Schizophrenia Sister      Social History   Social History     Socioeconomic History    Marital status:      Physical Exam   BP (!) 151/74   Pulse 81   Temp 97.7  F (36.5  C) (Oral)   Resp 26   Ht 1.829 m (6')   Wt 112.5 kg (248 lb)   SpO2 97%   BMI 33.63 kg/m       Weight: 248 lbs 0 oz Body mass index is 33.63 kg/m .     Constitutional: alert and oriented x3, laying in bed, appears comfortable   HEENT: no obvious cranial trauma.   CV: regular rate & rhythm, no murmurs, rubs, or gallops. Radial pulse 2+. No pitting LE edema.   Respiratory: CTA bilaterally, respirations unlabored  GI: Bowel sounds present throughout. Abdomen protuberant but soft, nontender to palpation  Skin: Warm and dry. Mild pallor.   Musculoskeletal: muscle bulk as  expected  Neuro: distal sensation intact to lower extremities bilaterally.  symmetric. Speech intact; interacting appropriately.       Data   Data reviewed today:     I have personally reviewed the following data over the past 24 hrs:    9.2  \   6.5 (LL)   / 278     140 106 26.7 (H) /  149 (H)   4.4 19 (L) 1.21 (H) \     ALT: 14 AST: 16 AP: 116 TBILI: 0.2   ALB: 4.2 TOT PROTEIN: 7.0 LIPASE: N/A     INR:  1.13 PTT:  N/A   D-dimer:  N/A Fibrinogen:  N/A

## 2025-03-27 NOTE — MEDICATION SCRIBE - ADMISSION MEDICATION HISTORY
Medication Scribe Admission Medication History    Admission medication history is complete. The information provided in this note is only as accurate as the sources available at the time of the update.    Information Source(s): Patient via in-person    Pertinent Information: Pt stopped taking this med. He did not like how it made him feel    Changes made to PTA medication list:  Added: eye drops, multivitamin  Deleted: None  Changed: Pt stopped taking this med. He did not like how it made him feel    Allergies reviewed with patient and updates made in EHR: yes    Medication History Completed By: Arlene Cee 3/27/2025 5:11 PM    PTA Med List   Medication Sig Note Last Dose/Taking    allopurinol (ZYLOPRIM) 300 MG tablet Take 1 tablet (300 mg) by mouth daily.  3/27/2025 Morning    amLODIPine (NORVASC) 10 MG tablet Take 1 tablet (10 mg) by mouth daily.  3/27/2025 Morning    atorvastatin (LIPITOR) 80 MG tablet Take 1 tablet (80 mg) by mouth daily.  3/27/2025 Morning    blood glucose (ACCU-CHEK GUIDE) test strip TEST ONCE DAILY AS DIRECTED 3/27/2025: BS #130 Past Week    clopidogrel (PLAVIX) 75 MG tablet Take 1 tablet (75 mg) by mouth daily.  3/27/2025 Morning    glipiZIDE (GLUCOTROL XL) 5 MG 24 hr tablet Take 1 tablet (5 mg) by mouth daily.  3/27/2025 Morning    levothyroxine (SYNTHROID/LEVOTHROID) 50 MCG tablet Take 1 tablet (50 mcg) by mouth every morning. Take on an empty stomach.  3/27/2025 Morning    metFORMIN (GLUCOPHAGE) 500 MG tablet Take 2 tablets (1,000 mg) by mouth 2 times daily (with meals).  3/27/2025 Morning    Multiple Vitamins-Minerals (MULTIVITAMIN ADULTS) TABS Take 1 tablet by mouth daily.  3/27/2025 Morning    olmesartan (BENICAR) 5 MG tablet Take 2 tablets (10 mg) by mouth daily.  3/27/2025 Morning    testosterone (ANDROGEL/TESTIM) 50 MG/5GM (1%) topical gel Apply 1 packet to shoulders daily.  3/27/2025 Morning    tetrahydrozoline (VISINE) 0.05 % ophthalmic solution Place 1 drop into both eyes  3 times daily as needed.  3/27/2025 Morning

## 2025-03-28 VITALS
SYSTOLIC BLOOD PRESSURE: 121 MMHG | WEIGHT: 251.32 LBS | RESPIRATION RATE: 18 BRPM | DIASTOLIC BLOOD PRESSURE: 55 MMHG | TEMPERATURE: 97.9 F | HEIGHT: 72 IN | OXYGEN SATURATION: 96 % | BODY MASS INDEX: 34.04 KG/M2 | HEART RATE: 69 BPM

## 2025-03-28 LAB
ANION GAP SERPL CALCULATED.3IONS-SCNC: 12 MMOL/L (ref 7–15)
BUN SERPL-MCNC: 23.8 MG/DL (ref 8–23)
CALCIUM SERPL-MCNC: 8.7 MG/DL (ref 8.8–10.4)
CHLORIDE SERPL-SCNC: 107 MMOL/L (ref 98–107)
CREAT SERPL-MCNC: 1.11 MG/DL (ref 0.67–1.17)
EGFRCR SERPLBLD CKD-EPI 2021: 70 ML/MIN/1.73M2
ERYTHROCYTE [DISTWIDTH] IN BLOOD BY AUTOMATED COUNT: 17.7 % (ref 10–15)
GLUCOSE BLDC GLUCOMTR-MCNC: 104 MG/DL (ref 70–99)
GLUCOSE BLDC GLUCOMTR-MCNC: 109 MG/DL (ref 70–99)
GLUCOSE BLDC GLUCOMTR-MCNC: 117 MG/DL (ref 70–99)
GLUCOSE BLDC GLUCOMTR-MCNC: 88 MG/DL (ref 70–99)
GLUCOSE BLDC GLUCOMTR-MCNC: 93 MG/DL (ref 70–99)
GLUCOSE SERPL-MCNC: 94 MG/DL (ref 70–99)
HCO3 SERPL-SCNC: 20 MMOL/L (ref 22–29)
HCT VFR BLD AUTO: 22.5 % (ref 40–53)
HGB BLD-MCNC: 7 G/DL (ref 13.3–17.7)
HGB BLD-MCNC: 7.3 G/DL (ref 13.3–17.7)
HGB BLD-MCNC: 7.3 G/DL (ref 13.3–17.7)
HGB BLD-MCNC: 7.8 G/DL (ref 13.3–17.7)
HGB BLD-MCNC: 8.2 G/DL (ref 13.3–17.7)
MCH RBC QN AUTO: 29.9 PG (ref 26.5–33)
MCHC RBC AUTO-ENTMCNC: 32.4 G/DL (ref 31.5–36.5)
MCV RBC AUTO: 92 FL (ref 78–100)
PLATELET # BLD AUTO: 221 10E3/UL (ref 150–450)
POTASSIUM SERPL-SCNC: 4.6 MMOL/L (ref 3.4–5.3)
RBC # BLD AUTO: 2.44 10E6/UL (ref 4.4–5.9)
SODIUM SERPL-SCNC: 139 MMOL/L (ref 135–145)
WBC # BLD AUTO: 8.4 10E3/UL (ref 4–11)

## 2025-03-28 PROCEDURE — 85018 HEMOGLOBIN: CPT

## 2025-03-28 PROCEDURE — 99232 SBSQ HOSP IP/OBS MODERATE 35: CPT | Performed by: STUDENT IN AN ORGANIZED HEALTH CARE EDUCATION/TRAINING PROGRAM

## 2025-03-28 PROCEDURE — 80048 BASIC METABOLIC PNL TOTAL CA: CPT

## 2025-03-28 PROCEDURE — 250N000011 HC RX IP 250 OP 636

## 2025-03-28 PROCEDURE — 36415 COLL VENOUS BLD VENIPUNCTURE: CPT | Performed by: STUDENT IN AN ORGANIZED HEALTH CARE EDUCATION/TRAINING PROGRAM

## 2025-03-28 PROCEDURE — 82435 ASSAY OF BLOOD CHLORIDE: CPT

## 2025-03-28 PROCEDURE — 85018 HEMOGLOBIN: CPT | Performed by: STUDENT IN AN ORGANIZED HEALTH CARE EDUCATION/TRAINING PROGRAM

## 2025-03-28 PROCEDURE — 82565 ASSAY OF CREATININE: CPT

## 2025-03-28 PROCEDURE — 250N000013 HC RX MED GY IP 250 OP 250 PS 637

## 2025-03-28 PROCEDURE — 85027 COMPLETE CBC AUTOMATED: CPT

## 2025-03-28 PROCEDURE — 36415 COLL VENOUS BLD VENIPUNCTURE: CPT

## 2025-03-28 PROCEDURE — 258N000003 HC RX IP 258 OP 636

## 2025-03-28 PROCEDURE — 120N000001 HC R&B MED SURG/OB

## 2025-03-28 RX ADMIN — ALLOPURINOL 300 MG: 300 TABLET ORAL at 09:45

## 2025-03-28 RX ADMIN — LEVOTHYROXINE SODIUM 50 MCG: 0.05 TABLET ORAL at 09:46

## 2025-03-28 RX ADMIN — ATORVASTATIN CALCIUM 80 MG: 80 TABLET, FILM COATED ORAL at 09:44

## 2025-03-28 RX ADMIN — SUCRALFATE 1 G: 1 SUSPENSION ORAL at 05:57

## 2025-03-28 RX ADMIN — AMLODIPINE BESYLATE 10 MG: 10 TABLET ORAL at 09:45

## 2025-03-28 RX ADMIN — PANTOPRAZOLE SODIUM 40 MG: 40 INJECTION, POWDER, FOR SOLUTION INTRAVENOUS at 20:34

## 2025-03-28 RX ADMIN — LOSARTAN POTASSIUM 25 MG: 25 TABLET, FILM COATED ORAL at 09:45

## 2025-03-28 RX ADMIN — SUCRALFATE 1 G: 1 SUSPENSION ORAL at 16:38

## 2025-03-28 RX ADMIN — PANTOPRAZOLE SODIUM 40 MG: 40 INJECTION, POWDER, FOR SOLUTION INTRAVENOUS at 09:54

## 2025-03-28 RX ADMIN — SUCRALFATE 1 G: 1 SUSPENSION ORAL at 12:19

## 2025-03-28 RX ADMIN — SODIUM CHLORIDE, SODIUM LACTATE, POTASSIUM CHLORIDE, AND CALCIUM CHLORIDE 1000 ML: .6; .31; .03; .02 INJECTION, SOLUTION INTRAVENOUS at 19:25

## 2025-03-28 RX ADMIN — SUCRALFATE 1 G: 1 SUSPENSION ORAL at 22:48

## 2025-03-28 RX ADMIN — SODIUM CHLORIDE, SODIUM LACTATE, POTASSIUM CHLORIDE, AND CALCIUM CHLORIDE 1000 ML: .6; .31; .03; .02 INJECTION, SOLUTION INTRAVENOUS at 00:19

## 2025-03-28 ASSESSMENT — ACTIVITIES OF DAILY LIVING (ADL)
ADLS_ACUITY_SCORE: 37

## 2025-03-28 NOTE — PROGRESS NOTES
[facility]    Medicine Progress Note - Hospitalist Service    Date of Admission:  3/27/2025    Assessment & Plan      Francois Lujan is a 74 year old male with past medical history of T2DM with polyneuropathy, CAD s/p CABG, obesity, depression, hyperlipidemia, HTN, CKD, chronic gout, anemia, ETOH use with dependence, hypothyroidism now presents on 3/27/2025 with melena. He is being admitted for management of presumed UGIB requiring blood transfusion.      # Anemia due to blood loss, acute on chronic  # Presumed upper GI bleeding    Presents with 3-4wks melena & progressive fatigue. Hemodynamically stable. Hemoglobin 6.5 in ER (baseline 10.0 - 10.5). Received 2u PRBC in ER.      Screening colonoscopy 2016 showed sigmoid diverticulosis. No previous EGD available. Does endorse history of duodenal ulcer while living in [place] in 1970's.    Clinical picture most consistent with upper GI bleeding, could be recurrent duodenal ulcer vs gastritis probably 2/2 situational stress (cannot rule out ETOH as contributor).   - General surgery consult  - NPO at midnight, plan for upper endoscopy in am   - S/p 2u pRBC   - Conditional 1u PRBC available for hemoglobin <7   - Pantoprazole 40mg IV BID   - Carafate 4x daily   - Holding PTA plavix      # CAD s/p CABG 2012  # Carotid stenosis, right  LIMA to LAD, SVG to OM3, SVG to dRCA. Previously following with Dr. Higgins of [facility]; last visit May 2022.   - HOLD PTA clopidogrel  - Continued PTA atorvastatin 80mg daily      # Essential hypertension  Mildly hypertensive since presentation (-150's) which appears to be near his baseline.   - Continue PTA olmesartan 10mg daily   - Continue PTA amlodipine 10mg daily      # Type 2 diabetes mellitus  # Diabetic polyneuropathy  Good control; hemoglobin A1c 6.1% on 10/25/2024. Glucose mildly elevated in ER (149).   - HOLD PTA metformin while NPO for possible endoscopy  - HOLD PTA glipizide 5mg  while NPO for possible endoscopy  - Medium sliding scale insulin while holding oral diabetic meds  - Hypoglycemia monitoring & management protocols in place     # Hypothyroidism  - Continued PTA levothyroxine 50mcg daily      # Chronic kidney disease, stage 2  Baseline creatinine ~1.0. Creatinine on presentation 1.21. GFR 63.     # Chronic idiopathic gout  - Continued PTA allopurinol 300mg daily      # Moderate major depression  Noted in history, not on any outpatient pharmacologic management.         Diet: Clear Liquid Diet    DVT Prophylaxis: Ambulate every shift  Lazo Catheter: Not present  Lines: None     Cardiac Monitoring: None  Code Status: No CPR- Do NOT Intubate      Clinically Significant Risk Factors                   # Hypertension: Noted on problem list            # Obesity: Estimated body mass index is 34.09 kg/m  as calculated from the following:    Height as of this encounter: 1.829 m (6').    Weight as of this encounter: 114 kg (251 lb 5.2 oz)., PRESENT ON ADMISSION      # History of CABG: noted on surgical history       Social Drivers of Health    Tobacco Use: Medium Risk (10/25/2024)    Patient History     Smoking Tobacco Use: Former     Smokeless Tobacco Use: Never   Physical Activity: Inactive (10/25/2024)    Exercise Vital Sign     Days of Exercise per Week: 0 days     Minutes of Exercise per Session: 0 min   Social Connections: Unknown (10/25/2024)    Social Connection and Isolation Panel [NHANES]     Frequency of Social Gatherings with Friends and Family: More than three times a week          Disposition Plan     Medically Ready for Discharge: Anticipated in 2-4 Days     Edi Ram MD  Hospitalist Service  [facility]  Securely message with [employer] (more info)  Text page via [employer] Paging/Directory   ______________________________________________________________________    Interval History     Admitted yesterday. Transfused two units. Vitals remained stable.  Reports abdominal pain improved but still present. Denies having any nausea or vomiting. Agreeable to staying for endoscopy to further evaluate stomach for signs of bleeding. States that black stool have resolved.     Physical Exam   Vital Signs: Temp: 97.8  F (36.6  C) Temp src: Oral BP: 133/68 Pulse: 72   Resp: 16 SpO2: 96 % O2 Device: None (Room air)    Weight: 251 lbs 5.19 oz    General Appearance: Awake and alert, sitting back in bed, in no acute distress   Respiratory: Breathing easily on room air   Cardiovascular: Regular rate and rhythm   GI: Abdomen soft, mildly distended, with some mild diffuse tenderness   Other: Appropriate mood and affect      Medical Decision Making       45 MINUTES SPENT BY ME on the date of service doing chart review, history, exam, documentation & further activities per the note.      Data     I have personally reviewed the following data over the past 24 hrs:    8.4  \   7.8 (L)   / 221     139 107 23.8 (H) /  117 (H)   4.6 20 (L) 1.11 \       Imaging results reviewed over the past 24 hrs:   No results found for this or any previous visit (from the past 24 hours).

## 2025-03-28 NOTE — PLAN OF CARE
"  Problem: Gastrointestinal Bleeding  Goal: Hemostasis  Outcome: Progressing       Goal Outcome Evaluation:      Plan of Care Reviewed With: patient    Overall Patient Progress: improvingOverall Patient Progress: improving    Outcome Evaluation: Patient is alert and oriented x 4.  Denies pain, nausea or shortness of air; no dizziness.  Patient states \"I had a very small stool with gas; no blood.\"  Last hemoglobin was 7.8 at 1407.  Clear liquid diet ordered per MD.      "

## 2025-03-28 NOTE — PLAN OF CARE
Problem: Adult Inpatient Plan of Care  Goal: Absence of Hospital-Acquired Illness or Injury  Intervention: Identify and Manage Fall Risk  Recent Flowsheet Documentation  Taken 3/28/2025 0056 by Emerita Montiel RN  Safety Promotion/Fall Prevention: activity supervised     Problem: Adult Inpatient Plan of Care  Goal: Optimal Comfort and Wellbeing  Outcome: Progressing  Intervention: Provide Person-Centered Care  Recent Flowsheet Documentation  Taken 3/28/2025 0056 by Emerita Montiel RN  Trust Relationship/Rapport: care explained     Problem: Gastrointestinal Bleeding  Goal: Optimal Coping with Acute Illness  Outcome: Progressing  Goal: Hemostasis  Outcome: Progressing   Goal Outcome Evaluation:      Plan of Care Reviewed With: patient    Overall Patient Progress: improvingOverall Patient Progress: improving    Outcome Evaluation: Pt alert, oriented, SBA. NPO 2 units of blood given per orders. Last Hgb 7.1. States he feels a lot better after the blood transfusion. LS clear. On RA. Denies pain, nausea, SOB.

## 2025-03-28 NOTE — CONSULTS
[facility]  Surgery Consultation         Lamar Cornelius DO    Francois Lujan MRN# [id number]   YOB: 1950 Age: 74 year old      Date of Consult: 3/28/2025    I was asked to see this patient at the request of Dr. Kumar for evaluation of melena/anemia.         Assessment and Plan:   Francois Lujan is a 74 year old male who presented with history, exam, and laboratory workup most consistent with upper GI bleed.       ICD-10-CM    1. Postsurgical aortocoronary bypass status  Z95.1       2. Melena  K92.1       3. Anemia due to blood loss, acute  D62       4. Long term (current) use of anticoagulants  Z79.01       5. Drug therapy  Z79.899         - Patient continues to have melenic stools, however denies additional symptoms including nausea, emesis, hematemesis, abdominal pain  - Received 2u RBC while in the ED w/Hgb 7.8 (8.2, 7.3, 7.1, 6.5); Hgb stable and patient is hemodynamically stable without signs of ongoing/active bleeding; recommend transfusion for Hgb < 7.0   - On Plavix for ischemic stroke, held; recommend continue holding any AC/APT while concern for UGIB  - May consider upper endoscopy for diagnostic/therapeutic intervention while patient is admitted, however patient states he would like to discharge whenever appropriate per primary team; may consider outpatient upper endoscopy should patient discharge today      Time talking to patient including looking up records and talking to other physicians and nurses about patient care is 45 minutes.            Code Status:   DNR / DNI         Primary Care Physician:   Teresita Alvarez         Requesting Physician:      No ref. provider found         Chief Complaint:     Chief Complaint   Patient presents with    Generalized Weakness    Rectal Bleeding       History is obtained from the patient         History of Present Illness:   Francois Lujan is a 74 year old male who presents on consultation with melenic stools and anemia  with initial Hgb 6.5. He received 2u RBC in the ED with repeat Hgb 8.2. Per patient report on our evaluation, he had onset of black, tarry stools approximately 1.5-2 weeks ago with associated weakness/fatigue, although denies abdominal pain, nausea, and emesis. He denies any NSAID usage, although does take Plavix for history of stroke in 2020 and CAD s/p CABG in 2012. Additionally, he states he was diagnosed with a duodenal ulcer in [place] in 1977 based on epigastric abdominal pain and resolution of symptoms with medications, however denies prior upper endoscopy or other direct evaluation of the upper GI tract. The past medical history, past surgical history, family history, social history and review of systems was performed and as noted.           Past Medical History:     Past Medical History:   Diagnosis Date    Arthritis     CAD (coronary artery disease)     Diabetes mellitus (H)     Diabetic ulcer of other part of right foot associated with diabetes mellitus due to underlying condition, limited to breakdown of skin (H) 08/15/2023    Erythema nodosum 01/01/1982    Gout     Hypertension     Malignant neoplasm (H)     squamous cell CA removed from right hand    Polymyalgia rheumatica 03/03/2014    Thyroid disease     Triceps tendon rupture, left, sequela 04/10/2017             Past Surgical History:     Past Surgical History:   Procedure Laterality Date    BACK SURGERY      BYPASS GRAFT ARTERY CORONARY  11/17/2011    Procedure:BYPASS GRAFT ARTERY CORONARY; CORONARY ARTERY BYPASS, Right, OM, LAD WITH ENDOVEIN HARVEST, ON PUMP; Surgeon:LEONEL MG; Location: OR    COLONOSCOPY N/A 6/9/2016    Procedure: COLONOSCOPY;  Surgeon: Isidro Humphreys MD;  Location: [facility]    HERNIA REPAIR      infantile hernia repair    ORTHOPEDIC SURGERY      Baker cyst removed - right knee, and mesiscus tear repair    GA LAMINEC/FACETECT/FORAMIN,LUMBAR 1 SEG      Description: Laminectomy Decompress, Facetectomy, Foraminotomy Lumbar  Seg;  Recorded: 12/13/2007;  Comments: '71    [facility] MASTECTOMY FOR GYNECOMASTIA      Description: Breast Surgery Mastectomy For Gynecomastia Bilateral;  Recorded: 12/13/2007;    REPAIR TENDON TRICEPS UPPER EXTREMITY Left 4/11/2017    Procedure: REPAIR TENDON TRICEPS UPPER EXTREMITY;  Surgeon: Rodriguez Kelley MD;  Location: [facility] CABG, VEIN, SINGLE      Description: CABG (CABG);  Proc Date: 11/09/2011;  Comments: 3 vessel            Home Medications:     Prior to Admission medications    Medication Sig Last Dose Taking? Auth Provider Long Term End Date   allopurinol (ZYLOPRIM) 300 MG tablet Take 1 tablet (300 mg) by mouth daily. 3/27/2025 Morning Yes Teresita Alvarez DO     amLODIPine (NORVASC) 10 MG tablet Take 1 tablet (10 mg) by mouth daily. 3/27/2025 Morning Yes Teresita Alvarez, DO Yes    atorvastatin (LIPITOR) 80 MG tablet Take 1 tablet (80 mg) by mouth daily. 3/27/2025 Morning Yes Teresita Alvarez, DO Yes    blood glucose (ACCU-CHEK GUIDE) test strip TEST ONCE DAILY AS DIRECTED Past Week Yes Teresita Alvarez DO     clopidogrel (PLAVIX) 75 MG tablet Take 1 tablet (75 mg) by mouth daily. 3/27/2025 Morning Yes Teresita Alvarez, DO Yes    glipiZIDE (GLUCOTROL XL) 5 MG 24 hr tablet Take 1 tablet (5 mg) by mouth daily. 3/27/2025 Morning Yes Teresita Alvarez, DO Yes    levothyroxine (SYNTHROID/LEVOTHROID) 50 MCG tablet Take 1 tablet (50 mcg) by mouth every morning. Take on an empty stomach. 3/27/2025 Morning Yes Teresita Alvarez, DO Yes    metFORMIN (GLUCOPHAGE) 500 MG tablet Take 2 tablets (1,000 mg) by mouth 2 times daily (with meals). 3/27/2025 Morning Yes Teresita Alvarez, DO Yes    Multiple Vitamins-Minerals (MULTIVITAMIN ADULTS) TABS Take 1 tablet by mouth daily. 3/27/2025 Morning Yes Reported, Patient     olmesartan (BENICAR) 5 MG tablet Take 2 tablets (10 mg) by mouth daily. 3/27/2025 Morning Yes Teresita Alvarez, DO Yes    testosterone (ANDROGEL/TESTIM) 50 MG/5GM  (1%) topical gel Apply 1 packet to shoulders daily. 3/27/2025 Morning Yes Teresita Martinez, FERNANDA Yes    tetrahydrozoline (VISINE) 0.05 % ophthalmic solution Place 1 drop into both eyes 3 times daily as needed. 3/27/2025 Morning Yes Reported, Patient     blood glucose (NO BRAND SPECIFIED) lancets standard Use to test blood sugar 3 times daily or as directed.   Karen Ivory MD     empagliflozin (JARDIANCE) 10 MG TABS tablet Take 1 tablet (10 mg) by mouth daily.   Teresita Alvarez DO              Current Medications:         Current Facility-Administered Medications   Medication Dose Route Frequency Provider Last Rate Last Admin    allopurinol (ZYLOPRIM) tablet 300 mg  300 mg Oral Daily Ila Grimaldo PA-C   300 mg at 03/28/25 0945    amLODIPine (NORVASC) tablet 10 mg  10 mg Oral Daily Ila Grimaldo PA-C   10 mg at 03/28/25 0945    atorvastatin (LIPITOR) tablet 80 mg  80 mg Oral Daily Ila Grimaldo PA-C   80 mg at 03/28/25 0944    [Held by provider] clopidogrel (PLAVIX) tablet 75 mg  75 mg Oral Daily Sumit Kumar MD        [Held by provider] glipiZIDE (GLUCOTROL XL) 24 hr tablet 5 mg  5 mg Oral Daily Ila Grimaldo PA-C        insulin aspart (NovoLOG) injection (RAPID ACTING)  1-7 Units Subcutaneous TID AC Ila Grimaldo PA-C        insulin aspart (NovoLOG) injection (RAPID ACTING)  1-5 Units Subcutaneous At Bedtime Ila Grmialdo PA-C        levothyroxine (SYNTHROID/LEVOTHROID) tablet 50 mcg  50 mcg Oral QAM Ila Grimaldo PA-C   50 mcg at 03/28/25 0946    losartan (COZAAR) tablet 25 mg  25 mg Oral Daily Ila Grimaldo PA-C   25 mg at 03/28/25 0945    [Held by provider] metFORMIN (GLUCOPHAGE) tablet 1,000 mg  1,000 mg Oral BID w/meals Ila Grimaldo PA-C        pantoprazole (PROTONIX) IV push injection 40 mg  40 mg Intravenous BID Ila Grimaldo PA-C   40 mg at 03/28/25 0954    sucralfate (CARAFATE) suspension 1 g  1 g Oral 4x Daily AC & HS  Ila Grimaldo PA-C   1 g at 03/28/25 1219     Current Facility-Administered Medications   Medication Dose Route Frequency Provider Last Rate Last Admin    calcium carbonate (TUMS) chewable tablet 1,000 mg  1,000 mg Oral 4x Daily PRN Sumit Kumar MD        glucose gel 15-30 g  15-30 g Oral Q15 Min PRN Ila Grimaldo PA-C        Or    dextrose 50 % injection 25-50 mL  25-50 mL Intravenous Q15 Min PRN Ila Grimaldo PA-C        Or    glucagon injection 1 mg  1 mg Subcutaneous Q15 Min PRN Ila Grimaldo PA-C        ondansetron (ZOFRAN ODT) ODT tab 4 mg  4 mg Oral Q6H PRN Ila Grimaldo PA-C        Or    ondansetron (ZOFRAN) injection 4 mg  4 mg Intravenous Q6H PRN Ila Grimaldo PA-C        prochlorperazine (COMPAZINE) injection 5 mg  5 mg Intravenous Q6H PRN Ila Grimaldo PA-C        Or    prochlorperazine (COMPAZINE) tablet 5 mg  5 mg Oral Q6H PRN Ila Grimaldo PA-C        sodium chloride 0.9% BOLUS 1-250 mL  1-250 mL Intravenous Q1H PRN Ila Grimaldo PA-C                Allergies:     Allergies   Allergen Reactions    Hydrocodone-Acetaminophen Itching    Iodine     Irbesartan      renal insuff and hyperkalemia      Lisinopril Nausea    Sulfa Antibiotics             Social History:   Francois Lujan  reports that he quit smoking about 27 years ago. His smoking use included cigarettes. He started smoking about 57 years ago. He has a 60 pack-year smoking history. He has never used smokeless tobacco. He reports current alcohol use of about 10.0 standard drinks of alcohol per week. He reports that he does not use drugs.          Family History:     Family History   Problem Relation Age of Onset    Heart Disease Father     Cancer Mother     C.A.D. Brother     Septicemia Brother 3    Lung Cancer Sister     Schizophrenia Sister              Review of Systems:   The 10 point Review of Systems is negative other than noted in the HPI or here.           Physical  Exam:    ,   Vitals: /58 (BP Location: Right arm)   Pulse 72   Temp 97.7  F (36.5  C) (Oral)   Resp 16   Ht 1.829 m (6')   Wt 114 kg (251 lb 5.2 oz)   SpO2 93%   BMI 34.09 kg/m    BMI= Body mass index is 34.09 kg/m .    Constitutional: Awake, alert, no acute distress.  Eyes: No scleral icterus.  Conjunctiva are without injection.  ENMT: Mucous membranes moist, dentition and gums are intact.   Respiratory: Lungs are clear to auscultation and percussion bilaterally.   Cardiovascular: Regular rate and rhythm. No murmurs, rubs, or gallops.   Abdomen: Non-distended, non-tender, normoactive bowel sounds present, No masses, no flank tenderness, no hepatosplenomegaly.   Musculoskeletal: No spinal or CVA tenderness. Full range of motion in the upper and lower extremities.    Skin: No skin rashes or lesions to inspection.  No petechia.    Neurologic: Cranial nerves II through XII are grossly intact and symmetric.  Psychiatric: The patient is alert and oriented times 3.  The patient's affect is not blunted and mood is appropriate.       Data:   All new lab and imaging data was reviewed.   Results for orders placed or performed during the hospital encounter of 03/27/25 (from the past 24 hours)   ABO/Rh type and screen *Canceled*    Narrative    The following orders were created for panel order ABO/Rh type and screen.  Procedure                               Abnormality         Status                     ---------                               -----------         ------                       Please view results for these tests on the individual orders.   Prepare red blood cells (unit)   Result Value Ref Range    Blood Component Type Red Blood Cells     Product Code D4396H69     Unit Status Transfused     Unit Number [id number]     CROSSMATCH Compatible     CODING SYSTEM [id number]     ISSUE DATE AND TIME [id number]     UNIT ABO/RH A+     UNIT TYPE ISBT 6200    Prepare red blood cells (unit)   Result Value Ref  Range    Blood Component Type Red Blood Cells     Product Code [id number]     Unit Status Transfused     Unit Number [id number]     CROSSMATCH Compatible     CODING SYSTEM [id number]     ISSUE DATE AND TIME [id number]     UNIT ABO/RH A+     UNIT TYPE ISBT 6200    Hemoglobin   Result Value Ref Range    Hemoglobin 7.1 (L) 13.3 - 17.7 g/dL   Glucose by meter   Result Value Ref Range    GLUCOSE BY METER POCT 121 (H) 70 - 99 mg/dL   Glucose by meter   Result Value Ref Range    GLUCOSE BY METER POCT 88 70 - 99 mg/dL   Basic metabolic panel   Result Value Ref Range    Sodium 139 135 - 145 mmol/L    Potassium 4.6 3.4 - 5.3 mmol/L    Chloride 107 98 - 107 mmol/L    Carbon Dioxide (CO2) 20 (L) 22 - 29 mmol/L    Anion Gap 12 7 - 15 mmol/L    Urea Nitrogen 23.8 (H) 8.0 - 23.0 mg/dL    Creatinine 1.11 0.67 - 1.17 mg/dL    GFR Estimate 70 >60 mL/min/1.73m2    Calcium 8.7 (L) 8.8 - 10.4 mg/dL    Glucose 94 70 - 99 mg/dL   CBC with platelets   Result Value Ref Range    WBC Count 8.4 4.0 - 11.0 10e3/uL    RBC Count 2.44 (L) 4.40 - 5.90 10e6/uL    Hemoglobin 7.3 (L) 13.3 - 17.7 g/dL    Hematocrit 22.5 (L) 40.0 - 53.0 %    MCV 92 78 - 100 fL    MCH 29.9 26.5 - 33.0 pg    MCHC 32.4 31.5 - 36.5 g/dL    RDW 17.7 (H) 10.0 - 15.0 %    Platelet Count 221 150 - 450 10e3/uL   Hemoglobin   Result Value Ref Range    Hemoglobin 7.3 (L) 13.3 - 17.7 g/dL   Glucose by meter   Result Value Ref Range    GLUCOSE BY METER POCT 104 (H) 70 - 99 mg/dL   Hemoglobin   Result Value Ref Range    Hemoglobin 8.2 (L) 13.3 - 17.7 g/dL   Glucose by meter   Result Value Ref Range    GLUCOSE BY METER POCT 109 (H) 70 - 99 mg/dL   Hemoglobin   Result Value Ref Range    Hemoglobin 7.8 (L) 13.3 - 17.7 g/dL     The above has been discussed with the attending physician. Any changes will be made in the attending attestation/addendum.    Lamar Cornelius,  PGY-2  [facility]  [facility]

## 2025-03-28 NOTE — PROGRESS NOTES
Blood pressure decreased after blood transfusion completed. Patient is feeling fine. No signs of transfusion reaction. MD notified.

## 2025-03-28 NOTE — PLAN OF CARE
[facility] ADMISSION NOTE    Patient admitted to room 2209 at approximately 1815 via wheel chair from emergency room. Patient was accompanied by transport tech.     ED report read in EPIC    Patient ambulated to bed with stand-by assist. Patient alert and oriented X 3. The patient is not having any pain.  . Admission vital signs: Blood pressure (!) 152/66, pulse 90, temperature 98  F (36.7  C), temperature source Oral, resp. rate 18, height 1.829 m (6'), weight 114 kg (251 lb 5.2 oz), SpO2 97%. Patient was oriented to plan of care, call light, bed controls, tv, telephone, bathroom, and visiting hours.     Risk Assessment    The following safety risks were identified during admission: fall. Yellow risk band applied: YES.     Skin Initial Assessment    This writer admitted this patient and completed a full skin assessment and Adrian score in the Adult PCS flowsheet.   Photo documentation of skin problem and/or wound competed via PowerMessage application (located under Media):  N/A    Appropriate interventions initiated as needed.     Secondary skin check completed by NA, deferred.    Adrian Risk Assessment  Sensory Perception: 3-->slightly limited  Moisture: 4-->rarely moist  Activity: 3-->walks occasionally  Mobility: 3-->slightly limited  Nutrition: 2-->probably inadequate (due to NPO status)  Friction and Shear: 3-->no apparent problem  Adrian Score: 18    Patient reporting intermittent cramping in left leg, this is new.     Brandy Louis RN

## 2025-03-29 ENCOUNTER — ANESTHESIA (OUTPATIENT)
Dept: GASTROENTEROLOGY | Facility: CLINIC | Age: 75
DRG: 378 | End: 2025-03-29
Payer: COMMERCIAL

## 2025-03-29 ENCOUNTER — ANESTHESIA EVENT (OUTPATIENT)
Dept: GASTROENTEROLOGY | Facility: CLINIC | Age: 75
DRG: 378 | End: 2025-03-29
Payer: COMMERCIAL

## 2025-03-29 VITALS
HEIGHT: 72 IN | TEMPERATURE: 97.6 F | HEART RATE: 66 BPM | DIASTOLIC BLOOD PRESSURE: 49 MMHG | OXYGEN SATURATION: 94 % | BODY MASS INDEX: 34.04 KG/M2 | SYSTOLIC BLOOD PRESSURE: 135 MMHG | WEIGHT: 251.32 LBS | RESPIRATION RATE: 15 BRPM

## 2025-03-29 LAB
ANION GAP SERPL CALCULATED.3IONS-SCNC: 13 MMOL/L (ref 7–15)
BUN SERPL-MCNC: 15.2 MG/DL (ref 8–23)
CALCIUM SERPL-MCNC: 8.6 MG/DL (ref 8.8–10.4)
CHLORIDE SERPL-SCNC: 107 MMOL/L (ref 98–107)
CREAT SERPL-MCNC: 0.96 MG/DL (ref 0.67–1.17)
EGFRCR SERPLBLD CKD-EPI 2021: 83 ML/MIN/1.73M2
ERYTHROCYTE [DISTWIDTH] IN BLOOD BY AUTOMATED COUNT: 17 % (ref 10–15)
GLUCOSE BLDC GLUCOMTR-MCNC: 107 MG/DL (ref 70–99)
GLUCOSE BLDC GLUCOMTR-MCNC: 131 MG/DL (ref 70–99)
GLUCOSE BLDC GLUCOMTR-MCNC: 98 MG/DL (ref 70–99)
GLUCOSE SERPL-MCNC: 109 MG/DL (ref 70–99)
HCO3 SERPL-SCNC: 18 MMOL/L (ref 22–29)
HCT VFR BLD AUTO: 22.3 % (ref 40–53)
HGB BLD-MCNC: 7.2 G/DL (ref 13.3–17.7)
HGB BLD-MCNC: 7.3 G/DL (ref 13.3–17.7)
MCH RBC QN AUTO: 29.8 PG (ref 26.5–33)
MCHC RBC AUTO-ENTMCNC: 32.3 G/DL (ref 31.5–36.5)
MCV RBC AUTO: 92 FL (ref 78–100)
PLATELET # BLD AUTO: 225 10E3/UL (ref 150–450)
POTASSIUM SERPL-SCNC: 4.4 MMOL/L (ref 3.4–5.3)
RBC # BLD AUTO: 2.42 10E6/UL (ref 4.4–5.9)
SODIUM SERPL-SCNC: 138 MMOL/L (ref 135–145)
UPPER GI ENDOSCOPY: NORMAL
WBC # BLD AUTO: 7.6 10E3/UL (ref 4–11)

## 2025-03-29 PROCEDURE — 85018 HEMOGLOBIN: CPT | Performed by: STUDENT IN AN ORGANIZED HEALTH CARE EDUCATION/TRAINING PROGRAM

## 2025-03-29 PROCEDURE — 36415 COLL VENOUS BLD VENIPUNCTURE: CPT | Performed by: STUDENT IN AN ORGANIZED HEALTH CARE EDUCATION/TRAINING PROGRAM

## 2025-03-29 PROCEDURE — 258N000003 HC RX IP 258 OP 636: Performed by: NURSE ANESTHETIST, CERTIFIED REGISTERED

## 2025-03-29 PROCEDURE — 250N000011 HC RX IP 250 OP 636

## 2025-03-29 PROCEDURE — 250N000011 HC RX IP 250 OP 636: Performed by: NURSE ANESTHETIST, CERTIFIED REGISTERED

## 2025-03-29 PROCEDURE — 82565 ASSAY OF CREATININE: CPT | Performed by: STUDENT IN AN ORGANIZED HEALTH CARE EDUCATION/TRAINING PROGRAM

## 2025-03-29 PROCEDURE — 83036 HEMOGLOBIN GLYCOSYLATED A1C: CPT

## 2025-03-29 PROCEDURE — 80048 BASIC METABOLIC PNL TOTAL CA: CPT | Performed by: STUDENT IN AN ORGANIZED HEALTH CARE EDUCATION/TRAINING PROGRAM

## 2025-03-29 PROCEDURE — 43255 EGD CONTROL BLEEDING ANY: CPT | Performed by: SURGERY

## 2025-03-29 PROCEDURE — 370N000017 HC ANESTHESIA TECHNICAL FEE, PER MIN: Performed by: SURGERY

## 2025-03-29 PROCEDURE — 250N000009 HC RX 250: Performed by: NURSE ANESTHETIST, CERTIFIED REGISTERED

## 2025-03-29 PROCEDURE — 250N000013 HC RX MED GY IP 250 OP 250 PS 637: Performed by: SURGERY

## 2025-03-29 PROCEDURE — 82435 ASSAY OF BLOOD CHLORIDE: CPT | Performed by: STUDENT IN AN ORGANIZED HEALTH CARE EDUCATION/TRAINING PROGRAM

## 2025-03-29 PROCEDURE — 0W3P8ZZ CONTROL BLEEDING IN GASTROINTESTINAL TRACT, VIA NATURAL OR ARTIFICIAL OPENING ENDOSCOPIC: ICD-10-PCS | Performed by: SURGERY

## 2025-03-29 PROCEDURE — 99239 HOSP IP/OBS DSCHRG MGMT >30: CPT | Performed by: STUDENT IN AN ORGANIZED HEALTH CARE EDUCATION/TRAINING PROGRAM

## 2025-03-29 RX ORDER — ONDANSETRON 2 MG/ML
4 INJECTION INTRAMUSCULAR; INTRAVENOUS EVERY 30 MIN PRN
Status: CANCELLED | OUTPATIENT
Start: 2025-03-29

## 2025-03-29 RX ORDER — PANTOPRAZOLE SODIUM 40 MG/1
40 TABLET, DELAYED RELEASE ORAL DAILY
Qty: 30 TABLET | Refills: 0 | Status: SHIPPED | OUTPATIENT
Start: 2025-03-29

## 2025-03-29 RX ORDER — SODIUM CHLORIDE, SODIUM LACTATE, POTASSIUM CHLORIDE, CALCIUM CHLORIDE 600; 310; 30; 20 MG/100ML; MG/100ML; MG/100ML; MG/100ML
INJECTION, SOLUTION INTRAVENOUS CONTINUOUS
Status: CANCELLED | OUTPATIENT
Start: 2025-03-29

## 2025-03-29 RX ORDER — LIDOCAINE 40 MG/G
CREAM TOPICAL
Status: DISCONTINUED | OUTPATIENT
Start: 2025-03-29 | End: 2025-03-29 | Stop reason: HOSPADM

## 2025-03-29 RX ORDER — SODIUM CHLORIDE, SODIUM LACTATE, POTASSIUM CHLORIDE, CALCIUM CHLORIDE 600; 310; 30; 20 MG/100ML; MG/100ML; MG/100ML; MG/100ML
INJECTION, SOLUTION INTRAVENOUS CONTINUOUS PRN
Status: DISCONTINUED | OUTPATIENT
Start: 2025-03-29 | End: 2025-03-29

## 2025-03-29 RX ORDER — ALBUTEROL SULFATE 0.83 MG/ML
2.5 SOLUTION RESPIRATORY (INHALATION) EVERY 4 HOURS PRN
Status: CANCELLED | OUTPATIENT
Start: 2025-03-29

## 2025-03-29 RX ORDER — NALOXONE HYDROCHLORIDE 0.4 MG/ML
0.1 INJECTION, SOLUTION INTRAMUSCULAR; INTRAVENOUS; SUBCUTANEOUS
Status: CANCELLED | OUTPATIENT
Start: 2025-03-29

## 2025-03-29 RX ORDER — GLYCOPYRROLATE 0.2 MG/ML
INJECTION, SOLUTION INTRAMUSCULAR; INTRAVENOUS PRN
Status: DISCONTINUED | OUTPATIENT
Start: 2025-03-29 | End: 2025-03-29

## 2025-03-29 RX ORDER — LIDOCAINE HYDROCHLORIDE 20 MG/ML
INJECTION, SOLUTION INFILTRATION; PERINEURAL PRN
Status: DISCONTINUED | OUTPATIENT
Start: 2025-03-29 | End: 2025-03-29

## 2025-03-29 RX ORDER — ONDANSETRON 4 MG/1
4 TABLET, ORALLY DISINTEGRATING ORAL EVERY 30 MIN PRN
Status: CANCELLED | OUTPATIENT
Start: 2025-03-29

## 2025-03-29 RX ORDER — PROPOFOL 10 MG/ML
INJECTION, EMULSION INTRAVENOUS CONTINUOUS PRN
Status: DISCONTINUED | OUTPATIENT
Start: 2025-03-29 | End: 2025-03-29

## 2025-03-29 RX ADMIN — PROPOFOL 200 MCG/KG/MIN: 10 INJECTION, EMULSION INTRAVENOUS at 08:15

## 2025-03-29 RX ADMIN — ALLOPURINOL 300 MG: 300 TABLET ORAL at 10:05

## 2025-03-29 RX ADMIN — TOPICAL ANESTHETIC 1 SPRAY: 200 SPRAY DENTAL; PERIODONTAL at 08:14

## 2025-03-29 RX ADMIN — GLYCOPYRROLATE 0.1 MG: 0.2 INJECTION, SOLUTION INTRAMUSCULAR; INTRAVENOUS at 08:14

## 2025-03-29 RX ADMIN — LIDOCAINE HYDROCHLORIDE 5 ML: 20 INJECTION, SOLUTION INFILTRATION; PERINEURAL at 08:17

## 2025-03-29 RX ADMIN — SODIUM CHLORIDE, SODIUM LACTATE, POTASSIUM CHLORIDE, AND CALCIUM CHLORIDE: .6; .31; .03; .02 INJECTION, SOLUTION INTRAVENOUS at 08:13

## 2025-03-29 RX ADMIN — SUCRALFATE 1 G: 1 SUSPENSION ORAL at 10:07

## 2025-03-29 RX ADMIN — LOSARTAN POTASSIUM 25 MG: 25 TABLET, FILM COATED ORAL at 10:06

## 2025-03-29 RX ADMIN — PANTOPRAZOLE SODIUM 40 MG: 40 INJECTION, POWDER, FOR SOLUTION INTRAVENOUS at 07:53

## 2025-03-29 RX ADMIN — AMLODIPINE BESYLATE 10 MG: 10 TABLET ORAL at 10:05

## 2025-03-29 RX ADMIN — ATORVASTATIN CALCIUM 80 MG: 80 TABLET, FILM COATED ORAL at 10:05

## 2025-03-29 RX ADMIN — LEVOTHYROXINE SODIUM 50 MCG: 0.05 TABLET ORAL at 10:05

## 2025-03-29 ASSESSMENT — ACTIVITIES OF DAILY LIVING (ADL)
ADLS_ACUITY_SCORE: 37
ADLS_ACUITY_SCORE: 36
ADLS_ACUITY_SCORE: 36
ADLS_ACUITY_SCORE: 37
ADLS_ACUITY_SCORE: 37
ADLS_ACUITY_SCORE: 36
ADLS_ACUITY_SCORE: 37

## 2025-03-29 ASSESSMENT — LIFESTYLE VARIABLES: TOBACCO_USE: 1

## 2025-03-29 NOTE — DISCHARGE SUMMARY
[facility]  Hospitalist Discharge Summary      Date of Admission:  3/27/2025  Date of Discharge:  3/29/2025  Discharging Provider: Edi Ram MD  Discharge Service: Hospitalist Service    Discharge Diagnoses   # Anemia due to blood loss, acute on chronic  # Duodenum Angiodysplastic lesion s/p clipping 3/29/2025  # Gastric polyp, 10 mm semi pedunculated   # CAD s/p CABG 2012  # Carotid stenosis, right  # Essential hypertension  # Type 2 diabetes mellitus  # Diabetic polyneuropathy  # Hypothyroidism  # Chronic kidney disease, stage 2  # Chronic idiopathic gout  # Moderate major depression    Clinically Significant Risk Factors     # Obesity: Estimated body mass index is 34.09 kg/m  as calculated from the following:    Height as of this encounter: 1.829 m (6').    Weight as of this encounter: 114 kg (251 lb 5.2 oz).       Follow-ups Needed After Discharge   Follow-up Appointments       Hospital Follow-up with Existing Primary Care Provider (PCP)      Please see details below         Schedule Primary Care visit within: 14 Days               Unresulted Labs Ordered in the Past 30 Days of this Admission       No orders found from 2/25/2025 to 3/28/2025.        These results will be followed up by Edi Ram MD.    Discharge Disposition   Discharged to home  Condition at discharge: Stable    Hospital Course   Francois Lujan is a 74 year old male with past medical history of T2DM with polyneuropathy, CAD s/p CABG, obesity, depression, hyperlipidemia, HTN, CKD, chronic gout, anemia, ETOH use with dependence, hypothyroidism now presents on 3/27/2025 with melena. He is being admitted for management of presumed UGIB requiring blood transfusion.      # Anemia due to blood loss, acute on chronic  # Duodenum Angiodysplastic lesion s/p clipping 3/29/2025  # Gastric polyp, 10 mm semi pedunculated     Presents with 3-4wks melena & progressive fatigue. Hemodynamically stable. Hemoglobin  6.5 in ER (baseline 10.0 - 10.5). Received 2u PRBC in ER.      Screening colonoscopy 2016 showed sigmoid diverticulosis. No previous EGD available. Does endorse history of duodenal ulcer while living in [place] in 1970's.    Clinical picture most consistent with upper GI bleeding, could be recurrent duodenal ulcer vs gastritis probably 2/2 situational stress (cannot rule out ETOH as contributor).     Transfused two units of packed RBC with stable Hgb.     EGD 3/29 with bleeding lesion that was clipped and gastric polyp that was visualized but not resected (given antiplatelet medications and current GI bleeding)   - Started pantoprazole 40 mg every day   - Follow up endoscopy in one month, hold antiplatelet medication 5 days prior      # CAD s/p CABG 2012  # Carotid stenosis, right  LIMA to LAD, SVG to OM3, SVG to dRCA. Previously following with Dr. Higgins of [place] [facility]; last visit May 2022.   - Restarted PTA clopidogrel  - Continued PTA atorvastatin 80mg daily      # Essential hypertension  Mildly hypertensive since presentation (-150's) which appears to be near his baseline.   - Continued PTA olmesartan 10mg daily   - Continued PTA amlodipine 10mg daily      # Type 2 diabetes mellitus  # Diabetic polyneuropathy  Good control; hemoglobin A1c 6.1% on 10/25/2024. Glucose mildly elevated in ER (149).   -  Restarted PTA metformin and glipizide at discharge      # Hypothyroidism  - Continued PTA levothyroxine 50 mcg daily      # Chronic kidney disease, stage 2  Baseline creatinine ~1.0. Creatinine on presentation 1.21. GFR 63. Improved to baseline prior to discharge.      # Chronic idiopathic gout  - Continued PTA allopurinol 300 mg daily      # Moderate major depression  Noted in history, not on any outpatient pharmacologic management.     Consultations This Hospital Stay   SURGERY GENERAL IP CONSULT  SURGERY GENERAL IP CONSULT    Code Status   No CPR- Do NOT Intubate    Time Spent on this Encounter   I,  Edi Ram MD, personally saw the patient today and spent greater than 30 minutes discharging this patient.       Edi Ram MD  [facility] SURGICAL  [address] [address]  Phone: [phone]  Fax: [phone]  ______________________________________________________________________    Physical Exam   Vital Signs: Temp: 97.6  F (36.4  C) Temp src: Oral BP: 135/49 Pulse: 66   Resp: 15 SpO2: 94 % O2 Device: None (Room air)    Weight: 251 lbs 5.19 oz  General Appearance:  Awake and alert, sitting back in bed, in no acute distress   Respiratory: Breathing easily on room air   Cardiovascular: Regular rate and rhythm   GI: Abdomen soft, mildly distended, non-tender   Other:  Appropriate mood and affect         Primary Care Physician   Teresita Alvarez    Discharge Orders      Adult GI  Referral - Procedure Only      Activity    Your activity upon discharge: activity as tolerated     Reason for your hospital stay    You were hospitalized with bleeding from a vascular formation in your stomach that was clipped during your admission. Your blood levels have remained stable and you are being discharged back to home with an acid blocking medication.    A gastric polyp was found plan to schedule a repeat upper endoscopy for removal. Plan to hold your Plavix for 5 days prior to this endoscopy.     Diet    Follow this diet upon discharge: Regular adult diet     Hospital Follow-up with Existing Primary Care Provider (PCP)    Please see details below            Significant Results and Procedures   Results for orders placed or performed in visit on 08/08/23   US Lower Extremity Arterial Duplex Right    Narrative    Table formatting from the original result was not included.  Arterial Duplex Ultrasound (Date: 08/08/23)  Lower Extremity Artery Evaluation       Indication: Surveillance Right Leg Arterial : Right Plantar Big Toe Ulcer.   Abnormal JJ's.  Decreased  lower extremity pulses, lower extremity pain    Previous: None    History: Previous Smoker, Hypertension, Diabetic, Hyperlipidemia, and   Vascular Ulcers    Technique: Duplex imaging is performed utilizing gray-scale,   two-dimensional images, and color-flow imaging. Doppler waveform analysis   and spectral Doppler imaging is also performed.    LOWER EXTREMITY ARTERIAL DUPLEX EXAM WITH WAVEFORMS    Right Leg:(cm/s)  Location: [employer] Waveforms   EIA:   212  B   CFA:   109  B   PFA:   143  B   SFA Proximal:   128  T   SFA Mid:   121  T   SFA Distal:   170  T   Popliteal Artery:   77 / 86  T   PTA:   55   B   LILLIANA:   55  B   DPA:   93  M   Waveforms: T=Triphasic, M=Monophasic, B=Biphasic    Left Leg:(cm/s)  Location: [facility]ocities Waveforms   PTA:   46   B   LILLIANA:   39  B   DPA:   45  B   Waveforms: T=Triphasic, M=Monophasic, B=Biphasic      Impression:     Right Lower Extremity: Patent vasculature with predominantly multiphasic   waveforms.  Monophasic flow in the dorsalis pedis artery.  No significant   stenosis identified.    Left Lower Extremity: Patent posterior tibial, anterior tibial and   dorsalis pedis arteries with biphasic flow.    Reference:  Category Normal 1-19% 20-49% 50-99% Occluded   PSV <160 cm/sec without spectral broadening <160 cm/sec with spectral   broadening Increased Increased Absent Flow   Ratio N/A N/A < 2.0 >2.0 N/A   Post-Stenotic Turbulence No No No Yes N/A             Discharge Medications   Current Discharge Medication List        START taking these medications    Details   pantoprazole (PROTONIX) 40 MG EC tablet Take 1 tablet (40 mg) by mouth daily.  Qty: 30 tablet, Refills: 0    Associated Diagnoses: Melena           CONTINUE these medications which have NOT CHANGED    Details   allopurinol (ZYLOPRIM) 300 MG tablet Take 1 tablet (300 mg) by mouth daily.  Qty: 90 tablet, Refills: 3    Associated Diagnoses: Idiopathic chronic gout of multiple sites without tophus      amLODIPine (NORVASC)  10 MG tablet Take 1 tablet (10 mg) by mouth daily.  Qty: 90 tablet, Refills: 3    Associated Diagnoses: Essential hypertension      atorvastatin (LIPITOR) 80 MG tablet Take 1 tablet (80 mg) by mouth daily.  Qty: 90 tablet, Refills: 3    Associated Diagnoses: Hyperlipidemia LDL goal <70      blood glucose (ACCU-CHEK GUIDE) test strip TEST ONCE DAILY AS DIRECTED  Qty: 100 strip, Refills: 2    Comments: We haven't filled this before but pt is requesting an Rx please! Also need Rx for lancets. Thanks!  Associated Diagnoses: Type 2 diabetes mellitus with complication, without long-term current use of insulin (H)      clopidogrel (PLAVIX) 75 MG tablet Take 1 tablet (75 mg) by mouth daily.  Qty: 90 tablet, Refills: 3    Associated Diagnoses: History of stroke      glipiZIDE (GLUCOTROL XL) 5 MG 24 hr tablet Take 1 tablet (5 mg) by mouth daily.  Qty: 90 tablet, Refills: 3    Associated Diagnoses: Type 2 diabetes mellitus with microalbuminuria, without long-term current use of insulin (H)      levothyroxine (SYNTHROID/LEVOTHROID) 50 MCG tablet Take 1 tablet (50 mcg) by mouth every morning. Take on an empty stomach.  Qty: 90 tablet, Refills: 3    Associated Diagnoses: Acquired hypothyroidism      metFORMIN (GLUCOPHAGE) 500 MG tablet Take 2 tablets (1,000 mg) by mouth 2 times daily (with meals).  Qty: 360 tablet, Refills: 3    Associated Diagnoses: Type 2 diabetes mellitus with microalbuminuria, without long-term current use of insulin (H)      Multiple Vitamins-Minerals (MULTIVITAMIN ADULTS) TABS Take 1 tablet by mouth daily.      olmesartan (BENICAR) 5 MG tablet Take 2 tablets (10 mg) by mouth daily.  Qty: 180 tablet, Refills: 3    Associated Diagnoses: Essential hypertension; Type 2 diabetes mellitus with microalbuminuria, without long-term current use of insulin (H)      testosterone (ANDROGEL/TESTIM) 50 MG/5GM (1%) topical gel Apply 1 packet to shoulders daily.  Qty: 250 g, Refills: 3    Associated Diagnoses: Primary  testicular hypogonadism      tetrahydrozoline (VISINE) 0.05 % ophthalmic solution Place 1 drop into both eyes 3 times daily as needed.      blood glucose (NO BRAND SPECIFIED) lancets standard Use to test blood sugar 3 times daily or as directed.  Qty: 1 each, Refills: 11    Associated Diagnoses: Type 2 diabetes mellitus with complication, without long-term current use of insulin (H)           STOP taking these medications       empagliflozin (JARDIANCE) 10 MG TABS tablet Comments:   Reason for Stopping:             Allergies   Allergies   Allergen Reactions    Hydrocodone-Acetaminophen Itching    Iodine     Irbesartan      renal insuff and hyperkalemia      Lisinopril Nausea    Sulfa Antibiotics

## 2025-03-29 NOTE — ANESTHESIA POSTPROCEDURE EVALUATION
Patient: Francois Lujan    Procedure: Procedure(s):  ESOPHAGOGASTRODUODENOSCOPY, WITH HEMORRHAGE CONTROL       Anesthesia Type:  MAC, General    Note:  Disposition: Inpatient   Postop Pain Control: Uneventful            Sign Out: Well controlled pain   PONV: No   Neuro/Psych: Uneventful            Sign Out: Acceptable/Baseline neuro status   Airway/Respiratory: Uneventful            Sign Out: Acceptable/Baseline resp. status   CV/Hemodynamics: Uneventful            Sign Out: Acceptable CV status; No obvious hypovolemia; No obvious fluid overload   Other NRE: NONE   DID A NON-ROUTINE EVENT OCCUR? No       Last vitals:  Vitals:    03/29/25 0315 03/29/25 0739 03/29/25 0752   BP: 134/54 (!) 155/53    Pulse: 74 73    Resp: 18  18   Temp: 36.6  C (97.9  F)  36.8  C (98.3  F)   SpO2: 94% 94%        Electronically Signed By: EVA Martinez CRNA  March 29, 2025  8:39 AM

## 2025-03-29 NOTE — PLAN OF CARE
[facility] TRANSPORT NOTE  Data:   Reason for Transport:  Return to Med/Surg room 2209.    Francois Lujan was transported from PACU via wheel chair at 0936.  Patient was accompanied by Registered Nurse. Equipment used for transport: None. Family was aware of reason for transport: yes    Action:  Report: received from Alannah PATEL.    Response:  Patient's condition upon return was stable.    Patient denies pain or nausea.  Given water and jello; okay for clear liquids per orders.    Milla Bledsoe RN    Goal Outcome Evaluation:      Plan of Care Reviewed With: patient    Overall Patient Progress: improvingOverall Patient Progress: improving

## 2025-03-29 NOTE — PROGRESS NOTES
[facility] [facility] TRANSPORT NOTE  Data:   Reason for Transport:  Change in level of care    Francois Lujan was transported to [facility] via wheel chair at 0935.  Patient was accompanied by Registered Nurse. Equipment used for transport: None. Family was aware of reason for transport: yes    Action:  Report: given to Milla Bledsoe RN    Response:  Patient's condition when transferred off unit was stable, Aox4, denied pain, VSS.    Alannah Osborne RN

## 2025-03-29 NOTE — ANESTHESIA PREPROCEDURE EVALUATION
Anesthesia Pre-Procedure Evaluation    Patient: Francois Lujan   MRN: [id number] : 1950        Procedure : Procedure(s):  ESOPHAGOGASTRODUODENOSCOPY, WITH HEMORRHAGE CONTROL          Past Medical History:   Diagnosis Date     Arthritis      CAD (coronary artery disease)      Diabetes mellitus (H)      Diabetic ulcer of other part of right foot associated with diabetes mellitus due to underlying condition, limited to breakdown of skin (H) 08/15/2023     Erythema nodosum 1982     Gout      Hypertension      Malignant neoplasm (H)     squamous cell CA removed from right hand     Polymyalgia rheumatica 2014     Thyroid disease      Triceps tendon rupture, left, sequela 04/10/2017      Past Surgical History:   Procedure Laterality Date     BACK SURGERY       BYPASS GRAFT ARTERY CORONARY  2011    Procedure:BYPASS GRAFT ARTERY CORONARY; CORONARY ARTERY BYPASS, Right, OM, LAD WITH ENDOVEIN HARVEST, ON PUMP; Surgeon:LEONEL MG; Location:[facility]     COLONOSCOPY N/A 2016    Procedure: COLONOSCOPY;  Surgeon: Isidro Humphreys MD;  Location: [facility]     HERNIA REPAIR      infantile hernia repair     ORTHOPEDIC SURGERY      Baker cyst removed - right knee, and mesiscus tear repair     AZ LAMINEC/FACETECT/FORAMIN,LUMBAR 1 SEG      Description: Laminectomy Decompress, Facetectomy, Foraminotomy Lumbar Seg;  Recorded: 2007;  Comments: '71     AZ MASTECTOMY FOR GYNECOMASTIA      Description: Breast Surgery Mastectomy For Gynecomastia Bilateral;  Recorded: 2007;     REPAIR TENDON TRICEPS UPPER EXTREMITY Left 2017    Procedure: REPAIR TENDON TRICEPS UPPER EXTREMITY;  Surgeon: Rodriguez Kelley MD;  Location: [facility] CABG, VEIN, SINGLE      Description: CABG (CABG);  Proc Date: 2011;  Comments: 3 vessel      Allergies   Allergen Reactions     Hydrocodone-Acetaminophen Itching     Iodine      Irbesartan      renal insuff and hyperkalemia       Lisinopril Nausea     Sulfa  Antibiotics       Social History     Tobacco Use     Smoking status: Former     Current packs/day: 0.00     Average packs/day: 2.0 packs/day for 30.0 years (60.0 ttl pk-yrs)     Types: Cigarettes     Start date: 1967     Quit date: 1997     Years since quittin.3     Smokeless tobacco: Never   Substance Use Topics     Alcohol use: Yes     Alcohol/week: 10.0 standard drinks of alcohol     Types: 12 Cans of beer per week     Comment: on the weekends about 5 drinks a weekend       Wt Readings from Last 1 Encounters:   25 114 kg (251 lb 5.2 oz)        Anesthesia Evaluation   Pt has had prior anesthetic. Type: General and MAC.    No history of anesthetic complications       ROS/MED HX  ENT/Pulmonary:     (+) sleep apnea,               tobacco use, Past use,  60  Pack-Year Hx,                      Neurologic:     (+)    peripheral neuropathy,      CVA,                      Cardiovascular:     (+) Dyslipidemia hypertension- Peripheral Vascular Disease-- Carotid Stenosis.  CAD -  CABG- -   Taking blood thinners                              Previous cardiac testing   Echo: Date:  Results:  Interpretation Summary     1. The left ventricle is normal in structure, function and size. The visual  ejection fraction is estimated at 60%.  2. The right ventricle is normal in structure, function and size.  3. No valve disease.  4. The ascending aorta is Mildly dilated. 3.7cm.     No changes from echo 2020, previously aorta 3.8cm.    Stress Test:  Date: Results:    ECG Reviewed:  Date:  Results:  Sinus  Rhythm   -Anteroseptal infarct -age undetermined.     ABNORMAL   Cath:  Date: Results:      METS/Exercise Tolerance:     Hematologic:     (+) History of blood clots,    pt is anticoagulated, anemia,          Musculoskeletal: Comment: Rheumatoid  Lumbar DDD  (+)  arthritis,             GI/Hepatic:  - neg GI/hepatic ROS     Renal/Genitourinary:     (+) renal disease, type: CRI,            Endo: Comment:  Gout  Low testosterone    (+)  type II DM, Last HgA1c: 6.1, date: , Not using insulin, - not using insulin pump.    thyroid problem, hypothyroidism,    Obesity,       Psychiatric/Substance Use:     (+) psychiatric history depression alcohol abuse      Infectious Disease:  - neg infectious disease ROS     Malignancy:   (+) Malignancy,     Other:  - neg other ROS    (+)  , H/O Chronic Pain,         Physical Exam    Airway  airway exam normal           Respiratory Devices and Support         Dental       (+) Minor Abnormalities - some fillings, tiny chips      Cardiovascular   cardiovascular exam normal          Pulmonary   pulmonary exam normal            OUTSIDE LABS:  CBC:   Lab Results   Component Value Date    WBC 7.6 03/29/2025    WBC 8.4 03/28/2025    HGB 7.2 (L) 03/29/2025    HGB 7.0 (L) 03/28/2025    HCT 22.3 (L) 03/29/2025    HCT 22.5 (L) 03/28/2025     03/29/2025     03/28/2025     BMP:   Lab Results   Component Value Date     03/29/2025     03/28/2025    POTASSIUM 4.4 03/29/2025    POTASSIUM 4.6 03/28/2025    CHLORIDE 107 03/29/2025    CHLORIDE 107 03/28/2025    CO2 18 (L) 03/29/2025    CO2 20 (L) 03/28/2025    BUN 15.2 03/29/2025    BUN 23.8 (H) 03/28/2025    CR 0.96 03/29/2025    CR 1.11 03/28/2025     (H) 03/29/2025    GLC 98 03/29/2025     COAGS:   Lab Results   Component Value Date    PTT 25 05/29/2020    INR 1.13 03/27/2025    FIBR 637 (H) 11/16/2011     POC:   Lab Results   Component Value Date     (H) 08/03/2020     HEPATIC:   Lab Results   Component Value Date    ALBUMIN 4.2 03/27/2025    PROTTOTAL 7.0 03/27/2025    ALT 14 03/27/2025    AST 16 03/27/2025     (H) 10/04/2013    ALKPHOS 116 03/27/2025    BILITOTAL 0.2 03/27/2025    BILIDIRECT 0.2 02/03/2014     OTHER:   Lab Results   Component Value Date    PH 7.38 11/18/2011    LACT 0.7 08/22/2022    A1C 6.1 (H) 10/25/2024    A[place] 8.6 (L) 03/29/2025    PHOS 3.8 07/02/2019    MAG 2.0 11/21/2011     LIPASE 109 08/22/2022    TSH 0.69 10/25/2024    T4 0.84 11/20/2018    .0 (H) 08/25/2022    SED 22 (H) 07/28/2020       Anesthesia Plan    ASA Status:  4    NPO Status:  NPO Appropriate    Anesthesia Type: MAC.     - Reason for MAC: straight local not clinically adequate      Maintenance: Balanced.        Consents    Anesthesia Plan(s) and associated risks, benefits, and realistic alternatives discussed. Questions answered and patient/representative(s) expressed understanding.     - Discussed: Risks, Benefits and Alternatives for BOTH SEDATION and the PROCEDURE were discussed     - Discussed with:  Patient            Postoperative Care            Comments:               EVA Martinez CRNA    Clinically Significant Risk Factors           # Hypocalcemia: Lowest Ca = 8.6 mg/dL in last 2 days, will monitor and replace as appropriate         # Hypertension: Noted on problem list            # Obesity: Estimated body mass index is 34.09 kg/m  as calculated from the following:    Height as of this encounter: 1.829 m (6').    Weight as of this encounter: 114 kg (251 lb 5.2 oz)., PRESENT ON ADMISSION      # History of CABG: noted on surgical history

## 2025-03-29 NOTE — PLAN OF CARE
"[facility] [facility] TRANSPORT NOTE  Data:   Reason for Transport:  To endoscopy.    Francois Lujan was transported to Endoscopy via cart at 0808.  Patient was accompanied by Registered Nurse. Equipment used for transport: None. Family was aware of reason for transport: yes per patient report his spouse  is aware.    Action:  Report: given to Mao PATEL.    Response:  Patient's condition when transferred off unit was stable.    Milla Bledsoe RN    Goal Outcome Evaluation:      Plan of Care Reviewed With: patient    Overall Patient Progress: improvingOverall Patient Progress: improving    Outcome Evaluation: Patient reports \"I had a soft, black stool during the night; no blood.\"  Continues to decline pain or nausea.      "

## 2025-03-29 NOTE — PLAN OF CARE
"Goal Outcome Evaluation:      Plan of Care Reviewed With: patient          Outcome Evaluation: Pt A&Ox4. Able to make needs known. States he is ready for discharge.    Visit Vitals  /49 (BP Location: Left arm)   Pulse 66   Temp 97.6  F (36.4  C) (Oral)   Resp 15       Problem: Adult Inpatient Plan of Care  Goal: Plan of Care Review  Description: The Plan of Care Review/Shift note should be completed every shift.  The Outcome Evaluation is a brief statement about your assessment that the patient is improving, declining, or no change.  This information will be displayed automatically on your shiftnote.  Outcome: Adequate for Care Transition  Flowsheets (Taken 3/29/2025 9386)  Outcome Evaluation: Pt A&Ox4. Able to make needs known. States he is ready for discharge.  Plan of Care Reviewed With: patient  Goal: Patient-Specific Goal (Individualized)  Description: You can add care plan individualizations to a care plan. Examples of Individualization might be:  \"Parent requests to be called daily at 9am for status\", \"I have a hard time hearing out of my right ear\", or \"Do not touch me to wake me up as it startlesme\".  Outcome: Adequate for Care Transition  Goal: Absence of Hospital-Acquired Illness or Injury  Outcome: Adequate for Care Transition  Goal: Optimal Comfort and Wellbeing  Outcome: Adequate for Care Transition  Goal: Readiness for Transition of Care  Outcome: Adequate for Care Transition     "

## 2025-03-29 NOTE — ANESTHESIA CARE TRANSFER NOTE
Patient: Francois Lujan    Procedure: Procedure(s):  ESOPHAGOGASTRODUODENOSCOPY, WITH HEMORRHAGE CONTROL       Diagnosis: Melena [K92.1]  Diagnosis Additional Information: No value filed.    Anesthesia Type:   MAC, General     Note:    Oropharynx: oropharynx clear of all foreign objects and spontaneously breathing  Level of Consciousness: awake  Oxygen Supplementation: room air    Independent Airway: airway patency satisfactory and stable  Dentition: dentition unchanged  Vital Signs Stable: post-procedure vital signs reviewed and stable  Report to RN Given: handoff report given  Patient transferred to: Phase II    Handoff Report: Identifed the Patient, Identified the Reponsible Provider, Reviewed the pertinent medical history, Discussed the surgical course, Reviewed Intra-OP anesthesia mangement and issues during anesthesia, Set expectations for post-procedure period and Allowed opportunity for questions and acknowledgement of understanding  Vitals:  Vitals Value Taken Time   BP     Temp     Pulse     Resp     SpO2         Electronically Signed By: EVA Martinez CRNA  March 29, 2025  8:38 AM

## 2025-03-29 NOTE — PLAN OF CARE
Problem: Adult Inpatient Plan of Care  Goal: Plan of Care Review  Description: The Plan of Care Review/Shift note should be completed every shift.  The Outcome Evaluation is a brief statement about your assessment that the patient is improving, declining, or no change.  This information will be displayed automatically on your shiftnote.  Flowsheets (Taken 3/29/2025 0648)  Outcome Evaluation: Patient alert and oriented, able to make needs known.  Denies pain.  NPO at midnight for EGD today.  Numbness remains in right hand per patient report.  Up to bedside to utilize urinal.  Continue with plan of care  Plan of Care Reviewed With: patient  Overall Patient Progress: improving  Goal: Absence of Hospital-Acquired Illness or Injury  Intervention: Identify and Manage Fall Risk  Recent Flowsheet Documentation  Taken 3/29/2025 0017 by Aviva Phillips RN  Safety Promotion/Fall Prevention:   activity supervised   assistive device/personal items within reach   clutter free environment maintained   lighting adjusted   nonskid shoes/slippers when out of bed   room door open  Intervention: Prevent and Manage VTE (Venous Thromboembolism) Risk  Recent Flowsheet Documentation  Taken 3/29/2025 0017 by Aviva Phillips RN  VTE Prevention/Management: SCDs off (sequential compression devices)  Intervention: Prevent Infection  Recent Flowsheet Documentation  Taken 3/29/2025 0017 by Aviva Phillips RN  Infection Prevention:   hand hygiene promoted   rest/sleep promoted   single patient room provided     Problem: Delirium  Goal: Improved Behavioral Control  Intervention: Minimize Safety Risk  Recent Flowsheet Documentation  Taken 3/29/2025 0017 by Aviva Phillips RN  Enhanced Safety Measures:   assistive devices when indicated   pain management   patient/family teach back on injury risk   review medications for side effects with activity   Goal Outcome Evaluation:      Plan of Care Reviewed With: patient    Overall Patient  Progress: improvingOverall Patient Progress: improving    Outcome Evaluation: Patient alert and oriented, able to make needs known.  Denies pain.  NPO at midnight for EGD today.  Numbness remains in right hand per patient report.  Up to bedside to utilize urinal.  Continue with plan of care

## 2025-03-29 NOTE — PROGRESS NOTES
CATHY MEJIAG DISCHARGE NOTE    Patient discharged to home at 2:45PM via wheel chair. Accompanied by significant other and staff. Discharge instructions reviewed with patient, opportunity offered to ask questions. Prescriptions sent to patients preferred pharmacy. All belongings sent with patient.    Katy Ken RN

## 2025-04-01 ENCOUNTER — OFFICE VISIT (OUTPATIENT)
Dept: FAMILY MEDICINE | Facility: CLINIC | Age: 75
End: 2025-04-01
Attending: STUDENT IN AN ORGANIZED HEALTH CARE EDUCATION/TRAINING PROGRAM
Payer: COMMERCIAL

## 2025-04-01 VITALS
HEART RATE: 72 BPM | SYSTOLIC BLOOD PRESSURE: 132 MMHG | HEIGHT: 72 IN | OXYGEN SATURATION: 99 % | TEMPERATURE: 96.9 F | WEIGHT: 260.7 LBS | BODY MASS INDEX: 35.31 KG/M2 | RESPIRATION RATE: 16 BRPM | DIASTOLIC BLOOD PRESSURE: 70 MMHG

## 2025-04-01 DIAGNOSIS — F10.288 ALCOHOL DEPENDENCE WITH OTHER ALCOHOL-INDUCED DISORDER (H): ICD-10-CM

## 2025-04-01 DIAGNOSIS — R80.9 TYPE 2 DIABETES MELLITUS WITH MICROALBUMINURIA, WITHOUT LONG-TERM CURRENT USE OF INSULIN (H): ICD-10-CM

## 2025-04-01 DIAGNOSIS — E66.01 MORBID OBESITY (H): ICD-10-CM

## 2025-04-01 DIAGNOSIS — R20.0 NUMBNESS OF HAND: ICD-10-CM

## 2025-04-01 DIAGNOSIS — Z86.73 HISTORY OF STROKE: ICD-10-CM

## 2025-04-01 DIAGNOSIS — F32.1 MODERATE MAJOR DEPRESSION (H): ICD-10-CM

## 2025-04-01 DIAGNOSIS — E11.29 TYPE 2 DIABETES MELLITUS WITH MICROALBUMINURIA, WITHOUT LONG-TERM CURRENT USE OF INSULIN (H): ICD-10-CM

## 2025-04-01 DIAGNOSIS — D62 ANEMIA DUE TO BLOOD LOSS, ACUTE: Primary | ICD-10-CM

## 2025-04-01 DIAGNOSIS — E11.42 DIABETIC POLYNEUROPATHY ASSOCIATED WITH TYPE 2 DIABETES MELLITUS (H): ICD-10-CM

## 2025-04-01 DIAGNOSIS — M65.321 TRIGGER INDEX FINGER OF RIGHT HAND: ICD-10-CM

## 2025-04-01 DIAGNOSIS — K31.811 ANGIODYSPLASIA OF DUODENUM WITH HEMORRHAGE: ICD-10-CM

## 2025-04-01 PROBLEM — K92.1 MELENA: Status: RESOLVED | Noted: 2025-03-27 | Resolved: 2025-04-01

## 2025-04-01 LAB
ERYTHROCYTE [DISTWIDTH] IN BLOOD BY AUTOMATED COUNT: 16 % (ref 10–15)
EST. AVERAGE GLUCOSE BLD GHB EST-MCNC: 117 MG/DL
HBA1C MFR BLD: 5.7 %
HCT VFR BLD AUTO: 26.3 % (ref 40–53)
HGB BLD-MCNC: 8.5 G/DL (ref 13.3–17.7)
MCH RBC QN AUTO: 29.6 PG (ref 26.5–33)
MCHC RBC AUTO-ENTMCNC: 32.3 G/DL (ref 31.5–36.5)
MCV RBC AUTO: 92 FL (ref 78–100)
PLATELET # BLD AUTO: 284 10E3/UL (ref 150–450)
RBC # BLD AUTO: 2.87 10E6/UL (ref 4.4–5.9)
WBC # BLD AUTO: 9.1 10E3/UL (ref 4–11)

## 2025-04-01 PROCEDURE — 1111F DSCHRG MED/CURRENT MED MERGE: CPT | Performed by: INTERNAL MEDICINE

## 2025-04-01 PROCEDURE — 1126F AMNT PAIN NOTED NONE PRSNT: CPT | Performed by: INTERNAL MEDICINE

## 2025-04-01 PROCEDURE — 85027 COMPLETE CBC AUTOMATED: CPT | Performed by: INTERNAL MEDICINE

## 2025-04-01 PROCEDURE — 36415 COLL VENOUS BLD VENIPUNCTURE: CPT | Performed by: INTERNAL MEDICINE

## 2025-04-01 PROCEDURE — 3078F DIAST BP <80 MM HG: CPT | Performed by: INTERNAL MEDICINE

## 2025-04-01 PROCEDURE — 3075F SYST BP GE 130 - 139MM HG: CPT | Performed by: INTERNAL MEDICINE

## 2025-04-01 PROCEDURE — 99495 TRANSJ CARE MGMT MOD F2F 14D: CPT | Performed by: INTERNAL MEDICINE

## 2025-04-01 RX ORDER — GABAPENTIN 300 MG/1
300 CAPSULE ORAL 2 TIMES DAILY
Qty: 180 CAPSULE | Refills: 3 | Status: SHIPPED | OUTPATIENT
Start: 2025-04-01

## 2025-04-01 ASSESSMENT — PAIN SCALES - GENERAL: PAINLEVEL_OUTOF10: NO PAIN (0)

## 2025-04-01 ASSESSMENT — PATIENT HEALTH QUESTIONNAIRE - PHQ9: SUM OF ALL RESPONSES TO PHQ QUESTIONS 1-9: 0

## 2025-04-01 NOTE — PROGRESS NOTES
Assessment & Plan     Anemia due to blood loss, acute  He reports symptoms have improved although still feels weak and tired which is to be as expected.  Recheck hemoglobin.  Complete 1 month course of PPI.  Plan for upper endoscopy in about 4 weeks, holding clopidogrel 5 days prior.  Recommend to minimize alcohol to allow for healing  - Adult GI  Referral - Procedure Only; Future  - CBC with platelets; Future    Angiodysplasia of duodenum with hemorrhage  See above  - CBC with platelets; Future    Diabetic polyneuropathy associated with type 2 diabetes mellitus (H)  Due for A1c.  Jardiance because  itch  - HEMOGLOBIN A1C; Future    Type 2 diabetes mellitus with microalbuminuria, without long-term current use of insulin (H)  See above.  Due for A1c    Moderate major depression (H)  Known issue that I take into account for their medical decisions, no current exacerbations or new concerns    Alcohol dependence with other alcohol-induced disorder (H)  Contributes to medical complexity.  Recommended to abstain for alcohol to allow GI to heal    Morbid obesity (H)  Known issue that I take into account for their medical decisions, no current exacerbations or new concerns    Trigger index finger of right hand  Referral to ortho  - CBC with platelets; Future    Numbness of hand, right  Start gabapentin.  Increase if needed  - Orthopedic  Referral; Future  - gabapentin (NEURONTIN) 300 MG capsule; Take 1 capsule (300 mg) by mouth 2 times daily.    History of stroke  - Orthopedic  Referral; Future  - gabapentin (NEURONTIN) 300 MG capsule; Take 1 capsule (300 mg) by mouth 2 times daily.        MED REC REQUIRED  Post Medication Reconciliation Status: discharge medications reconciled and changed, per note/orders    Patient Instructions   Upper GI Bleed  Recommend follow-up upper endoscopy at the end of April.  Please stop your clopidogrel (Plavix) 5 days prior to the procedure  Finish the 1 month  course of pantoprazole  Blood work today  Be seen right away if you notice signs of bleeding again such as the black stools or weakness  Try to limit alcohol while your stomach is healing.      Post-stroke hand numbness/pain  Start gabapentin 300 mg twice daily  There is room to increase if needed  Referral to Ortho for trigger finger    Subjective   Francois is a 74 year old, presenting for the following health issues:  Hospital F/U        4/1/2025     9:40 AM   Additional Questions   Roomed by poncho   Accompanied by self         4/1/2025     9:40 AM   Patient Reported Additional Medications   Patient reports taking the following new medications none     HPI      Chief Complaint   Patient presents with    [facility] F/       Hospital Follow-up Visit:    Hospital/Nursing Home/IP Rehab Facility: [facility]  Most Recent Admission Date: 3/27/2025   Most Recent Admission Diagnosis: Melena - K92.1  Anemia due to blood loss, acute - D62     Most Recent Discharge Date: 3/29/2025   Most Recent Discharge Diagnosis: Melena - K92.1  Anemia due to blood loss, acute - D62  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  No  Do you have any other stressors you would like to discuss with your provider? No    Problems taking medications regularly:  None  Medication changes since discharge:        START taking these medications     Details   pantoprazole (PROTONIX) 40 MG EC tablet Take 1 tablet (40 mg) by mouth daily.  Qty: 30 tablet, Refills: 0     Associated Diagnoses: Melena          STOP taking these medications         empagliflozin (JARDIANCE) 10 MG TABS tablet Comments:   Reason for Stopping:      Problems adhering to non-medication therapy:  None    Summary of hospitalization:  [facility] discharge summary  reviewed  Diagnostic Tests/Treatments reviewed.  Follow up needed: EGD, CBC  Other Healthcare Providers Involved in Patient s Care:         None  Update since discharge: improved.         Plan of care communicated with patient             Acute blood loss anemia  -- Due to duodenal angiodysplastic lesion.  EGD 3/29 with bleeding lesion that was clipped and gastric polyp that was visualized but not resected (given antiplatelet medications and current GI bleeding)   --Received 2 units of blood for admitting hemoglobin of 6.5  --Discharge hemoglobin was 7.3  --He was started on pantoprazole 4 mg daily  --It is recommended that he have Follow up endoscopy in one month, hold antiplatelet medication 5 days prior   --today, he reports still feeling quite weak; was having dizziness prior to hosp, since resolved;   --was still having melena for several days until today;  stool is back to normal starting today  --plans to travel to [place] from late April to mid-May  --drinking 1-3 beers every few days    Diabetes  --stopped jardiance due to  symptoms of itch.  Stopped ~ 2 months ago    Post-stroke symptoms   --has chronic right hand numbness, intermittent.  Is often painful; wears compression glove sometimes and that helps  --pain can be severe at times - 'red hot like fire'  --onset was at time of stroke;  --also has trigger fingers in the right hand    Current Outpatient Medications   Medication Sig Dispense Refill    allopurinol (ZYLOPRIM) 300 MG tablet Take 1 tablet (300 mg) by mouth daily. 90 tablet 3    amLODIPine (NORVASC) 10 MG tablet Take 1 tablet (10 mg) by mouth daily. 90 tablet 3    atorvastatin (LIPITOR) 80 MG tablet Take 1 tablet (80 mg) by mouth daily. 90 tablet 3    blood glucose (ACCU-CHEK GUIDE) test strip TEST ONCE DAILY AS DIRECTED 100 strip 2    blood glucose (NO BRAND SPECIFIED) lancets standard Use to test blood sugar 3 times daily or as directed. 1 each 11    clopidogrel (PLAVIX) 75 MG tablet Take 1  tablet (75 mg) by mouth daily. 90 tablet 3    glipiZIDE (GLUCOTROL XL) 5 MG 24 hr tablet Take 1 tablet (5 mg) by mouth daily. 90 tablet 3    levothyroxine (SYNTHROID/LEVOTHROID) 50 MCG tablet Take 1 tablet (50 mcg) by mouth every morning. Take on an empty stomach. 90 tablet 3    metFORMIN (GLUCOPHAGE) 500 MG tablet Take 2 tablets (1,000 mg) by mouth 2 times daily (with meals). 360 tablet 3    Multiple Vitamins-Minerals (MULTIVITAMIN ADULTS) TABS Take 1 tablet by mouth daily.      olmesartan (BENICAR) 5 MG tablet Take 2 tablets (10 mg) by mouth daily. 180 tablet 3    pantoprazole (PROTONIX) 40 MG EC tablet Take 1 tablet (40 mg) by mouth daily. 30 tablet 0    testosterone (ANDROGEL/TESTIM) 50 MG/5GM (1%) topical gel Apply 1 packet to shoulders daily. 250 g 3    tetrahydrozoline (VISINE) 0.05 % ophthalmic solution Place 1 drop into both eyes 3 times daily as needed.             Review of Systems  Constitutional, neuro, ENT, endocrine, pulmonary, cardiac, gastrointestinal, genitourinary, musculoskeletal, integument and psychiatric systems are negative, except as otherwise noted.      Objective    /70 (BP Location: Right arm, Patient Position: Sitting, Cuff Size: Adult Regular)   Pulse 72   Temp 96.9  F (36.1  C) (Tympanic)   Resp 16   Ht 1.829 m (6')   Wt 118.3 kg (260 lb 11.2 oz)   SpO2 99%   BMI 35.36 kg/m    Body mass index is 35.36 kg/m .  Physical Exam   GENERAL: alert and no distress  RESP: lungs clear to auscultation - no rales, rhonchi or wheezes  CV: regular rate and rhythm, normal S1 S2, no S3 or S4, no murmur, click or rub, no peripheral edema   ABDOMEN: soft, nontender, no hepatosplenomegaly, no masses and bowel sounds normal  MSK  Trigger fingers of multiple fingres of bilateral hands          Signed Electronically by: Teresita Alvarez DO

## 2025-04-01 NOTE — PATIENT INSTRUCTIONS
Upper GI Bleed  Recommend follow-up upper endoscopy at the end of April.  Please stop your clopidogrel (Plavix) 5 days prior to the procedure  Finish the 1 month course of pantoprazole  Blood work today  Be seen right away if you notice signs of bleeding again such as the black stools or weakness  Try to limit alcohol while your stomach is healing.      Post-stroke hand numbness/pain  Start gabapentin 300 mg twice daily  There is room to increase if needed  Referral to Ortho for trigger finger

## 2025-04-02 ENCOUNTER — PATIENT OUTREACH (OUTPATIENT)
Dept: CARE COORDINATION | Facility: CLINIC | Age: 75
End: 2025-04-02
Payer: COMMERCIAL

## 2025-04-07 ENCOUNTER — OFFICE VISIT (OUTPATIENT)
Dept: ORTHOPEDICS | Facility: CLINIC | Age: 75
End: 2025-04-07
Attending: INTERNAL MEDICINE
Payer: COMMERCIAL

## 2025-04-07 VITALS — WEIGHT: 260 LBS | HEIGHT: 72 IN | BODY MASS INDEX: 35.21 KG/M2

## 2025-04-07 DIAGNOSIS — Z86.73 HISTORY OF STROKE: ICD-10-CM

## 2025-04-07 DIAGNOSIS — M65.332 TRIGGER MIDDLE FINGER OF LEFT HAND: ICD-10-CM

## 2025-04-07 DIAGNOSIS — M65.342 TRIGGER RING FINGER OF LEFT HAND: ICD-10-CM

## 2025-04-07 DIAGNOSIS — R20.0 NUMBNESS OF HAND: Primary | ICD-10-CM

## 2025-04-07 DIAGNOSIS — M65.321 TRIGGER INDEX FINGER OF RIGHT HAND: ICD-10-CM

## 2025-04-07 DIAGNOSIS — M65.341 TRIGGER RING FINGER OF RIGHT HAND: ICD-10-CM

## 2025-04-07 PROCEDURE — 20550 NJX 1 TENDON SHEATH/LIGAMENT: CPT | Mod: 50 | Performed by: FAMILY MEDICINE

## 2025-04-07 PROCEDURE — 20550 NJX 1 TENDON SHEATH/LIGAMENT: CPT | Mod: 51 | Performed by: FAMILY MEDICINE

## 2025-04-07 PROCEDURE — 99203 OFFICE O/P NEW LOW 30 MIN: CPT | Mod: 25 | Performed by: FAMILY MEDICINE

## 2025-04-07 RX ADMIN — BETAMETHASONE SODIUM PHOSPHATE AND BETAMETHASONE ACETATE 6 MG: 3; 3 INJECTION, SUSPENSION INTRA-ARTICULAR; INTRALESIONAL; INTRAMUSCULAR; SOFT TISSUE at 15:37

## 2025-04-07 RX ADMIN — BETAMETHASONE SODIUM PHOSPHATE AND BETAMETHASONE ACETATE 6 MG: 3; 3 INJECTION, SUSPENSION INTRA-ARTICULAR; INTRALESIONAL; INTRAMUSCULAR; SOFT TISSUE at 15:38

## 2025-04-07 RX ADMIN — ROPIVACAINE HYDROCHLORIDE 0.5 ML: 5 INJECTION, SOLUTION EPIDURAL; INFILTRATION; PERINEURAL at 15:36

## 2025-04-07 RX ADMIN — ROPIVACAINE HYDROCHLORIDE 0.5 ML: 5 INJECTION, SOLUTION EPIDURAL; INFILTRATION; PERINEURAL at 15:37

## 2025-04-07 RX ADMIN — ROPIVACAINE HYDROCHLORIDE 0.5 ML: 5 INJECTION, SOLUTION EPIDURAL; INFILTRATION; PERINEURAL at 15:38

## 2025-04-07 RX ADMIN — BETAMETHASONE SODIUM PHOSPHATE AND BETAMETHASONE ACETATE 6 MG: 3; 3 INJECTION, SUSPENSION INTRA-ARTICULAR; INTRALESIONAL; INTRAMUSCULAR; SOFT TISSUE at 15:36

## 2025-04-07 NOTE — LETTER
4/7/2025      Francois Lujan  [address]  [place] [address]      Dear Colleague,    Thank you for referring your patient, Francois Lujan, to the [facility]. Please see a copy of my visit note below.    ASSESSMENT & PLAN    Francois was seen today for numbness, pain and pain.    Diagnoses and all orders for this visit:    Numbness of hand, right  -     Orthopedic  Referral    History of stroke  -     Orthopedic  Referral    Trigger index finger of right hand  -     Hand / Upper Extremity Injection/Arthrocentesis: R index A1    Trigger ring finger of right hand  -     Hand / Upper Extremity Injection/Arthrocentesis: bilateral ring A1    Trigger middle finger of left hand  -     Hand / Upper Extremity Injection/Arthrocentesis: L long A1    Trigger ring finger of left hand  -     Hand / Upper Extremity Injection/Arthrocentesis: bilateral ring A1      This issue is acute on chronic and Worsening.    # Bilateral Trigger Fingers: Francois Lujan  was seen today for bilateral trigger fingers. Symptoms had been going on for 6 weeks without inciting injury. On examination there are positive findings of trigger fingers right 2nd and 4th, left 3rd and 4th.  Likely cause of patient's condition due to bilateral trigger fingers.   Counseled patient on nature of condition and treatment options.  Given this plan as below, follow-up 1 mon as needed.     Image Findings: none today  Treatment: Activities as tolerated   Medications/Injections: Limited tylenol/ibuprofen for pain for 1-2 weeks, Topical Voltaren gel, right 2nd and 4th, left 3rd and 4th trigger finger steroid injections  Follow-up: In one month if symptoms do not improve, sooner if worsening  Can consider repeat evaluation    Travis Mojica MD  [facility]    -----  Chief Complaint   Patient presents with     Right Hand - Numbness, Pain     Left Hand - Pain        SUBJECTIVE  Francois Lujan is a/an 74 year old male who is seen in consultation at the request of  Teresita Alvarez D.O. for evaluation of right hand pain.     The patient is seen by themselves.  The patient is Right handed    Onset: 6 week(s) ago. Reports insidious onset without acute precipitating event.  Location of Pain: right index finger   Worsened by: increased use   Better with: nothing   Treatments tried: cream, compression glove    Associated symptoms: numbness,  burning, locking     Orthopedic/Surgical history: YES - Dupuytrens, Chronic neck pain, CVA   Social History/Occupation: [employer]-retired      No family history pertinent to patient's problem today.      REVIEW OF SYSTEMS:  Review of Systems  Constitutional, HEENT, cardiovascular, pulmonary, gi and gu systems are negative, except as otherwise noted.    OBJECTIVE:  Ht 1.829 m (6')   Wt 117.9 kg (260 lb)   BMI 35.26 kg/m     General: healthy, alert and in no distress  HEENT: no scleral icterus or conjunctival erythema  Skin: no suspicious lesions or rash. No jaundice.  CV: distal perfusion intact    Resp: normal respiratory effort without conversational dyspnea   Psych: normal mood and affect  Gait: normal steady gait with appropriate coordination and balance    Neuro: Normal light sensory exam of bilateral upper extremities    Ortho Exam   BILATERAL HAND  Inspection:    No swelling, bruising, discoloration, or obvious deformity or asymmetry  Palpation:   Carpals: normal   Metacarpals: normal   Thumb: normal   Fingers: Triggering right 2nd and 4th, left 3rd and 4th digits  Range of Motion:    Full active flexion and extension at MCP, PIP, and DIP joints; normal finger cascade without malrotation.  Wrist pronation, supination, and ulnar/radial deviation normal.  Strength:     full  Special Tests:    Positive: Triggering right 2nd and 4th, left 3rd and 4th digits    Negative: flexor digitorum superficialis testing, flexor digitorum  profundus testing    RADIOLOGY:  No new imaging      Review of external notes as documented elsewhere in note       Disclaimer: This note consists of symbols derived from keyboarding, dictation and/or voice recognition software. As a result, there may be errors in the script that have gone undetected. Please consider this when interpreting information found in this chart.    Hand / Upper Extremity Injection/Arthrocentesis: R index A1    Date/Time: 4/7/2025 3:36 PM    Performed by: Travis Mojica MD  Authorized by: Travis Mojica MD    Indications:  Pain and therapeutic  Needle Size:  25 G  Guidance: landmark    Approach:  Volar  Condition: trigger finger    Location:  Index finger    Site:  R index A1  Medications:  6 mg betamethasone acet & sod phos 6 (3-3) MG/ML; 0.5 mL ROPivacaine 5 MG/ML  Medications comment:  Actual amount of celestone used 0.5 mL   Outcome:  Tolerated well, no immediate complications  Procedure discussed: discussed risks, benefits, and alternatives    Consent Given by:  Patient  Timeout: timeout called immediately prior to procedure    Prep: patient was prepped and draped in usual sterile fashion     Patient tolerated bilateral right 2nd and 4th, left 3rd and 4th digit trigger finger steroid injections.  Ultrasound guided images were permanently stored.   Aftercare instructions given to patient.  Plan to follow-up as discussed above.     Travis Mojica MD [facility]      Hand / Upper Extremity Injection/Arthrocentesis: bilateral ring A1    Date/Time: 4/7/2025 3:37 PM    Performed by: Travis Mojica MD  Authorized by: Travis Mojica MD    Indications:  Pain and therapeutic  Needle Size:  25 G  Guidance: landmark    Approach:  Volar  Condition: trigger finger    Laterality:  Bilateral  Location:  Ring finger    Site:  Bilateral ring A1  Medications (Right):  6 mg betamethasone acet & sod phos 6 (3-3) MG/ML; 0.5 mL ROPivacaine 5  MG/ML  Medications (Right) comment:  Actual amount of celestone used 0.5 mL   Medications (Left):  6 mg betamethasone acet & sod phos 6 (3-3) MG/ML; 0.5 mL ROPivacaine 5 MG/ML  Medications (Left) comment:  Actual amount of celestone used 0.5 mL   Outcome:  Tolerated well, no immediate complications  Procedure discussed: discussed risks, benefits, and alternatives    Consent Given by:  Patient  Timeout: timeout called immediately prior to procedure    Prep: patient was prepped and draped in usual sterile fashion    Hand / Upper Extremity Injection/Arthrocentesis: L long A1    Date/Time: 4/7/2025 3:38 PM    Performed by: Travis Mojica MD  Authorized by: Travis Mojica MD    Indications:  Pain and therapeutic  Needle Size:  25 G  Guidance: landmark    Approach:  Volar  Condition: trigger finger    Location:  Long finger    Site:  L long A1  Medications:  6 mg betamethasone acet & sod phos 6 (3-3) MG/ML; 0.5 mL ROPivacaine 5 MG/ML  Medications comment:  Actual amount of celestone used 0.5 mL   Outcome:  Tolerated well, no immediate complications  Procedure discussed: discussed risks, benefits, and alternatives    Consent Given by:  Patient  Timeout: timeout called immediately prior to procedure    Prep: patient was prepped and draped in usual sterile fashion            Again, thank you for allowing me to participate in the care of your patient.        Sincerely,        Travis Mojica MD    Electronically signed

## 2025-04-07 NOTE — PROGRESS NOTES
ASSESSMENT & PLAN    Francois was seen today for numbness, pain and pain.    Diagnoses and all orders for this visit:    Numbness of hand, right  -     Orthopedic  Referral    History of stroke  -     Orthopedic  Referral    Trigger index finger of right hand  -     Hand / Upper Extremity Injection/Arthrocentesis: R index A1    Trigger ring finger of right hand  -     Hand / Upper Extremity Injection/Arthrocentesis: bilateral ring A1    Trigger middle finger of left hand  -     Hand / Upper Extremity Injection/Arthrocentesis: L long A1    Trigger ring finger of left hand  -     Hand / Upper Extremity Injection/Arthrocentesis: bilateral ring A1      This issue is acute on chronic and Worsening.    # Bilateral Trigger Fingers: Francois Lujan  was seen today for bilateral trigger fingers. Symptoms had been going on for 6 weeks without inciting injury. On examination there are positive findings of trigger fingers right 2nd and 4th, left 3rd and 4th.  Likely cause of patient's condition due to bilateral trigger fingers.   Counseled patient on nature of condition and treatment options.  Given this plan as below, follow-up 1 mon as needed.     Image Findings: none today  Treatment: Activities as tolerated   Medications/Injections: Limited tylenol/ibuprofen for pain for 1-2 weeks, Topical Voltaren gel, right 2nd and 4th, left 3rd and 4th trigger finger steroid injections  Follow-up: In one month if symptoms do not improve, sooner if worsening  Can consider repeat evaluation    Travis Mojica MD  [facility] [place]    -----  Chief Complaint   Patient presents with    Right Hand - Numbness, Pain    Left Hand - Pain       SUBJECTIVE  Francois Lujan is a/an 74 year old male who is seen in consultation at the request of  Teresita Alvarez D.O. for evaluation of right hand pain.     The patient is seen by themselves.  The patient is Right handed    Onset: 6 week(s) ago.  Reports insidious onset without acute precipitating event.  Location of Pain: right index finger   Worsened by: increased use   Better with: nothing   Treatments tried: cream, compression glove    Associated symptoms: numbness,  burning, locking     Orthopedic/Surgical history: YES - Dupuytrens, Chronic neck pain, CVA   Social History/Occupation: [employer]-retired      No family history pertinent to patient's problem today.      REVIEW OF SYSTEMS:  Review of Systems  Constitutional, HEENT, cardiovascular, pulmonary, gi and gu systems are negative, except as otherwise noted.    OBJECTIVE:  Ht 1.829 m (6')   Wt 117.9 kg (260 lb)   BMI 35.26 kg/m     General: healthy, alert and in no distress  HEENT: no scleral icterus or conjunctival erythema  Skin: no suspicious lesions or rash. No jaundice.  CV: distal perfusion intact    Resp: normal respiratory effort without conversational dyspnea   Psych: normal mood and affect  Gait: normal steady gait with appropriate coordination and balance    Neuro: Normal light sensory exam of bilateral upper extremities    Ortho Exam   BILATERAL HAND  Inspection:    No swelling, bruising, discoloration, or obvious deformity or asymmetry  Palpation:   Carpals: normal   Metacarpals: normal   Thumb: normal   Fingers: Triggering right 2nd and 4th, left 3rd and 4th digits  Range of Motion:    Full active flexion and extension at MCP, PIP, and DIP joints; normal finger cascade without malrotation.  Wrist pronation, supination, and ulnar/radial deviation normal.  Strength:     full  Special Tests:    Positive: Triggering right 2nd and 4th, left 3rd and 4th digits    Negative: flexor digitorum superficialis testing, flexor digitorum profundus testing    RADIOLOGY:  No new imaging      Review of external notes as documented elsewhere in note       Disclaimer: This note consists of symbols derived from keyboarding, dictation and/or voice recognition software. As a result, there may be errors  in the script that have gone undetected. Please consider this when interpreting information found in this chart.    Hand / Upper Extremity Injection/Arthrocentesis: R index A1    Date/Time: 4/7/2025 3:36 PM    Performed by: Travis Mojica MD  Authorized by: Travis Mojica MD    Indications:  Pain and therapeutic  Needle Size:  25 G  Guidance: landmark    Approach:  Volar  Condition: trigger finger    Location:  Index finger    Site:  R index A1  Medications:  6 mg betamethasone acet & sod phos 6 (3-3) MG/ML; 0.5 mL ROPivacaine 5 MG/ML  Medications comment:  Actual amount of celestone used 0.5 mL   Outcome:  Tolerated well, no immediate complications  Procedure discussed: discussed risks, benefits, and alternatives    Consent Given by:  Patient  Timeout: timeout called immediately prior to procedure    Prep: patient was prepped and draped in usual sterile fashion     Patient tolerated bilateral right 2nd and 4th, left 3rd and 4th digit trigger finger steroid injections.  Ultrasound guided images were permanently stored.   Aftercare instructions given to patient.  Plan to follow-up as discussed above.     Travis Mojica MD [facility]      Hand / Upper Extremity Injection/Arthrocentesis: bilateral ring A1    Date/Time: 4/7/2025 3:37 PM    Performed by: Travis Mojica MD  Authorized by: Travis Mojica MD    Indications:  Pain and therapeutic  Needle Size:  25 G  Guidance: landmark    Approach:  Volar  Condition: trigger finger    Laterality:  Bilateral  Location:  Ring finger    Site:  Bilateral ring A1  Medications (Right):  6 mg betamethasone acet & sod phos 6 (3-3) MG/ML; 0.5 mL ROPivacaine 5 MG/ML  Medications (Right) comment:  Actual amount of celestone used 0.5 mL   Medications (Left):  6 mg betamethasone acet & sod phos 6 (3-3) MG/ML; 0.5 mL ROPivacaine 5 MG/ML  Medications (Left) comment:  Actual amount of celestone used 0.5 mL   Outcome:  Tolerated well, no  immediate complications  Procedure discussed: discussed risks, benefits, and alternatives    Consent Given by:  Patient  Timeout: timeout called immediately prior to procedure    Prep: patient was prepped and draped in usual sterile fashion    Hand / Upper Extremity Injection/Arthrocentesis: L long A1    Date/Time: 4/7/2025 3:38 PM    Performed by: Travis Mojica MD  Authorized by: Travis Mojica MD    Indications:  Pain and therapeutic  Needle Size:  25 G  Guidance: landmark    Approach:  Volar  Condition: trigger finger    Location:  Long finger    Site:  L long A1  Medications:  6 mg betamethasone acet & sod phos 6 (3-3) MG/ML; 0.5 mL ROPivacaine 5 MG/ML  Medications comment:  Actual amount of celestone used 0.5 mL   Outcome:  Tolerated well, no immediate complications  Procedure discussed: discussed risks, benefits, and alternatives    Consent Given by:  Patient  Timeout: timeout called immediately prior to procedure    Prep: patient was prepped and draped in usual sterile fashion

## 2025-04-07 NOTE — PATIENT INSTRUCTIONS
# Bilateral Trigger Fingers: Francois Lujan  was seen today for bilateral trigger fingers. Symptoms had been going on for 6 weeks without inciting injury. On examination there are positive findings of trigger fingers right 2nd and 4th, left 3rd and 4th.  Likely cause of patient's condition due to bilateral trigger fingers.   Counseled patient on nature of condition and treatment options.  Given this plan as below, follow-up 1 mon as needed.     Image Findings: none today  Treatment: Activities as tolerated   Medications/Injections: Limited tylenol/ibuprofen for pain for 1-2 weeks, Topical Voltaren gel, right 2nd and 4th, left 3rd and 4th trigger finger steroid injections  Follow-up: In one month if symptoms do not improve, sooner if worsening  Can consider repeat evaluation    Please call [phone]   Ask for my team if you have any questions or concerns    If you have not yet received the influenza vaccine but would like to get one, please call  [phone] or you can schedule via [employer]    It was great seeing you again today!    Travis Mojica MD, [facility] Injection Discharge Instructions    Procedure: bilateral trigger finger steroid injections    You may shower, however avoid swimming, tub baths or hot tubs for 24 hours following your procedure  You may have a mild to moderate increase in pain for several days following the injection.  It may take up to 14 days for the steroid medication to start working although you may feel the effect as early as a few days after the procedure.  You may use ice packs for 10-15 minutes, 3 to 4 times a day at the injection site for comfort  You may use anti-inflammatory medications (such as Ibuprofen or Aleve or Advil) or Tylenol for pain control if necessary  If you were fasting, you may resume your normal diet and medications after the procedure  If you have diabetes, check your blood sugar more frequently than usual as your blood sugar may be higher than  normal for 10-14 days following a steroid injection. Contact your doctor who manages your diabetes if your blood sugar is higher than usual    If you experience any of the following, call [facility] @ [phone] or [phone]  -Fever over 100 degree F  -Swelling, bleeding, redness, drainage, warmth at the injection site  - New or worsening pain

## 2025-04-08 RX ORDER — ROPIVACAINE HYDROCHLORIDE 5 MG/ML
0.5 INJECTION, SOLUTION EPIDURAL; INFILTRATION; PERINEURAL
Status: COMPLETED | OUTPATIENT
Start: 2025-04-07 | End: 2025-04-07

## 2025-04-08 RX ORDER — BETAMETHASONE SODIUM PHOSPHATE AND BETAMETHASONE ACETATE 3; 3 MG/ML; MG/ML
6 INJECTION, SUSPENSION INTRA-ARTICULAR; INTRALESIONAL; INTRAMUSCULAR; SOFT TISSUE
Status: COMPLETED | OUTPATIENT
Start: 2025-04-07 | End: 2025-04-07

## 2025-04-19 ENCOUNTER — HEALTH MAINTENANCE LETTER (OUTPATIENT)
Age: 75
End: 2025-04-19

## 2025-05-11 DIAGNOSIS — E11.29 TYPE 2 DIABETES MELLITUS WITH MICROALBUMINURIA, WITHOUT LONG-TERM CURRENT USE OF INSULIN (H): ICD-10-CM

## 2025-05-11 DIAGNOSIS — R80.9 TYPE 2 DIABETES MELLITUS WITH MICROALBUMINURIA, WITHOUT LONG-TERM CURRENT USE OF INSULIN (H): ICD-10-CM

## 2025-05-12 NOTE — TELEPHONE ENCOUNTER
Please ask patient if they requested this from [facility], Patient has refills available with hetras.   Aviva Coto RN on 5/12/2025 at 3:42 PM

## 2025-06-04 ENCOUNTER — RESULTS FOLLOW-UP (OUTPATIENT)
Dept: FAMILY MEDICINE | Facility: CLINIC | Age: 75
End: 2025-06-04

## 2025-06-04 ENCOUNTER — OFFICE VISIT (OUTPATIENT)
Dept: FAMILY MEDICINE | Facility: CLINIC | Age: 75
End: 2025-06-04
Payer: COMMERCIAL

## 2025-06-04 VITALS
RESPIRATION RATE: 12 BRPM | WEIGHT: 259.6 LBS | TEMPERATURE: 97 F | HEIGHT: 73 IN | BODY MASS INDEX: 34.4 KG/M2 | HEART RATE: 93 BPM | SYSTOLIC BLOOD PRESSURE: 156 MMHG | DIASTOLIC BLOOD PRESSURE: 84 MMHG | OXYGEN SATURATION: 99 %

## 2025-06-04 DIAGNOSIS — D62 ANEMIA DUE TO BLOOD LOSS, ACUTE: ICD-10-CM

## 2025-06-04 DIAGNOSIS — E11.29 TYPE 2 DIABETES MELLITUS WITH MICROALBUMINURIA, WITHOUT LONG-TERM CURRENT USE OF INSULIN (H): ICD-10-CM

## 2025-06-04 DIAGNOSIS — E78.5 HYPERLIPIDEMIA LDL GOAL <70: ICD-10-CM

## 2025-06-04 DIAGNOSIS — I10 ESSENTIAL HYPERTENSION: ICD-10-CM

## 2025-06-04 DIAGNOSIS — R80.9 TYPE 2 DIABETES MELLITUS WITH MICROALBUMINURIA, WITHOUT LONG-TERM CURRENT USE OF INSULIN (H): ICD-10-CM

## 2025-06-04 DIAGNOSIS — D50.9 IRON DEFICIENCY ANEMIA, UNSPECIFIED IRON DEFICIENCY ANEMIA TYPE: Primary | ICD-10-CM

## 2025-06-04 DIAGNOSIS — K92.1 MELENA: ICD-10-CM

## 2025-06-04 DIAGNOSIS — D50.0 IRON DEFICIENCY ANEMIA DUE TO CHRONIC BLOOD LOSS: Primary | ICD-10-CM

## 2025-06-04 LAB
BASOPHILS # BLD AUTO: 0 10E3/UL (ref 0–0.2)
BASOPHILS NFR BLD AUTO: 1 %
EOSINOPHIL # BLD AUTO: 0.5 10E3/UL (ref 0–0.7)
EOSINOPHIL NFR BLD AUTO: 7 %
ERYTHROCYTE [DISTWIDTH] IN BLOOD BY AUTOMATED COUNT: 17 % (ref 10–15)
EST. AVERAGE GLUCOSE BLD GHB EST-MCNC: 137 MG/DL
FERRITIN SERPL-MCNC: 16 NG/ML (ref 31–409)
HBA1C MFR BLD: 6.4 % (ref 0–5.6)
HCT VFR BLD AUTO: 27.2 % (ref 40–53)
HGB BLD-MCNC: 8.6 G/DL (ref 13.3–17.7)
IMM GRANULOCYTES # BLD: 0 10E3/UL
IMM GRANULOCYTES NFR BLD: 0 %
IRON BINDING CAPACITY (ROCHE): 466 UG/DL (ref 240–430)
IRON SATN MFR SERPL: 8 % (ref 15–46)
IRON SERPL-MCNC: 37 UG/DL (ref 61–157)
LYMPHOCYTES # BLD AUTO: 2.6 10E3/UL (ref 0.8–5.3)
LYMPHOCYTES NFR BLD AUTO: 35 %
MCH RBC QN AUTO: 27.7 PG (ref 26.5–33)
MCHC RBC AUTO-ENTMCNC: 31.6 G/DL (ref 31.5–36.5)
MCV RBC AUTO: 88 FL (ref 78–100)
MONOCYTES # BLD AUTO: 0.6 10E3/UL (ref 0–1.3)
MONOCYTES NFR BLD AUTO: 9 %
NEUTROPHILS # BLD AUTO: 3.6 10E3/UL (ref 1.6–8.3)
NEUTROPHILS NFR BLD AUTO: 49 %
PLATELET # BLD AUTO: 299 10E3/UL (ref 150–450)
RBC # BLD AUTO: 3.1 10E6/UL (ref 4.4–5.9)
WBC # BLD AUTO: 7.4 10E3/UL (ref 4–11)

## 2025-06-04 PROCEDURE — 85025 COMPLETE CBC W/AUTO DIFF WBC: CPT | Performed by: FAMILY MEDICINE

## 2025-06-04 PROCEDURE — 82728 ASSAY OF FERRITIN: CPT | Performed by: FAMILY MEDICINE

## 2025-06-04 PROCEDURE — 83550 IRON BINDING TEST: CPT | Performed by: FAMILY MEDICINE

## 2025-06-04 PROCEDURE — 90480 ADMN SARSCOV2 VAC 1/ONLY CMP: CPT | Performed by: FAMILY MEDICINE

## 2025-06-04 PROCEDURE — 3077F SYST BP >= 140 MM HG: CPT | Performed by: FAMILY MEDICINE

## 2025-06-04 PROCEDURE — 83036 HEMOGLOBIN GLYCOSYLATED A1C: CPT | Performed by: FAMILY MEDICINE

## 2025-06-04 PROCEDURE — 36415 COLL VENOUS BLD VENIPUNCTURE: CPT | Performed by: FAMILY MEDICINE

## 2025-06-04 PROCEDURE — 3044F HG A1C LEVEL LT 7.0%: CPT | Performed by: FAMILY MEDICINE

## 2025-06-04 PROCEDURE — 83540 ASSAY OF IRON: CPT | Performed by: FAMILY MEDICINE

## 2025-06-04 PROCEDURE — 99214 OFFICE O/P EST MOD 30 MIN: CPT | Mod: 25 | Performed by: FAMILY MEDICINE

## 2025-06-04 PROCEDURE — 1126F AMNT PAIN NOTED NONE PRSNT: CPT | Performed by: FAMILY MEDICINE

## 2025-06-04 PROCEDURE — 91320 SARSCV2 VAC 30MCG TRS-SUC IM: CPT | Performed by: FAMILY MEDICINE

## 2025-06-04 PROCEDURE — 3079F DIAST BP 80-89 MM HG: CPT | Performed by: FAMILY MEDICINE

## 2025-06-04 PROCEDURE — G2211 COMPLEX E/M VISIT ADD ON: HCPCS | Performed by: FAMILY MEDICINE

## 2025-06-04 RX ORDER — ATORVASTATIN CALCIUM 80 MG/1
80 TABLET, FILM COATED ORAL DAILY
Qty: 90 TABLET | Refills: 0 | Status: SHIPPED | OUTPATIENT
Start: 2025-06-04

## 2025-06-04 RX ORDER — FERROUS SULFATE 325(65) MG
325 TABLET, DELAYED RELEASE (ENTERIC COATED) ORAL 2 TIMES DAILY
Qty: 180 TABLET | Refills: 0 | Status: SHIPPED | OUTPATIENT
Start: 2025-06-04

## 2025-06-04 ASSESSMENT — PAIN SCALES - GENERAL: PAINLEVEL_OUTOF10: NO PAIN (0)

## 2025-06-04 NOTE — PATIENT INSTRUCTIONS
You will be contacted later today for results of your lab tests.   Further recommendations thereafter.,    Schedule follow up with general surgery regarding the stomach polyp.    Take blood pressure medications.  Increase olmesartan to 2 tablets a day.    Measure resting blood pressure at home at least once a day, and as needed if you have dizziness or other symptoms.  You may log measurements in a small notebook.  Report measurements to Dr. Alvarez Monday next week.    Goal blood pressure is to be below 140/90    Contact the care team if your blood pressures are frequently out of range or if you have any concerning symptoms.

## 2025-06-04 NOTE — PROGRESS NOTES
"  Assessment & Plan     Iron deficiency anemia due to chronic blood loss  Not in distress.  Repeat labs today and treat accordingly.  Start ferrous sulfate if verified iron deficient.  Decide on transfusion depending on hgb level. Refer to hematology if with further concerns.  Patient to schedule surgery clinic follow up for further management of the gastric polyp found, as well as follow up on the bleding lesion found on EGD.  Reasons to go to ER discussed in detail with patient.   - CBC with Platelets & Differential  - Ferritin  - Iron & Iron Binding Capacity  - CBC with Platelets & Differential  - Iron & Iron Binding Capacity    Essential hypertension  Patient was advised BP uncontrolled today.  Reviewed meds with patient. Patient verifies he takes olmesartan only one tablet a day  Increase olmesartan to 10 mg daily to optimize management.  Reinforced sodium restriction.  Exercise recommendations reviewed with patient.  Home BP monitoring. Patient to report measurements to PCP care team next week.      Type 2 diabetes mellitus with microalbuminuria, without long-term current use of insulin (H)  - metFORMIN (GLUCOPHAGE) 500 MG tablet  Dispense: 360 tablet; Refill: 0  - Hemoglobin A1c    Hyperlipidemia LDL goal <70  - atorvastatin (LIPITOR) 80 MG tablet  Dispense: 90 tablet; Refill: 0    Melena  - Ferritin            MED REC REQUIRED  Post Medication Reconciliation Status: discharge medications reconciled, continue medications without change  BMI  Estimated body mass index is 34.49 kg/m  as calculated from the following:    Height as of this encounter: 1.848 m (6' 0.75\").    Weight as of this encounter: 117.8 kg (259 lb 9.6 oz).   Weight management plan: Discussed healthy diet and exercise guidelines      Follow-up   Return if symptoms worsen or fail to improve.        Juan F Parrish is a 74 year old, presenting for the following health issues:  Refill Request (Patient is needing refills on metformin, " atorvastatin and testoterone) and ER F/U      6/4/2025     1:14 PM   Additional Questions   Roomed by Elizabeth BARNES   Accompanied by self     HPI      ED/UC Followup:    Facility:  [facility]  Date of visit: 5/4/25  Reason for visit: abdominal pain  Current Status: Given 2 units of blood for low hgb before he left for the . Continues to have weakness and heart rate elevated.  Abdominal pain has resolved. Does have history of polyp in stomach which needs to be removed, history of hernias.      Patient denies black tarry stools.    Needing refills of metformin, atorvastatin and testoterone  Patient states his pharmacist told him he does not have any more refills on file in spite of the last Rx from PCP dated 10/2024 showed he has 3 refills.    Diabetes Follow-up    How often are you checking your blood sugar? A few times a week  What time of day are you checking your blood sugars (select all that apply)?  Before meals  Have you had any blood sugars above 200?  No  Have you had any blood sugars below 70?  No  What symptoms do you notice when your blood sugar is low?  Shaky  What concerns do you have today about your diabetes? None   Do you have any of these symptoms? (Select all that apply)  Numbness in feet      BP Readings from Last 2 Encounters:   06/04/25 (!) 156/84   04/01/25 132/70     Hemoglobin A1C (%)   Date Value   03/29/2025 5.7 (H)   10/25/2024 6.1 (H)   07/08/2021 6.9 (H)   04/08/2021 8.2 (H)     LDL Cholesterol Calculated (mg/dL)   Date Value   10/25/2024 55   09/07/2023 48   07/08/2021 76   08/03/2020 93             Hyperlipidemia Follow-Up    Are you regularly taking any medication or supplement to lower your cholesterol?   Yes- atorvastatin  Are you having muscle aches or other side effects that you think could be caused by your cholesterol lowering medication?  No      Review of Systems  Constitutional, HEENT, cardiovascular, pulmonary, GI, , musculoskeletal, neuro, skin, endocrine and psych  "systems are negative, except as otherwise noted.      Objective    BP (!) 156/84 (BP Location: Right arm, Patient Position: Chair, Cuff Size: Adult Large)   Pulse 93   Temp 97  F (36.1  C) (Tympanic)   Resp 12   Ht 1.848 m (6' 0.75\")   Wt 117.8 kg (259 lb 9.6 oz)   SpO2 99%   BMI 34.49 kg/m    Body mass index is 34.49 kg/m .  Physical Exam   GENERAL: obese, ambulatory w/o assist , alert and no distress  EYES: pale conjunctivae, no icterus  RESP: lungs clear to auscultation - no rales, no rhonchi, no wheezes  CV: regular rates and rhythm, normal S1 S2, no S3 or S4 and no murmur, no click or rub  ABD: protuberant abdomen, no skin changes, no TTP, no palpable mass, no guarding, no Reese's sign, no palpable organomegaly  MS: extremities- no gross deformities noted, no edema  SKIN: good turgor, no jaundice or rash     No results found for any visits on 06/04/25.        Signed Electronically by: Oral Jennigns MD    "

## 2025-06-05 DIAGNOSIS — Z12.5 SCREENING FOR PROSTATE CANCER: Primary | ICD-10-CM

## 2025-06-05 DIAGNOSIS — E29.1 PRIMARY TESTICULAR HYPOGONADISM: ICD-10-CM

## 2025-06-05 RX ORDER — TESTOSTERONE GEL, 1% 10 MG/G
GEL TRANSDERMAL
Qty: 250 G | Refills: 5 | Status: SHIPPED | OUTPATIENT
Start: 2025-06-05

## 2025-06-05 NOTE — TELEPHONE ENCOUNTER
Contacts       Contact Date/Time Type Contact Phone/Fax    06/05/2025 01:01 PM CDT Interface (Incoming) [id number] - [address] AT [address] (Pharmacy) [phone]    06/05/2025 03:25 PM CDT Phone (Outgoing) Francois Lujan (Self) [phone] (M)    Left Message           Attempted to reach patient to: Schedule an appointment    When patient returns call, please take this action: Assist with scheduling    Reason for the visit: lab    When to schedule: Next available    Additional comments/info: Fasting, morning lab    If unable to schedule: Add a note to the telephone encounter noting the barrier and route back to primary care team pool

## 2025-07-08 ENCOUNTER — TELEPHONE (OUTPATIENT)
Dept: GASTROENTEROLOGY | Facility: CLINIC | Age: 75
End: 2025-07-08
Payer: COMMERCIAL

## 2025-07-08 NOTE — TELEPHONE ENCOUNTER
Endoscopy Scheduling Screen      What insurance is in the chart?  Other:  [facility]/medicare    Ordering/Referring Provider: Teresita Alvarez,     (If ordering provider performs procedure, schedule with ordering provider unless otherwise instructed. )    BMI: 34.49    Sedation Ordered  general anesthesia.   BMI<= 45 45 < BMI <= 48 48 < BMI < = 50  BMI > 50   No Restrictions No MG ASC  No [facility]  [facility] with exceptions Hospital Only OR Only       Do you have a history of malignant hyperthermia?  NO    (Females) Are you currently pregnant?   NO     Are you currently on dialysis?   NO    Do you need assistance transferring?   NO    BMI: There is no height or weight on file to calculate BMI.     Is patients BMI > 50?  NO    BMI > 40?  NO    Do you have a diagnosis of diabetes?  Yes (Golytely Prep)    Do you take an Oral or Injectable medication for weight loss or diabetes (excluding insulin)?  NO    Do you take the medication Naltrexone?  NO    Do you take blood thinners?  Yes, you must contact your prescribing provider for directions on holding or bridging with a different medication.  clopidogrel (PLAVIX)   Prep   Are you currently have chronic kidney disease?  NO    Do you have a diagnosis of cystic fibrosis (CF)?  NO    On a regular basis do you go 3 -5 days between each bowel movements?  NO    Preferred Pharmacy:    [facility] [place] [address] [facility]  [address] [facility] [address]  Phone: [phone] Fax: [phone]    [facility] - [place], [place] - [address] [facility]  [address] [facility] [address]  Phone: [phone] Fax: [phone]    [id number] - [place], [place] - [address]   [address] [facility] [address]  Phone: [phone] Fax: [phone]    [facility] [address] [facility] - [place], [place] - [address] [facility]  [address] [facility] [address]  Phone: [phone] Fax: [phone]    [id number] - [place], [place] - [address]  [facility]  [address] [facility] [address]  Phone: [phone] Fax: [phone]    [id number] - [place], [place] - [address] AT [facility] [address]  [address] [facility] [address]  Phone: [phone] Fax: [phone]      Final Scheduling Details     Procedure scheduled  Upper endoscopy (EGD)    Surgeon:  Deepthi      Date of procedure:  7/11/25     Location  [place] - Per order.    What is your communication preference for Instructions and/or Bowel Prep?   [facility]    Patient Reminders:    You will receive a call from a Nurse to review instructions and health history.  This assessment must be completed prior to your procedure.  Failure to complete the Nurse assessment may result in the procedure being cancelled.       On the day of your procedure, please designate an adult(s) who can drive you home stay with you for the next 24 hours. The medicines used in the exam will make you sleepy. You will not be able to drive.       You cannot take public transportation, ride share services, or non-medical taxi service without a responsible caregiver.  Medical transport services are allowed with the requirement that a responsible caregiver will receive you at your destination.  We require that drivers and caregivers are confirmed prior to your procedure.

## 2025-07-11 ENCOUNTER — HOSPITAL ENCOUNTER (OUTPATIENT)
Facility: CLINIC | Age: 75
Discharge: HOME | End: 2025-07-11
Attending: SURGERY | Admitting: SURGERY
Payer: COMMERCIAL

## 2025-07-11 LAB
GLUCOSE BLDC GLUCOMTR-MCNC: 109 MG/DL (ref 70–99)
HGB BLD-MCNC: 9.3 G/DL (ref 13.3–17.7)
HOLD SPECIMEN: NORMAL
MCV RBC AUTO: 93 FL (ref 78–100)
UPPER GI ENDOSCOPY: NORMAL

## 2025-07-11 PROCEDURE — 370N000017 HC ANESTHESIA TECHNICAL FEE, PER MIN: Performed by: SURGERY

## 2025-07-11 PROCEDURE — 36415 COLL VENOUS BLD VENIPUNCTURE: CPT | Performed by: SURGERY

## 2025-07-11 PROCEDURE — 82962 GLUCOSE BLOOD TEST: CPT

## 2025-07-11 PROCEDURE — 88305 TISSUE EXAM BY PATHOLOGIST: CPT | Mod: TC | Performed by: SURGERY

## 2025-07-11 PROCEDURE — 43255 EGD CONTROL BLEEDING ANY: CPT | Performed by: SURGERY

## 2025-07-11 PROCEDURE — 43251 EGD REMOVE LESION SNARE: CPT | Performed by: SURGERY

## 2025-07-11 PROCEDURE — 85018 HEMOGLOBIN: CPT | Performed by: SURGERY

## 2025-07-11 PROCEDURE — 43239 EGD BIOPSY SINGLE/MULTIPLE: CPT | Performed by: SURGERY

## 2025-07-11 RX ORDER — NALOXONE HYDROCHLORIDE 0.4 MG/ML
0.4 INJECTION, SOLUTION INTRAMUSCULAR; INTRAVENOUS; SUBCUTANEOUS
Status: DISCONTINUED | OUTPATIENT
Start: 2025-07-11 | End: 2025-07-11 | Stop reason: HOSPADM

## 2025-07-11 RX ORDER — LIDOCAINE 40 MG/G
CREAM TOPICAL
Status: DISCONTINUED | OUTPATIENT
Start: 2025-07-11 | End: 2025-07-11 | Stop reason: HOSPADM

## 2025-07-11 RX ORDER — ONDANSETRON 2 MG/ML
4 INJECTION INTRAMUSCULAR; INTRAVENOUS
Status: DISCONTINUED | OUTPATIENT
Start: 2025-07-11 | End: 2025-07-11 | Stop reason: HOSPADM

## 2025-07-11 RX ORDER — SODIUM CHLORIDE, SODIUM LACTATE, POTASSIUM CHLORIDE, CALCIUM CHLORIDE 600; 310; 30; 20 MG/100ML; MG/100ML; MG/100ML; MG/100ML
INJECTION, SOLUTION INTRAVENOUS CONTINUOUS
Status: DISCONTINUED | OUTPATIENT
Start: 2025-07-11 | End: 2025-07-11 | Stop reason: HOSPADM

## 2025-07-11 RX ORDER — NALOXONE HYDROCHLORIDE 0.4 MG/ML
0.2 INJECTION, SOLUTION INTRAMUSCULAR; INTRAVENOUS; SUBCUTANEOUS
Status: DISCONTINUED | OUTPATIENT
Start: 2025-07-11 | End: 2025-07-11 | Stop reason: HOSPADM

## 2025-07-11 RX ORDER — FLUMAZENIL 0.1 MG/ML
0.2 INJECTION, SOLUTION INTRAVENOUS
Status: DISCONTINUED | OUTPATIENT
Start: 2025-07-11 | End: 2025-07-11 | Stop reason: HOSPADM

## 2025-07-11 ASSESSMENT — ACTIVITIES OF DAILY LIVING (ADL)
ADLS_ACUITY_SCORE: 55

## 2025-07-11 NOTE — H&P
Formerly [facility] [place]    Pre-Endoscopy History and Physical     Francois Lujan MRN# [id number]   YOB: 1950 Age: 74 year old     Date of Procedure: 7/11/2025  Primary care provider: Teresita Alvarez  Type of Endoscopy: Gastroscopy with possible biopsy, possible dilation  Reason for Procedure: Anemia  Type of Anesthesia Anticipated: Conscious Sedation    HPI:    Francois is a 74 year old male who will be undergoing the above procedure.      Ongoing anemia.  Feels improved, but not better.  Black stools.  On Iron supplementation.  History of AVM clipping in duodenum earlier this year.  Recent hospitalization in [place] with Diverticulitis.    A history and physical has been performed. The patient's medications and allergies have been reviewed. The risks and benefits of the procedure and the sedation options and risks were discussed with the patient.  All questions were answered and informed consent was obtained.      He denies a personal or family history of anesthesia complications or bleeding disorders.     Patient Active Problem List   Diagnosis    S/P CABG (coronary artery bypass graft)    Hypogonadotropic hypogonadism    Acquired hypothyroidism    Hard of hearing    Hyperlipidemia LDL goal <70    ADELA (obstructive sleep apnea)    Essential hypertension    Numbness of hand, right    Radicular pain of lumbosacral region    History of back surgery    Multiple joint pain    OA (osteoarthritis) of knee, right    ACL (anterior cruciate ligament) tear    Necrobiosis lipoidica    CKD (chronic kidney disease) stage 2, GFR 60-89 ml/min    Type 2 diabetes mellitus with microalbuminuria, without long-term current use of insulin (H)    Diabetic polyneuropathy associated with type 2 diabetes mellitus (H)    Alcohol dependence with other alcohol-induced disorder (H)    Coronary artery disease involving native coronary artery of native heart without angina pectoris    Benign neoplasm of colon     Degeneration of lumbar or lumbosacral intervertebral disc    Vitamin D deficiency    Obesity (BMI 35.0-39.9) with comorbidity (H)    History of stroke    Carotid stenosis, right    Chronic idiopathic gout of multiple sites    Moderate major depression (H)    Hallux abducto valgus, right    Hammertoe of right foot    Anemia due to blood loss, acute    Angiodysplasia of duodenum with hemorrhage        Past Medical History:   Diagnosis Date    Arthritis     CAD (coronary artery disease)     Diabetes mellitus (H)     Diabetic ulcer of other part of right foot associated with diabetes mellitus due to underlying condition, limited to breakdown of skin (H) 08/15/2023    Erythema nodosum 01/01/1982    Gout     Hypertension     Malignant neoplasm (H)     squamous cell CA removed from right hand    Polymyalgia rheumatica 03/03/2014    Thyroid disease     Triceps tendon rupture, left, sequela 04/10/2017        Past Surgical History:   Procedure Laterality Date    BACK SURGERY      BYPASS GRAFT ARTERY CORONARY  11/17/2011    Procedure:BYPASS GRAFT ARTERY CORONARY; CORONARY ARTERY BYPASS, Right, OM, LAD WITH ENDOVEIN HARVEST, ON PUMP; Surgeon:LEONEL MG; Location: OR    COLONOSCOPY N/A 06/09/2016    Procedure: COLONOSCOPY;  Surgeon: Isidro Humphreys MD;  Location: [facility]    HERNIA REPAIR      infantile hernia repair    ORTHOPEDIC SURGERY  4/11/2017    Baker cyst removed - right knee, and mesiscus tear repair    HI LAMINEC/FACETECT/FORAMIN,LUMBAR 1 SEG      Description: Laminectomy Decompress, Facetectomy, Foraminotomy Lumbar Seg;  Recorded: 12/13/2007;  Comments: '71    HI MASTECTOMY FOR GYNECOMASTIA      Description: Breast Surgery Mastectomy For Gynecomastia Bilateral;  Recorded: 12/13/2007;    REPAIR TENDON TRICEPS UPPER EXTREMITY Left 04/11/2017    Procedure: REPAIR TENDON TRICEPS UPPER EXTREMITY;  Surgeon: Rodriguez Kelley MD;  Location: [facility]    [facility] CABG, VEIN, SINGLE      Description: CABG (CABG);  Proc Date:  2011;  Comments: 3 vessel       Social History     Tobacco Use    Smoking status: Former     Current packs/day: 0.00     Average packs/day: 2.0 packs/day for 30.0 years (60.0 ttl pk-yrs)     Types: Cigarettes     Start date: 1967     Quit date: 1997     Years since quittin.6    Smokeless tobacco: Never   Substance Use Topics    Alcohol use: Yes     Alcohol/week: 10.0 standard drinks of alcohol     Types: 12 Cans of beer per week     Comment: on the weekends about 5 drinks a weekend        Family History   Problem Relation Age of Onset    Heart Disease Father     Cancer Mother     C.A.D. Brother     Septicemia Brother 3    Lung Cancer Sister     Schizophrenia Sister        Prior to Admission medications    Medication Sig Start Date End Date Taking? Authorizing Provider   allopurinol (ZYLOPRIM) 300 MG tablet Take 1 tablet (300 mg) by mouth daily. 10/25/24  Yes Teresita Alvarez DO   amLODIPine (NORVASC) 10 MG tablet Take 1 tablet (10 mg) by mouth daily. 10/25/24  Yes Teresita Alvarez DO   atorvastatin (LIPITOR) 80 MG tablet Take 1 tablet (80 mg) by mouth daily. 25  Yes Oral Jennings MD   ferrous sulfate (FE TABS) 325 (65 Fe) MG EC tablet Take 1 tablet (325 mg) by mouth 2 times daily. 25  Yes Oral Jennings MD   gabapentin (NEURONTIN) 300 MG capsule Take 1 capsule (300 mg) by mouth 2 times daily. 25  Yes Teresita Alvarez DO   glipiZIDE (GLUCOTROL XL) 5 MG 24 hr tablet Take 1 tablet (5 mg) by mouth daily. 10/25/24  Yes Teresita Alvarez DO   levothyroxine (SYNTHROID/LEVOTHROID) 50 MCG tablet Take 1 tablet (50 mcg) by mouth every morning. Take on an empty stomach. 10/25/24  Yes Teresita Alvarez DO   metFORMIN (GLUCOPHAGE) 500 MG tablet Take 2 tablets (1,000 mg) by mouth 2 times daily (with meals). 25  Yes Oral Jennings MD   Multiple Vitamins-Minerals (MULTIVITAMIN ADULTS) TABS Take 1 tablet by mouth daily.   Yes Reported,  "Patient   olmesartan (BENICAR) 5 MG tablet Take 2 tablets (10 mg) by mouth daily. 10/25/24  Yes Teresita Alvarez DO   pantoprazole (PROTONIX) 40 MG EC tablet Take 1 tablet (40 mg) by mouth daily. 3/29/25  Yes Edi Ram MD   blood glucose (ACCU-CHEK GUIDE) test strip TEST ONCE DAILY AS DIRECTED 8/7/23   Teresita Alvarez DO   blood glucose (NO BRAND SPECIFIED) lancets standard Use to test blood sugar 3 times daily or as directed. 4/9/20   Karen Ivory MD   clopidogrel (PLAVIX) 75 MG tablet Take 1 tablet (75 mg) by mouth daily. 10/25/24   Teresita Alvarez DO   testosterone (ANDROGEL/TESTIM) 50 MG/5GM (1%) topical gel APPLY 1 PACKET TOPICALLY TO SHOULDERS DAILY 6/5/25   Teresita Alvarez DO   tetrahydrozoline (VISINE) 0.05 % ophthalmic solution Place 1 drop into both eyes 3 times daily as needed.    Reported, Patient       Allergies   Allergen Reactions    Hydrocodone-Acetaminophen Itching    Iodine     Irbesartan      renal insuff and hyperkalemia      Lisinopril Nausea    Sulfa Antibiotics         REVIEW OF SYSTEMS:   5 point ROS negative except as noted above in HPI, including Gen., Resp., CV, GI &  system review.    PHYSICAL EXAM:   There were no vitals taken for this visit. Estimated body mass index is 34.49 kg/m  as calculated from the following:    Height as of 6/4/25: 1.848 m (6' 0.75\").    Weight as of 6/4/25: 117.8 kg (259 lb 9.6 oz).   Constitutional: Awake, alert, no acute distress.  Eyes: No scleral icterus.  Conjunctiva are without injection.  ENMT: Mucous membranes moist, dentition and gums are intact.   Neck: Soft, supple, trachea midline.    Endocrine: n/a   Lymphatic: There is no cervical, submandibularadenopathy.  Respiratory: normal effortgs   Cardiovascular: S1, S2  Abdomen: Non-distended, non-tender,  No masses,  Musculoskeletal: No spinal or CVA tenderness. Full range of motion in the upper and lower extremities.    Skin: No skin rashes or lesions to " inspection.  No petechia.    Neurologic: alerted and oriented 3x  Psychiatric: The patient's affect is not blunted and mood is appropriate.  DIAGNOSTICS:    Not indicated    IMPRESSION   ASA Class 3 - Severe systemic disease, but not incapacitating    PLAN:   Plan for Gastroscopy with possible biopsy, possible dilation. We discussed the risks, benefits and alternatives and the patient wished to proceed.  Patient is cleared for the above procedure.    The above has been forwarded to the consulting provider.    Lenin Lacey DO  [facility]

## 2025-07-16 LAB
PATH REPORT.COMMENTS IMP SPEC: NORMAL
PATH REPORT.COMMENTS IMP SPEC: NORMAL
PATH REPORT.FINAL DX SPEC: NORMAL
PATH REPORT.GROSS SPEC: NORMAL
PATH REPORT.MICROSCOPIC SPEC OTHER STN: NORMAL
PATH REPORT.RELEVANT HX SPEC: NORMAL
PHOTO IMAGE: NORMAL

## 2025-07-16 PROCEDURE — 88305 TISSUE EXAM BY PATHOLOGIST: CPT | Mod: 26 | Performed by: PATHOLOGY

## 2025-07-23 ENCOUNTER — RESULTS FOLLOW-UP (OUTPATIENT)
Dept: SURGERY | Facility: CLINIC | Age: 75
End: 2025-07-23
Payer: COMMERCIAL

## 2025-07-23 DIAGNOSIS — D62 ANEMIA DUE TO BLOOD LOSS, ACUTE: Primary | ICD-10-CM

## 2025-08-06 ENCOUNTER — HOSPITAL ENCOUNTER (EMERGENCY)
Facility: CLINIC | Age: 75
Discharge: HOME | End: 2025-08-06
Attending: FAMILY MEDICINE | Admitting: FAMILY MEDICINE
Payer: COMMERCIAL

## 2025-08-06 VITALS
WEIGHT: 258 LBS | HEART RATE: 80 BPM | OXYGEN SATURATION: 96 % | TEMPERATURE: 99.2 F | DIASTOLIC BLOOD PRESSURE: 115 MMHG | SYSTOLIC BLOOD PRESSURE: 142 MMHG | BODY MASS INDEX: 34.95 KG/M2 | HEIGHT: 72 IN | RESPIRATION RATE: 21 BRPM

## 2025-08-06 DIAGNOSIS — R60.0 PERIPHERAL EDEMA: ICD-10-CM

## 2025-08-06 DIAGNOSIS — J18.9 PNEUMONIA OF BOTH UPPER LOBES DUE TO INFECTIOUS ORGANISM: Primary | ICD-10-CM

## 2025-08-06 DIAGNOSIS — I48.91 NEW ONSET ATRIAL FIBRILLATION (H): ICD-10-CM

## 2025-08-06 LAB
ALBUMIN SERPL BCG-MCNC: 4.1 G/DL (ref 3.5–5.2)
ALP SERPL-CCNC: 189 U/L (ref 40–150)
ALT SERPL W P-5'-P-CCNC: 14 U/L (ref 0–70)
ANION GAP SERPL CALCULATED.3IONS-SCNC: 14 MMOL/L (ref 7–15)
AST SERPL W P-5'-P-CCNC: 30 U/L (ref 0–45)
BASOPHILS # BLD AUTO: 0.1 10E3/UL (ref 0–0.2)
BASOPHILS NFR BLD AUTO: 1 %
BILIRUB SERPL-MCNC: 0.6 MG/DL
BUN SERPL-MCNC: 19.6 MG/DL (ref 8–23)
CALCIUM SERPL-MCNC: 9.2 MG/DL (ref 8.8–10.4)
CHLORIDE SERPL-SCNC: 103 MMOL/L (ref 98–107)
CREAT SERPL-MCNC: 1.02 MG/DL (ref 0.67–1.17)
D DIMER PPP FEU-MCNC: 0.75 UG/ML FEU (ref 0–0.5)
EGFRCR SERPLBLD CKD-EPI 2021: 77 ML/MIN/1.73M2
EOSINOPHIL # BLD AUTO: 0.5 10E3/UL (ref 0–0.7)
EOSINOPHIL NFR BLD AUTO: 4 %
ERYTHROCYTE [DISTWIDTH] IN BLOOD BY AUTOMATED COUNT: 19.3 % (ref 10–15)
ETHANOL SERPL-MCNC: <0.01 G/DL
GLUCOSE SERPL-MCNC: 126 MG/DL (ref 70–99)
HCO3 SERPL-SCNC: 19 MMOL/L (ref 22–29)
HCT VFR BLD AUTO: 31.3 % (ref 40–53)
HGB BLD-MCNC: 10.3 G/DL (ref 13.3–17.7)
IMM GRANULOCYTES # BLD: 0.1 10E3/UL
IMM GRANULOCYTES NFR BLD: 1 %
LYMPHOCYTES # BLD AUTO: 2.6 10E3/UL (ref 0.8–5.3)
LYMPHOCYTES NFR BLD AUTO: 25 %
MCH RBC QN AUTO: 31.1 PG (ref 26.5–33)
MCHC RBC AUTO-ENTMCNC: 32.9 G/DL (ref 31.5–36.5)
MCV RBC AUTO: 95 FL (ref 78–100)
MONOCYTES # BLD AUTO: 1.1 10E3/UL (ref 0–1.3)
MONOCYTES NFR BLD AUTO: 10 %
NEUTROPHILS # BLD AUTO: 6.1 10E3/UL (ref 1.6–8.3)
NEUTROPHILS NFR BLD AUTO: 59 %
NRBC # BLD AUTO: 0 10E3/UL
NRBC BLD AUTO-RTO: 0 /100
NT-PROBNP SERPL-MCNC: 660 PG/ML (ref 0–229)
PLATELET # BLD AUTO: 258 10E3/UL (ref 150–450)
POTASSIUM SERPL-SCNC: 5.3 MMOL/L (ref 3.4–5.3)
PROT SERPL-MCNC: 7.6 G/DL (ref 6.4–8.3)
RBC # BLD AUTO: 3.31 10E6/UL (ref 4.4–5.9)
SODIUM SERPL-SCNC: 136 MMOL/L (ref 135–145)
WBC # BLD AUTO: 10.4 10E3/UL (ref 4–11)

## 2025-08-06 PROCEDURE — 85018 HEMOGLOBIN: CPT | Performed by: FAMILY MEDICINE

## 2025-08-06 PROCEDURE — 250N000011 HC RX IP 250 OP 636: Performed by: FAMILY MEDICINE

## 2025-08-06 PROCEDURE — 83880 ASSAY OF NATRIURETIC PEPTIDE: CPT | Performed by: FAMILY MEDICINE

## 2025-08-06 PROCEDURE — 93005 ELECTROCARDIOGRAM TRACING: CPT

## 2025-08-06 PROCEDURE — 99285 EMERGENCY DEPT VISIT HI MDM: CPT | Performed by: FAMILY MEDICINE

## 2025-08-06 PROCEDURE — 82947 ASSAY GLUCOSE BLOOD QUANT: CPT | Performed by: FAMILY MEDICINE

## 2025-08-06 PROCEDURE — 36415 COLL VENOUS BLD VENIPUNCTURE: CPT | Performed by: FAMILY MEDICINE

## 2025-08-06 PROCEDURE — 93010 ELECTROCARDIOGRAM REPORT: CPT | Performed by: FAMILY MEDICINE

## 2025-08-06 PROCEDURE — 85379 FIBRIN DEGRADATION QUANT: CPT | Performed by: FAMILY MEDICINE

## 2025-08-06 PROCEDURE — 82077 ASSAY SPEC XCP UR&BREATH IA: CPT | Performed by: FAMILY MEDICINE

## 2025-08-06 PROCEDURE — 99285 EMERGENCY DEPT VISIT HI MDM: CPT | Mod: 25 | Performed by: FAMILY MEDICINE

## 2025-08-06 PROCEDURE — 96374 THER/PROPH/DIAG INJ IV PUSH: CPT | Mod: 59

## 2025-08-06 PROCEDURE — 250N000009 HC RX 250: Performed by: FAMILY MEDICINE

## 2025-08-06 RX ORDER — IOPAMIDOL 755 MG/ML
86 INJECTION, SOLUTION INTRAVASCULAR ONCE
Status: COMPLETED | OUTPATIENT
Start: 2025-08-06 | End: 2025-08-06

## 2025-08-06 RX ORDER — AZITHROMYCIN 250 MG/1
TABLET, FILM COATED ORAL
Qty: 6 TABLET | Refills: 0 | Status: SHIPPED | OUTPATIENT
Start: 2025-08-06 | End: 2025-08-13

## 2025-08-06 RX ORDER — FUROSEMIDE 10 MG/ML
60 INJECTION INTRAMUSCULAR; INTRAVENOUS ONCE
Status: COMPLETED | OUTPATIENT
Start: 2025-08-06 | End: 2025-08-06

## 2025-08-06 RX ORDER — OLMESARTAN MEDOXOMIL 5 MG/1
5 TABLET ORAL DAILY
COMMUNITY

## 2025-08-06 RX ADMIN — FUROSEMIDE 60 MG: 10 INJECTION, SOLUTION INTRAMUSCULAR; INTRAVENOUS at 13:55

## 2025-08-06 RX ADMIN — SODIUM CHLORIDE 100 ML: 9 INJECTION, SOLUTION INTRAVENOUS at 17:20

## 2025-08-06 RX ADMIN — IOPAMIDOL 86 ML: 755 INJECTION, SOLUTION INTRAVENOUS at 17:20

## 2025-08-06 ASSESSMENT — ACTIVITIES OF DAILY LIVING (ADL)
ADLS_ACUITY_SCORE: 55

## 2025-08-07 ENCOUNTER — PATIENT OUTREACH (OUTPATIENT)
Dept: CARE COORDINATION | Facility: CLINIC | Age: 75
End: 2025-08-07
Payer: COMMERCIAL

## 2025-08-07 LAB
ATRIAL RATE - MUSE: NORMAL BPM
DIASTOLIC BLOOD PRESSURE - MUSE: NORMAL MMHG
INTERPRETATION ECG - MUSE: NORMAL
P AXIS - MUSE: NORMAL DEGREES
PR INTERVAL - MUSE: NORMAL MS
QRS DURATION - MUSE: 88 MS
QT - MUSE: 366 MS
QTC - MUSE: 464 MS
R AXIS - MUSE: -3 DEGREES
SYSTOLIC BLOOD PRESSURE - MUSE: NORMAL MMHG
T AXIS - MUSE: 25 DEGREES
VENTRICULAR RATE- MUSE: 97 BPM

## 2025-08-11 ENCOUNTER — OFFICE VISIT (OUTPATIENT)
Dept: FAMILY MEDICINE | Facility: CLINIC | Age: 75
End: 2025-08-11
Payer: COMMERCIAL

## 2025-08-11 ENCOUNTER — TELEPHONE (OUTPATIENT)
Dept: GASTROENTEROLOGY | Facility: CLINIC | Age: 75
End: 2025-08-11

## 2025-08-11 ENCOUNTER — PATIENT OUTREACH (OUTPATIENT)
Dept: CARE COORDINATION | Facility: CLINIC | Age: 75
End: 2025-08-11

## 2025-08-11 VITALS
HEIGHT: 72 IN | OXYGEN SATURATION: 96 % | BODY MASS INDEX: 35.76 KG/M2 | WEIGHT: 264 LBS | RESPIRATION RATE: 20 BRPM | TEMPERATURE: 98.3 F | SYSTOLIC BLOOD PRESSURE: 150 MMHG | DIASTOLIC BLOOD PRESSURE: 70 MMHG | HEART RATE: 91 BPM

## 2025-08-11 DIAGNOSIS — Z12.5 SCREENING FOR PROSTATE CANCER: ICD-10-CM

## 2025-08-11 DIAGNOSIS — J18.9 PNEUMONIA OF BOTH UPPER LOBES DUE TO INFECTIOUS ORGANISM: Primary | ICD-10-CM

## 2025-08-11 DIAGNOSIS — I25.10 CORONARY ARTERY DISEASE INVOLVING NATIVE CORONARY ARTERY OF NATIVE HEART WITHOUT ANGINA PECTORIS: ICD-10-CM

## 2025-08-11 DIAGNOSIS — D62 ANEMIA DUE TO BLOOD LOSS, ACUTE: ICD-10-CM

## 2025-08-11 DIAGNOSIS — M79.89 LEG SWELLING: ICD-10-CM

## 2025-08-11 DIAGNOSIS — E29.1 PRIMARY TESTICULAR HYPOGONADISM: ICD-10-CM

## 2025-08-11 DIAGNOSIS — I48.91 ATRIAL FIBRILLATION, UNSPECIFIED TYPE (H): ICD-10-CM

## 2025-08-11 DIAGNOSIS — Z95.1 S/P CABG (CORONARY ARTERY BYPASS GRAFT): ICD-10-CM

## 2025-08-11 DIAGNOSIS — D64.9 ANEMIA, UNSPECIFIED TYPE: ICD-10-CM

## 2025-08-11 DIAGNOSIS — I10 ESSENTIAL HYPERTENSION: ICD-10-CM

## 2025-08-11 LAB
ERYTHROCYTE [DISTWIDTH] IN BLOOD BY AUTOMATED COUNT: 17.8 % (ref 10–15)
HCT VFR BLD AUTO: 32.2 % (ref 40–53)
HGB BLD-MCNC: 10.6 G/DL (ref 13.3–17.7)
MCH RBC QN AUTO: 30.2 PG (ref 26.5–33)
MCHC RBC AUTO-ENTMCNC: 32.9 G/DL (ref 31.5–36.5)
MCV RBC AUTO: 92 FL (ref 78–100)
PLATELET # BLD AUTO: 320 10E3/UL (ref 150–450)
PSA SERPL DL<=0.01 NG/ML-MCNC: 0.06 NG/ML (ref 0–6.5)
RBC # BLD AUTO: 3.51 10E6/UL (ref 4.4–5.9)
WBC # BLD AUTO: 10.1 10E3/UL (ref 4–11)

## 2025-08-11 PROCEDURE — 36415 COLL VENOUS BLD VENIPUNCTURE: CPT | Performed by: FAMILY MEDICINE

## 2025-08-11 PROCEDURE — G2211 COMPLEX E/M VISIT ADD ON: HCPCS | Performed by: FAMILY MEDICINE

## 2025-08-11 PROCEDURE — 99215 OFFICE O/P EST HI 40 MIN: CPT | Performed by: FAMILY MEDICINE

## 2025-08-11 PROCEDURE — G0103 PSA SCREENING: HCPCS | Performed by: FAMILY MEDICINE

## 2025-08-11 PROCEDURE — 85027 COMPLETE CBC AUTOMATED: CPT | Performed by: FAMILY MEDICINE

## 2025-08-11 PROCEDURE — 1126F AMNT PAIN NOTED NONE PRSNT: CPT | Performed by: FAMILY MEDICINE

## 2025-08-11 PROCEDURE — 3077F SYST BP >= 140 MM HG: CPT | Performed by: FAMILY MEDICINE

## 2025-08-11 PROCEDURE — 3078F DIAST BP <80 MM HG: CPT | Performed by: FAMILY MEDICINE

## 2025-08-11 RX ORDER — FUROSEMIDE 20 MG/1
20 TABLET ORAL DAILY
Qty: 5 TABLET | Refills: 0 | Status: SHIPPED | OUTPATIENT
Start: 2025-08-11 | End: 2025-08-13

## 2025-08-11 RX ORDER — METOPROLOL TARTRATE 25 MG/1
12.5 TABLET, FILM COATED ORAL 2 TIMES DAILY
Qty: 180 TABLET | Refills: 0 | Status: SHIPPED | OUTPATIENT
Start: 2025-08-11

## 2025-08-11 ASSESSMENT — PAIN SCALES - GENERAL: PAINLEVEL_OUTOF10: NO PAIN (0)

## 2025-08-13 ENCOUNTER — OFFICE VISIT (OUTPATIENT)
Dept: CARDIOLOGY | Facility: CLINIC | Age: 75
End: 2025-08-13
Attending: FAMILY MEDICINE
Payer: COMMERCIAL

## 2025-08-13 VITALS
RESPIRATION RATE: 20 BRPM | WEIGHT: 258.7 LBS | BODY MASS INDEX: 35.04 KG/M2 | DIASTOLIC BLOOD PRESSURE: 71 MMHG | OXYGEN SATURATION: 96 % | SYSTOLIC BLOOD PRESSURE: 130 MMHG | HEART RATE: 84 BPM | HEIGHT: 72 IN

## 2025-08-13 DIAGNOSIS — I48.91 NEW ONSET ATRIAL FIBRILLATION (H): ICD-10-CM

## 2025-08-13 DIAGNOSIS — M79.89 LEG SWELLING: ICD-10-CM

## 2025-08-13 RX ORDER — FUROSEMIDE 40 MG/1
40 TABLET ORAL DAILY
Qty: 60 TABLET | Refills: 3 | Status: SHIPPED | OUTPATIENT
Start: 2025-08-13

## 2025-08-19 ENCOUNTER — OFFICE VISIT (OUTPATIENT)
Dept: FAMILY MEDICINE | Facility: CLINIC | Age: 75
End: 2025-08-19
Payer: COMMERCIAL

## 2025-08-19 VITALS
WEIGHT: 257 LBS | TEMPERATURE: 98.9 F | SYSTOLIC BLOOD PRESSURE: 130 MMHG | RESPIRATION RATE: 20 BRPM | DIASTOLIC BLOOD PRESSURE: 60 MMHG | HEIGHT: 72 IN | OXYGEN SATURATION: 96 % | HEART RATE: 79 BPM | BODY MASS INDEX: 34.81 KG/M2

## 2025-08-19 DIAGNOSIS — I10 ESSENTIAL HYPERTENSION: ICD-10-CM

## 2025-08-19 DIAGNOSIS — N18.2 CKD (CHRONIC KIDNEY DISEASE) STAGE 2, GFR 60-89 ML/MIN: ICD-10-CM

## 2025-08-19 DIAGNOSIS — J18.9 PNEUMONIA DUE TO INFECTIOUS ORGANISM, UNSPECIFIED LATERALITY, UNSPECIFIED PART OF LUNG: Primary | ICD-10-CM

## 2025-08-19 DIAGNOSIS — I48.91 ATRIAL FIBRILLATION, UNSPECIFIED TYPE (H): ICD-10-CM

## 2025-08-19 DIAGNOSIS — R80.9 TYPE 2 DIABETES MELLITUS WITH MICROALBUMINURIA, WITHOUT LONG-TERM CURRENT USE OF INSULIN (H): ICD-10-CM

## 2025-08-19 DIAGNOSIS — E11.29 TYPE 2 DIABETES MELLITUS WITH MICROALBUMINURIA, WITHOUT LONG-TERM CURRENT USE OF INSULIN (H): ICD-10-CM

## 2025-08-19 DIAGNOSIS — M79.89 LEG SWELLING: ICD-10-CM

## 2025-08-19 DIAGNOSIS — D64.9 ANEMIA, UNSPECIFIED TYPE: ICD-10-CM

## 2025-08-19 DIAGNOSIS — I25.10 CORONARY ARTERY DISEASE INVOLVING NATIVE CORONARY ARTERY OF NATIVE HEART WITHOUT ANGINA PECTORIS: ICD-10-CM

## 2025-08-19 LAB
ANION GAP SERPL CALCULATED.3IONS-SCNC: 12 MMOL/L (ref 7–15)
BUN SERPL-MCNC: 37.6 MG/DL (ref 8–23)
CALCIUM SERPL-MCNC: 9.5 MG/DL (ref 8.8–10.4)
CHLORIDE SERPL-SCNC: 102 MMOL/L (ref 98–107)
CREAT SERPL-MCNC: 1.21 MG/DL (ref 0.67–1.17)
EGFRCR SERPLBLD CKD-EPI 2021: 63 ML/MIN/1.73M2
ERYTHROCYTE [DISTWIDTH] IN BLOOD BY AUTOMATED COUNT: 17.5 % (ref 10–15)
GLUCOSE SERPL-MCNC: 123 MG/DL (ref 70–99)
HCO3 SERPL-SCNC: 22 MMOL/L (ref 22–29)
HCT VFR BLD AUTO: 32.8 % (ref 40–53)
HGB BLD-MCNC: 10.8 G/DL (ref 13.3–17.7)
MCH RBC QN AUTO: 30.5 PG (ref 26.5–33)
MCHC RBC AUTO-ENTMCNC: 32.9 G/DL (ref 31.5–36.5)
MCV RBC AUTO: 92.7 FL (ref 78–100)
PLATELET # BLD AUTO: 338 10E3/UL (ref 150–450)
POTASSIUM SERPL-SCNC: 4.7 MMOL/L (ref 3.4–5.3)
RBC # BLD AUTO: 3.54 10E6/UL (ref 4.4–5.9)
SODIUM SERPL-SCNC: 136 MMOL/L (ref 135–145)
WBC # BLD AUTO: 10.64 10E3/UL (ref 4–11)

## 2025-08-19 PROCEDURE — 3078F DIAST BP <80 MM HG: CPT | Performed by: FAMILY MEDICINE

## 2025-08-19 PROCEDURE — 3075F SYST BP GE 130 - 139MM HG: CPT | Performed by: FAMILY MEDICINE

## 2025-08-19 PROCEDURE — 85027 COMPLETE CBC AUTOMATED: CPT | Performed by: FAMILY MEDICINE

## 2025-08-19 PROCEDURE — 99214 OFFICE O/P EST MOD 30 MIN: CPT | Performed by: FAMILY MEDICINE

## 2025-08-19 PROCEDURE — 1126F AMNT PAIN NOTED NONE PRSNT: CPT | Performed by: FAMILY MEDICINE

## 2025-08-19 PROCEDURE — 36415 COLL VENOUS BLD VENIPUNCTURE: CPT | Performed by: FAMILY MEDICINE

## 2025-08-19 PROCEDURE — 80048 BASIC METABOLIC PNL TOTAL CA: CPT | Performed by: FAMILY MEDICINE

## 2025-08-19 ASSESSMENT — PAIN SCALES - GENERAL: PAINLEVEL_OUTOF10: NO PAIN (0)

## 2025-08-28 ENCOUNTER — OFFICE VISIT (OUTPATIENT)
Dept: FAMILY MEDICINE | Facility: CLINIC | Age: 75
End: 2025-08-28
Payer: COMMERCIAL

## 2025-08-28 VITALS
OXYGEN SATURATION: 95 % | HEART RATE: 81 BPM | DIASTOLIC BLOOD PRESSURE: 68 MMHG | RESPIRATION RATE: 20 BRPM | SYSTOLIC BLOOD PRESSURE: 140 MMHG | BODY MASS INDEX: 34.94 KG/M2 | WEIGHT: 257.6 LBS | TEMPERATURE: 97 F

## 2025-08-28 DIAGNOSIS — I48.91 NEW ONSET A-FIB (H): Primary | ICD-10-CM

## 2025-08-28 DIAGNOSIS — K25.4 GASTROINTESTINAL HEMORRHAGE ASSOCIATED WITH GASTRIC ULCER: ICD-10-CM

## 2025-08-28 DIAGNOSIS — Z86.73 HISTORY OF STROKE: ICD-10-CM

## 2025-08-28 PROBLEM — F32.1 MODERATE MAJOR DEPRESSION (H): Status: RESOLVED | Noted: 2022-01-06 | Resolved: 2025-08-28

## 2025-08-28 ASSESSMENT — PAIN SCALES - GENERAL: PAINLEVEL_OUTOF10: NO PAIN (0)

## 2025-08-31 DIAGNOSIS — R80.9 TYPE 2 DIABETES MELLITUS WITH MICROALBUMINURIA, WITHOUT LONG-TERM CURRENT USE OF INSULIN (H): ICD-10-CM

## 2025-08-31 DIAGNOSIS — E11.29 TYPE 2 DIABETES MELLITUS WITH MICROALBUMINURIA, WITHOUT LONG-TERM CURRENT USE OF INSULIN (H): ICD-10-CM

## 2025-08-31 DIAGNOSIS — E78.5 HYPERLIPIDEMIA LDL GOAL <70: ICD-10-CM

## 2025-09-02 RX ORDER — ATORVASTATIN CALCIUM 80 MG/1
80 TABLET, FILM COATED ORAL DAILY
Qty: 90 TABLET | Refills: 0 | Status: SHIPPED | OUTPATIENT
Start: 2025-09-02

## (undated) DEVICE — SU FIBERLINK #2 BRAIDED PB BLUE W/1.5" CLSD LOOP  AR-7235

## (undated) DEVICE — GLOVE PROTEXIS BLUE W/NEU-THERA 7.0  2D73EB70

## (undated) DEVICE — SOL WATER IRRIG 1000ML BOTTLE 07139-09

## (undated) DEVICE — SOL NACL 0.9% IRRIG 1000ML BOTTLE 07138-09

## (undated) DEVICE — DRAPE CONVERTORS U-DRAPE 60X72" 8476

## (undated) DEVICE — BUR STRK EGG 4.0X9.5X54MM 10 FLUTE 1608-002-035

## (undated) DEVICE — ENDO FORCEP ENDOJAW BIOPSY 2.8MMX230CM FB-220U

## (undated) DEVICE — DEVICE RETRIEVER HEWSON 71111579

## (undated) DEVICE — BLADE KNIFE SURG 10 371110

## (undated) DEVICE — GLOVE PROTEXIS POWDER FREE 7.5 ORTHOPEDIC 2D73ET75

## (undated) DEVICE — ENDO SNARE EXACTO COLD 9MM LOOP 2.4MMX230CM 00711115

## (undated) DEVICE — TOURNIQUET CUFF 18" STERILE

## (undated) DEVICE — BLADE KNIFE SURG 15 371115

## (undated) DEVICE — DEVICE ENDO CLIP INSTINCT PLUS INSC-P-7-230-S  G58010

## (undated) DEVICE — GLOVE PROTEXIS BLUE W/NEU-THERA 7.5  2D73EB75

## (undated) DEVICE — BUR STRK ROUND 4.0X54MM FAST CUT 8 FLUTE 1608-006-137

## (undated) DEVICE — SU STRATAFIX 3-0 MH 12" PS-2 SXMD1B103

## (undated) DEVICE — BLANKET BAIR HUGGER LOWER BODY 42568

## (undated) DEVICE — PACK SHOULDER

## (undated) DEVICE — SUCTION TIP YANKAUER STR K87

## (undated) DEVICE — DRAPE SHEET REV FOLD 3/4 9349

## (undated) DEVICE — CLIP HEMOSTASIS ASSURANCE W16 MM BX00711884

## (undated) DEVICE — GLOVE PROTEXIS W/NEU-THERA 7.0  2D73TE70

## (undated) DEVICE — SU FIBERWIRE 2 38"  AR-7200

## (undated) DEVICE — GOWN LG DISP 9515

## (undated) DEVICE — SU NDL MAYO 1824-4

## (undated) RX ORDER — FENTANYL CITRATE 50 UG/ML
INJECTION, SOLUTION INTRAMUSCULAR; INTRAVENOUS
Status: DISPENSED
Start: 2017-04-11

## (undated) RX ORDER — LIDOCAINE HYDROCHLORIDE 10 MG/ML
INJECTION, SOLUTION INFILTRATION; PERINEURAL
Status: DISPENSED
Start: 2017-04-11

## (undated) RX ORDER — PROPOFOL 10 MG/ML
INJECTION, EMULSION INTRAVENOUS
Status: DISPENSED
Start: 2017-04-11

## (undated) RX ORDER — OXYCODONE HYDROCHLORIDE 5 MG/1
TABLET ORAL
Status: DISPENSED
Start: 2017-04-11

## (undated) RX ORDER — EPHEDRINE SULFATE 50 MG/ML
INJECTION, SOLUTION INTRAVENOUS
Status: DISPENSED
Start: 2017-04-11

## (undated) RX ORDER — ROPIVACAINE HYDROCHLORIDE 7.5 MG/ML
INJECTION, SOLUTION EPIDURAL; PERINEURAL
Status: DISPENSED
Start: 2017-04-11

## (undated) RX ORDER — BUPIVACAINE HYDROCHLORIDE 5 MG/ML
INJECTION, SOLUTION PERINEURAL
Status: DISPENSED
Start: 2017-04-11

## (undated) RX ORDER — GLYCOPYRROLATE 0.2 MG/ML
INJECTION, SOLUTION INTRAMUSCULAR; INTRAVENOUS
Status: DISPENSED
Start: 2017-04-11

## (undated) RX ORDER — LIDOCAINE HCL/EPINEPHRINE/PF 2%-1:200K
VIAL (ML) INJECTION
Status: DISPENSED
Start: 2017-04-11

## (undated) RX ORDER — ACETAMINOPHEN 325 MG/1
TABLET ORAL
Status: DISPENSED
Start: 2017-04-11

## (undated) RX ORDER — LIDOCAINE HYDROCHLORIDE 10 MG/ML
INJECTION, SOLUTION EPIDURAL; INFILTRATION; INTRACAUDAL; PERINEURAL
Status: DISPENSED
Start: 2017-04-11

## (undated) RX ORDER — KETAMINE HYDROCHLORIDE 10 MG/ML
INJECTION, SOLUTION INTRAMUSCULAR; INTRAVENOUS
Status: DISPENSED
Start: 2017-04-11